# Patient Record
Sex: FEMALE | Race: WHITE | NOT HISPANIC OR LATINO | Employment: OTHER | URBAN - METROPOLITAN AREA
[De-identification: names, ages, dates, MRNs, and addresses within clinical notes are randomized per-mention and may not be internally consistent; named-entity substitution may affect disease eponyms.]

---

## 2017-01-12 ENCOUNTER — CLINICAL SUPPORT (OUTPATIENT)
Dept: AUDIOLOGY | Facility: CLINIC | Age: 62
End: 2017-01-12

## 2017-01-12 DIAGNOSIS — H90.3 SENSORINEURAL HEARING LOSS, BILATERAL: Primary | ICD-10-CM

## 2017-01-12 PROCEDURE — 99499 UNLISTED E&M SERVICE: CPT | Mod: S$GLB,,, | Performed by: OTOLARYNGOLOGY

## 2017-01-12 NOTE — PROGRESS NOTES
Mrs. Mcknight was seen for a hearing aid follow up appointment.  I decreased the gain in her high frequencies for her right aid.  She will return if it is not enough.

## 2017-02-14 ENCOUNTER — OFFICE VISIT (OUTPATIENT)
Dept: INTERNAL MEDICINE | Facility: CLINIC | Age: 62
End: 2017-02-14
Payer: COMMERCIAL

## 2017-02-14 VITALS
HEIGHT: 62 IN | WEIGHT: 132.06 LBS | BODY MASS INDEX: 24.3 KG/M2 | DIASTOLIC BLOOD PRESSURE: 70 MMHG | SYSTOLIC BLOOD PRESSURE: 110 MMHG | HEART RATE: 77 BPM | OXYGEN SATURATION: 97 %

## 2017-02-14 DIAGNOSIS — N39.0 URINARY TRACT INFECTION WITHOUT HEMATURIA, SITE UNSPECIFIED: Primary | ICD-10-CM

## 2017-02-14 PROCEDURE — 87086 URINE CULTURE/COLONY COUNT: CPT

## 2017-02-14 PROCEDURE — 99213 OFFICE O/P EST LOW 20 MIN: CPT | Mod: S$GLB,,, | Performed by: HOSPITALIST

## 2017-02-14 PROCEDURE — 99999 PR PBB SHADOW E&M-EST. PATIENT-LVL III: CPT | Mod: PBBFAC,,, | Performed by: HOSPITALIST

## 2017-02-14 RX ORDER — SERTRALINE HYDROCHLORIDE 50 MG/1
50 TABLET, FILM COATED ORAL DAILY
Qty: 30 TABLET | Refills: 3 | Status: SHIPPED | OUTPATIENT
Start: 2017-02-14 | End: 2017-12-11 | Stop reason: SDUPTHER

## 2017-02-14 RX ORDER — SULFAMETHOXAZOLE AND TRIMETHOPRIM 800; 160 MG/1; MG/1
1 TABLET ORAL 2 TIMES DAILY
Qty: 6 TABLET | Refills: 0 | Status: SHIPPED | OUTPATIENT
Start: 2017-02-14 | End: 2017-02-14 | Stop reason: SDUPTHER

## 2017-02-14 RX ORDER — SULFAMETHOXAZOLE AND TRIMETHOPRIM 800; 160 MG/1; MG/1
1 TABLET ORAL 2 TIMES DAILY
Qty: 14 TABLET | Refills: 0 | Status: SHIPPED | OUTPATIENT
Start: 2017-02-14 | End: 2017-02-21

## 2017-02-14 NOTE — MR AVS SNAPSHOT
[facility] - Internal Medicine  [address]  [place] [address]  Phone: [phone]  Fax: [phone]                  Trudi Mcknight   2017 2:00 PM   Office Visit    Description:  Female : 1955   Provider:  Gina Verma MD   Department:  [facility] Internal Medicine           Reason for Visit     Urinary Tract Infection           Diagnoses this Visit        Comments    Urinary tract infection without hematuria, site unspecified    -  Primary            To Do List           Goals (5 Years of Data)     None       These Medications        Disp Refills Start End    sulfamethoxazole-trimethoprim 800-160mg (BACTRIM DS) 800-160 mg Tab 14 tablet 0 2017    Take 1 tablet by mouth 2 (two) times daily. - Oral    Pharmacy: [facility] - [place], [place] - [address] [facility] Ph #: [phone]       sertraline (ZOLOFT) 50 MG tablet 30 tablet 3 2017 3/16/2017    Take 1 tablet (50 mg total) by mouth once daily. - Oral    Pharmacy: [facility] - [place], [place] - [address] Ph #: [phone]         [facility]     [facility] Nurse Care [facility] -  Assistance  Registered nurses in the [facility] provide clinical advisement, health education, appointment booking, and other advisory services.  Call for this free service at [phone].             Medications           Message regarding Medications     Verify the changes and/or additions to your medication regime listed below are the same as discussed with your clinician today.  If any of these changes or additions are incorrect, please notify your healthcare provider.        START taking these NEW medications        Refills    sulfamethoxazole-trimethoprim 800-160mg (BACTRIM DS) 800-160 mg Tab 0    Sig: Take 1 tablet by mouth 2 (two) times daily.    Class: Normal    Route: Oral    sertraline (ZOLOFT) 50 MG tablet 3    Sig: Take 1 tablet (50 mg  "total) by mouth once daily.    Class: Normal    Route: Oral           Verify that the below list of medications is an accurate representation of the medications you are currently taking.  If none reported, the list may be blank. If incorrect, please contact your healthcare provider. Carry this list with you in case of emergency.           Current Medications     sertraline (ZOLOFT) 50 MG tablet Take 1 tablet (50 mg total) by mouth once daily.    sulfamethoxazole-trimethoprim 800-160mg (BACTRIM DS) 800-160 mg Tab Take 1 tablet by mouth 2 (two) times daily.           Clinical Reference Information           Your Vitals Were     BP Pulse Height Weight SpO2 BMI    110/70 (BP Location: Right arm, Patient Position: Sitting, BP Method: Manual) 77 5' 1.5" (1.562 m) 59.9 kg (132 lb 0.9 oz) 97% 24.55 kg/m2      Blood Pressure          Most Recent Value    BP  110/70      Allergies as of 2/14/2017     No Known Allergies      Immunizations Administered on Date of Encounter - 2/14/2017     None      Orders Placed During Today's Visit      Normal Orders This Visit    POCT urinalysis, dipstick or tablet reag     Urine culture       Language Assistance Services     ATTENTION: Language assistance services are available, free of charge. Please call [phone].      ATENCIÓN: Si habla adamañol, tiene a fuller disposición servicios gratuitos de asistencia lingüística. Llame al [phone].     ROBERTO Ý: N?u b?n nói Ti?ng Vi?t, có các d?ch v? h? tr? ngôn ng? mi?n phí dành cho b?n. G?i s? [phone].         [facility] complies with applicable Federal civil rights laws and does not discriminate on the basis of race, color, national origin, age, disability, or sex.        "

## 2017-02-14 NOTE — PROGRESS NOTES
Subjective:       Patient ID: Trudi Mcknight is a 61 y.o. female.    Chief Complaint: Urinary Tract Infection    HPI   Ms. Mcknight is a 62 yo female w/o significant past medical history or comorbidities who presents today with complaint of (subjective) fevers, chills, HA, urinary frequency/urgency, dysuria and lower back pain (R>L). Her symptoms worsened yesterday. She describes them as consistent with previous UTIs which resolved with antibiotics. Denies h/o kidney problems/infections. She has been taking OTC pyridium. Denies N/V, chest pain, SOB, syncope/presyncope.     Review of Systems    Constitutional: +ve subjective fever w/ chills, negative for sweats and weight loss  HENT: no headaches/neck pain, congestion, nasal/ear discharge.  Eyes: no photophobia, eye/conjunctival redness/discharge, or changes in vision  Respiratory: no cough or shortness of breath, negative for hemoptysis, stridor and wheezing  Cardiovascular: no chest pain or palpitations, lower extremity edema, near-syncope and orthopnea  Gastrointestinal: no nausea or vomiting, no abdominal pain or change in bowel habits, negative for hematochezia, BRBPR, melena.  Genitourinary: no hematuria, +ve dysuria, frequency and urgency  Skin: no rashes, bumps, bruises, wounds.  Neurological: no seizures or tremors, negative for focal or unilateral weakness/paralysis  Behavioral/Psych: no auditory or visual hallucinations    Objective:      Physical Exam    Constitutional: Patient is oriented to person, place, and time. Patient appears well-developed and well-nourished. No distress.   HENT: Normocephalic and atraumatic. Oropharynx is clear and moist. No oropharyngeal exudate. Conjunctivae and EOM are normal. Pupils are equal, round, and reactive to light. No scleral icterus.  Normal range of motion. Neck supple. No tracheal deviation present. No thyromegaly present.   Cardiovascular: Normal rate, regular rhythm, normal heart sounds and intact distal pulses.   Exam reveals no gallop and no friction rub.  No murmur heard.  Pulmonary/Chest: Effort normal and breath sounds normal. No respiratory distress. Patient has no wheezes, or rales and exhibits no tenderness.   Abdominal: Soft. Bowel sounds are normal. There is no distension. There is mild suprapubic and b/l CVA tenderness.   Lymphadenopathy: Patient has no cervical adenopathy.   Neurological: Patient is alert and oriented to person, place, and time. Patient has normal reflexes. There is no cranial nerve deficit.   Skin: Skin is warm and dry. No rash noted. Patient is not diaphoretic. No erythema. No pallor.   Psychiatric: Patient has a normal mood and affect. Behavior is normal. Thought content normal.       Assessment:       1. Urinary tract infection without hematuria, site unspecified        Plan:       Urinary tract infection without hematuria, site unspecified  -     POCT urinalysis, dipstick or tablet reag  -     Urine culture  -     sulfamethoxazole-trimethoprim 800-160mg (BACTRIM DS) 800-160 mg Tab; Take 1 tablet by mouth 2 (two) times daily.  Dispense: 14 tablet; Refill: 0    Depression  Has been taking and is stable on Zoloft for many years. Recently moved from [place] and has just re-established with Dr. Moreno as her PCP. Will give 3 months worth refill and have her follow up with her PCP.   -     sertraline (ZOLOFT) 50 MG tablet; Take 1 tablet (50 mg total) by mouth once daily.  Dispense: 30 tablet; Refill: 3      Gina Verma MD  PGY-3  [phone]      I have personally seen and examined patient and agree with the A/P as noted above.    Chapincito Martinez

## 2017-02-16 LAB — BACTERIA UR CULT: NO GROWTH

## 2017-02-23 ENCOUNTER — OFFICE VISIT (OUTPATIENT)
Dept: INTERNAL MEDICINE | Facility: CLINIC | Age: 62
End: 2017-02-23
Payer: COMMERCIAL

## 2017-02-23 ENCOUNTER — LAB VISIT (OUTPATIENT)
Dept: LAB | Facility: HOSPITAL | Age: 62
End: 2017-02-23
Payer: COMMERCIAL

## 2017-02-23 VITALS
WEIGHT: 129.44 LBS | TEMPERATURE: 99 F | SYSTOLIC BLOOD PRESSURE: 106 MMHG | HEIGHT: 62 IN | HEART RATE: 82 BPM | BODY MASS INDEX: 23.82 KG/M2 | DIASTOLIC BLOOD PRESSURE: 82 MMHG | OXYGEN SATURATION: 97 %

## 2017-02-23 DIAGNOSIS — R10.9 RIGHT FLANK PAIN: Primary | ICD-10-CM

## 2017-02-23 DIAGNOSIS — R10.9 RIGHT FLANK PAIN: ICD-10-CM

## 2017-02-23 DIAGNOSIS — M54.9 CVA TENDERNESS: ICD-10-CM

## 2017-02-23 LAB
ANION GAP SERPL CALC-SCNC: 8 MMOL/L
BILIRUB SERPL-MCNC: NORMAL MG/DL
BLOOD URINE, POC: NORMAL
BUN SERPL-MCNC: 13 MG/DL
CALCIUM SERPL-MCNC: 9.9 MG/DL
CHLORIDE SERPL-SCNC: 105 MMOL/L
CO2 SERPL-SCNC: 27 MMOL/L
COLOR, POC UA: NORMAL
CREAT SERPL-MCNC: 0.9 MG/DL
EST. GFR  (AFRICAN AMERICAN): >60 ML/MIN/1.73 M^2
EST. GFR  (NON AFRICAN AMERICAN): >60 ML/MIN/1.73 M^2
GLUCOSE SERPL-MCNC: 130 MG/DL
GLUCOSE UR QL STRIP: NORMAL
KETONES UR QL STRIP: NORMAL
LEUKOCYTE ESTERASE URINE, POC: NORMAL
NITRITE, POC UA: NORMAL
PH, POC UA: 5
POTASSIUM SERPL-SCNC: 4.8 MMOL/L
PROTEIN, POC: NORMAL
SODIUM SERPL-SCNC: 140 MMOL/L
SPECIFIC GRAVITY, POC UA: 1.01
UROBILINOGEN, POC UA: NORMAL

## 2017-02-23 PROCEDURE — 99214 OFFICE O/P EST MOD 30 MIN: CPT | Mod: 25,S$GLB,, | Performed by: INTERNAL MEDICINE

## 2017-02-23 PROCEDURE — 1160F RVW MEDS BY RX/DR IN RCRD: CPT | Mod: S$GLB,,, | Performed by: INTERNAL MEDICINE

## 2017-02-23 PROCEDURE — 99999 PR PBB SHADOW E&M-EST. PATIENT-LVL IV: CPT | Mod: PBBFAC,,, | Performed by: INTERNAL MEDICINE

## 2017-02-23 PROCEDURE — 81002 URINALYSIS NONAUTO W/O SCOPE: CPT | Mod: S$GLB,,, | Performed by: INTERNAL MEDICINE

## 2017-02-23 PROCEDURE — 87086 URINE CULTURE/COLONY COUNT: CPT

## 2017-02-23 PROCEDURE — 80048 BASIC METABOLIC PNL TOTAL CA: CPT

## 2017-02-23 PROCEDURE — 36415 COLL VENOUS BLD VENIPUNCTURE: CPT

## 2017-02-23 RX ORDER — CHLORHEXIDINE GLUCONATE ORAL RINSE 1.2 MG/ML
SOLUTION DENTAL
Refills: 0 | COMMUNITY
Start: 2017-01-06 | End: 2017-12-08

## 2017-02-23 RX ORDER — DIAZEPAM 5 MG/1
5 TABLET ORAL
COMMUNITY
Start: 2014-11-16 | End: 2017-12-08

## 2017-02-23 RX ORDER — MECLIZINE HYDROCHLORIDE 25 MG/1
25-50 TABLET ORAL
COMMUNITY
Start: 2014-11-16 | End: 2017-12-08

## 2017-02-23 RX ORDER — AMOXICILLIN 500 MG/1
CAPSULE ORAL
Refills: 0 | COMMUNITY
Start: 2017-01-06 | End: 2017-12-08

## 2017-02-23 NOTE — PROGRESS NOTES
Subjective:       Patient ID: Trudi Mcknight is a 61 y.o. female.    Chief Complaint: Urinary Tract Infection    Urinary Tract Infection    This is a recurrent problem. The current episode started acute onset. The problem occurs every urination. The quality of the pain is described as aching. The pain is at a severity of 4/10. The pain is moderate. There has been no fever. There is no history of pyelonephritis. Associated symptoms include flank pain. Pertinent negatives include no chills, frequency, hematuria, nausea, urgency, vomiting or rash. Treatments tried: 7 days of bactrim ended 48 hours ago. Improvement on treatment: previous symptoms better but now returned. Her past medical history is significant for kidney stones and recurrent UTIs.     Review of Systems   Constitutional: Negative for activity change, chills, fatigue and fever.   HENT: Negative for congestion, ear pain, nosebleeds, postnasal drip, sinus pressure and sore throat.    Eyes: Negative.  Negative for visual disturbance.   Respiratory: Negative for cough, chest tightness, shortness of breath and wheezing.    Cardiovascular: Negative for chest pain.   Gastrointestinal: Negative for abdominal pain, diarrhea, nausea and vomiting.   Genitourinary: Positive for flank pain. Negative for decreased urine volume, difficulty urinating, dysuria, frequency, hematuria and urgency.   Musculoskeletal: Negative for arthralgias and neck stiffness.   Skin: Negative for rash.   Neurological: Negative for dizziness, weakness and headaches.   Psychiatric/Behavioral: Negative for sleep disturbance. The patient is not nervous/anxious.        Objective:      Physical Exam   Constitutional: She is oriented to person, place, and time. She appears well-developed and well-nourished.  Non-toxic appearance. No distress.   HENT:   Head: Normocephalic and atraumatic.   Right Ear: Tympanic membrane, external ear and ear canal normal.   Left Ear: Tympanic membrane, external ear  and ear canal normal.   Eyes: EOM are normal. Pupils are equal, round, and reactive to light. No scleral icterus.   Neck: Normal range of motion. Neck supple. No thyromegaly present.   Cardiovascular: Normal rate, regular rhythm and normal heart sounds.    Pulmonary/Chest: Effort normal and breath sounds normal.   Abdominal: Soft. Bowel sounds are normal. She exhibits no mass. There is no tenderness. There is CVA tenderness. There is no rebound.       Musculoskeletal: Normal range of motion.   Lymphadenopathy:     She has no cervical adenopathy.   Neurological: She is alert and oriented to person, place, and time. She has normal reflexes. She displays normal reflexes. No cranial nerve deficit. She exhibits normal muscle tone. Coordination normal.   Skin: Skin is warm and dry.   Psychiatric: She has a normal mood and affect. Her behavior is normal.       Assessment:       1. Right flank pain    2. CVA tenderness        Plan:   Trudi was seen today for urinary tract infection.    Diagnoses and all orders for this visit:    Right flank pain  -     POCT URINE DIPSTICK WITHOUT MICROSCOPE  -     Urine culture  -     Basic metabolic panel; Future  -     CT Urogram Abd Pelvis W WO; Future  -     Ambulatory consult to Urology    CVA tenderness  -     POCT URINE DIPSTICK WITHOUT MICROSCOPE  -     Urine culture  -     Basic metabolic panel; Future  -     CT Urogram Abd Pelvis W WO; Future  -     Ambulatory consult to Urology    Patient feels her current pain is more like the pain she had with a kidney stone many years ago.  Though there is no blood in her urine, this does not rule ou the possibility of stone.  Will look

## 2017-02-23 NOTE — MR AVS SNAPSHOT
[facility] - [facility] Medicine  [address]  [facility] [address]  Phone: [phone]  Fax: [phone]                  Trudi Mcknight   2017 1:20 PM   Office Visit    Description:  Female : 1955   Provider:  Anayeli Saldivar MD   Department:  [facility]           Reason for Visit     Urinary Tract Infection           Diagnoses this Visit        Comments    Right flank pain    -  Primary     CVA tenderness                To Do List           Future Appointments        Provider Department Dept Phone    2017 2:10 PM LAB, APPOINTMENT [facility] [phone]      Goals (5 Years of Data)     None      [facility] On Call     [facility] On Call Nurse Care Line -  Assistance  Registered nurses in the [facility] provide clinical advisement, health education, appointment booking, and other advisory services.  Call for this free service at [phone].             Medications           Message regarding Medications     Verify the changes and/or additions to your medication regime listed below are the same as discussed with your clinician today.  If any of these changes or additions are incorrect, please notify your healthcare provider.             Verify that the below list of medications is an accurate representation of the medications you are currently taking.  If none reported, the list may be blank. If incorrect, please contact your healthcare provider. Carry this list with you in case of emergency.           Current Medications     sertraline (ZOLOFT) 50 MG tablet Take 1 tablet (50 mg total) by mouth once daily.    amoxicillin (AMOXIL) 500 MG capsule TAKE ONE CAPSULE BY MOUTH 3 TIMES A DAY FOR 7 DAYS    chlorhexidine (PERIDEX) 0.12 % solution SWISH AND SPIT 1/2 OUNCE TWICE A DAY FOR 7 DAYS    diazePAM (VALIUM) 5 MG tablet Take 5 mg by mouth.    meclizine (ANTIVERT) 25 mg tablet Take 25-50 mg by mouth.           Clinical  "Reference Information           Your Vitals Were     BP Pulse Temp Height Weight SpO2    106/82 (BP Location: Left arm, Patient Position: Sitting, BP Method: Manual) 82 98.5 °F (36.9 °C) (Oral) 5' 1.5" (1.562 m) 58.7 kg (129 lb 6.6 oz) 97%    BMI                24.06 kg/m2          Blood Pressure          Most Recent Value    BP  106/82      Allergies as of 2/23/2017     No Known Allergies      Immunizations Administered on Date of Encounter - 2/23/2017     None      Orders Placed During Today's Visit      Normal Orders This Visit    Ambulatory consult to Urology     POCT URINE DIPSTICK WITHOUT MICROSCOPE     Urine culture     Future Labs/Procedures Expected by Expires    Basic metabolic panel  2/23/2017 2/23/2018    CT Urogram Abd Pelvis W WO  2/23/2017 2/23/2018      Language Assistance Services     ATTENTION: Language assistance services are available, free of charge. Please call [phone].      ATENCIÓN: Si habla español, tiene a fuller disposición servicios gratuitos de asistencia lingüística. Llame al [phone].     ROBERTO Ý: N?u b?n nói Ti?ng Vi?t, có các d?ch v? h? tr? ngôn ng? mi?n phí dành cho b?n. G?i s? [phone].         [facility] complies with applicable Federal civil rights laws and does not discriminate on the basis of race, color, national origin, age, disability, or sex.        "

## 2017-02-24 ENCOUNTER — TELEPHONE (OUTPATIENT)
Dept: INTERNAL MEDICINE | Facility: CLINIC | Age: 62
End: 2017-02-24

## 2017-02-25 LAB — BACTERIA UR CULT: NO GROWTH

## 2017-12-08 ENCOUNTER — OFFICE VISIT (OUTPATIENT)
Dept: DERMATOLOGY | Facility: CLINIC | Age: 62
End: 2017-12-08
Payer: COMMERCIAL

## 2017-12-08 ENCOUNTER — TELEPHONE (OUTPATIENT)
Dept: PLASTIC SURGERY | Facility: CLINIC | Age: 62
End: 2017-12-08

## 2017-12-08 DIAGNOSIS — D22.9 NEVUS OF MULTIPLE SITES: Primary | ICD-10-CM

## 2017-12-08 DIAGNOSIS — L81.4 LENTIGINES: ICD-10-CM

## 2017-12-08 DIAGNOSIS — R20.9 DISTURBANCE OF SKIN SENSATION: ICD-10-CM

## 2017-12-08 PROCEDURE — 99202 OFFICE O/P NEW SF 15 MIN: CPT | Mod: S$GLB,,, | Performed by: DERMATOLOGY

## 2017-12-08 PROCEDURE — 99999 PR PBB SHADOW E&M-EST. PATIENT-LVL II: CPT | Mod: PBBFAC,,, | Performed by: DERMATOLOGY

## 2017-12-08 RX ORDER — HYDROQUINONE 40 MG/G
CREAM TOPICAL
Qty: 28.35 G | Refills: 3 | Status: SHIPPED | OUTPATIENT
Start: 2017-12-08 | End: 2017-12-11 | Stop reason: SDUPTHER

## 2017-12-08 NOTE — TELEPHONE ENCOUNTER
----- Message from Gina Ken MD sent at 12/8/2017  2:21 PM CST -----  Nevus on right nose growing, please schedule for consult removal

## 2017-12-08 NOTE — PROGRESS NOTES
Subjective:       Patient ID:  Trudi Mcknight is a 62 y.o. female who presents for   Chief Complaint   Patient presents with    Mole     History of Present Illness: The patient presents with chief complaint of mole.  Location: right nose  Duration: years  Signs/Symptoms: growing, irritated    Prior treatments: none          Review of Systems   Constitutional: Negative for fever.   Skin: Negative for itching and rash.   Hematologic/Lymphatic: Does not bruise/bleed easily.        Objective:    Physical Exam   Constitutional: She appears well-developed and well-nourished. No distress.   Neurological: She is alert and oriented to person, place, and time. She is not disoriented.   Psychiatric: She has a normal mood and affect.   Skin:   Areas Examined (abnormalities noted in diagram):   Head / Face Inspection Performed  Neck Inspection Performed  Chest / Axilla Inspection Performed  Abdomen Inspection Performed  Back Inspection Performed  RUE Inspected  LUE Inspection Performed                   Diagram Legend     Erythematous scaling macule/papule c/w actinic keratosis       Vascular papule c/w angioma      Pigmented verrucoid papule/plaque c/w seborrheic keratosis      Yellow umbilicated papule c/w sebaceous hyperplasia      Irregularly shaped tan macule c/w lentigo     1-2 mm smooth white papules consistent with Milia      Movable subcutaneous cyst with punctum c/w epidermal inclusion cyst      Subcutaneous movable cyst c/w pilar cyst      Firm pink to brown papule c/w dermatofibroma      Pedunculated fleshy papule(s) c/w skin tag(s)      Evenly pigmented macule c/w junctional nevus     Mildly variegated pigmented, slightly irregular-bordered macule c/w mildly atypical nevus      Flesh colored to evenly pigmented papule c/w intradermal nevus       Pink pearly papule/plaque c/w basal cell carcinoma      Erythematous hyperkeratotic cursted plaque c/w SCC      Surgical scar with no sign of skin cancer recurrence       "Open and closed comedones      Inflammatory papules and pustules      Verrucoid papule consistent consistent with wart     Erythematous eczematous patches and plaques     Dystrophic onycholytic nail with subungual debris c/w onychomycosis     Umbilicated papule    Erythematous-base heme-crusted tan verrucoid plaque consistent with inflamed seborrheic keratosis     Erythematous Silvery Scaling Plaque c/w Psoriasis     See annotation      Assessment / Plan:        Nevus of multiple sites  The "ABCD" rules to observe pigmented lesions were reviewed.  Refer for removal of nevus on right nose    Lentigines  -     hydroquinone 4 % Crea; Use hs on dark spots  Dispense: 28.35 g; Refill: 3s    Disturbance of skin sensation nevus right nose      Angiomas  reassurance               Return in about 1 year (around 12/8/2018).  "

## 2017-12-08 NOTE — LETTER
December 8, 2017      Renuka Moreno MD  [address]  [facility] [address]           [facility]  [address] [place] [address]  Phone: [phone]  Fax: [phone]          Patient: Trudi Mcknight   MR Number: [id number]   YOB: 1955   Date of Visit: 12/8/2017       Dear Dr. Renuka Moreno:    Thank you for referring Trudi Mcknight to me for evaluation. Attached you will find relevant portions of my assessment and plan of care.    If you have questions, please do not hesitate to call me. I look forward to following Trudi Mcknight along with you.    Sincerely,    Gina Ken MD    Enclosure  CC:  No Recipients    If you would like to receive this communication electronically, please contact [email or web address] or [phone] to request more information on [facility] [employer] access.    For providers and/or their staff who would like to refer a patient to [facility], please contact us through our one-stop-shop provider referral line, [facility], at [phone].    If you feel you have received this communication in error or would no longer like to receive these types of communications, please e-mail [email or web address]

## 2017-12-11 ENCOUNTER — OFFICE VISIT (OUTPATIENT)
Dept: INTERNAL MEDICINE | Facility: CLINIC | Age: 62
End: 2017-12-11
Payer: COMMERCIAL

## 2017-12-11 ENCOUNTER — HOSPITAL ENCOUNTER (OUTPATIENT)
Dept: RADIOLOGY | Facility: HOSPITAL | Age: 62
Discharge: HOME | End: 2017-12-11
Attending: INTERNAL MEDICINE
Payer: COMMERCIAL

## 2017-12-11 ENCOUNTER — TELEPHONE (OUTPATIENT)
Dept: INTERNAL MEDICINE | Facility: CLINIC | Age: 62
End: 2017-12-11

## 2017-12-11 VITALS
WEIGHT: 131 LBS | HEART RATE: 78 BPM | HEIGHT: 62 IN | BODY MASS INDEX: 24.11 KG/M2 | OXYGEN SATURATION: 98 % | DIASTOLIC BLOOD PRESSURE: 80 MMHG | SYSTOLIC BLOOD PRESSURE: 116 MMHG

## 2017-12-11 DIAGNOSIS — Z87.19 HISTORY OF GASTRITIS: ICD-10-CM

## 2017-12-11 DIAGNOSIS — Z00.00 HEALTH CARE MAINTENANCE: Primary | ICD-10-CM

## 2017-12-11 DIAGNOSIS — L81.9 HYPERPIGMENTATION: ICD-10-CM

## 2017-12-11 DIAGNOSIS — Z12.31 ENCOUNTER FOR SCREENING MAMMOGRAM FOR BREAST CANCER: ICD-10-CM

## 2017-12-11 DIAGNOSIS — J30.9 CHRONIC ALLERGIC RHINITIS, UNSPECIFIED SEASONALITY, UNSPECIFIED TRIGGER: ICD-10-CM

## 2017-12-11 DIAGNOSIS — J30.2 CHRONIC SEASONAL ALLERGIC RHINITIS, UNSPECIFIED TRIGGER: Primary | ICD-10-CM

## 2017-12-11 DIAGNOSIS — F32.A DEPRESSION, UNSPECIFIED DEPRESSION TYPE: ICD-10-CM

## 2017-12-11 PROCEDURE — 99999 PR PBB SHADOW E&M-EST. PATIENT-LVL IV: CPT | Mod: PBBFAC,,, | Performed by: INTERNAL MEDICINE

## 2017-12-11 PROCEDURE — 99396 PREV VISIT EST AGE 40-64: CPT | Mod: S$GLB,,, | Performed by: INTERNAL MEDICINE

## 2017-12-11 PROCEDURE — 77067 SCR MAMMO BI INCL CAD: CPT | Mod: TC

## 2017-12-11 PROCEDURE — 77067 SCR MAMMO BI INCL CAD: CPT | Mod: 26,,, | Performed by: RADIOLOGY

## 2017-12-11 RX ORDER — SERTRALINE HYDROCHLORIDE 25 MG/1
12.5 TABLET, FILM COATED ORAL DAILY
Qty: 45 TABLET | Refills: 3 | Status: SHIPPED | OUTPATIENT
Start: 2017-12-11 | End: 2019-03-08 | Stop reason: SDUPTHER

## 2017-12-11 RX ORDER — AZELASTINE HYDROCHLORIDE, FLUTICASONE PROPIONATE 137; 50 UG/1; UG/1
1 SPRAY, METERED NASAL 2 TIMES DAILY
Qty: 23 G | Refills: 11 | Status: SHIPPED | OUTPATIENT
Start: 2017-12-11 | End: 2017-12-11 | Stop reason: ALTCHOICE

## 2017-12-11 RX ORDER — AZELASTINE 1 MG/ML
1 SPRAY, METERED NASAL 2 TIMES DAILY
Qty: 30 ML | Refills: 11 | Status: SHIPPED | OUTPATIENT
Start: 2017-12-11 | End: 2020-12-03

## 2017-12-11 RX ORDER — FLUTICASONE PROPIONATE 50 MCG
2 SPRAY, SUSPENSION (ML) NASAL DAILY
Qty: 16 G | Refills: 12 | Status: SHIPPED | OUTPATIENT
Start: 2017-12-11 | End: 2018-12-11

## 2017-12-11 RX ORDER — HYDROQUINONE 40 MG/G
CREAM TOPICAL
Qty: 28.35 G | Refills: 3 | Status: SHIPPED | OUTPATIENT
Start: 2017-12-11 | End: 2019-08-05 | Stop reason: SDUPTHER

## 2017-12-11 RX ORDER — HYDROQUINONE 40 MG/G
CREAM TOPICAL
Qty: 28.35 G | Refills: 3 | Status: CANCELLED | OUTPATIENT
Start: 2017-12-11

## 2017-12-11 NOTE — PROGRESS NOTES
"Subjective:       Patient ID: Trudi Mcknight is a 62 y.o. female.    Chief Complaint: Annual Exam (yearly check up.)    HPI   Trudi Mcknight is a 62 y.o. female here for a yearly preventative healthcare visit.     Hx of esophagitis and gastritis. Previously on PPI. egd and cscope in 2015.   Stomach pain/reflux comes and goes. Omeprazole gives her abdominal pain.   Flares about 30% of the time.     Review of Systems   Constitutional: Positive for activity change.   HENT: Positive for hearing loss.    Eyes: Negative for discharge and visual disturbance.   Respiratory: Negative for wheezing.    Cardiovascular: Negative for chest pain and palpitations.   Gastrointestinal: Negative for constipation, diarrhea and vomiting.   Genitourinary: Negative for difficulty urinating and hematuria.   Musculoskeletal: Positive for arthralgias.   Neurological: Negative for headaches.   Psychiatric/Behavioral: Negative for dysphoric mood.       Objective:   /80 (BP Location: Right arm, Patient Position: Sitting, BP Method: Medium (Manual))   Pulse 78   Ht 5' 2" (1.575 m)   Wt 59.4 kg (131 lb)   SpO2 98%   BMI 23.96 kg/m²      Physical Exam   Constitutional: She is oriented to person, place, and time. She appears well-developed and well-nourished.   HENT:   Head: Normocephalic and atraumatic.   Eyes: Conjunctivae and EOM are normal. Pupils are equal, round, and reactive to light.   Neck: Neck supple. No thyromegaly present.   Cardiovascular: Normal rate, regular rhythm and normal heart sounds.    No murmur heard.  Pulmonary/Chest: Effort normal and breath sounds normal. No respiratory distress. She has no wheezes.   Abdominal: Soft. Bowel sounds are normal. She exhibits no distension. There is no tenderness.   Musculoskeletal: Normal range of motion.   Neurological: She is alert and oriented to person, place, and time.   Skin: Skin is warm and dry. No rash noted.   Psychiatric: She has a normal mood and affect. Judgment and " thought content normal.   Vitals reviewed.      Assessment:       1. Health care maintenance    2. History of gastritis    3. Chronic allergic rhinitis, unspecified seasonality, unspecified trigger    4. Encounter for screening mammogram for breast cancer    5. Hyperpigmentation    6. Depression, unspecified depression type        Plan:       Trudi was seen today for annual exam.    Diagnoses and all orders for this visit:    Health care maintenance  -     CBC auto differential; Future  -     Comprehensive metabolic panel; Future  -     Hemoglobin A1c; Future  -     Lipid panel; Future  -     TSH; Future    History of gastritis   Intolerant of PPI   Try zantac OTC as needed   Monitor   egd in 2015    Chronic allergic rhinitis, unspecified seasonality, unspecified trigger  -     azelastine-fluticasone 137-50 mcg/spray Spry nassal spray; 1 spray by Each Nare route 2 (two) times daily.    Encounter for screening mammogram for breast cancer  -     Mammo Digital Screening Bilateral With CAD; Future    Hyperpigmentation  -     hydroquinone 4 % Crea; Use hs on dark spots    Depression, unspecified depression type  -     sertraline (ZOLOFT) 25 MG tablet; Take 0.5 tablets (12.5 mg total) by mouth once daily.

## 2017-12-11 NOTE — TELEPHONE ENCOUNTER
Please call pt. Her insurance does not cover dymista but will cover fluticasone and azelastine nose sprays separately. These are the components of dymista. She should take both daily as needed for symptoms.

## 2017-12-13 ENCOUNTER — OFFICE VISIT (OUTPATIENT)
Dept: PLASTIC SURGERY | Facility: CLINIC | Age: 62
End: 2017-12-13

## 2017-12-13 VITALS
SYSTOLIC BLOOD PRESSURE: 108 MMHG | HEIGHT: 62 IN | BODY MASS INDEX: 24.18 KG/M2 | HEART RATE: 81 BPM | TEMPERATURE: 99 F | WEIGHT: 131.38 LBS | DIASTOLIC BLOOD PRESSURE: 75 MMHG

## 2017-12-13 DIAGNOSIS — D22.39 NEVUS OF NOSE: Primary | ICD-10-CM

## 2017-12-13 PROCEDURE — 99203 OFFICE O/P NEW LOW 30 MIN: CPT | Mod: S$GLB,,, | Performed by: PHYSICIAN ASSISTANT

## 2017-12-13 PROCEDURE — 99999 PR PBB SHADOW E&M-EST. PATIENT-LVL III: CPT | Mod: PBBFAC,,, | Performed by: PHYSICIAN ASSISTANT

## 2017-12-13 PROCEDURE — 99213 OFFICE O/P EST LOW 20 MIN: CPT | Mod: PBBFAC,PO | Performed by: PHYSICIAN ASSISTANT

## 2017-12-13 NOTE — PROGRESS NOTES
Trudi [facility]ck[facility] for consult regarding nevus of Right nasal ala. 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 She was seen today by myself and Dr. Ruben Zarate      Review of patient's allergies indicates:  No Known Allergies  Current Outpatient Prescriptions on File Prior to Visit   Medication Sig Dispense Refill    azelastine (ASTELIN) 137 mcg (0.1 %) nasal spray 1 spray (137 mcg total) by Nasal route 2 (two) times daily. 30 mL 11    fluticasone (FLONASE) 50 mcg/actuation nasal spray 2 sprays by Each Nare route once daily. 16 g 12    hydroquinone 4 % Crea Use hs on dark spots 28.35 g 3    sertraline (ZOLOFT) 25 MG tablet Take 0.5 tablets (12.5 mg total) by mouth once daily. 45 tablet 3     No current facility-administered medications on file prior to visit.      Patient Active Problem List   Diagnosis    Esophagitis    History of gastritis     Past Surgical History:   Procedure Laterality Date    BREAST CYST ASPIRATION           SECTION      HYSTERECTOMY      TONSILLECTOMY       PHYSICAL EXAMINATION  Vitals:    17 1112   BP: 108/75   Pulse: 81   Temp: 98.5 °F (36.9 °C)     WD WN NAD  VSS  Normal resp effort  Face - [id number] nevus of R nasal ala    ASSESSMENT/PLAN  62 y.o. F with nevus of R nasal ala  - excision of nevus under local anesthesia discussed in detail with the patient  - she understands the risks, benefits and alternatives  - understands that her nose may be altered in appearance for several months (pulling toward the right) but this should resolve, but may be permanently altered   - Will submit for insurance authorization and schedule for minor procedure in  - March    All questions were answered. The patient was advised to call the clinic with any questions or concerns prior to their next visit.

## 2018-01-15 ENCOUNTER — OFFICE VISIT (OUTPATIENT)
Dept: INTERNAL MEDICINE | Facility: CLINIC | Age: 63
End: 2018-01-15
Payer: COMMERCIAL

## 2018-01-15 VITALS
HEIGHT: 62 IN | OXYGEN SATURATION: 98 % | BODY MASS INDEX: 30.36 KG/M2 | HEART RATE: 90 BPM | TEMPERATURE: 98 F | DIASTOLIC BLOOD PRESSURE: 78 MMHG | WEIGHT: 165 LBS | SYSTOLIC BLOOD PRESSURE: 116 MMHG

## 2018-01-15 DIAGNOSIS — J01.90 SUBACUTE SINUSITIS, UNSPECIFIED LOCATION: Primary | ICD-10-CM

## 2018-01-15 PROCEDURE — 99213 OFFICE O/P EST LOW 20 MIN: CPT | Mod: S$GLB,,, | Performed by: FAMILY MEDICINE

## 2018-01-15 PROCEDURE — 99999 PR PBB SHADOW E&M-EST. PATIENT-LVL III: CPT | Mod: PBBFAC,,, | Performed by: FAMILY MEDICINE

## 2018-01-15 RX ORDER — AMOXICILLIN 500 MG/1
1000 CAPSULE ORAL 3 TIMES DAILY
Qty: 42 CAPSULE | Refills: 0 | Status: SHIPPED | OUTPATIENT
Start: 2018-01-15 | End: 2018-01-22

## 2018-01-15 NOTE — PROGRESS NOTES
"S/  [facility] visit. PCP is Renuka Moreno MD   63yo retired  with 2+ weeks of a bad sinus infeciton getting worse even with all OTC meds and sinus washes. Says this used ot happen a few tiems a year and only treatment that worked is AMoxil. Has not happened for 2 years till now.    O/  /78 (BP Location: Right arm, Patient Position: Sitting, BP Method: Large (Manual))   Pulse 90   Temp 98.3 °F (36.8 °C) (Oral)   Ht 5' 2" (1.575 m)   Wt 74.8 kg (165 lb)   SpO2 98%   BMI 30.18 kg/m²   +sinus tenderness  Pharynx with PND  Neck supple  Cor s1s2  Lungs CTA  Abd benign    Trudi was seen today for sinusitis.    Diagnoses and all orders for this visit:    Subacute sinusitis, unspecified location  -     amoxicillin (AMOXIL) 500 MG capsule; Take 2 capsules (1,000 mg total) by mouth 3 (three) times daily.x7d  - discussed getting a steroid shot to reduce inflammation, but pt says that usuallyt  antibiotics work fine and she is not used to getting steroids for her sinus problems  - f/u prn          "

## 2018-02-16 ENCOUNTER — TELEPHONE (OUTPATIENT)
Dept: PLASTIC SURGERY | Facility: CLINIC | Age: 63
End: 2018-02-16

## 2018-02-16 NOTE — TELEPHONE ENCOUNTER
Ms. Mcknight surgery cannot be scheduled at this time because I don't have insurance confirmation. I assured her that I would send a message to Lenora King to make her aware of Ms. Mcknight' concerns and that Ms. Mcknight would receive a call from Lenora King or myself with an update as soon as we hear from her insurance provider.    ----- Message from Cheryle Crockett sent at 2/14/2018 10:52 AM CST -----  Contact: self   Trudi States that she needs a call returned by a nurse in reference to an surgery date for a mole removal , Please call Trudi @ [phone] . Thanks :)

## 2018-05-24 ENCOUNTER — OFFICE VISIT (OUTPATIENT)
Dept: OPTOMETRY | Facility: CLINIC | Age: 63
End: 2018-05-24
Payer: COMMERCIAL

## 2018-05-24 DIAGNOSIS — H52.203 ASTIGMATISM OF BOTH EYES WITH PRESBYOPIA: Primary | ICD-10-CM

## 2018-05-24 DIAGNOSIS — H52.4 ASTIGMATISM OF BOTH EYES WITH PRESBYOPIA: Primary | ICD-10-CM

## 2018-05-24 PROCEDURE — 92004 COMPRE OPH EXAM NEW PT 1/>: CPT | Mod: S$GLB,,, | Performed by: OPTOMETRIST

## 2018-05-24 PROCEDURE — 92015 DETERMINE REFRACTIVE STATE: CPT | Mod: S$GLB,,, | Performed by: OPTOMETRIST

## 2018-05-24 PROCEDURE — 99999 PR PBB SHADOW E&M-EST. PATIENT-LVL II: CPT | Mod: PBBFAC,,, | Performed by: OPTOMETRIST

## 2018-05-24 NOTE — PROGRESS NOTES
HPI     DRISS: 1 year ago   Pt states OD feels strained and tired when reading for long periods. Va os   is fine  Occasional floaters and flashes  spex 3 yrs old  Hx PVD OU    No gtts     Last edited by Dandre Sanchez, OD on 5/24/2018 10:29 AM. (History)        ROS     Negative for: Constitutional, Gastrointestinal, Neurological, Skin,   Genitourinary, Musculoskeletal, HENT, Endocrine, Cardiovascular, Eyes,   Respiratory, Psychiatric, Allergic/Imm, Heme/Lymph    Last edited by Dandre Sanchez, OD on 5/24/2018 10:29 AM. (History)        Assessment /Plan     For exam results, see Encounter Report.    Astigmatism of both eyes with presbyopia      1. Small Cats OD>OS. wrote new spex Rx  2. Dry eye sx--advised SYSTANE ATs BID/prn    PLAN:    rtc 1 yr

## 2018-06-27 ENCOUNTER — TELEPHONE (OUTPATIENT)
Dept: OPTOMETRY | Facility: CLINIC | Age: 63
End: 2018-06-27

## 2018-06-27 NOTE — TELEPHONE ENCOUNTER
----- Message from Jia Dunn sent at 6/27/2018  1:14 PM CDT -----  Contact: Trudi  Patient Requesting Sooner Appointment.     Reason for sooner appt.:the patient eye glass prescription is incorrect and would like it checked again  When is the first available appointment?July 10  Communication Preference:[phone]  Additional Information:

## 2018-07-02 ENCOUNTER — OFFICE VISIT (OUTPATIENT)
Dept: OPTOMETRY | Facility: CLINIC | Age: 63
End: 2018-07-02
Payer: COMMERCIAL

## 2018-07-02 DIAGNOSIS — H52.4 ASTIGMATISM OF BOTH EYES WITH PRESBYOPIA: Primary | ICD-10-CM

## 2018-07-02 DIAGNOSIS — H52.203 ASTIGMATISM OF BOTH EYES WITH PRESBYOPIA: Primary | ICD-10-CM

## 2018-07-02 PROCEDURE — 99999 PR PBB SHADOW E&M-EST. PATIENT-LVL II: CPT | Mod: PBBFAC,,, | Performed by: OPTOMETRIST

## 2018-07-02 PROCEDURE — 99499 UNLISTED E&M SERVICE: CPT | Mod: S$GLB,,, | Performed by: OPTOMETRIST

## 2018-07-02 NOTE — PROGRESS NOTES
HPI     DLS: 5/24/2018  Pt states distance va with new glasses Rx is blurry. Harder to see street   signs      Last edited by Tricia Worthington on 7/2/2018  8:10 AM. (History)            Assessment /Plan     For exam results, see Encounter Report.    Astigmatism of both eyes with presbyopia      See notes from las  month:  1. Small Cats OD>OS. wrote new spex Rx  2. Dry eye sx--advised SYSTANE ATs BID/prn    TODAY pt presents w new PALs from our optical--reports difficulty w VA at all distances, and periph distortion.  Wore PALs in past w no problems (last pair from optical in [place]).  Refraction today stable--appears OCs may be off a little    PLAN:    Will have Loc call pt to set up time to check OC placement.  If all Ok will remake spex to match old guaman  NOLOS EPRx charge today

## 2018-08-01 ENCOUNTER — PROCEDURE VISIT (OUTPATIENT)
Dept: PLASTIC SURGERY | Facility: CLINIC | Age: 63
End: 2018-08-01
Payer: COMMERCIAL

## 2018-08-01 DIAGNOSIS — D22.9 NEVUS: Primary | ICD-10-CM

## 2018-08-01 PROCEDURE — 11441 EXC FACE-MM B9+MARG 0.6-1 CM: CPT | Mod: S$GLB,,, | Performed by: SURGERY

## 2018-08-01 PROCEDURE — 88305 TISSUE EXAM BY PATHOLOGIST: CPT | Performed by: PATHOLOGY

## 2018-08-01 NOTE — PROCEDURES
DATE OF PROCEDURE:  08/01/2018    PREOPERATIVE DIAGNOSIS:  Nevus, right alar base.    POSTOPERATIVE DIAGNOSIS:  Nevus, right alar base.    PROCEDURE PERFORMED:  Excision of nevus of the right alar base with primary   closure.  Final incision length is 8 mm.    The patient was placed in the supine position and 1% lidocaine with epinephrine   was instilled.  The lesion was excised and closed using interrupted 5-0 nylon.    There were no complications.        /SADAF [id number] DATE OF SERVICE        JYOTI/ADELE  dd: 08/01/2018 18:44:07 (CDT)  td: 08/02/2018 09:50:24 (CDT)  Doc ID   #[id number]  Job ID #[id number]    CC:

## 2019-01-06 ENCOUNTER — OFFICE VISIT (OUTPATIENT)
Dept: URGENT CARE | Facility: CLINIC | Age: 64
End: 2019-01-06
Payer: COMMERCIAL

## 2019-01-06 VITALS
TEMPERATURE: 98 F | WEIGHT: 130 LBS | RESPIRATION RATE: 14 BRPM | HEIGHT: 61 IN | HEART RATE: 77 BPM | BODY MASS INDEX: 24.55 KG/M2 | SYSTOLIC BLOOD PRESSURE: 123 MMHG | DIASTOLIC BLOOD PRESSURE: 83 MMHG | OXYGEN SATURATION: 99 %

## 2019-01-06 DIAGNOSIS — J32.9 BACTERIAL SINUSITIS: Primary | ICD-10-CM

## 2019-01-06 DIAGNOSIS — B96.89 BACTERIAL SINUSITIS: Primary | ICD-10-CM

## 2019-01-06 PROCEDURE — 99214 OFFICE O/P EST MOD 30 MIN: CPT | Mod: S$GLB,,, | Performed by: PHYSICIAN ASSISTANT

## 2019-01-06 PROCEDURE — 3008F BODY MASS INDEX DOCD: CPT | Mod: CPTII,S$GLB,, | Performed by: PHYSICIAN ASSISTANT

## 2019-01-06 PROCEDURE — 99214 PR OFFICE/OUTPT VISIT, EST, LEVL IV, 30-39 MIN: ICD-10-PCS | Mod: S$GLB,,, | Performed by: PHYSICIAN ASSISTANT

## 2019-01-06 PROCEDURE — 3008F PR BODY MASS INDEX (BMI) DOCUMENTED: ICD-10-PCS | Mod: CPTII,S$GLB,, | Performed by: PHYSICIAN ASSISTANT

## 2019-01-06 RX ORDER — DIAZEPAM 5 MG/1
5 TABLET ORAL
COMMUNITY
Start: 2014-11-16 | End: 2021-02-12

## 2019-01-06 RX ORDER — MECLIZINE HYDROCHLORIDE 25 MG/1
25-50 TABLET ORAL
COMMUNITY
Start: 2014-11-16 | End: 2019-08-05

## 2019-01-06 RX ORDER — AMOXICILLIN AND CLAVULANATE POTASSIUM 875; 125 MG/1; MG/1
1 TABLET, FILM COATED ORAL 2 TIMES DAILY
Qty: 20 TABLET | Refills: 0 | Status: SHIPPED | OUTPATIENT
Start: 2019-01-06 | End: 2019-01-16

## 2019-01-06 RX ORDER — OMEPRAZOLE 40 MG/1
CAPSULE, DELAYED RELEASE ORAL
COMMUNITY
Start: 2015-05-18 | End: 2022-01-31

## 2019-01-06 RX ORDER — PROMETHAZINE HYDROCHLORIDE AND CODEINE PHOSPHATE 6.25; 1 MG/5ML; MG/5ML
5 SOLUTION ORAL EVERY 4 HOURS PRN
Qty: 118 ML | Refills: 0 | Status: SHIPPED | OUTPATIENT
Start: 2019-01-06 | End: 2019-02-25

## 2019-01-06 RX ORDER — SERTRALINE HYDROCHLORIDE 25 MG/1
TABLET, FILM COATED ORAL
Refills: 3 | COMMUNITY
Start: 2018-12-09 | End: 2020-01-07

## 2019-01-06 RX ORDER — METHYLPREDNISOLONE 4 MG/1
TABLET ORAL
Qty: 1 PACKAGE | Refills: 0 | Status: SHIPPED | OUTPATIENT
Start: 2019-01-06 | End: 2019-02-25

## 2019-01-06 NOTE — PATIENT INSTRUCTIONS
Sinusitis (Antibiotic Treatment)    The sinuses are air-filled spaces within the bones of the face. They connect to the inside of the nose. Sinusitis is an inflammation of the tissue lining the sinus cavity. Sinus inflammation can occur during a cold. It can also be due to allergies to pollens and other particles in the air. Sinusitis can cause symptoms of sinus congestion and fullness. A sinus infection causes fever, headache and facial pain. There is often green or yellow drainage from the nose or into the back of the throat (post-nasal drip). You have been given antibiotics to treat this condition.  Home care:  · Take the full course of antibiotics as instructed. Do not stop taking them, even if you feel better.  · Drink plenty of water, hot tea, and other liquids. This may help thin mucus. It also may promote sinus drainage.  · Heat may help soothe painful areas of the face. Use a towel soaked in hot water. Or,  the shower and direct the hot spray onto your face. Using a vaporizer along with a menthol rub at night may also help.   · An expectorant containing guaifenesin may help thin the mucus and promote drainage from the sinuses.  · Over-the-counter decongestants may be used unless a similar medicine was prescribed. Nasal sprays work the fastest. Use one that contains phenylephrine or oxymetazoline. First blow the nose gently. Then use the spray. Do not use these medicines more often than directed on the label or symptoms may get worse. You may also use tablets containing pseudoephedrine. Avoid products that combine ingredients, because side effects may be increased. Read labels. You can also ask the pharmacist for help. (NOTE: Persons with high blood pressure should not use decongestants. They can raise blood pressure.)  · Over-the-counter antihistamines may help if allergies contributed to your sinusitis.    · Do not use nasal rinses or irrigation during an acute sinus infection, unless told to by  your health care provider. Rinsing may spread the infection to other sinuses.  · Use acetaminophen or ibuprofen to control pain, unless another pain medicine was prescribed. (If you have chronic liver or kidney disease or ever had a stomach ulcer, talk with your doctor before using these medicines. Aspirin should never be used in anyone under 18 years of age who is ill with a fever. It may cause severe liver damage.)  · Don't smoke. This can worsen symptoms.  Follow-up care  Follow up with your healthcare provider or our staff if you are not improving within the next week.  When to seek medical advice  Call your healthcare provider if any of these occur:  · Facial pain or headache becoming more severe  · Stiff neck  · Unusual drowsiness or confusion  · Swelling of the forehead or eyelids  · Vision problems, including blurred or double vision  · Fever of 100.4ºF (38ºC) or higher, or as directed by your healthcare provider  · Seizure  · Breathing problems  · Symptoms not resolving within 10 days  Date Last Reviewed: 4/13/2015  © 4066-3603 [employer] [address] [place] [address], [place] [address]. All rights reserved. This information is not intended as a substitute for professional medical care. Always follow your healthcare professional's instructions.

## 2019-01-06 NOTE — PROGRESS NOTES
"Subjective:       Patient ID: Trudi Mcknight is a 63 y.o. female.    Vitals:  height is 5' 1" (1.549 m) and weight is 59 kg (130 lb). Her oral temperature is 97.8 °F (36.6 °C). Her blood pressure is 123/83 and her pulse is 77. Her respiration is 14 and oxygen saturation is 99%.     Chief Complaint: Sinus Problem    Pt c/o sinus pressure, sinus pain, sneezing, hoarse voice, fever,  cough and right ear pain, x 2 wks      Sinus Problem   This is a new problem. The current episode started 1 to 4 weeks ago. The problem has been gradually worsening since onset. The maximum temperature recorded prior to her arrival was 100.4 - 100.9 F. Her pain is at a severity of 8/10. The pain is moderate. Associated symptoms include chills, congestion (nasal congestion), coughing, diaphoresis, ear pain (rigth ear), headaches, a hoarse voice, sinus pressure and sneezing. Pertinent negatives include no neck pain, shortness of breath or sore throat.       Constitution: Positive for chills, sweating, fatigue and fever.   HENT: Positive for ear pain (rigth ear), congestion (nasal congestion), postnasal drip, sinus pain, sinus pressure and voice change. Negative for sore throat.    Neck: Negative for neck pain, neck stiffness and painful lymph nodes.   Cardiovascular: Negative for chest pain and palpitations.   Eyes: Negative for eye pain and eye redness.   Respiratory: Positive for cough. Negative for chest tightness, sputum production, bloody sputum, COPD, shortness of breath, stridor, wheezing and asthma.    Gastrointestinal: Positive for vomiting (once from coughing). Negative for abdominal pain, nausea and diarrhea.   Musculoskeletal: Positive for muscle ache. Negative for back pain.   Skin: Negative for color change and rash.   Allergic/Immunologic: Positive for sneezing. Negative for seasonal allergies and asthma.   Neurological: Positive for headaches. Negative for dizziness.   Hematologic/Lymphatic: Negative for swollen lymph nodes. "       Objective:      Physical Exam   Constitutional: She is oriented to person, place, and time. She appears well-developed and well-nourished. She is cooperative.  Non-toxic appearance. She does not appear ill. No distress.   HENT:   Head: Normocephalic and atraumatic.   Right Ear: Hearing, tympanic membrane, external ear and ear canal normal.   Left Ear: Hearing, tympanic membrane, external ear and ear canal normal.   Nose: Mucosal edema present. No rhinorrhea or nasal deformity. No epistaxis. Right sinus exhibits maxillary sinus tenderness and frontal sinus tenderness. Left sinus exhibits no maxillary sinus tenderness and no frontal sinus tenderness.   Mouth/Throat: Uvula is midline, oropharynx is clear and moist and mucous membranes are normal. No trismus in the jaw. Normal dentition. No uvula swelling. No posterior oropharyngeal erythema.   Eyes: Conjunctivae and lids are normal. No scleral icterus.   Sclera clear bilat   Neck: Trachea normal, full passive range of motion without pain and phonation normal. Neck supple.   Cardiovascular: Normal rate, regular rhythm, normal heart sounds, intact distal pulses and normal pulses.   Pulmonary/Chest: Effort normal and breath sounds normal. No respiratory distress.   Abdominal: Soft. Normal appearance and bowel sounds are normal. She exhibits no distension. There is no tenderness.   Musculoskeletal: Normal range of motion. She exhibits no edema or deformity.   Neurological: She is alert and oriented to person, place, and time. She exhibits normal muscle tone. Coordination normal.   Skin: Skin is warm, dry and intact. She is not diaphoretic. No pallor.   Psychiatric: She has a normal mood and affect. Her speech is normal and behavior is normal. Judgment and thought content normal. Cognition and memory are normal.   Nursing note and vitals reviewed.      Assessment:       1. Bacterial sinusitis        Plan:         Bacterial sinusitis  -     amoxicillin-clavulanate  875-125mg (AUGMENTIN) 875-125 mg per tablet; Take 1 tablet by mouth 2 (two) times daily. for 10 days  Dispense: 20 tablet; Refill: 0  -     promethazine-codeine 6.25-10 mg/5 ml (PHENERGAN WITH CODEINE) 6.25-10 mg/5 mL syrup; Take 5 mLs by mouth every 4 (four) hours as needed for Cough.  Dispense: 118 mL; Refill: 0  -     methylPREDNISolone (MEDROL DOSEPACK) 4 mg tablet; use as directed  Dispense: 1 Package; Refill: 0      Patient Instructions     Sinusitis (Antibiotic Treatment)    The sinuses are air-filled spaces within the bones of the face. They connect to the inside of the nose. Sinusitis is an inflammation of the tissue lining the sinus cavity. Sinus inflammation can occur during a cold. It can also be due to allergies to pollens and other particles in the air. Sinusitis can cause symptoms of sinus congestion and fullness. A sinus infection causes fever, headache and facial pain. There is often green or yellow drainage from the nose or into the back of the throat (post-nasal drip). You have been given antibiotics to treat this condition.  Home care:  · Take the full course of antibiotics as instructed. Do not stop taking them, even if you feel better.  · Drink plenty of water, hot tea, and other liquids. This may help thin mucus. It also may promote sinus drainage.  · Heat may help soothe painful areas of the face. Use a towel soaked in hot water. Or,  the shower and direct the hot spray onto your face. Using a vaporizer along with a menthol rub at night may also help.   · An expectorant containing guaifenesin may help thin the mucus and promote drainage from the sinuses.  · Over-the-counter decongestants may be used unless a similar medicine was prescribed. Nasal sprays work the fastest. Use one that contains phenylephrine or oxymetazoline. First blow the nose gently. Then use the spray. Do not use these medicines more often than directed on the label or symptoms may get worse. You may also use  tablets containing pseudoephedrine. Avoid products that combine ingredients, because side effects may be increased. Read labels. You can also ask the pharmacist for help. (NOTE: Persons with high blood pressure should not use decongestants. They can raise blood pressure.)  · Over-the-counter antihistamines may help if allergies contributed to your sinusitis.    · Do not use nasal rinses or irrigation during an acute sinus infection, unless told to by your health care provider. Rinsing may spread the infection to other sinuses.  · Use acetaminophen or ibuprofen to control pain, unless another pain medicine was prescribed. (If you have chronic liver or kidney disease or ever had a stomach ulcer, talk with your doctor before using these medicines. Aspirin should never be used in anyone under 18 years of age who is ill with a fever. It may cause severe liver damage.)  · Don't smoke. This can worsen symptoms.  Follow-up care  Follow up with your healthcare provider or our staff if you are not improving within the next week.  When to seek medical advice  Call your healthcare provider if any of these occur:  · Facial pain or headache becoming more severe  · Stiff neck  · Unusual drowsiness or confusion  · Swelling of the forehead or eyelids  · Vision problems, including blurred or double vision  · Fever of 100.4ºF (38ºC) or higher, or as directed by your healthcare provider  · Seizure  · Breathing problems  · Symptoms not resolving within 10 days  Date Last Reviewed: 4/13/2015  © 0955-6573 [employer]. [address] [place] [address], [place] [address]. All rights reserved. This information is not intended as a substitute for professional medical care. Always follow your healthcare professional's instructions.

## 2019-02-14 ENCOUNTER — OFFICE VISIT (OUTPATIENT)
Dept: URGENT CARE | Facility: CLINIC | Age: 64
End: 2019-02-14
Payer: COMMERCIAL

## 2019-02-14 VITALS
HEIGHT: 61 IN | HEART RATE: 104 BPM | WEIGHT: 130 LBS | SYSTOLIC BLOOD PRESSURE: 120 MMHG | OXYGEN SATURATION: 100 % | DIASTOLIC BLOOD PRESSURE: 82 MMHG | BODY MASS INDEX: 24.55 KG/M2 | RESPIRATION RATE: 20 BRPM | TEMPERATURE: 100 F

## 2019-02-14 DIAGNOSIS — B96.89 BACTERIAL SINUSITIS: Primary | ICD-10-CM

## 2019-02-14 DIAGNOSIS — J32.9 BACTERIAL SINUSITIS: Primary | ICD-10-CM

## 2019-02-14 LAB
CTP QC/QA: YES
FLUAV AG NPH QL: NEGATIVE
FLUBV AG NPH QL: NEGATIVE

## 2019-02-14 PROCEDURE — 99214 PR OFFICE/OUTPT VISIT, EST, LEVL IV, 30-39 MIN: ICD-10-PCS | Mod: S$GLB,,, | Performed by: PHYSICIAN ASSISTANT

## 2019-02-14 PROCEDURE — 87804 POCT INFLUENZA A/B: ICD-10-PCS | Mod: 59,QW,S$GLB, | Performed by: PHYSICIAN ASSISTANT

## 2019-02-14 PROCEDURE — 99214 OFFICE O/P EST MOD 30 MIN: CPT | Mod: S$GLB,,, | Performed by: PHYSICIAN ASSISTANT

## 2019-02-14 PROCEDURE — 3008F PR BODY MASS INDEX (BMI) DOCUMENTED: ICD-10-PCS | Mod: CPTII,S$GLB,, | Performed by: PHYSICIAN ASSISTANT

## 2019-02-14 PROCEDURE — 87804 INFLUENZA ASSAY W/OPTIC: CPT | Mod: QW,S$GLB,, | Performed by: PHYSICIAN ASSISTANT

## 2019-02-14 PROCEDURE — 3008F BODY MASS INDEX DOCD: CPT | Mod: CPTII,S$GLB,, | Performed by: PHYSICIAN ASSISTANT

## 2019-02-14 RX ORDER — AMOXICILLIN AND CLAVULANATE POTASSIUM 875; 125 MG/1; MG/1
1 TABLET, FILM COATED ORAL 2 TIMES DAILY
Qty: 14 TABLET | Refills: 0 | Status: SHIPPED | OUTPATIENT
Start: 2019-02-14 | End: 2019-02-21

## 2019-02-14 RX ORDER — AMOXICILLIN AND CLAVULANATE POTASSIUM 875; 125 MG/1; MG/1
1 TABLET, FILM COATED ORAL 2 TIMES DAILY
Qty: 14 TABLET | Refills: 0 | Status: SHIPPED | OUTPATIENT
Start: 2019-02-14 | End: 2019-02-14 | Stop reason: CLARIF

## 2019-02-14 NOTE — PROGRESS NOTES
"Subjective:       Patient ID: Trudi Mcknight is a 63 y.o. female.    Vitals:  height is 5' 1" (1.549 m) and weight is 59 kg (130 lb). Her temperature is 99.6 °F (37.6 °C). Her blood pressure is 120/82 and her pulse is 104. Her respiration is 20 and oxygen saturation is 100%.     Chief Complaint: Cough and Fever    Patient presents with 2 weeks of worsening sinus congestion, sinus pain, cough.  Associated symptoms include tooth pain.  She has a history of recurrent sinus infections.  She states that symptoms started 2 weeks ago with nausea, vomiting, diarrhea which soon resolved- these were the same symptoms as her granddaughter who is also being seen with her in clinic.  Temperature max at home was 100.0 F. Denies chest pain, shortness of breath.       Cough   This is a new problem. The current episode started 1 to 4 weeks ago. The problem has been unchanged. The cough is productive of sputum. Associated symptoms include a fever and postnasal drip. Pertinent negatives include no chest pain, chills, ear pain, eye redness, hemoptysis, myalgias, rash, sore throat, shortness of breath or wheezing. Nothing aggravates the symptoms. She has tried OTC cough suppressant for the symptoms. The treatment provided no relief.   Fever    Associated symptoms include congestion and coughing. Pertinent negatives include no chest pain, ear pain, nausea, rash, sore throat, vomiting or wheezing.       Constitution: Positive for fever. Negative for chills, sweating and fatigue.   HENT: Positive for congestion, postnasal drip, sinus pain and sinus pressure. Negative for ear pain, sore throat, trouble swallowing and voice change.    Neck: Negative for painful lymph nodes.   Cardiovascular: Negative for chest trauma, chest pain and leg swelling.   Eyes: Negative for eye redness.   Respiratory: Positive for cough. Negative for chest tightness, sputum production, bloody sputum, COPD, shortness of breath, stridor, wheezing and asthma.  "   Gastrointestinal: Negative for nausea and vomiting.   Musculoskeletal: Negative for muscle ache.   Skin: Negative for rash.   Allergic/Immunologic: Negative for seasonal allergies and asthma.   Hematologic/Lymphatic: Negative for swollen lymph nodes.       Objective:      Physical Exam   Constitutional: She is oriented to person, place, and time. She appears well-developed and well-nourished. She is cooperative.  Non-toxic appearance. She does not appear ill. No distress.   HENT:   Head: Normocephalic and atraumatic.   Right Ear: Hearing, tympanic membrane, external ear and ear canal normal.   Left Ear: Hearing, tympanic membrane, external ear and ear canal normal.   Nose: Mucosal edema present. No rhinorrhea or nasal deformity. No epistaxis. Right sinus exhibits maxillary sinus tenderness. Right sinus exhibits no frontal sinus tenderness. Left sinus exhibits maxillary sinus tenderness. Left sinus exhibits no frontal sinus tenderness.   Mouth/Throat: Uvula is midline, oropharynx is clear and moist and mucous membranes are normal. No trismus in the jaw. Normal dentition. No uvula swelling. No oropharyngeal exudate, posterior oropharyngeal edema, posterior oropharyngeal erythema or tonsillar abscesses. Tonsils are 0 on the right. Tonsils are 0 on the left. No tonsillar exudate.   Eyes: Conjunctivae and lids are normal. No scleral icterus.   Sclera clear bilat   Neck: Trachea normal, full passive range of motion without pain and phonation normal. Neck supple.   Cardiovascular: Normal rate, regular rhythm, normal heart sounds, intact distal pulses and normal pulses.   Pulmonary/Chest: Effort normal and breath sounds normal. No stridor. No respiratory distress. She has no decreased breath sounds. She has no wheezes. She has no rhonchi. She has no rales. She exhibits no tenderness.   Abdominal: Soft. Normal appearance and bowel sounds are normal. She exhibits no distension. There is no tenderness.   Musculoskeletal:  Normal range of motion. She exhibits no edema or deformity.   Neurological: She is alert and oriented to person, place, and time. She exhibits normal muscle tone. Coordination normal.   Skin: Skin is warm, dry and intact. She is not diaphoretic. No pallor.   Psychiatric: She has a normal mood and affect. Her speech is normal and behavior is normal. Judgment and thought content normal. Cognition and memory are normal.   Nursing note and vitals reviewed.        Results for orders placed or performed in visit on 02/14/19   POCT Influenza A/B   Result Value Ref Range    Rapid Influenza A Ag Negative Negative    Rapid Influenza B Ag Negative Negative     Acceptable Yes        Assessment:       1. Bacterial sinusitis        Plan:         Bacterial sinusitis  -     POCT Influenza A/B  -     Discontinue: amoxicillin-clavulanate 875-125mg (AUGMENTIN) 875-125 mg per tablet; Take 1 tablet by mouth 2 (two) times daily. for 7 days  Dispense: 14 tablet; Refill: 0  -     amoxicillin-clavulanate 875-125mg (AUGMENTIN) 875-125 mg per tablet; Take 1 tablet by mouth 2 (two) times daily. for 7 days  Dispense: 14 tablet; Refill: 0      Patient Instructions   - Rest.    - Drink plenty of fluids.    - Tylenol or Ibuprofen as directed as needed for fever/pain.    - You can take over-the-counter claritin, zyrtec, allegra, or xyzal as directed.  If you do not have high blood pressure, you may use a decongestant form (D) of this medication or if you do not take the D form, you can take sudafed (pseudoephedrine) over the counter, which is a decongestant. This will help to dry up post-nasal drip which is likely causing sore throat and cough.   - You can use Flonase nasal spray as directed for sinus congestion and postnasal drip. Discontinue if you develop nose bleed  - use nasal saline prior to Flonase.  - Use Ocean Spray Nasal Saline 1-3 puffs each nostril every 2-3 hours then blow out onto tissue. This is to irrigate the nasal  passage way to clear the sinus openings. Use until sinus problem resolved.    - you can take over-the-counter Mucinex 1200 mg twice a day to help loosen mucous    - You have been given an antibiotic to treat your condition today.    - Please complete the antibiotic as directed on the bottle.   - If you are female and on oral birth control pills, use additional methods to prevent pregnancy while on antibiotics and for one cycle after.   - take antibiotic with food, as this may cause upset stomach.    -warm salt water gargles can help with sore throat  - warm tea with honey can help with cough. Honey is a natural cough suppressant.     - Follow up with your PCP or specialty clinic as directed in the next 1-2 weeks if not improved or as needed.  You can call [phone] to schedule an appointment with the appropriate provider.    - Go to the ED if your symptoms worsen.    - You must understand that you have received an Urgent Care treatment only and that you may be released before all of your medical problems are known or treated.   - You, the patient, will arrange for follow up care as instructed.   - If your condition worsens or fails to improve we recommend that you receive another evaluation at the ER immediately or contact your PCP to discuss your concerns or return here.       Sinusitis (Antibiotic Treatment)    The sinuses are air-filled spaces within the bones of the face. They connect to the inside of the nose. Sinusitis is an inflammation of the tissue lining the sinus cavity. Sinus inflammation can occur during a cold. It can also be due to allergies to pollens and other particles in the air. Sinusitis can cause symptoms of sinus congestion and fullness. A sinus infection causes fever, headache and facial pain. There is often green or yellow drainage from the nose or into the back of the throat (post-nasal drip). You have been given antibiotics to treat this condition.  Home care:  · Take the full course  of antibiotics as instructed. Do not stop taking them, even if you feel better.  · Drink plenty of water, hot tea, and other liquids. This may help thin mucus. It also may promote sinus drainage.  · Heat may help soothe painful areas of the face. Use a towel soaked in hot water. Or,  the shower and direct the hot spray onto your face. Using a vaporizer along with a menthol rub at night may also help.   · An expectorant containing guaifenesin may help thin the mucus and promote drainage from the sinuses.  · Over-the-counter decongestants may be used unless a similar medicine was prescribed. Nasal sprays work the fastest. Use one that contains phenylephrine or oxymetazoline. First blow the nose gently. Then use the spray. Do not use these medicines more often than directed on the label or symptoms may get worse. You may also use tablets containing pseudoephedrine. Avoid products that combine ingredients, because side effects may be increased. Read labels. You can also ask the pharmacist for help. (NOTE: Persons with high blood pressure should not use decongestants. They can raise blood pressure.)  · Over-the-counter antihistamines may help if allergies contributed to your sinusitis.    · Do not use nasal rinses or irrigation during an acute sinus infection, unless told to by your health care provider. Rinsing may spread the infection to other sinuses.  · Use acetaminophen or ibuprofen to control pain, unless another pain medicine was prescribed. (If you have chronic liver or kidney disease or ever had a stomach ulcer, talk with your doctor before using these medicines. Aspirin should never be used in anyone under 18 years of age who is ill with a fever. It may cause severe liver damage.)  · Don't smoke. This can worsen symptoms.  Follow-up care  Follow up with your healthcare provider or our staff if you are not improving within the next week.  When to seek medical advice  Call your healthcare provider if any  of these occur:  · Facial pain or headache becoming more severe  · Stiff neck  · Unusual drowsiness or confusion  · Swelling of the forehead or eyelids  · Vision problems, including blurred or double vision  · Fever of 100.4ºF (38ºC) or higher, or as directed by your healthcare provider  · Seizure  · Breathing problems  · Symptoms not resolving within 10 days  Date Last Reviewed: 4/13/2015  © 6295-9106 [employer]. [address] [place] [address], [place] [address]. All rights reserved. This information is not intended as a substitute for professional medical care. Always follow your healthcare professional's instructions.

## 2019-02-14 NOTE — PATIENT INSTRUCTIONS
- Rest.    - Drink plenty of fluids.    - Tylenol or Ibuprofen as directed as needed for fever/pain.    - You can take over-the-counter claritin, zyrtec, allegra, or xyzal as directed.  If you do not have high blood pressure, you may use a decongestant form (D) of this medication or if you do not take the D form, you can take sudafed (pseudoephedrine) over the counter, which is a decongestant. This will help to dry up post-nasal drip which is likely causing sore throat and cough.   - You can use Flonase nasal spray as directed for sinus congestion and postnasal drip. Discontinue if you develop nose bleed  - use nasal saline prior to Flonase.  - Use Ocean Spray Nasal Saline 1-3 puffs each nostril every 2-3 hours then blow out onto tissue. This is to irrigate the nasal passage way to clear the sinus openings. Use until sinus problem resolved.    - you can take over-the-counter Mucinex 1200 mg twice a day to help loosen mucous    - You have been given an antibiotic to treat your condition today.    - Please complete the antibiotic as directed on the bottle.   - If you are female and on oral birth control pills, use additional methods to prevent pregnancy while on antibiotics and for one cycle after.   - take antibiotic with food, as this may cause upset stomach.    -warm salt water gargles can help with sore throat  - warm tea with honey can help with cough. Honey is a natural cough suppressant.     - Follow up with your PCP or specialty clinic as directed in the next 1-2 weeks if not improved or as needed.  You can call [phone] to schedule an appointment with the appropriate provider.    - Go to the ED if your symptoms worsen.    - You must understand that you have received an Urgent Care treatment only and that you may be released before all of your medical problems are known or treated.   - You, the patient, will arrange for follow up care as instructed.   - If your condition worsens or fails to improve we  recommend that you receive another evaluation at the ER immediately or contact your PCP to discuss your concerns or return here.       Sinusitis (Antibiotic Treatment)    The sinuses are air-filled spaces within the bones of the face. They connect to the inside of the nose. Sinusitis is an inflammation of the tissue lining the sinus cavity. Sinus inflammation can occur during a cold. It can also be due to allergies to pollens and other particles in the air. Sinusitis can cause symptoms of sinus congestion and fullness. A sinus infection causes fever, headache and facial pain. There is often green or yellow drainage from the nose or into the back of the throat (post-nasal drip). You have been given antibiotics to treat this condition.  Home care:  · Take the full course of antibiotics as instructed. Do not stop taking them, even if you feel better.  · Drink plenty of water, hot tea, and other liquids. This may help thin mucus. It also may promote sinus drainage.  · Heat may help soothe painful areas of the face. Use a towel soaked in hot water. Or,  the shower and direct the hot spray onto your face. Using a vaporizer along with a menthol rub at night may also help.   · An expectorant containing guaifenesin may help thin the mucus and promote drainage from the sinuses.  · Over-the-counter decongestants may be used unless a similar medicine was prescribed. Nasal sprays work the fastest. Use one that contains phenylephrine or oxymetazoline. First blow the nose gently. Then use the spray. Do not use these medicines more often than directed on the label or symptoms may get worse. You may also use tablets containing pseudoephedrine. Avoid products that combine ingredients, because side effects may be increased. Read labels. You can also ask the pharmacist for help. (NOTE: Persons with high blood pressure should not use decongestants. They can raise blood pressure.)  · Over-the-counter antihistamines may help if  allergies contributed to your sinusitis.    · Do not use nasal rinses or irrigation during an acute sinus infection, unless told to by your health care provider. Rinsing may spread the infection to other sinuses.  · Use acetaminophen or ibuprofen to control pain, unless another pain medicine was prescribed. (If you have chronic liver or kidney disease or ever had a stomach ulcer, talk with your doctor before using these medicines. Aspirin should never be used in anyone under 18 years of age who is ill with a fever. It may cause severe liver damage.)  · Don't smoke. This can worsen symptoms.  Follow-up care  Follow up with your healthcare provider or our staff if you are not improving within the next week.  When to seek medical advice  Call your healthcare provider if any of these occur:  · Facial pain or headache becoming more severe  · Stiff neck  · Unusual drowsiness or confusion  · Swelling of the forehead or eyelids  · Vision problems, including blurred or double vision  · Fever of 100.4ºF (38ºC) or higher, or as directed by your healthcare provider  · Seizure  · Breathing problems  · Symptoms not resolving within 10 days  Date Last Reviewed: 4/13/2015  © 4557-4879 [employer]. [address] [place] [address], [place] [address]. All rights reserved. This information is not intended as a substitute for professional medical care. Always follow your healthcare professional's instructions.

## 2019-02-25 ENCOUNTER — OFFICE VISIT (OUTPATIENT)
Dept: INTERNAL MEDICINE | Facility: CLINIC | Age: 64
End: 2019-02-25
Payer: COMMERCIAL

## 2019-02-25 ENCOUNTER — HOSPITAL ENCOUNTER (OUTPATIENT)
Dept: RADIOLOGY | Facility: HOSPITAL | Age: 64
Discharge: HOME | End: 2019-02-25
Attending: NURSE PRACTITIONER
Payer: COMMERCIAL

## 2019-02-25 VITALS
HEIGHT: 62 IN | SYSTOLIC BLOOD PRESSURE: 120 MMHG | BODY MASS INDEX: 24.14 KG/M2 | OXYGEN SATURATION: 97 % | TEMPERATURE: 99 F | DIASTOLIC BLOOD PRESSURE: 76 MMHG | HEART RATE: 79 BPM | WEIGHT: 131.19 LBS

## 2019-02-25 DIAGNOSIS — R05.9 COUGH: ICD-10-CM

## 2019-02-25 DIAGNOSIS — J32.9 BACTERIAL SINUSITIS: Primary | ICD-10-CM

## 2019-02-25 DIAGNOSIS — B96.89 BACTERIAL SINUSITIS: Primary | ICD-10-CM

## 2019-02-25 PROCEDURE — 3008F PR BODY MASS INDEX (BMI) DOCUMENTED: ICD-10-PCS | Mod: CPTII,S$GLB,, | Performed by: NURSE PRACTITIONER

## 2019-02-25 PROCEDURE — 71046 X-RAY EXAM CHEST 2 VIEWS: CPT | Mod: 26,,, | Performed by: RADIOLOGY

## 2019-02-25 PROCEDURE — 99213 OFFICE O/P EST LOW 20 MIN: CPT | Mod: S$GLB,,, | Performed by: NURSE PRACTITIONER

## 2019-02-25 PROCEDURE — 99999 PR PBB SHADOW E&M-EST. PATIENT-LVL IV: ICD-10-PCS | Mod: PBBFAC,,, | Performed by: NURSE PRACTITIONER

## 2019-02-25 PROCEDURE — 3008F BODY MASS INDEX DOCD: CPT | Mod: CPTII,S$GLB,, | Performed by: NURSE PRACTITIONER

## 2019-02-25 PROCEDURE — 99999 PR PBB SHADOW E&M-EST. PATIENT-LVL IV: CPT | Mod: PBBFAC,,, | Performed by: NURSE PRACTITIONER

## 2019-02-25 PROCEDURE — 99213 PR OFFICE/OUTPT VISIT, EST, LEVL III, 20-29 MIN: ICD-10-PCS | Mod: S$GLB,,, | Performed by: NURSE PRACTITIONER

## 2019-02-25 PROCEDURE — 71046 XR CHEST PA AND LATERAL: ICD-10-PCS | Mod: 26,,, | Performed by: RADIOLOGY

## 2019-02-25 PROCEDURE — 71046 X-RAY EXAM CHEST 2 VIEWS: CPT | Mod: TC

## 2019-02-25 RX ORDER — LEVOFLOXACIN 500 MG/1
500 TABLET, FILM COATED ORAL DAILY
Qty: 10 TABLET | Refills: 0 | Status: SHIPPED | OUTPATIENT
Start: 2019-02-25 | End: 2019-08-05

## 2019-02-25 NOTE — PROGRESS NOTES
"Subjective:       Patient ID: Trudi Mcknight is a 63 y.o. female.    Chief Complaint: URI    Ms. Mcknight presents today for congestion, cough, sinus pain, tooth pain, body aches.  She was sick in January and prescribed Augmentin for sinusitis on Jan 6th. She felt better after a few days. On Feb 5th she felt ill again, with fatigue, congestion, cough, and fever. She waited on 2/14/19 to seek care, again at urgent care. She had a negative flu test. Both her grandchildren, who she cares for, and her son all tested positive for the flu. She was treated again with Augmentin. The "green gunk" cleared up after 3 days and she felt a little better but then worsened again while still on the antibiotic.       Review of Systems   Constitutional: Positive for chills, fatigue and fever.   HENT: Positive for dental problem, ear pain, postnasal drip, rhinorrhea, sinus pressure and sinus pain. Negative for facial swelling and sore throat.    Eyes: Negative for visual disturbance.   Respiratory: Positive for cough. Negative for shortness of breath.    Cardiovascular: Negative for chest pain.   Gastrointestinal: Positive for diarrhea and vomiting. Negative for constipation and nausea.   Genitourinary: Negative for dysuria.   Musculoskeletal: Negative for gait problem.   Skin: Negative for rash.   Neurological: Positive for headaches.   Psychiatric/Behavioral: Negative for confusion.       Objective:      Physical Exam   Constitutional: She is oriented to person, place, and time. She appears well-developed and well-nourished. She appears ill. No distress.   HENT:   Head: Normocephalic.   Right Ear: Tympanic membrane normal.   Left Ear: Tympanic membrane normal.   Nose: Rhinorrhea and sinus tenderness present. No mucosal edema. Right sinus exhibits maxillary sinus tenderness and frontal sinus tenderness. Left sinus exhibits frontal sinus tenderness. Left sinus exhibits no maxillary sinus tenderness.   Eyes: No scleral icterus.   Neck: " Normal range of motion. Neck supple.   Cardiovascular: Normal rate, regular rhythm and normal heart sounds.   Pulmonary/Chest: Effort normal and breath sounds normal. No stridor. No respiratory distress. She has no wheezes. She has no rales.   Lymphadenopathy:     She has cervical adenopathy.   Neurological: She is alert and oriented to person, place, and time.   Skin: Skin is warm and dry. She is not diaphoretic.   Psychiatric: She has a normal mood and affect. Her behavior is normal.   Nursing note and vitals reviewed.      Assessment:       1. Bacterial sinusitis    2. Cough        Plan:   1. Bacterial sinusitis   levoFLOXacin (LEVAQUIN) 500 MG tablet; Take 1 tablet (500 mg total) by mouth once daily.  Dispense: 10 tablet; Refill: 0    2. Cough   X-Ray Chest PA And Lateral; Future      Pt has been given instructions populated from [employer] database and has verbalized understanding of the after visit summary and information contained wherein.    Follow up with a primary care provider. May go to ER for acute shortness of breath, lightheadedness, fever, or any other emergent complaints or changes in condition.

## 2019-03-08 DIAGNOSIS — F32.A DEPRESSION, UNSPECIFIED DEPRESSION TYPE: ICD-10-CM

## 2019-03-11 RX ORDER — SERTRALINE HYDROCHLORIDE 25 MG/1
12.5 TABLET, FILM COATED ORAL DAILY
Qty: 45 TABLET | Refills: 0 | Status: SHIPPED | OUTPATIENT
Start: 2019-03-11 | End: 2019-08-05

## 2019-05-30 ENCOUNTER — PATIENT MESSAGE (OUTPATIENT)
Dept: INTERNAL MEDICINE | Facility: CLINIC | Age: 64
End: 2019-05-30

## 2019-05-30 DIAGNOSIS — Z12.31 BREAST CANCER SCREENING BY MAMMOGRAM: Primary | ICD-10-CM

## 2019-06-03 ENCOUNTER — HOSPITAL ENCOUNTER (OUTPATIENT)
Dept: RADIOLOGY | Facility: HOSPITAL | Age: 64
Discharge: HOME | End: 2019-06-03
Attending: INTERNAL MEDICINE
Payer: COMMERCIAL

## 2019-06-03 DIAGNOSIS — Z12.31 BREAST CANCER SCREENING BY MAMMOGRAM: ICD-10-CM

## 2019-06-03 PROCEDURE — 77063 MAMMO DIGITAL SCREENING BILAT WITH TOMOSYNTHESIS_CAD: ICD-10-PCS | Mod: 26,,, | Performed by: RADIOLOGY

## 2019-06-03 PROCEDURE — 77067 MAMMO DIGITAL SCREENING BILAT WITH TOMOSYNTHESIS_CAD: ICD-10-PCS | Mod: 26,,, | Performed by: RADIOLOGY

## 2019-06-03 PROCEDURE — 77067 SCR MAMMO BI INCL CAD: CPT | Mod: 26,,, | Performed by: RADIOLOGY

## 2019-06-03 PROCEDURE — 77067 SCR MAMMO BI INCL CAD: CPT | Mod: TC

## 2019-06-03 PROCEDURE — 77063 BREAST TOMOSYNTHESIS BI: CPT | Mod: 26,,, | Performed by: RADIOLOGY

## 2019-08-05 ENCOUNTER — LAB VISIT (OUTPATIENT)
Dept: LAB | Facility: HOSPITAL | Age: 64
End: 2019-08-05
Attending: INTERNAL MEDICINE
Payer: COMMERCIAL

## 2019-08-05 ENCOUNTER — OFFICE VISIT (OUTPATIENT)
Dept: INTERNAL MEDICINE | Facility: CLINIC | Age: 64
End: 2019-08-05
Payer: COMMERCIAL

## 2019-08-05 VITALS
WEIGHT: 134 LBS | BODY MASS INDEX: 25.3 KG/M2 | SYSTOLIC BLOOD PRESSURE: 122 MMHG | DIASTOLIC BLOOD PRESSURE: 68 MMHG | HEIGHT: 61 IN

## 2019-08-05 DIAGNOSIS — Z00.00 HEALTH CARE MAINTENANCE: Primary | ICD-10-CM

## 2019-08-05 DIAGNOSIS — Z12.83 SKIN EXAM, SCREENING FOR CANCER: ICD-10-CM

## 2019-08-05 DIAGNOSIS — Z00.00 HEALTH CARE MAINTENANCE: ICD-10-CM

## 2019-08-05 DIAGNOSIS — Z12.11 SCREEN FOR COLON CANCER: ICD-10-CM

## 2019-08-05 DIAGNOSIS — L81.9 HYPERPIGMENTATION: ICD-10-CM

## 2019-08-05 LAB
25(OH)D3+25(OH)D2 SERPL-MCNC: 27 NG/ML (ref 30–96)
ALBUMIN SERPL BCP-MCNC: 3.9 G/DL (ref 3.5–5.2)
ALP SERPL-CCNC: 89 U/L (ref 55–135)
ALT SERPL W/O P-5'-P-CCNC: 10 U/L (ref 10–44)
ANION GAP SERPL CALC-SCNC: 9 MMOL/L (ref 8–16)
AST SERPL-CCNC: 18 U/L (ref 10–40)
BASOPHILS # BLD AUTO: 0.03 K/UL (ref 0–0.2)
BASOPHILS NFR BLD: 0.5 % (ref 0–1.9)
BILIRUB SERPL-MCNC: 0.6 MG/DL (ref 0.1–1)
BUN SERPL-MCNC: 16 MG/DL (ref 8–23)
CALCIUM SERPL-MCNC: 9.7 MG/DL (ref 8.7–10.5)
CHLORIDE SERPL-SCNC: 107 MMOL/L (ref 95–110)
CHOLEST SERPL-MCNC: 222 MG/DL (ref 120–199)
CHOLEST/HDLC SERPL: 3.8 {RATIO} (ref 2–5)
CO2 SERPL-SCNC: 25 MMOL/L (ref 23–29)
CREAT SERPL-MCNC: 1 MG/DL (ref 0.5–1.4)
DIFFERENTIAL METHOD: NORMAL
EOSINOPHIL # BLD AUTO: 0.1 K/UL (ref 0–0.5)
EOSINOPHIL NFR BLD: 1.8 % (ref 0–8)
ERYTHROCYTE [DISTWIDTH] IN BLOOD BY AUTOMATED COUNT: 14.3 % (ref 11.5–14.5)
EST. GFR  (AFRICAN AMERICAN): >60 ML/MIN/1.73 M^2
EST. GFR  (NON AFRICAN AMERICAN): 60 ML/MIN/1.73 M^2
ESTIMATED AVG GLUCOSE: 120 MG/DL (ref 68–131)
GLUCOSE SERPL-MCNC: 91 MG/DL (ref 70–110)
HBA1C MFR BLD HPLC: 5.8 % (ref 4–5.6)
HCT VFR BLD AUTO: 42.8 % (ref 37–48.5)
HDLC SERPL-MCNC: 58 MG/DL (ref 40–75)
HDLC SERPL: 26.1 % (ref 20–50)
HGB BLD-MCNC: 13.9 G/DL (ref 12–16)
LDLC SERPL CALC-MCNC: 142.8 MG/DL (ref 63–159)
LYMPHOCYTES # BLD AUTO: 1.3 K/UL (ref 1–4.8)
LYMPHOCYTES NFR BLD: 20.4 % (ref 18–48)
MCH RBC QN AUTO: 27.3 PG (ref 27–31)
MCHC RBC AUTO-ENTMCNC: 32.5 G/DL (ref 32–36)
MCV RBC AUTO: 84 FL (ref 82–98)
MONOCYTES # BLD AUTO: 0.5 K/UL (ref 0.3–1)
MONOCYTES NFR BLD: 6.9 % (ref 4–15)
NEUTROPHILS # BLD AUTO: 4.6 K/UL (ref 1.8–7.7)
NEUTROPHILS NFR BLD: 70.4 % (ref 38–73)
NONHDLC SERPL-MCNC: 164 MG/DL
PLATELET # BLD AUTO: 318 K/UL (ref 150–350)
PMV BLD AUTO: 10 FL (ref 9.2–12.9)
POTASSIUM SERPL-SCNC: 4.1 MMOL/L (ref 3.5–5.1)
PROT SERPL-MCNC: 7.2 G/DL (ref 6–8.4)
RBC # BLD AUTO: 5.09 M/UL (ref 4–5.4)
SODIUM SERPL-SCNC: 141 MMOL/L (ref 136–145)
TRIGL SERPL-MCNC: 106 MG/DL (ref 30–150)
TSH SERPL DL<=0.005 MIU/L-ACNC: 1.41 UIU/ML (ref 0.4–4)
WBC # BLD AUTO: 6.53 K/UL (ref 3.9–12.7)

## 2019-08-05 PROCEDURE — 85025 COMPLETE CBC W/AUTO DIFF WBC: CPT

## 2019-08-05 PROCEDURE — 80053 COMPREHEN METABOLIC PANEL: CPT

## 2019-08-05 PROCEDURE — 99396 PREV VISIT EST AGE 40-64: CPT | Mod: S$GLB,,, | Performed by: INTERNAL MEDICINE

## 2019-08-05 PROCEDURE — 82306 VITAMIN D 25 HYDROXY: CPT

## 2019-08-05 PROCEDURE — 99396 PR PREVENTIVE VISIT,EST,40-64: ICD-10-PCS | Mod: S$GLB,,, | Performed by: INTERNAL MEDICINE

## 2019-08-05 PROCEDURE — 36415 COLL VENOUS BLD VENIPUNCTURE: CPT

## 2019-08-05 PROCEDURE — 83036 HEMOGLOBIN GLYCOSYLATED A1C: CPT

## 2019-08-05 PROCEDURE — 84443 ASSAY THYROID STIM HORMONE: CPT

## 2019-08-05 PROCEDURE — 99999 PR PBB SHADOW E&M-EST. PATIENT-LVL III: CPT | Mod: PBBFAC,,, | Performed by: INTERNAL MEDICINE

## 2019-08-05 PROCEDURE — 80061 LIPID PANEL: CPT

## 2019-08-05 PROCEDURE — 99999 PR PBB SHADOW E&M-EST. PATIENT-LVL III: ICD-10-PCS | Mod: PBBFAC,,, | Performed by: INTERNAL MEDICINE

## 2019-08-05 RX ORDER — LORATADINE 10 MG/1
TABLET ORAL
COMMUNITY
Start: 2019-08-01 | End: 2021-11-09

## 2019-08-05 RX ORDER — HYDROQUINONE 40 MG/G
CREAM TOPICAL
Qty: 57 G | Refills: 3 | Status: SHIPPED | OUTPATIENT
Start: 2019-08-05 | End: 2020-12-03

## 2019-08-05 NOTE — PROGRESS NOTES
"Subjective:       Patient ID: Trudi Mcknight is a 64 y.o. female.    Chief Complaint: Annual Exam (annaul check up.); Blood Sugar Problem (when waking up in the am, patient assumes BG levels are lowered. patient has a family hx of diabetes but has not been diagnosed. reports of sx like slight dizziness/lightheadedness, feeling unsteady. ); and Medication Refill (patient is currently using the Hydroquinone 4% cream (not effective) and would like to try 6% for dark spots on face (taken prn).)    HPI   Trudi Mcknight is a 64 y.o. female here for a yearly preventative healthcare visit.   Last seen 2017.    Hx of esophagitis and gastritis. egd and cscope in 2015.   Stomach pain/reflux comes and goes. claritin seems to flare her symptoms. Omeprazole about 1 week/month.  Flares about 30% of the time.      Sertraline 12.5mg daily    If eating sugary food at night feels bad in am next day - shaky. She quit eating sugary foods at night and sx have resolved.      Review of Systems   Constitutional: Negative for activity change and unexpected weight change.   HENT: Positive for trouble swallowing. Negative for hearing loss and rhinorrhea.    Eyes: Negative for discharge and visual disturbance.   Respiratory: Negative for chest tightness and wheezing.    Cardiovascular: Negative for chest pain and palpitations.   Gastrointestinal: Negative for blood in stool, constipation, diarrhea and vomiting.   Endocrine: Negative for polydipsia and polyuria.   Genitourinary: Negative for difficulty urinating, dysuria, hematuria and menstrual problem.   Musculoskeletal: Positive for arthralgias and joint swelling. Negative for neck pain.   Neurological: Negative for weakness and headaches.   Psychiatric/Behavioral: Negative for confusion and dysphoric mood.       Objective:   /68 (BP Location: Left arm, Patient Position: Sitting, BP Method: Medium (Manual))   Ht 5' 1" (1.549 m)   Wt 60.8 kg (134 lb)   BMI 25.32 kg/m²      Physical " Exam   Constitutional: She is oriented to person, place, and time. She appears well-developed and well-nourished. No distress.   HENT:   Head: Normocephalic and atraumatic.   Op mildly erythematous   Cardiovascular: Normal rate and regular rhythm.   Pulmonary/Chest: Effort normal. No respiratory distress. She has no wheezes. She has no rales.   Neurological: She is alert and oriented to person, place, and time.   Skin: Skin is warm and dry. She is not diaphoretic.   Right shoulder with 2mm slightly scaly nevus   Psychiatric: She has a normal mood and affect. Her behavior is normal.       Assessment:       1. Health care maintenance    2. Screen for colon cancer    3. Hyperpigmentation    4. Skin exam, screening for cancer        Plan:       Trudi was seen today for annual exam, blood sugar problem and medication refill.    Diagnoses and all orders for this visit:    Health care maintenance  -     CBC auto differential; Future  -     Hemoglobin A1c; Future  -     Comprehensive metabolic panel; Future  -     Lipid panel; Future  -     TSH; Future  -     Vitamin D; Future    Screen for colon cancer  -     Case request GI: COLONOSCOPY    Hyperpigmentation  -     hydroquinone 4 % Crea; Use hs on dark spots    Skin exam, screening for cancer  -     Ambulatory Referral to Dermatology    cscope 2015, recommended 3 years. Ordered.

## 2019-08-15 DIAGNOSIS — Z12.11 SPECIAL SCREENING FOR MALIGNANT NEOPLASMS, COLON: Primary | ICD-10-CM

## 2019-08-15 RX ORDER — POLYETHYLENE GLYCOL 3350, SODIUM SULFATE ANHYDROUS, SODIUM BICARBONATE, SODIUM CHLORIDE, POTASSIUM CHLORIDE 236; 22.74; 6.74; 5.86; 2.97 G/4L; G/4L; G/4L; G/4L; G/4L
4 POWDER, FOR SOLUTION ORAL ONCE
Qty: 4000 ML | Refills: 0 | Status: SHIPPED | OUTPATIENT
Start: 2019-08-15 | End: 2019-08-15

## 2019-09-16 ENCOUNTER — HOSPITAL ENCOUNTER (OUTPATIENT)
Facility: HOSPITAL | Age: 64
Discharge: HOME | End: 2019-09-16
Attending: COLON & RECTAL SURGERY | Admitting: COLON & RECTAL SURGERY
Payer: COMMERCIAL

## 2019-09-16 ENCOUNTER — ANESTHESIA (OUTPATIENT)
Dept: ENDOSCOPY | Facility: HOSPITAL | Age: 64
End: 2019-09-16
Payer: COMMERCIAL

## 2019-09-16 ENCOUNTER — ANESTHESIA EVENT (OUTPATIENT)
Dept: ENDOSCOPY | Facility: HOSPITAL | Age: 64
End: 2019-09-16
Payer: COMMERCIAL

## 2019-09-16 VITALS
TEMPERATURE: 98 F | HEIGHT: 61 IN | HEART RATE: 68 BPM | SYSTOLIC BLOOD PRESSURE: 132 MMHG | OXYGEN SATURATION: 97 % | BODY MASS INDEX: 24.17 KG/M2 | DIASTOLIC BLOOD PRESSURE: 73 MMHG | WEIGHT: 128 LBS | RESPIRATION RATE: 18 BRPM

## 2019-09-16 DIAGNOSIS — Z12.11 SCREENING FOR MALIGNANT NEOPLASM OF COLON: ICD-10-CM

## 2019-09-16 PROCEDURE — G0105 COLORECTAL SCRN; HI RISK IND: ICD-10-PCS | Mod: ,,, | Performed by: COLON & RECTAL SURGERY

## 2019-09-16 PROCEDURE — G0105 COLORECTAL SCRN; HI RISK IND: HCPCS | Mod: ,,, | Performed by: COLON & RECTAL SURGERY

## 2019-09-16 PROCEDURE — G0105 COLORECTAL SCRN; HI RISK IND: HCPCS | Performed by: COLON & RECTAL SURGERY

## 2019-09-16 PROCEDURE — 63600175 PHARM REV CODE 636 W HCPCS: Performed by: COLON & RECTAL SURGERY

## 2019-09-16 PROCEDURE — E9220 PRA ENDO ANESTHESIA: HCPCS | Mod: ,,, | Performed by: NURSE ANESTHETIST, CERTIFIED REGISTERED

## 2019-09-16 PROCEDURE — 37000008 HC ANESTHESIA 1ST 15 MINUTES: Performed by: COLON & RECTAL SURGERY

## 2019-09-16 PROCEDURE — 37000009 HC ANESTHESIA EA ADD 15 MINS: Performed by: COLON & RECTAL SURGERY

## 2019-09-16 PROCEDURE — E9220 PRA ENDO ANESTHESIA: ICD-10-PCS | Mod: ,,, | Performed by: NURSE ANESTHETIST, CERTIFIED REGISTERED

## 2019-09-16 PROCEDURE — 63600175 PHARM REV CODE 636 W HCPCS: Performed by: NURSE ANESTHETIST, CERTIFIED REGISTERED

## 2019-09-16 RX ORDER — LIDOCAINE HCL/PF 100 MG/5ML
SYRINGE (ML) INTRAVENOUS
Status: DISCONTINUED | OUTPATIENT
Start: 2019-09-16 | End: 2019-09-16

## 2019-09-16 RX ORDER — PROPOFOL 10 MG/ML
VIAL (ML) INTRAVENOUS CONTINUOUS PRN
Status: DISCONTINUED | OUTPATIENT
Start: 2019-09-16 | End: 2019-09-16

## 2019-09-16 RX ORDER — SODIUM CHLORIDE 9 MG/ML
INJECTION, SOLUTION INTRAVENOUS CONTINUOUS
Status: DISCONTINUED | OUTPATIENT
Start: 2019-09-16 | End: 2019-09-16 | Stop reason: HOSPADM

## 2019-09-16 RX ORDER — PROPOFOL 10 MG/ML
VIAL (ML) INTRAVENOUS
Status: DISCONTINUED | OUTPATIENT
Start: 2019-09-16 | End: 2019-09-16

## 2019-09-16 RX ADMIN — PROPOFOL 175 MCG/KG/MIN: 10 INJECTION, EMULSION INTRAVENOUS at 01:09

## 2019-09-16 RX ADMIN — PROPOFOL 80 MG: 10 INJECTION, EMULSION INTRAVENOUS at 01:09

## 2019-09-16 RX ADMIN — LIDOCAINE HYDROCHLORIDE 40 MG: 20 INJECTION, SOLUTION INTRAVENOUS at 01:09

## 2019-09-16 RX ADMIN — SODIUM CHLORIDE: 0.9 INJECTION, SOLUTION INTRAVENOUS at 01:09

## 2019-09-16 RX ADMIN — SODIUM CHLORIDE: 0.9 INJECTION, SOLUTION INTRAVENOUS at 12:09

## 2019-09-16 NOTE — ANESTHESIA PREPROCEDURE EVALUATION
2019  Trudi Mcknight is a 64 y.o., female.  Patient Active Problem List   Diagnosis    Esophagitis    History of gastritis     Past Surgical History:   Procedure Laterality Date    BREAST CYST ASPIRATION           SECTION      HYSTERECTOMY      TONSILLECTOMY           Anesthesia Evaluation    I have reviewed the Patient Summary Reports.     I have reviewed the Medications.     Review of Systems  Anesthesia Hx:  No problems with previous Anesthesia  Denies Family Hx of Anesthesia complications.   Denies Personal Hx of Anesthesia complications.   Hematology/Oncology:  Hematology Normal   Oncology Normal     EENT/Dental:EENT/Dental Normal   Cardiovascular:   Exercise tolerance: good    Pulmonary:  Pulmonary Normal    Renal/:  Renal/ Normal     Hepatic/GI:  Hepatic/GI Normal Bowel Prep.    Musculoskeletal:  Musculoskeletal Normal    Neurological:  Neurology Normal    Endocrine:  Endocrine Normal    Dermatological:  Skin Normal    Psych:  Psychiatric Normal           Physical Exam  General:  Well nourished    Airway/Jaw/Neck:  Airway Findings: Mouth Opening: Normal Tongue: Normal  General Airway Assessment: Adult  TM Distance: Normal, at least 6 cm       Chest/Lungs:  Chest/Lungs Clear    Heart/Vascular:  Heart Findings: Normal Heart murmur: negative            Anesthesia Plan  Type of Anesthesia, risks & benefits discussed:  Anesthesia Type:  general  Patient's Preference:   Intra-op Monitoring Plan: standard ASA monitors  Intra-op Monitoring Plan Comments:   Post Op Pain Control Plan:   Post Op Pain Control Plan Comments:   Induction:   IV  Beta Blocker:  Patient is not currently on a Beta-Blocker (No further documentation required).       Informed Consent: Patient understands risks and agrees with Anesthesia plan.  Questions answered. Anesthesia consent signed with patient.  ASA  Score: 2     Day of Surgery Review of History & Physical:    H&P update referred to the surgeon.         Ready For Surgery From Anesthesia Perspective.

## 2019-09-16 NOTE — [employer] PATIENT INSTRUCTIONS
Discharge Summary/Instructions after an Endoscopic Procedure  Patient Name: Trudi Mcknight  Patient MRN: [id number]  Patient YOB: 1955  Monday, September 16, 2019  Haile Russo MD  RESTRICTIONS:  During your procedure today, you received medications for sedation.  These   medications may affect your judgment, balance and coordination.  Therefore,   for 24 hours, you have the following restrictions:   - DO NOT drive a car, operate machinery, make legal/financial decisions,   sign important papers or drink alcohol.    ACTIVITY:  Today: no heavy lifting, straining or running due to procedural   sedation/anesthesia.  The following day: return to full activity including work.  DIET:  Eat and drink normally unless instructed otherwise.     TREATMENT FOR COMMON SIDE EFFECTS:  - Mild abdominal pain, nausea, belching, bloating or excessive gas:  rest,   eat lightly and use a heating pad.  - Sore Throat: treat with throat lozenges and/or gargle with warm salt   water.  - Because air was used during the procedure, expelling large amounts of air   from your rectum or belching is normal.  - If a bowel prep was taken, you may not have a bowel movement for 1-3 days.    This is normal.  SYMPTOMS TO WATCH FOR AND REPORT TO YOUR PHYSICIAN:  1. Abdominal pain or bloating, other than gas cramps.  2. Chest pain.  3. Back pain.  4. Signs of infection such as: chills or fever occurring within 24 hours   after the procedure.  5. Rectal bleeding, which would show as bright red, maroon, or black stools.   (A tablespoon of blood from the rectum is not serious, especially if   hemorrhoids are present.)  6. Vomiting.  7. Weakness or dizziness.  GO DIRECTLY TO THE NEAREST EMERGENCY ROOM IF YOU HAVE ANY OF THE FOLLOWING:      Difficulty breathing              Chills and/or fever over 101 F   Persistent vomiting and/or vomiting blood   Severe abdominal pain   Severe chest pain   Black, tarry stools   Bleeding- more than one  tablespoon   Any other symptom or condition that you feel may need urgent attention  Your doctor recommends these additional instructions:  If any biopsies were taken, your doctors clinic will contact you in 1 to 2   weeks with any results.  - Discharge patient to home (ambulatory).   - Patient has a contact number available for emergencies.  The signs and   symptoms of potential delayed complications were discussed with the   patient.  Return to normal activities tomorrow.  Written discharge   instructions were provided to the patient.   - Resume previous diet.   - Continue present medications.   - Repeat colonoscopy in 5 years for surveillance.  For questions, problems or results please call your physician - Haile Russo MD at Work:  [phone].  [facility], EMERGENCY ROOM PHONE NUMBER: [phone]  IF A COMPLICATION OR EMERGENCY SITUATION ARISES AND YOU ARE UNABLE TO REACH   YOUR PHYSICIAN - GO DIRECTLY TO THE EMERGENCY ROOM.  Haile Russo MD  9/16/2019 1:27:59 PM  This report has been verified and signed electronically.  [employer]

## 2019-09-16 NOTE — H&P
COLONOSCOPY HISTORY AND PHYSICAL EXAM    Procedure : Colonoscopy      INDICATIONS: asymptomatic screening exam and personal history of colon polyps    Family Hx of CRC: None    Last Colonoscopy:  4 years ago  Findings: 3 polyps - adenomatous       Past Medical History:   Diagnosis Date    Esophagitis     Meniere's disease      Sedation Problems: NO  Family History   Problem Relation Age of Onset    Heart disease Mother 55        bypass at 55    Breast cancer Mother     Heart disease Father     Heart disease Brother     Diverticulitis Brother     Diverticulitis Sister     Breast cancer Paternal Grandmother     Colon cancer Neg Hx     Melanoma Neg Hx     Amblyopia Neg Hx     Blindness Neg Hx     Cataracts Neg Hx     Glaucoma Neg Hx     Macular degeneration Neg Hx     Strabismus Neg Hx     Retinal detachment Neg Hx      Fam Hx of Sedation Problems: NO  Social History     Socioeconomic History    Marital status:      Spouse name: Not on file    Number of children: Not on file    Years of education: Not on file    Highest education level: Not on file   Occupational History    Not on file   Social Needs    Financial resource strain: Not hard at all    Food insecurity:     Worry: Never true     Inability: Never true    Transportation needs:     Medical: No     Non-medical: No   Tobacco Use    Smoking status: Never Smoker    Smokeless tobacco: Never Used   Substance and Sexual Activity    Alcohol use: No     Alcohol/week: 0.0 oz     Frequency: Never     Binge frequency: Never     Comment: rarely    Drug use: No    Sexual activity: Not on file   Lifestyle    Physical activity:     Days per week: 4 days     Minutes per session: 20 min    Stress: Only a little   Relationships    Social connections:     Talks on phone: More than three times a week     Gets together: Twice a week     Attends [employer] service: Not on file     Active member of club or organization: Yes     Attends meetings  "of clubs or organizations: More than 4 times per year     Relationship status:    Other Topics Concern    Are you pregnant or think you may be? Not Asked    Breast-feeding Not Asked   Social History Narrative    Not on file       Review of Systems - Negative except   Respiratory ROS: no dyspnea  Cardiovascular ROS: no exertional chest pain  Gastrointestinal ROS: NO abdominal discomfort,  NO rectal bleeding  Musculoskeletal ROS: no muscular pain  Neurological ROS: no recent stroke    Physical Exam:  /70 (BP Location: Left arm, Patient Position: Lying)   Pulse 73   Temp 98.1 °F (36.7 °C) (Tympanic)   Resp 16   Ht 5' 1" (1.549 m)   Wt 58.1 kg (128 lb)   SpO2 96%   Breastfeeding? No   BMI 24.19 kg/m²   General: no distress  Head: normocephalic  Mallampati Score   Neck: supple, symmetrical, trachea midline  Lungs:  clear to auscultation bilaterally and normal respiratory effort  Heart: regular rate and rhythm and no murmur  Abdomen: soft, non-tender non-distented; bowel sounds normal; no masses,  no organomegaly  Extremities: no cyanosis or edema, or clubbing    ASA:  II    PLAN  COLONOSCOPY.    SedationPlan :MAC    The details of the procedure, the possible need for biopsy or polypectomy and the potential risks including bleeding, perforation, missed polyps were discussed in detail.      "

## 2019-09-16 NOTE — DISCHARGE INSTRUCTIONS
Colonoscopy     A camera attached to a flexible tube with a viewing lens is used to take video pictures.     Colonoscopy is a test to view the inside of your lower digestive tract (colon and rectum). Sometimes it can show the last part of the small intestine (ileum). During the test, small pieces of tissue may be removed for testing. This is called a biopsy. Small growths, such as polyps, may also be removed.   Why is colonoscopy done?  The test is done to help look for colon cancer. And it can help find the source of abdominal pain, bleeding, and changes in bowel habits. It may be needed once a year, depending on factors such as your:  · Age  · Health history  · Family health history  · Symptoms  · Results from any prior colonoscopy  Risks and possible complications  These include:  · Bleeding               · A puncture or tear in the colon   · Risks of anesthesia  · A cancer lesion not being seen  Getting ready   To prepare for the test:  · Talk with your healthcare provider about the risks of the test (see below). Also ask your healthcare provider about alternatives to the test.  · Tell your healthcare provider about any medicines you take. Also tell him or her about any health conditions you may have.  · Make sure your rectum and colon are empty for the test. Follow the diet and bowel prep instructions exactly. If you dont, the test may need to be rescheduled.  · Plan for a friend or family member to drive you home after the test.     Colonoscopy provides an inside view of the entire colon.     You may discuss the results with your doctor right away or at a future visit.  During the test   The test is usually done in the hospital on an outpatient basis. This means you go home the same day. The procedure takes about 30 minutes. During that time:  · You are given relaxing (sedating) medicine through an IV line. You may be drowsy, or fully asleep.  · The healthcare provider will first give you a physical exam to  check for anal and rectal problems.  · Then the anus is lubricated and the scope inserted.  · If you are awake, you may have a feeling similar to needing to have a bowel movement. You may also feel pressure as air is pumped into the colon. Its OK to pass gas during the procedure.  · Biopsy, polyp removal, or other treatments may be done during the test.  After the test   You may have gas right after the test. It can help to try to pass it to help prevent later bloating. Your healthcare provider may discuss the results with you right away. Or you may need to schedule a follow-up visit to talk about the results. After the test, you can go back to your normal eating and other activities. You may be tired from the sedation and need to rest for a few hours.  Date Last Reviewed: 11/1/2016 © 2000-2017 [employer]. [address] [place] [address], [place] [address]. All rights reserved. This information is not intended as a substitute for professional medical care. Always follow your healthcare professional's instructions.

## 2019-09-16 NOTE — TRANSFER OF CARE
"Anesthesia Transfer of Care Note    Patient: Trudi Mcknight    Procedure(s) Performed: Procedure(s) (LRB):  COLONOSCOPY (N/A)    Patient location: PACU    Anesthesia Type: general    Transport from OR: Transported from OR on room air with adequate spontaneous ventilation    Post pain: adequate analgesia    Post assessment: no apparent anesthetic complications and tolerated procedure well    Post vital signs: stable    Level of consciousness: sedated    Nausea/Vomiting: no nausea/vomiting    Complications: none    Transfer of care protocol was followed      Last vitals:   Visit Vitals  /70 (BP Location: Left arm, Patient Position: Lying)   Pulse 73   Temp 36.7 °C (98.1 °F) (Tympanic)   Resp 16   Ht 5' 1" (1.549 m)   Wt 58.1 kg (128 lb)   SpO2 96%   Breastfeeding? No   BMI 24.19 kg/m²     "

## 2019-09-16 NOTE — ANESTHESIA POSTPROCEDURE EVALUATION
Anesthesia Post Evaluation    Patient: Trudi Mcknight    Procedure(s) Performed: Procedure(s) (LRB):  COLONOSCOPY (N/A)    Final Anesthesia Type: general  Patient location during evaluation: GI PACU  Patient participation: Yes- Able to Participate  Level of consciousness: awake and alert and oriented  Post-procedure vital signs: reviewed and stable  Pain management: adequate  Airway patency: patent  PONV status at discharge: No PONV  Anesthetic complications: no      Cardiovascular status: blood pressure returned to baseline and hemodynamically stable  Respiratory status: unassisted, spontaneous ventilation and room air  Hydration status: euvolemic  Follow-up not needed.          Vitals Value Taken Time   /73 9/16/2019  2:00 PM   Temp 36.7 °C (98.1 °F) 9/16/2019  1:30 PM   Pulse 68 9/16/2019  2:00 PM   Resp 18 9/16/2019  2:00 PM   SpO2 97 % 9/16/2019  2:00 PM         Event Time     Out of Recovery 14:03:31          Pain/Leanna Score: Leanna Score: 10 (9/16/2019  2:00 PM)

## 2019-09-23 ENCOUNTER — TELEPHONE (OUTPATIENT)
Dept: ENDOSCOPY | Facility: HOSPITAL | Age: 64
End: 2019-09-23

## 2019-09-25 ENCOUNTER — DOCUMENTATION ONLY (OUTPATIENT)
Dept: AUDIOLOGY | Facility: CLINIC | Age: 64
End: 2019-09-25

## 2019-09-25 NOTE — PROGRESS NOTES
[employer] LiNX2  7  Suleman [place]  Invoice Date: 11/11/16  Rt SN [id number]  : 2MP  Warranty Exp 12/11/19

## 2019-12-13 ENCOUNTER — IMMUNIZATION (OUTPATIENT)
Dept: PHARMACY | Facility: CLINIC | Age: 64
End: 2019-12-13
Payer: COMMERCIAL

## 2020-01-07 RX ORDER — SERTRALINE HYDROCHLORIDE 25 MG/1
TABLET, FILM COATED ORAL
Qty: 45 TABLET | Refills: 0 | Status: SHIPPED | OUTPATIENT
Start: 2020-01-07 | End: 2020-04-22

## 2020-01-07 NOTE — PROGRESS NOTES
Refill Authorization Note     is requesting a refill authorization.    Brief assessment and rationale for refill: APPROVE: prr                                         Comments:     Requested Prescriptions   Signed Prescriptions Disp Refills    sertraline (ZOLOFT) 25 MG tablet 45 tablet 0     Sig: TAKE 0.5 TABLETS (12.5 MG TOTAL) BY MOUTH ONCE DAILY.       Psychiatry:  Antidepressants - SSRI Passed - 1/6/2020  1:32 PM        Passed - Patient is at least 18 years old        Passed - Last BP in normal range within 360 days     BP Readings from Last 3 Encounters:   09/16/19 132/73   08/05/19 122/68   02/25/19 120/76              Passed - Office visit in past 6 months or future 90 days     Recent Outpatient Visits            5 months ago Health care maintenance    [facility] - Internal Medicine Renuka Moreno MD    10 months ago Bacterial sinusitis    [facility] - Internal Medicine Reema Wang NP    10 months ago Bacterial sinusitis    [facility] [place] Shorty Hayes PA-C    1 year ago Bacterial sinusitis    [facility] [place] Dotson PA-C    1 year ago Astigmatism of both eyes with presbyopia    [facility] - Optometry Dandre Sanchez, OD

## 2020-04-22 RX ORDER — SERTRALINE HYDROCHLORIDE 25 MG/1
TABLET, FILM COATED ORAL
Qty: 45 TABLET | Refills: 1 | Status: SHIPPED | OUTPATIENT
Start: 2020-04-22 | End: 2021-05-03

## 2020-04-22 NOTE — TELEPHONE ENCOUNTER
Refill Authorization Note     is requesting a refill authorization.    Brief assessment and rationale for refill: APPROVE: prr               Medication reconciliation completed: No                         Comments:   Requested Prescriptions   Signed Prescriptions Disp Refills    sertraline (ZOLOFT) 25 MG tablet 45 tablet 1     Sig: TAKE 1/2 TABLET BY MOUTH ONCE DAILY       Psychiatry:  Antidepressants - SSRI Failed - 4/17/2020  5:06 PM        Failed - Office visit in past 6 months or future 90 days.     Recent Outpatient Visits            8 months ago Health care maintenance    [facility] - Internal Medicine Renuka Moreno MD    1 year ago Bacterial sinusitis    [facility] - Internal Medicine Reema Wang NP    1 year ago Bacterial sinusitis    [facility] [place] Shorty Hayes PA-C    1 year ago Bacterial sinusitis    [facility] [place] Lopez Dotson PA-C    1 year ago Astigmatism of both eyes with presbyopia    [facility] - Optometry Dandre Sanchez, OD                    Passed - Patient is at least 18 years old         Appointments  past 12m or future 3m with PCP    Date Provider   Last Visit   8/5/2019 Renuka Moreno MD   Next Visit   Visit date not found Renuka Moreno MD   .  ED visits in past 90 days: [unfilled]       Note composed:2:36 PM 04/22/2020

## 2020-09-28 ENCOUNTER — PATIENT MESSAGE (OUTPATIENT)
Dept: INTERNAL MEDICINE | Facility: CLINIC | Age: 65
End: 2020-09-28

## 2020-09-28 DIAGNOSIS — Z12.31 ENCOUNTER FOR SCREENING MAMMOGRAM FOR BREAST CANCER: Primary | ICD-10-CM

## 2020-10-01 ENCOUNTER — HOSPITAL ENCOUNTER (OUTPATIENT)
Dept: RADIOLOGY | Facility: HOSPITAL | Age: 65
Discharge: HOME | End: 2020-10-01
Attending: INTERNAL MEDICINE
Payer: MEDICARE

## 2020-10-01 DIAGNOSIS — Z12.31 ENCOUNTER FOR SCREENING MAMMOGRAM FOR BREAST CANCER: ICD-10-CM

## 2020-10-01 PROCEDURE — 77063 MAMMO DIGITAL SCREENING BILAT WITH TOMO: ICD-10-PCS | Mod: 26,HCNC,, | Performed by: RADIOLOGY

## 2020-10-01 PROCEDURE — 77067 SCR MAMMO BI INCL CAD: CPT | Mod: 26,HCNC,, | Performed by: RADIOLOGY

## 2020-10-01 PROCEDURE — 77067 SCR MAMMO BI INCL CAD: CPT | Mod: TC,HCNC

## 2020-10-01 PROCEDURE — 77063 BREAST TOMOSYNTHESIS BI: CPT | Mod: 26,HCNC,, | Performed by: RADIOLOGY

## 2020-10-01 PROCEDURE — 77067 MAMMO DIGITAL SCREENING BILAT WITH TOMO: ICD-10-PCS | Mod: 26,HCNC,, | Performed by: RADIOLOGY

## 2020-10-05 ENCOUNTER — PATIENT MESSAGE (OUTPATIENT)
Dept: ADMINISTRATIVE | Facility: HOSPITAL | Age: 65
End: 2020-10-05

## 2020-10-10 ENCOUNTER — OFFICE VISIT (OUTPATIENT)
Dept: URGENT CARE | Facility: CLINIC | Age: 65
End: 2020-10-10
Payer: MEDICARE

## 2020-10-10 VITALS
BODY MASS INDEX: 24.55 KG/M2 | SYSTOLIC BLOOD PRESSURE: 135 MMHG | TEMPERATURE: 98 F | WEIGHT: 130 LBS | HEIGHT: 61 IN | HEART RATE: 96 BPM | RESPIRATION RATE: 16 BRPM | OXYGEN SATURATION: 98 % | DIASTOLIC BLOOD PRESSURE: 84 MMHG

## 2020-10-10 DIAGNOSIS — S92.355A NONDISPLACED FRACTURE OF FIFTH METATARSAL BONE, LEFT FOOT, INITIAL ENCOUNTER FOR CLOSED FRACTURE: ICD-10-CM

## 2020-10-10 DIAGNOSIS — S99.922A INJURY OF LEFT FOOT, INITIAL ENCOUNTER: Primary | ICD-10-CM

## 2020-10-10 PROCEDURE — 73630 X-RAY EXAM OF FOOT: CPT | Mod: FY,LT,S$GLB, | Performed by: RADIOLOGY

## 2020-10-10 PROCEDURE — 73630 XR FOOT COMPLETE 3 VIEW LEFT: ICD-10-PCS | Mod: FY,LT,S$GLB, | Performed by: RADIOLOGY

## 2020-10-10 PROCEDURE — 99214 OFFICE O/P EST MOD 30 MIN: CPT | Mod: S$GLB,,, | Performed by: INTERNAL MEDICINE

## 2020-10-10 PROCEDURE — 99214 PR OFFICE/OUTPT VISIT, EST, LEVL IV, 30-39 MIN: ICD-10-PCS | Mod: S$GLB,,, | Performed by: INTERNAL MEDICINE

## 2020-10-10 NOTE — PROGRESS NOTES
"Subjective:       Patient ID: Trudi Mcknight is a 65 y.o. female.    Vitals:  height is 5' 1" (1.549 m) and weight is 59 kg (130 lb). Her temperature is 98.3 °F (36.8 °C). Her blood pressure is 135/84 and her pulse is 96. Her respiration is 16 and oxygen saturation is 98%.     Chief Complaint: Foot Injury    Pt 's shoe caught and she fell, hkurting her left foot    Foot Injury   The incident occurred 1 to 3 hours ago. The injury mechanism was a fall. The pain is present in the left foot. The pain is at a severity of 9/10. The pain is severe. The pain has been constant since onset. Associated symptoms include an inability to bear weight. The symptoms are aggravated by movement and weight bearing. She has tried nothing for the symptoms.       Constitution: Negative for chills, fatigue and fever.   HENT: Negative for congestion and sore throat.    Neck: Negative for painful lymph nodes.   Cardiovascular: Negative for chest pain and leg swelling.   Eyes: Negative for double vision and blurred vision.   Respiratory: Negative for cough and shortness of breath.    Gastrointestinal: Negative for nausea, vomiting and diarrhea.   Genitourinary: Negative for dysuria, frequency, urgency and history of kidney stones.   Musculoskeletal: Negative for joint pain, joint swelling, muscle cramps and muscle ache.   Skin: Negative for color change, pale, rash and bruising.   Allergic/Immunologic: Negative for seasonal allergies.   Neurological: Negative for dizziness, history of vertigo, light-headedness, passing out and headaches.   Hematologic/Lymphatic: Negative for swollen lymph nodes.   Psychiatric/Behavioral: Negative for nervous/anxious, sleep disturbance and depression. The patient is not nervous/anxious.        Objective:      Physical Exam   Cardiovascular: Normal pulses.   Abdominal: Normal appearance.   Musculoskeletal:      Comments: Tenderness and swelling 5th and 4th metatarsal area L foot   Neurological: She is alert. "   Nursing note and vitals reviewed.        Assessment:       1. Injury of left foot, initial encounter    2. Nondisplaced fracture of fifth metatarsal bone, left foot, initial encounter for closed fracture        Plan:         Injury of left foot, initial encounter  -     X-Ray Foot Complete 3 view Left; Future; Expected date: 10/10/2020    Nondisplaced fracture of fifth metatarsal bone, left foot, initial encounter for closed fracture  -     NON-PNEUMATIC WALKING BOOT FOR HOME USE  -     Ambulatory referral/consult to Orthopedics

## 2020-10-12 ENCOUNTER — TELEPHONE (OUTPATIENT)
Dept: ORTHOPEDICS | Facility: CLINIC | Age: 65
End: 2020-10-12

## 2020-10-12 NOTE — TELEPHONE ENCOUNTER
Ortho Referral: [id number]   Spoke with pt regarding Ortho appt for nondisplaced fracture of fifth metatarsal bone, left foot, per Dr. Gaona/Tyler  referral. Pt confirms wearing boot and states that she would like to wait to schedule Ortho appt if needed. Provided Ortho Clinic contact number.

## 2020-10-17 ENCOUNTER — IMMUNIZATION (OUTPATIENT)
Dept: INTERNAL MEDICINE | Facility: CLINIC | Age: 65
End: 2020-10-17
Payer: MEDICARE

## 2020-10-17 PROCEDURE — 90694 VACC AIIV4 NO PRSRV 0.5ML IM: CPT | Mod: HCNC,S$GLB,, | Performed by: FAMILY MEDICINE

## 2020-10-17 PROCEDURE — 99999 PR PBB SHADOW E&M-EST. PATIENT-LVL I: ICD-10-PCS | Mod: PBBFAC,HCNC,,

## 2020-10-17 PROCEDURE — G0008 FLU VACCINE - QUADRIVALENT - ADJUVANTED: ICD-10-PCS | Mod: HCNC,S$GLB,, | Performed by: FAMILY MEDICINE

## 2020-10-17 PROCEDURE — 90694 FLU VACCINE - QUADRIVALENT - ADJUVANTED: ICD-10-PCS | Mod: HCNC,S$GLB,, | Performed by: FAMILY MEDICINE

## 2020-10-17 PROCEDURE — 99999 PR PBB SHADOW E&M-EST. PATIENT-LVL I: CPT | Mod: PBBFAC,HCNC,,

## 2020-10-17 PROCEDURE — G0008 ADMIN INFLUENZA VIRUS VAC: HCPCS | Mod: HCNC,S$GLB,, | Performed by: FAMILY MEDICINE

## 2020-10-26 ENCOUNTER — PATIENT OUTREACH (OUTPATIENT)
Dept: ADMINISTRATIVE | Facility: OTHER | Age: 65
End: 2020-10-26

## 2020-10-27 ENCOUNTER — OFFICE VISIT (OUTPATIENT)
Dept: OPTOMETRY | Facility: CLINIC | Age: 65
End: 2020-10-27
Payer: MEDICARE

## 2020-10-27 DIAGNOSIS — Z13.5 GLAUCOMA SCREENING: ICD-10-CM

## 2020-10-27 DIAGNOSIS — H25.13 NUCLEAR SCLEROSIS, BILATERAL: Primary | ICD-10-CM

## 2020-10-27 DIAGNOSIS — H52.4 ASTIGMATISM OF BOTH EYES WITH PRESBYOPIA: ICD-10-CM

## 2020-10-27 DIAGNOSIS — H53.2 DIPLOPIA: ICD-10-CM

## 2020-10-27 DIAGNOSIS — H52.203 ASTIGMATISM OF BOTH EYES WITH PRESBYOPIA: ICD-10-CM

## 2020-10-27 PROCEDURE — 92014 COMPRE OPH EXAM EST PT 1/>: CPT | Mod: HCNC,S$GLB,, | Performed by: OPTOMETRIST

## 2020-10-27 PROCEDURE — 92015 DETERMINE REFRACTIVE STATE: CPT | Mod: HCNC,S$GLB,, | Performed by: OPTOMETRIST

## 2020-10-27 PROCEDURE — 99999 PR PBB SHADOW E&M-EST. PATIENT-LVL III: CPT | Mod: PBBFAC,HCNC,, | Performed by: OPTOMETRIST

## 2020-10-27 PROCEDURE — 92014 PR EYE EXAM, EST PATIENT,COMPREHESV: ICD-10-PCS | Mod: HCNC,S$GLB,, | Performed by: OPTOMETRIST

## 2020-10-27 PROCEDURE — 99999 PR PBB SHADOW E&M-EST. PATIENT-LVL III: ICD-10-PCS | Mod: PBBFAC,HCNC,, | Performed by: OPTOMETRIST

## 2020-10-27 PROCEDURE — 92015 PR REFRACTION: ICD-10-PCS | Mod: HCNC,S$GLB,, | Performed by: OPTOMETRIST

## 2020-10-27 NOTE — PROGRESS NOTES
HPI     DLS: 7/2/18  Pt states she is having double VA with reading and dist all the time. Now   when she reads she is seeing rays of lights  Coming upward. also go blue   light blockers +2.50 on top of her glasses, pt stats reading with her rx   glasses stain her eyes.  Also  getting rays of light with dist also this   has been going on for over a year now. Pt states she was never happy with   her Rx, her glasses were made [facility].   Occ. Floaters   No gtts     Last edited by Cheryle Childers MA on 10/27/2020 12:45 PM. (History)          ROS     Negative for: Constitutional, Gastrointestinal, Neurological, Skin,   Genitourinary, Musculoskeletal, HENT, Endocrine, Cardiovascular, Eyes,   Respiratory, Psychiatric, Allergic/Imm, Heme/Lymph    Last edited by Dandre Sanchez, ELAINE on 10/27/2020  1:48 PM. (History)        Assessment /Plan     For exam results, see Encounter Report.    Nuclear sclerosis, bilateral    Diplopia    Glaucoma screening    Astigmatism of both eyes with presbyopia      1. Small cats OU--no spex Rx change  2. Pt notes transient vertical diplopia for past year.  She noted it in office at distance, [facility]oria testing was normal.  Pt says some days diplopia in diff gaze direction than the day before.  Could not get accurate Lane 3 step responses.      PLAN:    Consult--Dr Choudhary--diplopia eval.  ro myasthemia

## 2020-12-02 ENCOUNTER — PATIENT OUTREACH (OUTPATIENT)
Dept: ADMINISTRATIVE | Facility: OTHER | Age: 65
End: 2020-12-02

## 2020-12-02 NOTE — PROGRESS NOTES
Requested updates within Care Everywhere.  Patient's chart was reviewed for overdue ALIA topics.  Immunizations reconciled.

## 2020-12-03 ENCOUNTER — OFFICE VISIT (OUTPATIENT)
Dept: ORTHOPEDICS | Facility: CLINIC | Age: 65
End: 2020-12-03
Payer: MEDICARE

## 2020-12-03 DIAGNOSIS — S92.302A CLOSED FRACTURE OF METATARSAL NECK, LEFT, INITIAL ENCOUNTER: Primary | ICD-10-CM

## 2020-12-03 PROCEDURE — 1125F AMNT PAIN NOTED PAIN PRSNT: CPT | Mod: HCNC,S$GLB,, | Performed by: ORTHOPAEDIC SURGERY

## 2020-12-03 PROCEDURE — 99204 OFFICE O/P NEW MOD 45 MIN: CPT | Mod: HCNC,57,S$GLB, | Performed by: ORTHOPAEDIC SURGERY

## 2020-12-03 PROCEDURE — 1125F PR PAIN SEVERITY QUANTIFIED, PAIN PRESENT: ICD-10-PCS | Mod: HCNC,S$GLB,, | Performed by: ORTHOPAEDIC SURGERY

## 2020-12-03 PROCEDURE — 99204 PR OFFICE/OUTPT VISIT, NEW, LEVL IV, 45-59 MIN: ICD-10-PCS | Mod: HCNC,57,S$GLB, | Performed by: ORTHOPAEDIC SURGERY

## 2020-12-03 PROCEDURE — 99999 PR PBB SHADOW E&M-EST. PATIENT-LVL III: ICD-10-PCS | Mod: PBBFAC,HCNC,, | Performed by: ORTHOPAEDIC SURGERY

## 2020-12-03 PROCEDURE — 99999 PR PBB SHADOW E&M-EST. PATIENT-LVL III: CPT | Mod: PBBFAC,HCNC,, | Performed by: ORTHOPAEDIC SURGERY

## 2020-12-03 PROCEDURE — 28470 CLTX METATARSAL FX WO MNP EA: CPT | Mod: HCNC,S$GLB,, | Performed by: ORTHOPAEDIC SURGERY

## 2020-12-03 PROCEDURE — 28470 PR CLOSED RX METATARSAL FX: ICD-10-PCS | Mod: HCNC,S$GLB,, | Performed by: ORTHOPAEDIC SURGERY

## 2020-12-03 NOTE — PROGRESS NOTES
Subjective:   Chief complaint:   Chief Complaint   Patient presents with    Left Foot - Pain       Date of injury: Early October  Fracture: left 5th metatarsal neck fracture    HPI:   Trudi Mcknight is a 65 y.o. female referred to me by Dr. Jeferson Gaona for evaluation of left metatarsal fracture.  Rates pain as 3/10.  Pain has been ongoing for since date of injury.  Inciting event: injury.  Treatments thus far: boot immobilization.    S over the 1st month following her injury she had improvement in pain.  She feels now she is stuck.  She has continued pain over the lateral border of the foot.  It is worse with activity.  She has been unable to transition into normal shoes due to this.    ROS:  Musculoskeletal: per HPI  Constitutional: Negative for fever  Cardiovascular: Negative for chest pain  Respiratory: Negative for shortness of breath  Skin: Negative for ulcers or lesions  Neurological: Negative for burning, tingling and numbness  Vascular: Negative for peripheral edema  Heme: Negative for chronic anticoagulation; Negative for history of blood clot  Endocrine: Negative for diabetes  Immune: Negative for inflammatory arthritis  /Nephrology: Negative for ESRD-on hemodialysis       Objective:   Exam:  There were no vitals filed for this visit.  General: No acute distress, well-appearing  Neurologic: Alert and oriented x3  Psychiatric: Appropriate mood and affect, cooperative  Cardiovascular: Regular pulse  Respiratory: Breathing on room air  Skin: No rashes or ulcers  Vascular:  Left foot palpable dorsalis pedis and posterior tibial pulses  Musculoskeletal:  Standing examination demonstrates neutral ankle and hindfoot alignment.  Lesser toe cascade maintained.  Minimal swelling seen.  Ankle range of motion maintained and not irritable.  Hindfoot flexible.  Fires tibialis anterior, gastrocsoleus, posterior tibialis, peroneals.  Tender palpation over the plantar and dorsal aspects of the 5th metatarsal  head.  Slight prominence of the 5th metatarsal head plantarly.  No callusing seen.  Manipulation of the 5th MP joint not irritable.  Nontender proximally over the 5th metatarsal shaft and base.  Sensation intact and able to localize throughout superficial peroneal, deep peroneal, tibial nerve distributions.    Imaging:  Prior radiographs were personally reviewed by me.    Nonweightbearing three views left foot demonstrate minimally displaced comminuted 5th metatarsal neck fracture.  Fifth MP joint is congruent.    Additional records/labs reviewed:  None      Assessment:     1. Closed fracture of metatarsal neck, left, initial encounter         I reviewed imaging, injury history, and diagnosis as above with the patient. I attempted to use layman's terms to educate the patient as well as utilize foot models and/or pictures.   I personally went through imaging with the patient and reviewed that radiographs show a stable injury pattern, and I discussed the overwhelming number of toes fractures heal uneventfully.  As such, I discussed the role for non-operative treatment.  Non-operative treatment for this injury involves: Anti-inflammatory medications or creams, RICE modalities, Braces and/or shoe ware modifications and HEP.  I anticipate symptoms should improve and fracture should heal with treatment and there is minimal risk of long term symptoms from this injury.  I discussed that with treatment of any fracture, there is risk that fracture may not heal but rarely for toe fractures is that a significant problem.  I discussed the most common problem from these fractures is residual stiffness in the toe.  I also discussed that swelling and sensitivity of the toe can remain for 2-3 months following the injury.       Plan:       1.  Weight bearing: Weight bearing as tolerated  2.  Immobilization: Start weaning short boot and use metatarsal pad and/or bunionette sleeve  3.  Activity: Advance as tolerated, use pain as  guide  4.  Therapy: Encourage motion and use  5.  Symptomatic treatment: Over the counter anti-inflammatories as needed; ice and elevation as needed   6.  Follow-up: 1 months for recheck left foot with standing x-rays      No orders of the defined types were placed in this encounter.      Past Medical History:   Diagnosis Date    Esophagitis     Meniere's disease        Past Surgical History:   Procedure Laterality Date    BREAST CYST ASPIRATION           SECTION      COLONOSCOPY N/A 2019    Procedure: COLONOSCOPY;  Surgeon: INGRID Russo MD;  Location: [facility] ([address] [place] [address]);  Service: Endoscopy;  Laterality: N/A;    HYSTERECTOMY      TONSILLECTOMY         Family History   Problem Relation Age of Onset    Heart disease Mother 55        bypass at 55    Breast cancer Mother     Heart disease Father     Heart disease Brother     Diverticulitis Brother     Diverticulitis Sister     Breast cancer Paternal Grandmother     Colon cancer Neg Hx     Melanoma Neg Hx     Amblyopia Neg Hx     Blindness Neg Hx     Cataracts Neg Hx     Glaucoma Neg Hx     Macular degeneration Neg Hx     Strabismus Neg Hx     Retinal detachment Neg Hx        Social History     Socioeconomic History    Marital status:      Spouse name: Not on file    Number of children: Not on file    Years of education: Not on file    Highest education level: Not on file   Occupational History    Not on file   Social Needs    Financial resource strain: Not hard at all    Food insecurity     Worry: Never true     Inability: Never true    Transportation needs     Medical: No     Non-medical: No   Tobacco Use    Smoking status: Never Smoker    Smokeless tobacco: Never Used   Substance and Sexual Activity    Alcohol use: No     Alcohol/week: 0.0 standard drinks     Frequency: Never     Binge frequency: Never     Comment: rarely    Drug use: No    Sexual activity: Not on file   Lifestyle    Physical activity      Days per week: 4 days     Minutes per session: 20 min    Stress: Only a little   Relationships    Social connections     Talks on phone: More than three times a week     Gets together: Twice a week     Attends [employer] service: Not on file     Active member of club or organization: Yes     Attends meetings of clubs or organizations: More than 4 times per year     Relationship status:    Other Topics Concern    Are you pregnant or think you may be? Not Asked    Breast-feeding Not Asked   Social History Narrative    Not on file

## 2020-12-03 NOTE — LETTER
December 3, 2020      Jeferson Gaona MD  [address] [facility] [address]           [facility]  [address]  [facility] [address]  Phone: [phone]          Patient: Trudi Mcknight   MR Number: [id number]   YOB: 1955   Date of Visit: 12/3/2020       Dear Dr. Jeferson Gaona,    Thank you for your referral of Trudi Mcknight for evaluation of their left foot fracture.  Please see attached consultation note for details regarding our visit.    I appreciate the opportunity to participate in the care of our mutual patient.  Please do not hesitate to reach out with questions/concerns.    Sincerely,    Jaelyn Rowe MD  Foot & Ankle Orthopedic Surgery  12/03/2020  [phone]

## 2020-12-03 NOTE — PATIENT INSTRUCTIONS
"Today, you saw Dr. Rowe and were seen for left 5th metatarsal neck fracture follow-up.  Broken toes especially close to joints hurt (and are swollen) for a while - up to 3 months.  I want to encourage you to move it, use it, and wean out of boot.    We decided the next step(s) in in treatment is/are   1.  Weight bearing: weight bear as tolerated   2.  Immobilization: discontinue boot  o Try the pad provided and consider a tailor bunion sleeve to add cushion to this area in your shoe   3.  Therapy: First resume regular walking and then begin adding calf raises and scar/toe massage to work out the stiffness   4.  Symptomatic treatment: Over the counter anti-inflammatories as needed; ice and elevation as needed     Thank you for allowing me to participate in your care.  We will see you back in 1 month if not improved.    How to treat inflammation?  1.) What does your doctor mean when they tell you to follow R.I.C.E. Guidelines?     Rest your ankle/foot by limiting activities that cause pain.  A good indicator of activity level is your pain the night following the activity and the day after.  If you have pain that lasts until the next day, you did too much.   Ice it to keep the swelling down. Don't put ice directly on the skin (use a thin piece of cloth between the ice bag and skin) and don't ice more than 20 minutes at a time to avoid frostbite.   Compressive bandages immobilize and support your injury.  Make sure it isn't too tight - your toes should not be turning purple and the wrap should not hurt.   Elevate your ankle above your heart level - "toes above your nose". This applies to acute injuries and should be followed for first 48 hours as well as afterward when you have increased pain and swelling after activity or therapy.    2.) Another common way of treating pain and inflammation from an injury is anti-inflammatory medications.  Dr. Rowe may prescribe you a medication for inflammation or you can " take an over-the-counter anti-inflammatory (also known as NSAIDs - nonsteroidal anti-inflammatory drugs).  These include such medications as aleve, motrin, ibuprofen, naproxen, etc.  They should be taken as prescribed or according to the over-the-counter packaging instructions.  These medications can upset the stomach or rare cases causes ulcer disease or kidney injury.  If you are concerned about using these medications long-term, you should discuss it with you primary doctor.

## 2020-12-15 ENCOUNTER — PATIENT MESSAGE (OUTPATIENT)
Dept: INTERNAL MEDICINE | Facility: CLINIC | Age: 65
End: 2020-12-15

## 2021-01-04 ENCOUNTER — PATIENT MESSAGE (OUTPATIENT)
Dept: ADMINISTRATIVE | Facility: HOSPITAL | Age: 66
End: 2021-01-04

## 2021-02-12 ENCOUNTER — OFFICE VISIT (OUTPATIENT)
Dept: OPHTHALMOLOGY | Facility: CLINIC | Age: 66
End: 2021-02-12
Payer: MEDICARE

## 2021-02-12 DIAGNOSIS — H02.883 MEIBOMIAN GLAND DYSFUNCTION (MGD) OF BOTH EYES: Primary | ICD-10-CM

## 2021-02-12 DIAGNOSIS — H02.886 MEIBOMIAN GLAND DYSFUNCTION (MGD) OF BOTH EYES: Primary | ICD-10-CM

## 2021-02-12 DIAGNOSIS — H04.129 DRY EYE: ICD-10-CM

## 2021-02-12 PROCEDURE — 99999 PR PBB SHADOW E&M-EST. PATIENT-LVL II: CPT | Mod: PBBFAC,,, | Performed by: STUDENT IN AN ORGANIZED HEALTH CARE EDUCATION/TRAINING PROGRAM

## 2021-02-12 PROCEDURE — 1100F PTFALLS ASSESS-DOCD GE2>/YR: CPT | Mod: CPTII,S$GLB,, | Performed by: STUDENT IN AN ORGANIZED HEALTH CARE EDUCATION/TRAINING PROGRAM

## 2021-02-12 PROCEDURE — 1126F PR PAIN SEVERITY QUANTIFIED, NO PAIN PRESENT: ICD-10-PCS | Mod: S$GLB,,, | Performed by: STUDENT IN AN ORGANIZED HEALTH CARE EDUCATION/TRAINING PROGRAM

## 2021-02-12 PROCEDURE — 92002 PR EYE EXAM, NEW PATIENT,INTERMED: ICD-10-PCS | Mod: S$GLB,,, | Performed by: STUDENT IN AN ORGANIZED HEALTH CARE EDUCATION/TRAINING PROGRAM

## 2021-02-12 PROCEDURE — 99999 PR PBB SHADOW E&M-EST. PATIENT-LVL II: ICD-10-PCS | Mod: PBBFAC,,, | Performed by: STUDENT IN AN ORGANIZED HEALTH CARE EDUCATION/TRAINING PROGRAM

## 2021-02-12 PROCEDURE — 1100F PR PT FALLS ASSESS DOC 2+ FALLS/FALL W/INJURY/YR: ICD-10-PCS | Mod: CPTII,S$GLB,, | Performed by: STUDENT IN AN ORGANIZED HEALTH CARE EDUCATION/TRAINING PROGRAM

## 2021-02-12 PROCEDURE — 1126F AMNT PAIN NOTED NONE PRSNT: CPT | Mod: S$GLB,,, | Performed by: STUDENT IN AN ORGANIZED HEALTH CARE EDUCATION/TRAINING PROGRAM

## 2021-02-12 PROCEDURE — 3288F PR FALLS RISK ASSESSMENT DOCUMENTED: ICD-10-PCS | Mod: CPTII,S$GLB,, | Performed by: STUDENT IN AN ORGANIZED HEALTH CARE EDUCATION/TRAINING PROGRAM

## 2021-02-12 PROCEDURE — 3288F FALL RISK ASSESSMENT DOCD: CPT | Mod: CPTII,S$GLB,, | Performed by: STUDENT IN AN ORGANIZED HEALTH CARE EDUCATION/TRAINING PROGRAM

## 2021-02-12 PROCEDURE — 92002 INTRM OPH EXAM NEW PATIENT: CPT | Mod: S$GLB,,, | Performed by: STUDENT IN AN ORGANIZED HEALTH CARE EDUCATION/TRAINING PROGRAM

## 2021-03-07 DIAGNOSIS — B02.29 POST HERPETIC NEURALGIA: Primary | ICD-10-CM

## 2021-03-07 RX ORDER — GABAPENTIN 300 MG/1
CAPSULE ORAL
Qty: 30 CAPSULE | Refills: 0 | Status: SHIPPED | OUTPATIENT
Start: 2021-03-07 | End: 2021-11-09

## 2021-03-08 ENCOUNTER — HOSPITAL ENCOUNTER (EMERGENCY)
Facility: HOSPITAL | Age: 66
Discharge: HOME | End: 2021-03-08
Attending: EMERGENCY MEDICINE
Payer: MEDICARE

## 2021-03-08 ENCOUNTER — TELEPHONE (OUTPATIENT)
Dept: INTERNAL MEDICINE | Facility: CLINIC | Age: 66
End: 2021-03-08

## 2021-03-08 VITALS
HEIGHT: 62 IN | BODY MASS INDEX: 23.92 KG/M2 | SYSTOLIC BLOOD PRESSURE: 131 MMHG | OXYGEN SATURATION: 97 % | DIASTOLIC BLOOD PRESSURE: 79 MMHG | WEIGHT: 130 LBS | RESPIRATION RATE: 18 BRPM | TEMPERATURE: 99 F | HEART RATE: 95 BPM

## 2021-03-08 DIAGNOSIS — B02.9 HERPES ZOSTER WITHOUT COMPLICATION: Primary | ICD-10-CM

## 2021-03-08 DIAGNOSIS — R21 RASH OF FACE: ICD-10-CM

## 2021-03-08 PROCEDURE — 99284 PR EMERGENCY DEPT VISIT,LEVEL IV: ICD-10-PCS | Mod: ,,, | Performed by: EMERGENCY MEDICINE

## 2021-03-08 PROCEDURE — 99284 EMERGENCY DEPT VISIT MOD MDM: CPT | Mod: ,,, | Performed by: EMERGENCY MEDICINE

## 2021-03-08 PROCEDURE — 99281 EMR DPT VST MAYX REQ PHY/QHP: CPT

## 2021-03-08 PROCEDURE — 25000003 PHARM REV CODE 250: Performed by: EMERGENCY MEDICINE

## 2021-03-08 RX ORDER — VALACYCLOVIR HYDROCHLORIDE 500 MG/1
500 TABLET, FILM COATED ORAL 2 TIMES DAILY
COMMUNITY
End: 2021-11-09

## 2021-03-08 RX ORDER — PROPARACAINE HYDROCHLORIDE 5 MG/ML
1 SOLUTION/ DROPS OPHTHALMIC
Status: DISCONTINUED | OUTPATIENT
Start: 2021-03-08 | End: 2021-03-08 | Stop reason: HOSPADM

## 2021-03-11 ENCOUNTER — PATIENT MESSAGE (OUTPATIENT)
Dept: INTERNAL MEDICINE | Facility: CLINIC | Age: 66
End: 2021-03-11

## 2021-03-12 ENCOUNTER — PATIENT MESSAGE (OUTPATIENT)
Dept: INTERNAL MEDICINE | Facility: CLINIC | Age: 66
End: 2021-03-12

## 2021-04-05 ENCOUNTER — PATIENT MESSAGE (OUTPATIENT)
Dept: ADMINISTRATIVE | Facility: HOSPITAL | Age: 66
End: 2021-04-05

## 2021-04-16 ENCOUNTER — PATIENT MESSAGE (OUTPATIENT)
Dept: RESEARCH | Facility: HOSPITAL | Age: 66
End: 2021-04-16

## 2021-05-03 RX ORDER — SERTRALINE HYDROCHLORIDE 25 MG/1
TABLET, FILM COATED ORAL
Qty: 45 TABLET | Refills: 1 | Status: SHIPPED | OUTPATIENT
Start: 2021-05-03 | End: 2021-10-25

## 2021-10-04 ENCOUNTER — PATIENT MESSAGE (OUTPATIENT)
Dept: INTERNAL MEDICINE | Facility: CLINIC | Age: 66
End: 2021-10-04

## 2021-10-04 DIAGNOSIS — Z12.31 ENCOUNTER FOR SCREENING MAMMOGRAM FOR MALIGNANT NEOPLASM OF BREAST: Primary | ICD-10-CM

## 2021-10-15 ENCOUNTER — HOSPITAL ENCOUNTER (OUTPATIENT)
Dept: RADIOLOGY | Facility: HOSPITAL | Age: 66
Discharge: HOME | End: 2021-10-15
Attending: INTERNAL MEDICINE
Payer: MEDICARE

## 2021-10-15 VITALS — BODY MASS INDEX: 24.29 KG/M2 | HEIGHT: 62 IN | WEIGHT: 132 LBS

## 2021-10-15 DIAGNOSIS — Z12.31 ENCOUNTER FOR SCREENING MAMMOGRAM FOR MALIGNANT NEOPLASM OF BREAST: ICD-10-CM

## 2021-10-15 PROCEDURE — 77067 SCR MAMMO BI INCL CAD: CPT | Mod: 26,HCNC,, | Performed by: RADIOLOGY

## 2021-10-15 PROCEDURE — 77067 MAMMO DIGITAL SCREENING BILAT WITH TOMO: ICD-10-PCS | Mod: 26,HCNC,, | Performed by: RADIOLOGY

## 2021-10-15 PROCEDURE — 77063 MAMMO DIGITAL SCREENING BILAT WITH TOMO: ICD-10-PCS | Mod: 26,HCNC,, | Performed by: RADIOLOGY

## 2021-10-15 PROCEDURE — 77067 SCR MAMMO BI INCL CAD: CPT | Mod: TC,HCNC

## 2021-10-15 PROCEDURE — 77063 BREAST TOMOSYNTHESIS BI: CPT | Mod: 26,HCNC,, | Performed by: RADIOLOGY

## 2021-10-25 RX ORDER — SERTRALINE HYDROCHLORIDE 25 MG/1
TABLET, FILM COATED ORAL
Qty: 45 TABLET | Refills: 3 | Status: SHIPPED | OUTPATIENT
Start: 2021-10-25 | End: 2022-03-14

## 2021-11-09 ENCOUNTER — LAB VISIT (OUTPATIENT)
Dept: LAB | Facility: HOSPITAL | Age: 66
End: 2021-11-09
Payer: MEDICARE

## 2021-11-09 ENCOUNTER — OFFICE VISIT (OUTPATIENT)
Dept: INTERNAL MEDICINE | Facility: CLINIC | Age: 66
End: 2021-11-09
Payer: MEDICARE

## 2021-11-09 ENCOUNTER — TELEPHONE (OUTPATIENT)
Dept: INTERNAL MEDICINE | Facility: CLINIC | Age: 66
End: 2021-11-09
Payer: MEDICARE

## 2021-11-09 VITALS
SYSTOLIC BLOOD PRESSURE: 112 MMHG | BODY MASS INDEX: 25.19 KG/M2 | HEART RATE: 76 BPM | HEIGHT: 62 IN | DIASTOLIC BLOOD PRESSURE: 76 MMHG | WEIGHT: 136.88 LBS | OXYGEN SATURATION: 98 %

## 2021-11-09 DIAGNOSIS — R42 DIZZINESS: ICD-10-CM

## 2021-11-09 DIAGNOSIS — E66.3 OVERWEIGHT (BMI 25.0-29.9): ICD-10-CM

## 2021-11-09 DIAGNOSIS — R55 SYNCOPE, UNSPECIFIED SYNCOPE TYPE: Primary | ICD-10-CM

## 2021-11-09 DIAGNOSIS — R55 SYNCOPE, UNSPECIFIED SYNCOPE TYPE: ICD-10-CM

## 2021-11-09 PROBLEM — I48.91 UNSPECIFIED ATRIAL FIBRILLATION: Status: ACTIVE | Noted: 2021-03-07

## 2021-11-09 PROBLEM — E07.9 DISORDER OF THYROID, UNSPECIFIED: Status: ACTIVE | Noted: 2021-03-07

## 2021-11-09 PROBLEM — K21.9 GASTRO-ESOPHAGEAL REFLUX DISEASE WITHOUT ESOPHAGITIS: Status: ACTIVE | Noted: 2021-03-07

## 2021-11-09 PROBLEM — M79.2 NEURALGIA AND NEURITIS, UNSPECIFIED: Status: ACTIVE | Noted: 2021-03-07

## 2021-11-09 LAB
ALBUMIN SERPL BCP-MCNC: 3.9 G/DL (ref 3.5–5.2)
ALP SERPL-CCNC: 81 U/L (ref 55–135)
ALT SERPL W/O P-5'-P-CCNC: 17 U/L (ref 10–44)
ANION GAP SERPL CALC-SCNC: 8 MMOL/L (ref 8–16)
AST SERPL-CCNC: 21 U/L (ref 10–40)
BASOPHILS # BLD AUTO: 0.04 K/UL (ref 0–0.2)
BASOPHILS NFR BLD: 0.5 % (ref 0–1.9)
BILIRUB SERPL-MCNC: 0.3 MG/DL (ref 0.1–1)
BUN SERPL-MCNC: 13 MG/DL (ref 8–23)
CALCIUM SERPL-MCNC: 10.1 MG/DL (ref 8.7–10.5)
CHLORIDE SERPL-SCNC: 106 MMOL/L (ref 95–110)
CO2 SERPL-SCNC: 28 MMOL/L (ref 23–29)
CREAT SERPL-MCNC: 1 MG/DL (ref 0.5–1.4)
DIFFERENTIAL METHOD: NORMAL
EOSINOPHIL # BLD AUTO: 0.1 K/UL (ref 0–0.5)
EOSINOPHIL NFR BLD: 1.9 % (ref 0–8)
ERYTHROCYTE [DISTWIDTH] IN BLOOD BY AUTOMATED COUNT: 13.9 % (ref 11.5–14.5)
EST. GFR  (AFRICAN AMERICAN): >60 ML/MIN/1.73 M^2
EST. GFR  (NON AFRICAN AMERICAN): 58.8 ML/MIN/1.73 M^2
GLUCOSE SERPL-MCNC: 102 MG/DL (ref 70–110)
HCT VFR BLD AUTO: 42.5 % (ref 37–48.5)
HGB BLD-MCNC: 13.6 G/DL (ref 12–16)
IMM GRANULOCYTES # BLD AUTO: 0.01 K/UL (ref 0–0.04)
IMM GRANULOCYTES NFR BLD AUTO: 0.1 % (ref 0–0.5)
LYMPHOCYTES # BLD AUTO: 1.6 K/UL (ref 1–4.8)
LYMPHOCYTES NFR BLD: 21.8 % (ref 18–48)
MAGNESIUM SERPL-MCNC: 2.1 MG/DL (ref 1.6–2.6)
MCH RBC QN AUTO: 27.7 PG (ref 27–31)
MCHC RBC AUTO-ENTMCNC: 32 G/DL (ref 32–36)
MCV RBC AUTO: 87 FL (ref 82–98)
MONOCYTES # BLD AUTO: 0.5 K/UL (ref 0.3–1)
MONOCYTES NFR BLD: 6.8 % (ref 4–15)
NEUTROPHILS # BLD AUTO: 5.1 K/UL (ref 1.8–7.7)
NEUTROPHILS NFR BLD: 68.9 % (ref 38–73)
NRBC BLD-RTO: 0 /100 WBC
PLATELET # BLD AUTO: 358 K/UL (ref 150–450)
PMV BLD AUTO: 11.4 FL (ref 9.2–12.9)
POTASSIUM SERPL-SCNC: 4 MMOL/L (ref 3.5–5.1)
PROT SERPL-MCNC: 7.3 G/DL (ref 6–8.4)
RBC # BLD AUTO: 4.91 M/UL (ref 4–5.4)
SODIUM SERPL-SCNC: 142 MMOL/L (ref 136–145)
WBC # BLD AUTO: 7.35 K/UL (ref 3.9–12.7)

## 2021-11-09 PROCEDURE — 80053 COMPREHEN METABOLIC PANEL: CPT | Mod: HCNC | Performed by: NURSE PRACTITIONER

## 2021-11-09 PROCEDURE — 3008F PR BODY MASS INDEX (BMI) DOCUMENTED: ICD-10-PCS | Mod: HCNC,CPTII,S$GLB, | Performed by: NURSE PRACTITIONER

## 2021-11-09 PROCEDURE — 3074F SYST BP LT 130 MM HG: CPT | Mod: HCNC,CPTII,S$GLB, | Performed by: NURSE PRACTITIONER

## 2021-11-09 PROCEDURE — 3074F PR MOST RECENT SYSTOLIC BLOOD PRESSURE < 130 MM HG: ICD-10-PCS | Mod: HCNC,CPTII,S$GLB, | Performed by: NURSE PRACTITIONER

## 2021-11-09 PROCEDURE — 99999 PR PBB SHADOW E&M-EST. PATIENT-LVL III: ICD-10-PCS | Mod: PBBFAC,HCNC,, | Performed by: NURSE PRACTITIONER

## 2021-11-09 PROCEDURE — 1101F PT FALLS ASSESS-DOCD LE1/YR: CPT | Mod: HCNC,CPTII,S$GLB, | Performed by: NURSE PRACTITIONER

## 2021-11-09 PROCEDURE — 83735 ASSAY OF MAGNESIUM: CPT | Mod: HCNC | Performed by: NURSE PRACTITIONER

## 2021-11-09 PROCEDURE — 3078F DIAST BP <80 MM HG: CPT | Mod: HCNC,CPTII,S$GLB, | Performed by: NURSE PRACTITIONER

## 2021-11-09 PROCEDURE — 85025 COMPLETE CBC W/AUTO DIFF WBC: CPT | Mod: HCNC | Performed by: NURSE PRACTITIONER

## 2021-11-09 PROCEDURE — 3008F BODY MASS INDEX DOCD: CPT | Mod: HCNC,CPTII,S$GLB, | Performed by: NURSE PRACTITIONER

## 2021-11-09 PROCEDURE — 3288F PR FALLS RISK ASSESSMENT DOCUMENTED: ICD-10-PCS | Mod: HCNC,CPTII,S$GLB, | Performed by: NURSE PRACTITIONER

## 2021-11-09 PROCEDURE — 93010 EKG 12-LEAD: ICD-10-PCS | Mod: HCNC,S$GLB,, | Performed by: INTERNAL MEDICINE

## 2021-11-09 PROCEDURE — 1101F PR PT FALLS ASSESS DOC 0-1 FALLS W/OUT INJ PAST YR: ICD-10-PCS | Mod: HCNC,CPTII,S$GLB, | Performed by: NURSE PRACTITIONER

## 2021-11-09 PROCEDURE — 99214 PR OFFICE/OUTPT VISIT, EST, LEVL IV, 30-39 MIN: ICD-10-PCS | Mod: HCNC,S$GLB,, | Performed by: NURSE PRACTITIONER

## 2021-11-09 PROCEDURE — 3288F FALL RISK ASSESSMENT DOCD: CPT | Mod: HCNC,CPTII,S$GLB, | Performed by: NURSE PRACTITIONER

## 2021-11-09 PROCEDURE — 99999 PR PBB SHADOW E&M-EST. PATIENT-LVL III: CPT | Mod: PBBFAC,HCNC,, | Performed by: NURSE PRACTITIONER

## 2021-11-09 PROCEDURE — 1126F PR PAIN SEVERITY QUANTIFIED, NO PAIN PRESENT: ICD-10-PCS | Mod: HCNC,CPTII,S$GLB, | Performed by: NURSE PRACTITIONER

## 2021-11-09 PROCEDURE — 93010 ELECTROCARDIOGRAM REPORT: CPT | Mod: HCNC,S$GLB,, | Performed by: INTERNAL MEDICINE

## 2021-11-09 PROCEDURE — 3078F PR MOST RECENT DIASTOLIC BLOOD PRESSURE < 80 MM HG: ICD-10-PCS | Mod: HCNC,CPTII,S$GLB, | Performed by: NURSE PRACTITIONER

## 2021-11-09 PROCEDURE — 93005 EKG 12-LEAD: ICD-10-PCS | Mod: HCNC,S$GLB,, | Performed by: NURSE PRACTITIONER

## 2021-11-09 PROCEDURE — 93005 ELECTROCARDIOGRAM TRACING: CPT | Mod: HCNC,S$GLB,, | Performed by: NURSE PRACTITIONER

## 2021-11-09 PROCEDURE — 36415 COLL VENOUS BLD VENIPUNCTURE: CPT | Mod: HCNC | Performed by: NURSE PRACTITIONER

## 2021-11-09 PROCEDURE — 1126F AMNT PAIN NOTED NONE PRSNT: CPT | Mod: HCNC,CPTII,S$GLB, | Performed by: NURSE PRACTITIONER

## 2021-11-09 PROCEDURE — 99214 OFFICE O/P EST MOD 30 MIN: CPT | Mod: HCNC,S$GLB,, | Performed by: NURSE PRACTITIONER

## 2021-11-09 RX ORDER — MECLIZINE HCL 12.5 MG 12.5 MG/1
12.5 TABLET ORAL 3 TIMES DAILY PRN
Qty: 30 TABLET | Refills: 0 | Status: SHIPPED | OUTPATIENT
Start: 2021-11-09 | End: 2022-01-10

## 2022-01-02 ENCOUNTER — HOSPITAL ENCOUNTER (EMERGENCY)
Facility: HOSPITAL | Age: 67
Discharge: HOME | End: 2022-01-02
Attending: EMERGENCY MEDICINE
Payer: MEDICARE

## 2022-01-02 VITALS
WEIGHT: 130 LBS | HEART RATE: 66 BPM | OXYGEN SATURATION: 98 % | BODY MASS INDEX: 23.92 KG/M2 | HEIGHT: 62 IN | DIASTOLIC BLOOD PRESSURE: 69 MMHG | SYSTOLIC BLOOD PRESSURE: 160 MMHG | TEMPERATURE: 98 F | RESPIRATION RATE: 20 BRPM

## 2022-01-02 DIAGNOSIS — K44.9 HIATAL HERNIA: ICD-10-CM

## 2022-01-02 DIAGNOSIS — I48.92 ATRIAL FLUTTER WITH RAPID VENTRICULAR RESPONSE: Primary | ICD-10-CM

## 2022-01-02 DIAGNOSIS — R07.89 CHEST DISCOMFORT: ICD-10-CM

## 2022-01-02 DIAGNOSIS — R10.11 RIGHT UPPER QUADRANT ABDOMINAL PAIN: ICD-10-CM

## 2022-01-02 LAB
ALBUMIN SERPL BCP-MCNC: 3.7 G/DL (ref 3.5–5.2)
ALP SERPL-CCNC: 80 U/L (ref 55–135)
ALT SERPL W/O P-5'-P-CCNC: 13 U/L (ref 10–44)
ANION GAP SERPL CALC-SCNC: 12 MMOL/L (ref 8–16)
AST SERPL-CCNC: 19 U/L (ref 10–40)
BASOPHILS # BLD AUTO: 0.05 K/UL (ref 0–0.2)
BASOPHILS NFR BLD: 0.7 % (ref 0–1.9)
BILIRUB SERPL-MCNC: 0.5 MG/DL (ref 0.1–1)
BILIRUB UR QL STRIP: NEGATIVE
BUN SERPL-MCNC: 17 MG/DL (ref 8–23)
BUN SERPL-MCNC: 21 MG/DL (ref 6–30)
CALCIUM SERPL-MCNC: 9.5 MG/DL (ref 8.7–10.5)
CHLORIDE SERPL-SCNC: 108 MMOL/L (ref 95–110)
CHLORIDE SERPL-SCNC: 110 MMOL/L (ref 95–110)
CLARITY UR REFRACT.AUTO: CLEAR
CO2 SERPL-SCNC: 22 MMOL/L (ref 23–29)
COLOR UR AUTO: NORMAL
CREAT SERPL-MCNC: 0.9 MG/DL (ref 0.5–1.4)
CREAT SERPL-MCNC: 0.9 MG/DL (ref 0.5–1.4)
CTP QC/QA: YES
DIFFERENTIAL METHOD: NORMAL
EOSINOPHIL # BLD AUTO: 0.2 K/UL (ref 0–0.5)
EOSINOPHIL NFR BLD: 2.1 % (ref 0–8)
ERYTHROCYTE [DISTWIDTH] IN BLOOD BY AUTOMATED COUNT: 13.7 % (ref 11.5–14.5)
EST. GFR  (AFRICAN AMERICAN): >60 ML/MIN/1.73 M^2
EST. GFR  (NON AFRICAN AMERICAN): >60 ML/MIN/1.73 M^2
GLUCOSE SERPL-MCNC: 118 MG/DL (ref 70–110)
GLUCOSE SERPL-MCNC: 122 MG/DL (ref 70–110)
GLUCOSE UR QL STRIP: NEGATIVE
HCT VFR BLD AUTO: 44.3 % (ref 37–48.5)
HCT VFR BLD CALC: 43 %PCV (ref 36–54)
HGB BLD-MCNC: 14.6 G/DL (ref 12–16)
HGB UR QL STRIP: NEGATIVE
IMM GRANULOCYTES # BLD AUTO: 0.02 K/UL (ref 0–0.04)
IMM GRANULOCYTES NFR BLD AUTO: 0.3 % (ref 0–0.5)
KETONES UR QL STRIP: NEGATIVE
LACTATE SERPL-SCNC: 1.5 MMOL/L (ref 0.5–2.2)
LEUKOCYTE ESTERASE UR QL STRIP: NEGATIVE
LIPASE SERPL-CCNC: 41 U/L (ref 4–60)
LYMPHOCYTES # BLD AUTO: 2 K/UL (ref 1–4.8)
LYMPHOCYTES NFR BLD: 28.1 % (ref 18–48)
MCH RBC QN AUTO: 28.3 PG (ref 27–31)
MCHC RBC AUTO-ENTMCNC: 33 G/DL (ref 32–36)
MCV RBC AUTO: 86 FL (ref 82–98)
MONOCYTES # BLD AUTO: 0.6 K/UL (ref 0.3–1)
MONOCYTES NFR BLD: 8.3 % (ref 4–15)
NEUTROPHILS # BLD AUTO: 4.4 K/UL (ref 1.8–7.7)
NEUTROPHILS NFR BLD: 60.5 % (ref 38–73)
NITRITE UR QL STRIP: NEGATIVE
NRBC BLD-RTO: 0 /100 WBC
PH UR STRIP: 6 [PH] (ref 5–8)
PLATELET # BLD AUTO: 316 K/UL (ref 150–450)
PMV BLD AUTO: 10.5 FL (ref 9.2–12.9)
POC IONIZED CALCIUM: 1.24 MMOL/L (ref 1.06–1.42)
POC TCO2 (MEASURED): 25 MMOL/L (ref 23–29)
POTASSIUM BLD-SCNC: 3.4 MMOL/L (ref 3.5–5.1)
POTASSIUM SERPL-SCNC: 3.5 MMOL/L (ref 3.5–5.1)
PROT SERPL-MCNC: 7.3 G/DL (ref 6–8.4)
PROT UR QL STRIP: NEGATIVE
RBC # BLD AUTO: 5.16 M/UL (ref 4–5.4)
SAMPLE: ABNORMAL
SARS-COV-2 RDRP RESP QL NAA+PROBE: NEGATIVE
SODIUM BLD-SCNC: 145 MMOL/L (ref 136–145)
SODIUM SERPL-SCNC: 144 MMOL/L (ref 136–145)
SP GR UR STRIP: 1 (ref 1–1.03)
TROPONIN I SERPL DL<=0.01 NG/ML-MCNC: <0.006 NG/ML (ref 0–0.03)
TSH SERPL DL<=0.005 MIU/L-ACNC: 2.5 UIU/ML (ref 0.4–4)
URN SPEC COLLECT METH UR: NORMAL
WBC # BLD AUTO: 7.2 K/UL (ref 3.9–12.7)

## 2022-01-02 PROCEDURE — 99285 EMERGENCY DEPT VISIT HI MDM: CPT | Mod: 25,HCNC

## 2022-01-02 PROCEDURE — 86803 HEPATITIS C AB TEST: CPT | Mod: HCNC | Performed by: EMERGENCY MEDICINE

## 2022-01-02 PROCEDURE — 93010 EKG 12-LEAD: ICD-10-PCS | Mod: HCNC,,, | Performed by: INTERNAL MEDICINE

## 2022-01-02 PROCEDURE — 80053 COMPREHEN METABOLIC PANEL: CPT | Mod: HCNC | Performed by: EMERGENCY MEDICINE

## 2022-01-02 PROCEDURE — 25000003 PHARM REV CODE 250: Mod: HCNC | Performed by: EMERGENCY MEDICINE

## 2022-01-02 PROCEDURE — 84443 ASSAY THYROID STIM HORMONE: CPT | Mod: HCNC | Performed by: EMERGENCY MEDICINE

## 2022-01-02 PROCEDURE — 83605 ASSAY OF LACTIC ACID: CPT | Mod: HCNC | Performed by: EMERGENCY MEDICINE

## 2022-01-02 PROCEDURE — U0002 COVID-19 LAB TEST NON-CDC: HCPCS | Mod: HCNC | Performed by: EMERGENCY MEDICINE

## 2022-01-02 PROCEDURE — 80047 BASIC METABLC PNL IONIZED CA: CPT | Mod: HCNC

## 2022-01-02 PROCEDURE — 93010 ELECTROCARDIOGRAM REPORT: CPT | Mod: HCNC,,, | Performed by: INTERNAL MEDICINE

## 2022-01-02 PROCEDURE — 84484 ASSAY OF TROPONIN QUANT: CPT | Mod: HCNC | Performed by: EMERGENCY MEDICINE

## 2022-01-02 PROCEDURE — 81003 URINALYSIS AUTO W/O SCOPE: CPT | Mod: HCNC | Performed by: EMERGENCY MEDICINE

## 2022-01-02 PROCEDURE — 99284 PR EMERGENCY DEPT VISIT,LEVEL IV: ICD-10-PCS | Mod: HCNC,CS,, | Performed by: EMERGENCY MEDICINE

## 2022-01-02 PROCEDURE — 85025 COMPLETE CBC W/AUTO DIFF WBC: CPT | Mod: HCNC | Performed by: EMERGENCY MEDICINE

## 2022-01-02 PROCEDURE — 83690 ASSAY OF LIPASE: CPT | Mod: HCNC | Performed by: EMERGENCY MEDICINE

## 2022-01-02 PROCEDURE — 99284 EMERGENCY DEPT VISIT MOD MDM: CPT | Mod: HCNC,CS,, | Performed by: EMERGENCY MEDICINE

## 2022-01-02 PROCEDURE — 93005 ELECTROCARDIOGRAM TRACING: CPT | Mod: HCNC

## 2022-01-02 PROCEDURE — 82330 ASSAY OF CALCIUM: CPT | Mod: HCNC

## 2022-01-02 RX ORDER — METOPROLOL SUCCINATE 25 MG/1
12.5 TABLET, EXTENDED RELEASE ORAL DAILY
Qty: 15 TABLET | Refills: 0 | OUTPATIENT
Start: 2022-01-02 | End: 2022-01-05

## 2022-01-02 RX ORDER — METOPROLOL TARTRATE 1 MG/ML
5 INJECTION, SOLUTION INTRAVENOUS
Status: DISCONTINUED | OUTPATIENT
Start: 2022-01-02 | End: 2022-01-02

## 2022-01-02 RX ORDER — METOPROLOL SUCCINATE 25 MG/1
12.5 TABLET, EXTENDED RELEASE ORAL DAILY
Qty: 15 TABLET | Refills: 11 | Status: SHIPPED | OUTPATIENT
Start: 2022-01-02 | End: 2022-01-02 | Stop reason: SDUPTHER

## 2022-01-02 RX ORDER — METOPROLOL SUCCINATE 25 MG/1
25 TABLET, EXTENDED RELEASE ORAL
Status: DISCONTINUED | OUTPATIENT
Start: 2022-01-02 | End: 2022-01-02

## 2022-01-02 RX ADMIN — SODIUM CHLORIDE 1000 ML: 0.9 INJECTION, SOLUTION INTRAVENOUS at 04:01

## 2022-01-02 RX ADMIN — APIXABAN 5 MG: 5 TABLET, FILM COATED ORAL at 09:01

## 2022-01-02 RX ADMIN — METOPROLOL SUCCINATE 12.5 MG: 25 TABLET, EXTENDED RELEASE ORAL at 09:01

## 2022-01-02 NOTE — ED PROVIDER NOTES
"Source of History   Patient    Chief Complaint   Abdominal Pain (R upper abd, )      History Of Present Illness   Trudi Mcknight is a 66 y.o. female presenting with intermittent gas pains on her chest off and on for a few weeks.  She has also been having right upper quadrant and right rib pain for a couple of months.  They are not content for PALOMA.  She does not want to eat because it makes the abdominal pain worse.  No fever or chills.  She had an EKG a couple of weeks ago and was told it looked normal.  At the moment, she does not feel short of breath or have any "gas" pressure.    Review Of Systems   As per HPI and below:  General: No fever.  No chills.  Eyes: No visual changes.  Head: No headache.    Integument: No rashes or lesions.  Chest: Notes shortness of breath.  Cardiovascular: Notes chest pain.  Abdomen: Notes abdominal pain.  No nausea or vomiting.  Urinary: No abnormal urination.  Neurologic: No focal weakness.  No numbness.  Hematologic: No easy bruising.  Endocrine: No excessive thirst or urination.      Review of patient's allergies indicates:   Allergen Reactions    Penicillins Hives     causes congestion and hives    Tricyclic compounds        No current facility-administered medications on file prior to encounter.     Current Outpatient Medications on File Prior to Encounter   Medication Sig Dispense Refill    sertraline (ZOLOFT) 25 MG tablet TAKE 1/2 TABLET BY MOUTH EVERY DAY 45 tablet 3    meclizine (ANTIVERT) 12.5 mg tablet Take 1 tablet (12.5 mg total) by mouth 3 (three) times daily as needed for Dizziness. 30 tablet 0    omeprazole (PRILOSEC) 40 MG capsule          Past History   As per HPI and below:  Past Medical History:   Diagnosis Date    Esophagitis     Meniere's disease      Past Surgical History:   Procedure Laterality Date    BREAST CYST ASPIRATION           SECTION      COLONOSCOPY N/A 2019    Procedure: COLONOSCOPY;  Surgeon: INGRID Russo MD;  " Location: [facility] (4TH FLR);  Service: Endoscopy;  Laterality: N/A;    HYSTERECTOMY      TONSILLECTOMY         Social History     Socioeconomic History    Marital status:    Tobacco Use    Smoking status: Never Smoker    Smokeless tobacco: Never Used   Substance and Sexual Activity    Alcohol use: No     Alcohol/week: 0.0 standard drinks     Comment: rarely    Drug use: No       Family History   Problem Relation Age of Onset    Heart disease Mother 55        bypass at 55    Breast cancer Mother     Heart disease Father     Heart disease Brother     Diverticulitis Brother     Diverticulitis Sister     Breast cancer Paternal Grandmother     Colon cancer Neg Hx     Melanoma Neg Hx     Amblyopia Neg Hx     Blindness Neg Hx     Cataracts Neg Hx     Glaucoma Neg Hx     Macular degeneration Neg Hx     Strabismus Neg Hx     Retinal detachment Neg Hx        Physical Exam     Vitals:    01/02/22 1732 01/02/22 1734 01/02/22 2101 01/02/22 2102   BP: (!) 140/80  (!) 160/69    Pulse: 65  66    Resp:  18  20   Temp:       TempSrc:       SpO2: 98%  98%    Weight:       Height:         Appearance: No acute distress.  Skin: No rashes seen.  Good turgor.  No abrasions.  No ecchymoses.  Eyes: No conjunctival injection.  ENT: Oropharynx clear.    Chest: Clear to auscultation bilaterally.  Good air movement.  No wheezes.  No rhonchi.  Cardiovascular: Irregularly irregular.  No murmurs. No gallops. No rubs.  Abdomen: Soft.  Not distended.  RUQ TTP.  No guarding.  No rebound.  Musculoskeletal: Good range of motion all joints.  No deformities.  Neck supple.  No meningismus.  Neurologic: Motor intact.  Sensation intact.  Cerebellar intact.  Cranial nerves intact.  Mental Status:  Alert and oriented x 3.  Appropriate, conversant.      Initial MDM   Atrial flutter with rapid ventricular response, also abdominal tenderness.  She has had a couple of months of intermittent palpitations/gas pain as well as a  couple months of abdominal pain.  Will do gallbladder workup as well as atrial flutter workup.    I decided to obtain the patient's medical records.    Medications   sodium chloride 0.9% bolus 1,000 mL (1,000 mLs Intravenous New Bag 1/2/22 4931)   apixaban tablet 5 mg (5 mg Oral Given 1/2/22 2147)   metoprolol succinate (TOPROL-XL) 24 hr split tablet 12.5 mg (12.5 mg Oral Given 1/2/22 2147)       Results and Course     Labs Reviewed   COMPREHENSIVE METABOLIC PANEL - Abnormal; Notable for the following components:       Result Value    CO2 22 (*)     Glucose 118 (*)     All other components within normal limits   ISTAT PROCEDURE - Abnormal; Notable for the following components:    POC Glucose 122 (*)     POC Potassium 3.4 (*)     All other components within normal limits   HEPATITIS C ANTIBODY    Narrative:     Release to patient->Immediate   CBC W/ AUTO DIFFERENTIAL   LIPASE   URINALYSIS, REFLEX TO URINE CULTURE    Narrative:     Specimen Source->Urine   TROPONIN I   TSH   LACTIC ACID, PLASMA   SARS-COV-2 RDRP GENE    Narrative:     This test utilizes isothermal nucleic acid amplification   technology to detect the SARS-CoV-2 RdRp nucleic acid segment.   The analytical sensitivity (limit of detection) is 125 genome   equivalents/mL.   A POSITIVE result implies infection with the SARS-CoV-2 virus;   the patient is presumed to be contagious.     A NEGATIVE result means that SARS-CoV-2 nucleic acids are not   present above the limit of detection. A NEGATIVE result should be   treated as presumptive. It does not rule out the possibility of   COVID-19 and should not be the sole basis for treatment decisions.   If COVID-19 is strongly suspected based on clinical and exposure   history, re-testing using an alternate molecular assay should be   considered.   This test is only for use under the Food and Drug   Administration s Emergency Use Authorization (EUA).   Commercial kits are provided by [employer].    Performance characteristics of the EUA have been independently   verified by [facility] Department of   Pathology and Laboratory Medicine.   _________________________________________________________________   The authorized Fact Sheet for Healthcare Providers and the authorized Fact   Sheet for Patients of the ID NOW COVID-19 are available on the FDA   website:     https://www.fda.gov/media/151552/download  https://www.fda.gov/media/567434/download           Imaging Results          US Abdomen Limited (Final result)  Result time 01/02/22 19:09:42    Final result by Dandre Hedrick MD (01/02/22 19:09:42)                 Impression:      No significant sonographic abnormality.      Electronically signed by: Dandre Hedrick MD  Date:    01/02/2022  Time:    19:09             Narrative:    EXAMINATION:  US ABDOMEN LIMITED    CLINICAL HISTORY:  RUQ Pain, r/o biliary disease;    TECHNIQUE:  Limited ultrasound of the right upper quadrant of the abdomen.    COMPARISON:  None.    FINDINGS:  Gallbladder: No calculi, wall thickening, or pericholecystic fluid.  No sonographic Mccoy's sign.    Biliary system: The common duct is not dilated, measuring 2 mm.  No intrahepatic ductal dilatation.    Miscellaneous: No upper abdominal ascites.  Right kidney measures 10.6 cm in length without hydronephrosis.  Imaged portions of the pancreas are within normal limits.                               X-Ray Chest AP Portable (Final result)  Result time 01/02/22 18:26:21    Final result by Dandre Hedrick MD (01/02/22 18:26:21)                 Impression:      No radiographic acute intrathoracic process seen.    Suspected large hiatal hernia.  Clinical correlation advised.      Electronically signed by: Dandre Hedrick MD  Date:    01/02/2022  Time:    18:26             Narrative:    EXAMINATION:  XR CHEST AP PORTABLE    CLINICAL HISTORY:  Other chest pain    TECHNIQUE:  Single frontal view of the chest was  performed.    COMPARISON:  Chest radiograph 02/25/2019    FINDINGS:  Monitoring leads overlie the chest.  No detrimental change.  Cardiomediastinal silhouette is midline noting calcification and tortuosity of the aorta, mildly enlarged cardiac silhouette and also suspected large hiatal hernia projected over the midline lower mediastinum with air-fluid level.  Pulmonary vasculature and hilar contours are within normal limits.  The lungs are well expanded without large consolidation, pleural effusion or pneumothorax.  No acute osseous process seen.  PA and lateral views can be obtained.                                ED Course as of 01/03/22 1032   Sun Jan 02, 2022   1638 EKG 12-lead [DC]   1638 EKG 12-lead  Atrial flutter with 's, no acute ischemia per my independent interpretation.     [DC]   1650 WBC: 7.20 [DC]   1650 Hemoglobin: 14.6 [DC]   1650 Platelets: 316 [DC]   1650 POC Creatinine: 0.9 [DC]   1739 EKG 12-lead  NSR @ 80, no ischemia, cardioverted spontaneously per my independent interpretation.     [DC]   1739 SARS-CoV-2 RNA, Amplification, Qual: Negative [DC]   1740 Lipase: 41 [DC]   1740 POC Creatinine: 0.9 [DC]   1740 Troponin I: <0.006 [DC]   1740 AST: 19 [DC]   1740 ALT: 13 [DC]   1740 Alkaline Phosphatase: 80 [DC]   1744 Chest discomfort resolved after spontaneous cardioversion, still with some RUQ pain [DC]   1751 TSH: 2.504 [DC]   1802 Discussed with cards, ok to d/c with metoprolol and xarelto.  Will check 2nd trop.   [DC]   1820 X-Ray Chest AP Portable  CXR shows no acute disease per my independent interpretation.     [DC]      ED Course User Index  [DC] Haile Ireland MD           MDM, Impression and Plan   66 y.o. female with atrial flutter/rvr, vague chest discomfort intermittently for over a month, resolved in ED when spontaneously cardioverted.  Also with over a month of RUQ/rib pain, CXR negative, RUQ U/S negative, labs benign.  OK to d/c with afib meds, f/u pcp.  Hiatal hernia noted,  doubt incarceration/strangulation given resolution of symptoms.           Final diagnoses:  [R07.89] Chest discomfort  [I48.92] Atrial flutter with rapid ventricular response (Primary)  [R10.11] Right upper quadrant abdominal pain  [K44.9] Hiatal hernia          ED Disposition Condition    Discharge Stable        ED Prescriptions     Medication Sig Dispense Start Date End Date Auth. Provider    metoprolol succinate (TOPROL-XL) 25 MG 24 hr tablet  (Status: Discontinued) Take 0.5 tablets (12.5 mg total) by mouth once daily. 15 tablet 1/2/2022 1/2/2022 Dafne Howe MD    apixaban (ELIQUIS) 5 mg Tab  (Status: Discontinued) Take 1 tablet (5 mg total) by mouth 2 (two) times daily. 60 tablet 1/2/2022 1/2/2022 Dafne Howe MD    apixaban (ELIQUIS) 5 mg Tab Take 1 tablet (5 mg total) by mouth 2 (two) times daily. 60 tablet 1/2/2022 2/1/2022 Dafne Howe MD    metoprolol succinate (TOPROL-XL) 25 MG 24 hr tablet Take 0.5 tablets (12.5 mg total) by mouth once daily. 15 tablet 1/2/2022  Dafne Howe MD        Follow-up Information     Follow up With Specialties Details Why Contact Info    Renuka Moreno MD Internal Medicine Schedule an appointment as soon as possible for a visit in 2 days  [address]  [place] [address]  [phone]             Haile Ireland MD  01/03/22 1086

## 2022-01-02 NOTE — ED NOTES
I-STAT Chem-8+ Results:   Value Reference Range   Sodium 145 136-145 mmol/L   Potassium  3.4 3.5-5.1 mmol/L   Chloride 108  mmol/L   Ionized Calcium 1.24 1.06-1.42 mmol/L   CO2 (measured) 25 23-29 mmol/L   Glucose 122  mg/dL   BUN 21 6-30 mg/dL   Creatinine 0.9 0.5-1.4 mg/dL   Hematocrit 43 36-54%

## 2022-01-02 NOTE — ED NOTES
"Patient states pain to RUQ, abd pain , :gas on my chest" States she has had 5 "spells"  In the last hour when she feels like she is going to pass out, states left arm feels "tired"  H/O A fib in 90s. Currently with chest pressure,   "

## 2022-01-03 ENCOUNTER — PATIENT MESSAGE (OUTPATIENT)
Dept: INTERNAL MEDICINE | Facility: CLINIC | Age: 67
End: 2022-01-03
Payer: MEDICARE

## 2022-01-03 LAB — HCV AB SERPL QL IA: NEGATIVE

## 2022-01-03 NOTE — ED PROVIDER NOTES
Patient was signed out to me by Dr. Ireland. Briefly, this is a 65yo F presenting with 1mo of CP and RUQ abdominal pain, found to be in atrail flutter with RVR though spontaneously converted to NSR. Pending RUQ US.     PE:   Const: Awake and alert, NAD  Resp: Even and unlabored  Abd: soft, ND, NTTP  Neuro: Moves all extremities equally  Psych: Normal mood and affect    Data:  Results for orders placed or performed during the hospital encounter of 01/02/22   CBC W/ AUTO DIFFERENTIAL   Result Value Ref Range    WBC 7.20 3.90 - 12.70 K/uL    RBC 5.16 4.00 - 5.40 M/uL    Hemoglobin 14.6 12.0 - 16.0 g/dL    Hematocrit 44.3 37.0 - 48.5 %    MCV 86 82 - 98 fL    MCH 28.3 27.0 - 31.0 pg    MCHC 33.0 32.0 - 36.0 g/dL    RDW 13.7 11.5 - 14.5 %    Platelets 316 150 - 450 K/uL    MPV 10.5 9.2 - 12.9 fL    Immature Granulocytes 0.3 0.0 - 0.5 %    Gran # (ANC) 4.4 1.8 - 7.7 K/uL    Immature Grans (Abs) 0.02 0.00 - 0.04 K/uL    Lymph # 2.0 1.0 - 4.8 K/uL    Mono # 0.6 0.3 - 1.0 K/uL    Eos # 0.2 0.0 - 0.5 K/uL    Baso # 0.05 0.00 - 0.20 K/uL    nRBC 0 0 /100 WBC    Gran % 60.5 38.0 - 73.0 %    Lymph % 28.1 18.0 - 48.0 %    Mono % 8.3 4.0 - 15.0 %    Eosinophil % 2.1 0.0 - 8.0 %    Basophil % 0.7 0.0 - 1.9 %    Differential Method Automated    Comp. Metabolic Panel   Result Value Ref Range    Sodium 144 136 - 145 mmol/L    Potassium 3.5 3.5 - 5.1 mmol/L    Chloride 110 95 - 110 mmol/L    CO2 22 (L) 23 - 29 mmol/L    Glucose 118 (H) 70 - 110 mg/dL    BUN 17 8 - 23 mg/dL    Creatinine 0.9 0.5 - 1.4 mg/dL    Calcium 9.5 8.7 - 10.5 mg/dL    Total Protein 7.3 6.0 - 8.4 g/dL    Albumin 3.7 3.5 - 5.2 g/dL    Total Bilirubin 0.5 0.1 - 1.0 mg/dL    Alkaline Phosphatase 80 55 - 135 U/L    AST 19 10 - 40 U/L    ALT 13 10 - 44 U/L    Anion Gap 12 8 - 16 mmol/L    eGFR if African American >60.0 >60 mL/min/1.73 m^2    eGFR if non African American >60.0 >60 mL/min/1.73 m^2   Lipase   Result Value Ref Range    Lipase 41 4 - 60 U/L   Urinalysis,  Reflex to Urine Culture Urine, Clean Catch    Specimen: Urine   Result Value Ref Range    Specimen UA Urine, Clean Catch     Color, UA Straw Yellow, Straw, Jazzy    Appearance, UA Clear Clear    pH, UA 6.0 5.0 - 8.0    Specific Gravity, UA 1.005 1.005 - 1.030    Protein, UA Negative Negative    Glucose, UA Negative Negative    Ketones, UA Negative Negative    Bilirubin (UA) Negative Negative    Occult Blood UA Negative Negative    Nitrite, UA Negative Negative    Leukocytes, UA Negative Negative   Troponin I   Result Value Ref Range    Troponin I <0.006 0.000 - 0.026 ng/mL   TSH   Result Value Ref Range    TSH 2.504 0.400 - 4.000 uIU/mL   Lactic acid, plasma   Result Value Ref Range    Lactate (Lactic Acid) 1.5 0.5 - 2.2 mmol/L   POCT COVID-19 Rapid Screening   Result Value Ref Range    POC Rapid COVID Negative Negative     Acceptable Yes    ISTAT PROCEDURE   Result Value Ref Range    POC Glucose 122 (H) 70 - 110 mg/dL    POC BUN 21 6 - 30 mg/dL    POC Creatinine 0.9 0.5 - 1.4 mg/dL    POC Sodium 145 136 - 145 mmol/L    POC Potassium 3.4 (L) 3.5 - 5.1 mmol/L    POC Chloride 108 95 - 110 mmol/L    POC TCO2 (MEASURED) 25 23 - 29 mmol/L    POC Ionized Calcium 1.24 1.06 - 1.42 mmol/L    POC Hematocrit 43 36 - 54 %PCV    Sample LONG       Imaging Results              US Abdomen Limited (Final result)  Result time 01/02/22 19:09:42      Final result by Dandre Hedrick MD (01/02/22 19:09:42)                   Impression:      No significant sonographic abnormality.      Electronically signed by: Dandre Hedrick MD  Date:    01/02/2022  Time:    19:09               Narrative:    EXAMINATION:  US ABDOMEN LIMITED    CLINICAL HISTORY:  RUQ Pain, r/o biliary disease;    TECHNIQUE:  Limited ultrasound of the right upper quadrant of the abdomen.    COMPARISON:  None.    FINDINGS:  Gallbladder: No calculi, wall thickening, or pericholecystic fluid.  No sonographic Mccoy's sign.    Biliary system: The common duct is not  dilated, measuring 2 mm.  No intrahepatic ductal dilatation.    Miscellaneous: No upper abdominal ascites.  Right kidney measures 10.6 cm in length without hydronephrosis.  Imaged portions of the pancreas are within normal limits.                                       X-Ray Chest AP Portable (Final result)  Result time 01/02/22 18:26:21      Final result by Dandre Hedrick MD (01/02/22 18:26:21)                   Impression:      No radiographic acute intrathoracic process seen.    Suspected large hiatal hernia.  Clinical correlation advised.      Electronically signed by: Dandre Hedrick MD  Date:    01/02/2022  Time:    18:26               Narrative:    EXAMINATION:  XR CHEST AP PORTABLE    CLINICAL HISTORY:  Other chest pain    TECHNIQUE:  Single frontal view of the chest was performed.    COMPARISON:  Chest radiograph 02/25/2019    FINDINGS:  Monitoring leads overlie the chest.  No detrimental change.  Cardiomediastinal silhouette is midline noting calcification and tortuosity of the aorta, mildly enlarged cardiac silhouette and also suspected large hiatal hernia projected over the midline lower mediastinum with air-fluid level.  Pulmonary vasculature and hilar contours are within normal limits.  The lungs are well expanded without large consolidation, pleural effusion or pneumothorax.  No acute osseous process seen.  PA and lateral views can be obtained.                                       MDM:   RUQ US negative for acute cholecystitis or cholelithiasis. Doubt cholecystitis, appendicitis, pancreatitis, SBO, diverticulitis, or any other acute abdominal surgical emergency. I susect her symptoms are more related to her hiatal hernia and likely paroxysmal atrial flutter. Patient discussed with cariology on call, who recommended eliquis and metoprolol. Patient started on eliquis for anticoagulation and low dose metoprolol in the ER per cardiology recs, given prescription for home. Outpatient referral placed, stable  for DC home with cardiology and GI outpatient follow up.      Dafne Howe MD  01/03/22 0956

## 2022-01-05 ENCOUNTER — CLINICAL SUPPORT (OUTPATIENT)
Dept: CARDIOLOGY | Facility: HOSPITAL | Age: 67
End: 2022-01-05
Attending: STUDENT IN AN ORGANIZED HEALTH CARE EDUCATION/TRAINING PROGRAM
Payer: MEDICARE

## 2022-01-05 ENCOUNTER — HOSPITAL ENCOUNTER (EMERGENCY)
Facility: HOSPITAL | Age: 67
Discharge: HOME | End: 2022-01-05
Attending: EMERGENCY MEDICINE
Payer: MEDICARE

## 2022-01-05 VITALS
BODY MASS INDEX: 23.22 KG/M2 | HEART RATE: 68 BPM | SYSTOLIC BLOOD PRESSURE: 117 MMHG | WEIGHT: 123 LBS | DIASTOLIC BLOOD PRESSURE: 72 MMHG | TEMPERATURE: 99 F | RESPIRATION RATE: 18 BRPM | HEIGHT: 61 IN | OXYGEN SATURATION: 98 %

## 2022-01-05 DIAGNOSIS — I49.3 PVC (PREMATURE VENTRICULAR CONTRACTION): ICD-10-CM

## 2022-01-05 DIAGNOSIS — I48.0 PAROXYSMAL ATRIAL FIBRILLATION: Primary | ICD-10-CM

## 2022-01-05 DIAGNOSIS — I48.0 PAROXYSMAL ATRIAL FIBRILLATION: ICD-10-CM

## 2022-01-05 DIAGNOSIS — R07.9 CHEST PAIN: ICD-10-CM

## 2022-01-05 DIAGNOSIS — R00.1 SINUS BRADYCARDIA: ICD-10-CM

## 2022-01-05 DIAGNOSIS — R00.2 PALPITATIONS: ICD-10-CM

## 2022-01-05 LAB
ALBUMIN SERPL BCP-MCNC: 3.8 G/DL (ref 3.5–5.2)
ALP SERPL-CCNC: 77 U/L (ref 55–135)
ALT SERPL W/O P-5'-P-CCNC: 8 U/L (ref 10–44)
ANION GAP SERPL CALC-SCNC: 9 MMOL/L (ref 8–16)
AST SERPL-CCNC: 18 U/L (ref 10–40)
BASOPHILS # BLD AUTO: 0.03 K/UL (ref 0–0.2)
BASOPHILS NFR BLD: 0.5 % (ref 0–1.9)
BILIRUB SERPL-MCNC: 0.7 MG/DL (ref 0.1–1)
BNP SERPL-MCNC: 32 PG/ML (ref 0–99)
BUN SERPL-MCNC: 16 MG/DL (ref 8–23)
CALCIUM SERPL-MCNC: 9.5 MG/DL (ref 8.7–10.5)
CHLORIDE SERPL-SCNC: 108 MMOL/L (ref 95–110)
CO2 SERPL-SCNC: 20 MMOL/L (ref 23–29)
CREAT SERPL-MCNC: 0.9 MG/DL (ref 0.5–1.4)
CTP QC/QA: YES
DIFFERENTIAL METHOD: ABNORMAL
EOSINOPHIL # BLD AUTO: 0.1 K/UL (ref 0–0.5)
EOSINOPHIL NFR BLD: 2.2 % (ref 0–8)
ERYTHROCYTE [DISTWIDTH] IN BLOOD BY AUTOMATED COUNT: 13.6 % (ref 11.5–14.5)
EST. GFR  (AFRICAN AMERICAN): >60 ML/MIN/1.73 M^2
EST. GFR  (NON AFRICAN AMERICAN): >60 ML/MIN/1.73 M^2
GLUCOSE SERPL-MCNC: 94 MG/DL (ref 70–110)
HCT VFR BLD AUTO: 42.9 % (ref 37–48.5)
HGB BLD-MCNC: 13.7 G/DL (ref 12–16)
IMM GRANULOCYTES # BLD AUTO: 0.01 K/UL (ref 0–0.04)
IMM GRANULOCYTES NFR BLD AUTO: 0.2 % (ref 0–0.5)
LYMPHOCYTES # BLD AUTO: 1.7 K/UL (ref 1–4.8)
LYMPHOCYTES NFR BLD: 26.3 % (ref 18–48)
MAGNESIUM SERPL-MCNC: 2.1 MG/DL (ref 1.6–2.6)
MCH RBC QN AUTO: 27.6 PG (ref 27–31)
MCHC RBC AUTO-ENTMCNC: 31.9 G/DL (ref 32–36)
MCV RBC AUTO: 87 FL (ref 82–98)
MONOCYTES # BLD AUTO: 0.5 K/UL (ref 0.3–1)
MONOCYTES NFR BLD: 8.2 % (ref 4–15)
NEUTROPHILS # BLD AUTO: 4 K/UL (ref 1.8–7.7)
NEUTROPHILS NFR BLD: 62.6 % (ref 38–73)
NRBC BLD-RTO: 0 /100 WBC
PLATELET # BLD AUTO: 280 K/UL (ref 150–450)
PMV BLD AUTO: 10.2 FL (ref 9.2–12.9)
POTASSIUM SERPL-SCNC: 3.7 MMOL/L (ref 3.5–5.1)
PROT SERPL-MCNC: 7 G/DL (ref 6–8.4)
RBC # BLD AUTO: 4.96 M/UL (ref 4–5.4)
SARS-COV-2 RDRP RESP QL NAA+PROBE: NEGATIVE
SODIUM SERPL-SCNC: 137 MMOL/L (ref 136–145)
TROPONIN I SERPL DL<=0.01 NG/ML-MCNC: 0.01 NG/ML (ref 0–0.03)
TROPONIN I SERPL DL<=0.01 NG/ML-MCNC: <0.006 NG/ML (ref 0–0.03)
WBC # BLD AUTO: 6.31 K/UL (ref 3.9–12.7)

## 2022-01-05 PROCEDURE — 85025 COMPLETE CBC W/AUTO DIFF WBC: CPT | Mod: HCNC | Performed by: STUDENT IN AN ORGANIZED HEALTH CARE EDUCATION/TRAINING PROGRAM

## 2022-01-05 PROCEDURE — 93226 XTRNL ECG REC<48 HR SCAN A/R: CPT | Mod: HCNC

## 2022-01-05 PROCEDURE — 84484 ASSAY OF TROPONIN QUANT: CPT | Mod: 91,HCNC | Performed by: STUDENT IN AN ORGANIZED HEALTH CARE EDUCATION/TRAINING PROGRAM

## 2022-01-05 PROCEDURE — 83735 ASSAY OF MAGNESIUM: CPT | Mod: HCNC | Performed by: STUDENT IN AN ORGANIZED HEALTH CARE EDUCATION/TRAINING PROGRAM

## 2022-01-05 PROCEDURE — 99285 EMERGENCY DEPT VISIT HI MDM: CPT | Mod: HCNC,CS,, | Performed by: EMERGENCY MEDICINE

## 2022-01-05 PROCEDURE — U0002 COVID-19 LAB TEST NON-CDC: HCPCS | Mod: HCNC | Performed by: STUDENT IN AN ORGANIZED HEALTH CARE EDUCATION/TRAINING PROGRAM

## 2022-01-05 PROCEDURE — 80053 COMPREHEN METABOLIC PANEL: CPT | Mod: HCNC | Performed by: STUDENT IN AN ORGANIZED HEALTH CARE EDUCATION/TRAINING PROGRAM

## 2022-01-05 PROCEDURE — 93010 EKG 12-LEAD: ICD-10-PCS | Mod: HCNC,,, | Performed by: INTERNAL MEDICINE

## 2022-01-05 PROCEDURE — 99285 PR EMERGENCY DEPT VISIT,LEVEL V: ICD-10-PCS | Mod: HCNC,CS,, | Performed by: EMERGENCY MEDICINE

## 2022-01-05 PROCEDURE — 83880 ASSAY OF NATRIURETIC PEPTIDE: CPT | Mod: HCNC | Performed by: STUDENT IN AN ORGANIZED HEALTH CARE EDUCATION/TRAINING PROGRAM

## 2022-01-05 PROCEDURE — 93010 ELECTROCARDIOGRAM REPORT: CPT | Mod: HCNC,,, | Performed by: INTERNAL MEDICINE

## 2022-01-05 PROCEDURE — 99285 EMERGENCY DEPT VISIT HI MDM: CPT | Mod: 25,HCNC

## 2022-01-05 PROCEDURE — 84484 ASSAY OF TROPONIN QUANT: CPT | Mod: HCNC | Performed by: EMERGENCY MEDICINE

## 2022-01-05 PROCEDURE — 25000003 PHARM REV CODE 250: Mod: HCNC | Performed by: STUDENT IN AN ORGANIZED HEALTH CARE EDUCATION/TRAINING PROGRAM

## 2022-01-05 PROCEDURE — 93227 XTRNL ECG REC<48 HR R&I: CPT | Mod: HCNC,,, | Performed by: INTERNAL MEDICINE

## 2022-01-05 PROCEDURE — 93227 HOLTER MONITOR - 48 HOUR (CUPID ONLY): ICD-10-PCS | Mod: HCNC,,, | Performed by: INTERNAL MEDICINE

## 2022-01-05 PROCEDURE — 93005 ELECTROCARDIOGRAM TRACING: CPT | Mod: HCNC

## 2022-01-05 PROCEDURE — 93005 ELECTROCARDIOGRAM TRACING: CPT | Mod: HCNC,59

## 2022-01-05 RX ORDER — ASPIRIN 325 MG
325 TABLET ORAL
Status: COMPLETED | OUTPATIENT
Start: 2022-01-05 | End: 2022-01-05

## 2022-01-05 RX ADMIN — ASPIRIN 325 MG ORAL TABLET 325 MG: 325 PILL ORAL at 05:01

## 2022-01-05 NOTE — CARE UPDATE
"Brief Cardiology Clinic Note    Called regarding Ms. Mcknight, 66F with recently diagnosed AF presenting with palpitations and noncardiac mid sternal chest pain (associated solely w eating, lying flat after meal), nonexertional. Recently in ED for newly diagnosed afib. Started on eliquis and very low dose metoprolol succinate 12.5mg daily. She reports that her symptoms again occurred today after she took the medication though this was also associated with a large meal and associated w lying flat. Not associated with exertion (if anything improved). In ED telemetry with normal sinus rhythm and mild bradycardia (50s) and frequent PVCs. No sustained pauses (they were concerned because saw "30s" briefly on bedside monitor). Not seen on my tele review. Not associated w lightheadedness, dizziness. Denies weight gain, swelling, PND, orthopnea.      RECS  - Chest pain sounds noncardiac. Initial trop negative, ECG without dynamic changes. Reasonable to follow up additional troponin given proximity of onset of symptoms  - 48 hour Holter monitor ordered to assess for episodes of bradycardia, further Afib and PVC briden  - Continue eliquis  - Low suspicion metoprolol contributing to symptoms (miniscule dose 12.5mg) but in absence of significant burden atrial fibrillation and intermittent bradycardia, reasonable to discontinue metoprolol    - Reasonable to hold off on stress testing for now given noncardiac nature of discomfort (likely GI +/- palpitations from PVCs) but if symptoms persist reasonable to perform outpatient stress testing given new AF of unclear cause (defer to upcoming outpatient cardiology appointment next week).      Ruben Dykes MD  PGY-6, Cardiology  Pager [phone]    "

## 2022-01-05 NOTE — ED NOTES
Ambulated to bathroom in room -- at bedside. Describes a burning /pressure type pain in mid chest. --Maintained on cardiac monitor in Sinus madelaine with occasional PVC's

## 2022-01-05 NOTE — ED TRIAGE NOTES
"Trudi Mcknight, a 66 y.o. female presents to the ED w/ complaint of mid sternal chest pain and SOB. Pt describes chest pain as tight, burning and cold that radiates to her back. Pt reports that per her pulse ox " 02 was in the 80s and HR was in the 30s".     Triage note:  Chief Complaint   Patient presents with    Palpitations     Started Sunday, cp started this morning, pt recently dx of afib     Review of patient's allergies indicates:   Allergen Reactions    Penicillins Hives     causes congestion and hives    Tricyclic compounds      Past Medical History:   Diagnosis Date    Esophagitis     Meniere's disease      "

## 2022-01-05 NOTE — PROVIDER PROGRESS NOTES - EMERGENCY DEPT.
Encounter Date: 1/5/2022    ED Physician Progress Notes        Physician Note:   Ms Trudi Mcknight is a pleasant 66 y.o. female with a PMH hiatal hernia, AFib on Eliquis and metoprolol started 3 days ago presenting with chest pain, shortness of breath, diaphoresis, and generalized weakness that started around 430/5:00 a.m. She had a milder episode at midnight on 01/05/2022. On repeat exam, patient was in stable condition with HR in the low 60s. Examination of cardiac monitoring showed HR dipping on occasion to 48. Patient does not complain of chest pain, but occasional chest tightness. ECG was evaluated without signs of acute infarctive process. Initial troponin was negative. Patient symptoms likely related to her recent initiation of metoprolol. ED reached out to cardiology who suggested discontinuing patient's metoprolol/    Additional interventions:  - Repeat troponin was ordered for collection at 9am which came back negative .   - Holter cardiac monitor for 48 hours    Disposition plans:  - Follow-up on holter monitor results  - Seek medical assistance if condition worsens or patient develops new worsening symptoms  - Patient already has a follow-up appointment with cardiologist Dr. Darby on 01/12/2022

## 2022-01-05 NOTE — DISCHARGE INSTRUCTIONS
Ms Mcknight, you were seen in the ED with chest tightness and decreased heart rate. Your examination confirmed intermittently mildly reduced heart rate. Multiple lab results and ECG were negative for any signs of a heart attack. Your symptoms are most likely related to your recent initiation of metoprolol. We reached out to our inpatient cardiologist who agreed with this impression. The plan moving forward is to discontinue your metoprolol to ensure your heart rate is not reduced. Additionally, a holter monitor will be set up to allow for 48 hour monitoring of your heart rhythm to assess need for medication for atrial fibrillation and further management. Please follow up with your PCP and cardiologist for additional assessment.    Please follow up with your PCP and your cardiologist within the next week. If symptoms worsen, call your PCP or in case of emergency seek assistance.

## 2022-01-05 NOTE — ED PROVIDER NOTES
Encounter Date: 2022       History     Chief Complaint   Patient presents with    Palpitations     Started , cp started this morning, pt recently dx of afib     HPI   Patient is a 66-year-old female with past medical history of hiatal hernia, AFib on Eliquis and metoprolol started 3 days ago presenting with chest pain, shortness of breath, diaphoresis, and generalized weakness that started around 430/5:00 a.m..  States she woke up to use restroom and when she walked back to the bedroom started having severe 10/10 pain in her chest that felt like pressure radiating to her back and her neck.  Pain resolved after approximately 5 minutes though currently 3/10.  Did not take any medications for symptoms.  Patient is concerned that she has been having fluctuations in her heart rate since starting the metoprolol.  No recent infectious symptoms, nausea or vomiting, trauma, urinary symptoms, diarrhea, or history of bleeding (hemoptosis, hematemesis, hematuria, melena, or hematochezia).    Review of patient's allergies indicates:   Allergen Reactions    Penicillins Hives     causes congestion and hives    Tricyclic compounds      Past Medical History:   Diagnosis Date    Esophagitis     Meniere's disease      Past Surgical History:   Procedure Laterality Date    BREAST CYST ASPIRATION           SECTION      COLONOSCOPY N/A 2019    Procedure: COLONOSCOPY;  Surgeon: INGRID Russo MD;  Location: [facility] ([address] [place] [address]);  Service: Endoscopy;  Laterality: N/A;    HYSTERECTOMY      TONSILLECTOMY       Family History   Problem Relation Age of Onset    Heart disease Mother 55        bypass at 55    Breast cancer Mother     Heart disease Father     Heart disease Brother     Diverticulitis Brother     Diverticulitis Sister     Breast cancer Paternal Grandmother     Colon cancer Neg Hx     Melanoma Neg Hx     Amblyopia Neg Hx     Blindness Neg Hx     Cataracts Neg Hx     Glaucoma Neg Hx      Macular degeneration Neg Hx     Strabismus Neg Hx     Retinal detachment Neg Hx      Social History     Tobacco Use    Smoking status: Never Smoker    Smokeless tobacco: Never Used   Substance Use Topics    Alcohol use: No     Alcohol/week: 0.0 standard drinks     Comment: rarely    Drug use: No     Review of Systems   Constitutional: Positive for diaphoresis. Negative for appetite change, chills and fever.   HENT: Negative for congestion and sore throat.    Respiratory: Positive for chest tightness and shortness of breath. Negative for cough.    Cardiovascular: Positive for chest pain. Negative for leg swelling.   Gastrointestinal: Negative for abdominal pain, constipation, diarrhea, nausea and vomiting.   Genitourinary: Negative for dysuria and hematuria.   Musculoskeletal: Positive for back pain and neck pain.   Skin: Negative for rash and wound.   Neurological: Positive for weakness. Negative for headaches.   Hematological: Does not bruise/bleed easily.       Physical Exam     Initial Vitals [01/05/22 0523]   BP Pulse Resp Temp SpO2   137/68 81 18 97.7 °F (36.5 °C) 99 %      MAP       --         Physical Exam    Nursing note and vitals reviewed.  Constitutional: She appears well-developed. She is not diaphoretic.   Elderly female who looks her stated age, alert and oriented, speaking in full sentences.    HENT:   Head: Normocephalic and atraumatic.   Nose: Nose normal.   Eyes: Conjunctivae and EOM are normal. Pupils are equal, round, and reactive to light. No scleral icterus.   Neck: Neck supple.   Normal range of motion.  Cardiovascular: Normal rate, regular rhythm and intact distal pulses.   No murmur heard.  Pulmonary/Chest: Breath sounds normal. No stridor. No respiratory distress. She has no wheezes. She has no rales.   Abdominal: Abdomen is soft. Bowel sounds are normal. She exhibits no distension. There is no abdominal tenderness. There is no rebound.   Musculoskeletal:         General: No  edema. Normal range of motion.      Cervical back: Normal range of motion and neck supple.     Neurological: She is alert and oriented to person, place, and time. GCS score is 15. GCS eye subscore is 4. GCS verbal subscore is 5. GCS motor subscore is 6.   Skin: Skin is warm. Capillary refill takes less than 2 seconds.   Psychiatric: She has a normal mood and affect.         ED Course   Procedures  Labs Reviewed   CBC W/ AUTO DIFFERENTIAL - Abnormal; Notable for the following components:       Result Value    MCHC 31.9 (*)     All other components within normal limits   MAGNESIUM   B-TYPE NATRIURETIC PEPTIDE   COMPREHENSIVE METABOLIC PANEL   TROPONIN I   SARS-COV-2 RDRP GENE     EKG Readings: (Independently Interpreted)   EKG: Sinus rhythm, normal intervals, normal axis no ischemic changes.       Imaging Results          X-Ray Chest AP Portable (In process)                  Medications   aspirin tablet 325 mg (325 mg Oral Given 1/5/22 0556)     Medical Decision Making:   History:   Old Medical Records: I decided to obtain old medical records.  Old Records Summarized: records from previous admission(s).       <> Summary of Records: Dx with paroxysmal a flutter on 1/2/2022- labs stable. Started on eliquis and metoprolol  Independently Interpreted Test(s):   I have ordered and independently interpreted X-rays - see prior notes.  I have ordered and independently interpreted EKG Reading(s) - see prior notes  Clinical Tests:   Lab Tests: Reviewed and Ordered  Radiological Study: Ordered and Reviewed  Medical Tests: Ordered and Reviewed       APC / Resident Notes:   Patient is a 66-year-old female past medical history of AFib recently diagnosed, on Eliquis and metoprolol presenting with sensations of palpitations, shortness breath, chest pressure, and diaphoresis.  Had an episode like this around 5:00 a.m. today.  No associated nausea or vomiting.  Denies any recent infectious symptoms. Vital signs all within normal limits.   Concern for ACS, arrhythmia, medication induced bradycardia, anemia, pneumonia, pericarditis, or myocarditis.  Given aspirin.  Ordered ACS workup, chest x-ray, and COVID-19 swab.  Disposition pending symptom improvement and either to [facility] or if patient continues to be symptomatic with chest pain, consideration for admission.  Pending workup and reassessment, care signed out to oncoming team.     Nate Zelaya MD  [facility], PGY4  01/05/2022 6:41 AM                Attending Attestation:   Physician Attestation Statement for Resident:  As the supervising MD   Physician Attestation Statement: I have personally seen and examined this patient.   I agree with the above history. -: 66 M hx of hiatal hernia, AFib on Eliquis here for chest pain, shortness of breath, weakness insert shortly after getting up to use the bathroom around 4:30 a.m..   As the supervising MD I agree with the above PE.   -: General:  Well appearing, no acute distress  Lungs:  Clear to auscultation  Cardiac:  Regular rate and rhythm  Abdomen: soft, nontender  Extremity:no extremities   As the supervising MD I agree with the above treatment, course, plan, and disposition.   -: Labs, ekg, cxr    CBC unremarkable.  Sign-out to Dr. Lou pending CMP, troponin, BNP, chest x-ray, re-eval and final disposition                               Clinical Impression:   Final diagnoses:  [R07.9] Chest pain  [R00.2] Palpitations                 Jaci Hurd MD  01/05/22 0620       Nate Zelaya MD  Resident  01/05/22 0641       Jaci Hurd MD  01/05/22 0652

## 2022-01-10 ENCOUNTER — OFFICE VISIT (OUTPATIENT)
Dept: INTERNAL MEDICINE | Facility: CLINIC | Age: 67
End: 2022-01-10
Payer: MEDICARE

## 2022-01-10 ENCOUNTER — HOSPITAL ENCOUNTER (OUTPATIENT)
Dept: CARDIOLOGY | Facility: HOSPITAL | Age: 67
Discharge: HOME | End: 2022-01-10
Attending: INTERNAL MEDICINE
Payer: MEDICARE

## 2022-01-10 VITALS
HEIGHT: 61 IN | HEART RATE: 67 BPM | OXYGEN SATURATION: 99 % | DIASTOLIC BLOOD PRESSURE: 78 MMHG | WEIGHT: 131 LBS | BODY MASS INDEX: 24.73 KG/M2 | SYSTOLIC BLOOD PRESSURE: 110 MMHG

## 2022-01-10 VITALS
WEIGHT: 131 LBS | DIASTOLIC BLOOD PRESSURE: 78 MMHG | SYSTOLIC BLOOD PRESSURE: 110 MMHG | BODY MASS INDEX: 24.73 KG/M2 | HEART RATE: 70 BPM | HEIGHT: 61 IN

## 2022-01-10 DIAGNOSIS — I48.91 ATRIAL FIBRILLATION, UNSPECIFIED TYPE: ICD-10-CM

## 2022-01-10 DIAGNOSIS — I48.91 ATRIAL FIBRILLATION, UNSPECIFIED TYPE: Primary | ICD-10-CM

## 2022-01-10 LAB
ASCENDING AORTA: 3.26 CM
AV INDEX (PROSTH): 0.79
AV MEAN GRADIENT: 3 MMHG
AV PEAK GRADIENT: 5 MMHG
AV VALVE AREA: 3.8 CM2
AV VELOCITY RATIO: 0.88
BSA FOR ECHO PROCEDURE: 1.6 M2
CV ECHO LV RWT: 0.31 CM
DOP CALC AO PEAK VEL: 1.08 M/S
DOP CALC AO VTI: 24.19 CM
DOP CALC LVOT AREA: 4.8 CM2
DOP CALC LVOT DIAMETER: 2.48 CM
DOP CALC LVOT PEAK VEL: 0.95 M/S
DOP CALC LVOT STROKE VOLUME: 91.88 CM3
DOP CALCLVOT PEAK VEL VTI: 19.03 CM
E WAVE DECELERATION TIME: 158.13 MSEC
E/A RATIO: 0.94
E/E' RATIO: 10.73 M/S
ECHO LV POSTERIOR WALL: 0.56 CM (ref 0.6–1.1)
EJECTION FRACTION: 55 %
FRACTIONAL SHORTENING: 32 % (ref 28–44)
INTERVENTRICULAR SEPTUM: 0.73 CM (ref 0.6–1.1)
LA MAJOR: 4.05 CM
LA MINOR: 3.74 CM
LA WIDTH: 2.97 CM
LEFT ATRIUM SIZE: 2.98 CM
LEFT ATRIUM VOLUME INDEX MOD: 14.6 ML/M2
LEFT ATRIUM VOLUME INDEX: 18.5 ML/M2
LEFT ATRIUM VOLUME MOD: 23.07 CM3
LEFT ATRIUM VOLUME: 29.26 CM3
LEFT INTERNAL DIMENSION IN SYSTOLE: 2.47 CM (ref 2.1–4)
LEFT VENTRICLE DIASTOLIC VOLUME INDEX: 34.6 ML/M2
LEFT VENTRICLE DIASTOLIC VOLUME: 54.67 ML
LEFT VENTRICLE MASS INDEX: 38 G/M2
LEFT VENTRICLE SYSTOLIC VOLUME INDEX: 13.7 ML/M2
LEFT VENTRICLE SYSTOLIC VOLUME: 21.61 ML
LEFT VENTRICULAR INTERNAL DIMENSION IN DIASTOLE: 3.61 CM (ref 3.5–6)
LEFT VENTRICULAR MASS: 59.34 G
LV LATERAL E/E' RATIO: 8.43 M/S
LV SEPTAL E/E' RATIO: 14.75 M/S
MV PEAK A VEL: 0.63 M/S
MV PEAK E VEL: 0.59 M/S
MV STENOSIS PRESSURE HALF TIME: 45.86 MS
MV VALVE AREA P 1/2 METHOD: 4.8 CM2
PISA TR MAX VEL: 2.32 M/S
RA MAJOR: 3.7 CM
RA PRESSURE: 3 MMHG
RA WIDTH: 3.02 CM
RIGHT VENTRICULAR END-DIASTOLIC DIMENSION: 2.93 CM
RV TISSUE DOPPLER FREE WALL SYSTOLIC VELOCITY 1 (APICAL 4 CHAMBER VIEW): 42.12 CM/S
SINUS: 3.23 CM
STJ: 3.52 CM
TDI LATERAL: 0.07 M/S
TDI SEPTAL: 0.04 M/S
TDI: 0.06 M/S
TR MAX PG: 22 MMHG
TRICUSPID ANNULAR PLANE SYSTOLIC EXCURSION: 1.76 CM
TV REST PULMONARY ARTERY PRESSURE: 25 MMHG

## 2022-01-10 PROCEDURE — 1125F AMNT PAIN NOTED PAIN PRSNT: CPT | Mod: HCNC,CPTII,S$GLB, | Performed by: INTERNAL MEDICINE

## 2022-01-10 PROCEDURE — 1125F PR PAIN SEVERITY QUANTIFIED, PAIN PRESENT: ICD-10-PCS | Mod: HCNC,CPTII,S$GLB, | Performed by: INTERNAL MEDICINE

## 2022-01-10 PROCEDURE — 99999 PR PBB SHADOW E&M-EST. PATIENT-LVL III: ICD-10-PCS | Mod: PBBFAC,HCNC,, | Performed by: INTERNAL MEDICINE

## 2022-01-10 PROCEDURE — 93306 TTE W/DOPPLER COMPLETE: CPT | Mod: HCNC

## 2022-01-10 PROCEDURE — 3078F DIAST BP <80 MM HG: CPT | Mod: HCNC,CPTII,S$GLB, | Performed by: INTERNAL MEDICINE

## 2022-01-10 PROCEDURE — 3074F PR MOST RECENT SYSTOLIC BLOOD PRESSURE < 130 MM HG: ICD-10-PCS | Mod: HCNC,CPTII,S$GLB, | Performed by: INTERNAL MEDICINE

## 2022-01-10 PROCEDURE — 1160F RVW MEDS BY RX/DR IN RCRD: CPT | Mod: HCNC,CPTII,S$GLB, | Performed by: INTERNAL MEDICINE

## 2022-01-10 PROCEDURE — 3078F PR MOST RECENT DIASTOLIC BLOOD PRESSURE < 80 MM HG: ICD-10-PCS | Mod: HCNC,CPTII,S$GLB, | Performed by: INTERNAL MEDICINE

## 2022-01-10 PROCEDURE — 1159F MED LIST DOCD IN RCRD: CPT | Mod: HCNC,CPTII,S$GLB, | Performed by: INTERNAL MEDICINE

## 2022-01-10 PROCEDURE — 93306 ECHO (CUPID ONLY): ICD-10-PCS | Mod: 26,HCNC,, | Performed by: INTERNAL MEDICINE

## 2022-01-10 PROCEDURE — 3288F PR FALLS RISK ASSESSMENT DOCUMENTED: ICD-10-PCS | Mod: HCNC,CPTII,S$GLB, | Performed by: INTERNAL MEDICINE

## 2022-01-10 PROCEDURE — 1159F PR MEDICATION LIST DOCUMENTED IN MEDICAL RECORD: ICD-10-PCS | Mod: HCNC,CPTII,S$GLB, | Performed by: INTERNAL MEDICINE

## 2022-01-10 PROCEDURE — 3008F BODY MASS INDEX DOCD: CPT | Mod: HCNC,CPTII,S$GLB, | Performed by: INTERNAL MEDICINE

## 2022-01-10 PROCEDURE — 1101F PR PT FALLS ASSESS DOC 0-1 FALLS W/OUT INJ PAST YR: ICD-10-PCS | Mod: HCNC,CPTII,S$GLB, | Performed by: INTERNAL MEDICINE

## 2022-01-10 PROCEDURE — 99213 OFFICE O/P EST LOW 20 MIN: CPT | Mod: HCNC,S$GLB,, | Performed by: INTERNAL MEDICINE

## 2022-01-10 PROCEDURE — 3288F FALL RISK ASSESSMENT DOCD: CPT | Mod: HCNC,CPTII,S$GLB, | Performed by: INTERNAL MEDICINE

## 2022-01-10 PROCEDURE — 1160F PR REVIEW ALL MEDS BY PRESCRIBER/CLIN PHARMACIST DOCUMENTED: ICD-10-PCS | Mod: HCNC,CPTII,S$GLB, | Performed by: INTERNAL MEDICINE

## 2022-01-10 PROCEDURE — 3074F SYST BP LT 130 MM HG: CPT | Mod: HCNC,CPTII,S$GLB, | Performed by: INTERNAL MEDICINE

## 2022-01-10 PROCEDURE — 93306 TTE W/DOPPLER COMPLETE: CPT | Mod: 26,HCNC,, | Performed by: INTERNAL MEDICINE

## 2022-01-10 PROCEDURE — 3008F PR BODY MASS INDEX (BMI) DOCUMENTED: ICD-10-PCS | Mod: HCNC,CPTII,S$GLB, | Performed by: INTERNAL MEDICINE

## 2022-01-10 PROCEDURE — 1101F PT FALLS ASSESS-DOCD LE1/YR: CPT | Mod: HCNC,CPTII,S$GLB, | Performed by: INTERNAL MEDICINE

## 2022-01-10 PROCEDURE — 99213 PR OFFICE/OUTPT VISIT, EST, LEVL III, 20-29 MIN: ICD-10-PCS | Mod: HCNC,S$GLB,, | Performed by: INTERNAL MEDICINE

## 2022-01-10 PROCEDURE — 99999 PR PBB SHADOW E&M-EST. PATIENT-LVL III: CPT | Mod: PBBFAC,HCNC,, | Performed by: INTERNAL MEDICINE

## 2022-01-10 NOTE — PROGRESS NOTES
"Subjective:       Patient ID: Trudi Mcknight is a 66 y.o. female.    Chief Complaint: Hospital Follow Up    HPI     Seen in ED on 1/5:  A fib on eliquis recently metoprolol was started but HR down to 48 and seen in ED with shortness of breath and weakness and cardiology discontinued metoprolol CE negative.   appt w Dr. Darby on 1/12.   She was seen earlier on 1/2 with a fib with rvr.     She reports she is sleeping upright because she feels chest pressure and PVCs occur when she lays flat. HR drops to upper 50s, one time 48 at rest (after stopping metoprolol.)    She reports a hx of a fib in her 30s that appeared to be triggered by treatment with synthroid. Has been off of this with normal TSH since then.     In March 2015 she had stress test and maybe an echo in 2015 prior to moving to [facility] which checked out normal.     She has done a 48 hour holter and awaiting results.     She was started on eliquis on her own but she stopped it due to severe teeth pain and gums turning dark. She was worried about losing dental implant. She also was having GI discomfort.     Previously bisphosphonates were recommended and she turned down. She may consider infusion version in future once heart and gi issues addressed.   Review of Systems   Constitutional: Negative for fever.   Respiratory: Negative for shortness of breath.    Cardiovascular: Negative for chest pain.   Musculoskeletal: Negative.    Skin: Negative.        Objective:   /78 (BP Location: Right arm, Patient Position: Sitting, BP Method: Medium (Manual))   Pulse 67   Ht 5' 1" (1.549 m)   Wt 59.4 kg (131 lb)   SpO2 99%   BMI 24.75 kg/m²      Physical Exam  Constitutional:       General: She is not in acute distress.     Appearance: She is well-developed and well-nourished. She is not diaphoretic.   HENT:      Head: Normocephalic and atraumatic.   Cardiovascular:      Rate and Rhythm: Normal rate and regular rhythm.   Pulmonary:      Effort: Pulmonary effort " is normal. No respiratory distress.      Breath sounds: No wheezing or rales.   Skin:     General: Skin is warm and dry.   Neurological:      Mental Status: She is alert and oriented to person, place, and time.   Psychiatric:         Mood and Affect: Mood and affect normal.         Behavior: Behavior normal.         Assessment:       1. Atrial fibrillation, unspecified type        Plan:       Trudi was seen today for hospital follow up.    Diagnoses and all orders for this visit:    Atrial fibrillation, unspecified type  -     Echo Saline Bubble? No; Future   Keep cardiology follow up        Declines dexa and p13 at this time

## 2022-01-12 ENCOUNTER — OFFICE VISIT (OUTPATIENT)
Dept: CARDIOLOGY | Facility: CLINIC | Age: 67
End: 2022-01-12
Payer: MEDICARE

## 2022-01-12 ENCOUNTER — TELEPHONE (OUTPATIENT)
Dept: CARDIOLOGY | Facility: CLINIC | Age: 67
End: 2022-01-12
Payer: MEDICARE

## 2022-01-12 VITALS
HEIGHT: 61 IN | HEART RATE: 85 BPM | DIASTOLIC BLOOD PRESSURE: 67 MMHG | WEIGHT: 131.81 LBS | SYSTOLIC BLOOD PRESSURE: 146 MMHG | BODY MASS INDEX: 24.89 KG/M2

## 2022-01-12 DIAGNOSIS — I48.0 PAROXYSMAL ATRIAL FIBRILLATION: Primary | ICD-10-CM

## 2022-01-12 DIAGNOSIS — I48.92 ATRIAL FLUTTER WITH RAPID VENTRICULAR RESPONSE: ICD-10-CM

## 2022-01-12 LAB
OHS CV EVENT MONITOR DAY: 0
OHS CV HOLTER LENGTH DECIMAL HOURS: 48
OHS CV HOLTER LENGTH HOURS: 48
OHS CV HOLTER LENGTH MINUTES: 0
OHS CV HOLTER SINUS AVERAGE HR: 72
OHS CV HOLTER SINUS MAX HR: 218
OHS CV HOLTER SINUS MIN HR: 37

## 2022-01-12 PROCEDURE — 3288F FALL RISK ASSESSMENT DOCD: CPT | Mod: HCNC,CPTII,S$GLB, | Performed by: INTERNAL MEDICINE

## 2022-01-12 PROCEDURE — 3077F PR MOST RECENT SYSTOLIC BLOOD PRESSURE >= 140 MM HG: ICD-10-PCS | Mod: HCNC,CPTII,S$GLB, | Performed by: INTERNAL MEDICINE

## 2022-01-12 PROCEDURE — 3078F PR MOST RECENT DIASTOLIC BLOOD PRESSURE < 80 MM HG: ICD-10-PCS | Mod: HCNC,CPTII,S$GLB, | Performed by: INTERNAL MEDICINE

## 2022-01-12 PROCEDURE — 1101F PT FALLS ASSESS-DOCD LE1/YR: CPT | Mod: HCNC,CPTII,S$GLB, | Performed by: INTERNAL MEDICINE

## 2022-01-12 PROCEDURE — 99204 PR OFFICE/OUTPT VISIT, NEW, LEVL IV, 45-59 MIN: ICD-10-PCS | Mod: HCNC,S$GLB,, | Performed by: INTERNAL MEDICINE

## 2022-01-12 PROCEDURE — 99999 PR PBB SHADOW E&M-EST. PATIENT-LVL III: ICD-10-PCS | Mod: PBBFAC,HCNC,, | Performed by: INTERNAL MEDICINE

## 2022-01-12 PROCEDURE — 99204 OFFICE O/P NEW MOD 45 MIN: CPT | Mod: HCNC,S$GLB,, | Performed by: INTERNAL MEDICINE

## 2022-01-12 PROCEDURE — 1101F PR PT FALLS ASSESS DOC 0-1 FALLS W/OUT INJ PAST YR: ICD-10-PCS | Mod: HCNC,CPTII,S$GLB, | Performed by: INTERNAL MEDICINE

## 2022-01-12 PROCEDURE — 3008F BODY MASS INDEX DOCD: CPT | Mod: HCNC,CPTII,S$GLB, | Performed by: INTERNAL MEDICINE

## 2022-01-12 PROCEDURE — 3288F PR FALLS RISK ASSESSMENT DOCUMENTED: ICD-10-PCS | Mod: HCNC,CPTII,S$GLB, | Performed by: INTERNAL MEDICINE

## 2022-01-12 PROCEDURE — 3008F PR BODY MASS INDEX (BMI) DOCUMENTED: ICD-10-PCS | Mod: HCNC,CPTII,S$GLB, | Performed by: INTERNAL MEDICINE

## 2022-01-12 PROCEDURE — 1126F PR PAIN SEVERITY QUANTIFIED, NO PAIN PRESENT: ICD-10-PCS | Mod: HCNC,CPTII,S$GLB, | Performed by: INTERNAL MEDICINE

## 2022-01-12 PROCEDURE — 3078F DIAST BP <80 MM HG: CPT | Mod: HCNC,CPTII,S$GLB, | Performed by: INTERNAL MEDICINE

## 2022-01-12 PROCEDURE — 3077F SYST BP >= 140 MM HG: CPT | Mod: HCNC,CPTII,S$GLB, | Performed by: INTERNAL MEDICINE

## 2022-01-12 PROCEDURE — 1126F AMNT PAIN NOTED NONE PRSNT: CPT | Mod: HCNC,CPTII,S$GLB, | Performed by: INTERNAL MEDICINE

## 2022-01-12 PROCEDURE — 99999 PR PBB SHADOW E&M-EST. PATIENT-LVL III: CPT | Mod: PBBFAC,HCNC,, | Performed by: INTERNAL MEDICINE

## 2022-01-12 RX ORDER — ASPIRIN 81 MG/1
81 TABLET ORAL DAILY
Qty: 1 TABLET | Refills: 0
Start: 2022-01-12 | End: 2022-01-27

## 2022-01-12 RX ORDER — CHOLECALCIFEROL (VITAMIN D3) 25 MCG
1000 TABLET ORAL DAILY
COMMUNITY
End: 2022-02-04

## 2022-01-12 RX ORDER — DILTIAZEM HYDROCHLORIDE 120 MG/1
240 CAPSULE, EXTENDED RELEASE ORAL DAILY
Qty: 30 CAPSULE | Refills: 3 | Status: ON HOLD | OUTPATIENT
Start: 2022-01-12 | End: 2022-01-24 | Stop reason: HOSPADM

## 2022-01-12 NOTE — PROGRESS NOTES
Subjective:    Patient ID:  Trudi Mcknight is a 66 y.o. female who presents for evaluation paroxsymal atrial fibrillation  HPI     Trudi Mcknight is a 66 year old female presented to the ED 1/2/22 with intermittent gas pains on her chest off and on for a few weeks.  She has also been having right upper quadrant and right rib pain for a couple of months.She was noted to have rapid irregular rhythm and EKG revealed AF with RVR. She spontaneously reverted back to RSR. She does not have IHSAN and does not use alcohol. She is a non smoker and exercises regularly. She has been noticing few minute episodes or rapid irregular rhythm for a few weeks. She was evaluated for an irregular rhythm in 2015 that was unremarkable[records pending]. CHADS 1. She was prescribed 12.5 mg metoprolol but has bradycardia and upset stomach. Holter report is pending.    Summary 1/10/22    · The left ventricle is normal in size with normal systolic function. The estimated ejection fraction is 55%.  · Normal right ventricular size with normal right ventricular systolic function.  · Normal left ventricular diastolic function.  · The estimated PA systolic pressure is 25 mmHg.  · Normal central venous pressure (3 mmHg).       Lab Results   Component Value Date     01/05/2022    K 3.7 01/05/2022     01/05/2022    CO2 20 (L) 01/05/2022    BUN 16 01/05/2022    CREATININE 0.9 01/05/2022    GLU 94 01/05/2022    HGBA1C 5.8 (H) 08/05/2019    MG 2.1 01/05/2022    AST 18 01/05/2022    ALT 8 (L) 01/05/2022    ALBUMIN 3.8 01/05/2022    PROT 7.0 01/05/2022    BILITOT 0.7 01/05/2022    WBC 6.31 01/05/2022    HGB 13.7 01/05/2022    HCT 42.9 01/05/2022    HCT 43 01/02/2022    MCV 87 01/05/2022     01/05/2022    TSH 2.504 01/02/2022         Lab Results   Component Value Date    CHOL 222 (H) 08/05/2019    HDL 58 08/05/2019    TRIG 106 08/05/2019       Lab Results   Component Value Date    LDLCALC 142.8 08/05/2019       Past Medical History:    Diagnosis Date    Esophagitis     Meniere's disease        Current Outpatient Medications:     omeprazole (PRILOSEC) 40 MG capsule, , Disp: , Rfl:     sertraline (ZOLOFT) 25 MG tablet, TAKE 1/2 TABLET BY MOUTH EVERY DAY, Disp: 45 tablet, Rfl: 3    vitamin D (VITAMIN D3) 1000 units Tab, Take 1,000 Units by mouth once daily., Disp: , Rfl:     aspirin (ECOTRIN) 81 MG EC tablet, Take 1 tablet (81 mg total) by mouth once daily., Disp: 1 tablet, Rfl: 0    diltiaZEM (TIAZAC) 120 mg 24 hr capsule, Take 2 capsules (240 mg total) by mouth once daily., Disp: 30 capsule, Rfl: 3          Review of Systems   Constitutional: Negative for decreased appetite, diaphoresis, fever, malaise/fatigue, weight gain and weight loss.   HENT: Negative for congestion, ear discharge, ear pain and nosebleeds.    Eyes: Negative for blurred vision, double vision and visual disturbance.   Cardiovascular: Positive for palpitations. Negative for chest pain, claudication, cyanosis, dyspnea on exertion, irregular heartbeat, leg swelling, near-syncope, orthopnea, paroxysmal nocturnal dyspnea and syncope.   Respiratory: Negative for cough, hemoptysis, shortness of breath, sleep disturbances due to breathing, snoring, sputum production and wheezing.    Endocrine: Negative for polydipsia, polyphagia and polyuria.   Hematologic/Lymphatic: Negative for adenopathy and bleeding problem. Does not bruise/bleed easily.   Skin: Negative for color change, nail changes, poor wound healing and rash.   Musculoskeletal: Negative for muscle cramps and muscle weakness.   Gastrointestinal: Positive for bloating. Negative for abdominal pain, anorexia, change in bowel habit, hematochezia, nausea and vomiting.   Genitourinary: Negative for dysuria, frequency and hematuria.   Neurological: Negative for brief paralysis, difficulty with concentration, excessive daytime sleepiness, dizziness, focal weakness, headaches, light-headedness, seizures, vertigo and weakness.  "  Psychiatric/Behavioral: Negative for altered mental status and depression.   Allergic/Immunologic: Negative for persistent infections.        Objective:BP (!) 146/67 (BP Location: Left arm, Patient Position: Sitting, BP Method: Medium (Automatic))   Pulse 85   Ht 5' 1" (1.549 m)   Wt 59.8 kg (131 lb 13.4 oz)   BMI 24.91 kg/m²             Physical Exam  Constitutional:       Appearance: She is well-developed, normal weight and well-nourished.   HENT:      Head: Normocephalic.      Right Ear: External ear normal.      Left Ear: External ear normal.      Nose: Nose normal.        Comments: Inspection of lips, teeth and gums normalEyes:      General: No scleral icterus.     Extraocular Movements: EOM normal.      Conjunctiva/sclera: Conjunctivae normal.      Pupils: Pupils are equal, round, and reactive to light.   Neck:      Thyroid: No thyromegaly.      Vascular: No JVD.      Trachea: No tracheal deviation.   Cardiovascular:      Rate and Rhythm: Normal rate and regular rhythm.      Pulses: Intact distal pulses.           Carotid pulses are 2+ on the right side and 2+ on the left side.       Dorsalis pedis pulses are 2+ on the right side and 2+ on the left side.      Heart sounds: S1 normal and S2 normal. No murmur heard.  No friction rub. No gallop.    Pulmonary:      Effort: Pulmonary effort is normal. No respiratory distress.      Breath sounds: Normal breath sounds. No wheezing or rales.   Chest:      Chest wall: No tenderness.   Abdominal:      General: Bowel sounds are normal. There is no distension.      Palpations: Abdomen is soft. There is no hepatosplenomegaly.      Tenderness: There is no abdominal tenderness. There is no guarding.   Musculoskeletal:         General: No tenderness or edema. Normal range of motion.      Cervical back: Normal range of motion.   Lymphadenopathy:      Comments: Palpation of lymph nodes of neck and groin normal   Skin:     General: Skin is warm and dry.      Coloration: " Skin is not pale.      Findings: No erythema or rash.      Comments: No ankle nor pretibial edema   Neurological:      Mental Status: She is alert and oriented to person, place, and time.      Cranial Nerves: No cranial nerve deficit.      Motor: No abnormal muscle tone.      Coordination: Coordination normal.   Psychiatric:         Mood and Affect: Mood and affect normal.         Behavior: Behavior normal.         Thought Content: Thought content normal.         Judgment: Judgment normal.           Assessment:       1. Paroxysmal atrial fibrillation    2. Atrial flutter with rapid ventricular response [not present]        Plan:       Trudi was seen today for chest pain, dizziness, fatigue and atrial flutter with rapid ventricular response.    Diagnoses and all orders for this visit:    Paroxysmal atrial fibrillation  -     aspirin (ECOTRIN) 81 MG EC tablet; Take 1 tablet (81 mg total) by mouth once daily.  -     diltiaZEM (TIAZAC) 120 mg 24 hr capsule; Take 2 capsules (240 mg total) by mouth once daily.    Atrial flutter with rapid ventricular response  -     Ambulatory referral/consult to Cardiology    Other orders  -     vitamin D (VITAMIN D3) 1000 units Tab; Take 1,000 Units by mouth once daily.

## 2022-01-13 ENCOUNTER — TELEPHONE (OUTPATIENT)
Dept: CARDIOLOGY | Facility: CLINIC | Age: 67
End: 2022-01-13
Payer: MEDICARE

## 2022-01-13 DIAGNOSIS — I48.0 PAROXYSMAL ATRIAL FIBRILLATION: Primary | ICD-10-CM

## 2022-01-13 DIAGNOSIS — I49.5 SICK SINUS SYNDROME: ICD-10-CM

## 2022-01-15 ENCOUNTER — PATIENT MESSAGE (OUTPATIENT)
Dept: ELECTROPHYSIOLOGY | Facility: CLINIC | Age: 67
End: 2022-01-15
Payer: MEDICARE

## 2022-01-15 ENCOUNTER — PATIENT MESSAGE (OUTPATIENT)
Dept: INTERNAL MEDICINE | Facility: CLINIC | Age: 67
End: 2022-01-15
Payer: MEDICARE

## 2022-01-15 DIAGNOSIS — R93.89 ABNORMAL CHEST X-RAY: Primary | ICD-10-CM

## 2022-01-17 ENCOUNTER — HOSPITAL ENCOUNTER (INPATIENT)
Facility: HOSPITAL | Age: 67
LOS: 5 days | Discharge: HOME | DRG: 244 | End: 2022-01-24
Attending: EMERGENCY MEDICINE | Admitting: INTERNAL MEDICINE
Payer: MEDICARE

## 2022-01-17 DIAGNOSIS — I48.0 PAROXYSMAL ATRIAL FIBRILLATION: ICD-10-CM

## 2022-01-17 DIAGNOSIS — I49.9 ARRHYTHMIA: ICD-10-CM

## 2022-01-17 DIAGNOSIS — R07.9 CHEST PAIN: Primary | ICD-10-CM

## 2022-01-17 DIAGNOSIS — I49.5 SICK SINUS SYNDROME: ICD-10-CM

## 2022-01-17 DIAGNOSIS — Z13.6 ENCOUNTER FOR SCREENING FOR CARDIOVASCULAR DISORDERS: ICD-10-CM

## 2022-01-17 DIAGNOSIS — I48.0 PAROXYSMAL ATRIAL FIBRILLATION WITH RVR: ICD-10-CM

## 2022-01-17 DIAGNOSIS — I48.91 A-FIB: ICD-10-CM

## 2022-01-17 DIAGNOSIS — R00.0 TACHYCARDIA: ICD-10-CM

## 2022-01-17 DIAGNOSIS — I20.9 ANGINA PECTORIS, UNSPECIFIED: ICD-10-CM

## 2022-01-17 DIAGNOSIS — R00.1 SYMPTOMATIC BRADYCARDIA: ICD-10-CM

## 2022-01-17 DIAGNOSIS — R07.89 CHEST DISCOMFORT: ICD-10-CM

## 2022-01-17 PROBLEM — K44.9 HIATAL HERNIA WITH GERD AND ESOPHAGITIS: Status: ACTIVE | Noted: 2021-03-07

## 2022-01-17 PROBLEM — K21.00 HIATAL HERNIA WITH GERD AND ESOPHAGITIS: Status: ACTIVE | Noted: 2021-03-07

## 2022-01-17 LAB
ALBUMIN SERPL BCP-MCNC: 3.9 G/DL (ref 3.5–5.2)
ALP SERPL-CCNC: 79 U/L (ref 55–135)
ALT SERPL W/O P-5'-P-CCNC: 8 U/L (ref 10–44)
ANION GAP SERPL CALC-SCNC: 10 MMOL/L (ref 8–16)
AST SERPL-CCNC: 15 U/L (ref 10–40)
BASOPHILS # BLD AUTO: 0.05 K/UL (ref 0–0.2)
BASOPHILS NFR BLD: 0.5 % (ref 0–1.9)
BILIRUB SERPL-MCNC: 0.6 MG/DL (ref 0.1–1)
BNP SERPL-MCNC: 33 PG/ML (ref 0–99)
BUN SERPL-MCNC: 16 MG/DL (ref 8–23)
CALCIUM SERPL-MCNC: 9.9 MG/DL (ref 8.7–10.5)
CHLORIDE SERPL-SCNC: 109 MMOL/L (ref 95–110)
CO2 SERPL-SCNC: 20 MMOL/L (ref 23–29)
CREAT SERPL-MCNC: 0.9 MG/DL (ref 0.5–1.4)
CTP QC/QA: YES
DIFFERENTIAL METHOD: ABNORMAL
EOSINOPHIL # BLD AUTO: 0.1 K/UL (ref 0–0.5)
EOSINOPHIL NFR BLD: 1.2 % (ref 0–8)
ERYTHROCYTE [DISTWIDTH] IN BLOOD BY AUTOMATED COUNT: 13.9 % (ref 11.5–14.5)
EST. GFR  (AFRICAN AMERICAN): >60 ML/MIN/1.73 M^2
EST. GFR  (NON AFRICAN AMERICAN): >60 ML/MIN/1.73 M^2
GLUCOSE SERPL-MCNC: 97 MG/DL (ref 70–110)
HCT VFR BLD AUTO: 44.8 % (ref 37–48.5)
HGB BLD-MCNC: 14.6 G/DL (ref 12–16)
IMM GRANULOCYTES # BLD AUTO: 0.03 K/UL (ref 0–0.04)
IMM GRANULOCYTES NFR BLD AUTO: 0.3 % (ref 0–0.5)
LYMPHOCYTES # BLD AUTO: 1.3 K/UL (ref 1–4.8)
LYMPHOCYTES NFR BLD: 14.3 % (ref 18–48)
MCH RBC QN AUTO: 28.1 PG (ref 27–31)
MCHC RBC AUTO-ENTMCNC: 32.6 G/DL (ref 32–36)
MCV RBC AUTO: 86 FL (ref 82–98)
MONOCYTES # BLD AUTO: 0.7 K/UL (ref 0.3–1)
MONOCYTES NFR BLD: 6.9 % (ref 4–15)
NEUTROPHILS # BLD AUTO: 7.2 K/UL (ref 1.8–7.7)
NEUTROPHILS NFR BLD: 76.8 % (ref 38–73)
NRBC BLD-RTO: 0 /100 WBC
PLATELET # BLD AUTO: 281 K/UL (ref 150–450)
PMV BLD AUTO: 10.7 FL (ref 9.2–12.9)
POTASSIUM SERPL-SCNC: 3.7 MMOL/L (ref 3.5–5.1)
PROT SERPL-MCNC: 7.3 G/DL (ref 6–8.4)
RBC # BLD AUTO: 5.19 M/UL (ref 4–5.4)
SARS-COV-2 RDRP RESP QL NAA+PROBE: NEGATIVE
SODIUM SERPL-SCNC: 139 MMOL/L (ref 136–145)
TROPONIN I SERPL DL<=0.01 NG/ML-MCNC: 0.01 NG/ML (ref 0–0.03)
TROPONIN I SERPL DL<=0.01 NG/ML-MCNC: <0.006 NG/ML (ref 0–0.03)
WBC # BLD AUTO: 9.38 K/UL (ref 3.9–12.7)

## 2022-01-17 PROCEDURE — U0002 COVID-19 LAB TEST NON-CDC: HCPCS | Mod: HCNC | Performed by: PHYSICIAN ASSISTANT

## 2022-01-17 PROCEDURE — 99285 EMERGENCY DEPT VISIT HI MDM: CPT | Mod: 25,HCNC

## 2022-01-17 PROCEDURE — 93010 EKG 12-LEAD: ICD-10-PCS | Mod: HCNC,,, | Performed by: INTERNAL MEDICINE

## 2022-01-17 PROCEDURE — 93005 ELECTROCARDIOGRAM TRACING: CPT | Mod: HCNC

## 2022-01-17 PROCEDURE — G0378 HOSPITAL OBSERVATION PER HR: HCPCS | Mod: HCNC

## 2022-01-17 PROCEDURE — 25000003 PHARM REV CODE 250: Mod: HCNC

## 2022-01-17 PROCEDURE — 84484 ASSAY OF TROPONIN QUANT: CPT | Mod: HCNC | Performed by: PHYSICIAN ASSISTANT

## 2022-01-17 PROCEDURE — 99220 PR INITIAL OBSERVATION CARE,LEVL III: CPT | Mod: HCNC,,,

## 2022-01-17 PROCEDURE — 25000003 PHARM REV CODE 250: Mod: HCNC | Performed by: PHYSICIAN ASSISTANT

## 2022-01-17 PROCEDURE — 99220 PR INITIAL OBSERVATION CARE,LEVL III: ICD-10-PCS | Mod: HCNC,,,

## 2022-01-17 PROCEDURE — 93010 ELECTROCARDIOGRAM REPORT: CPT | Mod: HCNC,,, | Performed by: INTERNAL MEDICINE

## 2022-01-17 PROCEDURE — 99284 PR EMERGENCY DEPT VISIT,LEVEL IV: ICD-10-PCS | Mod: HCNC,CS,, | Performed by: EMERGENCY MEDICINE

## 2022-01-17 PROCEDURE — 83880 ASSAY OF NATRIURETIC PEPTIDE: CPT | Mod: HCNC | Performed by: PHYSICIAN ASSISTANT

## 2022-01-17 PROCEDURE — 85025 COMPLETE CBC W/AUTO DIFF WBC: CPT | Mod: HCNC | Performed by: PHYSICIAN ASSISTANT

## 2022-01-17 PROCEDURE — 99284 EMERGENCY DEPT VISIT MOD MDM: CPT | Mod: HCNC,CS,, | Performed by: EMERGENCY MEDICINE

## 2022-01-17 PROCEDURE — 80053 COMPREHEN METABOLIC PANEL: CPT | Mod: HCNC | Performed by: PHYSICIAN ASSISTANT

## 2022-01-17 RX ORDER — TALC
9 POWDER (GRAM) TOPICAL NIGHTLY PRN
Status: DISCONTINUED | OUTPATIENT
Start: 2022-01-17 | End: 2022-01-17

## 2022-01-17 RX ORDER — PROCHLORPERAZINE EDISYLATE 5 MG/ML
5 INJECTION INTRAMUSCULAR; INTRAVENOUS EVERY 6 HOURS PRN
Status: DISCONTINUED | OUTPATIENT
Start: 2022-01-17 | End: 2022-01-24 | Stop reason: HOSPADM

## 2022-01-17 RX ORDER — ASPIRIN 81 MG/1
81 TABLET ORAL DAILY
Status: DISCONTINUED | OUTPATIENT
Start: 2022-01-18 | End: 2022-01-24 | Stop reason: HOSPADM

## 2022-01-17 RX ORDER — SUCRALFATE 1 G/1
1 TABLET ORAL
Status: DISCONTINUED | OUTPATIENT
Start: 2022-01-17 | End: 2022-01-24 | Stop reason: HOSPADM

## 2022-01-17 RX ORDER — IBUPROFEN 200 MG
16 TABLET ORAL
Status: DISCONTINUED | OUTPATIENT
Start: 2022-01-17 | End: 2022-01-24 | Stop reason: HOSPADM

## 2022-01-17 RX ORDER — ACETAMINOPHEN 325 MG/1
650 TABLET ORAL EVERY 6 HOURS PRN
Status: DISCONTINUED | OUTPATIENT
Start: 2022-01-17 | End: 2022-01-21

## 2022-01-17 RX ORDER — PANTOPRAZOLE SODIUM 40 MG/1
40 TABLET, DELAYED RELEASE ORAL 2 TIMES DAILY
Status: DISCONTINUED | OUTPATIENT
Start: 2022-01-17 | End: 2022-01-24 | Stop reason: HOSPADM

## 2022-01-17 RX ORDER — GLUCAGON 1 MG
1 KIT INJECTION
Status: DISCONTINUED | OUTPATIENT
Start: 2022-01-17 | End: 2022-01-24 | Stop reason: HOSPADM

## 2022-01-17 RX ORDER — ASPIRIN 325 MG
325 TABLET ORAL
Status: COMPLETED | OUTPATIENT
Start: 2022-01-17 | End: 2022-01-17

## 2022-01-17 RX ORDER — SODIUM CHLORIDE 0.9 % (FLUSH) 0.9 %
10 SYRINGE (ML) INJECTION EVERY 8 HOURS PRN
Status: DISCONTINUED | OUTPATIENT
Start: 2022-01-17 | End: 2022-01-24 | Stop reason: HOSPADM

## 2022-01-17 RX ORDER — MAG HYDROX/ALUMINUM HYD/SIMETH 200-200-20
30 SUSPENSION, ORAL (FINAL DOSE FORM) ORAL 4 TIMES DAILY PRN
Status: DISCONTINUED | OUTPATIENT
Start: 2022-01-17 | End: 2022-01-24 | Stop reason: HOSPADM

## 2022-01-17 RX ORDER — SODIUM CHLORIDE 0.9 % (FLUSH) 0.9 %
10 SYRINGE (ML) INJECTION
Status: DISCONTINUED | OUTPATIENT
Start: 2022-01-17 | End: 2022-01-24 | Stop reason: HOSPADM

## 2022-01-17 RX ORDER — PANTOPRAZOLE SODIUM 40 MG/1
40 TABLET, DELAYED RELEASE ORAL DAILY
Status: DISCONTINUED | OUTPATIENT
Start: 2022-01-18 | End: 2022-01-17

## 2022-01-17 RX ORDER — IPRATROPIUM BROMIDE AND ALBUTEROL SULFATE 2.5; .5 MG/3ML; MG/3ML
3 SOLUTION RESPIRATORY (INHALATION) EVERY 4 HOURS PRN
Status: DISCONTINUED | OUTPATIENT
Start: 2022-01-17 | End: 2022-01-22

## 2022-01-17 RX ORDER — NALOXONE HCL 0.4 MG/ML
0.02 VIAL (ML) INJECTION
Status: DISCONTINUED | OUTPATIENT
Start: 2022-01-17 | End: 2022-01-24 | Stop reason: HOSPADM

## 2022-01-17 RX ORDER — ONDANSETRON 8 MG/1
8 TABLET, ORALLY DISINTEGRATING ORAL EVERY 8 HOURS PRN
Status: DISCONTINUED | OUTPATIENT
Start: 2022-01-17 | End: 2022-01-24 | Stop reason: HOSPADM

## 2022-01-17 RX ORDER — LIDOCAINE HYDROCHLORIDE 20 MG/ML
5 SOLUTION OROPHARYNGEAL
Status: COMPLETED | OUTPATIENT
Start: 2022-01-17 | End: 2022-01-17

## 2022-01-17 RX ORDER — POLYETHYLENE GLYCOL 3350 17 G/17G
17 POWDER, FOR SOLUTION ORAL DAILY
Status: DISCONTINUED | OUTPATIENT
Start: 2022-01-17 | End: 2022-01-24 | Stop reason: HOSPADM

## 2022-01-17 RX ORDER — MAG HYDROX/ALUMINUM HYD/SIMETH 200-200-20
30 SUSPENSION, ORAL (FINAL DOSE FORM) ORAL
Status: COMPLETED | OUTPATIENT
Start: 2022-01-17 | End: 2022-01-17

## 2022-01-17 RX ORDER — IBUPROFEN 200 MG
24 TABLET ORAL
Status: DISCONTINUED | OUTPATIENT
Start: 2022-01-17 | End: 2022-01-24 | Stop reason: HOSPADM

## 2022-01-17 RX ORDER — TALC
6 POWDER (GRAM) TOPICAL NIGHTLY PRN
Status: DISCONTINUED | OUTPATIENT
Start: 2022-01-17 | End: 2022-01-24 | Stop reason: HOSPADM

## 2022-01-17 RX ADMIN — POLYETHYLENE GLYCOL 3350 17 G: 17 POWDER, FOR SOLUTION ORAL at 04:01

## 2022-01-17 RX ADMIN — ASPIRIN 325 MG ORAL TABLET 325 MG: 325 PILL ORAL at 11:01

## 2022-01-17 RX ADMIN — SUCRALFATE 1 G: 1 TABLET ORAL at 04:01

## 2022-01-17 RX ADMIN — LIDOCAINE HYDROCHLORIDE 5 ML: 20 SOLUTION ORAL; TOPICAL at 11:01

## 2022-01-17 RX ADMIN — SUCRALFATE 1 G: 1 TABLET ORAL at 08:01

## 2022-01-17 RX ADMIN — ALUMINUM HYDROXIDE, MAGNESIUM HYDROXIDE, AND SIMETHICONE 30 ML: 200; 200; 20 SUSPENSION ORAL at 11:01

## 2022-01-17 RX ADMIN — PANTOPRAZOLE SODIUM 40 MG: 40 TABLET, DELAYED RELEASE ORAL at 04:01

## 2022-01-17 NOTE — Clinical Note
The lead was inserted under fluorscopic guidance. The lead was inserted in the high right atrium.

## 2022-01-17 NOTE — ASSESSMENT & PLAN NOTE
Holter monitor earlier this month confirmed paroxysmal afib with RVR. Evaluated by outpatient cardiology. Not on AC, [id number] score 2 (2.2% stroke risk). Pressures unable to tolerate metoprolol, not a candidate for diltiazem based on pause seen on holter. Referred, but not yet evaluated by EP.   - On admit, HR is controlled   - EKG with sinus arrhythmia

## 2022-01-17 NOTE — ED TRIAGE NOTES
Pt has hx of Afib and c/o left sided chest pain that radiates down her left arm and to her neck. Pt also reports SOB and weakness when the chest pain presents. Pt is alert and oriented x4, VSS, NAD. Pt ambulates without assistance and has steady gait.

## 2022-01-17 NOTE — HPI
Trudi Mcknight is a 66-year-old female with paroxysmal Afib, hiatal hernia with esophagitis, and Meniere's disease, who presents after having intermittent chest pain for 2 weeks, acutely worsened today. At its worst the pain was substernal, crushing and radiating to her neck, back, and left arm. The pain is associated palpitations and significant fatigue, shortness of breath, lightheadedness and sensation of presyncope. She also reports onset of pain with eating, though she cannot link the symptoms to certain foods and reports it does not happen every time she eats. She denies nausea/vomiting, abdominal pain, bright blood in stool or dark stools, diaphoresis, fevers/chills, or numbness/tingling. She was recently seen here at [facility] for a similar episode, ACS workup was negative. She was discharged, referred and evaluated by outpatient cardiology. Holter monitor confirmed paroxysmal a fib with RVR. She was not started on AC. She was started on metoprolol for rate control but was unable to tolerate due to hypotension. Planned to start diltiazem, but holter monitor showed 4 second pause and patient was instructed not to start the med. Cards referred her to EP, but patient has not yet been evaluated. She has no personal ACS history but endorses strong family history of heart disease, her mother had an MI in her 50s, her brother had an MI very young and passed away from a heart attack in his 60s.  She had a stress test about 7 years ago.  She is a nonsmoker.    In the ED, patient is afebrile without leukocytosis. Vitals stable and HR controlled. EKG with sinus arrhythmia, T wave inversion in anterior leads. Troponin trend flat x 2. CXR without acute process. BNP 33. CBC/CMP unremarkable. Patient was given  and GI cocktail, with improvement in CP.

## 2022-01-17 NOTE — Clinical Note
A generator pocket was made at the left  chest with blunt dissection, electrocautery and sharp dissection.

## 2022-01-17 NOTE — ASSESSMENT & PLAN NOTE
Hiatal hernia and GERD with hx of esophagitis on EGD in 2015. Patient was prescribed daily PPI which helped her symptoms in the past, but she reports she stopped taking it out of concern that it would contribute to her cardiac arrhythmias. Patient being evaluated for CC of CP, which she describes as chest tightness, and at times burning pain. The pain is sometimes brought on by eating, though she has not noticed certain foods to be triggers. She denies abdominal pain, N/V, melena or hematochezia.   - pain improved with GI cocktail x1 in ED  - resume home PO protonix 40mg, increase to BID while inpatient   - mylanta and antiemetics prn   - scheduled carafate

## 2022-01-17 NOTE — ED PROVIDER NOTES
Encounter Date: 2022       History     Chief Complaint   Patient presents with    Chest Pain     Hx atrial fib     66-year-old female with esophagitis, Meniere's disease, paroxysmal AFib presents for intermittent chest pain for 2 weeks.  The pain is substernal, feels crushing and radiates to her neck and left arm.  She feels that the pain is worse when she has palpitations associated with Afib.  She feels better sometimes when she gets up and walks around.  She reports associated shortness of breath, lightheadedness and sensation of presyncope.  She denies nausea/vomiting abdominal pain, fevers/chills, numbness/tingling or weakness.  She is followed by cardiology for intermittent AFib but is not currently taking any medication for this.  She was started on metoprolol but this dropped her blood pressure significantly, she was started on diltiazem but made her heart rate too slow.  Not currently on anticoagulation.  She has no personal ACS history but endorses strong family history of heart disease, her mother had an MI in her 50s, her brother had an MI very young and passed away from a heart attack in his 60s.  She had a stress test about 7 years ago.  She is a nonsmoker.        Review of patient's allergies indicates:   Allergen Reactions    Penicillins Hives     causes congestion and hives    Tricyclic compounds      Past Medical History:   Diagnosis Date    Esophagitis     Meniere's disease      Past Surgical History:   Procedure Laterality Date    BREAST CYST ASPIRATION           SECTION      COLONOSCOPY N/A 2019    Procedure: COLONOSCOPY;  Surgeon: INGRID Russo MD;  Location: [address]);  Service: Endoscopy;  Laterality: N/A;    HYSTERECTOMY      TONSILLECTOMY       Family History   Problem Relation Age of Onset    Heart disease Mother 55        bypass at 55    Breast cancer Mother     Hypertension Mother     Hyperlipidemia Mother     Heart disease Father      Hypertension Father     Heart disease Brother     Diverticulitis Brother     Diverticulitis Sister     Breast cancer Paternal Grandmother     Colon cancer Neg Hx     Melanoma Neg Hx     Amblyopia Neg Hx     Blindness Neg Hx     Cataracts Neg Hx     Glaucoma Neg Hx     Macular degeneration Neg Hx     Strabismus Neg Hx     Retinal detachment Neg Hx      Social History     Tobacco Use    Smoking status: Never Smoker    Smokeless tobacco: Never Used   Substance Use Topics    Alcohol use: No     Alcohol/week: 0.0 standard drinks     Comment: rarely    Drug use: No     Review of Systems   Constitutional: Negative for fever.   HENT: Negative for sore throat.    Respiratory: Positive for shortness of breath.    Cardiovascular: Positive for chest pain and palpitations. Negative for leg swelling.   Gastrointestinal: Negative for abdominal pain, nausea and vomiting.   Genitourinary: Negative for dysuria.   Musculoskeletal: Negative for back pain.   Skin: Negative for rash.   Neurological: Positive for light-headedness. Negative for weakness.   Hematological: Does not bruise/bleed easily.       Physical Exam     Initial Vitals [01/17/22 1037]   BP Pulse Resp Temp SpO2   134/74 99 18 97.7 °F (36.5 °C) 100 %      MAP       --         Physical Exam    Nursing note and vitals reviewed.  Constitutional: She appears well-developed and well-nourished. She is not diaphoretic. No distress.   HENT:   Head: Normocephalic and atraumatic.   Eyes: EOM are normal. Pupils are equal, round, and reactive to light.   Neck:   Normal range of motion.  Cardiovascular: Normal rate, regular rhythm, normal heart sounds and intact distal pulses. Exam reveals no gallop and no friction rub.    No murmur heard.  Pulmonary/Chest: Breath sounds normal. No respiratory distress. She has no wheezes. She has no rhonchi. She has no rales. She exhibits no tenderness.   Abdominal: Abdomen is soft. Bowel sounds are normal. She exhibits no distension  and no mass. There is no abdominal tenderness. There is no rebound and no guarding.   Musculoskeletal:         General: Normal range of motion.      Cervical back: Normal range of motion.     Neurological: She is alert and oriented to person, place, and time.   Skin: Skin is warm and dry.   Psychiatric: She has a normal mood and affect.         ED Course   Procedures  Labs Reviewed   CBC W/ AUTO DIFFERENTIAL - Abnormal; Notable for the following components:       Result Value    Gran % 76.8 (*)     Lymph % 14.3 (*)     All other components within normal limits   COMPREHENSIVE METABOLIC PANEL - Abnormal; Notable for the following components:    CO2 20 (*)     ALT 8 (*)     All other components within normal limits   TROPONIN I   B-TYPE NATRIURETIC PEPTIDE   TROPONIN I   SARS-COV-2 RDRP GENE     EKG Readings: (Independently Interpreted)   Initial Reading: No STEMI. Previous EKG: Compared with most recent EKG Previous EKG Date: 1/5/2022. Rhythm: Sinus Arrhythmia. Heart Rate: 79. Ectopy: No Ectopy. ST Segments: Non-Specific ST Segment Depression. Clinical Impression: Sinus Arrhythmia       Imaging Results          X-Ray Chest AP Portable (Final result)  Result time 01/17/22 12:00:16    Final result by Carmita Gaytan MD (01/17/22 12:00:16)                 Impression:      1. Minimal blunting the left costophrenic angle favored to reflect scarring or less likely trace pleural fluid.  2. Atherosclerosis.  3. Moderate hiatal hernia.      Electronically signed by: Carmita Gaytan MD  Date:    01/17/2022  Time:    12:00             Narrative:    EXAMINATION:  XR CHEST AP PORTABLE    CLINICAL HISTORY:  Chest Pain;    TECHNIQUE:  Single frontal view of the chest was performed.    COMPARISON:  Prior dated 01/05/2022    FINDINGS:  The mediastinal structures are midline.  The cardiac silhouette is not enlarged.  There is atherosclerotic calcification of the aortic arch.  There is blunting the left costophrenic angle which  could be due to scarring or trace pleural fluid.  There is a moderate-sized hiatal hernia.                                 Medications   aspirin tablet 325 mg (325 mg Oral Given 1/17/22 1126)   aluminum-magnesium hydroxide-simethicone 200-200-20 mg/5 mL suspension 30 mL (30 mLs Oral Given 1/17/22 1126)   LIDOcaine HCl 2% oral solution 5 mL (5 mLs Oral Given 1/17/22 1126)     Medical Decision Making:   History:   Old Medical Records: I decided to obtain old medical records.  Old Records Summarized: records from clinic visits and other records.       <> Summary of Records: Patient had Holter monitor 01/05/2022 which showed paroxysmal AFib and PVCs  Initial Assessment:   66-year-old female presenting for chest pain.  Her vitals are normal, she appears uncomfortable but in no acute distress.  Differential Diagnosis:   ACS  Dysrhythmia  GERD  I think COVID-19, pneumonia or other infectious cause or less likely  Independently Interpreted Test(s):   I have ordered and independently interpreted X-rays - see summary below.       <> Summary of X-Ray Reading(s): No consolidation or pulmonary edema  I have ordered and independently interpreted EKG Reading(s) - see prior notes  Clinical Tests:   Lab Tests: Ordered and Reviewed  Radiological Study: Ordered and Reviewed  Medical Tests: Ordered and Reviewed  ED Management:  Will check labs, EKG, present cardiac monitor, give aspirin, GI cocktail and reassess.    Patient reports relief of chest discomfort after GI cocktail.  Unclear if symptoms are related to GI source versus cardiac stress from arrhythmia. HEART score=5, will to observation for ACS rule out.  Patient comfortable with admission.  I discussed this patient with my supervising physician.    Additional MDM:   Heart Score:    History:          Moderately suspicious.  ECG:             Nonspecific repolarisation disturbance  Age:               >65 years  Risk factors: 1-2 risk factors  Troponin:       Less than or equal to  normal limit  Final Score: 5             Attending Attestation:     Physician Attestation Statement for NP/PA:   I discussed this assessment and plan of this patient with the NP/PA, but I did not personally examine the patient. The face to face encounter was performed by the NP/PA.    Other NP/PA Attestation Additions:      Medical Decision Making: Moderately suspicious history for ACS given description of chest pain.  HEART score 4 (EKG not too concerning so I would probably not give a point for EKG).  Given lack of recent cardiac workup will  Recommend admit for cardiac workup.             ED Course as of 01/17/22 1303   Mon Jan 17, 2022   1208 Hemoglobin: 14.6 [CC]   1210 Patient reports relief of chest pain after GI cocktail [CC]   1210 Troponin I: <0.006 [CC]   1221 BNP: 33 [CC]   1228 SARS-CoV-2 RNA, Amplification, Qual: Negative [CC]      ED Course User Index  [CC] More Christensen PA-C             Clinical Impression:   Final diagnoses:  [R07.9] Chest pain (Primary)          ED Disposition Condition    Observation               More Christensen PA-C  01/17/22 1303       Cj Lerner MD  01/17/22 [id number]

## 2022-01-17 NOTE — Clinical Note
The chest was prepped. The site was prepped with ChloraPrep and Ioban. The site was clipped. The patient was draped. The patient was positioned supine. The patient was secured using safety straps, with ulnar pads and to an armboard.

## 2022-01-17 NOTE — H&P
[facility] ([address] [facility]  History & Physical    Patient Name: Trudi Mcknight  MRN: [id number]  Patient Class: OP- Observation  Admission Date: 1/17/2022  Attending Physician: Shana Cochran MD   Primary Care Provider: Renuka Moreno MD         Patient information was obtained from patient and ER records.     Subjective:     Principal Problem:Chest pain    Chief Complaint:   Chief Complaint   Patient presents with    Chest Pain     Hx atrial fib        HPI: Trudi Mcknight is a 66-year-old female with paroxysmal Afib, hiatal hernia with esophagitis, and Meniere's disease, who presents after having intermittent chest pain for 2 weeks, acutely worsened today. At its worst the pain was substernal, crushing and radiating to her neck, back, and left arm. The pain is associated palpitations and significant fatigue, shortness of breath, lightheadedness and sensation of presyncope. She also reports onset of pain with eating, though she cannot link the symptoms to certain foods and reports it does not happen every time she eats. She denies nausea/vomiting, abdominal pain, bright blood in stool or dark stools, diaphoresis, fevers/chills, or numbness/tingling. [place] [facility] [place], ACS workup was negative. She was discharged, referred and evaluated by outpatient cardiology. Holter monitor confirmed paroxysmal a fib with RVR. She was not started on AC. She was started on metoprolol for rate control but was unable to tolerate due to hypotension. Planned to start diltiazem, but holter monitor showed 4 second pause and patient was instructed not to start the med. Cards referred her to EP, but patient has not yet been evaluated. She has no personal ACS history but endorses strong family history of heart disease, her mother had an MI in her 50s, her brother had an MI very young and passed away from a heart attack in his 60s.  She had a stress test about  7 years ago.  She is a nonsmoker.    In the ED, patient is afebrile without leukocytosis. Vitals stable and HR controlled. EKG with sinus arrhythmia, T wave inversion in anterior leads. Troponin trend flat x 2. CXR without acute process. BNP 33. CBC/CMP unremarkable. Patient was given  and GI cocktail, with improvement in CP.       Past Medical History:   Diagnosis Date    Esophagitis     Meniere's disease        Past Surgical History:   Procedure Laterality Date    BREAST CYST ASPIRATION           SECTION      COLONOSCOPY N/A 2019    Procedure: COLONOSCOPY;  Surgeon: INGRID Russo MD;  Location: [facility] ([address] [place] [address]);  Service: Endoscopy;  Laterality: N/A;    HYSTERECTOMY      TONSILLECTOMY         Review of patient's allergies indicates:   Allergen Reactions    Penicillins Hives     causes congestion and hives    Tricyclic compounds        No current facility-administered medications on file prior to encounter.     Current Outpatient Medications on File Prior to Encounter   Medication Sig    aspirin (ECOTRIN) 81 MG EC tablet Take 1 tablet (81 mg total) by mouth once daily.    diltiaZEM (TIAZAC) 120 mg 24 hr capsule Take 2 capsules (240 mg total) by mouth once daily.    omeprazole (PRILOSEC) 40 MG capsule     sertraline (ZOLOFT) 25 MG tablet TAKE 1/2 TABLET BY MOUTH EVERY DAY    vitamin D (VITAMIN D3) 1000 units Tab Take 1,000 Units by mouth once daily.     Family History     Problem Relation (Age of Onset)    Breast cancer Mother, Paternal Grandmother    Diverticulitis Brother, Sister    Heart disease Mother (55), Father, Brother    Hyperlipidemia Mother    Hypertension Mother, Father        Tobacco Use    Smoking status: Never Smoker    Smokeless tobacco: Never Used   Substance and Sexual Activity    Alcohol use: No     Alcohol/week: 0.0 standard drinks     Comment: rarely    Drug use: No    Sexual activity: Not on file     Review of Systems   Constitutional: Positive  for fatigue. Negative for activity change, chills, diaphoresis and fever.   HENT: Negative for congestion, facial swelling, rhinorrhea and sore throat.    Eyes: Negative for photophobia, itching and visual disturbance.   Respiratory: Positive for chest tightness and shortness of breath. Negative for cough and wheezing.    Cardiovascular: Positive for chest pain and palpitations. Negative for leg swelling.   Gastrointestinal: Positive for constipation. Negative for abdominal distention, abdominal pain, blood in stool, diarrhea, nausea and vomiting.   Genitourinary: Negative for difficulty urinating, dysuria, frequency, hematuria and urgency.   Musculoskeletal: Negative for arthralgias, back pain and neck stiffness.   Skin: Negative for pallor.   Neurological: Positive for dizziness, weakness and light-headedness. Negative for tremors, seizures, syncope, numbness and headaches.   Psychiatric/Behavioral: Negative for agitation, confusion and hallucinations.     Objective:     Vital Signs (Most Recent):  Temp: 98.1 °F (36.7 °C) (01/17/22 1300)  Pulse: 66 (01/17/22 1300)  Resp: 18 (01/17/22 1300)  BP: (!) 127/92 (01/17/22 1300)  SpO2: 97 % (01/17/22 1244) Vital Signs (24h Range):  Temp:  [97.7 °F (36.5 °C)-98.1 °F (36.7 °C)] 98.1 °F (36.7 °C)  Pulse:  [66-99] 66  Resp:  [18] 18  SpO2:  [97 %-100 %] 97 %  BP: (127-157)/(74-92) 127/92     Weight: 57.2 kg (126 lb)  Body mass index is 23.81 kg/m².    Physical Exam  Vitals and nursing note reviewed.   Constitutional:       General: She is not in acute distress.     Appearance: She is well-developed. She is not diaphoretic.   HENT:      Head: Normocephalic and atraumatic.      Right Ear: External ear normal.      Left Ear: External ear normal.      Nose: Nose normal. No congestion.      Mouth/Throat:      Pharynx: Oropharynx is clear.   Eyes:      General: No scleral icterus.     Extraocular Movements: Extraocular movements intact.   Cardiovascular:      Rate and Rhythm:  Normal rate. Rhythm irregular.      Pulses: Normal pulses.      Heart sounds: Normal heart sounds. No murmur heard.      Pulmonary:      Effort: Pulmonary effort is normal. No respiratory distress.      Breath sounds: Normal breath sounds. No wheezing or rales.   Abdominal:      General: Bowel sounds are normal. There is no distension.      Palpations: Abdomen is soft.      Tenderness: There is no abdominal tenderness. There is no guarding or rebound.   Musculoskeletal:      Cervical back: Normal range of motion.      Right lower leg: No edema.      Left lower leg: No edema.   Skin:     General: Skin is warm and dry.      Capillary Refill: Capillary refill takes less than 2 seconds.   Neurological:      General: No focal deficit present.      Mental Status: She is alert and oriented to person, place, and time. Mental status is at baseline.   Psychiatric:         Mood and Affect: Mood normal.         Behavior: Behavior normal.         Thought Content: Thought content normal.             Significant Labs:   All pertinent labs within the past 24 hours have been reviewed.  BMP:   Recent Labs   Lab 01/17/22  1133   GLU 97      K 3.7      CO2 20*   BUN 16   CREATININE 0.9   CALCIUM 9.9     CBC:   Recent Labs   Lab 01/17/22  1133   WBC 9.38   HGB 14.6   HCT 44.8        Troponin:   Recent Labs   Lab 01/17/22  1133 01/17/22  1407   TROPONINI <0.006 0.009       Significant Imaging: I have reviewed all pertinent imaging results/findings within the past 24 hours.    Assessment/Plan:     Chest pain  Trudi Mcknight is a 66-year-old female with paroxysmal Afib, hiatal hernia with esophagitis, and Meniere's disease, who presents after having intermittent chest pain for 2 weeks, acutely worsened today. At its worst the pain was substernal, crushing and radiating to her neck, back, and left arm. The pain is associated palpitations and significant fatigue, shortness of breath, lightheadedness and sensation of  presyncope. She was recently seen here at [facility] [place] for a [place] episode, ACS workup was negative. She was discharged, referred and evaluated by outpatient cardiology.   - Vitals stable and HR controlled on admit   - EKG with sinus arrhythmia, T wave inversion in anterior leads.  - Troponin trend flat x 2, 3rd trop pending   - CXR without acute process, BNP 33.   - Patient was given  and GI cocktail in ED, with improvement in CP.   - PO protonix BID (see hiatal hernia)  - Last echo done 1/10 with normal cardiac function, EF 55%  - Will hold off on cards consult for now      Results for orders placed during the hospital encounter of 01/10/22    Echo Saline Bubble? No    Interpretation Summary  · The left ventricle is normal in size with normal systolic function. The estimated ejection fraction is 55%.  · Normal right ventricular size with normal right ventricular systolic function.  · Normal left ventricular diastolic function.  · The estimated PA systolic pressure is 25 mmHg.  · Normal central venous pressure (3 mmHg).      Paroxysmal atrial fibrillation with RVR  Holter monitor earlier this month confirmed paroxysmal afib with RVR. Evaluated by outpatient cardiology. Not on AC, [id number] score 2 (2.2% stroke risk). Pressures unable to tolerate metoprolol, not a candidate for diltiazem based on pause seen on holter. Referred, but not yet evaluated by EP.   - On admit, HR is controlled   - EKG with sinus arrhythmia         Hiatal hernia with GERD and esophagitis  Hiatal hernia and GERD with hx of esophagitis on EGD in 2015. Patient was prescribed daily PPI which helped her symptoms in the past, but she reports she stopped taking it out of concern that it would contribute to her cardiac arrhythmias. Patient being evaluated for CC of CP, which she describes as chest tightness, and at times burning pain. The pain is sometimes brought on by eating, though she has not noticed certain foods to be triggers. She denies  abdominal pain, N/V, melena or hematochezia.   - pain improved with GI cocktail x1 in ED  - resume home PO protonix 40mg, increase to BID while inpatient   - mylanta and antiemetics prn   - scheduled carafate        VTE Risk Mitigation (From admission, onward)         Ordered     IP VTE LOW RISK PATIENT  Once         01/17/22 1341     Place sequential compression device  Until discontinued         01/17/22 1341                   Tatiana Fletcher PA-C  Department of Hospital Medicine   [facility] - Telemetry Stepdown ([place]-)

## 2022-01-17 NOTE — ASSESSMENT & PLAN NOTE
Trudi Mcknight is a 66-year-old female with paroxysmal Afib, hiatal hernia with esophagitis, and Meniere's disease, who presents after having intermittent chest pain for 2 weeks, acutely worsened today. At its worst the pain was substernal, crushing and radiating to her neck, back, and left arm. The pain is associated palpitations and significant fatigue, shortness of breath, lightheadedness and sensation of presyncope. She was recently [place] at [facility] [place], ACS workup was negative. She was discharged, referred and evaluated by outpatient cardiology.   - Vitals stable and HR controlled on admit   - EKG with sinus arrhythmia, T wave inversion in anterior leads.  - Troponin trend flat x 2, 3rd trop pending   - CXR without acute process, BNP 33.   - Patient was given  and GI cocktail in ED, with improvement in CP.   - PO protonix BID (see hiatal hernia)  - Last echo done 1/10 with normal cardiac function, EF 55%  - Will hold off on cards consult for now      Results for orders placed during the hospital encounter of 01/10/22    Echo Saline Bubble? No    Interpretation Summary  · The left ventricle is normal in size with normal systolic function. The estimated ejection fraction is 55%.  · Normal right ventricular size with normal right ventricular systolic function.  · Normal left ventricular diastolic function.  · The estimated PA systolic pressure is 25 mmHg.  · Normal central venous pressure (3 mmHg).

## 2022-01-17 NOTE — SUBJECTIVE & OBJECTIVE
Past Medical History:   Diagnosis Date    Esophagitis     Meniere's disease        Past Surgical History:   Procedure Laterality Date    BREAST CYST ASPIRATION           SECTION      COLONOSCOPY N/A 2019    Procedure: COLONOSCOPY;  Surgeon: INGRID Russo MD;  Location: [address];  Service: Endoscopy;  Laterality: N/A;    HYSTERECTOMY      TONSILLECTOMY         Review of patient's allergies indicates:   Allergen Reactions    Penicillins Hives     causes congestion and hives    Tricyclic compounds        No current facility-administered medications on file prior to encounter.     Current Outpatient Medications on File Prior to Encounter   Medication Sig    aspirin (ECOTRIN) 81 MG EC tablet Take 1 tablet (81 mg total) by mouth once daily.    diltiaZEM (TIAZAC) 120 mg 24 hr capsule Take 2 capsules (240 mg total) by mouth once daily.    omeprazole (PRILOSEC) 40 MG capsule     sertraline (ZOLOFT) 25 MG tablet TAKE 1/2 TABLET BY MOUTH EVERY DAY    vitamin D (VITAMIN D3) 1000 units Tab Take 1,000 Units by mouth once daily.     Family History     Problem Relation (Age of Onset)    Breast cancer Mother, Paternal Grandmother    Diverticulitis Brother, Sister    Heart disease Mother (55), Father, Brother    Hyperlipidemia Mother    Hypertension Mother, Father        Tobacco Use    Smoking status: Never Smoker    Smokeless tobacco: Never Used   Substance and Sexual Activity    Alcohol use: No     Alcohol/week: 0.0 standard drinks     Comment: rarely    Drug use: No    Sexual activity: Not on file     Review of Systems   Constitutional: Positive for fatigue. Negative for activity change, chills, diaphoresis and fever.   HENT: Negative for congestion, facial swelling, rhinorrhea and sore throat.    Eyes: Negative for photophobia, itching and visual disturbance.   Respiratory: Positive for chest tightness and shortness of breath. Negative for cough and wheezing.    Cardiovascular:  Positive for chest pain and palpitations. Negative for leg swelling.   Gastrointestinal: Positive for constipation. Negative for abdominal distention, abdominal pain, blood in stool, diarrhea, nausea and vomiting.   Genitourinary: Negative for difficulty urinating, dysuria, frequency, hematuria and urgency.   Musculoskeletal: Negative for arthralgias, back pain and neck stiffness.   Skin: Negative for pallor.   Neurological: Positive for dizziness, weakness and light-headedness. Negative for tremors, seizures, syncope, numbness and headaches.   Psychiatric/Behavioral: Negative for agitation, confusion and hallucinations.     Objective:     Vital Signs (Most Recent):  Temp: 98.1 °F (36.7 °C) (01/17/22 1300)  Pulse: 66 (01/17/22 1300)  Resp: 18 (01/17/22 1300)  BP: (!) 127/92 (01/17/22 1300)  SpO2: 97 % (01/17/22 1244) Vital Signs (24h Range):  Temp:  [97.7 °F (36.5 °C)-98.1 °F (36.7 °C)] 98.1 °F (36.7 °C)  Pulse:  [66-99] 66  Resp:  [18] 18  SpO2:  [97 %-100 %] 97 %  BP: (127-157)/(74-92) 127/92     Weight: 57.2 kg (126 lb)  Body mass index is 23.81 kg/m².    Physical Exam  Vitals and nursing note reviewed.   Constitutional:       General: She is not in acute distress.     Appearance: She is well-developed. She is not diaphoretic.   HENT:      Head: Normocephalic and atraumatic.      Right Ear: External ear normal.      Left Ear: External ear normal.      Nose: Nose normal. No congestion.      Mouth/Throat:      Pharynx: Oropharynx is clear.   Eyes:      General: No scleral icterus.     Extraocular Movements: Extraocular movements intact.   Cardiovascular:      Rate and Rhythm: Normal rate. Rhythm irregular.      Pulses: Normal pulses.      Heart sounds: Normal heart sounds. No murmur heard.      Pulmonary:      Effort: Pulmonary effort is normal. No respiratory distress.      Breath sounds: Normal breath sounds. No wheezing or rales.   Abdominal:      General: Bowel sounds are normal. There is no distension.       Palpations: Abdomen is soft.      Tenderness: There is no abdominal tenderness. There is no guarding or rebound.   Musculoskeletal:      Cervical back: Normal range of motion.      Right lower leg: No edema.      Left lower leg: No edema.   Skin:     General: Skin is warm and dry.      Capillary Refill: Capillary refill takes less than 2 seconds.   Neurological:      General: No focal deficit present.      Mental Status: She is alert and oriented to person, place, and time. Mental status is at baseline.   Psychiatric:         Mood and Affect: Mood normal.         Behavior: Behavior normal.         Thought Content: Thought content normal.             Significant Labs:   All pertinent labs within the past 24 hours have been reviewed.  BMP:   Recent Labs   Lab 01/17/22  1133   GLU 97      K 3.7      CO2 20*   BUN 16   CREATININE 0.9   CALCIUM 9.9     CBC:   Recent Labs   Lab 01/17/22  1133   WBC 9.38   HGB 14.6   HCT 44.8        Troponin:   Recent Labs   Lab 01/17/22  1133 01/17/22  1407   TROPONINI <0.006 0.009       Significant Imaging: I have reviewed all pertinent imaging results/findings within the past 24 hours.

## 2022-01-17 NOTE — Clinical Note
Lm for pt ([id number] Aicha Booker per HIPAA) to inform mammo normal. Repeat in 1 year. To call back at the office if any further questions. The wire was removed from the  left axillary vein.

## 2022-01-17 NOTE — NURSING
"Patient was admitted with shortness of breath and chest pain.  She is oriented X 4 and she does have pain she rates @3 in the chest.  She stated that it feels like "light pressure".  No SOB at this time.  "

## 2022-01-18 LAB — TROPONIN I SERPL DL<=0.01 NG/ML-MCNC: <0.006 NG/ML (ref 0–0.03)

## 2022-01-18 PROCEDURE — 99226 PR SUBSEQUENT OBSERVATION CARE,LEVEL III: ICD-10-PCS | Mod: HCNC,,, | Performed by: PHYSICIAN ASSISTANT

## 2022-01-18 PROCEDURE — 36415 COLL VENOUS BLD VENIPUNCTURE: CPT | Mod: HCNC | Performed by: PHYSICIAN ASSISTANT

## 2022-01-18 PROCEDURE — 99226 PR SUBSEQUENT OBSERVATION CARE,LEVEL III: CPT | Mod: HCNC,,, | Performed by: PHYSICIAN ASSISTANT

## 2022-01-18 PROCEDURE — 84484 ASSAY OF TROPONIN QUANT: CPT | Mod: HCNC | Performed by: PHYSICIAN ASSISTANT

## 2022-01-18 PROCEDURE — 25000003 PHARM REV CODE 250: Mod: HCNC

## 2022-01-18 PROCEDURE — 99900035 HC TECH TIME PER 15 MIN (STAT): Mod: HCNC

## 2022-01-18 PROCEDURE — 25000003 PHARM REV CODE 250: Mod: HCNC | Performed by: PHYSICIAN ASSISTANT

## 2022-01-18 PROCEDURE — 94761 N-INVAS EAR/PLS OXIMETRY MLT: CPT | Mod: HCNC

## 2022-01-18 PROCEDURE — G0378 HOSPITAL OBSERVATION PER HR: HCPCS | Mod: HCNC

## 2022-01-18 RX ORDER — POTASSIUM CHLORIDE 20 MEQ/1
40 TABLET, EXTENDED RELEASE ORAL ONCE
Status: COMPLETED | OUTPATIENT
Start: 2022-01-18 | End: 2022-01-18

## 2022-01-18 RX ADMIN — POTASSIUM CHLORIDE 40 MEQ: 1500 TABLET, EXTENDED RELEASE ORAL at 04:01

## 2022-01-18 RX ADMIN — PANTOPRAZOLE SODIUM 40 MG: 40 TABLET, DELAYED RELEASE ORAL at 08:01

## 2022-01-18 RX ADMIN — ASPIRIN 81 MG: 81 TABLET, COATED ORAL at 09:01

## 2022-01-18 RX ADMIN — SUCRALFATE 1 G: 1 TABLET ORAL at 05:01

## 2022-01-18 RX ADMIN — SUCRALFATE 1 G: 1 TABLET ORAL at 04:01

## 2022-01-18 RX ADMIN — SUCRALFATE 1 G: 1 TABLET ORAL at 08:01

## 2022-01-18 RX ADMIN — PANTOPRAZOLE SODIUM 40 MG: 40 TABLET, DELAYED RELEASE ORAL at 09:01

## 2022-01-18 RX ADMIN — POLYETHYLENE GLYCOL 3350 17 G: 17 POWDER, FOR SOLUTION ORAL at 09:01

## 2022-01-18 RX ADMIN — SUCRALFATE 1 G: 1 TABLET ORAL at 11:01

## 2022-01-18 NOTE — PLAN OF CARE
CM will schedule EP appointment follow up, per provider patient will have stress test tomorrow.    Keiko Merchant RN, CM  Case Management  [id number]

## 2022-01-18 NOTE — HOSPITAL COURSE
66 y.o. who was admitted to hospital medicine for chest pain. EKG without acute ischemic changes. Troponin WNL x3. CXR unremarkable. DSE planned for 1/19. Prior to DSE, patient  developed acute symptomatic tachycardia with heart rates in the 190-200s interrupted with bouts of nonsustained bradycardia. Rapid response was called, EKG with atrial fibrillation w/ RVR. Cardiology and EP were consulted and pacemaker was placed on 1/20/2022 for sick sinus syndrome. Post-procedure TTE was stable and MTP was initiated, Xarelto to start 5 days post procedure. Case was discussed with general cardiology, as patient in need of is ischemic eval --- recommended outpatient follow-up. On 1/21/2022, patient became symptomatically hypotensive with noted atrial fibrillation w/ RVR. Decreased metoprolol from 25mg to 12.5mg with improvement in hypotension. Patient is stable for discharge home with cardiology follow up on 2/27/22. Outpatient nuclear stress test ordered. Return precautions given.

## 2022-01-18 NOTE — PLAN OF CARE
Problem: Adult Inpatient Plan of Care  Goal: Plan of Care Review  Outcome: Ongoing, Progressing  Flowsheets (Taken 1/18/2022 0503)  Plan of Care Reviewed With: patient  Goal: Absence of Hospital-Acquired Illness or Injury  Outcome: Ongoing, Progressing  Goal: Optimal Comfort and Wellbeing  Outcome: Ongoing, Progressing     Problem: Pain Acute  Goal: Acceptable Pain Control and Functional Ability  Outcome: Ongoing, Progressing    Plan of care reviewed with pt. Pt voiced understanding. Pt AAO X 4. Pt did c/o slight chest pain that did not last long during the shift. Pt kept NPO. No apparent distress noted. Fall precautions maintained. Pt remains free of fall or injury. Bed in lowest position, locked, and call light within reach. Side rails up x's 2 with slip resistant socks on.

## 2022-01-18 NOTE — PLAN OF CARE
Problem: Adult Inpatient Plan of Care  Goal: Plan of Care Review  Outcome: Ongoing, Progressing     Problem: Adult Inpatient Plan of Care  Goal: Absence of Hospital-Acquired Illness or Injury  Outcome: Ongoing, Progressing     Problem: Adult Inpatient Plan of Care  Goal: Optimal Comfort and Wellbeing  Outcome: Ongoing, Progressing     Pt. AAOx4, VSS, no falls or injuries during shift, safety maintained. Call light in reach, bed locked and in lowest position, side rails up x2. No acute events, will continue to monitor.

## 2022-01-18 NOTE — PLAN OF CARE
[facility] - Telemetry Stepdown ([facility])  Discharge Assessment    Primary Care Provider: Renuka Moreno MD     Discharge Assessment (most recent)     BRIEF DISCHARGE ASSESSMENT - 01/18/22 8703        Discharge Planning    Assessment Type Discharge Planning Brief Assessment     Support Systems Spouse/significant other     Equipment Currently Used at Home none     Patient/Family Anticipates Transition to home     Patient/Family Anticipated Services at Transition none     DME Needed Upon Discharge  none     Discharge Plan A Home with family     Discharge Plan B Home with family             Spoke to spouse.  Patient lives with spouse. Post hospital stay spouse will be her support person and patient transportation at discharge .  There have been no hospitalizations within the last 30 days per spouse. Verified patient's PCP and preferred Pharmacy.  Spouse states not on Coumadin and is not receiving dialysis.  All questions answered regarding Case Management Discharge Planning, spouse verbalized understanding.      Keiko Merchant RN, CM  Case Management  [id number]

## 2022-01-18 NOTE — PROGRESS NOTES
[facility] ([address] [facility]  Progress Note    Patient Name: Trudi Mcknight  MRN: [id number]  Patient Class: OP- Observation   Admission Date: 1/17/2022  Length of Stay: 0 days  Attending Physician: Shana Cochran MD  Primary Care Provider: Renuka Moreno MD        Subjective:     Principal Problem:Chest pain        HPI:  Trudi Mcknight is a 66-year-old female with paroxysmal Afib, hiatal hernia with esophagitis, and Meniere's disease, who presents after having intermittent chest pain for 2 weeks, acutely worsened today. At its worst the pain was substernal, crushing and radiating to her neck, back, and left arm. The pain is associated palpitations and significant fatigue, shortness of breath, lightheadedness and sensation of presyncope. She also reports onset of pain with eating, though she cannot link the symptoms to certain foods and reports it does not happen every time she eats. She denies nausea/vomiting, abdominal pain, bright blood in stool or dark stools, diaphoresis, fevers/chills, or numbness/tingling. She was recently seen here at [facility] [place] for a similar episode, ACS workup was negative. She was discharged, referred and evaluated by outpatient cardiology. Holter monitor confirmed paroxysmal a fib with RVR. She was not started on AC. She was started on metoprolol for rate control but was unable to tolerate due to hypotension. Planned to start diltiazem, but holter monitor showed 4 second pause and patient was instructed not to start the med. Cards referred her to EP, but patient has not yet been evaluated. She has no personal ACS history but endorses strong family history of heart disease, her mother had an MI in her 50s, her brother had an MI very young and passed away from a heart attack in his 60s.  She had a stress test about 7 years ago.  She is a nonsmoker.    In the ED, patient is afebrile without leukocytosis. Vitals stable and HR controlled. EKG with sinus  arrhythmia, T wave inversion in anterior leads. Troponin trend flat x 2. CXR without acute process. BNP 33. CBC/CMP unremarkable. Patient was given  and GI cocktail, with improvement in CP.       Overview/Hospital Course:  Patient admitted for chest pain. EKG without acute ischemic changes. Troponin WNL x3. CXR unremarkable. DSE planned for 1/19. Continue to monitor on tele.       Interval History: Trop WNL this AM, pt with subsided CP currently. Her last stress was <5 yrs ago and unremarkable. Will continue to monitor on tele, plan for DSE tomorrow @ 1300    Review of Systems   Constitutional: Positive for fatigue. Negative for diaphoresis and fever.   HENT: Negative for congestion and rhinorrhea.    Eyes: Negative for itching and visual disturbance.   Respiratory: Positive for chest tightness and shortness of breath. Negative for cough.    Cardiovascular: Positive for chest pain and palpitations.   Gastrointestinal: Negative for abdominal pain and nausea.   Genitourinary: Negative for difficulty urinating and dysuria.   Musculoskeletal: Negative for arthralgias and back pain.   Skin: Negative for color change and wound.   Neurological: Positive for dizziness, weakness and light-headedness. Negative for tremors, syncope and headaches.   Psychiatric/Behavioral: Negative for agitation and confusion.     Objective:     Vital Signs (Most Recent):  Temp: 98.3 °F (36.8 °C) (01/18/22 0802)  Pulse: (!) 52 (01/18/22 1111)  Resp: 18 (01/18/22 0419)  BP: 122/69 (01/18/22 0802)  SpO2: 98 % (01/18/22 0811) Vital Signs (24h Range):  Temp:  [96 °F (35.6 °C)-98.3 °F (36.8 °C)] 98.3 °F (36.8 °C)  Pulse:  [52-71] 52  Resp:  [17-18] 18  SpO2:  [96 %-99 %] 98 %  BP: ()/(59-69) 122/69     Weight: 57.2 kg (126 lb)  Body mass index is 23.81 kg/m².    Intake/Output Summary (Last 24 hours) at 1/18/2022 1322  Last data filed at 1/18/2022 0500  Gross per 24 hour   Intake 120 ml   Output --   Net 120 ml      Physical Exam  Vitals  and nursing note reviewed.   Constitutional:       General: She is not in acute distress.     Appearance: Normal appearance. She is well-developed. She is not ill-appearing.   HENT:      Head: Normocephalic and atraumatic.      Nose: Nose normal.   Eyes:      Extraocular Movements: Extraocular movements intact.   Cardiovascular:      Rate and Rhythm: Normal rate and regular rhythm.      Pulses: Normal pulses.      Heart sounds: No murmur heard.      Pulmonary:      Effort: Pulmonary effort is normal. No respiratory distress.      Breath sounds: Normal breath sounds.   Abdominal:      General: Abdomen is flat. There is no distension.      Palpations: Abdomen is soft.   Musculoskeletal:      Right lower leg: No edema.      Left lower leg: No edema.   Skin:     General: Skin is warm and dry.      Capillary Refill: Capillary refill takes less than 2 seconds.   Neurological:      General: No focal deficit present.      Mental Status: She is alert and oriented to person, place, and time.   Psychiatric:         Mood and Affect: Mood normal.         Significant Labs: All pertinent labs within the past 24 hours have been reviewed.    Significant Imaging: I have reviewed all pertinent imaging results/findings within the past 24 hours.      Assessment/Plan:      * Chest pain  Trudi Mcknight is a 66-year-old female with paroxysmal Afib, hiatal hernia with esophagitis, and Meniere's disease, who presents after having intermittent chest pain for 2 weeks, acutely worsened today. At its worst the pain was substernal, crushing and radiating to her neck, back, and left arm. The pain is associated palpitations and significant fatigue, shortness of breath, lightheadedness and sensation of presyncope. She was recently seen here at [facility] [place] for a similar episode, ACS workup was negative. She was discharged, referred and evaluated by outpatient cardiology.   - Vitals stable and HR controlled on admit   - EKG with sinus arrhythmia, T wave  inversion in anterior leads.  - Troponin trend flat x 3   - CXR without acute process, BNP 33.   - DSE 1/19  - Patient was given  and GI cocktail in ED, with improvement in CP.   - PO protonix BID (see hiatal hernia)  - Last echo done 1/10 with normal cardiac function, EF 55%  - Will hold off on cards consult for now      Results for orders placed during the hospital encounter of 01/10/22    Echo Saline Bubble? No    Interpretation Summary  · The left ventricle is normal in size with normal systolic function. The estimated ejection fraction is 55%.  · Normal right ventricular size with normal right ventricular systolic function.  · Normal left ventricular diastolic function.  · The estimated PA systolic pressure is 25 mmHg.  · Normal central venous pressure (3 mmHg).      Paroxysmal atrial fibrillation with RVR  Holter monitor earlier this month confirmed paroxysmal afib with RVR. Evaluated by outpatient cardiology. Not on AC, [id number] score 2 (2.2% stroke risk). Pressures unable to tolerate metoprolol, not a candidate for diltiazem based on pause seen on holter. Referred, but not yet evaluated by EP.   - On admit, HR is controlled   - EKG with sinus arrhythmia         Hiatal hernia with GERD and esophagitis  Hiatal hernia and GERD with hx of esophagitis on EGD in 2015. Patient was prescribed daily PPI which helped her symptoms in the past, but she reports she stopped taking it out of concern that it would contribute to her cardiac arrhythmias. Patient being evaluated for CC of CP, which she describes as chest tightness, and at times burning pain. The pain is sometimes brought on by eating, though she has not noticed certain foods to be triggers. She denies abdominal pain, N/V, melena or hematochezia.   - pain improved with GI cocktail x1 in ED  - resume home PO protonix 40mg, increase to BID while inpatient   - mylanta and antiemetics prn   - scheduled carafate        VTE Risk Mitigation (From admission,  onward)         Ordered     IP VTE LOW RISK PATIENT  Once         01/17/22 1859     Place sequential compression device  Until discontinued         01/17/22 1341                Discharge Planning   STEVE: 1/18/2022     Code Status: Full Code   Is the patient medically ready for discharge?: No    Reason for patient still in hospital (select all that apply): Imaging and Pending disposition                     Cheryle Sutton PA-C  Department of Hospital Medicine   [facility] - Telemetry Stepdown ([place]-[facility])

## 2022-01-18 NOTE — SUBJECTIVE & OBJECTIVE
Interval History: Trop WNL this AM, pt with subsided CP currently. Her last stress was <5 yrs ago and unremarkable. Will continue to monitor on tele, plan for DSE tomorrow @ 1300    Review of Systems   Constitutional: Positive for fatigue. Negative for diaphoresis and fever.   HENT: Negative for congestion and rhinorrhea.    Eyes: Negative for itching and visual disturbance.   Respiratory: Positive for chest tightness and shortness of breath. Negative for cough.    Cardiovascular: Positive for chest pain and palpitations.   Gastrointestinal: Negative for abdominal pain and nausea.   Genitourinary: Negative for difficulty urinating and dysuria.   Musculoskeletal: Negative for arthralgias and back pain.   Skin: Negative for color change and wound.   Neurological: Positive for dizziness, weakness and light-headedness. Negative for tremors, syncope and headaches.   Psychiatric/Behavioral: Negative for agitation and confusion.     Objective:     Vital Signs (Most Recent):  Temp: 98.3 °F (36.8 °C) (01/18/22 0802)  Pulse: (!) 52 (01/18/22 1111)  Resp: 18 (01/18/22 0419)  BP: 122/69 (01/18/22 0802)  SpO2: 98 % (01/18/22 0811) Vital Signs (24h Range):  Temp:  [96 °F (35.6 °C)-98.3 °F (36.8 °C)] 98.3 °F (36.8 °C)  Pulse:  [52-71] 52  Resp:  [17-18] 18  SpO2:  [96 %-99 %] 98 %  BP: ()/(59-69) 122/69     Weight: 57.2 kg (126 lb)  Body mass index is 23.81 kg/m².    Intake/Output Summary (Last 24 hours) at 1/18/2022 1322  Last data filed at 1/18/2022 0500  Gross per 24 hour   Intake 120 ml   Output --   Net 120 ml      Physical Exam  Vitals and nursing note reviewed.   Constitutional:       General: She is not in acute distress.     Appearance: Normal appearance. She is well-developed. She is not ill-appearing.   HENT:      Head: Normocephalic and atraumatic.      Nose: Nose normal.   Eyes:      Extraocular Movements: Extraocular movements intact.   Cardiovascular:      Rate and Rhythm: Normal rate and regular rhythm.       Pulses: Normal pulses.      Heart sounds: No murmur heard.      Pulmonary:      Effort: Pulmonary effort is normal. No respiratory distress.      Breath sounds: Normal breath sounds.   Abdominal:      General: Abdomen is flat. There is no distension.      Palpations: Abdomen is soft.   Musculoskeletal:      Right lower leg: No edema.      Left lower leg: No edema.   Skin:     General: Skin is warm and dry.      Capillary Refill: Capillary refill takes less than 2 seconds.   Neurological:      General: No focal deficit present.      Mental Status: She is alert and oriented to person, place, and time.   Psychiatric:         Mood and Affect: Mood normal.         Significant Labs: All pertinent labs within the past 24 hours have been reviewed.    Significant Imaging: I have reviewed all pertinent imaging results/findings within the past 24 hours.

## 2022-01-18 NOTE — ASSESSMENT & PLAN NOTE
Trudi Mcknight is a 66-year-old female with paroxysmal Afib, hiatal hernia with esophagitis, and Meniere's disease, who presents after having intermittent chest pain for 2 weeks, acutely worsened today. At its worst the pain was substernal, crushing and radiating to her neck, back, and left arm. The pain is associated palpitations and significant fatigue, shortness of breath, lightheadedness and sensation of presyncope. She was recently seen here at [facility] [place] for a similar episode, ACS workup was negative. She was discharged, referred and evaluated by outpatient cardiology.   - Vitals stable and HR controlled on admit   - EKG with sinus arrhythmia, T wave inversion in anterior leads.  - Troponin trend flat x 3   - CXR without acute process, BNP 33.   - DSE 1/19  - Patient was given  and GI cocktail in ED, with improvement in CP.   - PO protonix BID (see hiatal hernia)  - Last echo done 1/10 with normal cardiac function, EF 55%  - Will hold off on cards consult for now      Results for orders placed during the hospital encounter of 01/10/22    Echo Saline Bubble? No    Interpretation Summary  · The left ventricle is normal in size with normal systolic function. The estimated ejection fraction is 55%.  · Normal right ventricular size with normal right ventricular systolic function.  · Normal left ventricular diastolic function.  · The estimated PA systolic pressure is 25 mmHg.  · Normal central venous pressure (3 mmHg).

## 2022-01-19 ENCOUNTER — ANESTHESIA EVENT (OUTPATIENT)
Dept: MEDSURG UNIT | Facility: HOSPITAL | Age: 67
DRG: 244 | End: 2022-01-19
Payer: MEDICARE

## 2022-01-19 PROBLEM — I48.0 PAROXYSMAL ATRIAL FIBRILLATION: Status: ACTIVE | Noted: 2021-03-07

## 2022-01-19 LAB
ANION GAP SERPL CALC-SCNC: 10 MMOL/L (ref 8–16)
BUN SERPL-MCNC: 17 MG/DL (ref 8–23)
CALCIUM SERPL-MCNC: 9.8 MG/DL (ref 8.7–10.5)
CHLORIDE SERPL-SCNC: 108 MMOL/L (ref 95–110)
CO2 SERPL-SCNC: 20 MMOL/L (ref 23–29)
CREAT SERPL-MCNC: 0.8 MG/DL (ref 0.5–1.4)
EST. GFR  (AFRICAN AMERICAN): >60 ML/MIN/1.73 M^2
EST. GFR  (NON AFRICAN AMERICAN): >60 ML/MIN/1.73 M^2
GLUCOSE SERPL-MCNC: 93 MG/DL (ref 70–110)
MAGNESIUM SERPL-MCNC: 2 MG/DL (ref 1.6–2.6)
POTASSIUM SERPL-SCNC: 3.9 MMOL/L (ref 3.5–5.1)
SODIUM SERPL-SCNC: 138 MMOL/L (ref 136–145)
TROPONIN I SERPL DL<=0.01 NG/ML-MCNC: <0.006 NG/ML (ref 0–0.03)

## 2022-01-19 PROCEDURE — 80048 BASIC METABOLIC PNL TOTAL CA: CPT | Mod: HCNC | Performed by: PHYSICIAN ASSISTANT

## 2022-01-19 PROCEDURE — 93010 ELECTROCARDIOGRAM REPORT: CPT | Mod: HCNC,,, | Performed by: INTERNAL MEDICINE

## 2022-01-19 PROCEDURE — 93005 ELECTROCARDIOGRAM TRACING: CPT | Mod: HCNC

## 2022-01-19 PROCEDURE — 99223 1ST HOSP IP/OBS HIGH 75: CPT | Mod: HCNC,,, | Performed by: INTERNAL MEDICINE

## 2022-01-19 PROCEDURE — 99223 PR INITIAL HOSPITAL CARE,LEVL III: ICD-10-PCS | Mod: HCNC,,, | Performed by: INTERNAL MEDICINE

## 2022-01-19 PROCEDURE — 93010 EKG 12-LEAD: ICD-10-PCS | Mod: HCNC,,, | Performed by: INTERNAL MEDICINE

## 2022-01-19 PROCEDURE — 36415 COLL VENOUS BLD VENIPUNCTURE: CPT | Mod: HCNC | Performed by: PHYSICIAN ASSISTANT

## 2022-01-19 PROCEDURE — 99222 PR INITIAL HOSPITAL CARE,LEVL II: ICD-10-PCS | Mod: HCNC,,, | Performed by: INTERNAL MEDICINE

## 2022-01-19 PROCEDURE — 94761 N-INVAS EAR/PLS OXIMETRY MLT: CPT | Mod: HCNC

## 2022-01-19 PROCEDURE — 25000003 PHARM REV CODE 250: Mod: HCNC

## 2022-01-19 PROCEDURE — 99222 1ST HOSP IP/OBS MODERATE 55: CPT | Mod: HCNC,,, | Performed by: INTERNAL MEDICINE

## 2022-01-19 PROCEDURE — 99226 PR SUBSEQUENT OBSERVATION CARE,LEVEL III: CPT | Mod: HCNC,,, | Performed by: PHYSICIAN ASSISTANT

## 2022-01-19 PROCEDURE — 83735 ASSAY OF MAGNESIUM: CPT | Mod: HCNC | Performed by: PHYSICIAN ASSISTANT

## 2022-01-19 PROCEDURE — 99226 PR SUBSEQUENT OBSERVATION CARE,LEVEL III: ICD-10-PCS | Mod: HCNC,,, | Performed by: PHYSICIAN ASSISTANT

## 2022-01-19 PROCEDURE — 11000001 HC ACUTE MED/SURG PRIVATE ROOM: Mod: HCNC

## 2022-01-19 PROCEDURE — 84484 ASSAY OF TROPONIN QUANT: CPT | Mod: HCNC | Performed by: PHYSICIAN ASSISTANT

## 2022-01-19 PROCEDURE — 25000003 PHARM REV CODE 250: Mod: HCNC | Performed by: PHYSICIAN ASSISTANT

## 2022-01-19 RX ORDER — DILTIAZEM HYDROCHLORIDE 30 MG/1
30 TABLET, FILM COATED ORAL ONCE
Status: DISCONTINUED | OUTPATIENT
Start: 2022-01-19 | End: 2022-01-19

## 2022-01-19 RX ORDER — VANCOMYCIN HCL IN 5 % DEXTROSE 1G/250ML
1000 PLASTIC BAG, INJECTION (ML) INTRAVENOUS
Status: DISCONTINUED | OUTPATIENT
Start: 2022-01-19 | End: 2022-01-24 | Stop reason: HOSPADM

## 2022-01-19 RX ORDER — POTASSIUM CHLORIDE 20 MEQ/1
40 TABLET, EXTENDED RELEASE ORAL ONCE
Status: COMPLETED | OUTPATIENT
Start: 2022-01-19 | End: 2022-01-19

## 2022-01-19 RX ADMIN — POTASSIUM CHLORIDE 40 MEQ: 1500 TABLET, EXTENDED RELEASE ORAL at 11:01

## 2022-01-19 RX ADMIN — ALUMINUM HYDROXIDE, MAGNESIUM HYDROXIDE, AND SIMETHICONE 30 ML: 200; 200; 20 SUSPENSION ORAL at 10:01

## 2022-01-19 RX ADMIN — ASPIRIN 81 MG: 81 TABLET, COATED ORAL at 08:01

## 2022-01-19 RX ADMIN — PANTOPRAZOLE SODIUM 40 MG: 40 TABLET, DELAYED RELEASE ORAL at 08:01

## 2022-01-19 RX ADMIN — SUCRALFATE 1 G: 1 TABLET ORAL at 11:01

## 2022-01-19 RX ADMIN — SUCRALFATE 1 G: 1 TABLET ORAL at 05:01

## 2022-01-19 NOTE — PROGRESS NOTES
[facility] ([address] [facility]  Progress Note    Patient Name: Trudi Mcknight  MRN: [id number]  Patient Class: OP- Observation   Admission Date: 1/17/2022  Length of Stay: 0 days  Attending Physician: Erika Chaney MD  Primary Care Provider: Renuka Moreno MD        Subjective:     Principal Problem:Chest pain        HPI:  Trudi Mcknight is a 66-year-old female with paroxysmal Afib, hiatal hernia with esophagitis, and Meniere's disease, who presents after having intermittent chest pain for 2 weeks, acutely worsened today. At its worst the pain was substernal, crushing and radiating to her neck, back, and left arm. The pain is associated palpitations and significant fatigue, shortness of breath, lightheadedness and sensation of presyncope. She also reports onset of pain with eating, though she cannot link the symptoms to certain foods and reports it does not happen every time she eats. She denies nausea/vomiting, abdominal pain, bright blood in stool or dark stools, diaphoresis, fevers/chills, or numbness/tingling. [place] [facility] [place], ACS workup was negative. She was discharged, referred and evaluated by outpatient cardiology. Holter monitor confirmed paroxysmal a fib with RVR. She was not started on AC. She was started on metoprolol for rate control but was unable to tolerate due to hypotension. Planned to start diltiazem, but holter monitor showed 4 second pause and patient was instructed not to start the med. Cards referred her to EP, but patient has not yet been evaluated. She has no personal ACS history but endorses strong family history of heart disease, her mother had an MI in her 50s, her brother had an MI very young and passed away from a heart attack in his 60s.  She had a stress test about 7 years ago.  She is a nonsmoker.    In the ED, patient is afebrile without leukocytosis. Vitals stable and HR controlled. EKG with sinus  arrhythmia, T wave inversion in anterior leads. Troponin trend flat x 2. CXR without acute process. BNP 33. CBC/CMP unremarkable. Patient was given  and GI cocktail, with improvement in CP.       Overview/Hospital Course:  Patient admitted for chest pain. EKG without acute ischemic changes. Troponin WNL x3. CXR unremarkable. DSE planned for 1/19. However, 1/19 patient became tachycardic in the 190-200s on tele and symptomatic. Rapid response called, EKG showed afib RVR HR 110s. Patient began to have labile HR, bradying into the 30s but not sustaining. Spontaneously resolved at bedside. Discontinued DSE for now given labile HR. Cardiology called and consulted.       Interval History: Patient seen this AM resting in NAD, sitting by window. Planned for DSE at 1pm.  However, shortly after rounding, was notified by nursing that patient's HR was captured to be 190-200s on tele and patient felt 8/10 CP. Rapid response called. When at bedside, her monitor showed -135s. BP stable. STAT 12 lead, troponin, EKG ordered.EKG shows Afib RVR . As rapid team was leaving, patient began to madelaine down into the 30s temporarily. Called and discussed with cardiology, specifically regarding rate control given her labile HR and documentation of 4sec pause on outpt holter. Formal consult placed. Appreciate assistance. Dobutamine stress echo canceled given change in condition    Critical Care Time: 45 minutes        Critical care was time spent personally by me on the following activities: evaluating this patient's organ dysfunction, development of treatment plan, discussing treatment plan with patient or surrogate and bedside caregivers, discussions with consultants, evaluation of patient's response to treatment, examination of patient, ordering and performing treatments and interventions, ordering and review of laboratory studies, ordering and review of radiographic studies, re-evaluation of patient's condition. This  critical care time did not overlap with that of any other provider or involve time for any separately billed procedures    Diagnosis requiring critical care: Patient has a condition that poses threat to life and bodily function: tachycardia/arrhytmia          Review of Systems   Constitutional: Positive for fatigue. Negative for diaphoresis and fever.   HENT: Negative for congestion and rhinorrhea.    Eyes: Negative for itching and visual disturbance.   Respiratory: Positive for chest tightness and shortness of breath. Negative for cough.    Cardiovascular: Positive for chest pain and palpitations.   Gastrointestinal: Negative for abdominal pain, nausea and vomiting.   Genitourinary: Negative for difficulty urinating and dysuria.   Musculoskeletal: Negative for arthralgias and back pain.   Skin: Negative for color change and wound.   Neurological: Positive for dizziness, weakness and light-headedness. Negative for tremors, syncope and headaches.   Psychiatric/Behavioral: Negative for agitation and confusion.     Objective:     Vital Signs (Most Recent):  Temp: 98.2 °F (36.8 °C) (01/19/22 0749)  Pulse: 81 (01/19/22 1104)  Resp: 18 (01/19/22 0830)  BP: (!) 127/94 (01/19/22 1038)  SpO2: 97 % (01/19/22 1038) Vital Signs (24h Range):  Temp:  [96.2 °F (35.7 °C)-98.4 °F (36.9 °C)] 98.2 °F (36.8 °C)  Pulse:  [] 81  Resp:  [14-18] 18  SpO2:  [96 %-99 %] 97 %  BP: (104-133)/(57-94) 127/94     Weight: 57.2 kg (126 lb)  Body mass index is 23.81 kg/m².    Intake/Output Summary (Last 24 hours) at 1/19/2022 1144  Last data filed at 1/18/2022 2149  Gross per 24 hour   Intake 120 ml   Output --   Net 120 ml      Physical Exam  Vitals and nursing note reviewed.   Constitutional:       General: She is not in acute distress.     Appearance: Normal appearance. She is well-developed. She is not ill-appearing.   HENT:      Head: Normocephalic and atraumatic.      Nose: Nose normal.   Eyes:      Extraocular Movements: Extraocular  movements intact.   Cardiovascular:      Rate and Rhythm: Tachycardia present. Rhythm irregular.   Pulmonary:      Effort: Pulmonary effort is normal. No respiratory distress.      Breath sounds: Normal breath sounds. No wheezing or rales.   Abdominal:      General: Abdomen is flat.      Palpations: Abdomen is soft.   Musculoskeletal:      Right lower leg: No edema.      Left lower leg: No edema.   Skin:     General: Skin is warm and dry.      Capillary Refill: Capillary refill takes less than 2 seconds.   Neurological:      General: No focal deficit present.      Mental Status: She is alert and oriented to person, place, and time.   Psychiatric:         Mood and Affect: Mood normal.         Significant Labs: All pertinent labs within the past 24 hours have been reviewed.    Significant Imaging: I have reviewed all pertinent imaging results/findings within the past 24 hours.      Assessment/Plan:      * Chest pain  P Afib  Trudi Mcknight is a 66-year-old female with paroxysmal Afib, hiatal hernia with esophagitis, and Meniere's disease, who presents after having intermittent chest pain for 2 weeks, acutely worsened today. At its worst the pain was substernal, crushing and radiating to her neck, back, and left arm. The pain is associated palpitations and significant fatigue, shortness of breath, lightheadedness and sensation of presyncope. She was recently seen here at [facility] [place] for a similar episode, ACS workup was negative. She was discharged, referred and evaluated by outpatient cardiology. Holter monitor earlier this month confirmed paroxysmal afib with RVR. Evaluated by outpatient cardiology. Not on AC, [id number] score 2 (2.2% stroke risk). Pressures unable to tolerate metoprolol, not a candidate for diltiazem based on pause seen on holter. Referred, but not yet evaluated by EP.     - Vitals stable and HR controlled on admit   - EKG with sinus arrhythmia, T wave inversion in anterior leads.  - Troponin trend flat x  3   - CXR without acute process, BNP 33.   - Patient was given  and GI cocktail in ED, with improvement in CP.   - PO protonix BID (see hiatal hernia)  - Last echo done 1/10 with normal cardiac function, EF 55%  - recent TSH WNL, lipid and a1c pending  1/19: Rapid response called for -200s with associated CP   .STAT 12 lead    troponin    EKG ordered, EKG shows Afib RVR .   As rapid team was leaving, patient began to madelaine down into the 30s temporarily. Called and discussed with cardiology, specifically regarding rate control given her labile HR and documentation of 4sec pause on outpt holter. Formal consult placed. Appreciate assistance. Dobutamine stress echo canceled given change in condition    Results for orders placed during the hospital encounter of 01/10/22    Echo Saline Bubble? No    Interpretation Summary  · The left ventricle is normal in size with normal systolic function. The estimated ejection fraction is 55%.  · Normal right ventricular size with normal right ventricular systolic function.  · Normal left ventricular diastolic function.  · The estimated PA systolic pressure is 25 mmHg.  · Normal central venous pressure (3 mmHg).      Paroxysmal atrial fibrillation with RVR        Hiatal hernia with GERD and esophagitis  Hiatal hernia and GERD with hx of esophagitis on EGD in 2015. Patient was prescribed daily PPI which helped her symptoms in the past, but she reports she stopped taking it out of concern that it would contribute to her cardiac arrhythmias. Patient being evaluated for CC of CP, which she describes as chest tightness, and at times burning pain. The pain is sometimes brought on by eating, though she has not noticed certain foods to be triggers. She denies abdominal pain, N/V, melena or hematochezia.   - pain improved with GI cocktail x1 in ED  - resume home PO protonix 40mg, increase to BID while inpatient   - mylanta and antiemetics prn   - scheduled  carafate        VTE Risk Mitigation (From admission, onward)         Ordered     IP VTE LOW RISK PATIENT  Once         01/17/22 1859     Place sequential compression device  Until discontinued         01/17/22 1341                Discharge Planning   STEVE: 1/21/2022     Code Status: Full Code   Is the patient medically ready for discharge?: No    Reason for patient still in hospital (select all that apply): Patient new problem, Patient trending condition, Laboratory test, Treatment, Imaging and Consult recommendations  Discharge Plan A: Home with family              Discussed with cadence Sutton PA-C  Department of Hospital Medicine   [facility] - Telemetry Stepdown ([place]

## 2022-01-19 NOTE — SUBJECTIVE & OBJECTIVE
Interval History: Patient seen this AM resting in NAD, sitting by window. Planned for DSE at 1pm.  However, shortly after rounding, was notified by nursing that patient's HR was captured to be 190-200s on tele and patient felt 8/10 CP. Rapid response called. When at bedside, her monitor showed -135s. BP stable. STAT 12 lead, troponin, EKG ordered.EKG shows Afib RVR . As rapid team was leaving, patient began to madelaine down into the 30s temporarily. Called and discussed with cardiology, specifically regarding rate control given her labile HR and documentation of 4sec pause on outpt holter. Formal consult placed. Appreciate assistance. Dobutamine stress echo canceled given change in condition    Critical Care Time: 45 minutes        Critical care was time spent personally by me on the following activities: evaluating this patient's organ dysfunction, development of treatment plan, discussing treatment plan with patient or surrogate and bedside caregivers, discussions with consultants, evaluation of patient's response to treatment, examination of patient, ordering and performing treatments and interventions, ordering and review of laboratory studies, ordering and review of radiographic studies, re-evaluation of patient's condition. This critical care time did not overlap with that of any other provider or involve time for any separately billed procedures    Diagnosis requiring critical care: Patient has a condition that poses threat to life and bodily function: tachycardia/arrhytmia          Review of Systems   Constitutional: Positive for fatigue. Negative for diaphoresis and fever.   HENT: Negative for congestion and rhinorrhea.    Eyes: Negative for itching and visual disturbance.   Respiratory: Positive for chest tightness and shortness of breath. Negative for cough.    Cardiovascular: Positive for chest pain and palpitations.   Gastrointestinal: Negative for abdominal pain, nausea and vomiting.    Genitourinary: Negative for difficulty urinating and dysuria.   Musculoskeletal: Negative for arthralgias and back pain.   Skin: Negative for color change and wound.   Neurological: Positive for dizziness, weakness and light-headedness. Negative for tremors, syncope and headaches.   Psychiatric/Behavioral: Negative for agitation and confusion.     Objective:     Vital Signs (Most Recent):  Temp: 98.2 °F (36.8 °C) (01/19/22 0749)  Pulse: 81 (01/19/22 1104)  Resp: 18 (01/19/22 0830)  BP: (!) 127/94 (01/19/22 1038)  SpO2: 97 % (01/19/22 1038) Vital Signs (24h Range):  Temp:  [96.2 °F (35.7 °C)-98.4 °F (36.9 °C)] 98.2 °F (36.8 °C)  Pulse:  [] 81  Resp:  [14-18] 18  SpO2:  [96 %-99 %] 97 %  BP: (104-133)/(57-94) 127/94     Weight: 57.2 kg (126 lb)  Body mass index is 23.81 kg/m².    Intake/Output Summary (Last 24 hours) at 1/19/2022 1144  Last data filed at 1/18/2022 2149  Gross per 24 hour   Intake 120 ml   Output --   Net 120 ml      Physical Exam  Vitals and nursing note reviewed.   Constitutional:       General: She is not in acute distress.     Appearance: Normal appearance. She is well-developed. She is not ill-appearing.   HENT:      Head: Normocephalic and atraumatic.      Nose: Nose normal.   Eyes:      Extraocular Movements: Extraocular movements intact.   Cardiovascular:      Rate and Rhythm: Tachycardia present. Rhythm irregular.   Pulmonary:      Effort: Pulmonary effort is normal. No respiratory distress.      Breath sounds: Normal breath sounds. No wheezing or rales.   Abdominal:      General: Abdomen is flat.      Palpations: Abdomen is soft.   Musculoskeletal:      Right lower leg: No edema.      Left lower leg: No edema.   Skin:     General: Skin is warm and dry.      Capillary Refill: Capillary refill takes less than 2 seconds.   Neurological:      General: No focal deficit present.      Mental Status: She is alert and oriented to person, place, and time.   Psychiatric:         Mood and Affect:  Mood normal.         Significant Labs: All pertinent labs within the past 24 hours have been reviewed.    Significant Imaging: I have reviewed all pertinent imaging results/findings within the past 24 hours.

## 2022-01-19 NOTE — CHAPLAIN
Responded to RR. Pt being tended by medical staff. No family present.     Chaplain May, Spiritual Care  Spectra [phone]

## 2022-01-19 NOTE — CONSULTS
[facility] - Telemetry Stepdown ([place] [address])  Cardiac Electrophysiology  Consult Note    Admission Date: 2022  Code Status: Full Code   Attending Provider: Erika Chaney MD  Consulting Provider: Catherine Alvarez MD  Principal Problem:Chest pain    Consults  Subjective:     Chief Complaint:  AF, symptomatic bradycardia    HPI:   Ms. Trudi Mcknight is a 66 year old female presented to the ED 22 with intermittent gas pains on her chest off and on for a few weeks.  She has also been having right upper quadrant and right rib pain for a couple of months.She was noted to have rapid irregular rhythm and EKG revealed AF with RVR. Her Echo from 1/10/2022 showed EF 55%, and normal RV function and no significant VHD. Reviewing her ECG on 2021 showed SR, and first reported atrial fibrillation w RVR and subsequent ECG on same day showed sinus rhythm w rate of 71 BPM. She underwent Holter monitor (22)which sowed atrial fibrillation and conversion pause ~ 4.1 seconds at 2300. HR did range from 37bpm- 175bpm. She reports having episodes of dizziness and near syncope when Hrs drop as low at 30s. This admission 2022, her presentation is chest pain in the setting of having atrial fibrillation with rapid ventricular response ~ 130 BPM. She has three sets of Trop which were negative. Cardiology consulted for her atrial fibrillation. She felt his conversion pauses (one of which occurred during the Holter monitor). Difficult for her tot tolerate AV vika blocking agents for rate control given drop in Hrs.          Past Medical History:   Diagnosis Date    Esophagitis     Meniere's disease        Past Surgical History:   Procedure Laterality Date    BREAST CYST ASPIRATION           SECTION      COLONOSCOPY N/A 2019    Procedure: COLONOSCOPY;  Surgeon: INGRID Russo MD;  Location: [facility] ([address] [place] [address]);  Service: Endoscopy;  Laterality: N/A;    HYSTERECTOMY      TONSILLECTOMY          Review of patient's allergies indicates:   Allergen Reactions    Penicillins Hives     causes congestion and hives    Tricyclic compounds        No current facility-administered medications on file prior to encounter.     Current Outpatient Medications on File Prior to Encounter   Medication Sig    aspirin (ECOTRIN) 81 MG EC tablet Take 1 tablet (81 mg total) by mouth once daily.    diltiaZEM (TIAZAC) 120 mg 24 hr capsule Take 2 capsules (240 mg total) by mouth once daily.    omeprazole (PRILOSEC) 40 MG capsule     sertraline (ZOLOFT) 25 MG tablet TAKE 1/2 TABLET BY MOUTH EVERY DAY    vitamin D (VITAMIN D3) 1000 units Tab Take 1,000 Units by mouth once daily.     Family History     Problem Relation (Age of Onset)    Breast cancer Mother, Paternal Grandmother    Diverticulitis Brother, Sister    Heart disease Mother (55), Father, Brother    Hyperlipidemia Mother    Hypertension Mother, Father        Tobacco Use    Smoking status: Never Smoker    Smokeless tobacco: Never Used   Substance and Sexual Activity    Alcohol use: No     Alcohol/week: 0.0 standard drinks     Comment: rarely    Drug use: No    Sexual activity: Not on file     Review of Systems   Constitutional: Negative.   HENT: Negative.    Eyes: Negative.    Cardiovascular: Positive for chest pain.   Respiratory: Negative.    Skin: Negative.    Musculoskeletal: Negative.    Neurological: Positive for dizziness.     Objective:     Vital Signs (Most Recent):  Temp: 98.4 °F (36.9 °C) (01/19/22 1303)  Pulse: 79 (01/19/22 1303)  Resp: (!) 22 (01/19/22 1311)  BP: 98/63 (01/19/22 1303)  SpO2: 98 % (01/19/22 1303) Vital Signs (24h Range):  Temp:  [96.2 °F (35.7 °C)-98.4 °F (36.9 °C)] 98.4 °F (36.9 °C)  Pulse:  [] 79  Resp:  [14-28] 22  SpO2:  [96 %-98 %] 98 %  BP: ()/(57-94) 98/63       Weight: 57.2 kg (126 lb)  Body mass index is 23.81 kg/m².    SpO2: 98 %  O2 Device (Oxygen Therapy): room air    Physical Exam  Vitals and nursing note  reviewed.   Constitutional:       Appearance: Normal appearance.   HENT:      Head: Normocephalic and atraumatic.   Cardiovascular:      Rate and Rhythm: Normal rate and regular rhythm.      Pulses: Normal pulses.      Heart sounds: Normal heart sounds.   Pulmonary:      Effort: Pulmonary effort is normal.      Breath sounds: Normal breath sounds.   Abdominal:      General: Abdomen is flat. There is no distension.      Palpations: Abdomen is soft.   Musculoskeletal:      Cervical back: Normal range of motion and neck supple.      Right lower leg: No edema.      Left lower leg: No edema.   Neurological:      General: No focal deficit present.      Mental Status: She is alert and oriented to person, place, and time.         Significant Labs: All pertinent lab results from the last 24 hours have been reviewed.                Assessment and Plan:     Paroxysmal atrial fibrillation  - Paroxysmal atrial fibrillation one of which associated with conversion pause   - [id number] Score is 2 points, which carry risk 2.2% of stroke per year  - Currently not on anticoagulation as discussed previously,           - We recommended discussion with her again about starting anticoagulation after PPM   - Plan for PPM placement tomorrow  - keep NPO after midnight    The risks, benefits and alternatives of the procedure were explained to the patient, patient's family and/or surrogate decision maker. Risks include (but not limited to) bleeding, hematoma, infection, pain, vascular damage, damage to structures surrounding the vasculature, injury to heart valves, injury to lung causing pneumothorax requiring tube placementmyocardial damage [perforation, valvular damage], puncture of the heart leading to pericardial effusion or tamponade requiring tube drainage, CVA, MI, and death. All questions were answered. Patient is understanding of these risks, and would like to proceed. Consents signed.        Thank you for your consult. I will  follow-up with patient. Please contact us if you have any additional questions.    Catherine Alvarez MD  Cardiac Electrophysiology  [facility] - Telemetry Stepdown ([place])

## 2022-01-19 NOTE — SUBJECTIVE & OBJECTIVE
Past Medical History:   Diagnosis Date    Esophagitis     Meniere's disease        Past Surgical History:   Procedure Laterality Date    BREAST CYST ASPIRATION           SECTION      COLONOSCOPY N/A 2019    Procedure: COLONOSCOPY;  Surgeon: INGRID Russo MD;  Location: [address];  Service: Endoscopy;  Laterality: N/A;    HYSTERECTOMY      TONSILLECTOMY         Review of patient's allergies indicates:   Allergen Reactions    Penicillins Hives     causes congestion and hives    Tricyclic compounds        No current facility-administered medications on file prior to encounter.     Current Outpatient Medications on File Prior to Encounter   Medication Sig    aspirin (ECOTRIN) 81 MG EC tablet Take 1 tablet (81 mg total) by mouth once daily.    diltiaZEM (TIAZAC) 120 mg 24 hr capsule Take 2 capsules (240 mg total) by mouth once daily.    omeprazole (PRILOSEC) 40 MG capsule     sertraline (ZOLOFT) 25 MG tablet TAKE 1/2 TABLET BY MOUTH EVERY DAY    vitamin D (VITAMIN D3) 1000 units Tab Take 1,000 Units by mouth once daily.     Family History     Problem Relation (Age of Onset)    Breast cancer Mother, Paternal Grandmother    Diverticulitis Brother, Sister    Heart disease Mother (55), Father, Brother    Hyperlipidemia Mother    Hypertension Mother, Father        Tobacco Use    Smoking status: Never Smoker    Smokeless tobacco: Never Used   Substance and Sexual Activity    Alcohol use: No     Alcohol/week: 0.0 standard drinks     Comment: rarely    Drug use: No    Sexual activity: Not on file     Review of Systems   Constitutional: Negative for chills and decreased appetite.   HENT: Negative for congestion.    Cardiovascular: Positive for chest pain. Negative for irregular heartbeat and leg swelling.   Respiratory: Negative for cough and shortness of breath.    Endocrine: Negative for cold intolerance and heat intolerance.   Skin: Negative for rash.   Musculoskeletal: Negative  for arthritis and back pain.   Gastrointestinal: Negative for abdominal pain, constipation and diarrhea.   Genitourinary: Negative for dysuria and hematuria.   Neurological: Negative for dizziness and headaches.   Psychiatric/Behavioral: Negative for altered mental status.     Objective:     Vital Signs (Most Recent):  Temp: 98.4 °F (36.9 °C) (01/19/22 1303)  Pulse: 79 (01/19/22 1303)  Resp: (!) 22 (01/19/22 1311)  BP: 98/63 (01/19/22 1303)  SpO2: 98 % (01/19/22 1303) Vital Signs (24h Range):  Temp:  [96.2 °F (35.7 °C)-98.4 °F (36.9 °C)] 98.4 °F (36.9 °C)  Pulse:  [] 79  Resp:  [14-28] 22  SpO2:  [96 %-99 %] 98 %  BP: ()/(57-94) 98/63     Weight: 57.2 kg (126 lb)  Body mass index is 23.81 kg/m².    SpO2: 98 %  O2 Device (Oxygen Therapy): room air      Intake/Output Summary (Last 24 hours) at 1/19/2022 1317  Last data filed at 1/18/2022 2149  Gross per 24 hour   Intake 120 ml   Output --   Net 120 ml       Lines/Drains/Airways     Peripheral Intravenous Line                 Peripheral IV - Single Lumen 01/17/22 1134 20 G Left Antecubital 2 days                Physical Exam  Vitals reviewed.   Constitutional:       General: She is not in acute distress.     Appearance: She is well-nourished.   HENT:      Head: Normocephalic.   Eyes:      Pupils: Pupils are equal, round, and reactive to light.   Neck:      Thyroid: No thyromegaly.      Vascular: No JVD.   Cardiovascular:      Rate and Rhythm: Normal rate and regular rhythm.      Heart sounds: No murmur heard.  No friction rub.   Pulmonary:      Effort: Pulmonary effort is normal. No respiratory distress.   Musculoskeletal:         General: No edema.   Neurological:      Mental Status: She is alert and oriented to person, place, and time.         Significant Labs:   CMP   Recent Labs   Lab 01/19/22  0913      K 3.9      CO2 20*   GLU 93   BUN 17   CREATININE 0.8   CALCIUM 9.8   ANIONGAP 10   ESTGFRAFRICA >60.0   EGFRNONAA >60.0   , CBC No results  for input(s): WBC, HGB, HCT, PLT in the last 48 hours., Troponin   Recent Labs   Lab 01/17/22  1407 01/18/22  0913 01/19/22  1055   TROPONINI 0.009 <0.006 <0.006    and All pertinent lab results from the last 24 hours have been reviewed.    Significant Imaging: Echocardiogram:   2D echo with color flow doppler: No results found for this or any previous visit. and Transthoracic echo (TTE) complete (Cupid Only):   Results for orders placed or performed during the hospital encounter of 01/10/22   Echo Saline Bubble? No   Result Value Ref Range    Ascending aorta 3.26 cm    STJ 3.52 cm    AV mean gradient 3 mmHg    Ao peak mark 1.08 m/s    Ao VTI 24.19 cm    IVS 0.73 0.6 - 1.1 cm    LA size 2.98 cm    Left Atrium Major Axis 4.05 cm    Left Atrium Minor Axis 3.74 cm    LVIDd 3.61 3.5 - 6.0 cm    LVIDs 2.47 2.1 - 4.0 cm    LVOT diameter 2.48 cm    LVOT peak VTI 19.03 cm    Posterior Wall 0.56 (A) 0.6 - 1.1 cm    MV Peak A Mark 0.63 m/s    E wave deceleration time 158.13 msec    MV Peak E Mark 0.59 m/s    RA Major Axis 3.70 cm    RA Width 3.02 cm    RVDD 2.93 cm    Sinus 3.23 cm    TAPSE 1.76 cm    TR Max Mark 2.32 m/s    TDI LATERAL 0.07 m/s    TDI SEPTAL 0.04 m/s    LA WIDTH 2.97 cm    MV stenosis pressure 1/2 time 45.86 ms    LV Diastolic Volume 54.67 mL    LV Systolic Volume 21.61 mL    RV S' 42.12 cm/s    LVOT peak mark 0.95 m/s    LA volume (mod) 23.07 cm3    LV LATERAL E/E' RATIO 8.43 m/s    LV SEPTAL E/E' RATIO 14.75 m/s    FS 32 %    LA volume 29.26 cm3    LV mass 59.34 g    Left Ventricle Relative Wall Thickness 0.31 cm    AV valve area 3.80 cm2    AV Velocity Ratio 0.88     AV index (prosthetic) 0.79     MV valve area p 1/2 method 4.80 cm2    E/A ratio 0.94     Mean e' 0.06 m/s    LVOT area 4.8 cm2    LVOT stroke volume 91.88 cm3    AV peak gradient 5 mmHg    E/E' ratio 10.73 m/s    LV Systolic Volume Index 13.7 mL/m2    LV Diastolic Volume Index 34.60 mL/m2    LA Volume Index 18.5 mL/m2    LV Mass Index 38 g/m2     Triscuspid Valve Regurgitation Peak Gradient 22 mmHg    LA Volume Index (Mod) 14.6 mL/m2    BSA 1.6 m2    Right Atrial Pressure (from IVC) 3 mmHg    EF 55 %    TV rest pulmonary artery pressure 25 mmHg    Narrative    · The left ventricle is normal in size with normal systolic function. The   estimated ejection fraction is 55%.  · Normal right ventricular size with normal right ventricular systolic   function.  · Normal left ventricular diastolic function.  · The estimated PA systolic pressure is 25 mmHg.  · Normal central venous pressure (3 mmHg).

## 2022-01-19 NOTE — RESPIRATORY THERAPY
RAPID RESPONSE RESPIRATORY THERAPY NOTE             Code Status: Full Code   : 1955  Bed: 7071/7071 A:   MRN: [id number]  Time page Received: 1042   Time Rapid Response RT at Bedside: 1044  Time Rapid Response RT left Bedside: 1052    SITUATION    Evaluated patient for: Chest Pain    BACKGROUND    Why is the patient in the hospital?: Chest pain    Patient has a past medical history of Esophagitis and Meniere's disease.    24 Hours Vitals Range:  Temp:  [96.2 °F (35.7 °C)-98.4 °F (36.9 °C)]   Pulse:  []   Resp:  [14-18]   BP: (104-133)/(57-94)   SpO2:  [96 %-99 %]     Labs:    Recent Labs     22  1133 22  0913 22  1055    138  --    K 3.7 3.9  --     108  --    CO2 20* 20*  --    CREATININE 0.9 0.8  --    GLU 97 93  --    MG  --   --  2.0        No results for input(s): PH, PCO2, PO2, HCO3, POCSATURATED, BE in the last 72 hours.    ASSESSMENT/INTERVENTIONS    Upon arrival in room Pt awake and oriented with no respiratory needs.    Last Vitals: Temp: 98.2 °F (36.8 °C) ( 0749)  Pulse: 81 ( 1104)  Resp: 18 ( 0830)  BP: 127/94 ( 1038)  SpO2: 97 % ( 1038)  Level of Consciousness: Level of Consciousness (AVPU): alert  Respiratory Effort: Respiratory Effort: Normal,Unlabored  Expansion/Accessory Muscle Usage: Expansion/Accessory Muscles/Retractions: no use of accessory muscles,no retractions,expansion symmetric  All Lung Field Breath Sounds: All Lung Fields Breath Sounds: Posterior:,equal bilaterally,clear  PEDRO Breath Sounds: diminished  LLL Breath Sounds: diminished  RUL Breath Sounds: diminished  RML Breath Sounds: diminished  RLL Breath Sounds: diminished  O2 Device/Concentration:  ,  , O2 Device (Oxygen Therapy): room air  NIPPV: No Surgical airway: No Vent: No  ETCO2 monitored:    Ambu at bedside:      Active Orders   Respiratory Care    Inhalation Treatment Q4H PRN     Frequency: Q4H PRN     Number of Occurrences: Until Specified    Oxygen PRN      Frequency: PRN     Number of Occurrences: Until Specified     Order Questions:      Device type: Low flow      Device: Nasal Cannula (1- 5 Liters)      LPM: 2      Titrate O2 per Oxygen Titration Protocol: Yes      To maintain SpO2 goal of: >= 90%      Notify MD of: Inability to achieve desired SpO2; Sudden change in patient status and requires 20% increase in FiO2; Patient requires >60% FiO2    Pulse Oximetry Q4H     Frequency: Q4H     Number of Occurrences: Until Specified       RECOMMENDATIONS  ?  We recommend: RRT Recs: EKG tech completed EKG, nursing assesing.      ESCALATION        FOLLOW-UP    Disposition: Remain in room 7071.    Please call back the Rapid Response RTOlayinka RRT at x [phone] for any questions or concerns.

## 2022-01-19 NOTE — ASSESSMENT & PLAN NOTE
P Afib  Trudi Mcknight is a 66-year-old female with paroxysmal Afib, hiatal hernia with esophagitis, and Meniere's disease, who presents after having intermittent chest pain for 2 weeks, acutely worsened today. At its worst the pain was substernal, crushing and radiating to her neck, back, and left arm. The pain is associated palpitations and significant fatigue, shortness of breath, lightheadedness and sensation of presyncope. She was recently seen here at [facility] for a similar episode, ACS workup was negative. She was discharged, referred and evaluated by outpatient cardiology. Holter monitor earlier this month confirmed paroxysmal afib with RVR. Evaluated by outpatient cardiology. Not on AC, [id number] score 2 (2.2% stroke risk). Pressures unable to tolerate metoprolol, not a candidate for diltiazem based on pause seen on holter. Referred, but not yet evaluated by EP.     - Vitals stable and HR controlled on admit   - EKG with sinus arrhythmia, T wave inversion in anterior leads.  - Troponin trend flat x 3   - CXR without acute process, BNP 33.   - Patient was given  and GI cocktail in ED, with improvement in CP.   - PO protonix BID (see hiatal hernia)  - Last echo done 1/10 with normal cardiac function, EF 55%  - recent TSH WNL, lipid and a1c pending  1/19: Rapid response called for -200s with associated CP   .STAT 12 lead    troponin    EKG ordered, EKG shows Afib RVR .   As rapid team was leaving, patient began to madelaine down into the 30s temporarily. Called and discussed with cardiology, specifically regarding rate control given her labile HR and documentation of 4sec pause on outpt holter. Formal consult placed. Appreciate assistance. Dobutamine stress echo canceled given change in condition    Results for orders placed during the hospital encounter of 01/10/22    Echo Saline Bubble? No    Interpretation Summary  · The left ventricle is normal in size with normal systolic function. The  estimated ejection fraction is 55%.  · Normal right ventricular size with normal right ventricular systolic function.  · Normal left ventricular diastolic function.  · The estimated PA systolic pressure is 25 mmHg.  · Normal central venous pressure (3 mmHg).

## 2022-01-19 NOTE — NURSING
Call received from telemetry stating pt's HR is 209. This nurse found pt lying on bed c/o chest pain 8/10. Pt's  on monitor at this time. HUI Lobato made aware of above. Rapid response called at this time

## 2022-01-19 NOTE — SUBJECTIVE & OBJECTIVE
Past Medical History:   Diagnosis Date    Esophagitis     Meniere's disease        Past Surgical History:   Procedure Laterality Date    BREAST CYST ASPIRATION           SECTION      COLONOSCOPY N/A 2019    Procedure: COLONOSCOPY;  Surgeon: INGRID Russo MD;  Location: [address]);  Service: Endoscopy;  Laterality: N/A;    HYSTERECTOMY      TONSILLECTOMY         Review of patient's allergies indicates:   Allergen Reactions    Penicillins Hives     causes congestion and hives    Tricyclic compounds        No current facility-administered medications on file prior to encounter.     Current Outpatient Medications on File Prior to Encounter   Medication Sig    aspirin (ECOTRIN) 81 MG EC tablet Take 1 tablet (81 mg total) by mouth once daily.    diltiaZEM (TIAZAC) 120 mg 24 hr capsule Take 2 capsules (240 mg total) by mouth once daily.    omeprazole (PRILOSEC) 40 MG capsule     sertraline (ZOLOFT) 25 MG tablet TAKE 1/2 TABLET BY MOUTH EVERY DAY    vitamin D (VITAMIN D3) 1000 units Tab Take 1,000 Units by mouth once daily.     Family History     Problem Relation (Age of Onset)    Breast cancer Mother, Paternal Grandmother    Diverticulitis Brother, Sister    Heart disease Mother (55), Father, Brother    Hyperlipidemia Mother    Hypertension Mother, Father        Tobacco Use    Smoking status: Never Smoker    Smokeless tobacco: Never Used   Substance and Sexual Activity    Alcohol use: No     Alcohol/week: 0.0 standard drinks     Comment: rarely    Drug use: No    Sexual activity: Not on file     Review of Systems   Constitutional: Negative.   HENT: Negative.    Eyes: Negative.    Cardiovascular: Positive for chest pain.   Respiratory: Negative.    Skin: Negative.    Musculoskeletal: Negative.    Neurological: Positive for dizziness.     Objective:     Vital Signs (Most Recent):  Temp: 98.4 °F (36.9 °C) (22 1303)  Pulse: 79 (22 1303)  Resp: (!) 22 (22  1311)  BP: 98/63 (01/19/22 1303)  SpO2: 98 % (01/19/22 1303) Vital Signs (24h Range):  Temp:  [96.2 °F (35.7 °C)-98.4 °F (36.9 °C)] 98.4 °F (36.9 °C)  Pulse:  [] 79  Resp:  [14-28] 22  SpO2:  [96 %-98 %] 98 %  BP: ()/(57-94) 98/63       Weight: 57.2 kg (126 lb)  Body mass index is 23.81 kg/m².    SpO2: 98 %  O2 Device (Oxygen Therapy): room air    Physical Exam  Vitals and nursing note reviewed.   Constitutional:       Appearance: Normal appearance.   HENT:      Head: Normocephalic and atraumatic.   Cardiovascular:      Rate and Rhythm: Normal rate and regular rhythm.      Pulses: Normal pulses.      Heart sounds: Normal heart sounds.   Pulmonary:      Effort: Pulmonary effort is normal.      Breath sounds: Normal breath sounds.   Abdominal:      General: Abdomen is flat. There is no distension.      Palpations: Abdomen is soft.   Musculoskeletal:      Cervical back: Normal range of motion and neck supple.      Right lower leg: No edema.      Left lower leg: No edema.   Neurological:      General: No focal deficit present.      Mental Status: She is alert and oriented to person, place, and time.         Significant Labs: All pertinent lab results from the last 24 hours have been reviewed.

## 2022-01-19 NOTE — ANESTHESIA PREPROCEDURE EVALUATION
[facility]  Anesthesia Pre-Operative Evaluation         Patient Name: Trudi Mcknight  YOB: 1955  MRN: [id number]    SUBJECTIVE:     Pre-operative evaluation for Procedure(s) (LRB):  INSERTION, CARDIAC PACEMAKER, DUAL CHAMBER (N/A)     01/19/2022    Trudi Mcknight is a 66 y.o. female w/ a significant PMHx of pAF and hiatal hernia with esophagitis. She presented for evaluation and intermittent chest pain. EKG identified AF with RVR with a rate of 130. Troponin levels were at baseline. Cardiology was consulted for evaluation. She has not tolerated AV vika blocking agents due to symptomatic heart rate drops.    Patient now presents for the above procedure(s).      LDA:       Peripheral IV - Single Lumen 01/17/22 1134 20 G Left Antecubital (Active)   Site Assessment Clean;Dry;Intact 01/19/22 1233   Extremity Assessment Distal to IV No warmth;No swelling;No redness;No abnormal discoloration 01/19/22 1233   Line Status Saline locked 01/19/22 1233   Dressing Status Clean;Intact;Dry 01/19/22 1233   Dressing Intervention Integrity maintained 01/19/22 1233   Dressing Change Due 01/21/22 01/19/22 1233   Site Change Due 01/21/22 01/19/22 1233   Reason Not Rotated Not due 01/19/22 1233   Number of days: 2     Prev airway: None documented.    Drips: None documented.      Patient Active Problem List   Diagnosis    Esophagitis    History of gastritis    Screening for malignant neoplasm of colon    Paroxysmal atrial fibrillation    Neuralgia and neuritis, unspecified    Hiatal hernia with GERD and esophagitis    Disorder of thyroid, unspecified    Paroxysmal atrial fibrillation with RVR    Chest pain       Review of patient's allergies indicates:   Allergen Reactions    Penicillins Hives     causes congestion and hives    Tricyclic compounds        Current Inpatient Medications:   aspirin  81 mg Oral Daily    pantoprazole  40 mg Oral BID    polyethylene glycol  17 g Oral Daily     sucralfate  1 g Oral QID (AC & HS)       No current facility-administered medications on file prior to encounter.     Current Outpatient Medications on File Prior to Encounter   Medication Sig Dispense Refill    aspirin (ECOTRIN) 81 MG EC tablet Take 1 tablet (81 mg total) by mouth once daily. 1 tablet 0    diltiaZEM (TIAZAC) 120 mg 24 hr capsule Take 2 capsules (240 mg total) by mouth once daily. 30 capsule 3    omeprazole (PRILOSEC) 40 MG capsule       sertraline (ZOLOFT) 25 MG tablet TAKE 1/2 TABLET BY MOUTH EVERY DAY 45 tablet 3    vitamin D (VITAMIN D3) 1000 units Tab Take 1,000 Units by mouth once daily.         Past Surgical History:   Procedure Laterality Date    BREAST CYST ASPIRATION           SECTION      COLONOSCOPY N/A 2019    Procedure: COLONOSCOPY;  Surgeon: INGRID Russo MD;  Location: [address];  Service: Endoscopy;  Laterality: N/A;    HYSTERECTOMY      TONSILLECTOMY         Social History:  Tobacco Use: Low Risk     Smoking Tobacco Use: Never Smoker    Smokeless Tobacco Use: Never Used      Alcohol Use: Not At Risk    Frequency of Alcohol Consumption: Never    Average Number of Drinks: Patient refused    Frequency of Binge Drinking: Never        OBJECTIVE:     Vital Signs Range (Last 24H):  Temp:  [35.7 °C (96.2 °F)-36.9 °C (98.4 °F)]   Pulse:  []   Resp:  [14-28]   BP: ()/(57-94)   SpO2:  [96 %-98 %]       Significant Labs:  Lab Results   Component Value Date    WBC 9.38 2022    HGB 14.6 2022    HCT 44.8 2022     2022    CHOL 222 (H) 2019    TRIG 106 2019    HDL 58 2019    ALT 8 (L) 2022    AST 15 2022     2022    K 3.9 2022     2022    CREATININE 0.8 2022    BUN 17 2022    CO2 20 (L) 2022    TSH 2.504 2022    HGBA1C 5.8 (H) 2019       Diagnostic Studies: No relevant studies.    EKG:   Results for orders placed or  performed during the hospital encounter of 01/17/22   EKG 12-lead    Collection Time: 01/19/22 10:47 AM    Narrative    Test Reason : R00.0,    Vent. Rate : 113 BPM     Atrial Rate : 000 BPM     P-R Int : 000 ms          QRS Dur : 070 ms      QT Int : 318 ms       P-R-T Axes : 000 068 020 degrees     QTc Int : 436 ms    Atrial fibrillation with rapid ventricular response  Nonspecific ST and T wave abnormality  Abnormal ECG  When compared with ECG of 17-JAN-2022 10:42,  Atrial fibrillation has replaced Sinus rhythm  ST now depressed in Inferior leads  Confirmed by CONCEPCION MCDONALD MD ([id number]) on 1/19/2022 11:47:00 AM    Referred By: [email or web address]   SELF           Confirmed By:CONCEPCION MCDONALD MD       2D ECHO:  TTE:  Results for orders placed or performed during the hospital encounter of 01/10/22   Echo Saline Bubble? No   Result Value Ref Range    Ascending aorta 3.26 cm    STJ 3.52 cm    AV mean gradient 3 mmHg    Ao peak mandy 1.08 m/s    Ao VTI 24.19 cm    IVS 0.73 0.6 - 1.1 cm    LA size 2.98 cm    Left Atrium Major Axis 4.05 cm    Left Atrium Minor Axis 3.74 cm    LVIDd 3.61 3.5 - 6.0 cm    LVIDs 2.47 2.1 - 4.0 cm    LVOT diameter 2.48 cm    LVOT peak VTI 19.03 cm    Posterior Wall 0.56 (A) 0.6 - 1.1 cm    MV Peak A Mandy 0.63 m/s    E wave deceleration time 158.13 msec    MV Peak E Mandy 0.59 m/s    RA Major Axis 3.70 cm    RA Width 3.02 cm    RVDD 2.93 cm    Sinus 3.23 cm    TAPSE 1.76 cm    TR Max Mandy 2.32 m/s    TDI LATERAL 0.07 m/s    TDI SEPTAL 0.04 m/s    LA WIDTH 2.97 cm    MV stenosis pressure 1/2 time 45.86 ms    LV Diastolic Volume 54.67 mL    LV Systolic Volume 21.61 mL    RV S' 42.12 cm/s    LVOT peak mandy 0.95 m/s    LA volume (mod) 23.07 cm3    LV LATERAL E/E' RATIO 8.43 m/s    LV SEPTAL E/E' RATIO 14.75 m/s    FS 32 %    LA volume 29.26 cm3    LV mass 59.34 g    Left Ventricle Relative Wall Thickness 0.31 cm    AV valve area 3.80 cm2    AV Velocity Ratio 0.88     AV index (prosthetic) 0.79     MV valve area  p 1/2 method 4.80 cm2    E/A ratio 0.94     Mean e' 0.06 m/s    LVOT area 4.8 cm2    LVOT stroke volume 91.88 cm3    AV peak gradient 5 mmHg    E/E' ratio 10.73 m/s    LV Systolic Volume Index 13.7 mL/m2    LV Diastolic Volume Index 34.60 mL/m2    LA Volume Index 18.5 mL/m2    LV Mass Index 38 g/m2    Triscuspid Valve Regurgitation Peak Gradient 22 mmHg    LA Volume Index (Mod) 14.6 mL/m2    BSA 1.6 m2    Right Atrial Pressure (from IVC) 3 mmHg    EF 55 %    TV rest pulmonary artery pressure 25 mmHg    Narrative    · The left ventricle is normal in size with normal systolic function. The   estimated ejection fraction is 55%.  · Normal right ventricular size with normal right ventricular systolic   function.  · Normal left ventricular diastolic function.  · The estimated PA systolic pressure is 25 mmHg.  · Normal central venous pressure (3 mmHg).          RADHA:  No results found for this or any previous visit.    ASSESSMENT/PLAN:         Anesthesia Evaluation    I have reviewed the Patient Summary Reports.    I have reviewed the Nursing Notes. I have reviewed the NPO Status.   I have reviewed the Medications.     Review of Systems  Anesthesia Hx:  Denies Hx of Anesthetic complications  History of prior surgery of interest to airway management or planning: Denies Family Hx of Anesthesia complications.   Denies Personal Hx of Anesthesia complications.   Social:  Non-Smoker    Cardiovascular:   Dysrhythmias atrial fibrillation    Pulmonary:  Pulmonary Normal    Renal/:  Renal/ Normal     Hepatic/GI:   Hiatal Hernia, GERD    Neurological:  Neurology Normal    Endocrine:  Endocrine Normal        Physical Exam  General:  Well nourished    Airway/Jaw/Neck:  Airway Findings: Mouth Opening: Normal Tongue: Normal  General Airway Assessment: Adult, Average  Mallampati: I  TM Distance: Normal, at least 6 cm  Jaw/Neck Findings:  Neck ROM: Normal ROM      Dental:  Dental Findings: In tact    Chest/Lungs:  Chest/Lungs Findings:  Clear to auscultation, Normal Respiratory Rate     Heart/Vascular:  Heart Findings: Rate: Normal  Rhythm: Regular Rhythm  Sounds: Normal        Mental Status:  Mental Status Findings:  Cooperative, Alert and Oriented         Anesthesia Plan  Type of Anesthesia, risks & benefits discussed:  Anesthesia Type:  general, MAC    Patient's Preference:   Plan Factors:          Intra-op Monitoring Plan: standard ASA monitors  Intra-op Monitoring Plan Comments:   Post Op Pain Control Plan: per primary service following discharge from PACU, IV/PO Opioids PRN and multimodal analgesia  Post Op Pain Control Plan Comments:     Induction:   IV  Beta Blocker:  Patient is not currently on a Beta-Blocker (No further documentation required).       Informed Consent: Patient understands risks and agrees with Anesthesia plan.  Questions answered. Anesthesia consent signed with patient.  ASA Score: 3     Day of Surgery Review of History & Physical:    H&P update referred to the surgeon.         Ready For Surgery From Anesthesia Perspective.

## 2022-01-19 NOTE — NURSING
Pt c/o of chest pain 8/10 that radiates to bilateral neck and left arm. Pt states  makes pain better. /79, HR 93. Pt given maalox and immediately states pain was a 4/10. Cheryle AMES made aware of above. No new orders at this time

## 2022-01-19 NOTE — HPI
Ms. Trudi Mcknight is a 66 year old female presented to the ED 1/2/22 with intermittent gas pains on her chest off and on for a few weeks.  She has also been having right upper quadrant and right rib pain for a couple of months.She was noted to have rapid irregular rhythm and EKG revealed AF with RVR. Her Echo from 1/10/2022 showed EF 55%, and normal RV function and no significant VHD. Reviewing her ECG on 11/2021 showed SR, and first reported atrial fibrillation w RVR and subsequent ECG on same day showed sinus rhythm w rate of 71 BPM. She underwent Holter monitor which sowed atrial fibrillation and conversion pause ~ 4.1 seconds at 2300. This admission 1/17/2022, her presentation is chest pain in the setting of having atrial fibrillation with rapid ventricular response ~ 130 BPM. She has three sets of Trop which were negative. Cardiology consulted for her atrial fibrillation. She felt his conversion pauses (one of which occurred during the Holter monitor).

## 2022-01-19 NOTE — CONSULTS
[facility] - Tele[facility] Stepdown ([place] [address])  Cardiology  Consult Note    Patient Name: Trudi Mcknight  MRN: [id number]  Admission Date: 2022  Hospital Length of Stay: 0 days  Code Status: Full Code   Attending Provider: Erika Chaney MD   Consulting Provider: Soy Moreno MD  Primary Care Physician: Renuka Moreno MD  Principal Problem:Chest pain    Patient information was obtained from patient and ER records.     Inpatient consult to Cardiology  Consult performed by: Soy Moreno MD  Consult ordered by: Cheryle Sutton PA-C        Subjective:     Chief Complaint:  atrial fibrillation      HPI:   Ms. Trudi Mcknight is a 66 year old female presented to the ED 22 with intermittent gas pains on her chest off and on for a few weeks.  She has also been having right upper quadrant and right rib pain for a couple of months.She was noted to have rapid irregular rhythm and EKG revealed AF with RVR. Her Echo from 1/10/2022 showed EF 55%, and normal RV function and no significant VHD. Reviewing her ECG on 2021 showed SR, and first reported atrial fibrillation w RVR and subsequent ECG on same day showed sinus rhythm w rate of 71 BPM. She underwent Holter monitor which sowed atrial fibrillation and conversion pause ~ 4.1 seconds at 2300. This admission 2022, her presentation is chest pain in the setting of having atrial fibrillation with rapid ventricular response ~ 130 BPM. She has three sets of Trop which were negative. Cardiology consulted for her atrial fibrillation. She felt his conversion pauses (one of which occurred during the Holter monitor).           Past Medical History:   Diagnosis Date    Esophagitis     Meniere's disease        Past Surgical History:   Procedure Laterality Date    BREAST CYST ASPIRATION           SECTION      COLONOSCOPY N/A 2019    Procedure: COLONOSCOPY;  Surgeon: INGRID Russo MD;  Location: [facility] ([address] [place] [address]);  Service: Endoscopy;   Laterality: N/A;    HYSTERECTOMY      TONSILLECTOMY         Review of patient's allergies indicates:   Allergen Reactions    Penicillins Hives     causes congestion and hives    Tricyclic compounds        No current facility-administered medications on file prior to encounter.     Current Outpatient Medications on File Prior to Encounter   Medication Sig    aspirin (ECOTRIN) 81 MG EC tablet Take 1 tablet (81 mg total) by mouth once daily.    diltiaZEM (TIAZAC) 120 mg 24 hr capsule Take 2 capsules (240 mg total) by mouth once daily.    omeprazole (PRILOSEC) 40 MG capsule     sertraline (ZOLOFT) 25 MG tablet TAKE 1/2 TABLET BY MOUTH EVERY DAY    vitamin D (VITAMIN D3) 1000 units Tab Take 1,000 Units by mouth once daily.     Family History     Problem Relation (Age of Onset)    Breast cancer Mother, Paternal Grandmother    Diverticulitis Brother, Sister    Heart disease Mother (55), Father, Brother    Hyperlipidemia Mother    Hypertension Mother, Father        Tobacco Use    Smoking status: Never Smoker    Smokeless tobacco: Never Used   Substance and Sexual Activity    Alcohol use: No     Alcohol/week: 0.0 standard drinks     Comment: rarely    Drug use: No    Sexual activity: Not on file     Review of Systems   Constitutional: Negative for chills and decreased appetite.   HENT: Negative for congestion.    Cardiovascular: Positive for chest pain. Negative for irregular heartbeat and leg swelling.   Respiratory: Negative for cough and shortness of breath.    Endocrine: Negative for cold intolerance and heat intolerance.   Skin: Negative for rash.   Musculoskeletal: Negative for arthritis and back pain.   Gastrointestinal: Negative for abdominal pain, constipation and diarrhea.   Genitourinary: Negative for dysuria and hematuria.   Neurological: Negative for dizziness and headaches.   Psychiatric/Behavioral: Negative for altered mental status.     Objective:     Vital Signs (Most Recent):  Temp: 98.4 °F  (36.9 °C) (01/19/22 1303)  Pulse: 79 (01/19/22 1303)  Resp: (!) 22 (01/19/22 1311)  BP: 98/63 (01/19/22 1303)  SpO2: 98 % (01/19/22 1303) Vital Signs (24h Range):  Temp:  [96.2 °F (35.7 °C)-98.4 °F (36.9 °C)] 98.4 °F (36.9 °C)  Pulse:  [] 79  Resp:  [14-28] 22  SpO2:  [96 %-99 %] 98 %  BP: ()/(57-94) 98/63     Weight: 57.2 kg (126 lb)  Body mass index is 23.81 kg/m².    SpO2: 98 %  O2 Device (Oxygen Therapy): room air      Intake/Output Summary (Last 24 hours) at 1/19/2022 1317  Last data filed at 1/18/2022 2149  Gross per 24 hour   Intake 120 ml   Output --   Net 120 ml       Lines/Drains/Airways     Peripheral Intravenous Line                 Peripheral IV - Single Lumen 01/17/22 1134 20 G Left Antecubital 2 days                Physical Exam  Vitals reviewed.   Constitutional:       General: She is not in acute distress.     Appearance: She is well-nourished.   HENT:      Head: Normocephalic.   Eyes:      Pupils: Pupils are equal, round, and reactive to light.   Neck:      Thyroid: No thyromegaly.      Vascular: No JVD.   Cardiovascular:      Rate and Rhythm: Normal rate and regular rhythm.      Heart sounds: No murmur heard.  No friction rub.   Pulmonary:      Effort: Pulmonary effort is normal. No respiratory distress.   Musculoskeletal:         General: No edema.   Neurological:      Mental Status: She is alert and oriented to person, place, and time.         Significant Labs:   CMP   Recent Labs   Lab 01/19/22  0913      K 3.9      CO2 20*   GLU 93   BUN 17   CREATININE 0.8   CALCIUM 9.8   ANIONGAP 10   ESTGFRAFRICA >60.0   EGFRNONAA >60.0   , CBC No results for input(s): WBC, HGB, HCT, PLT in the last 48 hours., Troponin   Recent Labs   Lab 01/17/22  1407 01/18/22  0913 01/19/22  1055   TROPONINI 0.009 <0.006 <0.006    and All pertinent lab results from the last 24 hours have been reviewed.    Significant Imaging: Echocardiogram:   2D echo with color flow doppler: No results found for  this or any previous visit. and Transthoracic echo (TTE) complete (Cupid Only):   Results for orders placed or performed during the hospital encounter of 01/10/22   Echo Saline Bubble? No   Result Value Ref Range    Ascending aorta 3.26 cm    STJ 3.52 cm    AV mean gradient 3 mmHg    Ao peak mark 1.08 m/s    Ao VTI 24.19 cm    IVS 0.73 0.6 - 1.1 cm    LA size 2.98 cm    Left Atrium Major Axis 4.05 cm    Left Atrium Minor Axis 3.74 cm    LVIDd 3.61 3.5 - 6.0 cm    LVIDs 2.47 2.1 - 4.0 cm    LVOT diameter 2.48 cm    LVOT peak VTI 19.03 cm    Posterior Wall 0.56 (A) 0.6 - 1.1 cm    MV Peak A Mark 0.63 m/s    E wave deceleration time 158.13 msec    MV Peak E Mark 0.59 m/s    RA Major Axis 3.70 cm    RA Width 3.02 cm    RVDD 2.93 cm    Sinus 3.23 cm    TAPSE 1.76 cm    TR Max Mark 2.32 m/s    TDI LATERAL 0.07 m/s    TDI SEPTAL 0.04 m/s    LA WIDTH 2.97 cm    MV stenosis pressure 1/2 time 45.86 ms    LV Diastolic Volume 54.67 mL    LV Systolic Volume 21.61 mL    RV S' 42.12 cm/s    LVOT peak mark 0.95 m/s    LA volume (mod) 23.07 cm3    LV LATERAL E/E' RATIO 8.43 m/s    LV SEPTAL E/E' RATIO 14.75 m/s    FS 32 %    LA volume 29.26 cm3    LV mass 59.34 g    Left Ventricle Relative Wall Thickness 0.31 cm    AV valve area 3.80 cm2    AV Velocity Ratio 0.88     AV index (prosthetic) 0.79     MV valve area p 1/2 method 4.80 cm2    E/A ratio 0.94     Mean e' 0.06 m/s    LVOT area 4.8 cm2    LVOT stroke volume 91.88 cm3    AV peak gradient 5 mmHg    E/E' ratio 10.73 m/s    LV Systolic Volume Index 13.7 mL/m2    LV Diastolic Volume Index 34.60 mL/m2    LA Volume Index 18.5 mL/m2    LV Mass Index 38 g/m2    Triscuspid Valve Regurgitation Peak Gradient 22 mmHg    LA Volume Index (Mod) 14.6 mL/m2    BSA 1.6 m2    Right Atrial Pressure (from IVC) 3 mmHg    EF 55 %    TV rest pulmonary artery pressure 25 mmHg    Narrative    · The left ventricle is normal in size with normal systolic function. The   estimated ejection fraction is 55%.  ·  Normal right ventricular size with normal right ventricular systolic   function.  · Normal left ventricular diastolic function.  · The estimated PA systolic pressure is 25 mmHg.  · Normal central venous pressure (3 mmHg).        Assessment and Plan:     Paroxysmal atrial fibrillation  - Paroxysmal atrial fibrillation one of which associated with conversion pause   - Agree with continue on low dose Dilt 30 mg daily to avoid further   - [id number] Score is 2 points, which carry risk 2.2% of stroke per year  - Currently not on anticoagulation as discussed previously,    - We recommended discussion with her again about starting anticoagulation   - Defer decision for possible device therapy for Tachy Michele syndrome to EP  - Once she is stable from atrial fibrillation, needs outpatient cardiac stress test for possible cardiac chest pain     VTE Risk Mitigation (From admission, onward)         Ordered     IP VTE LOW RISK PATIENT  Once         01/17/22 1859     Place sequential compression device  Until discontinued         01/17/22 1341              Thank you for your consult. I will follow-up with patient. Please contact us if you have any additional questions.    Soy Moreno MD  Cardiology   [facility] - Tele[facility] Stepdown ([place]

## 2022-01-19 NOTE — ASSESSMENT & PLAN NOTE
- Paroxysmal atrial fibrillation one of which associated with conversion pause   - Agree with continue on low dose Dilt 30 mg daily to avoid further

## 2022-01-19 NOTE — PLAN OF CARE
Pt AOX4. Scheduled medications given per MAR. NPO for part of shift pending cardiology consult. C/o chest pain intermittently. Remained on heart monitor. Safety precautions in place.

## 2022-01-19 NOTE — CODE/ RAPID DOCUMENTATION
RAPID RESPONSE NURSE NOTE        Admit Date: 2022  LOS: 0  Code Status: Full Code   Date of Consult: 2022  : 1955  Age: 66 y.o.  Weight:   Wt Readings from Last 1 Encounters:   22 57.2 kg (126 lb)     Sex: female  Race: White   Bed: [facility]/70 A:   MRN: [id number]  Time Rapid Response Team page Received: 1041  Time Rapid Response Team at Bedside: 1043  Time Rapid Response Team left Bedside: 1109  Was the patient discharged from an ICU this admission? No   Was the patient discharged from a PACU within last 24 hours? No   Did the patient receive conscious sedation/general anesthesia in last 24 hours? No  Was the patient in the ED within the past 24 hours? No  Was the patient on NIPPV within the past 24 hours? No   Did this progress into an ARC or CPA: no  Attending Physician: Erika Chaney MD  Primary Service: [facility]       SITUATION    Notified by code pager.  Reason for alert: HR >200  Called to evaluate the patient for Dysrythmia    BACKGROUND     Why is the patient in the hospital?: Chest pain    Patient has a past medical history of Esophagitis and Meniere's disease.    Last Vitals:  Temp: 98.2 °F (36.8 °C) ( 0749)  Pulse: 81 ( 1104)  Resp: 18 ( 0830)  BP: 127/94 ( 1038)  SpO2: 97 % ( 1038)    24 Hours Vitals Range:  Temp:  [96.2 °F (35.7 °C)-98.4 °F (36.9 °C)]   Pulse:  []   Resp:  [14-18]   BP: (104-133)/(57-94)   SpO2:  [96 %-99 %]     Labs:  Recent Labs     22  1133   WBC 9.38   HGB 14.6   HCT 44.8          Recent Labs     22  1133 22  0913    138   K 3.7 3.9    108   CO2 20* 20*   CREATININE 0.9 0.8   GLU 97 93        No results for input(s): PH, PCO2, PO2, HCO3, POCSATURATED, BE in the last 72 hours.     ASSESSMENT    Physical Exam  Cardiovascular:      Rate and Rhythm: Normal rate and regular rhythm.      Pulses: Normal pulses.   Pulmonary:      Effort: Pulmonary effort is normal.   Abdominal:       "Palpations: Abdomen is soft.   Skin:     General: Skin is warm and dry.   Neurological:      General: No focal deficit present.      Mental Status: She is alert and oriented to person, place, and time.       INTERVENTIONS    Called to bedside for "HR > 200" upon arrival patient in afib 113, EKG, labs and chest xray completed, patient spontaneously converted to SR 80. Patient reports that she is starting to feel better now, but during event became SOB and had chest pressure and tightness. HUI Sutton at bedside, stress test cancelled, cardiology consult placed.    RECOMMENDATIONS    We recommend: Continuous telemetry and pulse ox, cardiology consult, bedrest    PROVIDER ESCALATION    Orders received and case discussed with  HUI Aviles.    Disposition: Remain in room 7071.    FOLLOW UP    Bedside RNClaudia and charge Chris CHAN  updated on plan of care. Instructed to call the Rapid Response Nurse, Courtney Li RN at [phone] for additional questions or concerns.        "

## 2022-01-19 NOTE — ASSESSMENT & PLAN NOTE
- Paroxysmal atrial fibrillation one of which associated with conversion pause   - [id number] Score is 2 points, which carry risk 2.2% of stroke per year  - Currently not on anticoagulation as discussed previously,           - We recommended discussion with her again about starting anticoagulation after PPM   - Plan for PPM placement tomorrow  - keep NPO after midnight    The risks, benefits and alternatives of the procedure were explained to the patient, patient's family and/or surrogate decision maker. Risks include (but not limited to) bleeding, hematoma, infection, pain, vascular damage, damage to structures surrounding the vasculature, injury to heart valves, injury to lung causing pneumothorax requiring tube placementmyocardial damage [perforation, valvular damage], puncture of the heart leading to pericardial effusion or tamponade requiring tube drainage, CVA, MI, and death. All questions were answered. Patient is understanding of these risks, and would like to proceed. Consents signed.

## 2022-01-20 ENCOUNTER — ANESTHESIA (OUTPATIENT)
Dept: MEDSURG UNIT | Facility: HOSPITAL | Age: 67
DRG: 244 | End: 2022-01-20
Payer: MEDICARE

## 2022-01-20 DIAGNOSIS — I49.8 OTHER SPECIFIED CARDIAC ARRHYTHMIAS: Primary | ICD-10-CM

## 2022-01-20 LAB
ANION GAP SERPL CALC-SCNC: 13 MMOL/L (ref 8–16)
ASCENDING AORTA: 3.05 CM
BSA FOR ECHO PROCEDURE: 1.57 M2
BUN SERPL-MCNC: 16 MG/DL (ref 8–23)
CALCIUM SERPL-MCNC: 10.1 MG/DL (ref 8.7–10.5)
CHLORIDE SERPL-SCNC: 108 MMOL/L (ref 95–110)
CHOLEST SERPL-MCNC: 166 MG/DL (ref 120–199)
CHOLEST/HDLC SERPL: 3.6 {RATIO} (ref 2–5)
CO2 SERPL-SCNC: 18 MMOL/L (ref 23–29)
CREAT SERPL-MCNC: 0.8 MG/DL (ref 0.5–1.4)
CV ECHO LV RWT: 0.36 CM
DOP CALC LVOT AREA: 3.6 CM2
DOP CALC LVOT DIAMETER: 2.14 CM
ECHO LV POSTERIOR WALL: 0.65 CM (ref 0.6–1.1)
EJECTION FRACTION: 65 %
EST. GFR  (AFRICAN AMERICAN): >60 ML/MIN/1.73 M^2
EST. GFR  (NON AFRICAN AMERICAN): >60 ML/MIN/1.73 M^2
ESTIMATED AVG GLUCOSE: 111 MG/DL (ref 68–131)
FRACTIONAL SHORTENING: 33 % (ref 28–44)
GLUCOSE SERPL-MCNC: 84 MG/DL (ref 70–110)
HBA1C MFR BLD: 5.5 % (ref 4–5.6)
HDLC SERPL-MCNC: 46 MG/DL (ref 40–75)
HDLC SERPL: 27.7 % (ref 20–50)
INTERVENTRICULAR SEPTUM: 0.7 CM (ref 0.6–1.1)
LDLC SERPL CALC-MCNC: 107 MG/DL (ref 63–159)
LEFT ATRIUM SIZE: 2.52 CM
LEFT INTERNAL DIMENSION IN SYSTOLE: 2.43 CM (ref 2.1–4)
LEFT VENTRICLE DIASTOLIC VOLUME INDEX: 35.2 ML/M2
LEFT VENTRICLE DIASTOLIC VOLUME: 54.56 ML
LEFT VENTRICLE MASS INDEX: 40 G/M2
LEFT VENTRICLE SYSTOLIC VOLUME INDEX: 13.5 ML/M2
LEFT VENTRICLE SYSTOLIC VOLUME: 20.85 ML
LEFT VENTRICULAR INTERNAL DIMENSION IN DIASTOLE: 3.6 CM (ref 3.5–6)
LEFT VENTRICULAR MASS: 62.69 G
NONHDLC SERPL-MCNC: 120 MG/DL
POCT GLUCOSE: 124 MG/DL (ref 70–110)
POTASSIUM SERPL-SCNC: 3.9 MMOL/L (ref 3.5–5.1)
SINUS: 3.51 CM
SODIUM SERPL-SCNC: 139 MMOL/L (ref 136–145)
STJ: 2.75 CM
TRIGL SERPL-MCNC: 65 MG/DL (ref 30–150)

## 2022-01-20 PROCEDURE — 63600175 PHARM REV CODE 636 W HCPCS: Mod: HCNC | Performed by: NURSE ANESTHETIST, CERTIFIED REGISTERED

## 2022-01-20 PROCEDURE — 25000003 PHARM REV CODE 250: Mod: HCNC | Performed by: PHYSICIAN ASSISTANT

## 2022-01-20 PROCEDURE — 11000001 HC ACUTE MED/SURG PRIVATE ROOM: Mod: HCNC

## 2022-01-20 PROCEDURE — 63600175 PHARM REV CODE 636 W HCPCS: Mod: HCNC | Performed by: INTERNAL MEDICINE

## 2022-01-20 PROCEDURE — 93010 ELECTROCARDIOGRAM REPORT: CPT | Mod: HCNC,,, | Performed by: INTERNAL MEDICINE

## 2022-01-20 PROCEDURE — D9220A PRA ANESTHESIA: ICD-10-PCS | Mod: HCNC,CRNA,, | Performed by: NURSE ANESTHETIST, CERTIFIED REGISTERED

## 2022-01-20 PROCEDURE — 63600175 PHARM REV CODE 636 W HCPCS: Mod: HCNC | Performed by: ANESTHESIOLOGY

## 2022-01-20 PROCEDURE — 25000003 PHARM REV CODE 250: Mod: HCNC | Performed by: NURSE ANESTHETIST, CERTIFIED REGISTERED

## 2022-01-20 PROCEDURE — 94761 N-INVAS EAR/PLS OXIMETRY MLT: CPT | Mod: HCNC

## 2022-01-20 PROCEDURE — 99233 SBSQ HOSP IP/OBS HIGH 50: CPT | Mod: HCNC,,, | Performed by: PHYSICIAN ASSISTANT

## 2022-01-20 PROCEDURE — 37000009 HC ANESTHESIA EA ADD 15 MINS: Mod: HCNC | Performed by: INTERNAL MEDICINE

## 2022-01-20 PROCEDURE — 93005 ELECTROCARDIOGRAM TRACING: CPT | Mod: HCNC

## 2022-01-20 PROCEDURE — 25000003 PHARM REV CODE 250: Mod: HCNC

## 2022-01-20 PROCEDURE — D9220A PRA ANESTHESIA: ICD-10-PCS | Mod: HCNC,ANES,, | Performed by: ANESTHESIOLOGY

## 2022-01-20 PROCEDURE — 80048 BASIC METABOLIC PNL TOTAL CA: CPT | Mod: HCNC | Performed by: PHYSICIAN ASSISTANT

## 2022-01-20 PROCEDURE — 36415 COLL VENOUS BLD VENIPUNCTURE: CPT | Mod: HCNC | Performed by: PHYSICIAN ASSISTANT

## 2022-01-20 PROCEDURE — 93010 EKG 12-LEAD: ICD-10-PCS | Mod: HCNC,,, | Performed by: INTERNAL MEDICINE

## 2022-01-20 PROCEDURE — 25000003 PHARM REV CODE 250: Mod: HCNC | Performed by: NURSE PRACTITIONER

## 2022-01-20 PROCEDURE — 25000003 PHARM REV CODE 250: Mod: HCNC | Performed by: INTERNAL MEDICINE

## 2022-01-20 PROCEDURE — D9220A PRA ANESTHESIA: Mod: HCNC,ANES,, | Performed by: ANESTHESIOLOGY

## 2022-01-20 PROCEDURE — 37000008 HC ANESTHESIA 1ST 15 MINUTES: Mod: HCNC | Performed by: INTERNAL MEDICINE

## 2022-01-20 PROCEDURE — 83036 HEMOGLOBIN GLYCOSYLATED A1C: CPT | Mod: HCNC | Performed by: PHYSICIAN ASSISTANT

## 2022-01-20 PROCEDURE — C1898 LEAD, PMKR, OTHER THAN TRANS: HCPCS | Mod: HCNC | Performed by: INTERNAL MEDICINE

## 2022-01-20 PROCEDURE — 33208 INSRT HEART PM ATRIAL & VENT: CPT | Mod: KX,HCNC,, | Performed by: INTERNAL MEDICINE

## 2022-01-20 PROCEDURE — 33208 PR INSER HART PACER XVENOUS ATR/VENTR: ICD-10-PCS | Mod: KX,HCNC,, | Performed by: INTERNAL MEDICINE

## 2022-01-20 PROCEDURE — 80061 LIPID PANEL: CPT | Mod: HCNC | Performed by: PHYSICIAN ASSISTANT

## 2022-01-20 PROCEDURE — 33208 INSRT HEART PM ATRIAL & VENT: CPT | Mod: KX,HCNC | Performed by: INTERNAL MEDICINE

## 2022-01-20 PROCEDURE — 99233 PR SUBSEQUENT HOSPITAL CARE,LEVL III: ICD-10-PCS | Mod: HCNC,,, | Performed by: PHYSICIAN ASSISTANT

## 2022-01-20 PROCEDURE — 25500020 PHARM REV CODE 255: Mod: HCNC | Performed by: INTERNAL MEDICINE

## 2022-01-20 PROCEDURE — D9220A PRA ANESTHESIA: Mod: HCNC,CRNA,, | Performed by: NURSE ANESTHETIST, CERTIFIED REGISTERED

## 2022-01-20 PROCEDURE — C1894 INTRO/SHEATH, NON-LASER: HCPCS | Mod: HCNC | Performed by: INTERNAL MEDICINE

## 2022-01-20 PROCEDURE — 63600175 PHARM REV CODE 636 W HCPCS: Mod: HCNC | Performed by: STUDENT IN AN ORGANIZED HEALTH CARE EDUCATION/TRAINING PROGRAM

## 2022-01-20 PROCEDURE — C1785 PMKR, DUAL, RATE-RESP: HCPCS | Mod: HCNC | Performed by: INTERNAL MEDICINE

## 2022-01-20 DEVICE — PULSE GENERATOR
Type: IMPLANTABLE DEVICE | Site: CHEST  WALL | Status: FUNCTIONAL
Brand: ASSURITY MRI™

## 2022-01-20 DEVICE — PACING LEAD
Type: IMPLANTABLE DEVICE | Site: HEART | Status: FUNCTIONAL
Brand: TENDRIL™

## 2022-01-20 RX ORDER — LIDOCAINE HYDROCHLORIDE 20 MG/ML
INJECTION, SOLUTION INFILTRATION; PERINEURAL
Status: DISCONTINUED | OUTPATIENT
Start: 2022-01-20 | End: 2022-01-24 | Stop reason: HOSPADM

## 2022-01-20 RX ORDER — KETOROLAC TROMETHAMINE 15 MG/ML
15 INJECTION, SOLUTION INTRAMUSCULAR; INTRAVENOUS ONCE
Status: DISCONTINUED | OUTPATIENT
Start: 2022-01-20 | End: 2022-01-21

## 2022-01-20 RX ORDER — DOXYCYCLINE 100 MG/1
100 CAPSULE ORAL EVERY 12 HOURS
Qty: 10 CAPSULE | Refills: 0 | Status: SHIPPED | OUTPATIENT
Start: 2022-01-21 | End: 2022-01-24 | Stop reason: SDUPTHER

## 2022-01-20 RX ORDER — MIDAZOLAM HYDROCHLORIDE 1 MG/ML
INJECTION, SOLUTION INTRAMUSCULAR; INTRAVENOUS
Status: DISCONTINUED | OUTPATIENT
Start: 2022-01-20 | End: 2022-01-20

## 2022-01-20 RX ORDER — VANCOMYCIN HYDROCHLORIDE 1 G/20ML
INJECTION, POWDER, LYOPHILIZED, FOR SOLUTION INTRAVENOUS
Status: DISCONTINUED | OUTPATIENT
Start: 2022-01-20 | End: 2022-01-24 | Stop reason: HOSPADM

## 2022-01-20 RX ORDER — SODIUM CHLORIDE 0.9 % (FLUSH) 0.9 %
10 SYRINGE (ML) INJECTION
Status: DISCONTINUED | OUTPATIENT
Start: 2022-01-20 | End: 2022-01-24 | Stop reason: HOSPADM

## 2022-01-20 RX ORDER — ONDANSETRON 2 MG/ML
4 INJECTION INTRAMUSCULAR; INTRAVENOUS DAILY PRN
Status: DISCONTINUED | OUTPATIENT
Start: 2022-01-20 | End: 2022-01-24 | Stop reason: HOSPADM

## 2022-01-20 RX ORDER — VANCOMYCIN HCL IN 5 % DEXTROSE 1G/250ML
1000 PLASTIC BAG, INJECTION (ML) INTRAVENOUS ONCE
Status: COMPLETED | OUTPATIENT
Start: 2022-01-20 | End: 2022-01-20

## 2022-01-20 RX ORDER — LIDOCAINE HYDROCHLORIDE 20 MG/ML
INJECTION, SOLUTION EPIDURAL; INFILTRATION; INTRACAUDAL; PERINEURAL
Status: DISCONTINUED | OUTPATIENT
Start: 2022-01-20 | End: 2022-01-20

## 2022-01-20 RX ORDER — PROPOFOL 10 MG/ML
VIAL (ML) INTRAVENOUS CONTINUOUS PRN
Status: DISCONTINUED | OUTPATIENT
Start: 2022-01-20 | End: 2022-01-20

## 2022-01-20 RX ORDER — SODIUM CHLORIDE 0.9 G/100ML
IRRIGANT IRRIGATION
Status: DISCONTINUED | OUTPATIENT
Start: 2022-01-20 | End: 2022-01-24 | Stop reason: HOSPADM

## 2022-01-20 RX ORDER — BUPIVACAINE HYDROCHLORIDE 2.5 MG/ML
INJECTION, SOLUTION EPIDURAL; INFILTRATION; INTRACAUDAL
Status: DISCONTINUED | OUTPATIENT
Start: 2022-01-20 | End: 2022-01-24 | Stop reason: HOSPADM

## 2022-01-20 RX ORDER — PHENYLEPHRINE HYDROCHLORIDE 10 MG/ML
INJECTION INTRAVENOUS
Status: DISCONTINUED | OUTPATIENT
Start: 2022-01-20 | End: 2022-01-20

## 2022-01-20 RX ORDER — DOXYCYCLINE HYCLATE 100 MG
100 TABLET ORAL EVERY 12 HOURS
Status: DISCONTINUED | OUTPATIENT
Start: 2022-01-21 | End: 2022-01-24 | Stop reason: HOSPADM

## 2022-01-20 RX ORDER — OXYCODONE HYDROCHLORIDE 5 MG/1
5 TABLET ORAL EVERY 6 HOURS PRN
Status: DISCONTINUED | OUTPATIENT
Start: 2022-01-20 | End: 2022-01-24 | Stop reason: HOSPADM

## 2022-01-20 RX ORDER — FENTANYL CITRATE 50 UG/ML
25 INJECTION, SOLUTION INTRAMUSCULAR; INTRAVENOUS EVERY 5 MIN PRN
Status: DISCONTINUED | OUTPATIENT
Start: 2022-01-20 | End: 2022-01-20

## 2022-01-20 RX ORDER — MAG HYDROX/ALUMINUM HYD/SIMETH 200-200-20
30 SUSPENSION, ORAL (FINAL DOSE FORM) ORAL ONCE
Status: COMPLETED | OUTPATIENT
Start: 2022-01-20 | End: 2022-01-20

## 2022-01-20 RX ORDER — METOPROLOL TARTRATE 25 MG/1
25 TABLET, FILM COATED ORAL 2 TIMES DAILY
Status: DISCONTINUED | OUTPATIENT
Start: 2022-01-20 | End: 2022-01-23

## 2022-01-20 RX ORDER — FENTANYL CITRATE 50 UG/ML
INJECTION, SOLUTION INTRAMUSCULAR; INTRAVENOUS
Status: DISCONTINUED | OUTPATIENT
Start: 2022-01-20 | End: 2022-01-20

## 2022-01-20 RX ADMIN — SODIUM CHLORIDE: 9 INJECTION, SOLUTION INTRAVENOUS at 09:01

## 2022-01-20 RX ADMIN — ACETAMINOPHEN 650 MG: 325 TABLET ORAL at 11:01

## 2022-01-20 RX ADMIN — VANCOMYCIN HYDROCHLORIDE 1000 MG: 1 INJECTION, POWDER, LYOPHILIZED, FOR SOLUTION INTRAVENOUS at 09:01

## 2022-01-20 RX ADMIN — SUCRALFATE 1 G: 1 TABLET ORAL at 09:01

## 2022-01-20 RX ADMIN — PHENYLEPHRINE HYDROCHLORIDE 200 MCG: 10 INJECTION, SOLUTION INTRAMUSCULAR; INTRAVENOUS; SUBCUTANEOUS at 09:01

## 2022-01-20 RX ADMIN — OXYCODONE 5 MG: 5 TABLET ORAL at 11:01

## 2022-01-20 RX ADMIN — LIDOCAINE HYDROCHLORIDE 60 MG: 20 INJECTION, SOLUTION EPIDURAL; INFILTRATION; INTRACAUDAL; PERINEURAL at 09:01

## 2022-01-20 RX ADMIN — ASPIRIN 81 MG: 81 TABLET, COATED ORAL at 08:01

## 2022-01-20 RX ADMIN — PANTOPRAZOLE SODIUM 40 MG: 40 TABLET, DELAYED RELEASE ORAL at 09:01

## 2022-01-20 RX ADMIN — PROPOFOL 75 MCG/KG/MIN: 10 INJECTION, EMULSION INTRAVENOUS at 09:01

## 2022-01-20 RX ADMIN — SUCRALFATE 1 G: 1 TABLET ORAL at 05:01

## 2022-01-20 RX ADMIN — ACETAMINOPHEN 650 MG: 325 TABLET ORAL at 09:01

## 2022-01-20 RX ADMIN — METOPROLOL TARTRATE 25 MG: 25 TABLET, FILM COATED ORAL at 09:01

## 2022-01-20 RX ADMIN — FENTANYL CITRATE 25 MCG: 50 INJECTION INTRAMUSCULAR; INTRAVENOUS at 12:01

## 2022-01-20 RX ADMIN — ONDANSETRON 4 MG: 2 INJECTION INTRAMUSCULAR; INTRAVENOUS at 01:01

## 2022-01-20 RX ADMIN — FENTANYL CITRATE 25 MCG: 0.05 INJECTION, SOLUTION INTRAMUSCULAR; INTRAVENOUS at 09:01

## 2022-01-20 RX ADMIN — METOPROLOL TARTRATE 25 MG: 25 TABLET, FILM COATED ORAL at 02:01

## 2022-01-20 RX ADMIN — ALUMINUM HYDROXIDE, MAGNESIUM HYDROXIDE, AND SIMETHICONE 30 ML: 200; 200; 20 SUSPENSION ORAL at 05:01

## 2022-01-20 RX ADMIN — PANTOPRAZOLE SODIUM 40 MG: 40 TABLET, DELAYED RELEASE ORAL at 08:01

## 2022-01-20 RX ADMIN — MIDAZOLAM HYDROCHLORIDE 2 MG: 1 INJECTION, SOLUTION INTRAMUSCULAR; INTRAVENOUS at 09:01

## 2022-01-20 RX ADMIN — SUCRALFATE 1 G: 1 TABLET ORAL at 06:01

## 2022-01-20 RX ADMIN — RIVAROXABAN 20 MG: 10 TABLET, FILM COATED ORAL at 05:01

## 2022-01-20 RX ADMIN — HUMAN ALBUMIN MICROSPHERES AND PERFLUTREN 0.66 MG: 10; .22 INJECTION, SOLUTION INTRAVENOUS at 03:01

## 2022-01-20 NOTE — PROGRESS NOTES
Pt awaiting transport and then complains of nausea, medicated.  Then complained of chest pain below incision and right jaw pain not related to previous complaint of right neck pain. Dr. Castaneda notified and orders received.   on phone and updated on condition.

## 2022-01-20 NOTE — TRANSFER OF CARE
"Anesthesia Transfer of Care Note    Patient: Trudi Mcknight    Procedure(s) Performed: Procedure(s) (LRB):  INSERTION, CARDIAC PACEMAKER, DUAL CHAMBER (N/A)    Patient location: PACU    Anesthesia Type: MAC    Transport from OR: Transported from OR on 6-10 L/min O2 by face mask with adequate spontaneous ventilation    Post pain: adequate analgesia    Post assessment: no apparent anesthetic complications    Post vital signs: stable    Level of consciousness: lethargic and responds to stimulation    Nausea/Vomiting: no nausea/vomiting    Complications: none    Transfer of care protocol was followed      Last vitals:   Visit Vitals  /70 (BP Location: Right arm, Patient Position: Lying)   Pulse 71   Temp 36.9 °C (98.5 °F)   Resp 18   Ht 5' 1" (1.549 m)   Wt 57.2 kg (126 lb)   SpO2 97%   BMI 23.81 kg/m²     "

## 2022-01-20 NOTE — NURSING TRANSFER
Nursing Transfer Note      1/20/2022     Reason patient is being transferred: pt released by aldretti    Transfer To: [place] and then 7071    Transfer via stretcher    Transfer with cardiac monitoring    Transported by transport    Medicines sent: home meds    Any special needs or follow-up needed: meds ordered    Chart send with patient: Yes    Notified: spouse    Patient reassessed at: 1430 1/20/2022  Upon arrival to floor: cardiac monitor applied, patient oriented to room, call bell in reach and bed in lowest position

## 2022-01-20 NOTE — CARE UPDATE
Patient having chest pain post PPM placement. ECG unrevealing NSR with nonspecific T wave changes unchanged from prior ECGs.     Recommendations  - Ok to initiate antianginals/ BB therapy at this time  - Hold off on Heparin at this time  - initiate AC therapy (Xarelto 20mg every evening with meals) 1 week post PPM placement  - If chest pain persists despite anti-anginal therapy, consider repeating echo.    Discussed above with primary team    Catherine Alvarez MD PGY V  Cardiology- Electrophysiolgy  Pager: [phone]  [facility]

## 2022-01-20 NOTE — PROGRESS NOTES
Pt states pain is stable at 3/10. Pt going straight to echo. Telemetry connected and verified. Nurse and  updated on plan. Dressing CDI. Ice pack intact. Arm in immobilizer. VS stable. ecg shows NSR.

## 2022-01-20 NOTE — PROGRESS NOTES
Dr. EUNICE Syed paged and notified of continuing chest/jaw pain.  Had spoken with Dr. Castaneda.  Okayed to transfer back to floor.

## 2022-01-20 NOTE — BRIEF OP NOTE
: Ernesto Duncan MD  Date of procedure:1/20/2022  Post-operative Diagnosis: Tachybrady/ conversion pause    Procedure Performed: dual chamber permanent pacemaker implant    Description of Procedure: The patient was brought to the EP lab in the fasting state. Prepped and draped in sterile fashion. Safety timeout was performed. Sedation administered by anesthesia staff. Lidocaine used for local anesthetic. Fluoroscopic guided axillary access utilized. Guide/J Wire advanced and confirmed in IVC. Incision made. Blunt and electrocautery dissection performed. Pocket made. Second fluoroscopic guided axillary access obtained. Guide/J wire advanced and confirmed in IVC. Peel away sheath advanced over J wire. RV lead advanced into RV. R waves and injury pattern adequate. Lead fixed into place and sutured in place. Second peel away sheath advanced over J wire. RA lead advanced into RA via peel away sheath. After adequate p waves and current of injury detected, the RA lead was fixed into place in the RA appendage. Peel away sheath removed and RA lead sutured into place. Pocket washed using antibiotic solution. Leads connected to generator. Generator placed into pocket, sutured in place, and washed with antibiotic solution. Deep layer closed with interrupted 3-0 suture. Intermediate layer closed with running 3-0 suture. Superficial layer closed with running 4-0 suture. Skin closed with Dermabond. Meplex dressing to be placed after dermabond has dried.     EBL: <10 mL    Specimens Removed: None  Complications: no immediate    1. Will start on metoprolol tartrate 50 mg BID. Discontinue diltiazem .   2. Sling to left arm - wear for 48 hours, then only at night for 6 weeks.  3. NO HEPARIN PRODUCTS and DO NOT ANTICOAGULATE. Will consider starting oral anticoagulation in 1 week.   4. Doxycycline 100 mg twice a  for 5 days at discharge   5. No lifting left elbow above shoulder height  6. No lifting over 5 pounds  7. No  driving for 1 week and for 4 weeks if patient uses left arm to make turns  8. Dressing in place   9. Chest Xray (ordered)  10. Follow up in device clinic in 1 week for device and wound checks.     Dr Duncan, the attending electrophysiologist, was present for the entirety of this procedure.    Navneet MARTINEZ Fellow

## 2022-01-20 NOTE — SUBJECTIVE & OBJECTIVE
Interval History: PPM placed today with EP. Start Xarelto and BB. TTE pending. Pt had CP after her Ppm placement, but improved with sitting up. Suspect a degree of reflux contributing    Review of Systems   Constitutional: Positive for fatigue. Negative for diaphoresis and fever.   HENT: Negative for congestion and rhinorrhea.    Eyes: Negative for itching and visual disturbance.   Respiratory: Positive for chest tightness and shortness of breath. Negative for cough.    Cardiovascular: Positive for chest pain and palpitations.   Gastrointestinal: Negative for abdominal pain, nausea and vomiting.   Genitourinary: Negative for difficulty urinating and dysuria.   Musculoskeletal: Negative for arthralgias and back pain.   Skin: Negative for color change and wound.   Neurological: Positive for dizziness, weakness and light-headedness. Negative for tremors, syncope and headaches.   Psychiatric/Behavioral: Negative for agitation and confusion.     Objective:     Vital Signs (Most Recent):  Temp: 98.3 °F (36.8 °C) (01/20/22 1600)  Pulse: 66 (01/20/22 1702)  Resp: 18 (01/20/22 1600)  BP: (!) 143/71 (01/20/22 1600)  SpO2: 98 % (01/20/22 1600) Vital Signs (24h Range):  Temp:  [96.4 °F (35.8 °C)-98.5 °F (36.9 °C)] 98.3 °F (36.8 °C)  Pulse:  [] 66  Resp:  [17-64] 18  SpO2:  [97 %-99 %] 98 %  BP: (112-144)/(58-71) 143/71     Weight: 57.2 kg (126 lb)  Body mass index is 23.81 kg/m².    Intake/Output Summary (Last 24 hours) at 1/20/2022 1720  Last data filed at 1/20/2022 1114  Gross per 24 hour   Intake 950 ml   Output --   Net 950 ml      Physical Exam  Vitals and nursing note reviewed.   Constitutional:       General: She is not in acute distress.     Appearance: Normal appearance. She is well-developed. She is not ill-appearing.   HENT:      Head: Normocephalic and atraumatic.      Nose: Nose normal.   Eyes:      Extraocular Movements: Extraocular movements intact.   Cardiovascular:      Rate and Rhythm: Normal rate and  regular rhythm.   Pulmonary:      Effort: Pulmonary effort is normal. No respiratory distress.      Breath sounds: Normal breath sounds.   Abdominal:      General: Abdomen is flat.      Palpations: Abdomen is soft.   Musculoskeletal:      Right lower leg: No edema.      Left lower leg: No edema.   Skin:     General: Skin is warm and dry.      Capillary Refill: Capillary refill takes less than 2 seconds.   Neurological:      General: No focal deficit present.      Mental Status: She is alert and oriented to person, place, and time.   Psychiatric:         Mood and Affect: Mood normal.         Significant Labs: All pertinent labs within the past 24 hours have been reviewed.    Significant Imaging: I have reviewed all pertinent imaging results/findings within the past 24 hours.

## 2022-01-20 NOTE — NURSING
Pt received from PACU/ ECHO in stable condition. VSS and as charted in flowsheet. No complains of pain, nausea, vomiting at this time. Safety precautions in place.

## 2022-01-20 NOTE — PROGRESS NOTES
[facility] [facility]  [facility] Note    Patient Name: Trudi Mcknight  MRN: [id number]  Patient Class: IP- Inpatient   Admission Date: 1/17/2022  Length of Stay: 1 days  Attending Physician: Shashi Syed MD  Primary Care Provider: Renuka Moreno MD        Subjective:     Principal Problem:Chest pain        HPI:  Trudi Mcknight is a 66-year-old female with paroxysmal Afib, hiatal hernia with esophagitis, and Meniere's disease, who presents after having intermittent chest pain for 2 weeks, acutely worsened today. At its worst the pain was substernal, crushing and radiating to her neck, back, and left arm. The pain is associated palpitations and significant fatigue, shortness of breath, lightheadedness and sensation of presyncope. She also reports onset of pain with eating, though she cannot link the symptoms to certain foods and reports it does not happen every time she eats. She denies nausea/vomiting, abdominal pain, bright blood in stool or dark stools, diaphoresis, fevers/chills, or numbness/tingling. She [place] [facility] [place], ACS workup was negative. She was discharged, referred and evaluated by outpatient cardiology. Holter monitor confirmed paroxysmal a fib with RVR. She was not started on AC. She was started on metoprolol for rate control but was unable to tolerate due to hypotension. Planned to start diltiazem, but holter monitor showed 4 second pause and patient was instructed not to start the med. Cards referred her to EP, but patient has not yet been evaluated. She has no personal ACS history but endorses strong family history of heart disease, her mother had an MI in her 50s, her brother had an MI very young and passed away from a heart attack in his 60s.  She had a stress test about 7 years ago.  She is a nonsmoker.    In the ED, patient is afebrile without leukocytosis. Vitals stable and HR controlled. EKG with sinus  arrhythmia, T wave inversion in anterior leads. Troponin trend flat x 2. CXR without acute process. BNP 33. CBC/CMP unremarkable. Patient was given  and GI cocktail, with improvement in CP.       Overview/Hospital Course:  Patient admitted for chest pain. EKG without acute ischemic changes. Troponin WNL x3. CXR unremarkable. DSE planned for 1/19. However, 1/19 patient became tachycardic in the 190-200s on tele and symptomatic. Rapid response called, EKG showed afib RVR HR 110s. Patient began to have labile HR, bradying into the 30s but not sustaining. Spontaneously resolved at bedside. Discontinued DSE for now given labile HR. Cardiology called and consulted, PPM placed 1/20. Start xarelto and MTP, repeat TTE and monitor      Interval History: PPM placed today with EP. Start Xarelto and BB. TTE pending. Pt had CP after her Ppm placement, but improved with sitting up. Suspect a degree of reflux contributing    Review of Systems   Constitutional: Positive for fatigue. Negative for diaphoresis and fever.   HENT: Negative for congestion and rhinorrhea.    Eyes: Negative for itching and visual disturbance.   Respiratory: Positive for chest tightness and shortness of breath. Negative for cough.    Cardiovascular: Positive for chest pain and palpitations.   Gastrointestinal: Negative for abdominal pain, nausea and vomiting.   Genitourinary: Negative for difficulty urinating and dysuria.   Musculoskeletal: Negative for arthralgias and back pain.   Skin: Negative for color change and wound.   Neurological: Positive for dizziness, weakness and light-headedness. Negative for tremors, syncope and headaches.   Psychiatric/Behavioral: Negative for agitation and confusion.     Objective:     Vital Signs (Most Recent):  Temp: 98.3 °F (36.8 °C) (01/20/22 1600)  Pulse: 66 (01/20/22 1702)  Resp: 18 (01/20/22 1600)  BP: (!) 143/71 (01/20/22 1600)  SpO2: 98 % (01/20/22 1600) Vital Signs (24h Range):  Temp:  [96.4 °F (35.8  °C)-98.5 °F (36.9 °C)] 98.3 °F (36.8 °C)  Pulse:  [] 66  Resp:  [17-64] 18  SpO2:  [97 %-99 %] 98 %  BP: (112-144)/(58-71) 143/71     Weight: 57.2 kg (126 lb)  Body mass index is 23.81 kg/m².    Intake/Output Summary (Last 24 hours) at 1/20/2022 1720  Last data filed at 1/20/2022 1114  Gross per 24 hour   Intake 950 ml   Output --   Net 950 ml      Physical Exam  Vitals and nursing note reviewed.   Constitutional:       General: She is not in acute distress.     Appearance: Normal appearance. She is well-developed. She is not ill-appearing.   HENT:      Head: Normocephalic and atraumatic.      Nose: Nose normal.   Eyes:      Extraocular Movements: Extraocular movements intact.   Cardiovascular:      Rate and Rhythm: Normal rate and regular rhythm.   Pulmonary:      Effort: Pulmonary effort is normal. No respiratory distress.      Breath sounds: Normal breath sounds.   Abdominal:      General: Abdomen is flat.      Palpations: Abdomen is soft.   Musculoskeletal:      Right lower leg: No edema.      Left lower leg: No edema.   Skin:     General: Skin is warm and dry.      Capillary Refill: Capillary refill takes less than 2 seconds.   Neurological:      General: No focal deficit present.      Mental Status: She is alert and oriented to person, place, and time.   Psychiatric:         Mood and Affect: Mood normal.         Significant Labs: All pertinent labs within the past 24 hours have been reviewed.    Significant Imaging: I have reviewed all pertinent imaging results/findings within the past 24 hours.      Assessment/Plan:      * Chest pain  P Afib  Trudi Mcknight is a 66-year-old female with paroxysmal Afib, hiatal hernia with esophagitis, and Meniere's disease, who presents after having intermittent chest pain for 2 weeks, acutely worsened today. At its worst the pain was substernal, crushing and radiating to her neck, back, and left arm. The pain is associated palpitations and significant fatigue, shortness  of breath, lightheadedness and sensation of presyncope. She was recently seen here at [facility] for a similar episode, ACS workup was negative. She was discharged, referred and evaluated by outpatient cardiology. Holter monitor earlier this month confirmed paroxysmal afib with RVR. Evaluated by outpatient cardiology. Not on AC, [id number] score 2 (2.2% stroke risk). Pressures unable to tolerate metoprolol, not a candidate for diltiazem based on pause seen on holter. Referred, but not yet evaluated by EP.     - Vitals stable and HR controlled on admit   - EKG with sinus arrhythmia, T wave inversion in anterior leads.  - Troponin trend flat x 3   - CXR without acute process, BNP 33.   - Patient was given  and GI cocktail in ED, with improvement in CP.   - PO protonix BID (see hiatal hernia)  - Last echo done 1/10 with normal cardiac function, EF 55%  - recent TSH WNL, lipid and a1c WNL  1/19: Rapid response called for -200s with associated CP     EKG ordered, EKG shows Afib RVR .   As rapid team was leaving, patient began to madelaine down into the 30s temporarily. Called and discussed with cardiology, specifically regarding rate control given her labile HR and documentation of 4sec pause on outpt holter. Formal consult placed. Appreciate assistance. Dobutamine stress echo canceled given change in condition  PPM placed 1/20, start MTP and Xarelto  Repeat TTE    Results for orders placed during the hospital encounter of 01/10/22    Echo Saline Bubble? No    Interpretation Summary  · The left ventricle is normal in size with normal systolic function. The estimated ejection fraction is 55%.  · Normal right ventricular size with normal right ventricular systolic function.  · Normal left ventricular diastolic function.  · The estimated PA systolic pressure is 25 mmHg.  · Normal central venous pressure (3 mmHg).      Paroxysmal atrial fibrillation with RVR        Hiatal hernia with GERD and esophagitis  Hiatal  hernia and GERD with hx of esophagitis on EGD in 2015. Patient was prescribed daily PPI which helped her symptoms in the past, but she reports she stopped taking it out of concern that it would contribute to her cardiac arrhythmias. Patient being evaluated for CC of CP, which she describes as chest tightness, and at times burning pain. The pain is sometimes brought on by eating, though she has not noticed certain foods to be triggers. She denies abdominal pain, N/V, melena or hematochezia.   - pain improved with GI cocktail x1 in ED  - resume home PO protonix 40mg, increase to BID while inpatient   - mylanta and antiemetics prn   - scheduled carafate        VTE Risk Mitigation (From admission, onward)         Ordered     rivaroxaban tablet 20 mg  With dinner         01/20/22 1426     IP VTE LOW RISK PATIENT  Once         01/17/22 1859     Place sequential compression device  Until discontinued         01/17/22 1341                Discharge Planning   STEVE: 1/21/2022     Code Status: Full Code   Is the patient medically ready for discharge?: No    Reason for patient still in hospital (select all that apply): Patient trending condition  Discharge Plan A: Home with family                  Cheryle Sutton PA-C  Department of Hospital Medicine   [facility] - Telemetry Stepdown ([place]

## 2022-01-20 NOTE — ASSESSMENT & PLAN NOTE
P Afib  Trudi Mcknight is a 66-year-old female with paroxysmal Afib, hiatal hernia with esophagitis, and Meniere's disease, who presents after having intermittent chest pain for 2 weeks, acutely worsened today. At its worst the pain was substernal, crushing and radiating to her neck, back, and left arm. The pain is associated palpitations and significant fatigue, shortness of breath, lightheadedness and sensation of presyncope. She was recently seen here at [facility] for a similar episode, ACS workup was negative. She was discharged, referred and evaluated by outpatient cardiology. Holter monitor earlier this month confirmed paroxysmal afib with RVR. Evaluated by outpatient cardiology. Not on AC, [id number] score 2 (2.2% stroke risk). Pressures unable to tolerate metoprolol, not a candidate for diltiazem based on pause seen on holter. Referred, but not yet evaluated by EP.     - Vitals stable and HR controlled on admit   - EKG with sinus arrhythmia, T wave inversion in anterior leads.  - Troponin trend flat x 3   - CXR without acute process, BNP 33.   - Patient was given  and GI cocktail in ED, with improvement in CP.   - PO protonix BID (see hiatal hernia)  - Last echo done 1/10 with normal cardiac function, EF 55%  - recent TSH WNL, lipid and a1c WNL  1/19: Rapid response called for -200s with associated CP     EKG ordered, EKG shows Afib RVR .   As rapid team was leaving, patient began to madelaine down into the 30s temporarily. Called and discussed with cardiology, specifically regarding rate control given her labile HR and documentation of 4sec pause on outpt holter. Formal consult placed. Appreciate assistance. Dobutamine stress echo canceled given change in condition  PPM placed 1/20, start MTP and Xarelto  Repeat TTE    Results for orders placed during the hospital encounter of 01/10/22    Echo Saline Bubble? No    Interpretation Summary  · The left ventricle is normal in size with normal systolic  function. The estimated ejection fraction is 55%.  · Normal right ventricular size with normal right ventricular systolic function.  · Normal left ventricular diastolic function.  · The estimated PA systolic pressure is 25 mmHg.  · Normal central venous pressure (3 mmHg).

## 2022-01-20 NOTE — CARE UPDATE
RAPID RESPONSE NURSE FOLLOW-UP NOTE       Followed up with patient for a rapid response on dayshift.  No acute issues at this time. Reviewed plan of care with Charge RNMarialuisa.   Please call Rapid Response RN, Aleta Gee RN with any questions or concerns at [phone].

## 2022-01-20 NOTE — ANESTHESIA POSTPROCEDURE EVALUATION
Anesthesia Post Evaluation    Patient: Trudi Mcknight    Procedure(s) Performed: Procedure(s) (LRB):  INSERTION, CARDIAC PACEMAKER, DUAL CHAMBER (N/A)    Final Anesthesia Type: general (Natural airway)      Patient location during evaluation: PACU  Patient participation: Yes- Able to Participate  Level of consciousness: awake and alert  Post-procedure vital signs: reviewed and stable  Pain management: adequate  Airway patency: patent    PONV status at discharge: No PONV  Anesthetic complications: no      Cardiovascular status: hemodynamically stable  Respiratory status: unassisted  Hydration status: euvolemic  Follow-up not needed.          Vitals Value Taken Time   /58 01/20/22 1132   Temp 36.5 °C (97.7 °F) 01/20/22 1130   Pulse 60 01/20/22 1144   Resp 22 01/20/22 1144   SpO2 98 % 01/20/22 1144   Vitals shown include unvalidated device data.      No case tracking events are documented in the log.      Pain/Leanna Score: No data recorded

## 2022-01-20 NOTE — PLAN OF CARE
Pt. AAO x4, VS stable with no complaints of pain at this moment. NPO status since midnight maintained. No distress noted.

## 2022-01-20 NOTE — PROGRESS NOTES
Pt admitted from procedure.  Vs stable. States slight neck pain.  Awake and alert.  ecg shows nsr rate 71.   notified of arrival. Waiting in room.

## 2022-01-21 DIAGNOSIS — I49.8 OTHER SPECIFIED CARDIAC ARRHYTHMIAS: Primary | ICD-10-CM

## 2022-01-21 PROBLEM — I49.5 SICK SINUS SYNDROME: Status: ACTIVE | Noted: 2022-01-21

## 2022-01-21 LAB
ALBUMIN SERPL BCP-MCNC: 3.3 G/DL (ref 3.5–5.2)
ALP SERPL-CCNC: 73 U/L (ref 55–135)
ALT SERPL W/O P-5'-P-CCNC: 9 U/L (ref 10–44)
ANION GAP SERPL CALC-SCNC: 11 MMOL/L (ref 8–16)
AST SERPL-CCNC: 15 U/L (ref 10–40)
BASOPHILS # BLD AUTO: 0.04 K/UL (ref 0–0.2)
BASOPHILS NFR BLD: 0.3 % (ref 0–1.9)
BILIRUB SERPL-MCNC: 0.7 MG/DL (ref 0.1–1)
BUN SERPL-MCNC: 17 MG/DL (ref 8–23)
CALCIUM SERPL-MCNC: 9.7 MG/DL (ref 8.7–10.5)
CHLORIDE SERPL-SCNC: 105 MMOL/L (ref 95–110)
CO2 SERPL-SCNC: 16 MMOL/L (ref 23–29)
CREAT SERPL-MCNC: 0.9 MG/DL (ref 0.5–1.4)
DIFFERENTIAL METHOD: ABNORMAL
EOSINOPHIL # BLD AUTO: 0.1 K/UL (ref 0–0.5)
EOSINOPHIL NFR BLD: 1 % (ref 0–8)
ERYTHROCYTE [DISTWIDTH] IN BLOOD BY AUTOMATED COUNT: 14.1 % (ref 11.5–14.5)
EST. GFR  (AFRICAN AMERICAN): >60 ML/MIN/1.73 M^2
EST. GFR  (NON AFRICAN AMERICAN): >60 ML/MIN/1.73 M^2
GLUCOSE SERPL-MCNC: 107 MG/DL (ref 70–110)
HCT VFR BLD AUTO: 40.4 % (ref 37–48.5)
HGB BLD-MCNC: 13 G/DL (ref 12–16)
IMM GRANULOCYTES # BLD AUTO: 0.14 K/UL (ref 0–0.04)
IMM GRANULOCYTES NFR BLD AUTO: 1.2 % (ref 0–0.5)
LACTATE SERPL-SCNC: 1.6 MMOL/L (ref 0.5–2.2)
LYMPHOCYTES # BLD AUTO: 1.5 K/UL (ref 1–4.8)
LYMPHOCYTES NFR BLD: 12.4 % (ref 18–48)
MAGNESIUM SERPL-MCNC: 2 MG/DL (ref 1.6–2.6)
MCH RBC QN AUTO: 27.6 PG (ref 27–31)
MCHC RBC AUTO-ENTMCNC: 32.2 G/DL (ref 32–36)
MCV RBC AUTO: 86 FL (ref 82–98)
MONOCYTES # BLD AUTO: 0.9 K/UL (ref 0.3–1)
MONOCYTES NFR BLD: 7.3 % (ref 4–15)
NEUTROPHILS # BLD AUTO: 9.5 K/UL (ref 1.8–7.7)
NEUTROPHILS NFR BLD: 77.8 % (ref 38–73)
NRBC BLD-RTO: 0 /100 WBC
PLATELET # BLD AUTO: 259 K/UL (ref 150–450)
PMV BLD AUTO: 11.3 FL (ref 9.2–12.9)
POCT GLUCOSE: 101 MG/DL (ref 70–110)
POCT GLUCOSE: 125 MG/DL (ref 70–110)
POCT GLUCOSE: 146 MG/DL (ref 70–110)
POTASSIUM SERPL-SCNC: 4.1 MMOL/L (ref 3.5–5.1)
PROT SERPL-MCNC: 6.4 G/DL (ref 6–8.4)
RBC # BLD AUTO: 4.71 M/UL (ref 4–5.4)
SODIUM SERPL-SCNC: 132 MMOL/L (ref 136–145)
VANCOMYCIN TROUGH SERPL-MCNC: 13.1 UG/ML (ref 10–22)
WBC # BLD AUTO: 12.16 K/UL (ref 3.9–12.7)

## 2022-01-21 PROCEDURE — 93010 EKG 12-LEAD: ICD-10-PCS | Mod: HCNC,,, | Performed by: INTERNAL MEDICINE

## 2022-01-21 PROCEDURE — 25000003 PHARM REV CODE 250: Mod: HCNC

## 2022-01-21 PROCEDURE — 80202 ASSAY OF VANCOMYCIN: CPT | Mod: HCNC | Performed by: INTERNAL MEDICINE

## 2022-01-21 PROCEDURE — 36415 COLL VENOUS BLD VENIPUNCTURE: CPT | Mod: HCNC | Performed by: INTERNAL MEDICINE

## 2022-01-21 PROCEDURE — 93010 ELECTROCARDIOGRAM REPORT: CPT | Mod: HCNC,,, | Performed by: INTERNAL MEDICINE

## 2022-01-21 PROCEDURE — 83735 ASSAY OF MAGNESIUM: CPT | Mod: HCNC | Performed by: PHYSICIAN ASSISTANT

## 2022-01-21 PROCEDURE — 25000003 PHARM REV CODE 250: Mod: HCNC | Performed by: PHYSICIAN ASSISTANT

## 2022-01-21 PROCEDURE — 99233 PR SUBSEQUENT HOSPITAL CARE,LEVL III: ICD-10-PCS | Mod: HCNC,,, | Performed by: PHYSICIAN ASSISTANT

## 2022-01-21 PROCEDURE — 25000003 PHARM REV CODE 250: Mod: HCNC | Performed by: NURSE PRACTITIONER

## 2022-01-21 PROCEDURE — 11000001 HC ACUTE MED/SURG PRIVATE ROOM: Mod: HCNC

## 2022-01-21 PROCEDURE — 25000003 PHARM REV CODE 250: Mod: HCNC | Performed by: INTERNAL MEDICINE

## 2022-01-21 PROCEDURE — 83605 ASSAY OF LACTIC ACID: CPT | Mod: HCNC | Performed by: INTERNAL MEDICINE

## 2022-01-21 PROCEDURE — 93005 ELECTROCARDIOGRAM TRACING: CPT | Mod: HCNC

## 2022-01-21 PROCEDURE — 99233 SBSQ HOSP IP/OBS HIGH 50: CPT | Mod: HCNC,,, | Performed by: PHYSICIAN ASSISTANT

## 2022-01-21 PROCEDURE — 63600175 PHARM REV CODE 636 W HCPCS: Mod: HCNC | Performed by: PHYSICIAN ASSISTANT

## 2022-01-21 PROCEDURE — 80053 COMPREHEN METABOLIC PANEL: CPT | Mod: HCNC | Performed by: PHYSICIAN ASSISTANT

## 2022-01-21 PROCEDURE — 36415 COLL VENOUS BLD VENIPUNCTURE: CPT | Mod: HCNC | Performed by: PHYSICIAN ASSISTANT

## 2022-01-21 PROCEDURE — 85025 COMPLETE CBC W/AUTO DIFF WBC: CPT | Mod: HCNC | Performed by: PHYSICIAN ASSISTANT

## 2022-01-21 RX ORDER — ACETAMINOPHEN 500 MG
1000 TABLET ORAL 3 TIMES DAILY
Qty: 18 TABLET | Refills: 0 | Status: SHIPPED | OUTPATIENT
Start: 2022-01-21 | End: 2022-01-24

## 2022-01-21 RX ORDER — METOPROLOL TARTRATE 1 MG/ML
INJECTION, SOLUTION INTRAVENOUS
Status: COMPLETED
Start: 2022-01-21 | End: 2022-01-21

## 2022-01-21 RX ORDER — KETOROLAC TROMETHAMINE 15 MG/ML
15 INJECTION, SOLUTION INTRAMUSCULAR; INTRAVENOUS ONCE
Status: DISPENSED | OUTPATIENT
Start: 2022-01-21 | End: 2022-01-24

## 2022-01-21 RX ORDER — METHOCARBAMOL 750 MG/1
750 TABLET, FILM COATED ORAL 3 TIMES DAILY
Qty: 30 TABLET | Refills: 0 | Status: SHIPPED | OUTPATIENT
Start: 2022-01-21 | End: 2022-01-31

## 2022-01-21 RX ORDER — METOPROLOL TARTRATE 1 MG/ML
5 INJECTION, SOLUTION INTRAVENOUS EVERY 5 MIN PRN
Status: DISCONTINUED | OUTPATIENT
Start: 2022-01-21 | End: 2022-01-24 | Stop reason: HOSPADM

## 2022-01-21 RX ORDER — ACETAMINOPHEN 500 MG
1000 TABLET ORAL 3 TIMES DAILY
Status: DISCONTINUED | OUTPATIENT
Start: 2022-01-21 | End: 2022-01-24 | Stop reason: HOSPADM

## 2022-01-21 RX ORDER — METHOCARBAMOL 750 MG/1
750 TABLET, FILM COATED ORAL 3 TIMES DAILY
Status: DISCONTINUED | OUTPATIENT
Start: 2022-01-21 | End: 2022-01-24 | Stop reason: HOSPADM

## 2022-01-21 RX ORDER — METOPROLOL TARTRATE 25 MG/1
25 TABLET, FILM COATED ORAL 2 TIMES DAILY
Qty: 60 TABLET | Refills: 11 | Status: SHIPPED | OUTPATIENT
Start: 2022-01-21 | End: 2022-01-24 | Stop reason: HOSPADM

## 2022-01-21 RX ADMIN — ACETAMINOPHEN 1000 MG: 500 TABLET ORAL at 08:01

## 2022-01-21 RX ADMIN — SODIUM CHLORIDE, SODIUM LACTATE, POTASSIUM CHLORIDE, AND CALCIUM CHLORIDE 500 ML: .6; .31; .03; .02 INJECTION, SOLUTION INTRAVENOUS at 04:01

## 2022-01-21 RX ADMIN — SUCRALFATE 1 G: 1 TABLET ORAL at 05:01

## 2022-01-21 RX ADMIN — ASPIRIN 81 MG: 81 TABLET, COATED ORAL at 09:01

## 2022-01-21 RX ADMIN — DOXYCYCLINE HYCLATE 100 MG: 100 TABLET, COATED ORAL at 09:01

## 2022-01-21 RX ADMIN — PANTOPRAZOLE SODIUM 40 MG: 40 TABLET, DELAYED RELEASE ORAL at 09:01

## 2022-01-21 RX ADMIN — SUCRALFATE 1 G: 1 TABLET ORAL at 11:01

## 2022-01-21 RX ADMIN — SUCRALFATE 1 G: 1 TABLET ORAL at 08:01

## 2022-01-21 RX ADMIN — PANTOPRAZOLE SODIUM 40 MG: 40 TABLET, DELAYED RELEASE ORAL at 08:01

## 2022-01-21 RX ADMIN — METOPROLOL TARTRATE: 5 INJECTION, SOLUTION INTRAVENOUS at 03:01

## 2022-01-21 RX ADMIN — METHOCARBAMOL 750 MG: 750 TABLET ORAL at 11:01

## 2022-01-21 RX ADMIN — METHOCARBAMOL 750 MG: 750 TABLET ORAL at 08:01

## 2022-01-21 RX ADMIN — OXYCODONE 5 MG: 5 TABLET ORAL at 09:01

## 2022-01-21 RX ADMIN — DOXYCYCLINE HYCLATE 100 MG: 100 TABLET, COATED ORAL at 08:01

## 2022-01-21 RX ADMIN — ACETAMINOPHEN 1000 MG: 500 TABLET ORAL at 11:01

## 2022-01-21 RX ADMIN — METOPROLOL TARTRATE 25 MG: 25 TABLET, FILM COATED ORAL at 09:01

## 2022-01-21 RX ADMIN — METOPROLOL TARTRATE 25 MG: 25 TABLET, FILM COATED ORAL at 10:01

## 2022-01-21 NOTE — PROGRESS NOTES
[facility] [facility]  Progress Note    Patient Name: Trudi Mcknight  MRN: [id number]  Patient Class: IP- Inpatient   Admission Date: 1/17/2022  Length of Stay: 2 days  Attending Physician: Shashi Syed MD  Primary Care Provider: Renuka Moreno MD        Subjective:     Principal Problem:Chest pain        HPI:  Trudi Mcknight is a 66-year-old female with paroxysmal Afib, hiatal hernia with esophagitis, and Meniere's disease, who presents after having intermittent chest pain for 2 weeks, acutely worsened today. At its worst the pain was substernal, crushing and radiating to her neck, back, and left arm. The pain is associated palpitations and significant fatigue, shortness of breath, lightheadedness and sensation of presyncope. She also reports onset of pain with eating, though she cannot link the symptoms to certain foods and reports it does not happen every time she eats. She denies nausea/vomiting, abdominal pain, bright blood in stool or dark stools, diaphoresis, fevers/chills, or numbness/tingling. She was recently seen here at [facility] [place] for a similar episode, ACS workup was negative. She was discharged, referred and evaluated by outpatient cardiology. Holter monitor confirmed paroxysmal a fib with RVR. She was not started on AC. She was started on metoprolol for rate control but was unable to tolerate due to hypotension. Planned to start diltiazem, but holter monitor showed 4 second pause and patient was instructed not to start the med. Cards referred her to EP, but patient has not yet been evaluated. She has no personal ACS history but endorses strong family history of heart disease, her mother had an MI in her 50s, her brother had an MI very young and passed away from a heart attack in his 60s.  She had a stress test about 7 years ago.  She is a nonsmoker.    In the ED, patient is afebrile without leukocytosis. Vitals stable and HR controlled. EKG with sinus  arrhythmia, T wave inversion in anterior leads. Troponin trend flat x 2. CXR without acute process. BNP 33. CBC/CMP unremarkable. Patient was given  and GI cocktail, with improvement in CP.       Overview/Hospital Course:  66 y.o. who was admitted to hospital medicine for chest pain. EKG without acute ischemic changes. Troponin WNL x3. CXR unremarkable. DSE planned for 1/19. Prior to DSE, patient  developed acute symptomatic tachycardia with heart rates in the 190-200s interrupted with bouts of nonsustained bradycardia. Rapid response was called, EKG with atrial fibrillation w/ RVR. Cardiology and EP were consulted and pacemaker was placed on 1/20/2022 for sick sinus syndrome. Post-procedure TTE was stable and MTP was initiated, Xarelto to start 5 days post procedure. Case was discussed with general cardiology, as patient in need of is ischemic eval --- recommended outpatient follow-up. On 1/21/2022, patient became symptomatically hypotensive with noted atrial fibrillation w/ RVR.       Interval History: NAEON. Patient is s/p pacemaker placement on yesterday. Patient seen with  at the bedside, case discussed extensively. The patient reports pain in her upper chest/collar bone area which is aggravated with movement and deep breathing. Pain was also reproducible on exam, suspect MSK pain post-procedure. We discussed antiinflammatories for this pain, patient and  were agreeable as this pain is different from the chest pain that brought her to the hospital. Case was discussed with cardiology who recommended outpatient ischemic work-up. Patient was then discharged.    Prior to discharge, patient became symptomatically hypotensive (reported feeling lightheaded as if she was going to pass out) with a blood pressure 82/60 and heart rate of 150. EKG was obtained which confirmed atrial fibrillation with RVR. LR bolus was started, with improvement of BP to ~100/64. Heart rate was sustained in the  120's-130's, patient continued to complain of headache and feeling faint, BP ~99/54. IV metoprolol 2.5MG was administered and SBP dropped slightly to the mid-80's. Cardiology was called for atrial fibrillation management in setting of symptomatic hypotension. An additional 2.5 MG was administered upon their arrival. Awaiting plan for atrial fibrillation management from cardiology. Patient remained alert and responsive throughout the event.      Review of Systems   Constitutional: Positive for fatigue. Negative for diaphoresis and fever.   HENT: Negative for congestion and rhinorrhea.    Eyes: Negative for itching and visual disturbance.   Respiratory: Positive for chest tightness and shortness of breath. Negative for cough.    Cardiovascular: Positive for chest pain and palpitations.   Gastrointestinal: Negative for abdominal pain, nausea and vomiting.   Genitourinary: Negative for difficulty urinating and dysuria.   Musculoskeletal: Negative for arthralgias and back pain.   Skin: Negative for color change and wound.   Neurological: Positive for dizziness, weakness and light-headedness. Negative for tremors, syncope and headaches.   Psychiatric/Behavioral: Negative for agitation and confusion.     Objective:     Vital Signs (Most Recent):  Temp: 97.3 °F (36.3 °C) (01/21/22 1147)  Pulse: (!) 111 (01/21/22 1634)  Resp: 18 (01/21/22 1634)  BP: (!) 90/58 (01/21/22 1634)  SpO2: (!) 94 % (01/21/22 1430) Vital Signs (24h Range):  Temp:  [96.2 °F (35.7 °C)-97.3 °F (36.3 °C)] 97.3 °F (36.3 °C)  Pulse:  [] 111  Resp:  [16-20] 18  SpO2:  [94 %-98 %] 94 %  BP: ()/(53-76) 90/58     Weight: 57.2 kg (126 lb)  Body mass index is 23.81 kg/m².  No intake or output data in the 24 hours ending 01/21/22 1659   Physical Exam  Vitals and nursing note reviewed.   Constitutional:       General: She is not in acute distress.     Appearance: Normal appearance. She is well-developed. She is not ill-appearing.   HENT:      Head:  Normocephalic and atraumatic.      Nose: Nose normal.   Eyes:      Extraocular Movements: Extraocular movements intact.   Cardiovascular:      Rate and Rhythm: Normal rate and regular rhythm.   Pulmonary:      Effort: Pulmonary effort is normal. No respiratory distress.      Breath sounds: Normal breath sounds.   Abdominal:      General: Abdomen is flat.      Palpations: Abdomen is soft.   Musculoskeletal:      Right lower leg: No edema.      Left lower leg: No edema.   Skin:     General: Skin is warm and dry.      Capillary Refill: Capillary refill takes less than 2 seconds.   Neurological:      General: No focal deficit present.      Mental Status: She is alert and oriented to person, place, and time.   Psychiatric:         Mood and Affect: Mood normal.         Significant Labs: All pertinent labs within the past 24 hours have been reviewed.    Significant Imaging: I have reviewed all pertinent imaging results/findings within the past 24 hours.      Assessment/Plan:      * Chest pain  Paroxysmal atrial fibrillation with RVR  Trudi Mcknight is a 66-year-old female with paroxysmal Afib, hiatal hernia with esophagitis, and Meniere's disease, who presents after having intermittent chest pain for 2 weeks, acutely worsened today. At its worst the pain was substernal, crushing and radiating to her neck, back, and left arm. The pain is associated palpitations and significant fatigue, shortness of breath, lightheadedness and sensation of presyncope. She was recently seen here at [facility] [place] for a similar episode, ACS workup was negative. She was discharged, referred and evaluated by outpatient cardiology. Holter monitor earlier this month confirmed paroxysmal afib with RVR. Evaluated by outpatient cardiology. Not on AC, [id number] score 2 (2.2% stroke risk). Pressures unable to tolerate metoprolol, not a candidate for diltiazem based on pause seen on holter. Referred, but not yet evaluated by EP.     RVR noted with symptomatic  hypotension, cardiology consulted for atrial fib management   - EKG with sinus arrhythmia, T wave inversion in anterior leads.  - Troponin trend flat x 3   - CXR without acute process, BNP 33.   - Patient was given  and GI cocktail in ED, with improvement in CP.   - PO protonix BID (see hiatal hernia)  - Last echo done 1/10 with normal cardiac function, EF 55%  - recent TSH WNL, lipid and a1c WNL  1/19: Rapid response called for -200s with associated CP     EKG ordered, EKG shows Afib RVR .   As rapid team was leaving, patient began to madelaine down into the 30s temporarily. Called and discussed with cardiology, specifically regarding rate control given her labile HR and documentation of 4sec pause on outpt holter. Formal consult placed. Appreciate assistance. Dobutamine stress echo canceled given change in condition  - start MTP and Xarelto (5 days post-procedure)  - Repeat TTE stable: EF 65%  - Devices: PPM placed 1/20  - previous DCCV/TTE?  - IV metoprolol 5mg PRN for 3 doses for HR >120   - cardiac telemetry  - keep Mg >2, K >4    Hiatal hernia with GERD and esophagitis  Hiatal hernia and GERD with hx of esophagitis on EGD in 2015. Patient was prescribed daily PPI which helped her symptoms in the past, but she reports she stopped taking it out of concern that it would contribute to her cardiac arrhythmias. Patient being evaluated for CC of CP, which she describes as chest tightness, and at times burning pain. The pain is sometimes brought on by eating, though she has not noticed certain foods to be triggers. She denies abdominal pain, N/V, melena or hematochezia.   - pain improved with GI cocktail x1 in ED  - resume home PO protonix 40mg, increase to BID while inpatient   - mylanta and antiemetics prn   - scheduled carafate      VTE Risk Mitigation (From admission, onward)         Ordered     IP VTE LOW RISK PATIENT  Once         01/17/22 1329     Place sequential compression device  Until  discontinued         01/17/22 1341                Discharge Planning   STEVE: 1/21/2022     Code Status: Full Code   Is the patient medically ready for discharge?: No    Reason for patient still in hospital (select all that apply): Patient trending condition and Consult recommendations  Discharge Plan A: Home with family                  Lea Choi PA-C  Department of Hospital Medicine   [facility] - Telemetry Stepdown ([place])

## 2022-01-21 NOTE — NURSING
Summoned to patient room by call light. Upon arrival, patient was sitting on edge of bed, c/o feeling faint and CP.  VS obtained with a BP of 82/60, P 150-157, R 20 Pulse Ox 94% on RA. Notified charge nurse Autumn for assist. Upon arrival, Charge nurse ordered EKG and notified MD. Awaiting EKG to be done.

## 2022-01-21 NOTE — CARE UPDATE
Device interrogation 1/21/2022  A capture at 1 v and .4 ms, sensing > 5 mv, impedance  480  Ohms    V threshold <.25 v @ .4 ms , sensing > 12 mv , impedance 590 ohms

## 2022-01-21 NOTE — ASSESSMENT & PLAN NOTE
Paroxysmal atrial fibrillation with RVR  Trudi Mcknight is a 66-year-old female with paroxysmal Afib, hiatal hernia with esophagitis, and Meniere's disease, who presents after having intermittent chest pain for 2 weeks, acutely worsened today. At its worst the pain was substernal, crushing and radiating to her neck, back, and left arm. The pain is associated palpitations and significant fatigue, shortness of breath, lightheadedness and sensation of presyncope. [place] [facility], ACS workup was negative. She was discharged, referred and evaluated by outpatient cardiology. Holter monitor earlier this month confirmed paroxysmal afib with RVR. Evaluated by outpatient cardiology. Not on AC, [id number] score 2 (2.2% stroke risk). Pressures unable to tolerate metoprolol, not a candidate for diltiazem based on pause seen on holter. Referred, but not yet evaluated by EP.     RVR noted with symptomatic hypotension, cardiology consulted for atrial fib management   - EKG with sinus arrhythmia, T wave inversion in anterior leads.  - Troponin trend flat x 3   - CXR without acute process, BNP 33.   - Patient was given  and GI cocktail in ED, with improvement in CP.   - PO protonix BID (see hiatal hernia)  - Last echo done 1/10 with normal cardiac function, EF 55%  - recent TSH WNL, lipid and a1c WNL  1/19: Rapid response called for -200s with associated CP     EKG ordered, EKG shows Afib RVR .   As rapid team was leaving, patient began to madelaine down into the 30s temporarily. Called and discussed with cardiology, specifically regarding rate control given her labile HR and documentation of 4sec pause on outpt holter. Formal consult placed. Appreciate assistance. Dobutamine stress echo canceled given change in condition  - start MTP and Xarelto (5 days post-procedure)  - Repeat TTE stable: EF 65%  - Devices: PPM placed 1/20  - previous DCCV/TTE?  - IV metoprolol 5mg PRN for 3  doses for HR >120   - cardiac telemetry  - keep Mg >2, K >4

## 2022-01-21 NOTE — PLAN OF CARE
Problem: Adult Inpatient Plan of Care  Goal: Plan of Care Review  Outcome: Ongoing, Progressing  Goal: Patient-Specific Goal (Individualized)  Outcome: Ongoing, Progressing  Goal: Absence of Hospital-Acquired Illness or Injury  Outcome: Ongoing, Progressing     Problem: Pain Acute  Goal: Acceptable Pain Control and Functional Ability  Outcome: Ongoing, Progressing     Problem: Fall Injury Risk  Goal: Absence of Fall and Fall-Related Injury  Outcome: Ongoing, Progressing

## 2022-01-21 NOTE — PLAN OF CARE
Problem: Adult Inpatient Plan of Care  Goal: Optimal Comfort and Wellbeing  Outcome: Ongoing, Progressing     Problem: Pain Acute  Goal: Acceptable Pain Control and Functional Ability  Outcome: Ongoing, Progressing     Problem: Fall Injury Risk  Goal: Absence of Fall and Fall-Related Injury  Outcome: Ongoing, Progressing      Pt. AAOx4, VS stable. Complains of chest and neck pain when taking deep breaths unrelieved by Oxycodone and Toradol. MD, notified, orders placed for STAT x-ray and EKG.     PAST MEDICAL HISTORY:  Back pain     Childhood asthma     H/O babesiosis 2006    H/O Clostridium difficile infection 1998    H/O measles 2006    H/O [place] spotted fever 2015    Herniated nucleus pulposus, lumbar sacral    IBS (irritable bowel syndrome)     Kidney stone     Lyme disease 1998    Lymphadenopathy retroperitoneal    Microscopic hematuria     Spinal stenosis PAST MEDICAL HISTORY:  Back pain     Childhood asthma     H/O babesiosis 2006    H/O Clostridium difficile infection 1998    H/O measles 2006    H/O [place] spotted fever 2015    Herniated nucleus pulposus, lumbar sacral    IBS (irritable bowel syndrome)     Kidney stone     Lyme disease 1998    Lymphadenopathy retroperitoneal    Microscopic hematuria     Prediabetes     Spinal stenosis

## 2022-01-21 NOTE — PLAN OF CARE
Pt AOX4. went for pacemarker placement today. Returned to floor with no complains of chest pain. Scheduled medications given per MAR. Safety precautions in place.

## 2022-01-21 NOTE — PLAN OF CARE
Mr. Mcknight was seen and examined this morning. She underwent successful dcppm implantation yesterday for her afib rvr and significant conversation pauses. During exam today, she reported ongoing chest discomfort that is on/off. Pain appears atypica -  reports chest discomfort that radiated to the neck/jaw, sometimes worse with food. She has had weeks of chest pain which was the reason why she presented to the ED on this admission. She now reports that there is a pruritic component but describes it as being similar to her  prior chest discomfort.     I interrogated her device which showed stable lead impedence and thresholds on both RA and RV leads. Her echo yesterday was also negative for any obvious effusion. Her dressing was removed and wound appears well and intact.       Plan:  Workup for underlying CAD and ischemia as cause of her chest pain  Can increase metoprolol to 50 mg bid if her bp allows   Continue to keep on sling overnight for 6 weeks  Continue on oral antibiotics x 5 days   Would care follow up in 1 week and then routine device follow up   Hold off anticoagulation and can resume after wound care visit (about 1 week)

## 2022-01-21 NOTE — SUBJECTIVE & OBJECTIVE
Interval History: NAEON. Patient is s/p pacemaker placement on yesterday. Patient seen with  at the bedside, case discussed extensively. The patient reports pain in her upper chest/collar bone area which is aggravated with movement and deep breathing. Pain was also reproducible on exam, suspect MSK pain post-procedure. We discussed antiinflammatories for this pain, patient and  were agreeable as this pain is different from the chest pain that brought her to the hospital. Case was discussed with cardiology who recommended outpatient ischemic work-up. Patient was then discharged.    Prior to discharge, patient became symptomatically hypotensive (reported feeling lightheaded as if she was going to pass out) with a blood pressure 82/60 and heart rate of 150. EKG was obtained which confirmed atrial fibrillation with RVR. LR bolus was started, with improvement of BP to ~100/64. Heart rate was sustained in the 120's-130's, patient continued to complain of headache and feeling faint, BP ~99/54. IV metoprolol 2.5MG was administered and SBP dropped slightly to the mid-80's. Cardiology was called for atrial fibrillation management in setting of symptomatic hypotension. An additional 2.5 MG was administered upon their arrival. Awaiting plan for atrial fibrillation management from cardiology. Patient remained alert and responsive throughout the event.      Review of Systems   Constitutional: Positive for fatigue. Negative for diaphoresis and fever.   HENT: Negative for congestion and rhinorrhea.    Eyes: Negative for itching and visual disturbance.   Respiratory: Positive for chest tightness and shortness of breath. Negative for cough.    Cardiovascular: Positive for chest pain and palpitations.   Gastrointestinal: Negative for abdominal pain, nausea and vomiting.   Genitourinary: Negative for difficulty urinating and dysuria.   Musculoskeletal: Negative for arthralgias and back pain.   Skin: Negative for color  change and wound.   Neurological: Positive for dizziness, weakness and light-headedness. Negative for tremors, syncope and headaches.   Psychiatric/Behavioral: Negative for agitation and confusion.     Objective:     Vital Signs (Most Recent):  Temp: 97.3 °F (36.3 °C) (01/21/22 1147)  Pulse: (!) 111 (01/21/22 1634)  Resp: 18 (01/21/22 1634)  BP: (!) 90/58 (01/21/22 1634)  SpO2: (!) 94 % (01/21/22 1430) Vital Signs (24h Range):  Temp:  [96.2 °F (35.7 °C)-97.3 °F (36.3 °C)] 97.3 °F (36.3 °C)  Pulse:  [] 111  Resp:  [16-20] 18  SpO2:  [94 %-98 %] 94 %  BP: ()/(53-76) 90/58     Weight: 57.2 kg (126 lb)  Body mass index is 23.81 kg/m².  No intake or output data in the 24 hours ending 01/21/22 1659   Physical Exam  Vitals and nursing note reviewed.   Constitutional:       General: She is not in acute distress.     Appearance: Normal appearance. She is well-developed. She is not ill-appearing.   HENT:      Head: Normocephalic and atraumatic.      Nose: Nose normal.   Eyes:      Extraocular Movements: Extraocular movements intact.   Cardiovascular:      Rate and Rhythm: Normal rate and regular rhythm.   Pulmonary:      Effort: Pulmonary effort is normal. No respiratory distress.      Breath sounds: Normal breath sounds.   Abdominal:      General: Abdomen is flat.      Palpations: Abdomen is soft.   Musculoskeletal:      Right lower leg: No edema.      Left lower leg: No edema.   Skin:     General: Skin is warm and dry.      Capillary Refill: Capillary refill takes less than 2 seconds.   Neurological:      General: No focal deficit present.      Mental Status: She is alert and oriented to person, place, and time.   Psychiatric:         Mood and Affect: Mood normal.         Significant Labs: All pertinent labs within the past 24 hours have been reviewed.    Significant Imaging: I have reviewed all pertinent imaging results/findings within the past 24 hours.

## 2022-01-21 NOTE — ASSESSMENT & PLAN NOTE
Paroxysmal atrial fibrillation with RVR  Sick sinus syndrome  Trudi Mcknight is a 66-year-old female with paroxysmal Afib, hiatal hernia with esophagitis, and Meniere's disease, who presents after having intermittent chest pain for 2 weeks, acutely worsened today. At its worst the pain was substernal, crushing and radiating to her neck, back, and left arm. The pain is associated palpitations and significant fatigue, shortness of breath, lightheadedness and sensation of presyncope. She was recently seen here at [facility] [place] for a similar episode, ACS workup was negative. She was discharged, referred and evaluated by outpatient cardiology. Holter monitor earlier this month confirmed paroxysmal afib with RVR. Evaluated by outpatient cardiology. Not on AC, [id number] score 2 (2.2% stroke risk). Pressures unable to tolerate metoprolol, not a candidate for diltiazem based on pause seen on holter. Referred, but not yet evaluated by EP.     RVR noted with symptomatic hypotension, cardiology consulted for atrial fib management   - EKG with sinus arrhythmia, T wave inversion in anterior leads.  - Troponin trend flat x 3   - CXR without acute process, BNP 33.   - Patient was given  and GI cocktail in ED, with improvement in CP.   - PO protonix BID (see hiatal hernia)  - Last echo done 1/10 with normal cardiac function, EF 55%  - recent TSH WNL, lipid and a1c WNL  1/19: Rapid response called for -200s with associated CP     EKG ordered, EKG shows Afib RVR .   As rapid team was leaving, patient began to madelaine down into the 30s temporarily. Called and discussed with cardiology, specifically regarding rate control given her labile HR and documentation of 4sec pause on outpt holter. Formal consult placed. Appreciate assistance. Dobutamine stress echo canceled given change in condition  - start MTP 12.5mg BID and Xarelto (5 days post-procedure)  - Repeat TTE stable: EF 65%  - Devices: PPM placed 1/20  - Cardiology follow  up and device check for 2/27  - outpatient nuclear stress test ordered

## 2022-01-22 LAB
POCT GLUCOSE: 110 MG/DL (ref 70–110)
POCT GLUCOSE: 131 MG/DL (ref 70–110)

## 2022-01-22 PROCEDURE — 25000003 PHARM REV CODE 250: Mod: HCNC | Performed by: INTERNAL MEDICINE

## 2022-01-22 PROCEDURE — 25000003 PHARM REV CODE 250: Mod: HCNC | Performed by: PHYSICIAN ASSISTANT

## 2022-01-22 PROCEDURE — 63600175 PHARM REV CODE 636 W HCPCS: Mod: HCNC | Performed by: NURSE PRACTITIONER

## 2022-01-22 PROCEDURE — 11000001 HC ACUTE MED/SURG PRIVATE ROOM: Mod: HCNC

## 2022-01-22 PROCEDURE — 25000003 PHARM REV CODE 250: Mod: HCNC

## 2022-01-22 PROCEDURE — 94761 N-INVAS EAR/PLS OXIMETRY MLT: CPT | Mod: HCNC

## 2022-01-22 RX ADMIN — SODIUM CHLORIDE 250 ML: 0.9 INJECTION, SOLUTION INTRAVENOUS at 04:01

## 2022-01-22 RX ADMIN — ACETAMINOPHEN 1000 MG: 500 TABLET ORAL at 08:01

## 2022-01-22 RX ADMIN — ASPIRIN 81 MG: 81 TABLET, COATED ORAL at 08:01

## 2022-01-22 RX ADMIN — METHOCARBAMOL 750 MG: 750 TABLET ORAL at 08:01

## 2022-01-22 RX ADMIN — METHOCARBAMOL 750 MG: 750 TABLET ORAL at 09:01

## 2022-01-22 RX ADMIN — SODIUM CHLORIDE, SODIUM LACTATE, POTASSIUM CHLORIDE, AND CALCIUM CHLORIDE 250 ML: .6; .31; .03; .02 INJECTION, SOLUTION INTRAVENOUS at 12:01

## 2022-01-22 RX ADMIN — METOPROLOL TARTRATE 25 MG: 25 TABLET, FILM COATED ORAL at 08:01

## 2022-01-22 RX ADMIN — SUCRALFATE 1 G: 1 TABLET ORAL at 05:01

## 2022-01-22 RX ADMIN — POLYETHYLENE GLYCOL 3350 17 G: 17 POWDER, FOR SOLUTION ORAL at 08:01

## 2022-01-22 RX ADMIN — DOXYCYCLINE HYCLATE 100 MG: 100 TABLET, COATED ORAL at 08:01

## 2022-01-22 RX ADMIN — SUCRALFATE 1 G: 1 TABLET ORAL at 11:01

## 2022-01-22 RX ADMIN — PANTOPRAZOLE SODIUM 40 MG: 40 TABLET, DELAYED RELEASE ORAL at 08:01

## 2022-01-22 RX ADMIN — PANTOPRAZOLE SODIUM 40 MG: 40 TABLET, DELAYED RELEASE ORAL at 09:01

## 2022-01-22 RX ADMIN — ACETAMINOPHEN 1000 MG: 500 TABLET ORAL at 02:01

## 2022-01-22 RX ADMIN — METHOCARBAMOL 750 MG: 750 TABLET ORAL at 02:01

## 2022-01-22 RX ADMIN — SUCRALFATE 1 G: 1 TABLET ORAL at 08:01

## 2022-01-22 NOTE — PROGRESS NOTES
Hospital Medicine  Progress Note  [facility]      Patient Name: Trudi Mcknight  MRN:  [id number]  Hospital Medicine Team: [facility] HOSP MED F Tim Bernard MD  Date of Admission:  1/17/2022     Length of Stay:  LOS: 3 days       Principal Problem:  Chest pain         Subjective:  Patient denies acute distress, denies chest pain, SOB, palpitations, light headedness. BP borderline normal. Last reading 99/63 but patient asymptomatic at this time.     HPI:  Trudi Mcknight is a 66-year-old female with paroxysmal Afib, hiatal hernia with esophagitis, and Meniere's disease, who presents after having intermittent chest pain for 2 weeks, acutely worsened today. At its worst the pain was substernal, crushing and radiating to her neck, back, and left arm. The pain is associated palpitations and significant fatigue, shortness of breath, lightheadedness and sensation of presyncope. She also reports onset of pain with eating, though she cannot link the symptoms to certain foods and reports it does not happen every time she eats. She denies nausea/vomiting, abdominal pain, bright blood in stool or dark stools, diaphoresis, fevers/chills, or numbness/tingling. She was recently seen here at [facility] for a similar episode, ACS workup was negative. She was discharged, referred and evaluated by outpatient cardiology. Holter monitor confirmed paroxysmal a fib with RVR. She was not started on AC. She was started on metoprolol for rate control but was unable to tolerate due to hypotension. Planned to start diltiazem, but holter monitor showed 4 second pause and patient was instructed not to start the med. Cards referred her to EP, but patient has not yet been evaluated. She has no personal ACS history but endorses strong family history of heart disease, her mother had an MI in her 50s, her brother had an MI very young and passed away from a heart attack in his 60s.  She had a stress test about 7 years ago.  She is a  nonsmoker.     In the ED, patient is afebrile without leukocytosis. Vitals stable and HR controlled. EKG with sinus arrhythmia, T wave inversion in anterior leads. Troponin trend flat x 2. CXR without acute process. BNP 33. CBC/CMP unremarkable. Patient was given  and GI cocktail, with improvement in CP.         Overview/Hospital Course:  66 y.o. who was admitted to hospital medicine for chest pain. EKG without acute ischemic changes. Troponin WNL x3. CXR unremarkable. DSE planned for 1/19. Prior to DSE, patient  developed acute symptomatic tachycardia with heart rates in the 190-200s interrupted with bouts of nonsustained bradycardia. Rapid response was called, EKG with atrial fibrillation w/ RVR. Cardiology and EP were consulted and pacemaker was placed on 1/20/2022 for sick sinus syndrome. Post-procedure TTE was stable and MTP was initiated, Xarelto to start 5 days post procedure. Case was discussed with general cardiology, as patient in need of is ischemic eval --- recommended outpatient follow-up. On 1/21/2022, patient became symptomatically hypotensive with noted atrial fibrillation w/ RVR.         Interval History: NAEON. Patient is s/p pacemaker placement on yesterday. Patient seen with  at the bedside, case discussed extensively. The patient reports pain in her upper chest/collar bone area which is aggravated with movement and deep breathing. Pain was also reproducible on exam, suspect MSK pain post-procedure. We discussed antiinflammatories for this pain, patient and  were agreeable as this pain is different from the chest pain that brought her to the hospital. Case was discussed with cardiology who recommended outpatient ischemic work-up. Patient was then discharged.     Prior to discharge, patient became symptomatically hypotensive (reported feeling lightheaded as if she was going to pass out) with a blood pressure 82/60 and heart rate of 150. EKG was obtained which confirmed atrial  "fibrillation with RVR. LR bolus was started, with improvement of BP to ~100/64. Heart rate was sustained in the 120's-130's, patient continued to complain of headache and feeling faint, BP ~99/54. IV metoprolol 2.5MG was administered and SBP dropped slightly to the mid-80's. Cardiology was called for atrial fibrillation management in setting of symptomatic hypotension. An additional 2.5 MG was administered upon their arrival. Cardiology and EP recommended workup for underlying CAD and ischemia as cause of her chest pain ( Need outpatient stress test ideally nuclear stress (SPECT))  Can increase metoprolol to 50 mg bid if her bp allows   Continue to keep on sling overnight for 6 weeks  Continue on oral antibiotics x 5 days   Would care follow up in 1 week and then routine device follow up   Hold off anticoagulation and can resume after wound care visit (about 1 week)"          Inpatient Medications:    Current Facility-Administered Medications:     acetaminophen tablet 1,000 mg, 1,000 mg, Oral, TID, Lea Choi PA-C, 1,000 mg at 01/22/22 0821    albuterol-ipratropium 2.5 mg-0.5 mg/3 mL nebulizer solution 3 mL, 3 mL, Nebulization, Q4H PRN, Tatiana Fletcher PA-C    aluminum-magnesium hydroxide-simethicone 200-200-20 mg/5 mL suspension 30 mL, 30 mL, Oral, QID PRN, Tatiana Fletcher PA-C, 30 mL at 01/19/22 1025    aspirin EC tablet 81 mg, 81 mg, Oral, Daily, Tatiana Fletcher PA-C, 81 mg at 01/22/22 0820    BUPivacaine (PF) 0.25% (2.5 mg/ml) injection, , , PRN, Ernesto Duncan MD, 10 mL at 01/20/22 0949    dextrose 50% injection 12.5 g, 12.5 g, Intravenous, PRN, Tatiana Fletcher PA-C    dextrose 50% injection 25 g, 25 g, Intravenous, PRN, Tatiana Fletcher PA-C    doxycycline tablet 100 mg, 100 mg, Oral, Q12H, Navneet Castaneda MD, 100 mg at 01/22/22 0821    glucagon (human recombinant) injection 1 mg, 1 mg, Intramuscular, PRN, Tatiana Fletcher PA-C    glucose chewable tablet 16 g, 16 g, Oral, PRN, " Tatiana Fletcher PA-C    glucose chewable tablet 24 g, 24 g, Oral, PRN, Tatiana Fletcher PA-C    ketorolac injection 15 mg, 15 mg, Intravenous, Once, Lea Choi PA-C    LIDOcaine HCL 20 mg/ml (2%) injection, , , PRN, Ernesto Duncan MD, 8 mL at 01/20/22 0949    melatonin tablet 6 mg, 6 mg, Oral, Nightly PRN, More Christensen PA-C    methocarbamoL tablet 750 mg, 750 mg, Oral, TID, Lea Choi PA-C, 750 mg at 01/22/22 0820    metoprolol injection 5 mg, 5 mg, Intravenous, Q5 Min PRN, Lea Choi PA-C    metoprolol tartrate (LOPRESSOR) tablet 25 mg, 25 mg, Oral, BID, Cheryle Sutton PA-C, 25 mg at 01/21/22 2256    naloxone 0.4 mg/mL injection 0.02 mg, 0.02 mg, Intravenous, PRN, Tatiana Fletcher PA-C    ondansetron disintegrating tablet 8 mg, 8 mg, Oral, Q8H PRN, Tatiana Fletcher PA-C    ondansetron injection 4 mg, 4 mg, Intravenous, Daily PRN, Cj Tom MD, 4 mg at 01/20/22 1330    oxyCODONE immediate release tablet 5 mg, 5 mg, Oral, Q6H PRN, Maya Fields NP, 5 mg at 01/21/22 0921    pantoprazole EC tablet 40 mg, 40 mg, Oral, BID, Tatiana Fletcher PA-C, 40 mg at 01/22/22 0820    polyethylene glycol packet 17 g, 17 g, Oral, Daily, Tatiana Fletcher PA-C, 17 g at 01/22/22 0822    prochlorperazine injection Soln 5 mg, 5 mg, Intravenous, Q6H PRN, Tatiana Fletcher PA-C    sodium chloride 0.9% flush 10 mL, 10 mL, Intravenous, PRN, More Christensen PA-C    sodium chloride 0.9% flush 10 mL, 10 mL, Intravenous, Q8H PRN, Tatiana Fletcher PA-C    sodium chloride 0.9% flush 10 mL, 10 mL, Intravenous, PRN, Cj Tom MD    sodium chloride 0.9% irrigation, , , PRN, Ernesto Duncan MD, 1,000 mL at 01/20/22 0933    sucralfate tablet 1 g, 1 g, Oral, QID (AC & HS), Tatiana Fletcher PA-C, 1 g at 01/22/22 0555    vancomycin in dextrose 5 % 1 gram/250 mL IVPB 1,000 mg, 1,000 mg, Intravenous, On Call Procedure, Catherine Alvarez MD, 1,000 mg at 01/20/22  "0930    vancomycin injection, , , PRN, Ernesto Duncan MD, 1,000 mg at 01/20/22 0932      Physical Exam:    No intake or output data in the 24 hours ending 01/22/22 1110  Wt Readings from Last 3 Encounters:   01/20/22 57.2 kg (126 lb)   01/12/22 59.8 kg (131 lb 13.4 oz)   01/10/22 59.4 kg (131 lb)       BP 99/63   Pulse 92   Temp 99.6 °F (37.6 °C)   Resp 16   Ht 5' 1" (1.549 m)   Wt 57.2 kg (126 lb)   SpO2 (!) 93%   BMI 23.81 kg/m²     Physical Exam  Vitals and nursing note reviewed.   Constitutional:       General: She is not in acute distress.     Appearance: Normal appearance. She is well-developed. She is not ill-appearing.   HENT:      Head: Normocephalic and atraumatic.      Nose: Nose normal.   Eyes:      Extraocular Movements: Extraocular movements intact.   Cardiovascular:      irregularly irregular rate and rhythm  Pulmonary:      Effort: Pulmonary effort is normal. No respiratory distress.      Breath sounds: Normal breath sounds.   Abdominal:      General: Abdomen is flat.      Palpations: Abdomen is soft.   Musculoskeletal:      Right lower leg: No edema.      Left lower leg: No edema.   Skin:     General: Skin is warm and dry.      Capillary Refill: Capillary refill takes less than 2 seconds.   Neurological:      General: No focal deficit present.      Mental Status: She is alert and oriented to person, place, and time.   Psychiatric:         Mood and Affect: Mood normal.     Laboratory:  Lab Results   Component Value Date    [id number] Negative 01/17/2022       Recent Labs   Lab 01/17/22  1133 01/21/22  1516   WBC 9.38 12.16   LYMPH 14.3*  1.3 12.4*  1.5   HGB 14.6 13.0   HCT 44.8 40.4    259     Recent Labs   Lab 01/19/22  0913 01/19/22  1055 01/20/22  0910 01/21/22  1516     --  139 132*   K 3.9  --  3.9 4.1     --  108 105   CO2 20*  --  18* 16*   BUN 17  --  16 17   CREATININE 0.8  --  0.8 0.9   GLU 93  --  84 107   CALCIUM 9.8  --  10.1 9.7   MG  --  2.0  --  2.0 "     Recent Labs   Lab 01/17/22  1133 01/21/22  1516   ALKPHOS 79 73   ALT 8* 9*   AST 15 15   ALBUMIN 3.9 3.3*   PROT 7.3 6.4   BILITOT 0.6 0.7        No results for input(s): DDIMER, FERRITIN, CRP, LDH, BNP, TROPONINI, CPK in the last 72 hours.    Invalid input(s): PROCALCITONIN    All labs within the last 24 hours were reviewed.     Microbiology:  Microbiology Results (last 7 days)     ** No results found for the last 168 hours. **            Imaging  ECG Results          EKG 12-lead (Final result)  Result time 01/18/22 11:07:24    Final result by Interface, Lab In [facility] (01/18/22 11:07:24)                 Narrative:    Test Reason : R07.9,    Vent. Rate : 079 BPM     Atrial Rate : 079 BPM     P-R Int : 140 ms          QRS Dur : 074 ms      QT Int : 372 ms       P-R-T Axes : 041 074 060 degrees     QTc Int : 426 ms    Normal sinus rhythm with sinus arrhythmia  Nonspecific ST and T wave abnormality  Abnormal ECG  When compared with ECG of 05-JAN-2022 05:45,  T wave inversion more evident in Anterior leads  Confirmed by Yair NORRIS MD ([id number]) on 1/18/2022 11:07:16 AM    Referred By: [email or web address]   SELF           Confirmed By:Yair NORRIS MD                              Results for orders placed during the hospital encounter of 01/17/22    Echo    Interpretation Summary  · Technically challenging study.  · The left ventricle is normal in size with normal systolic function.  · The estimated ejection fraction is 65%.  · Normal left ventricular diastolic function.  · Normal right ventricular size with normal right ventricular systolic function.      Electrophysiology Procedure  · Successful implantation of PPM Dual.     X-Ray Chest AP Portable  Narrative: EXAMINATION:  XR CHEST AP PORTABLE    CLINICAL HISTORY:  chest discomfort;    TECHNIQUE:  Single frontal view of the chest was performed.    COMPARISON:  01/20/2022    FINDINGS:  The cardiomediastinal silhouette is top limits of normal size to borderline enlarged.  There is  mild unfolding and atherosclerotic stigmata of the aorta.  The lung fields and pleural spaces appear to be clear.  Dual chamber pacer device again noted.  The bones appear unremarkable for the age of the patient, with mild hypertrophic and/or degenerative changes.  Impression: No acute pulmonary pathology identified.  No significant interval change since prior exam noted.    Electronically signed by: Monserrat Kaplan  Date:    01/21/2022  Time:    08:29      All imaging within the last 24 hours was reviewed.     Assessment and Plan:    Active Hospital Problems    Diagnosis  POA    *Chest pain [R07.9]  Yes    Sick sinus syndrome [I49.5]  Clinically Undetermined    Paroxysmal atrial fibrillation with RVR [I48.0]  Yes    Hiatal hernia with GERD and esophagitis [K44.9, K21.00]  Yes    Paroxysmal atrial fibrillation [I48.0]  Yes      Resolved Hospital Problems   No resolved problems to display.       * Chest pain  Paroxysmal atrial fibrillation with RVR  Trudi Mcknight is a 66-year-old female with paroxysmal Afib, hiatal hernia with esophagitis, and Meniere's disease, who presents after having intermittent chest pain for 2 weeks, acutely worsened today. At its worst the pain was substernal, crushing and radiating to her neck, back, and left arm. The pain is associated palpitations and significant fatigue, shortness of breath, lightheadedness and sensation of presyncope. She was [place] [facility] [place], ACS workup was negative. She was discharged, referred and evaluated by outpatient cardiology. Holter monitor earlier this month confirmed paroxysmal afib with RVR. Evaluated by outpatient cardiology. Not on AC, [id number] score 2 (2.2% stroke risk). Pressures unable to tolerate metoprolol, not a candidate for diltiazem based on pause seen on holter. Referred, but not yet evaluated by EP.      RVR noted with symptomatic hypotension, cardiology consulted for atrial fib management   - EKG with sinus  arrhythmia, T wave inversion in anterior leads.  - Troponin trend flat x 3   - CXR without acute process, BNP 33.   - Patient was given  and GI cocktail in ED, with improvement in CP.   - PO protonix BID (see hiatal hernia)  - Last echo done 1/10 with normal cardiac function, EF 55%  - recent TSH WNL, lipid and a1c WNL  1/19: Rapid response called for -200s with associated CP                       EKG ordered, EKG shows Afib RVR .   As rapid team was leaving, patient began to madelaine down into the 30s temporarily. Called and discussed with cardiology, specifically regarding rate control given her labile HR and documentation of 4sec pause on outpt holter. Formal consult placed. Appreciate assistance. Dobutamine stress echo canceled given change in condition  - start MTP and Xarelto (5 days post-procedure)  - Repeat TTE stable: EF 65%  - Devices: PPM placed 1/20  - previous DCCV/TTE?  - IV metoprolol 5mg PRN for 3 doses for HR >120   - cardiac telemetry  - keep Mg >2, K >4  -As plan from EP:   Workup for underlying CAD and ischemia as cause of her chest pain    Need outpatient stress test ideally nuclear stress (SPECT)   Can increase metoprolol to 50 mg bid if her bp allows   Continue to keep on sling overnight for 6 weeks  Continue on oral antibiotics x 5 days   Would care follow up in 1 week and then routine device follow up   Hold off anticoagulation and can resume after wound care visit (about 1 week)        Hiatal hernia with GERD and esophagitis  Hiatal hernia and GERD with hx of esophagitis on EGD in 2015. Patient was prescribed daily PPI which helped her symptoms in the past, but she reports she stopped taking it out of concern that it would contribute to her cardiac arrhythmias. Patient being evaluated for CC of CP, which she describes as chest tightness, and at times burning pain. The pain is sometimes brought on by eating, though she has not noticed certain foods to be triggers. She denies  abdominal pain, N/V, melena or hematochezia.   - pain improved with GI cocktail x1 in ED  - resume home PO protonix 40mg, increase to BID while inpatient   - mylanta and antiemetics prn   - scheduled carafate        VTE High Risk Prophylaxis:   VTE Risk Mitigation (From admission, onward)         Ordered     IP VTE LOW RISK PATIENT  Once         01/17/22 1859     Place sequential compression device  Until discontinued         01/17/22 1341                  Discharge Planning   STEVE: 1/23/2022     Code Status: Full Code   Is the patient medically ready for discharge?: No     Discharge Plan A: Home with family          Tim Bernard MD  [facility]

## 2022-01-22 NOTE — PLAN OF CARE
Problem: Adult Inpatient Plan of Care  Goal: Optimal Comfort and Wellbeing  Outcome: Ongoing, Progressing     Problem: Pain Acute  Goal: Acceptable Pain Control and Functional Ability  Outcome: Ongoing, Progressing     Pt. AAOx3, no complaints of chest pain or SOB at this moment. Intermittent reading of A-fib on the monitor with SBP<100, MAP >65, MD aware. Bed in the lowest position, call bell within reach. Informed patient to call when experiencing chest pain.

## 2022-01-22 NOTE — RESPIRATORY THERAPY
RAPID RESPONSE RESPIRATORY CHART CHECK       Chart check completed, instructed to call [phone] for further concerns or assistance.

## 2022-01-22 NOTE — NURSING
Patient reading A-fib on the monitor, Heart rate between 115-130. SBP> 100, oral Metoprolol given. Patient complains of nonradiating left sided chest pain. EKG STAT called. Charge nurse notified. Will continue to monitor patient closely. Awaiting response from on call provider, Maya Fields.

## 2022-01-23 LAB
ALBUMIN SERPL BCP-MCNC: 3 G/DL (ref 3.5–5.2)
ALP SERPL-CCNC: 83 U/L (ref 55–135)
ALT SERPL W/O P-5'-P-CCNC: 130 U/L (ref 10–44)
ANION GAP SERPL CALC-SCNC: 10 MMOL/L (ref 8–16)
AST SERPL-CCNC: 117 U/L (ref 10–40)
BASOPHILS # BLD AUTO: 0.04 K/UL (ref 0–0.2)
BASOPHILS NFR BLD: 0.4 % (ref 0–1.9)
BILIRUB SERPL-MCNC: 0.4 MG/DL (ref 0.1–1)
BILIRUB UR QL STRIP: ABNORMAL
BUN SERPL-MCNC: 13 MG/DL (ref 8–23)
CALCIUM SERPL-MCNC: 9.5 MG/DL (ref 8.7–10.5)
CHLORIDE SERPL-SCNC: 107 MMOL/L (ref 95–110)
CLARITY UR REFRACT.AUTO: CLEAR
CO2 SERPL-SCNC: 23 MMOL/L (ref 23–29)
COLOR UR AUTO: YELLOW
CREAT SERPL-MCNC: 0.8 MG/DL (ref 0.5–1.4)
DIFFERENTIAL METHOD: ABNORMAL
EOSINOPHIL # BLD AUTO: 0.2 K/UL (ref 0–0.5)
EOSINOPHIL NFR BLD: 2.4 % (ref 0–8)
ERYTHROCYTE [DISTWIDTH] IN BLOOD BY AUTOMATED COUNT: 14.1 % (ref 11.5–14.5)
EST. GFR  (AFRICAN AMERICAN): >60 ML/MIN/1.73 M^2
EST. GFR  (NON AFRICAN AMERICAN): >60 ML/MIN/1.73 M^2
GLUCOSE SERPL-MCNC: 101 MG/DL (ref 70–110)
GLUCOSE UR QL STRIP: NEGATIVE
HCT VFR BLD AUTO: 34.2 % (ref 37–48.5)
HGB BLD-MCNC: 11.2 G/DL (ref 12–16)
HGB UR QL STRIP: NEGATIVE
IMM GRANULOCYTES # BLD AUTO: 0.02 K/UL (ref 0–0.04)
IMM GRANULOCYTES NFR BLD AUTO: 0.2 % (ref 0–0.5)
KETONES UR QL STRIP: ABNORMAL
LACTATE SERPL-SCNC: 2.4 MMOL/L (ref 0.5–2.2)
LEUKOCYTE ESTERASE UR QL STRIP: NEGATIVE
LYMPHOCYTES # BLD AUTO: 1.7 K/UL (ref 1–4.8)
LYMPHOCYTES NFR BLD: 17.2 % (ref 18–48)
MCH RBC QN AUTO: 27.7 PG (ref 27–31)
MCHC RBC AUTO-ENTMCNC: 32.7 G/DL (ref 32–36)
MCV RBC AUTO: 84 FL (ref 82–98)
MONOCYTES # BLD AUTO: 0.9 K/UL (ref 0.3–1)
MONOCYTES NFR BLD: 9.2 % (ref 4–15)
NEUTROPHILS # BLD AUTO: 7 K/UL (ref 1.8–7.7)
NEUTROPHILS NFR BLD: 70.6 % (ref 38–73)
NITRITE UR QL STRIP: NEGATIVE
NRBC BLD-RTO: 0 /100 WBC
PH UR STRIP: 5 [PH] (ref 5–8)
PLATELET # BLD AUTO: 261 K/UL (ref 150–450)
PMV BLD AUTO: 11.4 FL (ref 9.2–12.9)
POCT GLUCOSE: 112 MG/DL (ref 70–110)
POCT GLUCOSE: 133 MG/DL (ref 70–110)
POTASSIUM SERPL-SCNC: 3.2 MMOL/L (ref 3.5–5.1)
PROT SERPL-MCNC: 6.6 G/DL (ref 6–8.4)
PROT UR QL STRIP: NEGATIVE
RBC # BLD AUTO: 4.05 M/UL (ref 4–5.4)
SODIUM SERPL-SCNC: 140 MMOL/L (ref 136–145)
SP GR UR STRIP: 1.02 (ref 1–1.03)
URN SPEC COLLECT METH UR: ABNORMAL
WBC # BLD AUTO: 9.87 K/UL (ref 3.9–12.7)

## 2022-01-23 PROCEDURE — 81003 URINALYSIS AUTO W/O SCOPE: CPT | Mod: HCNC | Performed by: INTERNAL MEDICINE

## 2022-01-23 PROCEDURE — 25000003 PHARM REV CODE 250: Mod: HCNC | Performed by: PHYSICIAN ASSISTANT

## 2022-01-23 PROCEDURE — 25000003 PHARM REV CODE 250: Mod: HCNC | Performed by: INTERNAL MEDICINE

## 2022-01-23 PROCEDURE — 80053 COMPREHEN METABOLIC PANEL: CPT | Mod: HCNC | Performed by: INTERNAL MEDICINE

## 2022-01-23 PROCEDURE — 83605 ASSAY OF LACTIC ACID: CPT | Mod: HCNC | Performed by: INTERNAL MEDICINE

## 2022-01-23 PROCEDURE — 85025 COMPLETE CBC W/AUTO DIFF WBC: CPT | Mod: HCNC | Performed by: INTERNAL MEDICINE

## 2022-01-23 PROCEDURE — 11000001 HC ACUTE MED/SURG PRIVATE ROOM: Mod: HCNC

## 2022-01-23 PROCEDURE — 25000003 PHARM REV CODE 250: Mod: HCNC

## 2022-01-23 PROCEDURE — 87040 BLOOD CULTURE FOR BACTERIA: CPT | Mod: HCNC | Performed by: INTERNAL MEDICINE

## 2022-01-23 PROCEDURE — 25000003 PHARM REV CODE 250: Mod: HCNC | Performed by: NURSE PRACTITIONER

## 2022-01-23 RX ORDER — METOPROLOL TARTRATE 25 MG/1
12.5 TABLET ORAL 2 TIMES DAILY
Status: DISCONTINUED | OUTPATIENT
Start: 2022-01-23 | End: 2022-01-24 | Stop reason: HOSPADM

## 2022-01-23 RX ADMIN — DOXYCYCLINE HYCLATE 100 MG: 100 TABLET, COATED ORAL at 08:01

## 2022-01-23 RX ADMIN — ACETAMINOPHEN 1000 MG: 500 TABLET ORAL at 08:01

## 2022-01-23 RX ADMIN — SUCRALFATE 1 G: 1 TABLET ORAL at 06:01

## 2022-01-23 RX ADMIN — PANTOPRAZOLE SODIUM 40 MG: 40 TABLET, DELAYED RELEASE ORAL at 08:01

## 2022-01-23 RX ADMIN — SUCRALFATE 1 G: 1 TABLET ORAL at 08:01

## 2022-01-23 RX ADMIN — METHOCARBAMOL 750 MG: 750 TABLET ORAL at 08:01

## 2022-01-23 RX ADMIN — METOPROLOL TARTRATE 12.5 MG: 25 TABLET, FILM COATED ORAL at 08:01

## 2022-01-23 RX ADMIN — METHOCARBAMOL 750 MG: 750 TABLET ORAL at 02:01

## 2022-01-23 RX ADMIN — SUCRALFATE 1 G: 1 TABLET ORAL at 12:01

## 2022-01-23 RX ADMIN — OXYCODONE 5 MG: 5 TABLET ORAL at 03:01

## 2022-01-23 RX ADMIN — ACETAMINOPHEN 1000 MG: 500 TABLET ORAL at 02:01

## 2022-01-23 RX ADMIN — SUCRALFATE 1 G: 1 TABLET ORAL at 04:01

## 2022-01-23 RX ADMIN — ASPIRIN 81 MG: 81 TABLET, COATED ORAL at 08:01

## 2022-01-23 NOTE — PROGRESS NOTES
01/23/22 1448 01/23/22 1450 01/23/22 1452   Vital Signs   /66 113/66 111/63   MAP (mmHg) 81 84 80   BP Location Right arm Right arm Right arm   BP Method Automatic Automatic Automatic   Patient Position Lying Sitting Standing   Orthostatic BP

## 2022-01-23 NOTE — PROGRESS NOTES
Hospital Medicine  Progress Note  [facility]      Patient Name: Trudi Mcknight  MRN:  [id number]  Hospital Medicine Team: [facility] [place] [facility] HOSP MED F Tim Bernard MD  Date of Admission:  1/17/2022     Length of Stay:  LOS: 4 days       Principal Problem:  Chest pain         Subjective:  Patient denies acute distress, denies chest pain, SOB, palpitations, light headedness. BP borderline normal.     HPI:  Trudi Mcknight is a 66-year-old female with paroxysmal Afib, hiatal hernia with esophagitis, and Meniere's disease, who presents after having intermittent chest pain for 2 weeks, acutely worsened today. At its worst the pain was substernal, crushing and radiating to her neck, back, and left arm. The pain is associated palpitations and significant fatigue, shortness of breath, lightheadedness and sensation of presyncope. She also reports onset of pain with eating, though she cannot link the symptoms to certain foods and reports it does not happen every time she eats. She denies nausea/vomiting, abdominal pain, bright blood in stool or dark stools, diaphoresis, fevers/chills, or numbness/tingling. She was recently seen here at [facility] for a similar episode, ACS workup was negative. She was discharged, referred and evaluated by outpatient cardiology. Holter monitor confirmed paroxysmal a fib with RVR. She was not started on AC. She was started on metoprolol for rate control but was unable to tolerate due to hypotension. Planned to start diltiazem, but holter monitor showed 4 second pause and patient was instructed not to start the med. Cards referred her to EP, but patient has not yet been evaluated. She has no personal ACS history but endorses strong family history of heart disease, her mother had an MI in her 50s, her brother had an MI very young and passed away from a heart attack in his 60s.  She had a stress test about 7 years ago.  She is a nonsmoker.     In the ED, patient is afebrile without  leukocytosis. Vitals stable and HR controlled. EKG with sinus arrhythmia, T wave inversion in anterior leads. Troponin trend flat x 2. CXR without acute process. BNP 33. CBC/CMP unremarkable. Patient was given  and GI cocktail, with improvement in CP.         Overview/Hospital Course:  66 y.o. who was admitted to hospital medicine for chest pain. EKG without acute ischemic changes. Troponin WNL x3. CXR unremarkable. DSE planned for 1/19. Prior to DSE, patient  developed acute symptomatic tachycardia with heart rates in the 190-200s interrupted with bouts of nonsustained bradycardia. Rapid response was called, EKG with atrial fibrillation w/ RVR. Cardiology and EP were consulted and pacemaker was placed on 1/20/2022 for sick sinus syndrome. Post-procedure TTE was stable and MTP was initiated, Xarelto to start 5 days post procedure. Case was discussed with general cardiology, as patient in need of is ischemic eval --- recommended outpatient follow-up. On 1/21/2022, patient became symptomatically hypotensive with noted atrial fibrillation w/ RVR.         Interval History: NAEON. Patient is s/p pacemaker placement on yesterday. Patient seen with  at the bedside, case discussed extensively. The patient reports pain in her upper chest/collar bone area which is aggravated with movement and deep breathing. Pain was also reproducible on exam, suspect MSK pain post-procedure. We discussed antiinflammatories for this pain, patient and  were agreeable as this pain is different from the chest pain that brought her to the hospital. Case was discussed with cardiology who recommended outpatient ischemic work-up. Prior to discharge, patient became symptomatically hypotensive (reported feeling lightheaded as if she was going to pass out) with a blood pressure 82/60 and heart rate of 150. EKG was obtained which confirmed atrial fibrillation with RVR. LR bolus was started, with improvement of BP to ~100/64. Heart  "rate was sustained in the 120's-130's, patient continued to complain of headache and feeling faint, BP ~99/54. IV metoprolol 2.5MG was administered and SBP dropped slightly to the mid-80's. Cardiology was called for atrial fibrillation management in setting of symptomatic hypotension. An additional 2.5 MG was administered upon their arrival. Cardiology and EP recommended workup for underlying CAD and ischemia as cause of her chest pain ( Need outpatient stress test ideally nuclear stress (SPECT))  Can increase metoprolol to 50 mg bid if her bp allows   Continue to keep on sling overnight for 6 weeks  Continue on oral antibiotics x 5 days   Would care follow up in 1 week and then routine device follow up   Hold off anticoagulation and can resume after wound care visit (about 1 week)"    1/23/22: patient still hypotensive, she doesn't like the lopressor, borderline MAPS of around 65 overnight, I have reduced the dose to 12.5 BID, ordered labs and ortho stats along with blood culture and UA.         Inpatient Medications:    Current Facility-Administered Medications:     acetaminophen tablet 1,000 mg, 1,000 mg, Oral, TID, Lea Choi PA-C, 1,000 mg at 01/23/22 0810    aluminum-magnesium hydroxide-simethicone 200-200-20 mg/5 mL suspension 30 mL, 30 mL, Oral, QID PRN, Tatiana Fletcher PA-C, 30 mL at 01/19/22 1025    aspirin EC tablet 81 mg, 81 mg, Oral, Daily, Tatiana Fletcher PA-C, 81 mg at 01/23/22 0809    BUPivacaine (PF) 0.25% (2.5 mg/ml) injection, , , PRN, Ernesto Duncan MD, 10 mL at 01/20/22 0949    dextrose 50% injection 12.5 g, 12.5 g, Intravenous, PRN, Tatiana Fletcher PA-C    dextrose 50% injection 25 g, 25 g, Intravenous, PRN, Tatiana Fletcher PA-C    doxycycline tablet 100 mg, 100 mg, Oral, Q12H, Navneet Castaneda MD, 100 mg at 01/23/22 0809    glucagon (human recombinant) injection 1 mg, 1 mg, Intramuscular, PRN, Tatiana Fletcher PA-C    glucose chewable tablet 16 g, 16 g, Oral, PRN, " Tatiana Fletcher PA-C    glucose chewable tablet 24 g, 24 g, Oral, PRN, Tatiana Fletcher PA-C    ketorolac injection 15 mg, 15 mg, Intravenous, Once, Lea Choi PA-C    LIDOcaine HCL 20 mg/ml (2%) injection, , , PRN, Ernesto Duncan MD, 8 mL at 01/20/22 0949    melatonin tablet 6 mg, 6 mg, Oral, Nightly PRN, More Christensen PA-C    methocarbamoL tablet 750 mg, 750 mg, Oral, TID, Lea Choi PA-C, 750 mg at 01/23/22 0809    metoprolol injection 5 mg, 5 mg, Intravenous, Q5 Min PRN, Lea Choi PA-C    metoprolol tartrate (LOPRESSOR) split tablet 12.5 mg, 12.5 mg, Oral, BID, Tim Bernard MD    naloxone 0.4 mg/mL injection 0.02 mg, 0.02 mg, Intravenous, PRN, Tatiana Fletcher PA-C    ondansetron disintegrating tablet 8 mg, 8 mg, Oral, Q8H PRN, Tatiana Fletcher PA-C    ondansetron injection 4 mg, 4 mg, Intravenous, Daily PRN, Cj Tom MD, 4 mg at 01/20/22 1330    oxyCODONE immediate release tablet 5 mg, 5 mg, Oral, Q6H PRN, Maya Fields NP, 5 mg at 01/23/22 0355    pantoprazole EC tablet 40 mg, 40 mg, Oral, BID, Tatiana Fletcher PA-C, 40 mg at 01/23/22 0810    polyethylene glycol packet 17 g, 17 g, Oral, Daily, Tatiana Fletcher PA-C, 17 g at 01/22/22 0822    prochlorperazine injection Soln 5 mg, 5 mg, Intravenous, Q6H PRN, Tatiana Fletcher PA-C    sodium chloride 0.9% flush 10 mL, 10 mL, Intravenous, PRN, More Carosone-Link, PA-C    sodium chloride 0.9% flush 10 mL, 10 mL, Intravenous, Q8H PRN, Tatiana Fletcher PA-C    sodium chloride 0.9% flush 10 mL, 10 mL, Intravenous, PRN, Cj Tom MD    sodium chloride 0.9% irrigation, , , PRN, Ernesto Duncan MD, 1,000 mL at 01/20/22 0933    sucralfate tablet 1 g, 1 g, Oral, QID (AC & HS), Tatiana Fletcher PA-C, 1 g at 01/23/22 1230    vancomycin in dextrose 5 % 1 gram/250 mL IVPB 1,000 mg, 1,000 mg, Intravenous, On Call Procedure, Catherine Alvarez MD, 1,000 mg at 01/20/22 0930     "vancomycin injection, , , PRN, Ernesto Duncan MD, 1,000 mg at 01/20/22 0932      Physical Exam:    No intake or output data in the 24 hours ending 01/23/22 1254  Wt Readings from Last 3 Encounters:   01/20/22 57.2 kg (126 lb)   01/12/22 59.8 kg (131 lb 13.4 oz)   01/10/22 59.4 kg (131 lb)       BP (!) 112/55 (Patient Position: Lying)   Pulse 97   Temp 97.3 °F (36.3 °C) (Oral)   Resp 18   Ht 5' 1" (1.549 m)   Wt 57.2 kg (126 lb)   SpO2 97%   BMI 23.81 kg/m²     Physical Exam  Vitals and nursing note reviewed.   Constitutional:       General: She is not in acute distress.     Appearance: Normal appearance. She is well-developed. She is not ill-appearing.   HENT:      Head: Normocephalic and atraumatic.      Nose: Nose normal.   Eyes:      Extraocular Movements: Extraocular movements intact.   Cardiovascular:      irregularly irregular rate and rhythm  Pulmonary:      Effort: Pulmonary effort is normal. No respiratory distress.      Breath sounds: Normal breath sounds.   Abdominal:      General: Abdomen is flat.      Palpations: Abdomen is soft.   Musculoskeletal:      Right lower leg: No edema.      Left lower leg: No edema.   Skin:     General: Skin is warm and dry.      Capillary Refill: Capillary refill takes less than 2 seconds.   Neurological:      General: No focal deficit present.      Mental Status: She is alert and oriented to person, place, and time.   Psychiatric:         Mood and Affect: Mood normal.     Laboratory:  Lab Results   Component Value Date    [id number] Negative 01/17/2022       Recent Labs   Lab 01/17/22  1133 01/21/22  1516   WBC 9.38 12.16   LYMPH 14.3*  1.3 12.4*  1.5   HGB 14.6 13.0   HCT 44.8 40.4    259     Recent Labs   Lab 01/19/22  0913 01/19/22  1055 01/20/22  0910 01/21/22  1516     --  139 132*   K 3.9  --  3.9 4.1     --  108 105   CO2 20*  --  18* 16*   BUN 17  --  16 17   CREATININE 0.8  --  0.8 0.9   GLU 93  --  84 107   CALCIUM 9.8  --  10.1 9.7 "   MG  --  2.0  --  2.0     Recent Labs   Lab 01/17/22  1133 01/21/22  1516   ALKPHOS 79 73   ALT 8* 9*   AST 15 15   ALBUMIN 3.9 3.3*   PROT 7.3 6.4   BILITOT 0.6 0.7        No results for input(s): DDIMER, FERRITIN, CRP, LDH, BNP, TROPONINI, CPK in the last 72 hours.    Invalid input(s): PROCALCITONIN    All labs within the last 24 hours were reviewed.     Microbiology:  Microbiology Results (last 7 days)     ** No results found for the last 168 hours. **            Imaging  ECG Results          EKG 12-lead (Final result)  Result time 01/18/22 11:07:24    Final result by Interface, Lab In [facility] (01/18/22 11:07:24)                 Narrative:    Test Reason : R07.9,    Vent. Rate : 079 BPM     Atrial Rate : 079 BPM     P-R Int : 140 ms          QRS Dur : 074 ms      QT Int : 372 ms       P-R-T Axes : 041 074 060 degrees     QTc Int : 426 ms    Normal sinus rhythm with sinus arrhythmia  Nonspecific ST and T wave abnormality  Abnormal ECG  When compared with ECG of 05-JAN-2022 05:45,  T wave inversion more evident in Anterior leads  Confirmed by Yair NORRIS MD ([id number]) on 1/18/2022 11:07:16 AM    Referred By: [email or web address]   SELF           Confirmed By:Yair NORRIS MD                              Results for orders placed during the hospital encounter of 01/17/22    Echo    Interpretation Summary  · Technically challenging study.  · The left ventricle is normal in size with normal systolic function.  · The estimated ejection fraction is 65%.  · Normal left ventricular diastolic function.  · Normal right ventricular size with normal right ventricular systolic function.      Electrophysiology Procedure  · Successful implantation of PPM Dual.     X-Ray Chest AP Portable  Narrative: EXAMINATION:  XR CHEST AP PORTABLE    CLINICAL HISTORY:  chest discomfort;    TECHNIQUE:  Single frontal view of the chest was performed.    COMPARISON:  01/20/2022    FINDINGS:  The cardiomediastinal silhouette is top limits of normal size to  borderline enlarged.  There is mild unfolding and atherosclerotic stigmata of the aorta.  The lung fields and pleural spaces appear to be clear.  Dual chamber pacer device again noted.  The bones appear unremarkable for the age of the patient, with mild hypertrophic and/or degenerative changes.  Impression: No acute pulmonary pathology identified.  No significant interval change since prior exam noted.    Electronically signed by: Monserrat Kaplan  Date:    01/21/2022  Time:    08:29      All imaging within the last 24 hours was reviewed.     Assessment and Plan:    Active Hospital Problems    Diagnosis  POA    *Chest pain [R07.9]  Yes    Sick sinus syndrome [I49.5]  Clinically Undetermined    Paroxysmal atrial fibrillation with RVR [I48.0]  Yes    Hiatal hernia with GERD and esophagitis [K44.9, K21.00]  Yes    Paroxysmal atrial fibrillation [I48.0]  Yes      Resolved Hospital Problems   No resolved problems to display.       * Chest pain  Paroxysmal atrial fibrillation with RVR  Trudi Mcknight is a 66-year-old female with paroxysmal Afib, hiatal hernia with esophagitis, and Meniere's disease, who presents after having intermittent chest pain for 2 weeks, acutely worsened today. At its worst the pain was substernal, crushing and radiating to her neck, back, and left arm. The pain is associated palpitations and significant fatigue, shortness of breath, lightheadedness and sensation of presyncope. She was recently [place] [facility] [place], ACS workup was negative. She was discharged, referred and evaluated by outpatient cardiology. Holter monitor earlier this month confirmed paroxysmal afib with RVR. Evaluated by outpatient cardiology. Not on AC, [id number] score 2 (2.2% stroke risk). Pressures unable to tolerate metoprolol, not a candidate for diltiazem based on pause seen on holter. Referred, but not yet evaluated by EP.      RVR noted with symptomatic hypotension, cardiology consulted for atrial fib  management   - EKG with sinus arrhythmia, T wave inversion in anterior leads.  - Troponin trend flat x 3   - CXR without acute process, BNP 33.   - Patient was given  and GI cocktail in ED, with improvement in CP.   - PO protonix BID (see hiatal hernia)  - Last echo done 1/10 with normal cardiac function, EF 55%  - recent TSH WNL, lipid and a1c WNL  1/19: Rapid response called for -200s with associated CP                       EKG ordered, EKG shows Afib RVR .   As rapid team was leaving, patient began to madelaine down into the 30s temporarily. Called and discussed with cardiology, specifically regarding rate control given her labile HR and documentation of 4sec pause on outpt holter. Formal consult placed. Appreciate assistance. Dobutamine stress echo canceled given change in condition  - start MTP and Xarelto (5 days post-procedure)  - Repeat TTE stable: EF 65%  - Devices: PPM placed 1/20  - previous DCCV/TTE?  - IV metoprolol 5mg PRN for 3 doses for HR >120   - cardiac telemetry  - keep Mg >2, K >4  -As plan from EP:   Workup for underlying CAD and ischemia as cause of her chest pain    Need outpatient stress test ideally nuclear stress (SPECT)   Can increase metoprolol to 50 mg bid if her bp allows (her HP isnt allowing so I lowered to 12.5 BID)  Continue to keep on sling overnight for 6 weeks  Continue on oral antibiotics x 5 days   Would care follow up in 1 week and then routine device follow up   Hold off anticoagulation and can resume after wound care visit (about 1 week)        Hiatal hernia with GERD and esophagitis  Hiatal hernia and GERD with hx of esophagitis on EGD in 2015. Patient was prescribed daily PPI which helped her symptoms in the past, but she reports she stopped taking it out of concern that it would contribute to her cardiac arrhythmias. Patient being evaluated for CC of CP, which she describes as chest tightness, and at times burning pain. The pain is sometimes brought on by  eating, though she has not noticed certain foods to be triggers. She denies abdominal pain, N/V, melena or hematochezia.   - pain improved with GI cocktail x1 in ED  - resume home PO protonix 40mg, increase to BID while inpatient   - mylanta and antiemetics prn   - scheduled carafate        VTE High Risk Prophylaxis:   VTE Risk Mitigation (From admission, onward)         Ordered     IP VTE LOW RISK PATIENT  Once         01/17/22 1859     Place sequential compression device  Until discontinued         01/17/22 1341                  Discharge Planning   STEVE: 1/23/2022     Code Status: Full Code   Is the patient medically ready for discharge?: No     Discharge Plan A: Home with family          Tim Bernard MD  [facility]

## 2022-01-23 NOTE — PLAN OF CARE
Pt AOX4. Scheduled medications given per MAR. Pt refused morning dose of metoprolol, states she experienced chest pain after receiving last night's dose. MD made aware during morning rounds. Orthostatic BP done per order. Pt c/o mild chest pain, relieved with scheduled tylenol. New orders noted and carried out. Safety precautions in place.

## 2022-01-23 NOTE — PLAN OF CARE
Pt AOX4. Scheduled medications given per MAR. No complains of pain nausea, or vomiting throughout shift. BP dropped, pt asymptomatic. MD notified, new orders placed by MD and carried out. Safety precautions in place.

## 2022-01-24 ENCOUNTER — TELEPHONE (OUTPATIENT)
Dept: WOUND CARE | Facility: CLINIC | Age: 67
End: 2022-01-24
Payer: MEDICARE

## 2022-01-24 VITALS
WEIGHT: 126 LBS | OXYGEN SATURATION: 99 % | RESPIRATION RATE: 16 BRPM | TEMPERATURE: 97 F | BODY MASS INDEX: 23.79 KG/M2 | HEIGHT: 61 IN | SYSTOLIC BLOOD PRESSURE: 104 MMHG | HEART RATE: 80 BPM | DIASTOLIC BLOOD PRESSURE: 58 MMHG

## 2022-01-24 LAB
ALBUMIN SERPL BCP-MCNC: 2.5 G/DL (ref 3.5–5.2)
ALP SERPL-CCNC: 72 U/L (ref 55–135)
ALT SERPL W/O P-5'-P-CCNC: 82 U/L (ref 10–44)
ANION GAP SERPL CALC-SCNC: 8 MMOL/L (ref 8–16)
AST SERPL-CCNC: 49 U/L (ref 10–40)
BILIRUB SERPL-MCNC: 0.4 MG/DL (ref 0.1–1)
BUN SERPL-MCNC: 13 MG/DL (ref 8–23)
CALCIUM SERPL-MCNC: 9.2 MG/DL (ref 8.7–10.5)
CHLORIDE SERPL-SCNC: 109 MMOL/L (ref 95–110)
CO2 SERPL-SCNC: 25 MMOL/L (ref 23–29)
CREAT SERPL-MCNC: 0.8 MG/DL (ref 0.5–1.4)
EST. GFR  (AFRICAN AMERICAN): >60 ML/MIN/1.73 M^2
EST. GFR  (NON AFRICAN AMERICAN): >60 ML/MIN/1.73 M^2
GLUCOSE SERPL-MCNC: 96 MG/DL (ref 70–110)
LACTATE SERPL-SCNC: 1 MMOL/L (ref 0.5–2.2)
POCT GLUCOSE: 96 MG/DL (ref 70–110)
POTASSIUM SERPL-SCNC: 3.7 MMOL/L (ref 3.5–5.1)
PROT SERPL-MCNC: 5.8 G/DL (ref 6–8.4)
SODIUM SERPL-SCNC: 142 MMOL/L (ref 136–145)

## 2022-01-24 PROCEDURE — 97161 PT EVAL LOW COMPLEX 20 MIN: CPT | Mod: HCNC

## 2022-01-24 PROCEDURE — 1111F DSCHRG MED/CURRENT MED MERGE: CPT | Mod: HCNC,CPTII,, | Performed by: PHYSICIAN ASSISTANT

## 2022-01-24 PROCEDURE — 97535 SELF CARE MNGMENT TRAINING: CPT | Mod: HCNC

## 2022-01-24 PROCEDURE — 83605 ASSAY OF LACTIC ACID: CPT | Mod: HCNC | Performed by: INTERNAL MEDICINE

## 2022-01-24 PROCEDURE — 1111F PR DISCHARGE MEDS RECONCILED W/ CURRENT OUTPATIENT MED LIST: ICD-10-PCS | Mod: HCNC,CPTII,, | Performed by: PHYSICIAN ASSISTANT

## 2022-01-24 PROCEDURE — 36415 COLL VENOUS BLD VENIPUNCTURE: CPT | Mod: HCNC | Performed by: PHYSICIAN ASSISTANT

## 2022-01-24 PROCEDURE — 99239 HOSP IP/OBS DSCHRG MGMT >30: CPT | Mod: HCNC,,, | Performed by: PHYSICIAN ASSISTANT

## 2022-01-24 PROCEDURE — 36415 COLL VENOUS BLD VENIPUNCTURE: CPT | Mod: HCNC | Performed by: INTERNAL MEDICINE

## 2022-01-24 PROCEDURE — 97165 OT EVAL LOW COMPLEX 30 MIN: CPT | Mod: HCNC

## 2022-01-24 PROCEDURE — 25000003 PHARM REV CODE 250: Mod: HCNC | Performed by: PHYSICIAN ASSISTANT

## 2022-01-24 PROCEDURE — 25000003 PHARM REV CODE 250: Mod: HCNC | Performed by: INTERNAL MEDICINE

## 2022-01-24 PROCEDURE — 99239 PR HOSPITAL DISCHARGE DAY,>30 MIN: ICD-10-PCS | Mod: HCNC,,, | Performed by: PHYSICIAN ASSISTANT

## 2022-01-24 PROCEDURE — 25000003 PHARM REV CODE 250: Mod: HCNC

## 2022-01-24 PROCEDURE — 80053 COMPREHEN METABOLIC PANEL: CPT | Mod: HCNC | Performed by: PHYSICIAN ASSISTANT

## 2022-01-24 PROCEDURE — 97530 THERAPEUTIC ACTIVITIES: CPT | Mod: HCNC

## 2022-01-24 RX ORDER — DOXYCYCLINE 100 MG/1
100 CAPSULE ORAL EVERY 12 HOURS
Qty: 3 CAPSULE | Refills: 0 | Status: SHIPPED | OUTPATIENT
Start: 2022-01-24 | End: 2022-01-27

## 2022-01-24 RX ORDER — METOPROLOL TARTRATE 25 MG/1
12.5 TABLET, FILM COATED ORAL 2 TIMES DAILY
Qty: 30 TABLET | Refills: 11 | Status: SHIPPED | OUTPATIENT
Start: 2022-01-24 | End: 2022-01-27 | Stop reason: SINTOL

## 2022-01-24 RX ADMIN — SUCRALFATE 1 G: 1 TABLET ORAL at 06:01

## 2022-01-24 RX ADMIN — METHOCARBAMOL 750 MG: 750 TABLET ORAL at 09:01

## 2022-01-24 RX ADMIN — POTASSIUM BICARBONATE 25 MEQ: 978 TABLET, EFFERVESCENT ORAL at 11:01

## 2022-01-24 RX ADMIN — DOXYCYCLINE HYCLATE 100 MG: 100 TABLET, COATED ORAL at 09:01

## 2022-01-24 RX ADMIN — ASPIRIN 81 MG: 81 TABLET, COATED ORAL at 09:01

## 2022-01-24 RX ADMIN — METOPROLOL TARTRATE 12.5 MG: 25 TABLET, FILM COATED ORAL at 09:01

## 2022-01-24 RX ADMIN — SUCRALFATE 1 G: 1 TABLET ORAL at 11:01

## 2022-01-24 RX ADMIN — PANTOPRAZOLE SODIUM 40 MG: 40 TABLET, DELAYED RELEASE ORAL at 09:01

## 2022-01-24 NOTE — DISCHARGE SUMMARY
[facility] [address]  [facility] Medicine  Discharge Summary      Patient Name: Trudi Mcknight  MRN: [id number]  Patient Class: IP- Inpatient  Admission Date: 1/17/2022  Hospital Length of Stay: 5 days  Discharge Date and Time: 1/24/2022  2:53 PM  Attending Physician: Shashi Syed MD  Discharging Provider: SEFERINO NorwoodC  Primary Care Provider: Renuka Moreno MD      HPI:   Trudi Mcknight is a 66-year-old female with paroxysmal Afib, hiatal hernia with esophagitis, and Meniere's disease, who presents after having intermittent chest pain for 2 weeks, acutely worsened today. At its worst the pain was substernal, crushing and radiating to her neck, back, and left arm. The pain is associated palpitations and significant fatigue, shortness of breath, lightheadedness and sensation of presyncope. She also reports onset of pain with eating, though she cannot link the symptoms to certain foods and reports it does not happen every time she eats. She denies nausea/vomiting, abdominal pain, bright blood in stool or dark stools, diaphoresis, fevers/chills, or numbness/tingling. She was recently seen here at [facility] for a similar episode, ACS workup was negative. She was discharged, referred and evaluated by outpatient cardiology. Holter monitor confirmed paroxysmal a fib with RVR. She was not started on AC. She was started on metoprolol for rate control but was unable to tolerate due to hypotension. Planned to start diltiazem, but holter monitor showed 4 second pause and patient was instructed not to start the med. Cards referred her to EP, but patient has not yet been evaluated. She has no personal ACS history but endorses strong family history of heart disease, her mother had an MI in her 50s, her brother had an MI very young and passed away from a heart attack in his 60s.  She had a stress test about 7 years ago.  She is a nonsmoker.    In the ED, patient is afebrile without leukocytosis. Vitals  stable and HR controlled. EKG with sinus arrhythmia, T wave inversion in anterior leads. Troponin trend flat x 2. CXR without acute process. BNP 33. CBC/CMP unremarkable. Patient was given  and GI cocktail, with improvement in CP.       Procedure(s) (LRB):  INSERTION, CARDIAC PACEMAKER, DUAL CHAMBER (N/A)      Hospital Course:   66 y.o. who was admitted to hospital medicine for chest pain. EKG without acute ischemic changes. Troponin WNL x3. CXR unremarkable. DSE planned for 1/19. Prior to DSE, patient  developed acute symptomatic tachycardia with heart rates in the 190-200s interrupted with bouts of nonsustained bradycardia. Rapid response was called, EKG with atrial fibrillation w/ RVR. Cardiology and EP were consulted and pacemaker was placed on 1/20/2022 for sick sinus syndrome. Post-procedure TTE was stable and MTP was initiated, Xarelto to start 5 days post procedure. Case was discussed with general cardiology, as patient in need of is ischemic eval --- recommended outpatient follow-up. On 1/21/2022, patient became symptomatically hypotensive with noted atrial fibrillation w/ RVR. Decreased metoprolol from 25mg to 12.5mg with improvement in hypotension. Patient is stable for discharge home with cardiology follow up on 2/27/22. Outpatient nuclear stress test ordered. Return precautions given.        Goals of Care Treatment Preferences:  Code Status: Full Code      Consults:   Consults (From admission, onward)        Status Ordering Provider     Inpatient consult to Cardiology  Once        Provider:  (Not yet assigned)    Completed SCOTTY RICARDO          * Chest pain  Paroxysmal atrial fibrillation with RVR  Sick sinus syndrome  Trudi Mcknight is a 66-year-old female with paroxysmal Afib, hiatal hernia with esophagitis, and Meniere's disease, who presents after having intermittent chest pain for 2 weeks, acutely worsened today. At its worst the pain was substernal, crushing and radiating to her neck,  back, and left arm. The pain is associated palpitations and significant fatigue, shortness of breath, lightheadedness and sensation of presyncope. She was recently seen here at [facility] for a similar episode, ACS workup was negative. She was discharged, referred and evaluated by outpatient cardiology. Holter monitor earlier this month confirmed paroxysmal afib with RVR. Evaluated by outpatient cardiology. Not on AC, [id number] score 2 (2.2% stroke risk). Pressures unable to tolerate metoprolol, not a candidate for diltiazem based on pause seen on holter. Referred, but not yet evaluated by EP.     RVR noted with symptomatic hypotension, cardiology consulted for atrial fib management   - EKG with sinus arrhythmia, T wave inversion in anterior leads.  - Troponin trend flat x 3   - CXR without acute process, BNP 33.   - Patient was given  and GI cocktail in ED, with improvement in CP.   - PO protonix BID (see hiatal hernia)  - Last echo done 1/10 with normal cardiac function, EF 55%  - recent TSH WNL, lipid and a1c WNL  1/19: Rapid response called for -200s with associated CP     EKG ordered, EKG shows Afib RVR .   As rapid team was leaving, patient began to madelaine down into the 30s temporarily. Called and discussed with cardiology, specifically regarding rate control given her labile HR and documentation of 4sec pause on outpt holter. Formal consult placed. Appreciate assistance. Dobutamine stress echo canceled given change in condition  - start MTP 12.5mg BID and Xarelto (5 days post-procedure)  - Repeat TTE stable: EF 65%  - Devices: PPM placed 1/20  - Cardiology follow up and device check for 2/27  - outpatient nuclear stress test ordered    Hiatal hernia with GERD and esophagitis  Hiatal hernia and GERD with hx of esophagitis on EGD in 2015. Patient was prescribed daily PPI which helped her symptoms in the past, but she reports she stopped taking it out of concern that it would contribute to her  cardiac arrhythmias. Patient being evaluated for CC of CP, which she describes as chest tightness, and at times burning pain. The pain is sometimes brought on by eating, though she has not noticed certain foods to be triggers. She denies abdominal pain, N/V, melena or hematochezia.   - pain improved with GI cocktail x1 in ED  - resume home PO protonix 40mg        Final Active Diagnoses:    Diagnosis Date Noted POA    PRINCIPAL PROBLEM:  Chest pain [R07.9] 01/17/2022 Yes    Sick sinus syndrome [I49.5] 01/21/2022 Clinically Undetermined    Paroxysmal atrial fibrillation with RVR [I48.0] 01/17/2022 Yes    Hiatal hernia with GERD and esophagitis [K44.9, K21.00] 03/07/2021 Yes    Paroxysmal atrial fibrillation [I48.0] 03/07/2021 Yes      Problems Resolved During this Admission:       Discharged Condition: stable    Disposition: Home or Self Care    Follow Up:   Follow-up Information     Kellie Geller MD On 1/27/2022.    Specialty: Cardiology  Why: Follow-up 1/27/22 at 4:00pm  Contact information:  [address] [facility]  8TH FLOOR  [facility] [address]  [phone]             Renuka Moreno MD In 1 week.    Specialty: Internal Medicine  Contact information:  [address]  [place] [address] [phone]                       Patient Instructions:      Ambulatory referral/consult to Outpatient Case Management   Referral Priority: Routine Referral Type: Consultation   Referral Reason: Specialty Services Required   Number of Visits Requested: 1     Ambulatory referral/consult to Wound Clinic   Standing Status: Future   Referral Priority: Routine Referral Type: Consultation   Referral Reason: Specialty Services Required   Requested Specialty: Wound Care   Number of Visits Requested: 1     Diet Cardiac     Diet Cardiac     Notify your health care provider if you experience any of the following:  redness, tenderness, or signs of infection (pain, swelling, redness, odor or green/yellow discharge  around incision site)     Notify your health care provider if you experience any of the following:  severe uncontrolled pain     Notify your health care provider if you experience any of the following:  difficulty breathing or increased cough     Notify your health care provider if you experience any of the following:  persistent nausea and vomiting or diarrhea     Notify your health care provider if you experience any of the following:  severe uncontrolled pain     Notify your health care provider if you experience any of the following:  persistent dizziness, light-headedness, or visual disturbances     Nuclear Stress Test   Standing Status: Future Standing Exp. Date: 01/21/23     Order Specific Question Answer Comments   Which stress agent will be used Pharm    Which medicaton for the stress procedure? Dobutamine    Release to patient Immediate      Activity as tolerated     Activity as tolerated       Significant Diagnostic Studies: Labs: All labs within the past 24 hours have been reviewed    Pending Diagnostic Studies:     None         Medications:  Reconciled Home Medications:      Medication List      START taking these medications    acetaminophen 500 MG tablet  Commonly known as: TYLENOL  Take 2 tablets (1,000 mg total) by mouth 3 (three) times daily. for 3 days     doxycycline 100 MG Cap  Commonly known as: VIBRAMYCIN  Take 1 capsule (100 mg total) by mouth every 12 (twelve) hours. for 3 doses     methocarbamoL 750 MG Tab  Commonly known as: ROBAXIN  Take 1 tablet (750 mg total) by mouth 3 (three) times daily. for 10 days     metoprolol tartrate 25 MG tablet  Commonly known as: LOPRESSOR  Take 0.5 tablets (12.5 mg total) by mouth 2 (two) times daily.     rivaroxaban 20 mg Tab  Commonly known as: XARELTO  Take 1 tablet (20 mg total) by mouth daily with dinner or evening meal.  Start taking on: January 26, 2022        CONTINUE taking these medications    aspirin 81 MG EC tablet  Commonly known as:  ECOTRIN  Take 1 tablet (81 mg total) by mouth once daily.     omeprazole 40 MG capsule  Commonly known as: PRILOSEC     sertraline 25 MG tablet  Commonly known as: ZOLOFT  TAKE 1/2 TABLET BY MOUTH EVERY DAY     vitamin D 1000 units Tab  Commonly known as: VITAMIN D3  Take 1,000 Units by mouth once daily.        STOP taking these medications    diltiaZEM HCl 120 mg 24 hr capsule  Commonly known as: TIAZAC            Indwelling Lines/Drains at time of discharge:   Lines/Drains/Airways     None                 Time spent on the discharge of patient: 37 minutes         Mercedez Huerta PA-C  Department of Hospital Medicine  [facility] - Telemetry Stepdown ([place]-[facility])

## 2022-01-24 NOTE — ASSESSMENT & PLAN NOTE
Hiatal hernia and GERD with hx of esophagitis on EGD in 2015. Patient was prescribed daily PPI which helped her symptoms in the past, but she reports she stopped taking it out of concern that it would contribute to her cardiac arrhythmias. Patient being evaluated for CC of CP, which she describes as chest tightness, and at times burning pain. The pain is sometimes brought on by eating, though she has not noticed certain foods to be triggers. She denies abdominal pain, N/V, melena or hematochezia.   - pain improved with GI cocktail x1 in ED  - resume home PO protonix 40mg

## 2022-01-24 NOTE — PT/OT/SLP EVAL
"Occupational Therapy   Evaluation and Discharge Note    Name: Trudi Mcknight  MRN: [id number]  Admitting Diagnosis:  Chest pain   Recent Surgery: Procedure(s) (LRB):  INSERTION, CARDIAC PACEMAKER, DUAL CHAMBER (N/A) 4 Days Post-Op    Recommendations:     Discharge Recommendations: home  Discharge Equipment Recommendations:  none  Barriers to discharge:  None    Assessment:     Trudi Mcknight is a 66 y.o. female with a medical diagnosis of Chest pain. Pt presents with independence in functional mobility, lower body dressing and grooming activities. Pt demonstrated independence with bed mobility. Pt's ROM/Strength of RUE are WNL. Pt's LUE ROM/ Strength were limited due to recent pacemaker placement on 1/20/2022. During evalaution, Pt's LUE ROM/strength were WFL. At this time, patient is functioning at their prior level of function and does not require further acute OT services.     Plan:     During this hospitalization, patient does not require further acute OT services.  Please re-consult if situation changes.    · Plan of Care Reviewed with: patient    Subjective     Chief Complaint: "It's hard to do anything with this IV"  Patient/Family Comments/goals: Return home to be with her 2 grandchildren.    Occupational Profile:  Living Environment:Pt lives with her  in a [facility] with 6 ESTUARDO and bilateral hand rails.  Pt has a tub/shower style bathroom and does not report difficulty with accessibility.   Previous level of function:Pt reports independence with all ADLs.  Roles and Routines: Pt is retired and stays home to look after her home and grandchildren.  Equipment Used at home:  none  Assistance upon Discharge: Pt has good family support.    Pain/Comfort:  · Pain Rating 1: 0/10      Objective:     Communicated with: RN prior to session.  Patient found supine with peripheral IV,telemetry upon OT entry to room.  General Precautions: Standard, fall,pacemaker   Orthopedic Precautions:N/A (pacemaker precautions, recent " surgery)   Braces: N/A  Respiratory Status: Room air     Occupational Performance:    Bed Mobility:    · Patient completed Rolling/Turning to Left with  independence  · Patient completed Scooting/Bridging with independence  · Patient completed Supine to Sit with independence  · Patient completed Sit to Supine with independence    Functional Mobility/Transfers:  · Patient completed Sit <> Stand Transfer with independence  with  no assistive device   · Functional Mobility: Pt demonstrated ability to independently walk ~20' with no AD.    Activities of Daily Living:  · Grooming: independence oral care and hair care while standing at the sink  · Lower Body Dressing: independence while seated at the EOB    Cognitive/Visual Perceptual:  Cognitive/Psychosocial Skills:     -       Oriented to: Person, Place, Time and Situation   -       Follows Commands/attention:Follows multistep  commands  -       Safety awareness/insight to disability: intact     Physical Exam:  Upper Extremity Range of Motion:     -       Right Upper Extremity: WNL  -       Left Upper Extremity: WFL, did not fully assess 2/2  recent pacemaker surgery on 1/20/2022  Upper Extremity Strength:    -       Right Upper Extremity: WNL  -       Left Upper Extremity: WFL, unable to fully assess 2/2 recent pacemaker surgery on 1/20/2022   Strength:    -       Right Upper Extremity: WNL  -       Left Upper Extremity: WNL    AMPAC 6 Click ADL:  AMPAC Total Score: 23    Treatment & Education:  Educated Role of OT  Educated on discharge planning    Education:    Patient left supine with all lines intact and call button in reach    GOALS:   Multidisciplinary Problems     Occupational Therapy Goals     Not on file                History:     Past Medical History:   Diagnosis Date    Esophagitis     Meniere's disease        Past Surgical History:   Procedure Laterality Date    A-V CARDIAC PACEMAKER INSERTION N/A 1/20/2022    Procedure: INSERTION, CARDIAC  PACEMAKER, DUAL CHAMBER;  Surgeon: Ernesto Duncan MD;  Location: [facility] EP LAB;  Service: Cardiology;  Laterality: N/A;  SB, DUAL PPM, SJM, ANES, SK, 7071    BREAST CYST ASPIRATION           SECTION      COLONOSCOPY N/A 2019    Procedure: COLONOSCOPY;  Surgeon: INGRID Russo MD;  Location: [facility] ENDO ([address] [place] [address]);  Service: Endoscopy;  Laterality: N/A;    HYSTERECTOMY      TONSILLECTOMY         Time Tracking:     OT Date of Treatment: 22  OT Start Time: 1138  OT Stop Time: 1155  OT Total Time (min): 17 min    Billable Minutes:Evaluation 9  Self Care/Home Management 8    2022

## 2022-01-24 NOTE — PT/OT/SLP EVAL
Physical Therapy Evaluation and Discharge Note    Patient Name:  Trudi Mcknight   MRN:  [id number]    Recommendations:     Discharge Recommendations:  home (no needs)   Discharge Equipment Recommendations: none   Barriers to discharge: None    Assessment:     Trudi Mcknight is a 66 y.o. female admitted with a medical diagnosis of Chest pain. .  At this time, patient is functioning at their prior level of function and does not require further acute PT services.     Recent Surgery: Procedure(s) (LRB):  INSERTION, CARDIAC PACEMAKER, DUAL CHAMBER (N/A) 4 Days Post-Op    Plan:     During this hospitalization, patient does not require further acute PT services.  Please re-consult if situation changes.      Subjective     Chief Complaint:  None stated  Patient/Family Comments/goals: return home to PLOF  Pain/Comfort:  · Pain Rating 1: 4/10  · Location - Orientation 1: generalized  · Location 1: neck  · Pain Addressed 1: Pre-medicate for activity,Nurse notified  · Pain Rating Post-Intervention 1: 4/10    Patients cultural, spiritual, Hindu conflicts given the current situation: no    Living Environment:  Pt lives w/ her  in a 1SH w/ [facility] and R that are far apart. She has a tub/shower combo.  Prior to admission, patients level of function was IND w/ mobility/ADLs w/o an AD. She takes care of her 4 y/o grandson daily.  Equipment used at home: none.  DME owned (not currently used): none.  Upon discharge, patient will have assistance from her  as needed.    Objective:     Communicated with nursing prior to session.  Patient found sitting at EOB with telemetry upon PT entry to room.    General Precautions: Standard, fall,pacemaker   Orthopedic Precautions: (LUE pacemaker precautions)   Braces: Sling and swathe (to be worn at night)   Respiratory Status: Room air    Exams:  · Cognitive Exam:  Patient is oriented to Person, Place, Time and Situation  · Fine Motor Coordination:    · -       Intact  Left hand  thumb/finger opposition skills and Right hand thumb/finger opposition skills  · Gross Motor Coordination:  WFL  · Postural Exam:  Patient presented with the following abnormalities:    · -       Rounded shoulders  · Sensation:    · -       Intact  light/touch hands/feet  · Skin Integrity/Edema:      · -       Skin integrity: Visible skin intact  · RUE ROM: WFL  · RUE Strength: WFL  · LUE ROM: WFL except shoulder limited to 90 degrees flexion/abd due to pacemaker precautions  · LUE Strength: not tested due to pacemaker precautions  · RLE ROM: WFL  · RLE Strength: WFL  · LLE ROM: WFL  · LLE Strength: WFL    Functional Mobility:  · Transfers:    · Sit<>stand to/from EOB w/o AD IND  · Gait:   · ~100ft in room w/o AD IND  · Swing through gait pattern w/ good step length/height  · No instability or LOB noted  · Balance:   · Static/dynamic standing balance w/o AD IND    AM-PAC 6 CLICK MOBILITY  Total Score:24       Therapeutic Activities and Exercises:  Pt was educated on pacemaker precautions including: LUE sling/swath to be worn at night, no lifting more than 5lbs w/ LUE, and no rasing LUE elbow above shoulder. Pt was also educated on her current LOF and the importance of frequent mobility at home. Pt verb understanding.     AM-PAC 6 CLICK MOBILITY  Total Score:24     Patient left sitting at EOB with all lines intact and call button in reach.    GOALS:   Multidisciplinary Problems     Physical Therapy Goals     Not on file                History:     Past Medical History:   Diagnosis Date    Esophagitis     Meniere's disease        Past Surgical History:   Procedure Laterality Date    A-V CARDIAC PACEMAKER INSERTION N/A 2022    Procedure: INSERTION, CARDIAC PACEMAKER, DUAL CHAMBER;  Surgeon: Ernesto Duncan MD;  Location: [facility] EP LAB;  Service: Cardiology;  Laterality: N/A;  SB, DUAL PPM, SJM, ANES, SK, 7071    BREAST CYST ASPIRATION           SECTION      COLONOSCOPY N/A 2019    Procedure:  COLONOSCOPY;  Surgeon: INGRID Russo MD;  Location: [facility] ([address] [place] [address]);  Service: Endoscopy;  Laterality: N/A;    HYSTERECTOMY      TONSILLECTOMY         Time Tracking:     PT Received On: 01/24/22  PT Start Time: 1400     PT Stop Time: 1418  PT Total Time (min): 18 min     Billable Minutes: Evaluation 10, Therapeutic Activity 8 and Total Time 18      01/24/2022

## 2022-01-24 NOTE — PLAN OF CARE
[facility]ude [facility] - Telemetry Stepdown ([facility])  Discharge Final Note    Primary Care Provider: Renuka Moreno MD    Expected Discharge Date: 1/24/2022    Final Discharge Note (most recent)     Final Note - 01/24/22 1405        Final Note    Assessment Type Final Discharge Note     Anticipated Discharge Disposition Home or Self Care     Hospital Resources/Appts/Education Provided Provided patient/caregiver with written discharge plan information;Appointments scheduled and added to AVS        Post-Acute Status    Post-Acute Authorization Other     Other Status No Post-Acute Service Needs     Discharge Delays None known at this time             Patient discharged home with no needs.  Family available for transportation.  Information given to her per medical staff for f/u and symptoms to notify provider.                 PCP:  Renuka Moreno MD  [phone]        Pharmacy:    [facility]/pharmacy #[id number] - [place] - [address]  [address]  [place] [address]  Phone: [phone] Fax: [phone]        Emergency Contacts:  Extended Emergency Contact Information  Primary Emergency Contact: Jose Mcknight  Address: [address] [place] [address]  Mobile Phone: [phone]  Relation: Spouse      Insurance:    Payor: HU[employer] MANAGED MEDICARE / Plan: HU[employer]NA TOTAL CARE ADVANTAGE / Product Type: Medicare Advantage /       01/24/2022  2:05 PM      Future Appointments   Date Time Provider Department Center   1/27/2022  1:20 PM PACEMAKER, ICD [facility] DAVID [facility]   1/27/2022  4:00 PM Kellie Geller MD [facility] CARDIO [facility]   1/31/2022  9:20 AM Keiko Parnell NP [facility] GASTRO [facility]   4/27/2022  8:00 AM EKG, APPT [facility] EKG [facility]   4/27/2022  8:20 AM COORDINATED DEVICE CHECK NOM DAVID [facility]   4/27/2022  9:20 AM Ernesto Duncan MD [facility] ARRHYTH [facility]     Keiko Merchant, RN, CM  Case Management  [id number]          Important Message from Medicare  Important Message from  Medicare regarding Discharge Appeal Rights: Given to patient/caregiver,Explained to patient/caregiver,Signed/date by patient/caregiver     Date IMM was signed: 01/24/22  Time IMM was signed: 1143    Contact Info     Kellie Geller MD   Specialty: Cardiology    [address] [facility]  8TH FLOOR  [facility] [address]   Phone: [phone]       Next Steps: Follow up on 1/27/2022    Instructions: Follow-up 1/27/22 at 4:00pm    Renuka Moreno MD   Specialty: Internal Medicine   Relationship: PCP - General    [address]  [place] [address]   Phone: [phone]       Next Steps: Follow up in 1 week(s)

## 2022-01-24 NOTE — PROGRESS NOTES
Reviewed discharge instructions with pt. Questions encouraged and answered. Verbalized understanding. IV removed. VSS. No fall or injuries noted during shift. Pt dressed appropriately for weather. Pt waiting transportation services. Will continue to monitor.

## 2022-01-24 NOTE — TELEPHONE ENCOUNTER
Spoke with patient and she stated that she has a follow up appointment with her doctor on 01/27/2022 to have her pacemaker check. She  didn't need and appointment with wound care.

## 2022-01-24 NOTE — CARE UPDATE
RAPID RESPONSE NURSE ROUND       Rounding completed with charge RNEkaterina. No concerns verbalized at this time. Instructed to call [phone] for further concerns or assistance.

## 2022-01-24 NOTE — TELEPHONE ENCOUNTER
----- Message from Marycarmen Caldera sent at 1/24/2022  2:01 PM CST -----  Regarding: Woundcare appointment  Contact: I49.5 (ICD-10-CM) - Sick sinus syndrome  Calling in regards to scheduling patient for follow up appointment within a week from today. Please call patient to schedule and send teams message of appointment to Keiko Merchant

## 2022-01-25 ENCOUNTER — PES CALL (OUTPATIENT)
Dept: ADMINISTRATIVE | Facility: CLINIC | Age: 67
End: 2022-01-25
Payer: MEDICARE

## 2022-01-25 ENCOUNTER — TELEPHONE (OUTPATIENT)
Dept: ELECTROPHYSIOLOGY | Facility: CLINIC | Age: 67
End: 2022-01-25
Payer: MEDICARE

## 2022-01-25 NOTE — TELEPHONE ENCOUNTER
Spoke to: Trudi Mcknight      Does the patient have any concerns, questions about post op instructions? Yes c/o chest pain that radiates up her neck.  Pain that is intermittent that lasts about 20 minutes and then settles down.   She has periods of irreg heart rhythm with HR in 150's.  Currently denies pain HR 70's and /65.  She does not know how to use her remote monitor to send readings to device team.  Instructed to bring the equipment with her on Thursday.     Does the patient have any concerns, questions about wound site? No,  She has no dressing just wound glue and liquid bandage.  The site was red at first but no longer red, no swelling or drainage.  Protecting with plastic wrap to shower.       Has the patient resumed medications and/or picked up new prescriptions ordered at discharge? Taking doxycycline as instructed and will resume the xeralto once antibiotics are complete    Does the patient have any other questions regarding their procedure? NO    Instructed if chest pain occurs and sustains greater than 15 minutes or more with pain radiating, nausea, SOB, Dizziness she should seek emergency care.     Patient verbalized understanding.

## 2022-01-26 ENCOUNTER — TELEPHONE (OUTPATIENT)
Dept: ELECTROPHYSIOLOGY | Facility: CLINIC | Age: 67
End: 2022-01-26
Payer: MEDICARE

## 2022-01-26 ENCOUNTER — HOSPITAL ENCOUNTER (EMERGENCY)
Facility: HOSPITAL | Age: 67
Discharge: HOME | End: 2022-01-26
Attending: EMERGENCY MEDICINE
Payer: MEDICARE

## 2022-01-26 VITALS
HEART RATE: 91 BPM | RESPIRATION RATE: 19 BRPM | DIASTOLIC BLOOD PRESSURE: 81 MMHG | WEIGHT: 126 LBS | TEMPERATURE: 98 F | BODY MASS INDEX: 23.81 KG/M2 | SYSTOLIC BLOOD PRESSURE: 128 MMHG | OXYGEN SATURATION: 100 %

## 2022-01-26 DIAGNOSIS — K44.9 HIATAL HERNIA: ICD-10-CM

## 2022-01-26 DIAGNOSIS — R07.9 CHEST PAIN: Primary | ICD-10-CM

## 2022-01-26 DIAGNOSIS — I48.0 PAROXYSMAL ATRIAL FIBRILLATION: ICD-10-CM

## 2022-01-26 LAB
ALBUMIN SERPL BCP-MCNC: 3 G/DL (ref 3.5–5.2)
ALP SERPL-CCNC: 74 U/L (ref 55–135)
ALT SERPL W/O P-5'-P-CCNC: 39 U/L (ref 10–44)
ANION GAP SERPL CALC-SCNC: 8 MMOL/L (ref 8–16)
AST SERPL-CCNC: 23 U/L (ref 10–40)
BASOPHILS # BLD AUTO: 0.05 K/UL (ref 0–0.2)
BASOPHILS NFR BLD: 0.8 % (ref 0–1.9)
BILIRUB SERPL-MCNC: 0.3 MG/DL (ref 0.1–1)
BNP SERPL-MCNC: 320 PG/ML (ref 0–99)
BUN SERPL-MCNC: 13 MG/DL (ref 8–23)
CALCIUM SERPL-MCNC: 9.6 MG/DL (ref 8.7–10.5)
CHLORIDE SERPL-SCNC: 111 MMOL/L (ref 95–110)
CO2 SERPL-SCNC: 25 MMOL/L (ref 23–29)
CREAT SERPL-MCNC: 0.7 MG/DL (ref 0.5–1.4)
CTP QC/QA: YES
DIFFERENTIAL METHOD: ABNORMAL
EOSINOPHIL # BLD AUTO: 0.2 K/UL (ref 0–0.5)
EOSINOPHIL NFR BLD: 3.3 % (ref 0–8)
ERYTHROCYTE [DISTWIDTH] IN BLOOD BY AUTOMATED COUNT: 14.3 % (ref 11.5–14.5)
EST. GFR  (AFRICAN AMERICAN): >60 ML/MIN/1.73 M^2
EST. GFR  (NON AFRICAN AMERICAN): >60 ML/MIN/1.73 M^2
GLUCOSE SERPL-MCNC: 106 MG/DL (ref 70–110)
HCT VFR BLD AUTO: 34.6 % (ref 37–48.5)
HGB BLD-MCNC: 11.2 G/DL (ref 12–16)
IMM GRANULOCYTES # BLD AUTO: 0.01 K/UL (ref 0–0.04)
IMM GRANULOCYTES NFR BLD AUTO: 0.2 % (ref 0–0.5)
LYMPHOCYTES # BLD AUTO: 1.7 K/UL (ref 1–4.8)
LYMPHOCYTES NFR BLD: 27.3 % (ref 18–48)
MCH RBC QN AUTO: 27.7 PG (ref 27–31)
MCHC RBC AUTO-ENTMCNC: 32.4 G/DL (ref 32–36)
MCV RBC AUTO: 86 FL (ref 82–98)
MONOCYTES # BLD AUTO: 0.5 K/UL (ref 0.3–1)
MONOCYTES NFR BLD: 7.7 % (ref 4–15)
NEUTROPHILS # BLD AUTO: 3.7 K/UL (ref 1.8–7.7)
NEUTROPHILS NFR BLD: 60.7 % (ref 38–73)
NRBC BLD-RTO: 0 /100 WBC
PLATELET # BLD AUTO: 334 K/UL (ref 150–450)
PMV BLD AUTO: 10 FL (ref 9.2–12.9)
POTASSIUM SERPL-SCNC: 3.4 MMOL/L (ref 3.5–5.1)
PROT SERPL-MCNC: 6.6 G/DL (ref 6–8.4)
RBC # BLD AUTO: 4.04 M/UL (ref 4–5.4)
SARS-COV-2 RDRP RESP QL NAA+PROBE: NEGATIVE
SODIUM SERPL-SCNC: 144 MMOL/L (ref 136–145)
TROPONIN I SERPL DL<=0.01 NG/ML-MCNC: <0.006 NG/ML (ref 0–0.03)
WBC # BLD AUTO: 6.09 K/UL (ref 3.9–12.7)

## 2022-01-26 PROCEDURE — 96374 THER/PROPH/DIAG INJ IV PUSH: CPT | Mod: HCNC

## 2022-01-26 PROCEDURE — 93010 EKG 12-LEAD: ICD-10-PCS | Mod: HCNC,,, | Performed by: INTERNAL MEDICINE

## 2022-01-26 PROCEDURE — 83880 ASSAY OF NATRIURETIC PEPTIDE: CPT | Mod: HCNC | Performed by: NURSE PRACTITIONER

## 2022-01-26 PROCEDURE — 25000003 PHARM REV CODE 250: Mod: HCNC

## 2022-01-26 PROCEDURE — 93005 ELECTROCARDIOGRAM TRACING: CPT | Mod: HCNC

## 2022-01-26 PROCEDURE — 84484 ASSAY OF TROPONIN QUANT: CPT | Mod: HCNC | Performed by: NURSE PRACTITIONER

## 2022-01-26 PROCEDURE — 99285 EMERGENCY DEPT VISIT HI MDM: CPT | Mod: 25,HCNC

## 2022-01-26 PROCEDURE — 99285 EMERGENCY DEPT VISIT HI MDM: CPT | Mod: HCNC,,, | Performed by: EMERGENCY MEDICINE

## 2022-01-26 PROCEDURE — 93010 ELECTROCARDIOGRAM REPORT: CPT | Mod: HCNC,,, | Performed by: INTERNAL MEDICINE

## 2022-01-26 PROCEDURE — 85025 COMPLETE CBC W/AUTO DIFF WBC: CPT | Mod: HCNC | Performed by: NURSE PRACTITIONER

## 2022-01-26 PROCEDURE — 80053 COMPREHEN METABOLIC PANEL: CPT | Mod: HCNC | Performed by: NURSE PRACTITIONER

## 2022-01-26 PROCEDURE — 96375 TX/PRO/DX INJ NEW DRUG ADDON: CPT | Mod: HCNC

## 2022-01-26 PROCEDURE — 63600175 PHARM REV CODE 636 W HCPCS: Mod: HCNC

## 2022-01-26 PROCEDURE — 99285 PR EMERGENCY DEPT VISIT,LEVEL V: ICD-10-PCS | Mod: HCNC,,, | Performed by: EMERGENCY MEDICINE

## 2022-01-26 RX ORDER — KETOROLAC TROMETHAMINE 30 MG/ML
10 INJECTION, SOLUTION INTRAMUSCULAR; INTRAVENOUS
Status: COMPLETED | OUTPATIENT
Start: 2022-01-26 | End: 2022-01-26

## 2022-01-26 RX ORDER — MAG HYDROX/ALUMINUM HYD/SIMETH 200-200-20
5 SUSPENSION, ORAL (FINAL DOSE FORM) ORAL
Status: DISCONTINUED | OUTPATIENT
Start: 2022-01-26 | End: 2022-01-27 | Stop reason: HOSPADM

## 2022-01-26 RX ORDER — METOPROLOL TARTRATE 1 MG/ML
5 INJECTION, SOLUTION INTRAVENOUS
Status: COMPLETED | OUTPATIENT
Start: 2022-01-26 | End: 2022-01-26

## 2022-01-26 RX ADMIN — METOROPROLOL TARTRATE 5 MG: 5 INJECTION, SOLUTION INTRAVENOUS at 09:01

## 2022-01-26 RX ADMIN — KETOROLAC TROMETHAMINE 10 MG: 30 INJECTION, SOLUTION INTRAMUSCULAR at 09:01

## 2022-01-26 NOTE — TELEPHONE ENCOUNTER
----- Message from Joana Harding RN sent at 1/25/2022  4:25 PM CST -----  Regarding: chest pain  FYI,  patient with intermittent chest pain.  Status post PPM insertion.   See note from call.     Joana

## 2022-01-27 ENCOUNTER — OFFICE VISIT (OUTPATIENT)
Dept: CARDIOLOGY | Facility: CLINIC | Age: 67
End: 2022-01-27
Payer: MEDICARE

## 2022-01-27 ENCOUNTER — TELEPHONE (OUTPATIENT)
Dept: ELECTROPHYSIOLOGY | Facility: CLINIC | Age: 67
End: 2022-01-27
Payer: MEDICARE

## 2022-01-27 ENCOUNTER — CLINICAL SUPPORT (OUTPATIENT)
Dept: CARDIOLOGY | Facility: HOSPITAL | Age: 67
End: 2022-01-27
Attending: INTERNAL MEDICINE
Payer: MEDICARE

## 2022-01-27 VITALS
SYSTOLIC BLOOD PRESSURE: 133 MMHG | BODY MASS INDEX: 24.55 KG/M2 | DIASTOLIC BLOOD PRESSURE: 58 MMHG | HEART RATE: 93 BPM | HEIGHT: 61 IN | WEIGHT: 130.06 LBS

## 2022-01-27 DIAGNOSIS — Z82.49 FAMILY HISTORY OF PREMATURE CAD: ICD-10-CM

## 2022-01-27 DIAGNOSIS — E78.00 HYPERCHOLESTEREMIA: ICD-10-CM

## 2022-01-27 DIAGNOSIS — I49.8 OTHER SPECIFIED CARDIAC ARRHYTHMIAS: ICD-10-CM

## 2022-01-27 DIAGNOSIS — I48.0 PAROXYSMAL ATRIAL FIBRILLATION: Primary | ICD-10-CM

## 2022-01-27 PROCEDURE — 3288F FALL RISK ASSESSMENT DOCD: CPT | Mod: HCNC,CPTII,S$GLB, | Performed by: INTERNAL MEDICINE

## 2022-01-27 PROCEDURE — 1159F PR MEDICATION LIST DOCUMENTED IN MEDICAL RECORD: ICD-10-PCS | Mod: HCNC,CPTII,S$GLB, | Performed by: INTERNAL MEDICINE

## 2022-01-27 PROCEDURE — 3288F PR FALLS RISK ASSESSMENT DOCUMENTED: ICD-10-PCS | Mod: HCNC,CPTII,S$GLB, | Performed by: INTERNAL MEDICINE

## 2022-01-27 PROCEDURE — 1126F AMNT PAIN NOTED NONE PRSNT: CPT | Mod: HCNC,CPTII,S$GLB, | Performed by: INTERNAL MEDICINE

## 2022-01-27 PROCEDURE — 3008F PR BODY MASS INDEX (BMI) DOCUMENTED: ICD-10-PCS | Mod: HCNC,CPTII,S$GLB, | Performed by: INTERNAL MEDICINE

## 2022-01-27 PROCEDURE — 3044F PR MOST RECENT HEMOGLOBIN A1C LEVEL <7.0%: ICD-10-PCS | Mod: HCNC,CPTII,S$GLB, | Performed by: INTERNAL MEDICINE

## 2022-01-27 PROCEDURE — 3008F BODY MASS INDEX DOCD: CPT | Mod: HCNC,CPTII,S$GLB, | Performed by: INTERNAL MEDICINE

## 2022-01-27 PROCEDURE — 3075F SYST BP GE 130 - 139MM HG: CPT | Mod: HCNC,CPTII,S$GLB, | Performed by: INTERNAL MEDICINE

## 2022-01-27 PROCEDURE — 3078F PR MOST RECENT DIASTOLIC BLOOD PRESSURE < 80 MM HG: ICD-10-PCS | Mod: HCNC,CPTII,S$GLB, | Performed by: INTERNAL MEDICINE

## 2022-01-27 PROCEDURE — 1159F MED LIST DOCD IN RCRD: CPT | Mod: HCNC,CPTII,S$GLB, | Performed by: INTERNAL MEDICINE

## 2022-01-27 PROCEDURE — 1160F PR REVIEW ALL MEDS BY PRESCRIBER/CLIN PHARMACIST DOCUMENTED: ICD-10-PCS | Mod: HCNC,CPTII,S$GLB, | Performed by: INTERNAL MEDICINE

## 2022-01-27 PROCEDURE — 3075F PR MOST RECENT SYSTOLIC BLOOD PRESS GE 130-139MM HG: ICD-10-PCS | Mod: HCNC,CPTII,S$GLB, | Performed by: INTERNAL MEDICINE

## 2022-01-27 PROCEDURE — 1160F RVW MEDS BY RX/DR IN RCRD: CPT | Mod: HCNC,CPTII,S$GLB, | Performed by: INTERNAL MEDICINE

## 2022-01-27 PROCEDURE — 3044F HG A1C LEVEL LT 7.0%: CPT | Mod: HCNC,CPTII,S$GLB, | Performed by: INTERNAL MEDICINE

## 2022-01-27 PROCEDURE — 93280 PM DEVICE PROGR EVAL DUAL: CPT | Mod: 26,HCNC,, | Performed by: INTERNAL MEDICINE

## 2022-01-27 PROCEDURE — 1111F DSCHRG MED/CURRENT MED MERGE: CPT | Mod: HCNC,CPTII,S$GLB, | Performed by: INTERNAL MEDICINE

## 2022-01-27 PROCEDURE — 1101F PT FALLS ASSESS-DOCD LE1/YR: CPT | Mod: HCNC,CPTII,S$GLB, | Performed by: INTERNAL MEDICINE

## 2022-01-27 PROCEDURE — 93280 PM DEVICE PROGR EVAL DUAL: CPT | Mod: HCNC

## 2022-01-27 PROCEDURE — 99214 OFFICE O/P EST MOD 30 MIN: CPT | Mod: HCNC,S$GLB,, | Performed by: INTERNAL MEDICINE

## 2022-01-27 PROCEDURE — 1101F PR PT FALLS ASSESS DOC 0-1 FALLS W/OUT INJ PAST YR: ICD-10-PCS | Mod: HCNC,CPTII,S$GLB, | Performed by: INTERNAL MEDICINE

## 2022-01-27 PROCEDURE — 93280 CARDIAC DEVICE CHECK - IN CLINIC & HOSPITAL: ICD-10-PCS | Mod: 26,HCNC,, | Performed by: INTERNAL MEDICINE

## 2022-01-27 PROCEDURE — 3078F DIAST BP <80 MM HG: CPT | Mod: HCNC,CPTII,S$GLB, | Performed by: INTERNAL MEDICINE

## 2022-01-27 PROCEDURE — 1126F PR PAIN SEVERITY QUANTIFIED, NO PAIN PRESENT: ICD-10-PCS | Mod: HCNC,CPTII,S$GLB, | Performed by: INTERNAL MEDICINE

## 2022-01-27 PROCEDURE — 1111F PR DISCHARGE MEDS RECONCILED W/ CURRENT OUTPATIENT MED LIST: ICD-10-PCS | Mod: HCNC,CPTII,S$GLB, | Performed by: INTERNAL MEDICINE

## 2022-01-27 PROCEDURE — 99999 PR PBB SHADOW E&M-EST. PATIENT-LVL IV: ICD-10-PCS | Mod: PBBFAC,HCNC,, | Performed by: INTERNAL MEDICINE

## 2022-01-27 PROCEDURE — 99214 PR OFFICE/OUTPT VISIT, EST, LEVL IV, 30-39 MIN: ICD-10-PCS | Mod: HCNC,S$GLB,, | Performed by: INTERNAL MEDICINE

## 2022-01-27 PROCEDURE — 99999 PR PBB SHADOW E&M-EST. PATIENT-LVL IV: CPT | Mod: PBBFAC,HCNC,, | Performed by: INTERNAL MEDICINE

## 2022-01-27 RX ORDER — DILTIAZEM HYDROCHLORIDE 90 MG/1
90 TABLET, FILM COATED ORAL 3 TIMES DAILY
Qty: 90 TABLET | Refills: 3 | Status: SHIPPED | OUTPATIENT
Start: 2022-01-27 | End: 2022-02-04 | Stop reason: CLARIF

## 2022-01-27 RX ORDER — DILTIAZEM HYDROCHLORIDE 90 MG/1
90 TABLET, FILM COATED ORAL 3 TIMES DAILY
COMMUNITY
End: 2022-01-27

## 2022-01-27 RX ORDER — DILTIAZEM HYDROCHLORIDE 90 MG/1
90 TABLET, FILM COATED ORAL 3 TIMES DAILY
Qty: 90 TABLET | Refills: 11 | Status: ON HOLD | OUTPATIENT
Start: 2022-01-27 | End: 2022-02-07 | Stop reason: HOSPADM

## 2022-01-27 NOTE — ED PROVIDER NOTES
"Encounter Date: 2022       History     Chief Complaint   Patient presents with    Chest Pain     Pt had a pacemaker placed last week and since has felt "weird". Light headed and feels like her legs will give out from under her.      Ms. Mcknight is a 65 yo woman PMHx of Afib with RVR on lopressor and xarelto, sick sinus syndrome s/p pacemaker in , hiatial hernia, GERD who presents with constant palpitations and chest pain that started at 11pm last night. Chest pain radiates to neck and L arm. She says that she has been feeling SOB at rest and weak as though her legs are going to give out. Also reports diffuse abdominal pain that she says sometimes happens when she has her A Fib.         Review of patient's allergies indicates:   Allergen Reactions    Penicillins Hives     causes congestion and hives    Tricyclic compounds      Past Medical History:   Diagnosis Date    Esophagitis     Meniere's disease      Past Surgical History:   Procedure Laterality Date    A-V CARDIAC PACEMAKER INSERTION N/A 2022    Procedure: INSERTION, CARDIAC PACEMAKER, DUAL CHAMBER;  Surgeon: Ernesto Duncan MD;  Location: [facility] EP LAB;  Service: Cardiology;  Laterality: N/A;  SB, DUAL PPM, SJM, ANES, SK, [id number]    BREAST CYST ASPIRATION           SECTION      COLONOSCOPY N/A 2019    Procedure: COLONOSCOPY;  Surgeon: INGRID Russo MD;  Location: [facility] ENDO ([address] [place] [address]);  Service: Endoscopy;  Laterality: N/A;    HYSTERECTOMY      TONSILLECTOMY       Family History   Problem Relation Age of Onset    Heart disease Mother 55        bypass at 55    Breast cancer Mother     Hypertension Mother     Hyperlipidemia Mother     Heart disease Father     Hypertension Father     Heart disease Brother     Diverticulitis Brother     Diverticulitis Sister     Breast cancer Paternal Grandmother     Colon cancer Neg Hx     Melanoma Neg Hx     Amblyopia Neg Hx     Blindness Neg Hx     Cataracts Neg Hx     " Glaucoma Neg Hx     Macular degeneration Neg Hx     Strabismus Neg Hx     Retinal detachment Neg Hx      Social History     Tobacco Use    Smoking status: Never Smoker    Smokeless tobacco: Never Used   Substance Use Topics    Alcohol use: No     Alcohol/week: 0.0 standard drinks     Comment: rarely    Drug use: No     Review of Systems   Constitutional: Positive for fatigue. Negative for chills and fever.   HENT: Negative.    Respiratory: Positive for shortness of breath. Negative for cough.    Cardiovascular: Positive for chest pain (radiating to neck and L arm) and palpitations. Negative for leg swelling.   Gastrointestinal: Positive for abdominal pain. Negative for abdominal distention, constipation, diarrhea, nausea and vomiting.   Genitourinary: Negative for difficulty urinating and dysuria.   Musculoskeletal: Negative.    Skin: Negative.    Allergic/Immunologic: Negative.    Neurological: Positive for weakness and light-headedness. Negative for syncope and numbness.   Psychiatric/Behavioral: The patient is nervous/anxious.        Physical Exam     Initial Vitals [01/26/22 1927]   BP Pulse Resp Temp SpO2   (!) 128/96 74 18 98.3 °F (36.8 °C) 99 %      MAP       --         Physical Exam    Nursing note and vitals reviewed.  Constitutional: She appears well-developed and well-nourished.   HENT:   Head: Normocephalic and atraumatic.   Eyes: EOM are normal.   Neck: Neck supple. No JVD present.   Normal range of motion.  Cardiovascular: Normal heart sounds.   No murmur heard.  Irregularly irregular heart beat   Pulmonary/Chest: Breath sounds normal.   Abdominal: Abdomen is soft. There is no abdominal tenderness.   Musculoskeletal:         General: No edema.      Cervical back: Normal range of motion and neck supple.     Neurological: She is alert and oriented to person, place, and time.   Skin: Skin is warm and dry.         ED Course   Procedures  Labs Reviewed   CBC W/ AUTO DIFFERENTIAL - Abnormal; Notable  for the following components:       Result Value    Hemoglobin 11.2 (*)     Hematocrit 34.6 (*)     All other components within normal limits   COMPREHENSIVE METABOLIC PANEL - Abnormal; Notable for the following components:    Potassium 3.4 (*)     Chloride 111 (*)     Albumin 3.0 (*)     All other components within normal limits   B-TYPE NATRIURETIC PEPTIDE - Abnormal; Notable for the following components:     (*)     All other components within normal limits   TROPONIN I     EKG Readings: (Independently Interpreted)   Initial Reading: No STEMI. Rhythm: Atrial Fibrillation.     ECG Results          EKG 12-lead (In process)  Result time 01/27/22 08:29:22    In process by Interface, Lab In [facility] (01/27/22 08:29:22)                 Narrative:    Test Reason :     Vent. Rate : 113 BPM     Atrial Rate : 111 BPM     P-R Int : 000 ms          QRS Dur : 070 ms      QT Int : 366 ms       P-R-T Axes : 000 086 068 degrees     QTc Int : 502 ms    Atrial fibrillation with rapid ventricular response  Nonspecific T wave abnormality  Abnormal ECG  When compared with ECG of 26-JAN-2022 19:34,  Atrial fibrillation has replaced Sinus rhythm  Nonspecific T wave abnormality now evident in Inferior leads    Referred By: [email or web address]   SELF           Confirmed By:                              EKG 12-lead (In process)  Result time 01/27/22 07:47:24    In process by Interface, Lab In [facility] (01/27/22 07:47:24)                 Narrative:    Test Reason : R07.9,    Vent. Rate : 109 BPM     Atrial Rate : 156 BPM     P-R Int : 200 ms          QRS Dur : 072 ms      QT Int : 314 ms       P-R-T Axes : 068 087 076 degrees     QTc Int : 422 ms    Sinus tachycardia with Fusion complexes  Otherwise normal ECG  When compared with ECG of 21-JAN-2022 23:06,  Sinus rhythm has replaced Atrial fibrillation    Referred By: [email or web address]   SELF           Confirmed By:                             Imaging Results          X-Ray Chest AP Portable (Final  result)  Result time 01/26/22 21:34:01    Final result by Javier España MD (01/26/22 21:34:01)                 Impression:      Bibasilar pleural effusions left greater than right with probable mild associated atelectasis at the left lung base.      Electronically signed by: Javier España  Date:    01/26/2022  Time:    21:34             Narrative:    EXAMINATION:  XR CHEST AP PORTABLE    CLINICAL HISTORY:  Chest Pain;    TECHNIQUE:  Single frontal view of the chest was performed.    COMPARISON:  01/21/2022    FINDINGS:  Cardiac silhouette is mildly enlarged, similar to the prior study.  Aortic atherosclerosis.    Cardiac pacemaker device on the left.    Left basilar pleural effusion with blunting of the costophrenic angle.  Probable mild associated atelectasis.  Trace effusion on the right also.  This is new from the prior study.    No mass, consolidation or focal infiltrate.  No acute osseous abnormality.                              X-Rays:   Independently Interpreted Readings:   Chest X-Ray: Normal heart size.  No acute abnormalities.  No infiltrates.     Medications   metoprolol injection 5 mg (5 mg Intravenous Given 1/26/22 2117)   ketorolac injection 9.999 mg (9.999 mg Intravenous Given 1/26/22 2123)     Medical Decision Making:   Initial Assessment:   65 yo woman PMHx of Afib with RVR on lopressor and xarelto reporting chest pain that radiates to neck and L arm since last night. HD stable, O2 sats >95% on room air, in A Fib on tele.   Differential Diagnosis:   Atrial fibrillation, STEMI, NSTEMI, atypical angina, GERD, hiatal hernia  Clinical Tests:   Lab Tests: Ordered and Reviewed  Radiological Study: Ordered and Reviewed  Medical Tests: Ordered and Reviewed  ED Management:  A Fib on tele and on EKG. No STEMI/NSTEMI on EKG. Troponin x1 WNL. BNP elevated to 320, likely 2/2 atrial fibrillation. Pt given dose of IV metoprolol 5mg with close monitoring of BP and atrial fibrillation. Pt maintained BP with  MAPS>65 with resolution of Atrial fibrillation. Pain controlled with Toradol 10mg IV. Given maalox, but deferred given that she had just taken a dose prior to arrival at ED. Pt felt better with resolution of A Fib. Instructed to discuss rate control regimen with Cardiology (appointment scheduled for tomorrow) and advised to follow up with Gastroenterology to discuss hiatal hernia and GERD symptoms.             Attending Attestation:   Physician Attestation Statement for Resident:  As the supervising MD   Physician Attestation Statement: I have personally seen and examined this patient.   I agree with the above history. -:   As the supervising MD I agree with the above PE.    As the supervising MD I agree with the above treatment, course, plan, and disposition.   -: Chest discomfort since last night, constant, didn't sleep well due to it.  It has never gone away in 24h.  Afib on EKG.  Pain worse with inspiration, certain movements and certain positions.  Troponin negative, which excludes ACS/MI after 24h of pain.  Symptoms improved after Toradol, metoprolol, conversion to NSR.  Patient has cardiology appt tomorrow, ok to d/c and f/u there.                           Clinical Impression:   Final diagnoses:  [R07.9] Chest pain (Primary)  [K44.9] Hiatal hernia  [I48.0] Paroxysmal atrial fibrillation          ED Disposition Condition    Discharge Stable        ED Prescriptions     None        Follow-up Information     Follow up With Specialties Details Why Contact Info    [facility] - Emergency Dept Emergency Medicine  If symptoms worsen [address] [facility] [address] [phone]    Kellie Geller MD Cardiology In 1 day To discuss chest pain and rate control [address] [facility]  [address]LO[address]  [place] [address]  [phone]      Keiko Parnell NP Gastroenterology In 1 week For evaluation of GERD and hiatal hernia [address]  [address]  [place] [address] [phone]             [place]  MD Kathy  Resident  01/26/22 224       Haile Ireland MD  01/27/22 7503

## 2022-01-27 NOTE — PROGRESS NOTES
"lipSubjective:   Patient ID:  Trudi Mcknight is a 66 y.o. female who presents for follow-up of Chest Pain (Hospital f/u 12/17/21 and ED f/u 1/26/22), Tachycardia, Atrial Fibrillation, and Atrial flutter with rapid ventricular response    Symptomatic Bradycardia s/p Dual chamber pacemaker  AF  Hiatal Hernia  GERD    Echo 01/2022  · Technically challenging study.  · The left ventricle is normal in size with normal systolic function.  · The estimated ejection fraction is 65%.  · Normal left ventricular diastolic function.  · Normal right ventricular size with normal right ventricular systolic function.     HPI:   Patient is c/o chest pain that radiates to the neck and the arm.  Pain comes on a couple of hours after eating, Much worse on lying.   Non smoker  Mother had CABG at 65. Dad has AF. Younger brother has a pacemaker has congential heart disease. Older brother passed away at 60 from heart disease.   She has gastritis and esophagitis and still taking omeprazole.  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 Her HRs have been above 100-135 in prior EKGs. She has been prescribed Diltiazem q8h by EP.       The 10-year ASCVD risk score (Canby DC Jr., et al., 2013) is: 6.5%    Values used to calculate the score:      Age: 66 years      Sex: Female      Is Non- : No      Diabetic: No      Tobacco smoker: No      Systolic Blood Pressure: 133 mmHg      Is BP treated: No      HDL Cholesterol: 46 mg/dL      Total Cholesterol: 166 mg/dL    Patient Active Problem List   Diagnosis    Esophagitis    History of gastritis    Screening for malignant neoplasm of colon    Paroxysmal atrial fibrillation    Neuralgia and neuritis, unspecified    Hiatal hernia with GERD and esophagitis    Disorder of thyroid, unspecified    Paroxysmal atrial fibrillation with RVR    Chest pain " "   Sick sinus syndrome     BP (!) 133/58 (BP Location: Left arm, Patient Position: Sitting, BP Method: Medium (Automatic))   Pulse 93   Ht 5' 1" (1.549 m)   Wt 59 kg (130 lb 1.1 oz)   BMI 24.58 kg/m²   Body mass index is 24.58 kg/m².  Estimated Creatinine Clearance: 65.3 mL/min (based on SCr of 0.7 mg/dL).    Lab Results   Component Value Date     01/26/2022    K 3.4 (L) 01/26/2022     (H) 01/26/2022    CO2 25 01/26/2022    BUN 13 01/26/2022    CREATININE 0.7 01/26/2022     01/26/2022    HGBA1C 5.5 01/20/2022    MG 2.0 01/21/2022    AST 23 01/26/2022    ALT 39 01/26/2022    ALBUMIN 3.0 (L) 01/26/2022    PROT 6.6 01/26/2022    BILITOT 0.3 01/26/2022    WBC 6.09 01/26/2022    HGB 11.2 (L) 01/26/2022    HCT 34.6 (L) 01/26/2022    HCT 43 01/02/2022    MCV 86 01/26/2022     01/26/2022    TSH 2.504 01/02/2022    CHOL 166 01/20/2022    HDL 46 01/20/2022    LDLCALC 107.0 01/20/2022    TRIG 65 01/20/2022       Current Outpatient Medications   Medication Sig    methocarbamoL (ROBAXIN) 750 MG Tab Take 1 tablet (750 mg total) by mouth 3 (three) times daily. for 10 days    omeprazole (PRILOSEC) 40 MG capsule     rivaroxaban (XARELTO) 20 mg Tab Take 1 tablet (20 mg total) by mouth daily with dinner or evening meal.    sertraline (ZOLOFT) 25 MG tablet TAKE 1/2 TABLET BY MOUTH EVERY DAY    vitamin D (VITAMIN D3) 1000 units Tab Take 1,000 Units by mouth once daily.    diltiaZEM (CARDIZEM) 90 MG tablet Take 1 tablet (90 mg total) by mouth 3 (three) times daily.     No current facility-administered medications for this visit.       Review of Systems   Constitutional: Positive for malaise/fatigue. Negative for chills, decreased appetite, night sweats, weight gain and weight loss.   Eyes: Negative for blurred vision, double vision, visual disturbance and visual halos.   Cardiovascular: Negative for chest pain, claudication, cyanosis, dyspnea on exertion, irregular heartbeat, leg swelling, " near-syncope, orthopnea, palpitations, paroxysmal nocturnal dyspnea and syncope.   Respiratory: Negative for cough, hemoptysis, snoring, sputum production and wheezing.    Endocrine: Negative for cold intolerance, heat intolerance, polydipsia and polyphagia.   Hematologic/Lymphatic: Negative for adenopathy and bleeding problem. Does not bruise/bleed easily.   Skin: Negative for flushing, itching, poor wound healing and rash.   Musculoskeletal: Negative for arthritis, back pain, falls, gout, joint pain, joint swelling, muscle cramps, muscle weakness, myalgias, neck pain and stiffness.   Gastrointestinal: Negative for bloating, abdominal pain, anorexia, diarrhea, dysphagia, excessive appetite, flatus, hematemesis, jaundice, melena and nausea.   Genitourinary: Negative for hesitancy and incomplete emptying.   Neurological: Positive for dizziness (with meds). Negative for aphonia, brief paralysis, difficulty with concentration, disturbances in coordination, excessive daytime sleepiness, focal weakness, light-headedness, loss of balance and weakness.   Psychiatric/Behavioral: Negative for altered mental status, depression, hallucinations, hypervigilance, memory loss, substance abuse and suicidal ideas. The patient does not have insomnia and is not nervous/anxious.        Objective:   Physical Exam  Constitutional:       General: She is not in acute distress.     Appearance: She is well-developed and well-nourished. She is not diaphoretic.   HENT:      Head: Normocephalic and atraumatic.      Nose: Nose normal.      Mouth/Throat:      Mouth: Oropharynx is clear and moist.      Pharynx: No oropharyngeal exudate.   Eyes:      General: No scleral icterus.        Right eye: No discharge.         Left eye: No discharge.      Extraocular Movements: EOM normal.      Conjunctiva/sclera: Conjunctivae normal.      Pupils: Pupils are equal, round, and reactive to light.   Neck:      Thyroid: No thyromegaly.      Vascular: No JVD.       Trachea: No tracheal deviation.   Cardiovascular:      Rate and Rhythm: Normal rate and regular rhythm.      Pulses: Intact distal pulses.      Heart sounds: Normal heart sounds. No murmur heard.  No friction rub. No gallop.    Pulmonary:      Effort: Pulmonary effort is normal. No respiratory distress.      Breath sounds: Normal breath sounds. No stridor. No wheezing or rales.   Chest:      Chest wall: No tenderness.   Abdominal:      General: Bowel sounds are normal. There is no distension.      Palpations: Abdomen is soft. There is no mass.      Tenderness: There is no abdominal tenderness. There is no guarding or rebound.   Musculoskeletal:         General: No tenderness or edema. Normal range of motion.      Cervical back: Normal range of motion and neck supple.   Lymphadenopathy:      Cervical: No cervical adenopathy.   Skin:     General: Skin is warm.      Coloration: Skin is not pale.      Findings: No erythema or rash.   Neurological:      Mental Status: She is alert and oriented to person, place, and time.      Cranial Nerves: No cranial nerve deficit.      Motor: No abnormal muscle tone.      Coordination: Coordination normal.      Deep Tendon Reflexes: Reflexes are normal and symmetric.   Psychiatric:         Mood and Affect: Mood and affect normal.         Behavior: Behavior normal.         Thought Content: Thought content normal.         Judgment: Judgment normal.         Assessment:     1. Paroxysmal atrial fibrillation    2. Family history of premature CAD    3. Hypercholesteremia        Plan:   Patient will reach out to EP if she cannot tolerate Dilitiazem.  Her CP appears to be related to GERD and possibly esophagitis (she is seeing GI). Will risk stratify her given given f/h of heart disease. Patient does not want to try statin but willing to take Red yeast rice.  Counseled on importance of heart healthy diet low in saturated and trans fat and salt as well gradually starting a regular aerobic  exercise regimen with goal of 30min 5x/week. Recommend BP diary. Call if systolic BP > 130 mmHg on checking repeatedly      Trudi was seen today for chest pain, tachycardia, atrial fibrillation and atrial flutter with rapid ventricular response.    Diagnoses and all orders for this visit:    Paroxysmal atrial fibrillation  -     CT Calcium Scoring Cardiac; Future    Family history of premature CAD  -     CT Calcium Scoring Cardiac; Future    Hypercholesteremia  -     Lipid Panel; Future    Other orders  -     diltiaZEM (CARDIZEM) 90 MG tablet; Take 1 tablet (90 mg total) by mouth 3 (three) times daily.      RTC 11 wks.

## 2022-01-27 NOTE — FIRST PROVIDER EVALUATION
" Emergency Department TeleTriage Encounter Note      CHIEF COMPLAINT    Chief Complaint   Patient presents with    Chest Pain     Pt had a pacemaker placed last week and since has felt "weird". Light headed and feels like her legs will give out from under her.        VITAL SIGNS   Initial Vitals [01/26/22 1927]   BP Pulse Resp Temp SpO2   (!) 128/96 74 18 98.3 °F (36.8 °C) 99 %      MAP       --            ALLERGIES    Review of patient's allergies indicates:   Allergen Reactions    Penicillins Hives     causes congestion and hives    Tricyclic compounds        PROVIDER TRIAGE NOTE  This is a teletriage evaluation of a 66 y.o. female presenting to the ED with c/o elevated heart rate, A-fib, chest pain and weakness since this morning.  Pt had a pacemaker placed 1 week ago.  Pt was told to come to ER by cardiologist.         All ED beds are full at present; patient notified of this status.  Patient seen and medically screened by Nurse Practitioner via teletriage. Orders initiated at triage to expedite care.  Patient is stable to return to the waiting room and will be placed in an ED bed when available.  Care will be transferred to an alternate provider when patient has been placed in an Exam Room from the [facility] for physical exam, additional orders, and disposition.          ORDERS  Labs Reviewed   CBC W/ AUTO DIFFERENTIAL   COMPREHENSIVE METABOLIC PANEL   TROPONIN I   B-TYPE NATRIURETIC PEPTIDE       ED Orders (720h ago, onward)    Start Ordered     Status Ordering Provider    01/26/22 2019 01/26/22 2018  Vital signs  Every 15 min         Ordered SHANAE CONTRERAS    01/26/22 2019 01/26/22 2018  Cardiac Monitoring - Adult  Continuous        Comments: Notify Physician If:    Ordered SHANAE CONTRERAS    01/26/22 2019 01/26/22 2018  Pulse Oximetry Continuous  Continuous         Ordered SHANAE CONTRERAS    01/26/22 2019 01/26/22 2018  Diet NPO  Diet effective now         Ordered SHANAE CONTRERAS    01/26/22 2019 " 01/26/22 2018  Saline lock IV  Once         Ordered SHANAE CONTRERAS    01/26/22 2019 01/26/22 2018  CBC auto differential  STAT         Ordered SHANAE CONTRERAS    01/26/22 2019 01/26/22 2018  Comprehensive metabolic panel  STAT         Ordered SHANAE CONTRERAS    01/26/22 2019 01/26/22 2018  Troponin I #1  STAT         Ordered SHANAE CONTRERAS    01/26/22 2019 01/26/22 2018  B-Type natriuretic peptide (BNP)  STAT         Ordered SHANAE CONTRERAS    01/26/22 2019 01/26/22 2018  X-Ray Chest AP Portable  1 time imaging         Ordered SHANAE CONTRERAS    01/26/22 1929 01/26/22 1928  EKG 12-lead  Once         Ordered YOCASTA BRIGHT            Virtual Visit Note: The provider triage portion of this emergency department evaluation and documentation was performed via [employer], a HIPAA-compliant telemedicine application, in concert with a tele-presenter in the room. A face to face patient evaluation with one of my colleagues will occur once the patient is placed in an emergency department room.      DISCLAIMER: This note was prepared with [email or web address] voice recognition transcription software. Garbled syntax, mangled pronouns, and other bizarre constructions may be attributed to that software system.

## 2022-01-27 NOTE — TELEPHONE ENCOUNTER
----- Message from Isatu Nolasco sent at 1/27/2022  2:06 PM CST -----  Patient seen in device clinic today for post op check; DC PPM for TBS, AF, conversion pause pre-implant    She is having symptomatic AF w/ RVR, is not anticoagulated and having trouble tolerating beta blockers.    I discussed with Ernesto.  He wants Xarelto started today, with dinner.  DC beta blocker now.  Start diltiazem 90mg TID.  This has all been taken care of and patient is aware.    He would like you to call her in 2 weeks to see how she's doing.  Would ideally like to hold off 4 weeks for AAD, but if in 2 weeks she's still not feeling well, may consider RADHA then AAD.     Thanks,  Isatu

## 2022-01-27 NOTE — ED TRIAGE NOTES
"Pt states "I had a pacemaker put in on Monday. I have been in atrial fib since then. My heart feels like it is beating so fast"  "

## 2022-01-27 NOTE — ED NOTES
"Patient identifiers verified and correct for Trudi Roxanna  C/C: Pt states "I had a pacemaker put in on Monday. I have been in atrial fib since then. My heart feels like it is beating so fast"  APPEARANCE: awake and alert in no acute distress.  SKIN: warm, dry and intact. No breakdown or bruising.  MUSCULOSKELETAL: Patient moving all extremities spontaneously, no obvious swelling or deformities noted. Ambulates independently.  RESPIRATORY: Denies shortness of breath. Respirations unlabored.   CARDIAC: Confirms CP, 2+ distal pulses; no peripheral edema  ABDOMEN: soft, non-tender, and non-distended, Denies N/V  : voids spontaneously, denies difficulty  Neurologic: AAO x 4; follows commands equal strength in all extremities; denies numbness/tingling. Denies dizziness   "

## 2022-01-28 ENCOUNTER — TELEPHONE (OUTPATIENT)
Dept: ELECTROPHYSIOLOGY | Facility: CLINIC | Age: 67
End: 2022-01-28
Payer: MEDICARE

## 2022-01-28 ENCOUNTER — TELEPHONE (OUTPATIENT)
Dept: CARDIOLOGY | Facility: HOSPITAL | Age: 67
End: 2022-01-28
Payer: MEDICARE

## 2022-01-28 LAB
BACTERIA BLD CULT: NORMAL
BACTERIA BLD CULT: NORMAL

## 2022-01-28 NOTE — TELEPHONE ENCOUNTER
Mrs. Mcknight contacted the Device Clinic on this morning and stated shortly after taking the Xarelto on last night she felt like she was going to pass out.  She stated she just feels tired this am.  Pt also stated that her pharmacy did not have the Diltiazem therefor she took a Metoprolol on last night. Being that pt's symptoms are medication related, she was informed the pt that a message would have to be sent to Dr. Duncan's RN.  Understanding was verbalized.     High priority message was sent to Dr. Duncan's RN via in basket.   
bed rails

## 2022-01-28 NOTE — TELEPHONE ENCOUNTER
Spoke with patient. Assured her there were no arrhythmias noted that correlated with symptoms. Patient states BP was stable. She has a documented history of being intolerant of beta blockers. [facility] told her they had to order the Diltiazem, so she stayed on the Metoprolol until she could switch. Advised to call [facility] and have them transfer the rx if they cannot get the Cardizem today. She verbalized understanding.   Also states that she has diarrhea today and thinks it is from the first dose of Xarelto. Denies any dark or tarry stools. Advised that we will continue to monitor and she may need a few days to adjust to the new meds. She will keep us updated.

## 2022-01-28 NOTE — TELEPHONE ENCOUNTER
----- Message from Rohan Guzman sent at 1/28/2022 10:07 AM CST -----  No arrhythmias that would explain why she would feel as though she would pass out.     ThanksRohan  ----- Message -----  From: Mela Mora RN  Sent: 1/28/2022   9:07 AM CST  To: Rohan Guzman    Anything noteworthy on transmission?  ----- Message -----  From: Rohan Guzman  Sent: 1/28/2022   8:52 AM CST  To: Mela Mora RN    Good morning Smiley,    Mrs. Mcknight contacted the Device Clinic on this morning and stated shortly after taking the Xarelto on last night she felt like she was going to pass out.  She just feels tired this am.  Pt also stated that her pharmacy did not have the Diltiazem therefor she took a Metoprolol.   Please contact the pt to discuss further.     ThanksRohan

## 2022-01-30 ENCOUNTER — PATIENT OUTREACH (OUTPATIENT)
Dept: ADMINISTRATIVE | Facility: OTHER | Age: 67
End: 2022-01-30
Payer: MEDICARE

## 2022-01-31 ENCOUNTER — OFFICE VISIT (OUTPATIENT)
Dept: GASTROENTEROLOGY | Facility: CLINIC | Age: 67
End: 2022-01-31
Payer: MEDICARE

## 2022-01-31 ENCOUNTER — HOSPITAL ENCOUNTER (OUTPATIENT)
Dept: RADIOLOGY | Facility: HOSPITAL | Age: 67
Discharge: HOME | End: 2022-01-31
Attending: INTERNAL MEDICINE
Payer: MEDICARE

## 2022-01-31 ENCOUNTER — OUTPATIENT CASE MANAGEMENT (OUTPATIENT)
Dept: ADMINISTRATIVE | Facility: OTHER | Age: 67
End: 2022-01-31
Payer: MEDICARE

## 2022-01-31 ENCOUNTER — PATIENT MESSAGE (OUTPATIENT)
Dept: INTERNAL MEDICINE | Facility: CLINIC | Age: 67
End: 2022-01-31
Payer: MEDICARE

## 2022-01-31 VITALS — WEIGHT: 130.94 LBS | BODY MASS INDEX: 24.72 KG/M2 | HEIGHT: 61 IN

## 2022-01-31 DIAGNOSIS — R93.89 ABNORMAL CHEST X-RAY: ICD-10-CM

## 2022-01-31 DIAGNOSIS — K59.04 CHRONIC IDIOPATHIC CONSTIPATION: ICD-10-CM

## 2022-01-31 DIAGNOSIS — R10.10 PAIN OF UPPER ABDOMEN: ICD-10-CM

## 2022-01-31 DIAGNOSIS — K21.9 GASTROESOPHAGEAL REFLUX DISEASE, UNSPECIFIED WHETHER ESOPHAGITIS PRESENT: Primary | ICD-10-CM

## 2022-01-31 DIAGNOSIS — K44.9 HIATAL HERNIA: ICD-10-CM

## 2022-01-31 DIAGNOSIS — R14.0 ABDOMINAL BLOATING: ICD-10-CM

## 2022-01-31 PROCEDURE — 3288F FALL RISK ASSESSMENT DOCD: CPT | Mod: HCNC,CPTII,S$GLB, | Performed by: NURSE PRACTITIONER

## 2022-01-31 PROCEDURE — 71046 X-RAY EXAM CHEST 2 VIEWS: CPT | Mod: 26,HCNC,, | Performed by: RADIOLOGY

## 2022-01-31 PROCEDURE — 1111F DSCHRG MED/CURRENT MED MERGE: CPT | Mod: HCNC,CPTII,S$GLB, | Performed by: NURSE PRACTITIONER

## 2022-01-31 PROCEDURE — 3008F BODY MASS INDEX DOCD: CPT | Mod: HCNC,CPTII,S$GLB, | Performed by: NURSE PRACTITIONER

## 2022-01-31 PROCEDURE — 1125F AMNT PAIN NOTED PAIN PRSNT: CPT | Mod: HCNC,CPTII,S$GLB, | Performed by: NURSE PRACTITIONER

## 2022-01-31 PROCEDURE — 1111F PR DISCHARGE MEDS RECONCILED W/ CURRENT OUTPATIENT MED LIST: ICD-10-PCS | Mod: HCNC,CPTII,S$GLB, | Performed by: NURSE PRACTITIONER

## 2022-01-31 PROCEDURE — 71046 X-RAY EXAM CHEST 2 VIEWS: CPT | Mod: TC,HCNC

## 2022-01-31 PROCEDURE — 3008F PR BODY MASS INDEX (BMI) DOCUMENTED: ICD-10-PCS | Mod: HCNC,CPTII,S$GLB, | Performed by: NURSE PRACTITIONER

## 2022-01-31 PROCEDURE — 1159F PR MEDICATION LIST DOCUMENTED IN MEDICAL RECORD: ICD-10-PCS | Mod: HCNC,CPTII,S$GLB, | Performed by: NURSE PRACTITIONER

## 2022-01-31 PROCEDURE — 71046 XR CHEST PA AND LATERAL: ICD-10-PCS | Mod: 26,HCNC,, | Performed by: RADIOLOGY

## 2022-01-31 PROCEDURE — 1101F PT FALLS ASSESS-DOCD LE1/YR: CPT | Mod: HCNC,CPTII,S$GLB, | Performed by: NURSE PRACTITIONER

## 2022-01-31 PROCEDURE — 1101F PR PT FALLS ASSESS DOC 0-1 FALLS W/OUT INJ PAST YR: ICD-10-PCS | Mod: HCNC,CPTII,S$GLB, | Performed by: NURSE PRACTITIONER

## 2022-01-31 PROCEDURE — 99214 PR OFFICE/OUTPT VISIT, EST, LEVL IV, 30-39 MIN: ICD-10-PCS | Mod: HCNC,S$GLB,, | Performed by: NURSE PRACTITIONER

## 2022-01-31 PROCEDURE — 99999 PR PBB SHADOW E&M-EST. PATIENT-LVL III: CPT | Mod: PBBFAC,HCNC,, | Performed by: NURSE PRACTITIONER

## 2022-01-31 PROCEDURE — 3044F HG A1C LEVEL LT 7.0%: CPT | Mod: HCNC,CPTII,S$GLB, | Performed by: NURSE PRACTITIONER

## 2022-01-31 PROCEDURE — 99214 OFFICE O/P EST MOD 30 MIN: CPT | Mod: HCNC,S$GLB,, | Performed by: NURSE PRACTITIONER

## 2022-01-31 PROCEDURE — 3044F PR MOST RECENT HEMOGLOBIN A1C LEVEL <7.0%: ICD-10-PCS | Mod: HCNC,CPTII,S$GLB, | Performed by: NURSE PRACTITIONER

## 2022-01-31 PROCEDURE — 1159F MED LIST DOCD IN RCRD: CPT | Mod: HCNC,CPTII,S$GLB, | Performed by: NURSE PRACTITIONER

## 2022-01-31 PROCEDURE — 1125F PR PAIN SEVERITY QUANTIFIED, PAIN PRESENT: ICD-10-PCS | Mod: HCNC,CPTII,S$GLB, | Performed by: NURSE PRACTITIONER

## 2022-01-31 PROCEDURE — 99999 PR PBB SHADOW E&M-EST. PATIENT-LVL III: ICD-10-PCS | Mod: PBBFAC,HCNC,, | Performed by: NURSE PRACTITIONER

## 2022-01-31 PROCEDURE — 3288F PR FALLS RISK ASSESSMENT DOCUMENTED: ICD-10-PCS | Mod: HCNC,CPTII,S$GLB, | Performed by: NURSE PRACTITIONER

## 2022-01-31 RX ORDER — PANTOPRAZOLE SODIUM 40 MG/1
40 TABLET, DELAYED RELEASE ORAL DAILY
Qty: 30 TABLET | Refills: 6 | Status: SHIPPED | OUTPATIENT
Start: 2022-01-31 | End: 2022-02-21

## 2022-01-31 NOTE — PROGRESS NOTES
Outpatient Care Management   - Low Risk Patient Assessment    Patient: Trudi Mcknight  MRN:  [id number]  Date of Service:  1/31/2022  Completed by:  Rocío Do LMSW  Referral Date: 01/21/2022  Program: OPCM Low Risk    Reason for Visit   Patient presents with    Social Work Assessment - Low/Mod Risk    Plan Of Care    Case Closure       Brief Summary: SW completed assessment with patient. Patient resides with spouse. Patient reports she can complete ADL's without assistance. Patient denied using any assisted devices to ambulate. Patient denied needing assistance with food, shelter, medication or medical. Patient aware of [employer] Over the Counter benefit and transportation. Patient reports spouse provides transportation to and from appointments. Patient denied any current symptoms. Patient denied information on Advance Care planning. SW completed PHQ screening with patient. Patient scored a 2. Patient denied SI or HI. Patient denied counseling resources. SW provided patient with [facility] helpline to assist with health care concerns. Patient denied any further needs SW will close case    Patient Summary     OPCM Social Work Assessment (Low/Moderate Risk)    General  Level of Caregiver support: Member independent and does not need caregiver assistance  Have you had to make a decision between paying for any of the following in the last 2 months?: None  Transportation means: Family  Current symptoms: None  Assessments  Was the PHQ Depression Screening completed this visit?: Yes  Was the DANIELLE-7 Screening completed this visit?: No         Complex Care Plan     Care plan was discussed and completed today with input from patient and/or caregiver.    Patient Instructions     No follow-ups on file.    Todays OPCM Self-Management Care Plan was developed with the patients/caregivers input and was based on identified barriers from todays assessment.  Goals were written today with the  patient/caregiver and the patient has agreed to work towards these goals to improve his/her overall well-being. Patient verbalized understanding of the care plan, goals, and all of today's instructions. Encouraged patient/caregiver to communicate with his/her physician and health care team about health conditions and the treatment plan.  Provided my contact information today and encouraged patient/caregiver to call me with any questions as needed.

## 2022-01-31 NOTE — PROGRESS NOTES
"     GASTROENTEROLOGY CLINIC NOTE    Chief Complaint: The primary encounter diagnosis was Gastroesophageal reflux disease, unspecified whether esophagitis present. Diagnoses of Pain of upper abdomen, Abdominal bloating, and Chronic idiopathic constipation were also pertinent to this visit.  Referring provider/PCP: Renuka Moreno MD    HPI:  Trudi Mcknight is a 66 y.o. female who is a new patient to me with a PMH that is significant for GERD, AFib with RVR, and chest pain.  She is here today to establish care for reflux and constipation.  These are not new problems as patient reports a h/o reflux but has been well controlled.  She recently (1/26/2022) underwent pacemaker placement and reports symptoms have worsened since.  She experiences daily symptoms where she describes upper abdominal pain/pressure that radiates upward into her chest and neck.  The pressure is described as "constricting"  And is accompanied by water brash, regurgitation with bending over, belching, bloating, and dysphagia.  Bowel movements occur every other day and are small and hard in consistency.  Denies pyrosis, nocturnal symptoms, melena, hematochezia, unexplained weight loss, known triggers, or significant NSAID use.  She is avoiding known triggers such as red sauce and foods with high acidity.  She has tried Prilosec, Mylanta, and Nexium for her symptoms.      Prior Upper Endoscopy: Approximately 7 years ago in [place].   Prior Colonoscopy: 9/2019  Findings: The perianal and digital rectal examinations were normal. Scattered small-mouthed diverticula were found in the sigmoid colon, descending colon, splenic flexure and transverse colon. No other significant abnormalities were identified in a careful examination of the remainder of the colon. The retroflexed view of the distal rectum and anal verge was normal and showed no anal or rectal abnormalities. No biopsies or other specimens were collected for this exam. The terminal ileum " appeared normal.     Impression:  - Diverticulosis in the sigmoid colon, in the descending colon, at the splenic flexure and in the transverse colon.                         - The distal rectum and anal verge are normal on retroflexion view.                         - The examined portion of the ileum was normal.     Recommendation:       - Discharge patient to home (ambulatory).                         - Resume previous diet.                         - Continue present medications.                         - Repeat colonoscopy in 5 years for surveillance.     Family h/o Colon Cancer: No  Family h/o Crohn's Disease or Ulcerative Colitis: No  Abdominal Surgeries: No    GI ROS:  Reflux: Yes  Dysphagia: Yes   Constipation: Yes  Diarrhea: No  Rectal bleeding/Melena/hematemesis: No  NSAIDs: No  Anticoagulation or Antiplatelet: Xarelto    Review of Systems   Constitutional: Negative for weight loss.   HENT: Negative for sore throat.    Eyes: Negative for blurred vision.   Respiratory: Negative for cough.    Cardiovascular: Negative for chest pain.   Gastrointestinal: Positive for constipation and heartburn. Negative for blood in stool, diarrhea, melena, nausea and vomiting. Abdominal pain: upper abdominal pressure.   Genitourinary: Negative for dysuria.   Musculoskeletal: Negative for myalgias.   Skin: Negative for rash.   Neurological: Negative for headaches.   Endo/Heme/Allergies: Negative for environmental allergies.   Psychiatric/Behavioral: Negative for suicidal ideas. The patient is not nervous/anxious.        Past Medical History: has a past medical history of Esophagitis and Meniere's disease.    Past Surgical History: has a past surgical history that includes Tonsillectomy; Hysterectomy;  section; Breast cyst aspiration; Colonoscopy (N/A, 2019); and A-V cardiac pacemaker insertion (N/A, 2022).    Family History:family history includes Breast cancer in her mother and paternal grandmother;  "Diverticulitis in her brother and sister; Heart disease in her brother and father; Heart disease (age of onset: 55) in her mother; Hyperlipidemia in her mother; Hypertension in her father and mother.    Allergies:   Review of patient's allergies indicates:   Allergen Reactions    Penicillins Hives     causes congestion and hives    Tricyclic compounds        Social History: reports that she has never smoked. She has never used smokeless tobacco. She reports that she does not drink alcohol and does not use drugs.    Home medications:   Current Outpatient Medications on File Prior to Visit   Medication Sig Dispense Refill    diltiaZEM (CARDIZEM) 90 MG tablet Take 1 tablet (90 mg total) by mouth 3 (three) times daily. 90 tablet 11    diltiaZEM (CARDIZEM) 90 MG tablet Take 1 tablet (90 mg total) by mouth 3 (three) times daily. 90 tablet 3    methocarbamoL (ROBAXIN) 750 MG Tab Take 1 tablet (750 mg total) by mouth 3 (three) times daily. for 10 days 30 tablet 0    omeprazole (PRILOSEC) 40 MG capsule       rivaroxaban (XARELTO) 20 mg Tab Take 1 tablet (20 mg total) by mouth daily with dinner or evening meal. 30 tablet 11    sertraline (ZOLOFT) 25 MG tablet TAKE 1/2 TABLET BY MOUTH EVERY DAY 45 tablet 3    vitamin D (VITAMIN D3) 1000 units Tab Take 1,000 Units by mouth once daily.       No current facility-administered medications on file prior to visit.       Vital signs:  Ht 5' 1" (1.549 m)   Wt 59.4 kg (130 lb 15.3 oz)   BMI 24.74 kg/m²     Physical Exam  Vitals reviewed.   Constitutional:       General: She is not in acute distress.     Appearance: Normal appearance. She is not ill-appearing.   HENT:      Head: Normocephalic.   Cardiovascular:      Rate and Rhythm: Normal rate and regular rhythm.      Heart sounds: Normal heart sounds. No murmur heard.      Pulmonary:      Effort: Pulmonary effort is normal. No respiratory distress.      Breath sounds: Normal breath sounds.   Chest:      Chest wall: No " tenderness.   Abdominal:      General: Bowel sounds are normal. There is no distension.      Palpations: Abdomen is soft.      Tenderness: There is no abdominal tenderness. Negative signs include Mccoy's sign.      Hernia: No hernia is present.   Skin:     General: Skin is warm.   Neurological:      Mental Status: She is alert and oriented to person, place, and time.   Psychiatric:         Mood and Affect: Mood normal.         Behavior: Behavior normal.         Routine labs:  Lab Results   Component Value Date    WBC 6.09 01/26/2022    HGB 11.2 (L) 01/26/2022    HCT 34.6 (L) 01/26/2022    MCV 86 01/26/2022     01/26/2022     No results found for: INR  No results found for: IRON, FERRITIN, TIBC, FESATURATED  Lab Results   Component Value Date     01/26/2022    K 3.4 (L) 01/26/2022     (H) 01/26/2022    CO2 25 01/26/2022    BUN 13 01/26/2022    CREATININE 0.7 01/26/2022     Lab Results   Component Value Date    ALBUMIN 3.0 (L) 01/26/2022    ALT 39 01/26/2022    AST 23 01/26/2022    ALKPHOS 74 01/26/2022    BILITOT 0.3 01/26/2022     No results found for: GLUCOSE  Lab Results   Component Value Date    TSH 2.504 01/02/2022     Lab Results   Component Value Date    CALCIUM 9.6 01/26/2022       Imaging:      I have reviewed prior labs, imaging, and notes.      Assessment:  1. Gastroesophageal reflux disease, unspecified whether esophagitis present    2. Pain of upper abdomen    3. Abdominal bloating    4. Chronic idiopathic constipation    5. Hiatal hernia        Plan:  Orders Placed This Encounter    pantoprazole (PROTONIX) 40 MG tablet     Stop Prilosec 40mg daily.   Start Protonix 40mg daily.  Start Miralax daily as patient reports good results with Miralax in the past.  If no improvement with Miralax, consider low dose Linzess.     Recommended EGD to patient to further evaluate worsening reflux and hiatal hernia which was recently discovered on xray.  Clearance will need to be obtained from  cardiology and likely will have to have her AFib controlled before having procedure.  She is not interested in having EGD performed as her sister had complications from procedure.  Discussed risks vs benefits of EGD.  Recommend medical management for symptom control with possible EGD in future.       Plan of care discussed with patient who is in agreement and verbalized understanding.     I have explained the planned procedures to the patient.The risks, benefits and alternatives of the procedure were also explained in detail. Patient verbalized understanding, all questions were answered. The patient agrees to proceed as planned    Follow Up: 8 weeks          YOUSIF Stevens,FNP-BC  [facility]

## 2022-01-31 NOTE — PROGRESS NOTES
Health Maintenance Due   Topic Date Due    DEXA SCAN  Never done    Shingles Vaccine (2 of 3) 02/09/2016    Pneumococcal Vaccines (Age 65+) (1 of 1 - PPSV23) Never done     Updates were requested from care everywhere.  Chart was reviewed for overdue Proactive [facility] Encounters ([facility]) topics (CRS, Breast Cancer Screening, Eye exam)  Health Maintenance has been updated.  LINKS immunization registry triggered.  Immunizations were reconciled.

## 2022-02-01 ENCOUNTER — TELEPHONE (OUTPATIENT)
Dept: ELECTROPHYSIOLOGY | Facility: CLINIC | Age: 67
End: 2022-02-01
Payer: MEDICARE

## 2022-02-01 ENCOUNTER — TELEPHONE (OUTPATIENT)
Dept: INTERNAL MEDICINE | Facility: CLINIC | Age: 67
End: 2022-02-01
Payer: MEDICARE

## 2022-02-01 DIAGNOSIS — J90 BILATERAL PLEURAL EFFUSION: Primary | ICD-10-CM

## 2022-02-01 NOTE — TELEPHONE ENCOUNTER
Please call pt. Her chest xray shows that she still has fluid accumulation in the bases of her lungs (aka pleural effusions.) I would like her to see pulmonary for further evaluation. Referral placed. Thanks.

## 2022-02-01 NOTE — TELEPHONE ENCOUNTER
----- Message from Efarin Posey MA sent at 2/1/2022 10:25 AM CST -----  Regarding: FW: PVC's    ----- Message -----  From: Grecia Jacob  Sent: 2/1/2022  10:19 AM CST  To: [facility] Staff  Subject: PVC's                                            The pt is having PVC's and she is having uncontrolled heart rate. Please call her back @ [phone]. Thanks, Grecia

## 2022-02-02 ENCOUNTER — OFFICE VISIT (OUTPATIENT)
Dept: PULMONOLOGY | Facility: CLINIC | Age: 67
End: 2022-02-02
Payer: MEDICARE

## 2022-02-02 ENCOUNTER — PATIENT MESSAGE (OUTPATIENT)
Dept: PULMONOLOGY | Facility: CLINIC | Age: 67
End: 2022-02-02

## 2022-02-02 ENCOUNTER — HOSPITAL ENCOUNTER (OUTPATIENT)
Dept: PULMONOLOGY | Facility: CLINIC | Age: 67
Discharge: HOME | End: 2022-02-02
Payer: MEDICARE

## 2022-02-02 VITALS
HEIGHT: 61 IN | BODY MASS INDEX: 24.72 KG/M2 | DIASTOLIC BLOOD PRESSURE: 70 MMHG | WEIGHT: 130.94 LBS | SYSTOLIC BLOOD PRESSURE: 127 MMHG | HEART RATE: 111 BPM | OXYGEN SATURATION: 99 %

## 2022-02-02 VITALS — WEIGHT: 130.06 LBS | BODY MASS INDEX: 24.55 KG/M2 | HEIGHT: 61 IN

## 2022-02-02 DIAGNOSIS — J90 BILATERAL PLEURAL EFFUSION: ICD-10-CM

## 2022-02-02 DIAGNOSIS — J90 BILATERAL PLEURAL EFFUSION: Primary | ICD-10-CM

## 2022-02-02 PROCEDURE — 3078F PR MOST RECENT DIASTOLIC BLOOD PRESSURE < 80 MM HG: ICD-10-PCS | Mod: HCNC,CPTII,S$GLB, | Performed by: INTERNAL MEDICINE

## 2022-02-02 PROCEDURE — 3078F DIAST BP <80 MM HG: CPT | Mod: HCNC,CPTII,S$GLB, | Performed by: INTERNAL MEDICINE

## 2022-02-02 PROCEDURE — 1111F PR DISCHARGE MEDS RECONCILED W/ CURRENT OUTPATIENT MED LIST: ICD-10-PCS | Mod: HCNC,CPTII,S$GLB, | Performed by: INTERNAL MEDICINE

## 2022-02-02 PROCEDURE — 94618 PULMONARY STRESS TESTING: CPT | Mod: HCNC,S$GLB,, | Performed by: INTERNAL MEDICINE

## 2022-02-02 PROCEDURE — 3008F PR BODY MASS INDEX (BMI) DOCUMENTED: ICD-10-PCS | Mod: HCNC,CPTII,S$GLB, | Performed by: INTERNAL MEDICINE

## 2022-02-02 PROCEDURE — 99204 OFFICE O/P NEW MOD 45 MIN: CPT | Mod: 25,HCNC,S$GLB, | Performed by: INTERNAL MEDICINE

## 2022-02-02 PROCEDURE — 3008F BODY MASS INDEX DOCD: CPT | Mod: HCNC,CPTII,S$GLB, | Performed by: INTERNAL MEDICINE

## 2022-02-02 PROCEDURE — 1159F PR MEDICATION LIST DOCUMENTED IN MEDICAL RECORD: ICD-10-PCS | Mod: HCNC,CPTII,S$GLB, | Performed by: INTERNAL MEDICINE

## 2022-02-02 PROCEDURE — 3044F HG A1C LEVEL LT 7.0%: CPT | Mod: HCNC,CPTII,S$GLB, | Performed by: INTERNAL MEDICINE

## 2022-02-02 PROCEDURE — 99204 PR OFFICE/OUTPT VISIT, NEW, LEVL IV, 45-59 MIN: ICD-10-PCS | Mod: 25,HCNC,S$GLB, | Performed by: INTERNAL MEDICINE

## 2022-02-02 PROCEDURE — 3074F PR MOST RECENT SYSTOLIC BLOOD PRESSURE < 130 MM HG: ICD-10-PCS | Mod: HCNC,CPTII,S$GLB, | Performed by: INTERNAL MEDICINE

## 2022-02-02 PROCEDURE — 1160F PR REVIEW ALL MEDS BY PRESCRIBER/CLIN PHARMACIST DOCUMENTED: ICD-10-PCS | Mod: HCNC,CPTII,S$GLB, | Performed by: INTERNAL MEDICINE

## 2022-02-02 PROCEDURE — 3074F SYST BP LT 130 MM HG: CPT | Mod: HCNC,CPTII,S$GLB, | Performed by: INTERNAL MEDICINE

## 2022-02-02 PROCEDURE — 1160F RVW MEDS BY RX/DR IN RCRD: CPT | Mod: HCNC,CPTII,S$GLB, | Performed by: INTERNAL MEDICINE

## 2022-02-02 PROCEDURE — 1159F MED LIST DOCD IN RCRD: CPT | Mod: HCNC,CPTII,S$GLB, | Performed by: INTERNAL MEDICINE

## 2022-02-02 PROCEDURE — 3288F FALL RISK ASSESSMENT DOCD: CPT | Mod: HCNC,CPTII,S$GLB, | Performed by: INTERNAL MEDICINE

## 2022-02-02 PROCEDURE — 94618 PULMONARY STRESS TESTING: ICD-10-PCS | Mod: HCNC,S$GLB,, | Performed by: INTERNAL MEDICINE

## 2022-02-02 PROCEDURE — 3044F PR MOST RECENT HEMOGLOBIN A1C LEVEL <7.0%: ICD-10-PCS | Mod: HCNC,CPTII,S$GLB, | Performed by: INTERNAL MEDICINE

## 2022-02-02 PROCEDURE — 1126F PR PAIN SEVERITY QUANTIFIED, NO PAIN PRESENT: ICD-10-PCS | Mod: HCNC,CPTII,S$GLB, | Performed by: INTERNAL MEDICINE

## 2022-02-02 PROCEDURE — 1111F DSCHRG MED/CURRENT MED MERGE: CPT | Mod: HCNC,CPTII,S$GLB, | Performed by: INTERNAL MEDICINE

## 2022-02-02 PROCEDURE — 99999 PR PBB SHADOW E&M-EST. PATIENT-LVL IV: CPT | Mod: PBBFAC,HCNC,, | Performed by: INTERNAL MEDICINE

## 2022-02-02 PROCEDURE — 1101F PR PT FALLS ASSESS DOC 0-1 FALLS W/OUT INJ PAST YR: ICD-10-PCS | Mod: HCNC,CPTII,S$GLB, | Performed by: INTERNAL MEDICINE

## 2022-02-02 PROCEDURE — 99999 PR PBB SHADOW E&M-EST. PATIENT-LVL IV: ICD-10-PCS | Mod: PBBFAC,HCNC,, | Performed by: INTERNAL MEDICINE

## 2022-02-02 PROCEDURE — 1101F PT FALLS ASSESS-DOCD LE1/YR: CPT | Mod: HCNC,CPTII,S$GLB, | Performed by: INTERNAL MEDICINE

## 2022-02-02 PROCEDURE — 3288F PR FALLS RISK ASSESSMENT DOCUMENTED: ICD-10-PCS | Mod: HCNC,CPTII,S$GLB, | Performed by: INTERNAL MEDICINE

## 2022-02-02 PROCEDURE — 1126F AMNT PAIN NOTED NONE PRSNT: CPT | Mod: HCNC,CPTII,S$GLB, | Performed by: INTERNAL MEDICINE

## 2022-02-02 RX ORDER — INFLUENZA A VIRUS A/VICTORIA/2570/2019 IVR-215 (H1N1) ANTIGEN (FORMALDEHYDE INACTIVATED), INFLUENZA A VIRUS A/TASMANIA/503/2020 IVR-221 (H3N2) ANTIGEN (FORMALDEHYDE INACTIVATED), INFLUENZA B VIRUS B/PHUKET/3073/2013 ANTIGEN (FORMALDEHYDE INACTIVATED), AND INFLUENZA B VIRUS B/WASHINGTON/02/2019 ANTIGEN (FORMALDEHYDE INACTIVATED) 60; 60; 60; 60 UG/.7ML; UG/.7ML; UG/.7ML; UG/.7ML
INJECTION, SUSPENSION INTRAMUSCULAR
Status: ON HOLD | COMMUNITY
Start: 2021-10-02 | End: 2022-04-07 | Stop reason: HOSPADM

## 2022-02-02 NOTE — PROCEDURES
Trudi Mcknight is a 66 y.o.  female patient, who presents for a 6 minute walk test ordered by MD Silas.  The diagnosis is Shortness of Breath.  The patient's BMI is 24.6 kg/m2.  Predicted distance (lower limit of normal) is 339.72 meters.      Test Results:    The test was completed with stops.  The patient stopped 2 times for a total of 39 seconds.  The total time walked was 321 seconds.  During walking, the patient reported:  Chest Pain; Dyspnea; Lightheadedness; Dizziness.  The patient used no assistive devices   during testing.     02/02/2022---------Distance: 129.54 meters (425 feet)     O2 Sat % Supplemental Oxygen Heart Rate Blood Pressure Caridad Scale   Pre-exercise  (Resting) 99 % Room Air 107 bpm 112/80 mmHg 2   During Exercise 99 % Room Air 156 bpm 128/60 mmHg 7-8   Post-exercise  (Recovery) 100 % Room Air  62 bpm       Recovery Time: 89 seconds    Performing nurse/tech: Sahil PEREZ      PREVIOUS STUDY:   The patient has not had a previous study.      CLINICAL INTERPRETATION:  Six minute walk distance is 129.54 meters (425 feet) with very heavy dyspnea.  During exercise, there was no desaturation while breathing room air.  Blood pressure remained stable and Heart rate increased significantly with walking.  Tachycardia was present prior to exercise.  This may represent a tachycardic response to exercise.  The patient reported non-pulmonary symptoms during exercise.  Severe exercise impairment is likely due to cardiovascular causes and subjective symptoms.  The patient did complete the study, walking 321 seconds of the 360 second test.  No previous study performed.  Based upon age and body mass index, exercise capacity is less than predicted.

## 2022-02-02 NOTE — PROGRESS NOTES
Subjective:       Patient ID: Trudi Mcknight is a 66 y.o. female.  Consult from Dr. Moreno for pleural effusion  Chief Complaint: Pleural Effusion    66 year old recently admitted for sick sinus syndrome and had an urgent pacemaker placement.  Prior to this, was extremely active without symptoms.  Shortly after Gabriel began having chest pain with associated shortness of breath.  She is a lifelong nonsmoker without a pulmonary history.  She does have a history of hiatal hernia and chest pain is reported to be two hours after meals.  However, since pacemaker has been placed she can no longer perform ADLs.  Showering this morning has wiped her out.  She is tearful.  She has been evaluated by cardiology and patient did state that she was complaining of worsening MARLEY, orthopnea (now sleeps in a recliner). BNP which was previously normal, now is elevated at over 300.  She has bilateral small pleural effusions.      Review of Systems   Constitutional: Negative for weight loss and weight gain.   HENT: Negative for trouble swallowing.    Eyes: Negative for itching.   Respiratory: Positive for chest tightness (walking and after eating). Negative for cough and wheezing.    Cardiovascular: Positive for chest pain (below left breast and on the side.). Negative for leg swelling.        Pedal edema only   Genitourinary: Negative for difficulty urinating.   Endocrine: Negative for cold intolerance and heat intolerance.    Musculoskeletal: Negative for arthralgias.   Gastrointestinal: Positive for acid reflux.   Neurological: Positive for headaches (since starting xarelto).   Hematological: Negative for adenopathy.   Psychiatric/Behavioral: Negative for confusion.       She is unable to tolerate diltiazem and states symptoms have become worse since starting xarelto.  Objective:       Vitals:    02/02/22 0933   BP: 127/70   BP Location: Left arm   Patient Position: Sitting   Pulse: (!) 111   SpO2: 99%   Weight: 59.4 kg (130 lb  "15.3 oz)   Height: 5' 1" (1.549 m)     Physical Exam   Constitutional: She is oriented to person, place, and time. She appears well-developed and well-nourished.   HENT:   Head: Normocephalic.   Nose: Nose normal.   Cardiovascular: Exam reveals no friction rub.   No murmur heard.  tachycardic   Pulmonary/Chest: Normal expansion, symmetric chest wall expansion, effort normal and breath sounds normal. She has no wheezes. She has no rales.   Abdominal: Soft. Bowel sounds are normal. There is no hepatosplenomegaly.   Musculoskeletal:         General: No edema.      Cervical back: Normal range of motion and neck supple.   Lymphadenopathy: No supraclavicular adenopathy is present.     She has no cervical adenopathy.   Neurological: She is alert and oriented to person, place, and time.   Skin: Skin is warm and dry.   Psychiatric: She has a normal mood and affect.   Tearful        Personal Diagnostic Review  Chest x-ray: bilateral effusions, small.  Right is new from previous    BNP elevated over 300    Previous echo are normal.    No flowsheet data found.      Assessment:       1. Bilateral pleural effusion        Outpatient Encounter Medications as of 2/2/2022   Medication Sig Dispense Refill    pantoprazole (PROTONIX) 40 MG tablet Take 1 tablet (40 mg total) by mouth once daily. 30 tablet 6    rivaroxaban (XARELTO) 20 mg Tab Take 1 tablet (20 mg total) by mouth daily with dinner or evening meal. 30 tablet 11    diltiaZEM (CARDIZEM) 90 MG tablet Take 1 tablet (90 mg total) by mouth 3 (three) times daily. (Patient not taking: Reported on 2/2/2022) 90 tablet 11    diltiaZEM (CARDIZEM) 90 MG tablet Take 1 tablet (90 mg total) by mouth 3 (three) times daily. (Patient not taking: Reported on 2/2/2022) 90 tablet 3    FLUZONE HIGHDOSE QUAD 21-22  mcg/0.7 mL Syrg       sertraline (ZOLOFT) 25 MG tablet TAKE 1/2 TABLET BY MOUTH EVERY DAY (Patient not taking: Reported on 2/2/2022) 45 tablet 3    vitamin D (VITAMIN D3) " 1000 units Tab Take 1,000 Units by mouth once daily.       No facility-administered encounter medications on file as of 2/2/2022.     Orders Placed This Encounter   Procedures    Stress test, pulmonary     Standing Status:   Future     Standing Expiration Date:   2/2/2023     Order Specific Question:   Reason for study     Answer:   Functional status     Order Specific Question:   Release to patient     Answer:   Immediate     Plan:     Problem List Items Addressed This Visit     Bilateral pleural effusion - Primary    Overview     In the setting of tachycardia, new elevation in BNP and orthopnea.  Likely not pulmonary related.  Have sent message to Dr. Duncan for further evaluation.    PFTs will likely not be reliable today.  Will obtain a 6MWT>           Relevant Orders    Stress test, pulmonary        There is not desaturation but significant tachycardia with activity. Post clinic.  Patient should follow up with cardiology.    02/02/2022---------Distance: 129.54 meters (425 feet)       O2 Sat % Supplemental Oxygen Heart Rate Blood Pressure Caridad Scale   Pre-exercise  (Resting) 99 % Room Air 107 bpm 112/80 mmHg 2   During Exercise 99 % Room Air 156 bpm 128/60 mmHg 7-8   Post-exercise  (Recovery) 100 % Room Air  62 bpm

## 2022-02-04 ENCOUNTER — OFFICE VISIT (OUTPATIENT)
Dept: CARDIOLOGY | Facility: CLINIC | Age: 67
End: 2022-02-04
Payer: MEDICARE

## 2022-02-04 ENCOUNTER — HOSPITAL ENCOUNTER (INPATIENT)
Facility: HOSPITAL | Age: 67
LOS: 3 days | Discharge: HOME HEALTH | DRG: 310 | End: 2022-02-07
Attending: EMERGENCY MEDICINE | Admitting: EMERGENCY MEDICINE
Payer: MEDICARE

## 2022-02-04 VITALS
DIASTOLIC BLOOD PRESSURE: 74 MMHG | SYSTOLIC BLOOD PRESSURE: 112 MMHG | WEIGHT: 129 LBS | HEART RATE: 125 BPM | OXYGEN SATURATION: 97 % | HEIGHT: 61 IN | BODY MASS INDEX: 24.35 KG/M2

## 2022-02-04 DIAGNOSIS — I48.3 TYPICAL ATRIAL FLUTTER: ICD-10-CM

## 2022-02-04 DIAGNOSIS — I50.31 ACUTE DIASTOLIC CONGESTIVE HEART FAILURE: ICD-10-CM

## 2022-02-04 DIAGNOSIS — R07.9 CHEST PAIN: ICD-10-CM

## 2022-02-04 DIAGNOSIS — I48.91 ATRIAL FIBRILLATION: ICD-10-CM

## 2022-02-04 DIAGNOSIS — I48.92 ATRIAL FLUTTER WITH RAPID VENTRICULAR RESPONSE: Primary | ICD-10-CM

## 2022-02-04 DIAGNOSIS — I50.9 CONGESTIVE HEART FAILURE, UNSPECIFIED HF CHRONICITY, UNSPECIFIED HEART FAILURE TYPE: Primary | ICD-10-CM

## 2022-02-04 DIAGNOSIS — I48.0 PAROXYSMAL ATRIAL FIBRILLATION: ICD-10-CM

## 2022-02-04 DIAGNOSIS — I48.92 ATRIAL FLUTTER: ICD-10-CM

## 2022-02-04 PROBLEM — I50.33 ACUTE ON CHRONIC DIASTOLIC CHF (CONGESTIVE HEART FAILURE): Status: ACTIVE | Noted: 2022-02-04

## 2022-02-04 LAB
ABO + RH BLD: NORMAL
ALBUMIN SERPL BCP-MCNC: 3.2 G/DL (ref 3.5–5.2)
ALP SERPL-CCNC: 80 U/L (ref 55–135)
ALT SERPL W/O P-5'-P-CCNC: 41 U/L (ref 10–44)
ANION GAP SERPL CALC-SCNC: 10 MMOL/L (ref 8–16)
AST SERPL-CCNC: 23 U/L (ref 10–40)
BASOPHILS # BLD AUTO: 0.04 K/UL (ref 0–0.2)
BASOPHILS NFR BLD: 0.5 % (ref 0–1.9)
BILIRUB SERPL-MCNC: 0.7 MG/DL (ref 0.1–1)
BLD GP AB SCN CELLS X3 SERPL QL: NORMAL
BNP SERPL-MCNC: 196 PG/ML (ref 0–99)
BUN SERPL-MCNC: 10 MG/DL (ref 8–23)
CALCIUM SERPL-MCNC: 9.5 MG/DL (ref 8.7–10.5)
CHLORIDE SERPL-SCNC: 108 MMOL/L (ref 95–110)
CO2 SERPL-SCNC: 23 MMOL/L (ref 23–29)
CREAT SERPL-MCNC: 0.8 MG/DL (ref 0.5–1.4)
CTP QC/QA: YES
DIFFERENTIAL METHOD: ABNORMAL
EOSINOPHIL # BLD AUTO: 0.2 K/UL (ref 0–0.5)
EOSINOPHIL NFR BLD: 2.1 % (ref 0–8)
ERYTHROCYTE [DISTWIDTH] IN BLOOD BY AUTOMATED COUNT: 15 % (ref 11.5–14.5)
EST. GFR  (AFRICAN AMERICAN): >60 ML/MIN/1.73 M^2
EST. GFR  (NON AFRICAN AMERICAN): >60 ML/MIN/1.73 M^2
GLUCOSE SERPL-MCNC: 94 MG/DL (ref 70–110)
HCT VFR BLD AUTO: 34 % (ref 37–48.5)
HGB BLD-MCNC: 10.9 G/DL (ref 12–16)
IMM GRANULOCYTES # BLD AUTO: 0.03 K/UL (ref 0–0.04)
IMM GRANULOCYTES NFR BLD AUTO: 0.4 % (ref 0–0.5)
LYMPHOCYTES # BLD AUTO: 1.4 K/UL (ref 1–4.8)
LYMPHOCYTES NFR BLD: 17 % (ref 18–48)
MAGNESIUM SERPL-MCNC: 2 MG/DL (ref 1.6–2.6)
MCH RBC QN AUTO: 28.2 PG (ref 27–31)
MCHC RBC AUTO-ENTMCNC: 32.1 G/DL (ref 32–36)
MCV RBC AUTO: 88 FL (ref 82–98)
MONOCYTES # BLD AUTO: 0.7 K/UL (ref 0.3–1)
MONOCYTES NFR BLD: 8.5 % (ref 4–15)
NEUTROPHILS # BLD AUTO: 6 K/UL (ref 1.8–7.7)
NEUTROPHILS NFR BLD: 71.5 % (ref 38–73)
NRBC BLD-RTO: 0 /100 WBC
PLATELET # BLD AUTO: 379 K/UL (ref 150–450)
PMV BLD AUTO: 9.9 FL (ref 9.2–12.9)
POTASSIUM SERPL-SCNC: 3.5 MMOL/L (ref 3.5–5.1)
PROT SERPL-MCNC: 6.9 G/DL (ref 6–8.4)
RBC # BLD AUTO: 3.87 M/UL (ref 4–5.4)
SARS-COV-2 RDRP RESP QL NAA+PROBE: NEGATIVE
SODIUM SERPL-SCNC: 141 MMOL/L (ref 136–145)
TROPONIN I SERPL DL<=0.01 NG/ML-MCNC: <0.006 NG/ML (ref 0–0.03)
TROPONIN I SERPL DL<=0.01 NG/ML-MCNC: <0.006 NG/ML (ref 0–0.03)
TSH SERPL DL<=0.005 MIU/L-ACNC: 1.29 UIU/ML (ref 0.4–4)
WBC # BLD AUTO: 8.43 K/UL (ref 3.9–12.7)

## 2022-02-04 PROCEDURE — 99999 PR PBB SHADOW E&M-EST. PATIENT-LVL IV: ICD-10-PCS | Mod: PBBFAC,HCNC,, | Performed by: INTERNAL MEDICINE

## 2022-02-04 PROCEDURE — 1159F PR MEDICATION LIST DOCUMENTED IN MEDICAL RECORD: ICD-10-PCS | Mod: HCNC,CPTII,S$GLB, | Performed by: INTERNAL MEDICINE

## 2022-02-04 PROCEDURE — 99285 EMERGENCY DEPT VISIT HI MDM: CPT | Mod: 25,HCNC

## 2022-02-04 PROCEDURE — 80053 COMPREHEN METABOLIC PANEL: CPT | Mod: HCNC | Performed by: EMERGENCY MEDICINE

## 2022-02-04 PROCEDURE — 83735 ASSAY OF MAGNESIUM: CPT | Mod: HCNC | Performed by: EMERGENCY MEDICINE

## 2022-02-04 PROCEDURE — 99214 PR OFFICE/OUTPT VISIT, EST, LEVL IV, 30-39 MIN: ICD-10-PCS | Mod: HCNC,S$GLB,, | Performed by: INTERNAL MEDICINE

## 2022-02-04 PROCEDURE — 84443 ASSAY THYROID STIM HORMONE: CPT | Mod: HCNC | Performed by: EMERGENCY MEDICINE

## 2022-02-04 PROCEDURE — 3074F PR MOST RECENT SYSTOLIC BLOOD PRESSURE < 130 MM HG: ICD-10-PCS | Mod: HCNC,CPTII,S$GLB, | Performed by: INTERNAL MEDICINE

## 2022-02-04 PROCEDURE — 3044F HG A1C LEVEL LT 7.0%: CPT | Mod: HCNC,CPTII,S$GLB, | Performed by: INTERNAL MEDICINE

## 2022-02-04 PROCEDURE — 83880 ASSAY OF NATRIURETIC PEPTIDE: CPT | Mod: HCNC | Performed by: EMERGENCY MEDICINE

## 2022-02-04 PROCEDURE — 99999 PR PBB SHADOW E&M-EST. PATIENT-LVL IV: CPT | Mod: PBBFAC,HCNC,, | Performed by: INTERNAL MEDICINE

## 2022-02-04 PROCEDURE — 3008F PR BODY MASS INDEX (BMI) DOCUMENTED: ICD-10-PCS | Mod: HCNC,CPTII,S$GLB, | Performed by: INTERNAL MEDICINE

## 2022-02-04 PROCEDURE — 63600175 PHARM REV CODE 636 W HCPCS: Mod: HCNC | Performed by: EMERGENCY MEDICINE

## 2022-02-04 PROCEDURE — 1125F AMNT PAIN NOTED PAIN PRSNT: CPT | Mod: HCNC,CPTII,S$GLB, | Performed by: INTERNAL MEDICINE

## 2022-02-04 PROCEDURE — 1125F PR PAIN SEVERITY QUANTIFIED, PAIN PRESENT: ICD-10-PCS | Mod: HCNC,CPTII,S$GLB, | Performed by: INTERNAL MEDICINE

## 2022-02-04 PROCEDURE — 1159F MED LIST DOCD IN RCRD: CPT | Mod: HCNC,CPTII,S$GLB, | Performed by: INTERNAL MEDICINE

## 2022-02-04 PROCEDURE — 1111F DSCHRG MED/CURRENT MED MERGE: CPT | Mod: HCNC,CPTII,S$GLB, | Performed by: INTERNAL MEDICINE

## 2022-02-04 PROCEDURE — 1111F PR DISCHARGE MEDS RECONCILED W/ CURRENT OUTPATIENT MED LIST: ICD-10-PCS | Mod: HCNC,CPTII,S$GLB, | Performed by: INTERNAL MEDICINE

## 2022-02-04 PROCEDURE — 1160F PR REVIEW ALL MEDS BY PRESCRIBER/CLIN PHARMACIST DOCUMENTED: ICD-10-PCS | Mod: HCNC,CPTII,S$GLB, | Performed by: INTERNAL MEDICINE

## 2022-02-04 PROCEDURE — 1101F PT FALLS ASSESS-DOCD LE1/YR: CPT | Mod: HCNC,CPTII,S$GLB, | Performed by: INTERNAL MEDICINE

## 2022-02-04 PROCEDURE — 3078F DIAST BP <80 MM HG: CPT | Mod: HCNC,CPTII,S$GLB, | Performed by: INTERNAL MEDICINE

## 2022-02-04 PROCEDURE — 3288F FALL RISK ASSESSMENT DOCD: CPT | Mod: HCNC,CPTII,S$GLB, | Performed by: INTERNAL MEDICINE

## 2022-02-04 PROCEDURE — 99499 RISK ADDL DX/OHS AUDIT: ICD-10-PCS | Mod: HCNC,S$GLB,, | Performed by: INTERNAL MEDICINE

## 2022-02-04 PROCEDURE — 1160F RVW MEDS BY RX/DR IN RCRD: CPT | Mod: HCNC,CPTII,S$GLB, | Performed by: INTERNAL MEDICINE

## 2022-02-04 PROCEDURE — 85025 COMPLETE CBC W/AUTO DIFF WBC: CPT | Mod: HCNC | Performed by: EMERGENCY MEDICINE

## 2022-02-04 PROCEDURE — 3074F SYST BP LT 130 MM HG: CPT | Mod: HCNC,CPTII,S$GLB, | Performed by: INTERNAL MEDICINE

## 2022-02-04 PROCEDURE — 3008F BODY MASS INDEX DOCD: CPT | Mod: HCNC,CPTII,S$GLB, | Performed by: INTERNAL MEDICINE

## 2022-02-04 PROCEDURE — 84484 ASSAY OF TROPONIN QUANT: CPT | Mod: HCNC | Performed by: EMERGENCY MEDICINE

## 2022-02-04 PROCEDURE — 1101F PR PT FALLS ASSESS DOC 0-1 FALLS W/OUT INJ PAST YR: ICD-10-PCS | Mod: HCNC,CPTII,S$GLB, | Performed by: INTERNAL MEDICINE

## 2022-02-04 PROCEDURE — 99284 EMERGENCY DEPT VISIT MOD MDM: CPT | Mod: HCNC,CS,, | Performed by: EMERGENCY MEDICINE

## 2022-02-04 PROCEDURE — 86850 RBC ANTIBODY SCREEN: CPT | Mod: HCNC | Performed by: STUDENT IN AN ORGANIZED HEALTH CARE EDUCATION/TRAINING PROGRAM

## 2022-02-04 PROCEDURE — 96374 THER/PROPH/DIAG INJ IV PUSH: CPT | Mod: HCNC

## 2022-02-04 PROCEDURE — 25000003 PHARM REV CODE 250: Mod: HCNC

## 2022-02-04 PROCEDURE — 99499 UNLISTED E&M SERVICE: CPT | Mod: HCNC,S$GLB,, | Performed by: INTERNAL MEDICINE

## 2022-02-04 PROCEDURE — 99214 OFFICE O/P EST MOD 30 MIN: CPT | Mod: HCNC,S$GLB,, | Performed by: INTERNAL MEDICINE

## 2022-02-04 PROCEDURE — 3288F PR FALLS RISK ASSESSMENT DOCUMENTED: ICD-10-PCS | Mod: HCNC,CPTII,S$GLB, | Performed by: INTERNAL MEDICINE

## 2022-02-04 PROCEDURE — 99284 PR EMERGENCY DEPT VISIT,LEVEL IV: ICD-10-PCS | Mod: HCNC,CS,, | Performed by: EMERGENCY MEDICINE

## 2022-02-04 PROCEDURE — 3044F PR MOST RECENT HEMOGLOBIN A1C LEVEL <7.0%: ICD-10-PCS | Mod: HCNC,CPTII,S$GLB, | Performed by: INTERNAL MEDICINE

## 2022-02-04 PROCEDURE — 93005 ELECTROCARDIOGRAM TRACING: CPT | Mod: HCNC

## 2022-02-04 PROCEDURE — 12000002 HC ACUTE/MED SURGE SEMI-PRIVATE ROOM: Mod: HCNC

## 2022-02-04 PROCEDURE — U0002 COVID-19 LAB TEST NON-CDC: HCPCS | Mod: HCNC | Performed by: EMERGENCY MEDICINE

## 2022-02-04 PROCEDURE — 3078F PR MOST RECENT DIASTOLIC BLOOD PRESSURE < 80 MM HG: ICD-10-PCS | Mod: HCNC,CPTII,S$GLB, | Performed by: INTERNAL MEDICINE

## 2022-02-04 RX ORDER — IBUPROFEN 200 MG
16 TABLET ORAL
Status: DISCONTINUED | OUTPATIENT
Start: 2022-02-04 | End: 2022-02-07 | Stop reason: HOSPADM

## 2022-02-04 RX ORDER — TALC
6 POWDER (GRAM) TOPICAL NIGHTLY PRN
Status: DISCONTINUED | OUTPATIENT
Start: 2022-02-04 | End: 2022-02-07 | Stop reason: HOSPADM

## 2022-02-04 RX ORDER — PANTOPRAZOLE SODIUM 40 MG/1
40 TABLET, DELAYED RELEASE ORAL DAILY
Status: DISCONTINUED | OUTPATIENT
Start: 2022-02-05 | End: 2022-02-07 | Stop reason: HOSPADM

## 2022-02-04 RX ORDER — POTASSIUM CHLORIDE 20 MEQ/1
40 TABLET, EXTENDED RELEASE ORAL ONCE
Status: COMPLETED | OUTPATIENT
Start: 2022-02-04 | End: 2022-02-04

## 2022-02-04 RX ORDER — GLUCAGON 1 MG
1 KIT INJECTION
Status: DISCONTINUED | OUTPATIENT
Start: 2022-02-04 | End: 2022-02-07 | Stop reason: HOSPADM

## 2022-02-04 RX ORDER — SODIUM CHLORIDE 0.9 % (FLUSH) 0.9 %
10 SYRINGE (ML) INJECTION
Status: DISCONTINUED | OUTPATIENT
Start: 2022-02-04 | End: 2022-02-07 | Stop reason: HOSPADM

## 2022-02-04 RX ORDER — TALC
6 POWDER (GRAM) TOPICAL NIGHTLY PRN
Status: DISCONTINUED | OUTPATIENT
Start: 2022-02-04 | End: 2022-02-04 | Stop reason: SDUPTHER

## 2022-02-04 RX ORDER — FUROSEMIDE 10 MG/ML
20 INJECTION INTRAMUSCULAR; INTRAVENOUS
Status: COMPLETED | OUTPATIENT
Start: 2022-02-04 | End: 2022-02-04

## 2022-02-04 RX ORDER — ACETAMINOPHEN 325 MG/1
650 TABLET ORAL EVERY 4 HOURS PRN
Status: DISCONTINUED | OUTPATIENT
Start: 2022-02-04 | End: 2022-02-07 | Stop reason: HOSPADM

## 2022-02-04 RX ORDER — POLYETHYLENE GLYCOL 3350 17 G/17G
17 POWDER, FOR SOLUTION ORAL ONCE AS NEEDED
COMMUNITY
End: 2022-05-19

## 2022-02-04 RX ORDER — POLYETHYLENE GLYCOL 3350 17 G/17G
17 POWDER, FOR SOLUTION ORAL DAILY
Status: DISCONTINUED | OUTPATIENT
Start: 2022-02-04 | End: 2022-02-06

## 2022-02-04 RX ORDER — IBUPROFEN 200 MG
24 TABLET ORAL
Status: DISCONTINUED | OUTPATIENT
Start: 2022-02-04 | End: 2022-02-07 | Stop reason: HOSPADM

## 2022-02-04 RX ORDER — MAG HYDROX/ALUMINUM HYD/SIMETH 200-200-20
30 SUSPENSION, ORAL (FINAL DOSE FORM) ORAL ONCE
Status: DISCONTINUED | OUTPATIENT
Start: 2022-02-04 | End: 2022-02-06

## 2022-02-04 RX ORDER — FUROSEMIDE 10 MG/ML
20 INJECTION INTRAMUSCULAR; INTRAVENOUS DAILY
Status: DISCONTINUED | OUTPATIENT
Start: 2022-02-05 | End: 2022-02-05

## 2022-02-04 RX ADMIN — POTASSIUM CHLORIDE 40 MEQ: 1500 TABLET, EXTENDED RELEASE ORAL at 02:02

## 2022-02-04 RX ADMIN — FUROSEMIDE 20 MG: 10 INJECTION, SOLUTION INTRAVENOUS at 12:02

## 2022-02-04 RX ADMIN — RIVAROXABAN 20 MG: 20 TABLET, FILM COATED ORAL at 05:02

## 2022-02-04 RX ADMIN — POLYETHYLENE GLYCOL 3350 17 G: 17 POWDER, FOR SOLUTION ORAL at 03:02

## 2022-02-04 NOTE — ASSESSMENT & PLAN NOTE
Etiology of shortness of breath possibly 2/2 heart failure, but likely exacerbated in the setting of AF and uncontrolled HF.  - Given one dosage of IV Lasix -> can reassess for additional diuresis  - Monitor I's and O's, daily weights, ensure K > 4, Mg > 2  - Na restricted (2g) and 1.5L fluid restricted diet

## 2022-02-04 NOTE — ED NOTES
Patient moved to ED room 9 via triage staff, patient assisted onto stretcher and changed into a gown. Patient placed on cardiac monitor, continuous pulse oximetry and automatic blood pressure cuff. Bed placed in low locked position, side rails up x 2, call light is within reach of patient or family, orientation to room and explanation of wait provided to family and patient, alarms set and turned on for monitor and pulse ox, awaiting MD evaluation and orders, will continue to monitor.      Pt presents to Er for cardiology consult and possible heart failure evaluation. Pt denies active CP, SOB< dizziness, N/V/D, abd pains or fever.

## 2022-02-04 NOTE — SUBJECTIVE & OBJECTIVE
Past Medical History:   Diagnosis Date    Esophagitis     Hiatal hernia     Meniere's disease     Paroxysmal atrial fibrillation 3/7/2021    Sick sinus syndrome 2022    s/p Dual chamber pacemaker       Past Surgical History:   Procedure Laterality Date    A-V CARDIAC PACEMAKER INSERTION N/A 2022    Procedure: INSERTION, CARDIAC PACEMAKER, DUAL CHAMBER;  Surgeon: Ernesto Duncan MD;  Location: [facility] EP LAB;  Service: Cardiology;  Laterality: N/A;  SB, DUAL PPM, SJM, ANES, SK, [id number]    BREAST CYST ASPIRATION           SECTION      COLONOSCOPY N/A 2019    Procedure: COLONOSCOPY;  Surgeon: INGRID Russo MD;  Location: [facility] ENDO ([address] [place] [address]);  Service: Endoscopy;  Laterality: N/A;    HYSTERECTOMY      TONSILLECTOMY         Review of patient's allergies indicates:   Allergen Reactions    Penicillins Hives     causes congestion and hives    Tricyclic compounds        No current facility-administered medications on file prior to encounter.     Current Outpatient Medications on File Prior to Encounter   Medication Sig    diltiaZEM (CARDIZEM) 90 MG tablet Take 1 tablet (90 mg total) by mouth 3 (three) times daily.    FLUZONE HIGHDOSE QUAD 21-22  mcg/0.7 mL Syrg     furosemide (LASIX) 20 MG tablet Take 1 tablet (20 mg total) by mouth once daily.    pantoprazole (PROTONIX) 40 MG tablet Take 1 tablet (40 mg total) by mouth once daily.    rivaroxaban (XARELTO) 20 mg Tab Take 1 tablet (20 mg total) by mouth daily with dinner or evening meal.    [DISCONTINUED] diltiaZEM (CARDIZEM) 90 MG tablet Take 1 tablet (90 mg total) by mouth 3 (three) times daily.    sertraline (ZOLOFT) 25 MG tablet TAKE 1/2 TABLET BY MOUTH EVERY DAY (Patient not taking: Reported on 2022)    [DISCONTINUED] vitamin D (VITAMIN D3) 1000 units Tab Take 1,000 Units by mouth once daily.     Family History     Problem Relation (Age of Onset)    Breast cancer Mother, Paternal Grandmother    Diverticulitis Brother,  Sister    Heart disease Mother (55), Father, Brother    Hyperlipidemia Mother    Hypertension Mother, Father        Tobacco Use    Smoking status: Never Smoker    Smokeless tobacco: Never Used   Substance and Sexual Activity    Alcohol use: No     Alcohol/week: 0.0 standard drinks     Comment: rarely    Drug use: No    Sexual activity: Not on file     Review of Systems   All other systems reviewed and are negative.    Objective:     Vital Signs (Most Recent):  Temp: 98.5 °F (36.9 °C) (02/04/22 1233)  Pulse: 100 (02/04/22 1503)  Resp: 18 (02/04/22 1040)  BP: 126/65 (02/04/22 1503)  SpO2: 95 % (02/04/22 1503) Vital Signs (24h Range):  Temp:  [98.5 °F (36.9 °C)] 98.5 °F (36.9 °C)  Pulse:  [100-125] 100  Resp:  [18] 18  SpO2:  [95 %-99 %] 95 %  BP: (112-128)/(63-74) 126/65       Weight: 59 kg (130 lb)  Body mass index is 24.56 kg/m².    SpO2: 95 %  O2 Device (Oxygen Therapy): room air    Physical Exam  HENT:      Head: Normocephalic.      Mouth/Throat:      Mouth: Mucous membranes are moist.   Eyes:      Pupils: Pupils are equal, round, and reactive to light.   Cardiovascular:      Rate and Rhythm: Tachycardia present. Rhythm irregular.      Pulses: Normal pulses.   Pulmonary:      Effort: Pulmonary effort is normal.      Comments: Decreased breath sounds at the bases  Abdominal:      Palpations: Abdomen is soft.   Musculoskeletal:      Right lower leg: No edema.      Left lower leg: No edema.   Skin:     General: Skin is warm.      Capillary Refill: Capillary refill takes less than 2 seconds.   Neurological:      General: No focal deficit present.      Mental Status: She is alert and oriented to person, place, and time.   Psychiatric:         Mood and Affect: Mood normal.         Behavior: Behavior normal.         Significant Labs:   Troponin   Recent Labs   Lab 02/04/22  1242 02/04/22  1537   TROPONINI <0.006 <0.006       Significant Imaging: Echocardiogram:   2D echo with color flow doppler: No results found for  this or any previous visit. and Transthoracic echo (TTE) complete (Cupid Only):   Results for orders placed or performed during the hospital encounter of 01/17/22   Echo   Result Value Ref Range    LVIDd 3.60 3.5 - 6.0 cm    IVS 0.70 0.6 - 1.1 cm    Posterior Wall 0.65 0.6 - 1.1 cm    LVIDs 2.43 2.1 - 4.0 cm    FS 33 28 - 44 %    Sinus 3.51 cm    STJ 2.75 cm    Ascending aorta 3.05 cm    LV mass 62.69 g    LA size 2.52 cm    Left Ventricle Relative Wall Thickness 0.36 cm    LVOT diameter 2.14 cm    LVOT area 3.6 cm2    LV Systolic Volume 20.85 mL    LV Systolic Volume Index 13.5 mL/m2    LV Diastolic Volume 54.56 mL    LV Diastolic Volume Index 35.20 mL/m2    LV Mass Index 40 g/m2    BSA 1.57 m2    EF 65 %    Narrative    · Technically challenging study.  · The left ventricle is normal in size with normal systolic function.  · The estimated ejection fraction is 65%.  · Normal left ventricular diastolic function.  · Normal right ventricular size with normal right ventricular systolic   function.

## 2022-02-04 NOTE — ASSESSMENT & PLAN NOTE
Patient reports SOB, no orthopnea. History of HFpEF.   CXR (2/4): No particular change in the size of the small left pleural effusion and trace right pleural effusion.  Afebrile, no white count, no consolidation present, low concern for infectious process.     - Will diurese with Lasix 20 daily

## 2022-02-04 NOTE — ASSESSMENT & PLAN NOTE
Patient reports history of esophagitis and has trouble swallowing uncoated pills.     - continue home med pantoprozole

## 2022-02-04 NOTE — CONSULTS
[facility] - Emergency Dept  Cardiac Electrophysiology  Consult Note    Admission Date: 2/4/2022  Code Status: Full Code   Attending Provider: Phil Leon MD  Consulting Provider: Tram Tejada MD  Principal Problem:Paroxysmal atrial fibrillation with RVR    Inpatient consult to Electrophysiology  Consult performed by: Tram Tejada MD  Consult ordered by: Sea Cohen MD        Subjective:     Chief Complaint:  Chest pain     HPI:   Ms. Mcknight is a 65 y/o F with a history of sick sinus syndrome s/p Dual chamber PPM implantation (1/20/22), pAF/AFL, hiatal hernia, GERD who was referred from Cardiology clinic today with complaints of chest discomfort with ECG notable for AF with RVR. Per the patient, she has a history of chest pain, but she developed left-sided chest pain this AM which radiates to the neck and the arm.  Pain comes on a couple of hours after eating, and appears to be worse when she lies down. She also reports shortness of breath with minimal exertion over the last several days and palpitations. Was prescribed metoprolol tartrate after implantation of dcPPM and reports that she did not tolerate it (she felt fatigue/generalized weakness). She also reports similar symptoms after taking diltiazem 90 mg TID and is no longer taking it either (stopped taking last week). Denies LE edema, PND, orthopnea, abdominal swelling.    Came to the ER today after her clinic visit and HRs were elevated in the 100s-120s. ECG notable for atrial fibrillation (some flutter waves apparent, but rate and rhythm are irregular), HR in the 100s, nonspecific T wave abnormalities. CXR with bilateral pleural effusions, no pulmonary edema. Labs notable for negative troponin,  (300 prior).  She was given IV Lasix 20 mg once. EP consulted for further management.            Past Medical History:   Diagnosis Date    Esophagitis     Hiatal hernia     Meniere's disease     Paroxysmal atrial fibrillation 3/7/2021     Sick sinus syndrome 2022    s/p Dual chamber pacemaker       Past Surgical History:   Procedure Laterality Date    A-V CARDIAC PACEMAKER INSERTION N/A 2022    Procedure: INSERTION, CARDIAC PACEMAKER, DUAL CHAMBER;  Surgeon: Ernesto Duncan MD;  Location: [facility] EP LAB;  Service: Cardiology;  Laterality: N/A;  SB, DUAL PPM, SJM, ANES, SK, [id number]    BREAST CYST ASPIRATION           SECTION      COLONOSCOPY N/A 2019    Procedure: COLONOSCOPY;  Surgeon: INGRID Russo MD;  Location: [facility] ENDO ([address] [place] [address]);  Service: Endoscopy;  Laterality: N/A;    HYSTERECTOMY      TONSILLECTOMY         Review of patient's allergies indicates:   Allergen Reactions    Penicillins Hives     causes congestion and hives    Tricyclic compounds        No current facility-administered medications on file prior to encounter.     Current Outpatient Medications on File Prior to Encounter   Medication Sig    diltiaZEM (CARDIZEM) 90 MG tablet Take 1 tablet (90 mg total) by mouth 3 (three) times daily.    FLUZONE HIGHDOSE QUAD 21-22  mcg/0.7 mL Syrg     furosemide (LASIX) 20 MG tablet Take 1 tablet (20 mg total) by mouth once daily.    pantoprazole (PROTONIX) 40 MG tablet Take 1 tablet (40 mg total) by mouth once daily.    rivaroxaban (XARELTO) 20 mg Tab Take 1 tablet (20 mg total) by mouth daily with dinner or evening meal.    [DISCONTINUED] diltiaZEM (CARDIZEM) 90 MG tablet Take 1 tablet (90 mg total) by mouth 3 (three) times daily.    sertraline (ZOLOFT) 25 MG tablet TAKE 1/2 TABLET BY MOUTH EVERY DAY (Patient not taking: Reported on 2022)    [DISCONTINUED] vitamin D (VITAMIN D3) 1000 units Tab Take 1,000 Units by mouth once daily.     Family History     Problem Relation (Age of Onset)    Breast cancer Mother, Paternal Grandmother    Diverticulitis Brother, Sister    Heart disease Mother (55), Father, Brother    Hyperlipidemia Mother    Hypertension Mother, Father        Tobacco Use    Smoking  status: Never Smoker    Smokeless tobacco: Never Used   Substance and Sexual Activity    Alcohol use: No     Alcohol/week: 0.0 standard drinks     Comment: rarely    Drug use: No    Sexual activity: Not on file     Review of Systems   All other systems reviewed and are negative.    Objective:     Vital Signs (Most Recent):  Temp: 98.5 °F (36.9 °C) (02/04/22 1233)  Pulse: 100 (02/04/22 1503)  Resp: 18 (02/04/22 1040)  BP: 126/65 (02/04/22 1503)  SpO2: 95 % (02/04/22 1503) Vital Signs (24h Range):  Temp:  [98.5 °F (36.9 °C)] 98.5 °F (36.9 °C)  Pulse:  [100-125] 100  Resp:  [18] 18  SpO2:  [95 %-99 %] 95 %  BP: (112-128)/(63-74) 126/65       Weight: 59 kg (130 lb)  Body mass index is 24.56 kg/m².    SpO2: 95 %  O2 Device (Oxygen Therapy): room air    Physical Exam  HENT:      Head: Normocephalic.      Mouth/Throat:      Mouth: Mucous membranes are moist.   Eyes:      Pupils: Pupils are equal, round, and reactive to light.   Cardiovascular:      Rate and Rhythm: Tachycardia present. Rhythm irregular.      Pulses: Normal pulses.   Pulmonary:      Effort: Pulmonary effort is normal.      Comments: Decreased breath sounds at the bases  Abdominal:      Palpations: Abdomen is soft.   Musculoskeletal:      Right lower leg: No edema.      Left lower leg: No edema.   Skin:     General: Skin is warm.      Capillary Refill: Capillary refill takes less than 2 seconds.   Neurological:      General: No focal deficit present.      Mental Status: She is alert and oriented to person, place, and time.   Psychiatric:         Mood and Affect: Mood normal.         Behavior: Behavior normal.         Significant Labs:   Troponin   Recent Labs   Lab 02/04/22  1242 02/04/22  1537   TROPONINI <0.006 <0.006       Significant Imaging: Echocardiogram:   2D echo with color flow doppler: No results found for this or any previous visit. and Transthoracic echo (TTE) complete (Cupid Only):   Results for orders placed or performed during the  hospital encounter of 01/17/22   Echo   Result Value Ref Range    LVIDd 3.60 3.5 - 6.0 cm    IVS 0.70 0.6 - 1.1 cm    Posterior Wall 0.65 0.6 - 1.1 cm    LVIDs 2.43 2.1 - 4.0 cm    FS 33 28 - 44 %    Sinus 3.51 cm    STJ 2.75 cm    Ascending aorta 3.05 cm    LV mass 62.69 g    LA size 2.52 cm    Left Ventricle Relative Wall Thickness 0.36 cm    LVOT diameter 2.14 cm    LVOT area 3.6 cm2    LV Systolic Volume 20.85 mL    LV Systolic Volume Index 13.5 mL/m2    LV Diastolic Volume 54.56 mL    LV Diastolic Volume Index 35.20 mL/m2    LV Mass Index 40 g/m2    BSA 1.57 m2    EF 65 %    Narrative    · Technically challenging study.  · The left ventricle is normal in size with normal systolic function.  · The estimated ejection fraction is 65%.  · Normal left ventricular diastolic function.  · Normal right ventricular size with normal right ventricular systolic   function.                Assessment and Plan:     * Paroxysmal atrial fibrillation with RVR  Patient with pAF ([id number] of 3), currently in RVR off rate-controlling medications. Plan for RADHA/DCCV Monday followed by starting an AAD (likely amiodarone vs. Tikosyn). There was an outpatient plan for possible PVI -> will consider after DCCV  - Continue Xarelto  - Can hold Diltazem as she is not taking at home       Typical atrial flutter  ECG currently with rate-uncontrolled AF, but given history of typical AFL, monitor closely on telemetry    Acute on chronic diastolic CHF (congestive heart failure)  Etiology of shortness of breath possibly 2/2 heart failure, but likely exacerbated in the setting of AF and uncontrolled HF.  - Given one dosage of IV Lasix -> can reassess for additional diuresis  - Monitor I's and O's, daily weights, ensure K > 4, Mg > 2  - Na restricted (2g) and 1.5L fluid restricted diet    Chest pain  Recommend ischemic work-up outpatient         Thank you for your consult. I will follow-up with patient. Please contact us if you have any additional  questions.    Tram Tejada MD  Cardiac Electrophysiology  [facility] - Emergency Dept

## 2022-02-04 NOTE — MEDICAL/APP STUDENT
History & Physical  [facility]    SUBJECTIVE:   Chief Complaint/Reason for Admission: Chest Pain    History of Present Illness:  Patient is a 66 y.o. female who presented with worsening SOB w/ intermittent chx pain and palpitations. Admitted for f/u from [facility] for a pMHX of afib/flutter, SSS, dyspnea on exertion, chx pain, and a new diagnosis of mild CHF (65% on 2022).    SOB began on  w/ dizziness. Diagnosed with Afib and resolved with cardioversion. Given apixaban and metoprolol. Chx pain since Nov of last year w/ dizziness. Pain radiates to Jaw and arm. Can approach 10/10 and described as burning pain greater w/ meals. Pain is not relieved by rest.    SSS diagnosed on  and pacemaker placed. New diagnosis of CHF. Given 20mg furosemide as recommended by [facility] in ED.     Old chart was reviewed.    Past Medical History:   Diagnosis Date    Esophagitis     Meniere's disease        Past Surgical History:   Procedure Laterality Date    A-V CARDIAC PACEMAKER INSERTION N/A 2022    Procedure: INSERTION, CARDIAC PACEMAKER, DUAL CHAMBER;  Surgeon: Ernesto Duncan MD;  Location: [facility] EP LAB;  Service: Cardiology;  Laterality: N/A;  SB, DUAL PPM, SJM, ANES, SK, 7071    BREAST CYST ASPIRATION           SECTION      COLONOSCOPY N/A 2019    Procedure: COLONOSCOPY;  Surgeon: INGRID Russo MD;  Location: [facility] ENDO ([address] [place] [address]);  Service: Endoscopy;  Laterality: N/A;    HYSTERECTOMY      TONSILLECTOMY        Family History   Problem Relation Age of Onset    Heart disease Mother 55        bypass at 55    Breast cancer Mother     Hypertension Mother     Hyperlipidemia Mother     Heart disease Father     Hypertension Father     Heart disease Brother     Diverticulitis Brother     Diverticulitis Sister     Breast cancer Paternal Grandmother     Colon cancer Neg Hx     Melanoma Neg Hx     Amblyopia Neg Hx     Blindness Neg Hx     Cataracts Neg Hx     Glaucoma Neg Hx     Macular  "degeneration Neg Hx     Strabismus Neg Hx     Retinal detachment Neg Hx        Social History     Tobacco Use    Smoking status: Never Smoker    Smokeless tobacco: Never Used   Substance Use Topics    Alcohol use: No     Alcohol/week: 0.0 standard drinks     Comment: rarely    Drug use: No      Review of patient's allergies indicates:   Allergen Reactions    Penicillins Hives     causes congestion and hives    Tricyclic compounds        No current facility-administered medications on file prior to encounter.     Current Outpatient Medications on File Prior to Encounter   Medication Sig Dispense Refill    diltiaZEM (CARDIZEM) 90 MG tablet Take 1 tablet (90 mg total) by mouth 3 (three) times daily. 90 tablet 11    FLUZONE HIGHDOSE QUAD 21-22  mcg/0.7 mL Syrg       furosemide (LASIX) 20 MG tablet Take 1 tablet (20 mg total) by mouth once daily. 20 tablet 0    pantoprazole (PROTONIX) 40 MG tablet Take 1 tablet (40 mg total) by mouth once daily. 30 tablet 6    rivaroxaban (XARELTO) 20 mg Tab Take 1 tablet (20 mg total) by mouth daily with dinner or evening meal. 30 tablet 11    sertraline (ZOLOFT) 25 MG tablet TAKE 1/2 TABLET BY MOUTH EVERY DAY 45 tablet 3    vitamin D (VITAMIN D3) 1000 units Tab Take 1,000 Units by mouth once daily.      diltiaZEM (CARDIZEM) 90 MG tablet Take 1 tablet (90 mg total) by mouth 3 (three) times daily. (Patient not taking: No sig reported) 90 tablet 3        Review of Systems:  Review of Systems   Constitutional: Negative for chills, fever and weight loss.   HENT: Negative for congestion and sore throat.    Eyes: Negative for photophobia and redness.        "Sees pulse in vision"   Respiratory: Positive for shortness of breath. Negative for cough and wheezing.         "Was able to walk 2 miles, now unable to go to bathroom"   Cardiovascular: Positive for chest pain and palpitations. Negative for claudication and leg swelling.        Left chx   Gastrointestinal: Positive " for constipation and heartburn. Negative for abdominal pain, nausea and vomiting.   Genitourinary: Negative for dysuria and hematuria.        +ve for reflux, dyspghagia   Musculoskeletal: Negative for falls and myalgias.   Neurological: Positive for dizziness. Negative for loss of consciousness, weakness and headaches.         OBJECTIVE:     Vital Signs (Most Recent)   Temp: 98.5 °F (36.9 °C) (02/04/22 1233)  Pulse: 104 (02/04/22 1302)  Resp: 18 (02/04/22 1040)  BP: 128/63 (02/04/22 1302)  SpO2: 99 % (02/04/22 1302)  Body mass index is 24.56 kg/m².      Physical Exam:  Physical Exam  Constitutional:       Appearance: Normal appearance.   Neck:      Vascular: No JVD.   Cardiovascular:      Rate and Rhythm: Tachycardia present. Rhythm irregularly irregular.      Heart sounds: No murmur heard.      Pulmonary:      Breath sounds: Normal breath sounds.   Chest:      Chest wall: Tenderness present.          Comments: There is a scar on left  Abdominal:      General: Bowel sounds are normal.      Palpations: Abdomen is soft.      Tenderness: There is no abdominal tenderness.   Musculoskeletal:      Right lower leg: No edema.      Left lower leg: No edema.   Neurological:      Mental Status: She is lethargic.         Laboratory:  CBC/Anemia Labs: Coags:    Recent Labs   Lab 02/04/22  1242   WBC 8.43   HGB 10.9*   HCT 34.0*      MCV 88   RDW 15.0*    No results for input(s): PT, INR, APTT in the last 168 hours.     Chemistries: ABG:   Recent Labs   Lab 02/04/22  1242      K 3.5      CO2 23   BUN 10   CREATININE 0.8   CALCIUM 9.5   PROT 6.9   BILITOT 0.7   ALKPHOS 80   ALT 41   AST 23    No results for input(s): PH, PCO2, PO2, HCO3, POCSATURATED, BE in the last 168 hours.         Diagnostic Results:   - Repeat Troponin nl (<0/006)  - BNP elevated at 196  - 12 lead EKG shows Afib  - XR AP Chx sig for cardiomegaly, aortic calcifications, trace pleural effusion on R, small effusion on L        ASSESSMENT/PLAN:      1. pAfib  - [id number] 3  - Not on rate-control    Plan:  - Cardiovert on Monday  - Continue xarelto 20mg PO   - Hold diltiazem  - Continue to consult cards  - monitor on telemetry    2. Acute on chronic diastolic CHF  - exasperated by AF    Plan:  - Continue tx on lasix 20mg PO  - Assess electrolytes daily  - Monitor I/O, daily weights, K > 4, Mg > 2  - Na restricted (2g) and 1.5L fluid restricted diet    VTE Risk Mitigation (From admission, onward)         Ordered     rivaroxaban tablet 20 mg  With dinner         02/04/22 1603     IP VTE HIGH RISK PATIENT  Once         02/04/22 1410     Place sequential compression device  Until discontinued         02/04/22 1410     Place sequential compression device  Until discontinued         02/04/22 1349              CODE status- FULL    Dispo- No, for cardiovert Monday    Gamal Villalta, MS3  [facility]  [phone]

## 2022-02-04 NOTE — PHARMACY MED REC
"Admission Medication History     The home medication history was taken by César Goodwin.    You may go to "Admission" then "Reconcile Home Medications" tabs to review and/or act upon these items.      The home medication list has been updated by the Pharmacy department.    Please read ALL comments highlighted in yellow.    Please address this information as you see fit.     Feel free to contact us if you have any questions or require assistance.      The medications listed below were removed from the home medication list. Please reorder if appropriate:  Patient reports no longer taking the following medication(s):   VITAMIN D 1000 UNITS TAB    Medications listed below were obtained from: Patient/family     PTA Medications   Medication Sig    diltiaZEM (CARDIZEM) 90 MG tablet Take 1 tablet (90 mg total) by mouth 3 (three) times daily. (only took for 2 days)    FLUZONE HIGHDOSE QUAD 21-22  mcg/0.7 mL Syrg     furosemide (LASIX) 20 MG tablet Take 1 tablet (20 mg total) by mouth once daily.    pantoprazole (PROTONIX) 40 MG tablet Take 1 tablet (40 mg total) by mouth once daily.    polyethylene glycol (MIRALAX) 17 gram PwPk Take 17 g by mouth once as needed (Constipation).    rivaroxaban (XARELTO) 20 mg Tab Take 1 tablet (20 mg total) by mouth daily with dinner or evening meal.     Potential issues to be addressed PRIOR TO DISCHARGE  Patient reported not taking the following medications: (SERTRALINE). This medication remain on the home medication list. Please address accordingly.     César Goodwin [facility]  EXT [id number]                    .          "

## 2022-02-04 NOTE — ED NOTES
Patient identifiers verified and correct for Trudi Mcknight.    LOC: The patient is awake, alert and aware of environment with an appropriate affect, the patient is oriented x 3 and speaking appropriately.  APPEARANCE: Patient resting comfortably and in no acute distress, patient is clean and well groomed, patient's clothing is properly fastened.  SKIN: The skin is warm and dry, color consistent with ethnicity, patient has normal skin turgor and moist mucus membranes, skin intact, no breakdown or bruising noted.  MUSCULOSKELETAL: Patient moving all extremities spontaneously, no obvious swelling or deformities noted.  RESPIRATORY: Airway is open and patent, respirations are spontaneous, patient has a normal effort and rate, no accessory muscle use noted, bilateral breath sounds clear.  CARDIAC: Patient noted a-fib on cardiac monitor w/ a rate of 110, no periphreal edema noted, capillary refill < 3 seconds. + pacemaker noted to left chest wall.   ABDOMEN: Soft and non tender to palpation, no distention noted.  NEUROLOGIC: PERRL, 3 mm bilaterally, eyes open spontaneously, behavior appropriate to situation, follows commands, facial expression symmetrical, bilateral hand grasp equal and even, purposeful motor response noted, normal sensation in all extremities when touched with a finger.

## 2022-02-04 NOTE — PROGRESS NOTES
"Subjective:   Patient ID:  Trudi Mcknight is a 66 y.o. female who presents for follow-up of Follow-up, Dizziness, Shortness of Breath, and Chest Pain   Trudi Mcknight is a 66 y.o. female who presents for follow-up of Chest Pain (Hospital f/u 12/17/21 and ED f/u 1/26/22), Tachycardia, Atrial Fibrillation, and Atrial flutter with rapid ventricular response     Symptomatic Bradycardia s/p Dual chamber pacemaker  AF  Hiatal Hernia  GERD     Echo 01/2022  · Technically challenging study.  · The left ventricle is normal in size with normal systolic function.  · The estimated ejection fraction is 65%.  · Normal left ventricular diastolic function.  · Normal right ventricular size with normal right ventricular systolic function.     HPI:   Patient is c/o chest pain that radiates to the neck and the arm.  Pain comes on a couple of hours after eating, Much worse on lying.   Non smoker  Mother had CABG at 65. Dad has AF. Younger brother has a pacemaker has congential heart disease. Older brother passed away at 60 from heart disease.   She has gastritis and esophagitis and still taking omeprazole.  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 Her HRs have been above 100-135 in prior EKGs. She has been prescribed Diltiazem q8h by EP.         The 10-year ASCVD risk score (Giovanni TOMASA Jr., et al., 2013) is: 6.5%    Values used to calculate the score:      Age: 66 years      Sex: Female      Is Non- : No      Diabetic: No      Tobacco smoker: No      Systolic Blood Pressure: 133 mmHg      Is BP treated: No      HDL Cholesterol: 46 mg/dL      Total Cholesterol: 166 mg/dL     HPI:   Patient has been experiencing chest pain and dizzy in November and was   In jan 2nd she was very dizzy presyncopal and was very dizzy, she was prescribed MTP and diagnosed with AF. She feels bad with " "Metoprolol.  She could not tolerate beta blocker and had a sinus pause  For which she underwent pacemaker implantation.   She has pacemaker 1/20 and xeralto 1/26.   Patient appears to be very winded  She took diltiazem last Sunday she took the whole tab of 90 mg Cardizem  Before /73 HR 99 and her BP dropped to 83 mmHg and HR to 63 bpm.      Patient Active Problem List   Diagnosis    Esophagitis    History of gastritis    Screening for malignant neoplasm of colon    Paroxysmal atrial fibrillation    Neuralgia and neuritis, unspecified    Hiatal hernia with GERD and esophagitis    Disorder of thyroid, unspecified    Paroxysmal atrial fibrillation with RVR    Chest pain    Sick sinus syndrome    Bilateral pleural effusion     /74 (BP Location: Left arm, Patient Position: Sitting, BP Method: Medium (Automatic))   Pulse (!) 125   Ht 5' 1" (1.549 m)   Wt 58.5 kg (128 lb 15.5 oz)   SpO2 97%   BMI 24.37 kg/m²   Body mass index is 24.37 kg/m².  CrCl cannot be calculated (Patient's most recent lab result is older than the maximum 7 days allowed.).    Lab Results   Component Value Date     01/26/2022    K 3.4 (L) 01/26/2022     (H) 01/26/2022    CO2 25 01/26/2022    BUN 13 01/26/2022    CREATININE 0.7 01/26/2022     01/26/2022    HGBA1C 5.5 01/20/2022    MG 2.0 01/21/2022    AST 23 01/26/2022    ALT 39 01/26/2022    ALBUMIN 3.0 (L) 01/26/2022    PROT 6.6 01/26/2022    BILITOT 0.3 01/26/2022    WBC 6.09 01/26/2022    HGB 11.2 (L) 01/26/2022    HCT 34.6 (L) 01/26/2022    HCT 43 01/02/2022    MCV 86 01/26/2022     01/26/2022    TSH 2.504 01/02/2022    CHOL 166 01/20/2022    HDL 46 01/20/2022    LDLCALC 107.0 01/20/2022    TRIG 65 01/20/2022       Current Outpatient Medications   Medication Sig    furosemide (LASIX) 20 MG tablet Take 1 tablet (20 mg total) by mouth once daily.    pantoprazole (PROTONIX) 40 MG tablet Take 1 tablet (40 mg total) by mouth once daily.    " rivaroxaban (XARELTO) 20 mg Tab Take 1 tablet (20 mg total) by mouth daily with dinner or evening meal.    diltiaZEM (CARDIZEM) 90 MG tablet Take 1 tablet (90 mg total) by mouth 3 (three) times daily. (Patient not taking: No sig reported)    diltiaZEM (CARDIZEM) 90 MG tablet Take 1 tablet (90 mg total) by mouth 3 (three) times daily. (Patient not taking: No sig reported)    FLUZONE HIGHDOSE QUAD 21-22  mcg/0.7 mL Syrg     sertraline (ZOLOFT) 25 MG tablet TAKE 1/2 TABLET BY MOUTH EVERY DAY (Patient not taking: No sig reported)    vitamin D (VITAMIN D3) 1000 units Tab Take 1,000 Units by mouth once daily.     No current facility-administered medications for this visit.       Review of Systems   Constitutional: Positive for malaise/fatigue. Negative for chills, decreased appetite, night sweats, weight gain and weight loss.   Eyes: Negative for blurred vision, double vision, visual disturbance and visual halos.   Cardiovascular: Negative for chest pain, claudication, cyanosis, dyspnea on exertion, irregular heartbeat, leg swelling, near-syncope, orthopnea, palpitations, paroxysmal nocturnal dyspnea and syncope.   Respiratory: Positive for shortness of breath. Negative for cough, hemoptysis, snoring, sputum production and wheezing.    Endocrine: Negative for cold intolerance, heat intolerance, polydipsia and polyphagia.   Hematologic/Lymphatic: Negative for adenopathy and bleeding problem. Does not bruise/bleed easily.   Skin: Negative for flushing, itching, poor wound healing and rash.   Musculoskeletal: Negative for arthritis, back pain, falls, gout, joint pain, joint swelling, muscle cramps, muscle weakness, myalgias, neck pain and stiffness.   Gastrointestinal: Negative for bloating, abdominal pain, anorexia, diarrhea, dysphagia, excessive appetite, flatus, hematemesis, jaundice, melena and nausea.   Genitourinary: Negative for hesitancy and incomplete emptying.   Neurological: Negative for aphonia, brief  paralysis, difficulty with concentration, disturbances in coordination, excessive daytime sleepiness, dizziness, focal weakness, light-headedness, loss of balance and weakness.   Psychiatric/Behavioral: Negative for altered mental status, depression, hallucinations, hypervigilance, memory loss, substance abuse and suicidal ideas. The patient does not have insomnia and is not nervous/anxious.        Objective:   Physical Exam  Vitals reviewed: patient SOB talking to me.   Constitutional:       General: She is not in acute distress.     Appearance: She is well-developed and well-nourished. She is not diaphoretic.   HENT:      Head: Normocephalic and atraumatic.      Nose: Nose normal.      Mouth/Throat:      Mouth: Oropharynx is clear and moist.      Pharynx: No oropharyngeal exudate.   Eyes:      General: No scleral icterus.        Right eye: No discharge.         Left eye: No discharge.      Extraocular Movements: EOM normal.      Conjunctiva/sclera: Conjunctivae normal.      Pupils: Pupils are equal, round, and reactive to light.   Neck:      Thyroid: No thyromegaly.      Vascular: No JVD.      Trachea: No tracheal deviation.   Cardiovascular:      Rate and Rhythm: Normal rate and regular rhythm.      Pulses: Intact distal pulses.      Heart sounds: Normal heart sounds. No murmur heard.  No friction rub. No gallop.    Pulmonary:      Effort: Pulmonary effort is normal. No respiratory distress.      Breath sounds: No stridor. Rales present. No wheezing.   Chest:      Chest wall: No tenderness.   Abdominal:      General: Bowel sounds are normal. There is no distension.      Palpations: Abdomen is soft. There is no mass.      Tenderness: There is no abdominal tenderness. There is no guarding or rebound.   Musculoskeletal:         General: No tenderness or edema. Normal range of motion.      Cervical back: Normal range of motion and neck supple.   Lymphadenopathy:      Cervical: No cervical adenopathy.   Skin:      General: Skin is warm.      Coloration: Skin is not pale.      Findings: No erythema or rash.   Neurological:      Mental Status: She is alert and oriented to person, place, and time.      Cranial Nerves: No cranial nerve deficit.      Motor: No abnormal muscle tone.      Coordination: Coordination normal.      Deep Tendon Reflexes: Reflexes are normal and symmetric.   Psychiatric:         Mood and Affect: Mood and affect normal.         Behavior: Behavior normal.         Thought Content: Thought content normal.         Judgment: Judgment normal.         Assessment:     1. Atrial flutter with rapid ventricular response    2. Paroxysmal atrial fibrillation    3. Acute diastolic congestive heart failure        Plan:   Patient in mild CHF consider rate controlling and gentle diuresis (20 mg IV lasix) and reassess. Patient to undergo RADHA cardioversion on Monday case discussed with cardiology fellow and EP staff.     Trudi was seen today for follow-up, dizziness, shortness of breath and chest pain.    Diagnoses and all orders for this visit:    Atrial flutter with rapid ventricular response    Paroxysmal atrial fibrillation    Acute diastolic congestive heart failure      RTC post discharge at 6 wks.

## 2022-02-04 NOTE — HPI
66 year old female with past medical history of sick sinus syndrome (s/p [employer] dual chamber pacemaker placement on 1/20), paroxysmal Afib (on xarelto for AC), diastolic HF (EF 65%), hiatal hernia, esophagitis, Meniere's disease who present from cardiology clinic with Afib with RVR. She reports crushing substernal/left sided chest pain radiating to her neck and arms, dyspnea on exertion, and palpitations. Her chest pain worsens with eating, improves with burping. She denies pleuritic/positional chest pain. She reports not being able to tolerate metoprolol and diltiazem in the past, experiencing headache, low BP, and palpitations when taking them. She reports that her dyspnea has worsened through the month of of January; she used to be able to walk 2 miles per day and now struggles to walk to the restroom 2/2 to dyspnea. She also reports occasional morning lightheadedness lasting 15 min. She denies orthopnea/PND/leg swelling. She was prescribed lasix 20 mg by her outpatient cardiologist two days ago, and has been taking it since. She has not been taking her diltiazem due her intolerance.     In the ED, patient received Lasix 20 mg. Troponins x2 negative, EKG shows Aflutter with variable AV block. Cardiology was consulted, recommend RADHA/DC cardioversion on Monday 2/7.

## 2022-02-04 NOTE — ED PROVIDER NOTES
Encounter Date: 2022       History     Chief Complaint   Patient presents with    Cardiology Referral     65 yo female referred from cardiology clinic for admission.    PMH:  Gastritis, esophagitis, sick sinus syndrome status post pacemaker, paroxysmal Afib on Xarelto     Context:  The patient was evaluated in cardiology clinic and referred for admission.  The patient states she has been having chest pain.  She had an episode of left-sided chest pain this morning.  The pain is currently resolved.  She reports dyspnea with any exertion, as well as palpitations.  The pain occasionally radiates down her left arm.  Onset:  Gradual  Location:  Left chest  Duration:  Intermittent, now resolved  Associated Symptoms:  Positive shortness of breath, no leg swelling    The history is provided by the patient and medical records. No  was used.     Review of patient's allergies indicates:   Allergen Reactions    Penicillins Hives     causes congestion and hives    Tricyclic compounds      Past Medical History:   Diagnosis Date    Esophagitis     Hiatal hernia     Meniere's disease     Paroxysmal atrial fibrillation 3/7/2021    Sick sinus syndrome 2022    s/p Dual chamber pacemaker     Past Surgical History:   Procedure Laterality Date    A-V CARDIAC PACEMAKER INSERTION N/A 2022    Procedure: INSERTION, CARDIAC PACEMAKER, DUAL CHAMBER;  Surgeon: Ernesto Duncan MD;  Location: [facility] EP LAB;  Service: Cardiology;  Laterality: N/A;  SB, DUAL PPM, SJM, ANES, SK, [id number]    BREAST CYST ASPIRATION           SECTION      COLONOSCOPY N/A 2019    Procedure: COLONOSCOPY;  Surgeon: INGRID Rusos MD;  Location: [facility] ENDO ([address] [place] [address]);  Service: Endoscopy;  Laterality: N/A;    HYSTERECTOMY      TONSILLECTOMY       Family History   Problem Relation Age of Onset    Heart disease Mother 55        bypass at 55    Breast cancer Mother     Hypertension Mother     Hyperlipidemia Mother      Heart disease Father     Hypertension Father     Heart disease Brother     Diverticulitis Brother     Diverticulitis Sister     Breast cancer Paternal Grandmother     Colon cancer Neg Hx     Melanoma Neg Hx     Amblyopia Neg Hx     Blindness Neg Hx     Cataracts Neg Hx     Glaucoma Neg Hx     Macular degeneration Neg Hx     Strabismus Neg Hx     Retinal detachment Neg Hx      Social History     Tobacco Use    Smoking status: Never Smoker    Smokeless tobacco: Never Used   Substance Use Topics    Alcohol use: No     Alcohol/week: 0.0 standard drinks     Comment: rarely    Drug use: No     Review of Systems   Constitutional: Negative for fever.   HENT: Negative for facial swelling.    Eyes: Negative for redness.   Respiratory: Positive for shortness of breath.    Cardiovascular: Positive for chest pain and palpitations. Negative for leg swelling.   Gastrointestinal: Negative for vomiting.   Skin: Negative for rash.   Allergic/Immunologic: Negative for food allergies.   Neurological: Negative for numbness.   Hematological: Does not bruise/bleed easily.       Physical Exam     Initial Vitals   BP Pulse Resp Temp SpO2   02/04/22 1040 02/04/22 1040 02/04/22 1040 02/04/22 1233 02/04/22 1040   126/73 101 18 98.5 °F (36.9 °C) 98 %      MAP       --                Physical Exam    Nursing note and vitals reviewed.  Constitutional: She is not diaphoretic. No distress.   HENT:   Head: Normocephalic and atraumatic.   Eyes: Right eye exhibits no discharge. Left eye exhibits no discharge.   Neck: Neck supple. No tracheal deviation present. No JVD present.   Cardiovascular: Normal rate.   Irregular rhythm    Pulmonary/Chest: No respiratory distress. She has rales (bibasilar ).   Left chest wall pacemaker site c/d/i, no erythema or purulence    Abdominal: Abdomen is soft. There is no abdominal tenderness.   Musculoskeletal:         General: No edema.      Cervical back: Neck supple.     Neurological: She is  alert and oriented to person, place, and time.   Skin: Skin is warm. No rash noted.   Psychiatric: She has a normal mood and affect. Her behavior is normal.         ED Course   Procedures  Labs Reviewed   CBC W/ AUTO DIFFERENTIAL - Abnormal; Notable for the following components:       Result Value    RBC 3.87 (*)     Hemoglobin 10.9 (*)     Hematocrit 34.0 (*)     RDW 15.0 (*)     Lymph % 17.0 (*)     All other components within normal limits   COMPREHENSIVE METABOLIC PANEL - Abnormal; Notable for the following components:    Albumin 3.2 (*)     All other components within normal limits   B-TYPE NATRIURETIC PEPTIDE - Abnormal; Notable for the following components:     (*)     All other components within normal limits   TROPONIN I   TROPONIN I   MAGNESIUM   TSH   SARS-COV-2 RDRP GENE     EKG Readings: (Independently Interpreted)   Initial Reading: No STEMI. Previous EKG: Compared with most recent EKG Rhythm: Atrial Flutter. Heart Rate: 100. Clinical Impression: Atrial Flutter     ECG Results          EKG 12-lead (Final result)  Result time 02/04/22 15:10:04    Final result by Interface, Lab In [facility] (02/04/22 15:10:04)                 Narrative:    Test Reason : R07.9,    Vent. Rate : 108 BPM     Atrial Rate : 309 BPM     P-R Int : 000 ms          QRS Dur : 072 ms      QT Int : 308 ms       P-R-T Axes : 000 072 008 degrees     QTc Int : 412 ms    Atrial flutter with variable A-V block  Nonspecific T wave abnormality  Abnormal ECG  When compared with ECG of 26-JAN-2022 20:46,  Atrial flutter has replaced Atrial fibrillation    Confirmed by Racquel Colin MD ([id number]) on 2/4/2022 3:09:58 PM    Referred By: JESSICA TITUS           Confirmed By:Racquel Colin MD                            Imaging Results          X-Ray Chest AP Portable (Final result)  Result time 02/04/22 15:00:43    Final result by Pop Singh MD (02/04/22 15:00:43)                 Impression:      No significant changes to chest findings  seen and described on earlier exam.      Electronically signed by: Pop Singh  Date:    02/04/2022  Time:    15:00             Narrative:    EXAMINATION:  XR CHEST AP PORTABLE    CLINICAL HISTORY:  Chest Pain;    TECHNIQUE:  Single frontal view of the chest was performed.    COMPARISON:  January 31, 2022    FINDINGS:  Reconfirmed cardiomegaly, arch calcification, normal pulmonary vasculature, and left dual lead pacing device.  No particular change in the size of the small left pleural effusion and trace right pleural effusion.  Minimal stable left basilar likely compressive atelectatic infiltrate.  Otherwise no left or right infiltrates.  No right or left pneumothorax.  Hiatal hernia.                                 Medications   sodium chloride 0.9% flush 10 mL (has no administration in time range)   sodium chloride 0.9% flush 10 mL (has no administration in time range)   acetaminophen tablet 650 mg (has no administration in time range)   melatonin tablet 6 mg (has no administration in time range)   polyethylene glycol packet 17 g (17 g Oral Given 2/4/22 1529)   glucose chewable tablet 16 g (has no administration in time range)   glucose chewable tablet 24 g (has no administration in time range)   dextrose 10% bolus 125 mL (has no administration in time range)   dextrose 10% bolus 250 mL (has no administration in time range)   glucagon (human recombinant) injection 1 mg (has no administration in time range)   aluminum-magnesium hydroxide-simethicone 200-200-20 mg/5 mL suspension 30 mL (30 mLs Oral Not Given 2/4/22 1500)   pantoprazole EC tablet 40 mg (has no administration in time range)   rivaroxaban tablet 20 mg (has no administration in time range)   furosemide injection 20 mg (has no administration in time range)   furosemide injection 20 mg (20 mg Intravenous Given 2/4/22 1243)   potassium chloride SA CR tablet 40 mEq (40 mEq Oral Given 2/4/22 1414)     Medical Decision Making:   History:   Old Medical  Records: I decided to obtain old medical records.  Old Records Summarized: records from clinic visits.       <> Summary of Records: Cardiology visit note reviewed:  RADHA cardioversion on Monday, plan for gentle diuresis     Most recent ECHO:  · The estimated ejection fraction is 65%.  · Normal left ventricular diastolic function.      Initial Assessment:   67 yo female referred from cardiology clinic for admission.  On arrival, no active chest pain, no distress.    Differential Diagnosis:   Including, but not limited to:  CHF exacerbation  ACS  A fib/flutter  Arrhythmia   Considered aortic pathology, but less likely given pain is resolved  Considered PE, but no new oxygen requirement   Independently Interpreted Test(s):   I have ordered and independently interpreted EKG Reading(s) - see prior notes  Clinical Tests:   Lab Tests: Ordered and Reviewed  Radiological Study: Reviewed and Ordered  Medical Tests: Ordered and Reviewed  ED Management:  I discussed the case with cardiology.  Cardiology recommends IV lasix, admission to hospital medicine, plan for cardioverson on Monday.  Patient understands and agrees with the plan.  Remains chest pain free, neg CTNI, lower suspicion for ACS.  Received modest lasix dosing.  I discussed the case with hospital medicine, admitted for further management.  Other:   I have discussed this case with another health care provider.                      Clinical Impression:   Final diagnoses:  [R07.9] Chest pain  [I50.9] Congestive heart failure, unspecified HF chronicity, unspecified heart failure type (Primary)  [I48.3] Typical atrial flutter          ED Disposition Condition    Admit               Sea Cohen MD  02/04/22 1641       Sea Cohen MD  02/04/22 [id number]

## 2022-02-04 NOTE — ASSESSMENT & PLAN NOTE
ECG currently with rate-uncontrolled AF, but given history of typical AFL, monitor closely on telemetry

## 2022-02-04 NOTE — SUBJECTIVE & OBJECTIVE
Past Medical History:   Diagnosis Date    Esophagitis     Hiatal hernia     Meniere's disease     Paroxysmal atrial fibrillation 3/7/2021    Sick sinus syndrome 2022    s/p Dual chamber pacemaker       Past Surgical History:   Procedure Laterality Date    A-V CARDIAC PACEMAKER INSERTION N/A 2022    Procedure: INSERTION, CARDIAC PACEMAKER, DUAL CHAMBER;  Surgeon: Ernesto Duncan MD;  Location: [facility] EP LAB;  Service: Cardiology;  Laterality: N/A;  SB, DUAL PPM, SJM, ANES, SK, [id number]    BREAST CYST ASPIRATION           SECTION      COLONOSCOPY N/A 2019    Procedure: COLONOSCOPY;  Surgeon: INGRID Russo MD;  Location: [facility] ENDO ([address] [place] [address]);  Service: Endoscopy;  Laterality: N/A;    HYSTERECTOMY      TONSILLECTOMY         Review of patient's allergies indicates:   Allergen Reactions    Penicillins Hives     causes congestion and hives    Tricyclic compounds        No current facility-administered medications on file prior to encounter.     Current Outpatient Medications on File Prior to Encounter   Medication Sig    diltiaZEM (CARDIZEM) 90 MG tablet Take 1 tablet (90 mg total) by mouth 3 (three) times daily.    FLUZONE HIGHDOSE QUAD 21-22  mcg/0.7 mL Syrg     furosemide (LASIX) 20 MG tablet Take 1 tablet (20 mg total) by mouth once daily.    pantoprazole (PROTONIX) 40 MG tablet Take 1 tablet (40 mg total) by mouth once daily.    polyethylene glycol (MIRALAX) 17 gram PwPk Take 17 g by mouth once as needed (Constipation).    rivaroxaban (XARELTO) 20 mg Tab Take 1 tablet (20 mg total) by mouth daily with dinner or evening meal.    [DISCONTINUED] diltiaZEM (CARDIZEM) 90 MG tablet Take 1 tablet (90 mg total) by mouth 3 (three) times daily.    sertraline (ZOLOFT) 25 MG tablet TAKE 1/2 TABLET BY MOUTH EVERY DAY (Patient not taking: Reported on 2022)    [DISCONTINUED] vitamin D (VITAMIN D3) 1000 units Tab Take 1,000 Units by mouth once daily.     Family History     Problem  Relation (Age of Onset)    Breast cancer Mother, Paternal Grandmother    Diverticulitis Brother, Sister    Heart disease Mother (55), Father, Brother    Hyperlipidemia Mother    Hypertension Mother, Father        Tobacco Use    Smoking status: Never Smoker    Smokeless tobacco: Never Used   Substance and Sexual Activity    Alcohol use: No     Alcohol/week: 0.0 standard drinks     Comment: rarely    Drug use: No    Sexual activity: Not on file     Review of Systems   Constitutional: Positive for activity change, appetite change, chills and unexpected weight change (3 lbs ). Negative for fever.   HENT: Positive for congestion (chronic) and trouble swallowing.    Eyes: Positive for visual disturbance (edges of vision get dark and pulsate with HR). Negative for photophobia.   Respiratory: Positive for shortness of breath. Negative for cough and wheezing.    Cardiovascular: Positive for chest pain and palpitations. Negative for leg swelling.   Gastrointestinal: Positive for constipation (improves with miralax). Negative for abdominal pain, diarrhea, nausea and vomiting.   Genitourinary: Negative for difficulty urinating and dysuria.   Musculoskeletal: Negative for arthralgias and back pain.   Skin: Negative for rash and wound.   Neurological: Positive for headaches (wtih metoprolol/diltiazem).   Psychiatric/Behavioral: Negative for agitation and behavioral problems.     Objective:     Vital Signs (Most Recent):  Temp: 98.5 °F (36.9 °C) (02/04/22 1233)  Pulse: 100 (02/04/22 1503)  Resp: 18 (02/04/22 1040)  BP: 126/65 (02/04/22 1503)  SpO2: 95 % (02/04/22 1503) Vital Signs (24h Range):  Temp:  [98.5 °F (36.9 °C)] 98.5 °F (36.9 °C)  Pulse:  [100-125] 100  Resp:  [18] 18  SpO2:  [95 %-99 %] 95 %  BP: (112-128)/(63-74) 126/65     Weight: 59 kg (130 lb)  Body mass index is 24.56 kg/m².    Physical Exam  Constitutional:       General: She is not in acute distress.     Appearance: She is normal weight. She is not  ill-appearing.   HENT:      Head: Normocephalic and atraumatic.      Nose: Nose normal.      Mouth/Throat:      Mouth: Mucous membranes are moist.   Eyes:      General: No scleral icterus.     Conjunctiva/sclera: Conjunctivae normal.   Cardiovascular:      Rate and Rhythm: Normal rate. Rhythm irregular.      Pulses: Normal pulses.      Heart sounds: No murmur heard.  No gallop.       Comments: No JVD  Pulmonary:      Effort: Pulmonary effort is normal. No respiratory distress.      Breath sounds: Normal breath sounds. No wheezing or rales.   Abdominal:      General: Abdomen is flat. Bowel sounds are normal. There is no distension.      Palpations: Abdomen is soft.      Tenderness: There is no abdominal tenderness. There is no guarding.   Musculoskeletal:         General: No swelling or tenderness. Normal range of motion.      Cervical back: Normal range of motion. No rigidity.      Right lower leg: No edema.      Left lower leg: No edema.   Skin:     General: Skin is warm and dry.      Capillary Refill: Capillary refill takes less than 2 seconds.      Coloration: Skin is not jaundiced.   Neurological:      General: No focal deficit present.      Mental Status: She is alert and oriented to person, place, and time.   Psychiatric:         Mood and Affect: Mood normal.         Behavior: Behavior normal.             Significant Labs:   All pertinent labs within the past 24 hours have been reviewed.  CBC:   Recent Labs   Lab 02/04/22  1242   WBC 8.43   HGB 10.9*   HCT 34.0*        CMP:   Recent Labs   Lab 02/04/22  1242      K 3.5      CO2 23   GLU 94   BUN 10   CREATININE 0.8   CALCIUM 9.5   PROT 6.9   ALBUMIN 3.2*   BILITOT 0.7   ALKPHOS 80   AST 23   ALT 41   ANIONGAP 10   EGFRNONAA >60.0     Troponin:   Recent Labs   Lab 02/04/22  1242 02/04/22  1537   TROPONINI <0.006 <0.006       Significant Imaging: I have reviewed all pertinent imaging results/findings within the past 24 hours.

## 2022-02-04 NOTE — ASSESSMENT & PLAN NOTE
Heart failure with preserved EF (65%) on 1/20/22. No signs of fluid overload on physical exam. History not consistent with HF as patient denies orthopnea/PND/recurrent LE edema. Started on Lasix 20 mg by her cardiologist Dr. Dominguez on 2/2/22. BNP was 196 on admission, down from 320 in January. Troponins x2 negative. Exam and labs not indicative of acute exacerbation, will continue maintenance diuretic dose.     - start home dose lasix 20 mg daily  - daily weights  - Strict I/Os  - cardiac diet (low Na 2 g, fluid restriction 1.5 L)

## 2022-02-04 NOTE — ASSESSMENT & PLAN NOTE
Patient with Paroxysmal (<7 days) atrial fibrillation which is uncontrolled currently with Calcium Channel Blocker. Patient is currently in atrial fibrillation.[id number] Score: 3. Anticoagulation indicated. Anticoagulation done with rivaroxaban.    - Restarted home AC med xarelto, CHADS VASC 3  - Rate/rhythm control per cards recommendations. Patient has not tolerated metoprolol or diltiazem in the past.   - May consider amiodarone/dofetilide for rhythm control post DCCV  - Cardioversion planned for Monday 2/7, NPO at midnight on 2/7  - EP consulted, appreciate recs.

## 2022-02-04 NOTE — HPI
Ms. Mcknight is a 67 y/o F with a history of sick sinus syndrome s/p Dual chamber PPM implantation (1/20/22), pAF/AFL, hiatal hernia, GERD who was referred from Cardiology clinic today with complaints of chest discomfort with ECG notable for AF with RVR. Per the patient, she has a history of chest pain, but she developed left-sided chest pain this AM which radiates to the neck and the arm.  Pain comes on a couple of hours after eating, and appears to be worse when she lies down. She also reports shortness of breath with minimal exertion over the last several days and palpitations. Was prescribed metoprolol tartrate after implantation of dcPPM and reports that she did not tolerate it (she felt fatigue/generalized weakness). She also reports similar symptoms after taking diltiazem 90 mg TID and is no longer taking it either (stopped taking last week). Denies LE edema, PND, orthopnea, abdominal swelling.    Came to the ER today after her clinic visit and HRs were elevated in the 100s-120s. ECG notable for atrial fibrillation (some flutter waves apparent, but rate and rhythm are irregular), HR in the 100s, nonspecific T wave abnormalities. CXR with bilateral pleural effusions, no pulmonary edema. Labs notable for negative troponin,  (300 prior).  She was given IV Lasix 20 mg once. EP consulted for further management.

## 2022-02-04 NOTE — ASSESSMENT & PLAN NOTE
Patient with pAF ([id number] of 3), currently in RVR off rate-controlling medications. Plan for RADHA/DCCV Monday followed by starting an AAD (likely amiodarone vs. Tikosyn). There was an outpatient plan for possible PVI -> will consider after DCCV  - Continue Xarelto  - Can hold Diltazem as she is not taking at home

## 2022-02-05 LAB
ANION GAP SERPL CALC-SCNC: 11 MMOL/L (ref 8–16)
BASOPHILS # BLD AUTO: 0.08 K/UL (ref 0–0.2)
BASOPHILS NFR BLD: 1.2 % (ref 0–1.9)
BUN SERPL-MCNC: 13 MG/DL (ref 8–23)
CALCIUM SERPL-MCNC: 9.9 MG/DL (ref 8.7–10.5)
CHLORIDE SERPL-SCNC: 105 MMOL/L (ref 95–110)
CO2 SERPL-SCNC: 24 MMOL/L (ref 23–29)
CREAT SERPL-MCNC: 0.8 MG/DL (ref 0.5–1.4)
DIFFERENTIAL METHOD: ABNORMAL
EOSINOPHIL # BLD AUTO: 0.3 K/UL (ref 0–0.5)
EOSINOPHIL NFR BLD: 4.5 % (ref 0–8)
ERYTHROCYTE [DISTWIDTH] IN BLOOD BY AUTOMATED COUNT: 14.7 % (ref 11.5–14.5)
EST. GFR  (AFRICAN AMERICAN): >60 ML/MIN/1.73 M^2
EST. GFR  (NON AFRICAN AMERICAN): >60 ML/MIN/1.73 M^2
GLUCOSE SERPL-MCNC: 92 MG/DL (ref 70–110)
HCT VFR BLD AUTO: 35.6 % (ref 37–48.5)
HGB BLD-MCNC: 11.2 G/DL (ref 12–16)
IMM GRANULOCYTES # BLD AUTO: 0.03 K/UL (ref 0–0.04)
IMM GRANULOCYTES NFR BLD AUTO: 0.4 % (ref 0–0.5)
LYMPHOCYTES # BLD AUTO: 1.4 K/UL (ref 1–4.8)
LYMPHOCYTES NFR BLD: 20.2 % (ref 18–48)
MAGNESIUM SERPL-MCNC: 2.3 MG/DL (ref 1.6–2.6)
MCH RBC QN AUTO: 27.5 PG (ref 27–31)
MCHC RBC AUTO-ENTMCNC: 31.5 G/DL (ref 32–36)
MCV RBC AUTO: 87 FL (ref 82–98)
MONOCYTES # BLD AUTO: 0.7 K/UL (ref 0.3–1)
MONOCYTES NFR BLD: 9.6 % (ref 4–15)
NEUTROPHILS # BLD AUTO: 4.4 K/UL (ref 1.8–7.7)
NEUTROPHILS NFR BLD: 64.1 % (ref 38–73)
NRBC BLD-RTO: 0 /100 WBC
PHOSPHATE SERPL-MCNC: 4.4 MG/DL (ref 2.7–4.5)
PLATELET # BLD AUTO: 428 K/UL (ref 150–450)
PMV BLD AUTO: 10.4 FL (ref 9.2–12.9)
POTASSIUM SERPL-SCNC: 4.2 MMOL/L (ref 3.5–5.1)
RBC # BLD AUTO: 4.08 M/UL (ref 4–5.4)
SODIUM SERPL-SCNC: 140 MMOL/L (ref 136–145)
TROPONIN I SERPL DL<=0.01 NG/ML-MCNC: <0.006 NG/ML (ref 0–0.03)
WBC # BLD AUTO: 6.89 K/UL (ref 3.9–12.7)

## 2022-02-05 PROCEDURE — 97161 PT EVAL LOW COMPLEX 20 MIN: CPT | Mod: HCNC

## 2022-02-05 PROCEDURE — 80048 BASIC METABOLIC PNL TOTAL CA: CPT | Mod: HCNC

## 2022-02-05 PROCEDURE — 97165 OT EVAL LOW COMPLEX 30 MIN: CPT | Mod: HCNC

## 2022-02-05 PROCEDURE — 25000003 PHARM REV CODE 250: Mod: HCNC

## 2022-02-05 PROCEDURE — 93005 ELECTROCARDIOGRAM TRACING: CPT | Mod: HCNC

## 2022-02-05 PROCEDURE — 97530 THERAPEUTIC ACTIVITIES: CPT | Mod: HCNC

## 2022-02-05 PROCEDURE — 85025 COMPLETE CBC W/AUTO DIFF WBC: CPT | Mod: HCNC

## 2022-02-05 PROCEDURE — 36415 COLL VENOUS BLD VENIPUNCTURE: CPT | Mod: HCNC

## 2022-02-05 PROCEDURE — 84100 ASSAY OF PHOSPHORUS: CPT | Mod: HCNC

## 2022-02-05 PROCEDURE — 93010 ELECTROCARDIOGRAM REPORT: CPT | Mod: HCNC,,, | Performed by: STUDENT IN AN ORGANIZED HEALTH CARE EDUCATION/TRAINING PROGRAM

## 2022-02-05 PROCEDURE — 63600175 PHARM REV CODE 636 W HCPCS: Mod: HCNC

## 2022-02-05 PROCEDURE — 93010 EKG 12-LEAD: ICD-10-PCS | Mod: HCNC,,, | Performed by: STUDENT IN AN ORGANIZED HEALTH CARE EDUCATION/TRAINING PROGRAM

## 2022-02-05 PROCEDURE — 83735 ASSAY OF MAGNESIUM: CPT | Mod: HCNC

## 2022-02-05 PROCEDURE — 84484 ASSAY OF TROPONIN QUANT: CPT | Mod: HCNC

## 2022-02-05 PROCEDURE — 20600001 HC STEP DOWN PRIVATE ROOM: Mod: HCNC

## 2022-02-05 RX ADMIN — PANTOPRAZOLE SODIUM 40 MG: 40 TABLET, DELAYED RELEASE ORAL at 08:02

## 2022-02-05 RX ADMIN — RIVAROXABAN 20 MG: 20 TABLET, FILM COATED ORAL at 05:02

## 2022-02-05 RX ADMIN — FUROSEMIDE 20 MG: 10 INJECTION, SOLUTION INTRAVENOUS at 09:02

## 2022-02-05 NOTE — MEDICAL/APP STUDENT
Progress Note  Hospital Medicine    Primary Team: [facility] HOSP MED 1  Admit Date: 2/4/2022   Length of Stay:  LOS: 1 day   SUBJECTIVE:   Reason for Admission:  Paroxysmal atrial fibrillation with RVR    History of Present Illness: Patient is a 66 y.o. female who presented with worsening SOB w/ palpitations and intermittent chx pain. Pt was admitted for pAfib with a PMHx significant for SSS, dyspnea on exert, radiating chx pain, and mild diastolic HF (65% on 2/5).      Interval history: Pt moved to [place] at 4pm on 2/5. Slept comfortably since. NAEON. Resting comfortably on entry. Cannot tolerate lasix 20mg.      Old chart was reviewed.    Review of Systems:  Review of Systems   Constitutional: Negative for chills and fever.   HENT: Negative for congestion and sore throat.    Eyes: Negative for photophobia and redness.   Respiratory: Positive for shortness of breath. Negative for cough.    Cardiovascular: Positive for chest pain. Negative for palpitations, claudication and leg swelling.   Gastrointestinal: Negative for abdominal pain, nausea and vomiting.   Genitourinary: Negative for dysuria and hematuria.   Musculoskeletal: Negative for falls and myalgias.   Neurological: Negative for dizziness, weakness and headaches.         OBJECTIVE:     Vital Signs (Most Recent)   Temp: 98 °F (36.7 °C) (02/05/22 0745)  Pulse: 75 (02/05/22 0747)  Resp: 15 (02/05/22 0418)  BP: (!) 102/55 (02/05/22 0745)  SpO2: 100 % (02/05/22 0747) Body mass index is 23.62 kg/m².  Intake/Outake:  This Shift:  No intake/output data recorded.    Net I/O past 24h:     Intake/Output Summary (Last 24 hours) at 2/5/2022 0809  Last data filed at 2/5/2022 0418  Gross per 24 hour   Intake 500 ml   Output 1725 ml   Net -1225 ml             Physical Exam:  Physical Exam  Constitutional:       Appearance: Normal appearance.   Cardiovascular:      Rate and Rhythm: Rhythm irregularly irregular.      Pulses: Normal pulses.   Pulmonary:      Breath sounds: Normal  breath sounds.   Abdominal:      Palpations: Abdomen is soft.      Tenderness: There is no abdominal tenderness.   Neurological:      Mental Status: She is alert and oriented to person, place, and time.          Laboratory:  CBC/Anemia Labs: Coags:    Recent Labs   Lab 02/04/22  1242 02/05/22  0428   WBC 8.43 6.89   HGB 10.9* 11.2*   HCT 34.0* 35.6*    428   MCV 88 87   RDW 15.0* 14.7*    No results for input(s): PT, INR, APTT in the last 168 hours.     Chemistries: ABG:   Recent Labs   Lab 02/04/22  1242 02/04/22  1537 02/05/22  0428     --  140   K 3.5  --  4.2     --  105   CO2 23  --  24   BUN 10  --  13   CREATININE 0.8  --  0.8   CALCIUM 9.5  --  9.9   PROT 6.9  --   --    BILITOT 0.7  --   --    ALKPHOS 80  --   --    ALT 41  --   --    AST 23  --   --    MG  --  2.0 2.3   PHOS  --   --  4.4    No results for input(s): PH, PCO2, PO2, HCO3, POCSATURATED, BE in the last 168 hours.     Diagnostic Results: No new results.    Medications:  Scheduled Meds:   aluminum-magnesium hydroxide-simethicone  30 mL Oral Once    furosemide (LASIX) injection  20 mg Intravenous Daily    pantoprazole  40 mg Oral Daily    polyethylene glycol  17 g Oral Daily    rivaroxaban  20 mg Oral Daily with dinner                             Continuous Infusions:  PRN Meds:acetaminophen, dextrose 10%, dextrose 10%, glucagon (human recombinant), glucose, glucose, melatonin, sodium chloride 0.9%, sodium chloride 0.9%     ASSESSMENT/PLAN:     Active Hospital Problems    Diagnosis  POA    *Paroxysmal atrial fibrillation with RVR [I48.0]  Yes    Acute on chronic diastolic CHF (congestive heart failure) [I50.33]  Yes    Typical atrial flutter [I48.3]  Yes    Bilateral pleural effusion [J90]  Yes    Chest pain [R07.9]  Yes    Esophagitis [K20.90]  Yes      Resolved Hospital Problems   No resolved problems to display.     1. pAfib  - [id number] 3  - Not on rate-control     Plan:  - Cardiovert on Monday  - Continue  xarelto 20mg PO   - Hold diltiazem  - Continue to consult cards  - monitor on telemetry     2. Acute on chronic diastolic CHF  - exasperated by AF  - D/C'd lasix 20mg PO     Plan:  - Assess electrolytes daily  - Monitor I/O, daily weights, K > 4, Mg > 2  - Na restricted (2g) and 1.5L fluid restricted diet    VTE Risk Mitigation (From admission, onward)         Ordered     rivaroxaban tablet 20 mg  With dinner         02/04/22 1603     IP VTE HIGH RISK PATIENT  Once         02/04/22 1410     Place sequential compression device  Until discontinued         02/04/22 1410     Place sequential compression device  Until discontinued         02/04/22 1349              CODE Status- FULL    Dispo- No, for cardiovert Monday    Angelita Villalta, MS3  [facility] Medicine  [phone]

## 2022-02-05 NOTE — H&P
[facility] - Emergency Dept  [facility] Medicine  History & Physical    Patient Name: Trudi Mcknight  MRN: [id number]  Patient Class: IP- Inpatient  Admission Date: 2/4/2022  Attending Physician: Phil Leon MD   Primary Care Provider: Renuka Moreno MD         Patient information was obtained from patient, past medical records and ER records.     Subjective:     Principal Problem:Paroxysmal atrial fibrillation with RVR    Chief Complaint:   Chief Complaint   Patient presents with    Cardiology Referral        HPI: 66 year old female with past medical history of sick sinus syndrome (s/p [employer] dual chamber pacemaker placement on 1/20), paroxysmal Afib (on xarelto for AC), diastolic HF (EF 65%), hiatal hernia, esophagitis, Meniere's disease who present from cardiology clinic with Afib with RVR. She reports crushing substernal/left sided chest pain radiating to her neck and arms, dyspnea on exertion, and palpitations. Her chest pain worsens with eating, improves with burping. She denies pleuritic/positional chest pain. She reports not being able to tolerate metoprolol and diltiazem in the past, experiencing headache, low BP, and palpitations when taking them. She reports that her dyspnea has worsened through the month of of January; she used to be able to walk 2 miles per day and now struggles to walk to the restroom 2/2 to dyspnea. She also reports occasional morning lightheadedness lasting 15 min. She denies orthopnea/PND/leg swelling. She was prescribed lasix 20 mg by her outpatient cardiologist two days ago, and has been taking it since. She has not been taking her diltiazem due her intolerance.     In the ED, patient received Lasix 20 mg. Troponins x2 negative, EKG shows Aflutter with variable AV block. Cardiology was consulted, recommend RADHA/DC cardioversion on Monday 2/7.       Past Medical History:   Diagnosis Date    Esophagitis     Hiatal hernia     Meniere's disease     Paroxysmal atrial  fibrillation 3/7/2021    Sick sinus syndrome 2022    s/p Dual chamber pacemaker       Past Surgical History:   Procedure Laterality Date    A-V CARDIAC PACEMAKER INSERTION N/A 2022    Procedure: INSERTION, CARDIAC PACEMAKER, DUAL CHAMBER;  Surgeon: Ernesto Duncan MD;  Location: [facility] EP LAB;  Service: Cardiology;  Laterality: N/A;  SB, DUAL PPM, SJM, ANES, SK, [id number]    BREAST CYST ASPIRATION           SECTION      COLONOSCOPY N/A 2019    Procedure: COLONOSCOPY;  Surgeon: INGRID Russo MD;  [facility]  Service: Endoscopy;  Laterality: N/A;    HYSTERECTOMY      TONSILLECTOMY         Review of patient's allergies indicates:   Allergen Reactions    Penicillins Hives     causes congestion and hives    Tricyclic compounds        No current facility-administered medications on file prior to encounter.     Current Outpatient Medications on File Prior to Encounter   Medication Sig    diltiaZEM (CARDIZEM) 90 MG tablet Take 1 tablet (90 mg total) by mouth 3 (three) times daily.    FLUZONE HIGHDOSE QUAD 21-22  mcg/0.7 mL Syrg     furosemide (LASIX) 20 MG tablet Take 1 tablet (20 mg total) by mouth once daily.    pantoprazole (PROTONIX) 40 MG tablet Take 1 tablet (40 mg total) by mouth once daily.    polyethylene glycol (MIRALAX) 17 gram PwPk Take 17 g by mouth once as needed (Constipation).    rivaroxaban (XARELTO) 20 mg Tab Take 1 tablet (20 mg total) by mouth daily with dinner or evening meal.    [DISCONTINUED] diltiaZEM (CARDIZEM) 90 MG tablet Take 1 tablet (90 mg total) by mouth 3 (three) times daily.    sertraline (ZOLOFT) 25 MG tablet TAKE 1/2 TABLET BY MOUTH EVERY DAY (Patient not taking: Reported on 2022)    [DISCONTINUED] vitamin D (VITAMIN D3) 1000 units Tab Take 1,000 Units by mouth once daily.     Family History     Problem Relation (Age of Onset)    Breast cancer Mother, Paternal Grandmother    Diverticulitis Brother, Sister    Heart  disease Mother (55), Father, Brother    Hyperlipidemia Mother    Hypertension Mother, Father        Tobacco Use    Smoking status: Never Smoker    Smokeless tobacco: Never Used   Substance and Sexual Activity    Alcohol use: No     Alcohol/week: 0.0 standard drinks     Comment: rarely    Drug use: No    Sexual activity: Not on file     Review of Systems   Constitutional: Positive for activity change, appetite change, chills and unexpected weight change (3 lbs ). Negative for fever.   HENT: Positive for congestion (chronic) and trouble swallowing.    Eyes: Positive for visual disturbance (edges of vision get dark and pulsate with HR). Negative for photophobia.   Respiratory: Positive for shortness of breath. Negative for cough and wheezing.    Cardiovascular: Positive for chest pain and palpitations. Negative for leg swelling.   Gastrointestinal: Positive for constipation (improves with miralax). Negative for abdominal pain, diarrhea, nausea and vomiting.   Genitourinary: Negative for difficulty urinating and dysuria.   Musculoskeletal: Negative for arthralgias and back pain.   Skin: Negative for rash and wound.   Neurological: Positive for headaches (wtih metoprolol/diltiazem).   Psychiatric/Behavioral: Negative for agitation and behavioral problems.     Objective:     Vital Signs (Most Recent):  Temp: 98.5 °F (36.9 °C) (02/04/22 1233)  Pulse: 100 (02/04/22 1503)  Resp: 18 (02/04/22 1040)  BP: 126/65 (02/04/22 1503)  SpO2: 95 % (02/04/22 1503) Vital Signs (24h Range):  Temp:  [98.5 °F (36.9 °C)] 98.5 °F (36.9 °C)  Pulse:  [100-125] 100  Resp:  [18] 18  SpO2:  [95 %-99 %] 95 %  BP: (112-128)/(63-74) 126/65     Weight: 59 kg (130 lb)  Body mass index is 24.56 kg/m².    Physical Exam  Constitutional:       General: She is not in acute distress.     Appearance: She is normal weight. She is not ill-appearing.   HENT:      Head: Normocephalic and atraumatic.      Nose: Nose normal.      Mouth/Throat:      Mouth:  Mucous membranes are moist.   Eyes:      General: No scleral icterus.     Conjunctiva/sclera: Conjunctivae normal.   Cardiovascular:      Rate and Rhythm: Normal rate. Rhythm irregular.      Pulses: Normal pulses.      Heart sounds: No murmur heard.  No gallop.       Comments: No JVD  Pulmonary:      Effort: Pulmonary effort is normal. No respiratory distress.      Breath sounds: Normal breath sounds. No wheezing or rales.   Abdominal:      General: Abdomen is flat. Bowel sounds are normal. There is no distension.      Palpations: Abdomen is soft.      Tenderness: There is no abdominal tenderness. There is no guarding.   Musculoskeletal:         General: No swelling or tenderness. Normal range of motion.      Cervical back: Normal range of motion. No rigidity.      Right lower leg: No edema.      Left lower leg: No edema.   Skin:     General: Skin is warm and dry.      Capillary Refill: Capillary refill takes less than 2 seconds.      Coloration: Skin is not jaundiced.   Neurological:      General: No focal deficit present.      Mental Status: She is alert and oriented to person, place, and time.   Psychiatric:         Mood and Affect: Mood normal.         Behavior: Behavior normal.             Significant Labs:   All pertinent labs within the past 24 hours have been reviewed.  CBC:   Recent Labs   Lab 02/04/22  1242   WBC 8.43   HGB 10.9*   HCT 34.0*        CMP:   Recent Labs   Lab 02/04/22  1242      K 3.5      CO2 23   GLU 94   BUN 10   CREATININE 0.8   CALCIUM 9.5   PROT 6.9   ALBUMIN 3.2*   BILITOT 0.7   ALKPHOS 80   AST 23   ALT 41   ANIONGAP 10   EGFRNONAA >60.0     Troponin:   Recent Labs   Lab 02/04/22  1242 02/04/22  1537   TROPONINI <0.006 <0.006       Significant Imaging: I have reviewed all pertinent imaging results/findings within the past 24 hours.    Assessment/Plan:     * Paroxysmal atrial fibrillation with RVR  Patient with Paroxysmal (<7 days) atrial fibrillation which is  uncontrolled currently with Calcium Channel Blocker. Patient is currently in atrial fibrillation.[id number] Score: 3. Anticoagulation indicated. Anticoagulation done with rivaroxaban.    - Restarted home AC med xarelto, CHADS VASC 3  - Rate/rhythm control per cards recommendations. Patient has not tolerated metoprolol or diltiazem in the past.   - May consider amiodarone/dofetilide for rhythm control post DCCV  - Cardioversion planned for Monday 2/7, NPO at midnight on 2/7  - EP consulted, appreciate recs.         Acute on chronic diastolic CHF (congestive heart failure)  Heart failure with preserved EF (65%) on 1/20/22. No signs of fluid overload on physical exam. History not consistent with HF as patient denies orthopnea/PND/recurrent LE edema. Started on Lasix 20 mg by her cardiologist Dr. Dominguez on 2/2/22. BNP was 196 on admission, down from 320 in January. Troponins x2 negative. Exam and labs not indicative of acute exacerbation, will continue maintenance diuretic dose.     - start home dose lasix 20 mg daily  - daily weights  - Strict I/Os  - cardiac diet (low Na 2 g, fluid restriction 1.5 L)          Bilateral pleural effusion  Patient reports SOB, no orthopnea. History of HFpEF.   CXR (2/4): No particular change in the size of the small left pleural effusion and trace right pleural effusion.  Afebrile, no white count, no consolidation present, low concern for infectious process.     - Will diurese with Lasix 20 daily      Esophagitis  Patient reports history of esophagitis and has trouble swallowing uncoated pills.     - continue home med pantoprozole      Typical atrial flutter    See paroxysmal afib with RVR    Chest pain  Patient complains of burning/crushing chest pain that radiating to neck/arms accompanied by SOB. Reports relief with burping and possible relief with omeprazole. Troponins x2 negative. No ST changes on EKG. EKG consistent with aflutter.     - Pantoprazole daily for esophagitis  related chest pain  - prn EKG and trop stat if recurrence of chest pain        VTE Risk Mitigation (From admission, onward)         Ordered     rivaroxaban tablet 20 mg  With dinner         02/04/22 1603     IP VTE HIGH RISK PATIENT  Once         02/04/22 1410     Place sequential compression device  Until discontinued         02/04/22 1410     Place sequential compression device  Until discontinued         02/04/22 1349                   Js Sales MD  Department of Hospital Medicine   [facility] - Emergency Dept

## 2022-02-05 NOTE — PLAN OF CARE
Continue currently plan for tentative arnulfo/dccv on Monday.   BP has remained stable overnight.  ECG today shows coarse AF with .  Continue Xarelto  NPO Sunday night.    Please page Cardiology with any urgent concerns or questions.

## 2022-02-05 NOTE — ASSESSMENT & PLAN NOTE
Patient with Paroxysmal (<7 days) atrial fibrillation which is uncontrolled currently with Calcium Channel Blocker. Patient is currently in atrial fibrillation.[id number] Score: 3. Anticoagulation indicated. Anticoagulation done with rivaroxaban.    - Restarted home AC med xarelto, CHADS VASC 3  - Rate/rhythm control per cards recommendations. Patient has not tolerated metoprolol or diltiazem in the past.   - May consider amiodarone/dofetilide for rhythm control post DCCV  - Cardioversion planned for Monday 2/7, NPO at midnight on 2/7  - EP consulted, appreciate recs.

## 2022-02-05 NOTE — HOSPITAL COURSE
Patient has been rate controlled without any medications in the 60-90s mostly. Dc'd lasix due to hypotension and lightheadedness. She was admitted with the plan for cardioversion, but EP recs starting flecainide and digoxin and continue AC. Rhythm converted to sinus. Plan discussed with the patient. She will follow up with EP 4-6 weeks and PCP as well. All questions and concerned addressed.     Vitals:    02/07/22 0603 02/07/22 0810 02/07/22 1201 02/07/22 1217   BP:       BP Location:       Patient Position:       Pulse: 67 66 76 64   Resp:       Temp:  98.1 °F (36.7 °C)     TempSrc:  Oral     SpO2: 96%      Weight:       Height:           Physical Exam  Constitutional:       General: She is not in acute distress.     Appearance: She is normal weight. She is not ill-appearing.   HENT:      Head: Normocephalic and atraumatic.      Nose: Nose normal.      Mouth/Throat:      Mouth: Mucous membranes are moist.   Eyes:      General: No scleral icterus.     Conjunctiva/sclera: Conjunctivae normal.   Cardiovascular:      Rate and Rhythm: Normal rate. Rhythm regular.      Pulses: Normal pulses.      Heart sounds: No murmur heard.  No gallop.       Comments: No JVD  Pulmonary:      Effort: Pulmonary effort is normal. No respiratory distress.      Breath sounds: Normal breath sounds. No wheezing or rales.   Abdominal:      General: Abdomen is flat. Bowel sounds are normal. There is no distension.      Palpations: Abdomen is soft.      Tenderness: There is no abdominal tenderness. There is no guarding.   Musculoskeletal:         General: No swelling or tenderness. Normal range of motion.      Cervical back: Normal range of motion. No rigidity.      Right lower leg: No edema.      Left lower leg: No edema.   Skin:     General: Skin is warm and dry.      Capillary Refill: Capillary refill takes less than 2 seconds.      Coloration: Skin is not jaundiced.   Neurological:      General: No focal deficit present.      Mental Status:  She is alert and oriented to person, place, and time.   Psychiatric:         Mood and Affect: Mood normal.

## 2022-02-05 NOTE — ASSESSMENT & PLAN NOTE
Patient complains of burning/crushing chest pain that radiating to neck/arms accompanied by SOB. Reports relief with burping and possible relief with omeprazole. Troponins x2 negative. No ST changes on EKG. EKG consistent with aflutter.     - Pantoprazole daily for esophagitis related chest pain  - prn EKG and trop stat if recurrence of chest pain

## 2022-02-05 NOTE — ASSESSMENT & PLAN NOTE
Heart failure with preserved EF (65%) on 1/20/22. No signs of fluid overload on physical exam. History not consistent with HF exacerbation as patient denies orthopnea/PND/recurrent LE edema. Started on Lasix 20 mg by her cardiologist Dr. Dominguez on 2/2/22. BNP was 196 on admission, down from 320 in January. Troponins x2 negative. Exam and labs not indicative of acute exacerbation. Likely that uncontrolled Afib contributing to HF.     - Hold Lasix  - daily weights  - Strict I/Os  - cardiac diet (low Na 2 g, fluid restriction 1.5 L)

## 2022-02-05 NOTE — PLAN OF CARE
Evaluation and POC established this date. Pt presented with pleasant demeanor and amenable to therapy, able to complete bed mobility activities with mod I and transfers with SBA.     Problem: Occupational Therapy Goal  Goal: Occupational Therapy Goal  Description: Goals to be met by: 3/7/22    Patient will increase functional independence with ADLs by performing:    Grooming while standing at sink with Supervision.  Step transfer with Supervision     Outcome: Ongoing, Progressing

## 2022-02-05 NOTE — SUBJECTIVE & OBJECTIVE
Interval History: NAOE, rate controlled off BB or CCB. DC'd Lasix due to hypotension and lightheadedness.     Review of Systems   Constitutional: Negative for chills and fever.   Eyes: Negative for photophobia and visual disturbance.   Respiratory: Negative for cough, shortness of breath and wheezing.    Cardiovascular: Negative for chest pain, palpitations and leg swelling.   Gastrointestinal: Negative for abdominal pain, constipation, diarrhea, nausea and vomiting.   Genitourinary: Negative for difficulty urinating and dysuria.   Musculoskeletal: Negative for arthralgias and back pain.   Neurological: Positive for light-headedness and headaches ([facility] metoprolol/diltiazem).   Psychiatric/Behavioral: Negative for agitation and behavioral problems.     Objective:     Vital Signs (Most Recent):  Temp: 97.5 °F (36.4 °C) (02/05/22 1145)  Pulse: 90 (02/05/22 1315)  Resp: 15 (02/05/22 0418)  BP: 95/62 (02/05/22 1315)  SpO2: 98 % (02/05/22 1315) Vital Signs (24h Range):  Temp:  [97.5 °F (36.4 °C)-98.8 °F (37.1 °C)] 97.5 °F (36.4 °C)  Pulse:  [] 90  Resp:  [15-29] 15  SpO2:  [98 %-100 %] 98 %  BP: ()/(55-80) 95/62     Weight: 56.7 kg (125 lb)  Body mass index is 23.62 kg/m².    Intake/Output Summary (Last 24 hours) at 2/5/2022 1529  Last data filed at 2/5/2022 1030  Gross per 24 hour   Intake 500 ml   Output 1025 ml   Net -525 ml      Physical Exam  Constitutional:       General: She is not in acute distress.     Appearance: She is normal weight. She is not ill-appearing.   HENT:      Head: Normocephalic and atraumatic.      Nose: Nose normal.      Mouth/Throat:      Mouth: Mucous membranes are moist.   Eyes:      General: No scleral icterus.     Conjunctiva/sclera: Conjunctivae normal.   Cardiovascular:      Rate and Rhythm: Normal rate. Rhythm irregular.      Pulses: Normal pulses.      Heart sounds: No murmur heard.  No gallop.       Comments: No JVD  Pulmonary:      Effort: Pulmonary effort is normal. No  respiratory distress.      Breath sounds: Normal breath sounds. No wheezing or rales.   Abdominal:      General: Abdomen is flat. Bowel sounds are normal. There is no distension.      Palpations: Abdomen is soft.      Tenderness: There is no abdominal tenderness. There is no guarding.   Musculoskeletal:         General: No swelling or tenderness. Normal range of motion.      Cervical back: Normal range of motion. No rigidity.      Right lower leg: No edema.      Left lower leg: No edema.   Skin:     General: Skin is warm and dry.      Capillary Refill: Capillary refill takes less than 2 seconds.      Coloration: Skin is not jaundiced.   Neurological:      General: No focal deficit present.      Mental Status: She is alert and oriented to person, place, and time.   Psychiatric:         Mood and Affect: Mood normal.         Behavior: Behavior normal.         Significant Labs: All pertinent labs within the past 24 hours have been reviewed.    Significant Imaging: I have reviewed all pertinent imaging results/findings within the past 24 hours.

## 2022-02-05 NOTE — PLAN OF CARE
Recommendations    1. Rec Cardiac diet with texture per SLP     2. Add Boost Plus TID     3. If TF warranted, rec Isosource 1.5 advancing as tolerated until goal of 40 mL/hr providing pt with 1440 kcal, 65 g protein, and 733 mL free water    Goals: Pt to meet >/= 75% EEN/EPN by f/u date  Nutrition Goal Status: new  Communication of RD Recs:  (POC)

## 2022-02-05 NOTE — PT/OT/SLP EVAL
Physical Therapy Co-Evaluation/Treatment    Patient Name:  Trudi Mcknight   MRN:  [id number]    Recommendations:     Discharge Recommendations:  home health PT,home health OT   Discharge Equipment Recommendations: none   Barriers to discharge: Inaccessible home (5-6 ESTUARDO)    Assessment:     Trudi Mcknight is a 66 y.o. female admitted with a medical diagnosis of Paroxysmal atrial fibrillation with RVR.  She presents with the following impairments/functional limitations:  weakness,impaired balance,impaired endurance,impaired cardiopulmonary response to activity,impaired self care skills,impaired functional mobilty,gait instability,orthopedic precautions. Pt completing functional mobility without physical assist or use of DME. Ambulated short distance bed>chair without LOB or major deviations noted. Further progression of mobility limited 2* impaired endurance and symptomatic reaction to receiving lasix earlier this date. Pt would continue to benefit from skilled acute PT in order to address current deficits and progress functional mobility.    Rehab Prognosis: Good; patient would benefit from acute skilled PT services to address these deficits and reach maximum level of function.    Recent Surgery: Procedure(s) (LRB):  Transesophageal echo (RADHA) intra-procedure log documentation (N/A)      Plan:     During this hospitalization, patient to be seen 3 x/week to address the identified rehab impairments via gait training,therapeutic activities,therapeutic exercises,neuromuscular re-education and progress toward the following goals:    · Plan of Care Expires:  03/06/22    Subjective     Chief Complaint: cramping in back and abdomen  Patient/Family Comments/goals: return to PLOF  Pain/Comfort:  · Pain Rating 1:  (reported discomfort at back and abdomen, described as cramping)  · Pain Addressed 1: Reposition,Distraction (hot pack applied)    Patients cultural, spiritual, Hindu conflicts given the current situation:  no    Living Environment:  Pt lives with her  in a 1SH with 5-6 ESTUARDO, B handrails.   Prior to admission, patients level of function was independent with ambulation and ADLs. Pt reports that she is typically active, enjoys walking daily, and caring for her grandchildren. Pt reports that she has been more limited recently due to medical issues. Equipment used at home: grab bar.  DME owned (not currently used): none.  Upon discharge, patient will have assistance from .    Objective:     Communicated with RN prior to session.  Patient found supine with telemetry,pulse ox (continuous)  upon PT entry to room.    General Precautions: Standard, fall,pacemaker   Orthopedic Precautions:N/A   Braces: Sling and swathe (at night 2* recent ppm insertion)  Respiratory Status: Room air    Exams:  · Cognitive Exam:  Patient is oriented to Person, Place, Time and Situation  · Sensation:    · -       Intact  except pt report N&T at lips 2* reaction from lasix  · RLE ROM: WFL  · RLE Strength: WFL  · LLE ROM: WFL  · LLE Strength: WFL    Functional Mobility:  · Bed Mobility:     · Supine to Sit: supervision with HOB elevated  · Transfers:     · Sit to Stand:  stand by assistance with no AD from EOB   · Bed to Chair: stand by assistance with  no AD  using  Stand Pivot  · Gait: ~4 ft. bed>chair with SBA without AD  · No major LOB or deviations noted    Therapeutic Activities and Exercises:  Pt educated on role of PT and PT POC. Pt verbalized understanding.   Reviewed pacemaker precautions 2* recent insertion (22). However, based on insertion date, noted that precautions may be . Will need to follow-up with JUAN PILLAI to clarify.   Pt educated on the effects of bed rest and the importance of OOB activity. Pt encouraged to sit UIC majority of day as tolerated and continue daily ambulation with nursing assist. Pt verbalized understanding.  Pt oriented to call bell and instructed to call for staff assist with standing  tasks/transfers. Pt verbalized understanding.   Set-up assist provided for pt to eat lunch     AM-PAC 6 CLICK MOBILITY  Total Score:20     Patient left up in chair with all lines intact, call button in reach and RN notified.    GOALS:   Multidisciplinary Problems     Physical Therapy Goals        Problem: Physical Therapy Goal    Goal Priority Disciplines Outcome Goal Variances Interventions   Physical Therapy Goal     PT, PT/OT Ongoing, Progressing     Description: Goals to be met by: 2/15/2022     Patient will increase functional independence with mobility by performin. Supine to sit with [place]  2. Sit to stand transfer with Supervision  3. Gait  x 150 feet with Supervision without AD.   4. Ascend/descend 5 stair with bilateral Handrails with SBA.  5. Lower extremity exercise program x15 reps, with supervision, in order to increase LE strength and (I) with functional mobility.                      History:     Past Medical History:   Diagnosis Date    Esophagitis     Hiatal hernia     Meniere's disease     Paroxysmal atrial fibrillation 3/7/2021    Sick sinus syndrome 2022    s/p Dual chamber pacemaker       Past Surgical History:   Procedure Laterality Date    A-V CARDIAC PACEMAKER INSERTION N/A 2022    Procedure: INSERTION, CARDIAC PACEMAKER, DUAL CHAMBER;  Surgeon: Ernesto Duncan MD;  Location: [facility] EP LAB;  Service: Cardiology;  Laterality: N/A;  SB, DUAL PPM, SJM, ANES, SK, [id number]    BREAST CYST ASPIRATION           SECTION      COLONOSCOPY N/A 2019    Procedure: COLONOSCOPY;  Surgeon: INGRID Russo MD;  Location: [facility] ENDO ([address] [place] [address]);  Service: Endoscopy;  Laterality: N/A;    HYSTERECTOMY      TONSILLECTOMY         Time Tracking:     PT Received On: 22  PT Start Time: 1255     PT Stop Time: 1318  PT Total Time (min): 23 min     Billable Minutes: Evaluation 15 and Therapeutic Activity 8  (co-eval performed this date due to need for 2 skilled therapists  in order to ensure pt safety, accomodate for pt activity tolerance, and maximize functional potential)     02/05/2022

## 2022-02-05 NOTE — ASSESSMENT & PLAN NOTE
Patient reports SOB, no orthopnea. History of HFpEF.   CXR (2/4): No particular change in the size of the small left pleural effusion and trace right pleural effusion.  Afebrile, no white count, no consolidation present, low concern for infectious process.     - HOLD lasix due to lightheadedness with hypotension

## 2022-02-05 NOTE — PT/OT/SLP EVAL
Occupational Therapy  Co- Evaluation    *co-eval to maximize safety and functional mobility    Name: Trudi Mcknight  MRN: [id number]  Admitting Diagnosis:  Paroxysmal atrial fibrillation with RVR  Recent Surgery: Procedure(s) (LRB):  Transesophageal echo (RADHA) intra-procedure log documentation (N/A)      Recommendations:     Discharge Recommendations: home health OT  Discharge Equipment Recommendations:  none  Barriers to discharge:   None    Assessment:     Trudi Mcknight is a 66 y.o. female with a medical diagnosis of Paroxysmal atrial fibrillation with RVR.  She presents with the following deficits affecting function: weakness,impaired endurance,impaired balance,impaired self care skills,impaired functional mobilty,gait instability,orthopedic precautions. Pt completed functional mobility without physical assistance or DME use. Patient completed bed mobility with modified independence and transfer sit >stand with SBA. Patient ambulated short distance bed>chair without LOB or wavering. Pt would benefit from skilled acute OT to address current deficits and progress functional mobility.      Rehab Prognosis: Good; patient would benefit from acute skilled OT services to address these deficits and reach maximum level of function.       Plan:     Patient to be seen 2 x/week to address the above listed problems via self-care/home management,therapeutic activities,therapeutic exercises  · Plan of Care Expires: 03/07/22  · Plan of Care Reviewed with: patient    Subjective     Chief Complaint: cramping in back and abdomen  Patient/Family Comments/goals: return to PLOF    Occupational Profile:  Living Environment: patient lives with  in 1SSH with 5-6 steps and BHR. Tub has grab bars but does not have DME  Previous level of function: walking and playing with grandchildren, independent with ambulation and ADLs, though has been more limited recently dt to medical issues.  has been staying in the bathroom for safety  while she has showered.   Roles and Routines: Grandmother, mother, wife  Equipment Used at Home:  grab bar  Assistance upon Discharge:     Pain/Comfort:  · Pain Rating 1: 0/10 (reported cramping in back and abdomen)  · Pain Addressed 1: Reposition,Distraction (hot pack applied)    Patients cultural, spiritual, Oriental orthodox conflicts given the current situation: no    Objective:     Communicated with: DUSTIN Lobato prior to session.  Patient found HOB elevated with telemetry,pulse ox (continuous) upon OT entry to room.    General Precautions: Standard, fall,pacemaker   Orthopedic Precautions:N/A   Braces: Sling and swathe (at night 2* recent ppm insertion)  Respiratory Status: Room air    Occupational Performance:    Bed Mobility:    · Patient completed Rolling/Turning to Left with  modified independence  · Patient completed Rolling/Turning to Right with modified independence  · Patient completed Scooting/Bridging with modified independence  · Patient completed Supine to Sit with stand by assistance     Functional Mobility/Transfers:  · Patient completed Sit <> Stand Transfer with stand by assistance  with  no assistive device from EOB  · Patient completed Stand <> Sit Transfer to upright chair with no AD  · Functional Mobility: Pt engaging in functional mobility to simulate household/community distances utilizing SBA and no AD in order to maximize functional activity tolerance and standing balance required for engagement in occupations of choice.     Activities of Daily Living:  · Lower Body Dressing: modified independence donning non-slip socks   · Feeding: feeding trays/table placed in front of patient to initiate eating while up in chair, mod I with set up assistance     Cognitive/Visual Perceptual:  Cognitive/Psychosocial Skills:     -       Oriented to: AOx4   -       Follows Commands/attention:Follows multistep  commands  -       Communication: clear/fluent  -       Memory: No Deficits noted  -       Safety  awareness/insight to disability: intact   -       Mood/Affect/Coping skills/emotional control: Appropriate to situation  Visual/Perceptual:      -Intact      Physical Exam:  Balance:    -       Intact  Postural examination/scapula alignment:    -       No postural abnormalities identified  Dominant hand:    -       Right  Upper Extremity Range of Motion:   WFL  Upper Extremity Strength:  WFL   Strength:  WFL  Fine Motor Coordination:    -       Intact  Gross motor coordination:   WFL    AMPAC 6 Click ADL:  AMPAC Total Score: 24    Treatment & Education:   Pt educated on role of OT, POC, and goals for therapy.     POC was dicussed with patient/caregiver, who was included in its development and is in agreement with the identified goals and treatment plan.    Patient and family aware of patient's deficits and therapy progression.    Time provided for therapeutic counseling and discussion of health disposition.    Educated on importance of EOB/OOB mobility, maintaining routine, sitting up in chair, and maximizing independence with ADLs during admission    Reviewed pacemaker precautions due to recent insertion (22), however based on insertion date, noted precautions may be .    Encouraged to sit in chair majority of day as tolerated, patient verbalized understanding    Education:    Patient left up in chair with all lines intact, call button in reach and RN notified    GOALS:   Multidisciplinary Problems     Occupational Therapy Goals        Problem: Occupational Therapy Goal    Goal Priority Disciplines Outcome Interventions   Occupational Therapy Goal     OT, PT/OT Ongoing, Progressing    Description: Goals to be met by: 3/7/22    Patient will increase functional independence with ADLs by performing:    Grooming while standing at sink with Supervision.  Step transfer with Supervision                      History:     Past Medical History:   Diagnosis Date    Esophagitis     Hiatal hernia      Meniere's disease     Paroxysmal atrial fibrillation 3/7/2021    Sick sinus syndrome 2022    s/p Dual chamber pacemaker       Past Surgical History:   Procedure Laterality Date    A-V CARDIAC PACEMAKER INSERTION N/A 2022    Procedure: INSERTION, CARDIAC PACEMAKER, DUAL CHAMBER;  Surgeon: Ernesto Duncan MD;  Location: [facility] EP LAB;  Service: Cardiology;  Laterality: N/A;  SB, DUAL PPM, SJM, ANES, SK, [id number]    BREAST CYST ASPIRATION           SECTION      COLONOSCOPY N/A 2019    Procedure: COLONOSCOPY;  Surgeon: INGRID Russo MD;  [facility]  Service: Endoscopy;  Laterality: N/A;    HYSTERECTOMY      TONSILLECTOMY         Time Tracking:     OT Date of Treatment: 22  OT Start Time: 1255  OT Stop Time: 1318  OT Total Time (min): 23 min    Billable Minutes:Evaluation 15  Therapeutic Activity 8    2022

## 2022-02-05 NOTE — CONSULTS
[facility] - [facility] ([place] [id number])  Adult Nutrition  Consult Note    SUMMARY     Recommendations    1. Rec Cardiac diet with texture per SLP     2. Add Boost Plus TID     3. If TF warranted, rec Isosource 1.5 advancing as tolerated until goal of 40 mL/hr providing pt with 1440 kcal, 65 g protein, and 733 mL free water    Goals: Pt to meet >/= 75% EEN/EPN by f/u date  Nutrition Goal Status: new  Communication of RD Recs:  (POC)    Assessment and Plan    Nutrition Problem:  Moderate Protein-Calorie Malnutrition  Malnutrition in the context of Chronic Illness/Injury    Related to (etiology):  Inability to consume sufficient energy     Signs and Symptoms (as evidenced by):  Energy Intake: <75% of estimated energy requirement for >/= 1 month  Body Fat Depletion: TERRY  Muscle Mass Depletion: TERRY  Weight Loss: 5 % x 1 month     Interventions(treatment strategy):  Collaboration with other providers  Fat, Na, ?texture modified diet  Commercial beverage    Nutrition Diagnosis Status:  New       Malnutrition Assessment  Malnutrition Type: chronic illness          Weight Loss (Malnutrition): 5% in 1 month  Energy Intake (Malnutrition): less than 75% for greater than or equal to 1 month                         Reason for Assessment    Reason For Assessment: consult  Diagnosis:  (Paroxysmal atrial fibrillation with RVR)  Relevant Medical History: esophagitis, hiatal hernia, HF    General Information Comments: 67 y/o female presented from cardiology clinic with A-fib with RVR. Team at bedside, unable to assess. Per chart review, 5% wt loss x 1 month. Per H&P, pt with dysphagia and appetite loss PTA. NFPE unable to be completed. With current information, pt meet moderate malnutrition in the context of chronic illness per ASPEN guidelines.    Nutrition Discharge Planning: Cardiac diet with texture per SLP    Nutrition Risk Screen    Nutrition Risk Screen: dysphagia or difficulty swallowing    Nutrition/Diet  "History    Spiritual, Cultural Beliefs, [employer]al Practices, Values that Affect Care: no  Food Allergies: NKFA  Factors Affecting Nutritional Intake: difficulty/impaired swallowing,decreased appetite    Anthropometrics    Temp: 97.5 °F (36.4 °C)  Height Method: Stated  Height: 5' 1" (154.9 cm)  Height (inches): 61 in  Weight Method: Bed Scale  Weight: 56.7 kg (125 lb)  Weight (lb): 125 lb  Ideal Body Weight (IBW), Female: 105 lb  % Ideal Body Weight, Female (lb): 119.05 %  BMI (Calculated): 23.6  BMI Grade: 18.5-24.9 - normal  Weight Loss: unintentional       Lab/Procedures/Meds    Pertinent Labs Reviewed: reviewed  Pertinent Labs Comments: WNL  Pertinent Medications Reviewed: reviewed  Pertinent Medications Comments: pantoprazole    Estimated/Assessed Needs    Weight Used For Calorie Calculations: 56.7 kg (125 lb)  Energy Calorie Requirements (kcal): 1305 kcal/day  Energy Need Method: [place]-St Jeor (PAL 1.25)  Protein Requirements: 57-68 g/day (1.0-1.2 g/kg)  Weight Used For Protein Calculations: 56.7 kg (125 lb)  Fluid Requirements (mL): 1 mL/kcal or per MD  Estimated Fluid Requirement Method: RDA Method  RDA Method (mL): 1305         Nutrition Prescription Ordered    Current Diet Order: Cardiac diet  Nutrition Order Comments: 1500 mL FR    Evaluation of Received Nutrient/Fluid Intake    I/O: -1.2L since admit  Energy Calories Required: not meeting needs  Protein Required: not meeting needs  Fluid Required: not meeting needs  Comments: LBM: 2/5  Tolerance:  (Will monitor)  % Intake of Estimated Energy Needs: 0 - 25 %  % Meal Intake: 0 - 25 %    Nutrition Risk    Level of Risk/Frequency of Follow-up: low       Monitor and Evaluation    Food and Nutrient Intake: energy intake  Food and Nutrient Adminstration: diet order  Anthropometric Measurements: weight,weight change  Biochemical Data, Medical Tests and Procedures: electrolyte and renal panel,glucose/endocrine profile,inflammatory profile,gastrointestinal " profile,lipid profile  Nutrition-Focused Physical Findings: overall appearance       Nutrition Follow-Up    RD Follow-up?: Yes

## 2022-02-05 NOTE — ED NOTES
Pt care assumed. Report received by FREDERICK Michelle. Pt lying in stretcher in low and locked position and side rails raised x2. Call light, pt's belongings, and bedside table within pt's reach. Pt on continuous cardiac monitoring, pulse oximetry, and BP cycling every 30 minutes. Pt in NAD and verbalized no needs at this time. Family member x1 at bedside.

## 2022-02-06 LAB
ANION GAP SERPL CALC-SCNC: 12 MMOL/L (ref 8–16)
BASOPHILS # BLD AUTO: 0.07 K/UL (ref 0–0.2)
BASOPHILS NFR BLD: 1.3 % (ref 0–1.9)
BUN SERPL-MCNC: 22 MG/DL (ref 8–23)
CALCIUM SERPL-MCNC: 9.8 MG/DL (ref 8.7–10.5)
CHLORIDE SERPL-SCNC: 105 MMOL/L (ref 95–110)
CO2 SERPL-SCNC: 23 MMOL/L (ref 23–29)
CREAT SERPL-MCNC: 0.8 MG/DL (ref 0.5–1.4)
DIFFERENTIAL METHOD: ABNORMAL
EOSINOPHIL # BLD AUTO: 0.3 K/UL (ref 0–0.5)
EOSINOPHIL NFR BLD: 4.7 % (ref 0–8)
ERYTHROCYTE [DISTWIDTH] IN BLOOD BY AUTOMATED COUNT: 14.6 % (ref 11.5–14.5)
EST. GFR  (AFRICAN AMERICAN): >60 ML/MIN/1.73 M^2
EST. GFR  (NON AFRICAN AMERICAN): >60 ML/MIN/1.73 M^2
GLUCOSE SERPL-MCNC: 122 MG/DL (ref 70–110)
HCT VFR BLD AUTO: 36.3 % (ref 37–48.5)
HGB BLD-MCNC: 11.7 G/DL (ref 12–16)
IMM GRANULOCYTES # BLD AUTO: 0.03 K/UL (ref 0–0.04)
IMM GRANULOCYTES NFR BLD AUTO: 0.6 % (ref 0–0.5)
LYMPHOCYTES # BLD AUTO: 0.9 K/UL (ref 1–4.8)
LYMPHOCYTES NFR BLD: 17.6 % (ref 18–48)
MAGNESIUM SERPL-MCNC: 2 MG/DL (ref 1.6–2.6)
MCH RBC QN AUTO: 27.9 PG (ref 27–31)
MCHC RBC AUTO-ENTMCNC: 32.2 G/DL (ref 32–36)
MCV RBC AUTO: 86 FL (ref 82–98)
MONOCYTES # BLD AUTO: 0.4 K/UL (ref 0.3–1)
MONOCYTES NFR BLD: 7.6 % (ref 4–15)
NEUTROPHILS # BLD AUTO: 3.6 K/UL (ref 1.8–7.7)
NEUTROPHILS NFR BLD: 68.2 % (ref 38–73)
NRBC BLD-RTO: 0 /100 WBC
PHOSPHATE SERPL-MCNC: 3.5 MG/DL (ref 2.7–4.5)
PLATELET # BLD AUTO: 446 K/UL (ref 150–450)
PMV BLD AUTO: 9.7 FL (ref 9.2–12.9)
POTASSIUM SERPL-SCNC: 3.9 MMOL/L (ref 3.5–5.1)
RBC # BLD AUTO: 4.2 M/UL (ref 4–5.4)
SODIUM SERPL-SCNC: 140 MMOL/L (ref 136–145)
WBC # BLD AUTO: 5.27 K/UL (ref 3.9–12.7)

## 2022-02-06 PROCEDURE — 80048 BASIC METABOLIC PNL TOTAL CA: CPT | Mod: HCNC

## 2022-02-06 PROCEDURE — 84100 ASSAY OF PHOSPHORUS: CPT | Mod: HCNC

## 2022-02-06 PROCEDURE — 85025 COMPLETE CBC W/AUTO DIFF WBC: CPT | Mod: HCNC

## 2022-02-06 PROCEDURE — 36415 COLL VENOUS BLD VENIPUNCTURE: CPT | Mod: HCNC

## 2022-02-06 PROCEDURE — 83735 ASSAY OF MAGNESIUM: CPT | Mod: HCNC

## 2022-02-06 PROCEDURE — 25000003 PHARM REV CODE 250: Mod: HCNC

## 2022-02-06 PROCEDURE — 20600001 HC STEP DOWN PRIVATE ROOM: Mod: HCNC

## 2022-02-06 PROCEDURE — 99232 PR SUBSEQUENT HOSPITAL CARE,LEVL II: ICD-10-PCS | Mod: HCNC,GC,, | Performed by: HOSPITALIST

## 2022-02-06 PROCEDURE — 99232 SBSQ HOSP IP/OBS MODERATE 35: CPT | Mod: HCNC,GC,, | Performed by: HOSPITALIST

## 2022-02-06 RX ORDER — POLYETHYLENE GLYCOL 3350 17 G/17G
17 POWDER, FOR SOLUTION ORAL DAILY PRN
Status: DISCONTINUED | OUTPATIENT
Start: 2022-02-06 | End: 2022-02-07 | Stop reason: HOSPADM

## 2022-02-06 RX ORDER — FLECAINIDE ACETATE 100 MG/1
100 TABLET ORAL EVERY 12 HOURS
Status: DISCONTINUED | OUTPATIENT
Start: 2022-02-06 | End: 2022-02-07 | Stop reason: HOSPADM

## 2022-02-06 RX ORDER — FLECAINIDE ACETATE 100 MG/1
100 TABLET ORAL EVERY 12 HOURS
Status: DISCONTINUED | OUTPATIENT
Start: 2022-02-06 | End: 2022-02-06

## 2022-02-06 RX ADMIN — RIVAROXABAN 20 MG: 20 TABLET, FILM COATED ORAL at 05:02

## 2022-02-06 RX ADMIN — FLECAINIDE ACETATE 100 MG: 100 TABLET ORAL at 08:02

## 2022-02-06 RX ADMIN — FLECAINIDE ACETATE 100 MG: 100 TABLET ORAL at 12:02

## 2022-02-06 RX ADMIN — PANTOPRAZOLE SODIUM 40 MG: 40 TABLET, DELAYED RELEASE ORAL at 08:02

## 2022-02-06 NOTE — SUBJECTIVE & OBJECTIVE
Interval History: Patient complained of R. Sided squeezing pain. EKG shows no ST changes, Trop negative. In the morning she mentioned pain that goes around her abdomen and to her back, improved with heating pads. Patient content with heating pads over medication. Per cardiology recommendations, flecainide started.     Review of Systems   Constitutional: Negative for chills and fever.   Eyes: Negative for photophobia and visual disturbance.   Respiratory: Negative for cough, shortness of breath and wheezing.    Cardiovascular: Negative for chest pain, palpitations and leg swelling.   Gastrointestinal: Positive for abdominal pain. Negative for constipation, diarrhea, nausea and vomiting.   Genitourinary: Negative for difficulty urinating and dysuria.   Musculoskeletal: Positive for back pain. Negative for arthralgias.   Neurological: Positive for light-headedness and headaches (wtih metoprolol/diltiazem).   Psychiatric/Behavioral: Negative for agitation and behavioral problems.     Objective:     Vital Signs (Most Recent):  Temp: 98.1 °F (36.7 °C) (02/06/22 1115)  Pulse: 78 (02/06/22 1115)  Resp: 15 (02/05/22 0418)  BP: (!) 101/57 (02/06/22 1115)  SpO2: 98 % (02/06/22 1115) Vital Signs (24h Range):  Temp:  [97.8 °F (36.6 °C)-98.8 °F (37.1 °C)] 98.1 °F (36.7 °C)  Pulse:  [] 78  SpO2:  [94 %-98 %] 98 %  BP: ()/(53-66) 101/57     Weight: 56.6 kg (124 lb 12.5 oz)  Body mass index is 23.58 kg/m².    Intake/Output Summary (Last 24 hours) at 2/6/2022 1313  Last data filed at 2/6/2022 1203  Gross per 24 hour   Intake --   Output 1350 ml   Net -1350 ml      Physical Exam  Constitutional:       General: She is not in acute distress.     Appearance: She is normal weight. She is not ill-appearing.   HENT:      Head: Normocephalic and atraumatic.      Nose: Nose normal.      Mouth/Throat:      Mouth: Mucous membranes are moist.   Eyes:      General: No scleral icterus.     Conjunctiva/sclera: Conjunctivae normal.    Cardiovascular:      Rate and Rhythm: Normal rate. Rhythm irregular.      Pulses: Normal pulses.      Heart sounds: No murmur heard.  No gallop.       Comments: No JVD  Pulmonary:      Effort: Pulmonary effort is normal. No respiratory distress.      Breath sounds: Normal breath sounds. No wheezing or rales.   Abdominal:      General: Abdomen is flat. Bowel sounds are normal. There is no distension.      Palpations: Abdomen is soft.      Tenderness: There is no abdominal tenderness. There is no guarding.   Musculoskeletal:         General: No swelling or tenderness. Normal range of motion.      Cervical back: Normal range of motion. No rigidity.      Right lower leg: No edema.      Left lower leg: No edema.   Skin:     General: Skin is warm and dry.      Capillary Refill: Capillary refill takes less than 2 seconds.      Coloration: Skin is not jaundiced.   Neurological:      General: No focal deficit present.      Mental Status: She is alert and oriented to person, place, and time.   Psychiatric:         Mood and Affect: Mood normal.         Behavior: Behavior normal.         Significant Labs:   All pertinent labs within the past 24 hours have been reviewed.  CBC:   Recent Labs   Lab 02/05/22  0428 02/06/22  0918   WBC 6.89 5.27   HGB 11.2* 11.7*   HCT 35.6* 36.3*    446     CMP:   Recent Labs   Lab 02/05/22  0428 02/06/22  0918    140   K 4.2 3.9    105   CO2 24 23   GLU 92 122*   BUN 13 22   CREATININE 0.8 0.8   CALCIUM 9.9 9.8   ANIONGAP 11 12   EGFRNONAA >60.0 >60.0     Troponin:   Recent Labs   Lab 02/04/22  1537 02/05/22 2039   TROPONINI <0.006 <0.006       Significant Imaging: I have reviewed all pertinent imaging results/findings within the past 24 hours.

## 2022-02-06 NOTE — PLAN OF CARE
Patient noted to go in and out of atrial flutter and sinus rhythm this am. Discussed with staff. We recommend initiation of flecainide at this time 100 mg po bid (first dose now). Continue to monitor on telemetry overnight. Please keep NPO at midnight for possible RADHA/DCCV tomorrow. Continue anticoaguation. Will finalize plan tomorrow.

## 2022-02-06 NOTE — PROGRESS NOTES
[facility] ([place] [address])  [facility]  Progress Note    Patient Name: Trudi Mcknight  MRN: [id number]  Patient Class: IP- Inpatient   Admission Date: 2/4/2022  Length of Stay: 2 days  Attending Physician: Phil Leon MD  Primary Care Provider: Renuka Moreno MD        Subjective:     Principal Problem:Paroxysmal atrial fibrillation with RVR        HPI:  66 year old female with past medical history of sick sinus syndrome (s/p [employer] dual chamber pacemaker placement on 1/20), paroxysmal Afib (on xarelto for AC), diastolic HF (EF 65%), hiatal hernia, esophagitis, Meniere's disease who present from cardiology clinic with Afib with RVR. She reports crushing substernal/left sided chest pain radiating to her neck and arms, dyspnea on exertion, and palpitations. Her chest pain worsens with eating, improves with burping. She denies pleuritic/positional chest pain. She reports not being able to tolerate metoprolol and diltiazem in the past, experiencing headache, low BP, and palpitations when taking them. She reports that her dyspnea has worsened through the month of of January; she used to be able to walk 2 miles per day and now struggles to walk to the restroom 2/2 to dyspnea. She also reports occasional morning lightheadedness lasting 15 min. She denies orthopnea/PND/leg swelling. She was prescribed lasix 20 mg by her outpatient cardiologist two days ago, and has been taking it since. She has not been taking her diltiazem due her intolerance.     In the ED, patient received Lasix 20 mg. Troponins x2 negative, EKG shows Aflutter with variable AV block. Cardiology was consulted, recommend RADHA/DC cardioversion on Monday 2/7.       Overview/Hospital Course:  Patient has been rate controlled without any medications in the 60-90s mostly. Dc'd lasix due to hypotension and lightheadedness. Plan for DCCV on Monday, continue rivaroxaban for AC. Per Cardiology recommendations, flecainide started on  2/6.       Interval History: Patient complained of R. Sided squeezing pain. EKG shows no ST changes, Trop negative. In the morning she mentioned pain that goes around her abdomen and to her back, improved with heating pads. Patient content with heating pads over medication. Per cardiology recommendations, flecainide started.     Review of Systems   Constitutional: Negative for chills and fever.   Eyes: Negative for photophobia and visual disturbance.   Respiratory: Negative for cough, shortness of breath and wheezing.    Cardiovascular: Negative for chest pain, palpitations and leg swelling.   Gastrointestinal: Positive for abdominal pain. Negative for constipation, diarrhea, nausea and vomiting.   Genitourinary: Negative for difficulty urinating and dysuria.   Musculoskeletal: Positive for back pain. Negative for arthralgias.   Neurological: Positive for light-headedness and headaches (wtih metoprolol/diltiazem).   Psychiatric/Behavioral: Negative for agitation and behavioral problems.     Objective:     Vital Signs (Most Recent):  Temp: 98.1 °F (36.7 °C) (02/06/22 1115)  Pulse: 78 (02/06/22 1115)  Resp: 15 (02/05/22 0418)  BP: (!) 101/57 (02/06/22 1115)  SpO2: 98 % (02/06/22 1115) Vital Signs (24h Range):  Temp:  [97.8 °F (36.6 °C)-98.8 °F (37.1 °C)] 98.1 °F (36.7 °C)  Pulse:  [] 78  SpO2:  [94 %-98 %] 98 %  BP: ()/(53-66) 101/57     Weight: 56.6 kg (124 lb 12.5 oz)  Body mass index is 23.58 kg/m².    Intake/Output Summary (Last 24 hours) at 2/6/2022 1313  Last data filed at 2/6/2022 1203  Gross per 24 hour   Intake --   Output 1350 ml   Net -1350 ml      Physical Exam  Constitutional:       General: She is not in acute distress.     Appearance: She is normal weight. She is not ill-appearing.   HENT:      Head: Normocephalic and atraumatic.      Nose: Nose normal.      Mouth/Throat:      Mouth: Mucous membranes are moist.   Eyes:      General: No scleral icterus.     Conjunctiva/sclera: Conjunctivae  normal.   Cardiovascular:      Rate and Rhythm: Normal rate. Rhythm irregular.      Pulses: Normal pulses.      Heart sounds: No murmur heard.  No gallop.       Comments: No JVD  Pulmonary:      Effort: Pulmonary effort is normal. No respiratory distress.      Breath sounds: Normal breath sounds. No wheezing or rales.   Abdominal:      General: Abdomen is flat. Bowel sounds are normal. There is no distension.      Palpations: Abdomen is soft.      Tenderness: There is no abdominal tenderness. There is no guarding.   Musculoskeletal:         General: No swelling or tenderness. Normal range of motion.      Cervical back: Normal range of motion. No rigidity.      Right lower leg: No edema.      Left lower leg: No edema.   Skin:     General: Skin is warm and dry.      Capillary Refill: Capillary refill takes less than 2 seconds.      Coloration: Skin is not jaundiced.   Neurological:      General: No focal deficit present.      Mental Status: She is alert and oriented to person, place, and time.   Psychiatric:         Mood and Affect: Mood normal.         Behavior: Behavior normal.         Significant Labs:   All pertinent labs within the past 24 hours have been reviewed.  CBC:   Recent Labs   Lab 02/05/22  0428 02/06/22  0918   WBC 6.89 5.27   HGB 11.2* 11.7*   HCT 35.6* 36.3*    446     CMP:   Recent Labs   Lab 02/05/22  0428 02/06/22  0918    140   K 4.2 3.9    105   CO2 24 23   GLU 92 122*   BUN 13 22   CREATININE 0.8 0.8   CALCIUM 9.9 9.8   ANIONGAP 11 12   EGFRNONAA >60.0 >60.0     Troponin:   Recent Labs   Lab 02/04/22  1537 02/05/22 2039   TROPONINI <0.006 <0.006       Significant Imaging: I have reviewed all pertinent imaging results/findings within the past 24 hours.         Assessment/Plan:      * Paroxysmal atrial fibrillation with RVR  Patient with Paroxysmal (<7 days) atrial fibrillation which is uncontrolled currently with Calcium Channel Blocker. Patient is currently in atrial  fibrillation.[id number] Score: 3. Anticoagulation indicated. Anticoagulation done with rivaroxaban.    - Restarted home AC med xarelto, CHADS VASC 3  - Rate/rhythm control per cards recommendations. Patient has not tolerated metoprolol or diltiazem in the past.   - Started flecainide 100 mg BID on 2/6 per cardiology recommendations  - Cardioversion planned for Monday 2/7, NPO at midnight on 2/7          Acute on chronic diastolic CHF (congestive heart failure)  Heart failure with preserved EF (65%) on 1/20/22. No signs of fluid overload on physical exam. History not consistent with HF exacerbation as patient denies orthopnea/PND/recurrent LE edema. Started on Lasix 20 mg by her cardiologist Dr. Dominguez on 2/2/22. BNP was 196 on admission, down from 320 in January. Troponins x2 negative. Exam and labs not indicative of acute exacerbation. Likely that uncontrolled Afib contributing to HF.     - Hold Lasix  - daily weights  - Strict I/Os  - cardiac diet (low Na 2 g, fluid restriction 1.5 L)          Bilateral pleural effusion  Patient reports SOB, no orthopnea. History of HFpEF.   CXR (2/4): No particular change in the size of the small left pleural effusion and trace right pleural effusion.  Afebrile, no white count, no consolidation present, low concern for infectious process.     - HOLD lasix due to lightheadedness with hypotension    Esophagitis  Patient reports history of esophagitis and has trouble swallowing uncoated pills.     - continue home med pantoprozole      Typical atrial flutter    See paroxysmal afib with RVR    Chest pain  Patient complains of burning/crushing chest pain that radiating to neck/arms accompanied by SOB. Reports relief with burping and possible relief with omeprazole. Troponins x2 negative. No ST changes on EKG. EKG consistent with aflutter.     - Pantoprazole daily for esophagitis related chest pain  - prn EKG and trop stat if recurrence of chest pain        VTE Risk Mitigation (From  admission, onward)         Ordered     rivaroxaban tablet 20 mg  With dinner         02/04/22 1603     IP VTE HIGH RISK PATIENT  Once         02/04/22 1410     Place sequential compression device  Until discontinued         02/04/22 1410     Place sequential compression device  Until discontinued         02/04/22 1349                Discharge Planning   STEVE: 2/9/2022     Code Status: Full Code   Is the patient medically ready for discharge?: No    Reason for patient still in hospital (select all that apply): Treatment and Consult recommendations                     Js Sales MD  Department of Hospital Medicine   [facility] - Intensive Care ([facility]-)

## 2022-02-06 NOTE — ASSESSMENT & PLAN NOTE
Patient with Paroxysmal (<7 days) atrial fibrillation which is uncontrolled currently with Calcium Channel Blocker. Patient is currently in atrial fibrillation.[id number] Score: 3. Anticoagulation indicated. Anticoagulation done with rivaroxaban.    - Restarted home AC med xarelto, CHADS VASC 3  - Rate/rhythm control per cards recommendations. Patient has not tolerated metoprolol or diltiazem in the past.   - Started flecainide 100 mg BID on 2/6 per cardiology recommendations  - Cardioversion planned for Monday 2/7, NPO at midnight on 2/7

## 2022-02-07 ENCOUNTER — TELEPHONE (OUTPATIENT)
Dept: ELECTROPHYSIOLOGY | Facility: CLINIC | Age: 67
End: 2022-02-07
Payer: MEDICARE

## 2022-02-07 VITALS
WEIGHT: 124.75 LBS | SYSTOLIC BLOOD PRESSURE: 101 MMHG | DIASTOLIC BLOOD PRESSURE: 61 MMHG | OXYGEN SATURATION: 96 % | BODY MASS INDEX: 23.55 KG/M2 | HEART RATE: 64 BPM | HEIGHT: 61 IN | RESPIRATION RATE: 16 BRPM | TEMPERATURE: 98 F

## 2022-02-07 LAB
ANION GAP SERPL CALC-SCNC: 11 MMOL/L (ref 8–16)
BASOPHILS # BLD AUTO: 0.05 K/UL (ref 0–0.2)
BASOPHILS NFR BLD: 0.9 % (ref 0–1.9)
BUN SERPL-MCNC: 20 MG/DL (ref 8–23)
CALCIUM SERPL-MCNC: 9.3 MG/DL (ref 8.7–10.5)
CHLORIDE SERPL-SCNC: 105 MMOL/L (ref 95–110)
CO2 SERPL-SCNC: 21 MMOL/L (ref 23–29)
CREAT SERPL-MCNC: 0.8 MG/DL (ref 0.5–1.4)
DIFFERENTIAL METHOD: ABNORMAL
EOSINOPHIL # BLD AUTO: 0.3 K/UL (ref 0–0.5)
EOSINOPHIL NFR BLD: 5 % (ref 0–8)
ERYTHROCYTE [DISTWIDTH] IN BLOOD BY AUTOMATED COUNT: 14.5 % (ref 11.5–14.5)
EST. GFR  (AFRICAN AMERICAN): >60 ML/MIN/1.73 M^2
EST. GFR  (NON AFRICAN AMERICAN): >60 ML/MIN/1.73 M^2
GLUCOSE SERPL-MCNC: 93 MG/DL (ref 70–110)
HCT VFR BLD AUTO: 32.2 % (ref 37–48.5)
HGB BLD-MCNC: 10.4 G/DL (ref 12–16)
IMM GRANULOCYTES # BLD AUTO: 0.01 K/UL (ref 0–0.04)
IMM GRANULOCYTES NFR BLD AUTO: 0.2 % (ref 0–0.5)
LYMPHOCYTES # BLD AUTO: 1.4 K/UL (ref 1–4.8)
LYMPHOCYTES NFR BLD: 24.7 % (ref 18–48)
MAGNESIUM SERPL-MCNC: 2.1 MG/DL (ref 1.6–2.6)
MCH RBC QN AUTO: 27.6 PG (ref 27–31)
MCHC RBC AUTO-ENTMCNC: 32.3 G/DL (ref 32–36)
MCV RBC AUTO: 85 FL (ref 82–98)
MONOCYTES # BLD AUTO: 0.5 K/UL (ref 0.3–1)
MONOCYTES NFR BLD: 8.7 % (ref 4–15)
NEUTROPHILS # BLD AUTO: 3.5 K/UL (ref 1.8–7.7)
NEUTROPHILS NFR BLD: 60.5 % (ref 38–73)
NRBC BLD-RTO: 0 /100 WBC
PHOSPHATE SERPL-MCNC: 3.7 MG/DL (ref 2.7–4.5)
PLATELET # BLD AUTO: 419 K/UL (ref 150–450)
PMV BLD AUTO: 10 FL (ref 9.2–12.9)
POTASSIUM SERPL-SCNC: 3.6 MMOL/L (ref 3.5–5.1)
RBC # BLD AUTO: 3.77 M/UL (ref 4–5.4)
SODIUM SERPL-SCNC: 137 MMOL/L (ref 136–145)
WBC # BLD AUTO: 5.75 K/UL (ref 3.9–12.7)

## 2022-02-07 PROCEDURE — 99238 PR HOSPITAL DISCHARGE DAY,<30 MIN: ICD-10-PCS | Mod: HCNC,GC,, | Performed by: HOSPITALIST

## 2022-02-07 PROCEDURE — 1111F PR DISCHARGE MEDS RECONCILED W/ CURRENT OUTPATIENT MED LIST: ICD-10-PCS | Mod: HCNC,CPTII,GC, | Performed by: HOSPITALIST

## 2022-02-07 PROCEDURE — 80048 BASIC METABOLIC PNL TOTAL CA: CPT | Mod: HCNC

## 2022-02-07 PROCEDURE — 25000003 PHARM REV CODE 250: Mod: HCNC

## 2022-02-07 PROCEDURE — 83735 ASSAY OF MAGNESIUM: CPT | Mod: HCNC

## 2022-02-07 PROCEDURE — 25000003 PHARM REV CODE 250: Mod: HCNC | Performed by: INTERNAL MEDICINE

## 2022-02-07 PROCEDURE — 1111F DSCHRG MED/CURRENT MED MERGE: CPT | Mod: HCNC,CPTII,GC, | Performed by: HOSPITALIST

## 2022-02-07 PROCEDURE — 93010 ELECTROCARDIOGRAM REPORT: CPT | Mod: HCNC,,, | Performed by: INTERNAL MEDICINE

## 2022-02-07 PROCEDURE — 99238 HOSP IP/OBS DSCHRG MGMT 30/<: CPT | Mod: HCNC,GC,, | Performed by: HOSPITALIST

## 2022-02-07 PROCEDURE — 93010 EKG 12-LEAD: ICD-10-PCS | Mod: HCNC,,, | Performed by: INTERNAL MEDICINE

## 2022-02-07 PROCEDURE — 93005 ELECTROCARDIOGRAM TRACING: CPT | Mod: HCNC

## 2022-02-07 PROCEDURE — 36415 COLL VENOUS BLD VENIPUNCTURE: CPT | Mod: HCNC

## 2022-02-07 PROCEDURE — 85025 COMPLETE CBC W/AUTO DIFF WBC: CPT | Mod: HCNC

## 2022-02-07 PROCEDURE — 84100 ASSAY OF PHOSPHORUS: CPT | Mod: HCNC

## 2022-02-07 RX ORDER — QUETIAPINE FUMARATE 25 MG/1
50 TABLET, FILM COATED ORAL ONCE AS NEEDED
Status: DISCONTINUED | OUTPATIENT
Start: 2022-02-07 | End: 2022-02-07 | Stop reason: HOSPADM

## 2022-02-07 RX ORDER — DIGOXIN 125 MCG
0.12 TABLET ORAL EVERY OTHER DAY
Status: DISCONTINUED | OUTPATIENT
Start: 2022-02-07 | End: 2022-02-07 | Stop reason: HOSPADM

## 2022-02-07 RX ORDER — CALCIUM CARBONATE 200(500)MG
500 TABLET,CHEWABLE ORAL 3 TIMES DAILY PRN
Status: DISCONTINUED | OUTPATIENT
Start: 2022-02-07 | End: 2022-02-07 | Stop reason: HOSPADM

## 2022-02-07 RX ORDER — DIGOXIN 125 MCG
0.12 TABLET ORAL EVERY OTHER DAY
Qty: 15 TABLET | Refills: 1 | Status: SHIPPED | OUTPATIENT
Start: 2022-02-09 | End: 2022-03-14

## 2022-02-07 RX ORDER — MUPIROCIN 20 MG/G
OINTMENT TOPICAL 2 TIMES DAILY
Status: DISCONTINUED | OUTPATIENT
Start: 2022-02-07 | End: 2022-02-07 | Stop reason: HOSPADM

## 2022-02-07 RX ORDER — FLECAINIDE ACETATE 100 MG/1
100 TABLET ORAL EVERY 12 HOURS
Qty: 60 TABLET | Refills: 1 | Status: SHIPPED | OUTPATIENT
Start: 2022-02-07 | End: 2022-02-28 | Stop reason: SDUPTHER

## 2022-02-07 RX ADMIN — CALCIUM CARBONATE (ANTACID) CHEW TAB 500 MG 500 MG: 500 CHEW TAB at 01:02

## 2022-02-07 RX ADMIN — PANTOPRAZOLE SODIUM 40 MG: 40 TABLET, DELAYED RELEASE ORAL at 09:02

## 2022-02-07 RX ADMIN — FLECAINIDE ACETATE 100 MG: 100 TABLET ORAL at 11:02

## 2022-02-07 RX ADMIN — DIGOXIN 0.12 MG: 125 TABLET ORAL at 12:02

## 2022-02-07 NOTE — SUBJECTIVE & OBJECTIVE
Interval History: BP softer overnight 90/60s, and continues to have intermittent fatigue and palpitations. Got out of bed this morning and walked to the bathroom without any issues. Palpitations come on randomly, and are short-lived. Currently in NSR.    Review of Systems   Constitutional: Positive for malaise/fatigue.   HENT: Negative.    Eyes: Negative.    Cardiovascular: Positive for palpitations. Negative for chest pain.   Respiratory: Positive for shortness of breath.    Endocrine: Negative.    Skin: Negative.    Musculoskeletal: Negative.    Gastrointestinal: Negative.    Genitourinary: Negative.    Neurological: Negative.    Psychiatric/Behavioral: Negative.    Allergic/Immunologic: Negative.      Objective:     Vital Signs (Most Recent):  Temp: 98.1 °F (36.7 °C) (02/07/22 0810)  Pulse: 64 (02/07/22 1217)  Resp: 16 (02/07/22 0015)  BP: 101/61 (02/07/22 0430)  SpO2: 96 % (02/07/22 0603) Vital Signs (24h Range):  Temp:  [97.8 °F (36.6 °C)-98.5 °F (36.9 °C)] 98.1 °F (36.7 °C)  Pulse:  [62-84] 64  Resp:  [16] 16  SpO2:  [95 %-98 %] 96 %  BP: (101-111)/(59-67) 101/61     Weight: 56.6 kg (124 lb 12.5 oz)  Body mass index is 23.58 kg/m².     SpO2: 96 %  O2 Device (Oxygen Therapy): room air    Physical Exam  Constitutional:       General: She is not in acute distress.     Appearance: Normal appearance.      Comments: Pleasant elderly female laying in bed   HENT:      Mouth/Throat:      Mouth: Mucous membranes are moist.   Eyes:      Extraocular Movements: Extraocular movements intact.   Neck:      Comments: No JVD  Cardiovascular:      Rate and Rhythm: Normal rate and regular rhythm.      Pulses: Normal pulses.      Heart sounds: No murmur heard.       Comments: Occasional premature beats  Pulmonary:      Effort: Pulmonary effort is normal. No respiratory distress.      Breath sounds: Normal breath sounds. No wheezing.   Abdominal:      General: Bowel sounds are normal.      Palpations: Abdomen is soft.    Musculoskeletal:      Cervical back: Neck supple.      Right lower leg: No edema.      Left lower leg: No edema.   Skin:     General: Skin is warm.   Neurological:      General: No focal deficit present.      Mental Status: She is alert and oriented to person, place, and time.   Psychiatric:         Mood and Affect: Mood normal.         Behavior: Behavior normal.         Significant Labs:   BMP:   Recent Labs   Lab 02/06/22  0918 02/07/22  0415   * 93    137   K 3.9 3.6    105   CO2 23 21*   BUN 22 20   CREATININE 0.8 0.8   CALCIUM 9.8 9.3   MG 2.0 2.1   , CMP:   Recent Labs   Lab 02/06/22  0918 02/07/22  0415    137   K 3.9 3.6    105   CO2 23 21*   * 93   BUN 22 20   CREATININE 0.8 0.8   CALCIUM 9.8 9.3   ANIONGAP 12 11   ESTGFRAFRICA >60.0 >60.0   EGFRNONAA >60.0 >60.0   , CBC:   Recent Labs   Lab 02/06/22 0918 02/06/22 0918 02/07/22 0415   WBC 5.27  --  5.75   HGB 11.7*  --  10.4*   HCT 36.3*   < > 32.2*     --  419    < > = values in this interval not displayed.   , INR: No results for input(s): INR, PROTIME in the last 48 hours., Lipid Panel No results for input(s): CHOL, HDL, LDLCALC, TRIG, CHOLHDL in the last 48 hours. and Troponin   Recent Labs   Lab 02/05/22 2039   TROPONINI <0.006       Significant Imaging:  TTE 1/20/2022  · Technically challenging study.  · The left ventricle is normal in size with normal systolic function.  · The estimated ejection fraction is 65%.  · Normal left ventricular diastolic function.  · Normal right ventricular size with normal right ventricular systolic function.

## 2022-02-07 NOTE — PLAN OF CARE
02/07/22 1328   Post-Acute Status   Post-Acute Authorization Home Health   Home Health Status Referrals Sent       Referral sent to [facility]. Awaiting facility decision.    Tamara Bautista LMSW  Case Management Social Worker   [facility] [address]

## 2022-02-07 NOTE — ASSESSMENT & PLAN NOTE
Prior Hx of Afib, but presented with Aflutter with RVR. No evidence of decompensated HF and hemodynamically stable.  - In NSR currently, so no indication for TTE/DCCV  - Continue Flecainide 100 mg BID  - Start Digoxin 125 mg QOD as unable to tolerate BB or CCB previously  - Continue Xarelto 20 mg daily  - F/u with Dr Duncan in 4-6 on discharge as will tentatively makes plans for ablation as out once PPM leads heal from recent insertion last month

## 2022-02-07 NOTE — PROGRESS NOTES
[facility] ([place] [address])  Cardiac Electrophysiology  Progress Note    Admission Date: 2/4/2022  Code Status: Full Code   Attending Physician: Phil Leon MD   Expected Discharge Date: 2/7/2022  Principal Problem:Paroxysmal atrial fibrillation with RVR    Subjective:     Interval History: BP softer overnight 90/60s, and continues to have intermittent fatigue and palpitations. Got out of bed this morning and walked to the bathroom without any issues. Palpitations come on randomly, and are short-lived. Currently in NSR.    Review of Systems   Constitutional: Positive for malaise/fatigue.   HENT: Negative.    Eyes: Negative.    Cardiovascular: Positive for palpitations. Negative for chest pain.   Respiratory: Positive for shortness of breath.    Endocrine: Negative.    Skin: Negative.    Musculoskeletal: Negative.    Gastrointestinal: Negative.    Genitourinary: Negative.    Neurological: Negative.    Psychiatric/Behavioral: Negative.    Allergic/Immunologic: Negative.      Objective:     Vital Signs (Most Recent):  Temp: 98.1 °F (36.7 °C) (02/07/22 0810)  Pulse: 64 (02/07/22 1217)  Resp: 16 (02/07/22 0015)  BP: 101/61 (02/07/22 0430)  SpO2: 96 % (02/07/22 0603) Vital Signs (24h Range):  Temp:  [97.8 °F (36.6 °C)-98.5 °F (36.9 °C)] 98.1 °F (36.7 °C)  Pulse:  [62-84] 64  Resp:  [16] 16  SpO2:  [95 %-98 %] 96 %  BP: (101-111)/(59-67) 101/61     Weight: 56.6 kg (124 lb 12.5 oz)  Body mass index is 23.58 kg/m².     SpO2: 96 %  O2 Device (Oxygen Therapy): room air    Physical Exam  Constitutional:       General: She is not in acute distress.     Appearance: Normal appearance.      Comments: Pleasant elderly female laying in bed   HENT:      Mouth/Throat:      Mouth: Mucous membranes are moist.   Eyes:      Extraocular Movements: Extraocular movements intact.   Neck:      Comments: No JVD  Cardiovascular:      Rate and Rhythm: Normal rate and regular rhythm.      Pulses: Normal pulses.      Heart  sounds: No murmur heard.       Comments: Occasional premature beats  Pulmonary:      Effort: Pulmonary effort is normal. No respiratory distress.      Breath sounds: Normal breath sounds. No wheezing.   Abdominal:      General: Bowel sounds are normal.      Palpations: Abdomen is soft.   Musculoskeletal:      Cervical back: Neck supple.      Right lower leg: No edema.      Left lower leg: No edema.   Skin:     General: Skin is warm.   Neurological:      General: No focal deficit present.      Mental Status: She is alert and oriented to person, place, and time.   Psychiatric:         Mood and Affect: Mood normal.         Behavior: Behavior normal.         Significant Labs:   BMP:   Recent Labs   Lab 02/06/22  0918 02/07/22  0415   * 93    137   K 3.9 3.6    105   CO2 23 21*   BUN 22 20   CREATININE 0.8 0.8   CALCIUM 9.8 9.3   MG 2.0 2.1   , CMP:   Recent Labs   Lab 02/06/22  0918 02/07/22 0415    137   K 3.9 3.6    105   CO2 23 21*   * 93   BUN 22 20   CREATININE 0.8 0.8   CALCIUM 9.8 9.3   ANIONGAP 12 11   ESTGFRAFRICA >60.0 >60.0   EGFRNONAA >60.0 >60.0   , CBC:   Recent Labs   Lab 02/06/22 0918 02/06/22 0918 02/07/22 0415   WBC 5.27  --  5.75   HGB 11.7*  --  10.4*   HCT 36.3*   < > 32.2*     --  419    < > = values in this interval not displayed.   , INR: No results for input(s): INR, PROTIME in the last 48 hours., Lipid Panel No results for input(s): CHOL, HDL, LDLCALC, TRIG, CHOLHDL in the last 48 hours. and Troponin   Recent Labs   Lab 02/05/22 2039   TROPONINI <0.006       Significant Imaging:  TTE 1/20/2022  · Technically challenging study.  · The left ventricle is normal in size with normal systolic function.  · The estimated ejection fraction is 65%.  · Normal left ventricular diastolic function.  · Normal right ventricular size with normal right ventricular systolic function.        Assessment and Plan:     * Paroxysmal atrial fibrillation with  RVR  Patient with pAF (ISMLk1OEOe of 3), but presented with Aflutter with RVR.  - Continue Flecainide 100 mg BID  - Start Digoxin 125 mg QOD as unable to tolerated BB or CCB previsouly  - Continue Xarelto 20 mg daily    Atrial flutter with RVR  Prior Hx of Afib, but presented with Aflutter with RVR. Aflutter appears atypical on initial EKG from 2/4. No evidence of decompensated HF and hemodynamically stable.  - In NSR currently, so no indication for TTE/DCCV  - Continue Flecainide 100 mg BID  - Start Digoxin 125 mg QOD as unable to tolerate BB or CCB previously  - Continue Xarelto 20 mg daily  - F/u with Dr Duncan in 4-6 on discharge as will tentatively makes plans for ablation as out once PPM leads heal from recent insertion last month    Acute on chronic diastolic CHF (congestive heart failure)  Appears euvolemic on exam.  - Continue home HFpEF regimen on discharge    Case discussed with Dr Ernesto Duncan MD. Will sign-off and follow-up as outpt. Please call with any other questions or concerns.     Phil Alvarez MD  Cardiac Electrophysiology  [facility] ([place]-)

## 2022-02-07 NOTE — ASSESSMENT & PLAN NOTE
Patient with pAF ([id number] of 3), but presented with Aflutter with RVR.  - Continue Flecainide 100 mg BID  - Start Digoxin 125 mg QOD as unable to tolerated BB or CCB previsouly  - Continue Xarelto 20 mg daily

## 2022-02-07 NOTE — DISCHARGE INSTRUCTIONS
Take digoxin every other day  Take flecainide daily   continue rivaroxaban   Follow up with Dr. Duncan in 406 weeks  Follow up with PCP

## 2022-02-07 NOTE — NURSING
Calm no resp. distress Ambulated to restroom reporting gas pain in chest relived from belching, tele room called no ectopy in normal sinus rhythm no pacer spikes for the last 30 minutes. Will continue to monitor.

## 2022-02-07 NOTE — PLAN OF CARE
[facility] ([place] [address])      HOME HEALTH ORDERS  FACE TO FACE ENCOUNTER    Patient Name: Trudi Mcknight  YOB: 1955    PCP: Renuka Moreno MD   [facility] [address] [facility] [address]  PCP Phone Number: [phone]  PCP Fax: [phone]    Encounter Date: 2/4/22    Admit to Home Health    Diagnoses:  Active Hospital Problems    Diagnosis  POA    *Paroxysmal atrial fibrillation with RVR [I48.0]  Yes    Acute on chronic diastolic CHF (congestive heart failure) [I50.33]  Yes    Typical atrial flutter [I48.3]  Yes    Bilateral pleural effusion [J90]  Yes    Chest pain [R07.9]  Yes    Esophagitis [K20.90]  Yes      Resolved Hospital Problems   No resolved problems to display.       Follow Up Appointments:  Future Appointments   Date Time Provider Department Center   2/11/2022  2:45 PM [facility] OI-CT2 500 LB LIMIT [facility]   3/10/2022  7:45 AM LAB, [place] [facility] [place]   3/28/2022  9:20 AM Keiko Parnell NP [facility] GASTRO [facility]   4/27/2022  8:00 AM EKG, APPT [facility] EKG [facility]   4/27/2022  8:20 AM COORDINATED DEVICE CHECK [facility] ARRHPRO [facility]   4/27/2022  9:20 AM Ernesto Duncan MD [facility] ARRHYTH [facility]   4/28/2022  8:30 AM Kellie Geller MD [facility] CARDIO [facility]       Allergies:  Review of patient's allergies indicates:   Allergen Reactions    Penicillins Hives     causes congestion and hives    Tricyclic compounds        Medications: Review discharge medications with patient and family and provide education.    Current Facility-Administered Medications   Medication Dose Route Frequency Provider Last Rate Last Admin    acetaminophen tablet 650 mg  650 mg Oral Q4H PRN Js Sales MD        calcium carbonate 200 mg calcium (500 mg) chewable tablet 500 mg  500 mg Oral TID PRN Ricky Guillermo MD   500 mg at 02/07/22 0110    dextrose 10% bolus 125 mL  12.5 g Intravenous PRN Js Sales MD        dextrose 10% bolus 250 mL  25 g  Intravenous PRN Js Sales MD        digoxin tablet 0.125 mg  0.125 mg Oral Every other day Phil Alvarez MD   0.125 mg at 02/07/22 1201    flecainide tablet 100 mg  100 mg Oral Q12H Js Sales MD   100 mg at 02/07/22 1134    glucagon (human recombinant) injection 1 mg  1 mg Intramuscular PRN Js Sales MD        glucose chewable tablet 16 g  16 g Oral PRN Js Sales MD        glucose chewable tablet 24 g  24 g Oral PRN Js Sales MD        melatonin tablet 6 mg  6 mg Oral Nightly PRN Js Sales MD        mupirocin 2 % ointment   Nasal BID Phil Leon MD        pantoprazole EC tablet 40 mg  40 mg Oral Daily Js Sales MD   40 mg at 02/07/22 0936    polyethylene glycol packet 17 g  17 g Oral Daily PRN Js Sales MD        QUEtiapine tablet 50 mg  50 mg Oral Once PRN Ricky Guillermo MD        rivaroxaban tablet 20 mg  20 mg Oral Daily with dinner Js Sales MD   20 mg at 02/06/22 1758    sodium chloride 0.9% flush 10 mL  10 mL Intravenous PRN Sea Cohen MD        sodium chloride 0.9% flush 10 mL  10 mL Intravenous PRN Js Sales MD         Current Discharge Medication List      START taking these medications    Details   digoxin (LANOXIN) 125 mcg tablet Take 1 tablet (0.125 mg total) by mouth every other day.  Qty: 15 tablet, Refills: 1      flecainide (TAMBOCOR) 100 MG Tab Take 1 tablet (100 mg total) by mouth every 12 (twelve) hours.  Qty: 60 tablet, Refills: 1         CONTINUE these medications which have NOT CHANGED    Details   FLUZONE HIGHDOSE QUAD 21-22  mcg/0.7 mL Syrg       furosemide (LASIX) 20 MG tablet Take 1 tablet (20 mg total) by mouth once daily.  Qty: 20 tablet, Refills: 0      pantoprazole (PROTONIX) 40 MG tablet Take 1 tablet (40 mg total) by mouth once daily.  Qty: 30 tablet, Refills: 6    Associated Diagnoses: Gastroesophageal reflux disease, unspecified whether esophagitis present      polyethylene glycol (MIRALAX) 17 gram PwPk Take 17 g by  mouth once as needed (Constipation).      rivaroxaban (XARELTO) 20 mg Tab Take 1 tablet (20 mg total) by mouth daily with dinner or evening meal.  Qty: 30 tablet, Refills: 11    Comments: Start Date: 1/27/22 AFTER cardiology appt      sertraline (ZOLOFT) 25 MG tablet TAKE 1/2 TABLET BY MOUTH EVERY DAY  Qty: 45 tablet, Refills: 3         STOP taking these medications       diltiaZEM (CARDIZEM) 90 MG tablet Comments:   Reason for Stopping:                 I have seen and examined this patient within the last 30 days. My clinical findings that support the need for the home health skilled services and home bound status are the following:no   Weakness/numbness causing balance and gait disturbance due to Weakness/Debility making it taxing to leave home.     Diet:   2 gram sodium diet      Referrals/ Consults  Physical Therapy to evaluate and treat. Evaluate for home safety and equipment needs; Establish/upgrade home exercise program. Perform / instruct on therapeutic exercises, gait training, transfer training, and Range of Motion.  Occupational Therapy to evaluate and treat. Evaluate home environment for safety and equipment needs. Perform/Instruct on transfers, ADL training, ROM, and therapeutic exercises.    Activities:   activity as tolerated    Nursing:   Agency to admit patient within 24 hours of hospital discharge unless specified on physician order or at patient request    SN to complete comprehensive assessment including routine vital signs. Instruct on disease process and s/s of complications to report to MD. Review/verify medication list sent home with the patient at time of discharge  and instruct patient/caregiver as needed. Frequency may be adjusted depending on start of care date.     Skilled nurse to perform up to 3 visits PRN for symptoms related to diagnosis    Notify MD if SBP > 160 or < 90; DBP > 90 or < 50; HR > 120 or < 50; Temp > 101; O2 < 88%  Ok to schedule additional visits based on staff  availability and patient request on consecutive days within the home health episode.    When multiple disciplines ordered:    Start of Care occurs on Sunday - Wednesday schedule remaining discipline evaluations as ordered on separate consecutive days following the start of care.    Thursday SOC -schedule subsequent evaluations Friday and Monday the following week.     Friday - Saturday SOC - schedule subsequent discipline evaluations on consecutive days starting Monday of the following week.    For all post-discharge communication and subsequent orders please contact patient's primary care physician. If unable to reach primary care physician or do not receive response within 30 minutes, please contact PCP for clinical staff order clarification      Home Health Aide:  Physical Therapy Twice weekly and Occupational Therapy Twice weekly    Wound Care Orders  no    I certify that this patient is confined to her home and needs physical therapy and occupational therapy.

## 2022-02-07 NOTE — TELEPHONE ENCOUNTER
Patient admitted over the weekend with shortness of breath. She was started on Flecainide and Digoxin. Scheduled for RADHA/DCCV today, but converted to SR.

## 2022-02-07 NOTE — PLAN OF CARE
[facility] ([place] [address])  Initial Discharge Assessment       Primary Care Provider: Renuka Moreno MD    Admission Diagnosis: Paroxysmal atrial fibrillation [I48.0]  Typical atrial flutter [I48.3]  Chest pain [R07.9]  Congestive heart failure, unspecified HF chronicity, unspecified heart failure type [I50.9]    Admission Date: 2/4/2022  Expected Discharge Date: 2/9/2022    Discharge Barriers Identified: None    Payor: [employer] MANAGED MEDICARE / Plan: [employer] TOTAL CARE ADVANTAGE / Product Type: Medicare Advantage /     Extended Emergency Contact Information  Primary Emergency Contact: Jose Mcknight  Address: [address] [place] [address] [facility]  Mobile Phone: [phone]  Relation: Spouse    Discharge Plan A: Home with family  Discharge Plan B: Home Health      [id number] - [place] - [address]  [address]  [facility] [address]  Phone: [phone] Fax: [phone]      Initial Assessment (most recent)       Adult Discharge Assessment - 02/07/22 1043          Discharge Assessment    Assessment Type Discharge Planning Assessment     Confirmed/corrected address, phone number and insurance Yes     Confirmed Demographics Correct on Facesheet     Source of Information patient     Communicated STEVE with patient/caregiver Date not available/Unable to determine     Reason For Admission Paroxysmal atrial fibrillation with RVR     Lives With spouse     Facility Arrived From: Emergency room     Do you expect to return to your current living situation? Yes     Do you have help at home or someone to help you manage your care at home? Yes     Who are your caregiver(s) and their phone number(s)? spouseJose     Prior to hospitilization cognitive status: Alert/Oriented     Current cognitive status: Alert/Oriented     Walking or Climbing Stairs Difficulty none     Dressing/Bathing Difficulty none     Equipment Currently Used at Home other (see comments)    Pacer monitor    Readmission within 30 days? Yes     Patient currently being followed by outpatient case management? No     Do you currently have service(s) that help you manage your care at home? No     Do you take prescription medications? Yes     Do you have any problems affording any of your prescribed medications? No     Is the patient taking medications as prescribed? yes     Who is going to help you get home at discharge? spouse     How do you get to doctors appointments? family or friend will provide     Are you on dialysis? No     Do you take coumadin? No     Discharge Plan A Home with family     Discharge Plan B Home Health     DME Needed Upon Discharge  none     Discharge Plan discussed with: Patient     Discharge Barriers Identified None                 CM met with patient at bedside for discharge planning.  Patient states she lives with her spouse in a one story house with six steps to enter with a railing.  Patient states she will have the help of her spouse at home if needed.  Patient is independent with ADL's and ambulating.  Patient's spouse will transport patient upon discharge.  Plan is to discharge to home with family.

## 2022-02-07 NOTE — DISCHARGE SUMMARY
[facility] - Intensive Care ([place] [address])  [facility] Medicine  Discharge Summary      Patient Name: Trudi Mcknight  MRN: [id number]  Patient Class: IP- Inpatient  Admission Date: 2/4/2022  Hospital Length of Stay: 3 days  Discharge Date and Time:  02/07/2022 1:19 PM  Attending Physician: Phil Leon MD   Discharging Provider: Akash Aguirre MD  Primary Care Provider: Renuka Moreno MD  [facility] Medicine Team: [facility] HOSP MED 1 Akash Aguirre MD    HPI:   66 year old female with past medical history of sick sinus syndrome (s/p [employer] dual chamber pacemaker placement on 1/20), paroxysmal Afib (on xarelto for AC), diastolic HF (EF 65%), hiatal hernia, esophagitis, Meniere's disease who present from cardiology clinic with Afib with RVR. She reports crushing substernal/left sided chest pain radiating to her neck and arms, dyspnea on exertion, and palpitations. Her chest pain worsens with eating, improves with burping. She denies pleuritic/positional chest pain. She reports not being able to tolerate metoprolol and diltiazem in the past, experiencing headache, low BP, and palpitations when taking them. She reports that her dyspnea has worsened through the month of of January; she used to be able to walk 2 miles per day and now struggles to walk to the restroom 2/2 to dyspnea. She also reports occasional morning lightheadedness lasting 15 min. She denies orthopnea/PND/leg swelling. She was prescribed lasix 20 mg by her outpatient cardiologist two days ago, and has been taking it since. She has not been taking her diltiazem due her intolerance.     In the ED, patient received Lasix 20 mg. Troponins x2 negative, EKG shows Aflutter with variable AV block. Cardiology was consulted, recommend RADHA/DC cardioversion on Monday 2/7.       Procedure(s) (LRB):  Transesophageal echo (RADHA) intra-procedure log documentation (N/A)      Hospital Course:   Patient has been rate controlled without any medications in the 60-90s mostly.  Dc'd lasix due to hypotension and lightheadedness. She was admitted with the plan for cardioversion, but EP recs starting flecainide and digoxin and continue AC. Rhythm converted to sinus. Plan discussed with the patient. She will follow up with EP 4-6 weeks and PCP as well. All questions and concerned addressed.     Vitals:    02/07/22 0603 02/07/22 0810 02/07/22 1201 02/07/22 1217   BP:       BP Location:       Patient Position:       Pulse: 67 66 76 64   Resp:       Temp:  98.1 °F (36.7 °C)     TempSrc:  Oral     SpO2: 96%      Weight:       Height:           Physical Exam  Constitutional:       General: She is not in acute distress.     Appearance: She is normal weight. She is not ill-appearing.   HENT:      Head: Normocephalic and atraumatic.      Nose: Nose normal.      Mouth/Throat:      Mouth: Mucous membranes are moist.   Eyes:      General: No scleral icterus.     Conjunctiva/sclera: Conjunctivae normal.   Cardiovascular:      Rate and Rhythm: Normal rate. Rhythm regular.      Pulses: Normal pulses.      Heart sounds: No murmur heard.  No gallop.       Comments: No JVD  Pulmonary:      Effort: Pulmonary effort is normal. No respiratory distress.      Breath sounds: Normal breath sounds. No wheezing or rales.   Abdominal:      General: Abdomen is flat. Bowel sounds are normal. There is no distension.      Palpations: Abdomen is soft.      Tenderness: There is no abdominal tenderness. There is no guarding.   Musculoskeletal:         General: No swelling or tenderness. Normal range of motion.      Cervical back: Normal range of motion. No rigidity.      Right lower leg: No edema.      Left lower leg: No edema.   Skin:     General: Skin is warm and dry.      Capillary Refill: Capillary refill takes less than 2 seconds.      Coloration: Skin is not jaundiced.   Neurological:      General: No focal deficit present.      Mental Status: She is alert and oriented to person, place, and time.   Psychiatric:          Mood and Affect: Mood normal.           Goals of Care Treatment Preferences:  Code Status: Full Code      Consults:   Consults (From admission, onward)        Status Ordering Provider     Inpatient consult to Registered Dietitian/Nutritionist  Once        Provider:  (Not yet assigned)    Completed SANDY TERAN     Inpatient consult to Electrophysiology  Once        Provider:  (Not yet assigned)    Completed RONN GRANT          No new Assessment & Plan notes have been filed under this hospital service since the last note was generated.  Service: Hospital Medicine    Final Active Diagnoses:    Diagnosis Date Noted POA    PRINCIPAL PROBLEM:  Paroxysmal atrial fibrillation with RVR [I48.0] 01/17/2022 Yes    Acute on chronic diastolic CHF (congestive heart failure) [I50.33] 02/04/2022 Yes    Typical atrial flutter [I48.3] 02/04/2022 Yes    Bilateral pleural effusion [J90] 02/02/2022 Yes    Chest pain [R07.9] 01/17/2022 Yes    Esophagitis [K20.90] 12/15/2015 Yes      Problems Resolved During this Admission:       Discharged Condition: good    Disposition:     Follow Up:   Follow-up Information     Ernesto Duncan MD In 4 weeks.    Specialties: Electrophysiology, Cardiology  Contact information:  [address] [facility] [address]  [phone]             Renuka Moreno MD In 2 weeks.    Specialty: Internal Medicine  Contact information:  [address]  [place] [id number] [phone]                       Patient Instructions:   No discharge procedures on file.    Significant Diagnostic Studies: Labs:   BMP:   Recent Labs   Lab 02/06/22  0918 02/07/22  0415   * 93    137   K 3.9 3.6    105   CO2 23 21*   BUN 22 20   CREATININE 0.8 0.8   CALCIUM 9.8 9.3   MG 2.0 2.1   , CMP   Recent Labs   Lab 02/06/22  0918 02/07/22  0415    137   K 3.9 3.6    105   CO2 23 21*   * 93   BUN 22 20   CREATININE 0.8 0.8   CALCIUM 9.8 9.3   ANIONGAP 12 11   ESTGFRAFRICA  >60.0 >60.0   EGFRNONAA >60.0 >60.0   , CBC   Recent Labs   Lab 02/06/22  0918 02/06/22  0918 02/07/22  0415   WBC 5.27  --  5.75   HGB 11.7*  --  10.4*   HCT 36.3*   < > 32.2*     --  419    < > = values in this interval not displayed.    and All labs within the past 24 hours have been reviewed  Radiology: X-Ray: CXR: X-Ray Chest 1 View (CXR): No results found for this visit on 02/04/22. and X-Ray Chest PA and Lateral (CXR): No results found for this visit on 02/04/22.  Cardiac Graphics: Echocardiogram:   2D echo with color flow doppler: No results found for this or any previous visit. and Transthoracic echo (TTE) complete (Cupid Only):   Results for orders placed or performed during the hospital encounter of 01/17/22   Echo   Result Value Ref Range    LVIDd 3.60 3.5 - 6.0 cm    IVS 0.70 0.6 - 1.1 cm    Posterior Wall 0.65 0.6 - 1.1 cm    LVIDs 2.43 2.1 - 4.0 cm    FS 33 28 - 44 %    Sinus 3.51 cm    STJ 2.75 cm    Ascending aorta 3.05 cm    LV mass 62.69 g    LA size 2.52 cm    Left Ventricle Relative Wall Thickness 0.36 cm    LVOT diameter 2.14 cm    LVOT area 3.6 cm2    LV Systolic Volume 20.85 mL    LV Systolic Volume Index 13.5 mL/m2    LV Diastolic Volume 54.56 mL    LV Diastolic Volume Index 35.20 mL/m2    LV Mass Index 40 g/m2    BSA 1.57 m2    EF 65 %    Narrative    · Technically challenging study.  · The left ventricle is normal in size with normal systolic function.  · The estimated ejection fraction is 65%.  · Normal left ventricular diastolic function.  · Normal right ventricular size with normal right ventricular systolic   function.          Pending Diagnostic Studies:     None         Medications:  Reconciled Home Medications:      Medication List      START taking these medications    digoxin 125 mcg tablet  Commonly known as: LANOXIN  Take 1 tablet (0.125 mg total) by mouth every other day.  Start taking on: February 9, 2022     flecainide 100 MG Tab  Commonly known as: TAMBOCOR  Take 1  tablet (100 mg total) by mouth every 12 (twelve) hours.        CONTINUE taking these medications    FLUZONE HIGHDOSE QUAD 21-22  mcg/0.7 mL Syrg  Generic drug: flu vacc [id number](65yr up)-PF     furosemide 20 MG tablet  Commonly known as: LASIX  Take 1 tablet (20 mg total) by mouth once daily.     MIRALAX 17 gram Pwpk  Generic drug: polyethylene glycol  Take 17 g by mouth once as needed (Constipation).     pantoprazole 40 MG tablet  Commonly known as: PROTONIX  Take 1 tablet (40 mg total) by mouth once daily.     rivaroxaban 20 mg Tab  Commonly known as: XARELTO  Take 1 tablet (20 mg total) by mouth daily with dinner or evening meal.     sertraline 25 MG tablet  Commonly known as: ZOLOFT  TAKE 1/2 TABLET BY MOUTH EVERY DAY        STOP taking these medications    diltiaZEM 90 MG tablet  Commonly known as: CARDIZEM            Indwelling Lines/Drains at time of discharge:   Lines/Drains/Airways     None                 Time spent on the discharge of patient: 35 minutes         Akash Aguirre MD  Department of Hospital Medicine  [facility] - Intensive Care ([place]-14)

## 2022-02-07 NOTE — PLAN OF CARE
[facility] - Intensive Care ([facility]14)  Discharge Final Note    Primary Care Provider: Renuka Moreno MD    Expected Discharge Date: 2/7/2022    Final Discharge Note (most recent)       Final Note - 02/07/22 1716          Final Note    Assessment Type Final Discharge Note (P)      Anticipated Discharge Disposition Home-Health Care Svc (P)         Post-Acute Status    Post-Acute Authorization Home Health (P)      Home Health Status Patient declined/refused (P)      Discharge Delays None known at this time (P)                      Important Message from Medicare             Contact Info       Ernesto Duncan MD   Specialty: Electrophysiology, Cardiology    [address] [place] [address]   Phone: [phone]       Next Steps: Follow up in 4 week(s)    Renuka Moreno MD   Specialty: Internal Medicine   Relationship: PCP - General    [address] [address]   Phone: [phone]       Next Steps: Follow up in 2 week(s)          SW advised by Sawyer at [facility] that pt refused  services. Pt was advised to contac tPCP if she changed her mind about receipt of services.     No further post acute needs.     Tamara Bautista LMSW  Case Management Social Worker   [facility] [address]

## 2022-02-07 NOTE — TELEPHONE ENCOUNTER
----- Message from Ernesto Duncan MD sent at 2/2/2022 10:54 AM CST -----  RADHA.  If out of rhythm >> DCCV + amiodarone.  If not >> amiodarone.    ----- Message -----  From: Joselin Rosen MD  Sent: 2/2/2022   9:51 AM CST  To: MD Ernesto Longoria,    Since she had the pacemaker placed, she has developed bilateral pleural effusions, her BNP is elevated, she has orthopnea and can no longer walk. This morning just showering has wiped her out.   She is complaining of chest pain but only after meals.  She has seen two providers within the cardiology department who she states she conveyed these complaints to.  They were attributed all of these findings to heart burn.  She is a lifetime nonsmoker, who has been referred to us for dyspnea.      I do not feel PFTs would be reliable at this point, we can perform a 6 MWT.      If you could please get her assessed, it would be appreciated.    Joselin

## 2022-02-08 ENCOUNTER — TELEPHONE (OUTPATIENT)
Dept: ELECTROPHYSIOLOGY | Facility: CLINIC | Age: 67
End: 2022-02-08
Payer: MEDICARE

## 2022-02-08 NOTE — TELEPHONE ENCOUNTER
Spoke with pt to get sooner appointment. Dr. Duncan and KAROL Grayson is all booked. Pt chose to keep appointment in April.

## 2022-02-09 ENCOUNTER — TELEPHONE (OUTPATIENT)
Dept: CARDIOLOGY | Facility: HOSPITAL | Age: 67
End: 2022-02-09
Payer: MEDICARE

## 2022-02-09 ENCOUNTER — TELEPHONE (OUTPATIENT)
Dept: ELECTROPHYSIOLOGY | Facility: CLINIC | Age: 67
End: 2022-02-09
Payer: MEDICARE

## 2022-02-09 ENCOUNTER — PATIENT OUTREACH (OUTPATIENT)
Dept: ADMINISTRATIVE | Facility: CLINIC | Age: 67
End: 2022-02-09
Payer: MEDICARE

## 2022-02-09 ENCOUNTER — PATIENT MESSAGE (OUTPATIENT)
Dept: ELECTROPHYSIOLOGY | Facility: CLINIC | Age: 67
End: 2022-02-09
Payer: MEDICARE

## 2022-02-09 ENCOUNTER — PATIENT MESSAGE (OUTPATIENT)
Dept: CARDIOLOGY | Facility: CLINIC | Age: 67
End: 2022-02-09
Payer: MEDICARE

## 2022-02-09 NOTE — TELEPHONE ENCOUNTER
----- Message from Rohan Guzman sent at 2/9/2022  3:43 PM CST -----  Delvin Reis,    Helped Mrs. Mcknight with sending a manual transmission.  At the time of the recording she was in AF that was ongoing for 26 mins.  Max duration of AF (2/3/22), 4 hrs, 41 mins.  Pt is having RVR with 38.5% > 110 bpm.  I did put a note in the pt's chart as well.     Thanks,  Rohan

## 2022-02-09 NOTE — TELEPHONE ENCOUNTER
Can you please call the patient to walk her through a manual transmission? She is having symptomatic AF and thinks her HR is in the 120's.  Thanks

## 2022-02-09 NOTE — TELEPHONE ENCOUNTER
Contacted the pt on this afternoon and assisted her in sending a manual remote transmission.  Informed the pt the transmission was received and reviewed and that the information would be sent to Dr. Duncan's RN.  Understanding was verbalized.     Presenting rhythm @ time of transmission was AF/Vs. Current event ongoing for 26 mins. AF with RVR events also recorded.  V rates 38.5% > 110 bpm.     Remote transmission information given to Dr. Duncan's RN via in basket messaging.

## 2022-02-09 NOTE — PROGRESS NOTES
C3 nurse attempted to contact Trudi Mcknight   for a TCC post hospital discharge follow up call. The patient is unable to conduct the call @ this time. The patient requested a callback.    The patient does not have a scheduled HOSFU appointment within 7-14 days post hospital discharge date 2/8/2022 Message sent to Physician staff to assist with HOSFU appointment scheduling.

## 2022-02-09 NOTE — TELEPHONE ENCOUNTER
Patient has decided to remain OFF of the Zoloft to try the increased flecainide.   She did agree to proceed with PVI. Scheduled for 3/29/22 (PPM was implanted 1/20/22).   Please provide case details.  Thanks

## 2022-02-09 NOTE — PT/OT/SLP DISCHARGE
Occupational Therapy Discharge Summary    Trudi Mcknight  MRN: [id number]   Principal Problem: Paroxysmal atrial fibrillation with RVR      Patient Discharged from acute Occupational Therapy on 2/9/22  Please refer to prior OT note dated 2/5/22 for functional status.    Assessment:      Patient appropriate for care in another setting.    Objective:     GOALS:   Multidisciplinary Problems     Occupational Therapy Goals        Problem: Occupational Therapy Goal    Goal Priority Disciplines Outcome Interventions   Occupational Therapy Goal     OT, PT/OT Ongoing, Progressing    Description: Goals to be met by: 3/7/22    Patient will increase functional independence with ADLs by performing:    Grooming while standing at sink with Supervision.  Step transfer with Supervision                      Reasons for Discontinuation of Therapy Services  Transfer to alternate level of care.      Plan:     Patient Discharged to: Home no OT services needed    2/9/2022

## 2022-02-09 NOTE — TELEPHONE ENCOUNTER
----- Message from Antoine Walekr MA sent at 2/9/2022 12:29 PM CST -----  Regarding: FW: AFIB    ----- Message -----  From: Grecia Jacob  Sent: 2/9/2022  12:00 PM CST  To: Dakota Orozco Staff  Subject: AFIB                                             The pt is calling to see what she needs to do because she is in AFIB. Please call her back @ [phone]. Thanks, Grecia

## 2022-02-09 NOTE — TELEPHONE ENCOUNTER
Discussed with Dr Duncan. He recommends increasing flecainide from 100mg bid to 150mg bid. He also continues to recommend PVI (will need to wait 2 months post PPM implant). She verbalized understanding of recommendations and is strongly thinking about the PVI. She understands that this is ultimately the answer and will make a decision soon. Will keep me posted as to how she is doing on the increased Flecainide.

## 2022-02-10 ENCOUNTER — PATIENT MESSAGE (OUTPATIENT)
Dept: ELECTROPHYSIOLOGY | Facility: CLINIC | Age: 67
End: 2022-02-10
Payer: MEDICARE

## 2022-02-10 DIAGNOSIS — I49.8 OTHER SPECIFIED CARDIAC ARRHYTHMIAS: ICD-10-CM

## 2022-02-10 DIAGNOSIS — I48.0 PAROXYSMAL ATRIAL FIBRILLATION: Primary | ICD-10-CM

## 2022-02-11 ENCOUNTER — HOSPITAL ENCOUNTER (OUTPATIENT)
Dept: RADIOLOGY | Facility: HOSPITAL | Age: 67
Discharge: HOME | End: 2022-02-11
Attending: INTERNAL MEDICINE
Payer: MEDICARE

## 2022-02-11 DIAGNOSIS — Z82.49 FAMILY HISTORY OF PREMATURE CAD: ICD-10-CM

## 2022-02-11 DIAGNOSIS — I48.0 PAROXYSMAL ATRIAL FIBRILLATION: ICD-10-CM

## 2022-02-11 PROCEDURE — 75571 CT CALCIUM SCORING CARDIAC: ICD-10-PCS | Mod: 26,HCNC,, | Performed by: RADIOLOGY

## 2022-02-11 PROCEDURE — 75571 CT HRT W/O DYE W/CA TEST: CPT | Mod: TC,HCNC

## 2022-02-11 PROCEDURE — 75571 CT HRT W/O DYE W/CA TEST: CPT | Mod: 26,HCNC,, | Performed by: RADIOLOGY

## 2022-02-11 NOTE — PHYSICIAN QUERY
PT Name: Trudi Mcknight  MR #: [id number]     Documentation Clarification      CDS/: Citlali Martin               Contact information:[email or web address]    This form is a permanent document in the medical record.     Query Date: February 10, 2022    By submitting this query, we are merely seeking further clarification of documentation. Please utilize your independent clinical judgment when addressing the question(s) below.    The Medical Record reflects the following:    Clinical Findings Location in Medical Records   Acute on chronic diastolic CHF (congestive heart failure)  Heart failure with preserved EF (65%) on 1/20/22.     No signs of fluid overload on physical exam. History not consistent with HF as patient denies orthopnea/PND/recurrent LE edema. Started on Lasix 20 mg by her cardiologist Dr. Dominguez on 2/2/22.    BNP was 196 on admission, down from 320 in January. Troponins x2 negative. Exam and labs not indicative of acute exacerbation, will continue maintenance diuretic dose.      - start home dose lasix 20 mg daily  - daily weights  - Strict I/Os  - cardiac diet (low Na 2 g, fluid restriction 1.5 L) H&P     P.o Furosemide   Mar 2-5                                                                                Provider, please specify the clinical indicators,sign and or symptoms that support  the diagnosis of Acute on chronic diastolic CHF:      [   ] Acute on chronic diastolic CHF diagnosis is confirmed and additional clinical support/decision-making indicators for the diagnosis include (please specify):________________     [   ] Acute on chronic diastolic  diagnosis is not confirmed and/or it has been ruled out..  Chronic diastolic heart failure     [   ] Acute on chronic diastolic CHF diagnosis is not confirmed and/or it has been ruled out, other diagnosis ruled in (please specify):_______________     [   ] Other clarification (please specify): ___________________   [  ]    Clinically undetermined         I did not provide primary cardiology care for this patient.

## 2022-02-12 ENCOUNTER — DOCUMENTATION ONLY (OUTPATIENT)
Dept: CARDIOLOGY | Facility: HOSPITAL | Age: 67
End: 2022-02-12
Payer: MEDICARE

## 2022-02-12 NOTE — PROGRESS NOTES
Ms. Mcknight paged EP this AM. She reports generalized fatigue, chest discomfort around generator site (implant 1/20/22), abdominal discomfort, and diarrhea. Recently discharged with atypical AFL with RVR on digoxin and flecainide. HR 60 bpm (suspec pacing). Denies fevers, swelling, or drainage from pocket site. Does not want to take digoxin anymore as she thinks this is related to her symptoms (intolerant to BB and CCB). Instructed to take Tylenol for discomfort around pocket site, and what to monitor for (swelling, drainage, fevers, etc.). Instructed her to come to the ED if she is feels she needs to be evaluated. She did not want to come to the ED at this time. PVI planned for 3/29 with Dr. Duncan.     Adolfo Edmond MD  Cardiology  PGY-6  Pager: [phone]

## 2022-02-14 ENCOUNTER — TELEPHONE (OUTPATIENT)
Dept: CARDIOLOGY | Facility: CLINIC | Age: 67
End: 2022-02-14
Payer: MEDICARE

## 2022-02-14 ENCOUNTER — HOSPITAL ENCOUNTER (OUTPATIENT)
Dept: CARDIOLOGY | Facility: HOSPITAL | Age: 67
Discharge: HOME | End: 2022-02-14
Attending: INTERNAL MEDICINE
Payer: MEDICARE

## 2022-02-14 VITALS
HEART RATE: 60 BPM | SYSTOLIC BLOOD PRESSURE: 130 MMHG | DIASTOLIC BLOOD PRESSURE: 72 MMHG | HEIGHT: 61 IN | BODY MASS INDEX: 23.41 KG/M2 | WEIGHT: 124 LBS

## 2022-02-14 DIAGNOSIS — I31.39 PERICARDIAL EFFUSION: Primary | ICD-10-CM

## 2022-02-14 DIAGNOSIS — Z95.0 PRESENCE OF PERMANENT CARDIAC PACEMAKER: ICD-10-CM

## 2022-02-14 DIAGNOSIS — I48.0 PAF (PAROXYSMAL ATRIAL FIBRILLATION): ICD-10-CM

## 2022-02-14 DIAGNOSIS — I31.39 PERICARDIAL EFFUSION: ICD-10-CM

## 2022-02-14 LAB
ASCENDING AORTA: 2.9 CM
AV INDEX (PROSTH): 0.79
AV MEAN GRADIENT: 4 MMHG
AV PEAK GRADIENT: 6 MMHG
AV VALVE AREA: 2.51 CM2
AV VELOCITY RATIO: 0.79
BSA FOR ECHO PROCEDURE: 1.56 M2
CV ECHO LV RWT: 0.41 CM
DOP CALC AO PEAK VEL: 1.26 M/S
DOP CALC AO VTI: 25.19 CM
DOP CALC LVOT AREA: 3.2 CM2
DOP CALC LVOT DIAMETER: 2.01 CM
DOP CALC LVOT PEAK VEL: 0.99 M/S
DOP CALC LVOT STROKE VOLUME: 63.33 CM3
DOP CALCLVOT PEAK VEL VTI: 19.97 CM
E WAVE DECELERATION TIME: 262.49 MSEC
E/A RATIO: 0.94
E/E' RATIO: 8 M/S
ECHO LV POSTERIOR WALL: 0.71 CM (ref 0.6–1.1)
EJECTION FRACTION: 65 %
FRACTIONAL SHORTENING: 41 % (ref 28–44)
INTERVENTRICULAR SEPTUM: 0.69 CM (ref 0.6–1.1)
LA MAJOR: 4.73 CM
LA MINOR: 4.85 CM
LA WIDTH: 3.54 CM
LEFT ATRIUM SIZE: 2.55 CM
LEFT ATRIUM VOLUME INDEX MOD: 25 ML/M2
LEFT ATRIUM VOLUME INDEX: 23.9 ML/M2
LEFT ATRIUM VOLUME MOD: 38.52 CM3
LEFT ATRIUM VOLUME: 36.75 CM3
LEFT INTERNAL DIMENSION IN SYSTOLE: 2.03 CM (ref 2.1–4)
LEFT VENTRICLE DIASTOLIC VOLUME INDEX: 31.59 ML/M2
LEFT VENTRICLE DIASTOLIC VOLUME: 48.65 ML
LEFT VENTRICLE MASS INDEX: 40 G/M2
LEFT VENTRICLE SYSTOLIC VOLUME INDEX: 8.6 ML/M2
LEFT VENTRICLE SYSTOLIC VOLUME: 13.22 ML
LEFT VENTRICULAR INTERNAL DIMENSION IN DIASTOLE: 3.44 CM (ref 3.5–6)
LEFT VENTRICULAR MASS: 61.06 G
LV LATERAL E/E' RATIO: 8 M/S
LV SEPTAL E/E' RATIO: 8 M/S
MV A" WAVE DURATION": 6.57 MSEC
MV PEAK A VEL: 0.68 M/S
MV PEAK E VEL: 0.64 M/S
MV STENOSIS PRESSURE HALF TIME: 76.12 MS
MV VALVE AREA P 1/2 METHOD: 2.89 CM2
PISA TR MAX VEL: 2.3 M/S
PULM VEIN S/D RATIO: 1.68
PV PEAK D VEL: 0.34 M/S
PV PEAK S VEL: 0.57 M/S
RA MAJOR: 4.59 CM
RA PRESSURE: 3 MMHG
RA WIDTH: 3.51 CM
RV TISSUE DOPPLER FREE WALL SYSTOLIC VELOCITY 1 (APICAL 4 CHAMBER VIEW): 15.1 CM/S
SINUS: 3.34 CM
STJ: 2.69 CM
TDI LATERAL: 0.08 M/S
TDI SEPTAL: 0.08 M/S
TDI: 0.08 M/S
TR MAX PG: 21 MMHG
TRICUSPID ANNULAR PLANE SYSTOLIC EXCURSION: 1.1 CM
TV REST PULMONARY ARTERY PRESSURE: 24 MMHG

## 2022-02-14 PROCEDURE — 93306 TTE W/DOPPLER COMPLETE: CPT | Mod: 26,HCNC,, | Performed by: INTERNAL MEDICINE

## 2022-02-14 PROCEDURE — 93306 TTE W/DOPPLER COMPLETE: CPT | Mod: HCNC

## 2022-02-14 PROCEDURE — 93306 ECHO (CUPID ONLY): ICD-10-PCS | Mod: 26,HCNC,, | Performed by: INTERNAL MEDICINE

## 2022-02-14 NOTE — TELEPHONE ENCOUNTER
Please let the patient know that she has appointment at 230 pm for Echo. Please come to the 3rd floor diagnostic to get an echo done.

## 2022-02-17 NOTE — PHYSICIAN QUERY
PT Name: Trudi Mcknight  MR #: [id number]     Documentation Clarification      CDS/: Citlali Martin               Contact information:[email or web address]    This form is a permanent document in the medical record.     Query Date: February 17, 2022    By submitting this query, we are merely seeking further clarification of documentation. Please utilize your independent clinical judgment when addressing the question(s) below.    The Medical Record reflects the following:    Clinical Findings Location in Medical Records   Acute on chronic diastolic CHF (congestive heart failure)  Heart failure with preserved EF (65%) on 1/20/22.     No signs of fluid overload on physical exam. History not consistent with HF as patient denies orthopnea/PND/recurrent LE edema. Started on Lasix 20 mg by her cardiologist Dr. Dominguez on 2/2/22.    BNP was 196 on admission, down from 320 in January. Troponins x2 negative. Exam and labs not indicative of acute exacerbation, will continue maintenance diuretic dose.      - start home dose lasix 20 mg daily  - daily weights  - Strict I/Os  - cardiac diet (low Na 2 g, fluid restriction 1.5 L) H&P     P.o Furosemide   Mar 2-5                                                                                Provider, please specify the clinical indicators,sign and or symptoms that support  the diagnosis of Acute on chronic diastolic CHF:      [   ] Acute on chronic diastolic CHF diagnosis is confirmed and additional clinical support/decision-making indicators for the diagnosis include (please specify):________________     [   ] Acute on chronic diastolic  diagnosis is not confirmed and/or it has been ruled out..  Chronic diastolic heart failure     [   ] Acute on chronic diastolic CHF diagnosis is not confirmed and/or it has been ruled out, other diagnosis ruled in (please specify):_______________     [   ] Other clarification (please specify): ___________________   [ x ]    Clinically undetermined

## 2022-02-18 ENCOUNTER — PATIENT MESSAGE (OUTPATIENT)
Dept: ADMINISTRATIVE | Facility: OTHER | Age: 67
End: 2022-02-18
Payer: MEDICARE

## 2022-02-18 ENCOUNTER — TELEPHONE (OUTPATIENT)
Dept: ELECTROPHYSIOLOGY | Facility: CLINIC | Age: 67
End: 2022-02-18
Payer: MEDICARE

## 2022-02-18 NOTE — TELEPHONE ENCOUNTER
No ma'am. Device is working appropriately and storing her information. She has just had too many transmissions or transmissions w/device checks with RF waves. We will still get her alerts once the lockout is cleared.

## 2022-02-18 NOTE — TELEPHONE ENCOUNTER
No. The more time she sends, the longer it stays in telemetry lockout. We can have her transmit on Monday if no transmission has been automatically received over the weekend.

## 2022-02-18 NOTE — TELEPHONE ENCOUNTER
We can't see anything today. She may have to come for EKG or device check in office.  We have a telemetry lockout right now.

## 2022-02-18 NOTE — TELEPHONE ENCOUNTER
Patient states she had a rough day with AF yesterday. I have asked her to send a manual transmission. Can you please take a look?  Thanks

## 2022-02-20 ENCOUNTER — PATIENT MESSAGE (OUTPATIENT)
Dept: GASTROENTEROLOGY | Facility: CLINIC | Age: 67
End: 2022-02-20
Payer: MEDICARE

## 2022-02-21 ENCOUNTER — HOSPITAL ENCOUNTER (EMERGENCY)
Facility: HOSPITAL | Age: 67
Discharge: HOME | End: 2022-02-21
Attending: EMERGENCY MEDICINE
Payer: MEDICARE

## 2022-02-21 ENCOUNTER — PATIENT MESSAGE (OUTPATIENT)
Dept: GASTROENTEROLOGY | Facility: CLINIC | Age: 67
End: 2022-02-21
Payer: MEDICARE

## 2022-02-21 ENCOUNTER — PATIENT MESSAGE (OUTPATIENT)
Dept: ELECTROPHYSIOLOGY | Facility: CLINIC | Age: 67
End: 2022-02-21
Payer: MEDICARE

## 2022-02-21 ENCOUNTER — PATIENT OUTREACH (OUTPATIENT)
Dept: EMERGENCY MEDICINE | Facility: HOSPITAL | Age: 67
End: 2022-02-21
Payer: MEDICARE

## 2022-02-21 VITALS
SYSTOLIC BLOOD PRESSURE: 125 MMHG | DIASTOLIC BLOOD PRESSURE: 64 MMHG | HEIGHT: 61 IN | RESPIRATION RATE: 21 BRPM | WEIGHT: 120 LBS | OXYGEN SATURATION: 96 % | HEART RATE: 60 BPM | TEMPERATURE: 98 F | BODY MASS INDEX: 22.66 KG/M2

## 2022-02-21 DIAGNOSIS — R06.02 SOB (SHORTNESS OF BREATH): ICD-10-CM

## 2022-02-21 DIAGNOSIS — R68.89 MULTIPLE COMPLAINTS: ICD-10-CM

## 2022-02-21 DIAGNOSIS — K21.9 GASTROESOPHAGEAL REFLUX DISEASE, UNSPECIFIED WHETHER ESOPHAGITIS PRESENT: Primary | ICD-10-CM

## 2022-02-21 LAB
ALBUMIN SERPL BCP-MCNC: 3.6 G/DL (ref 3.5–5.2)
ALP SERPL-CCNC: 79 U/L (ref 55–135)
ALT SERPL W/O P-5'-P-CCNC: 9 U/L (ref 10–44)
ANION GAP SERPL CALC-SCNC: 12 MMOL/L (ref 8–16)
AST SERPL-CCNC: 16 U/L (ref 10–40)
BASOPHILS # BLD AUTO: 0.04 K/UL (ref 0–0.2)
BASOPHILS NFR BLD: 0.6 % (ref 0–1.9)
BILIRUB SERPL-MCNC: 0.6 MG/DL (ref 0.1–1)
BILIRUB UR QL STRIP: NEGATIVE
BUN SERPL-MCNC: 8 MG/DL (ref 8–23)
CALCIUM SERPL-MCNC: 9.5 MG/DL (ref 8.7–10.5)
CHLORIDE SERPL-SCNC: 109 MMOL/L (ref 95–110)
CLARITY UR REFRACT.AUTO: CLEAR
CO2 SERPL-SCNC: 21 MMOL/L (ref 23–29)
COLOR UR AUTO: YELLOW
CREAT SERPL-MCNC: 0.9 MG/DL (ref 0.5–1.4)
CTP QC/QA: YES
DIFFERENTIAL METHOD: ABNORMAL
EOSINOPHIL # BLD AUTO: 0.1 K/UL (ref 0–0.5)
EOSINOPHIL NFR BLD: 1.7 % (ref 0–8)
ERYTHROCYTE [DISTWIDTH] IN BLOOD BY AUTOMATED COUNT: 14.6 % (ref 11.5–14.5)
EST. GFR  (AFRICAN AMERICAN): >60 ML/MIN/1.73 M^2
EST. GFR  (NON AFRICAN AMERICAN): >60 ML/MIN/1.73 M^2
GLUCOSE SERPL-MCNC: 98 MG/DL (ref 70–110)
GLUCOSE UR QL STRIP: NEGATIVE
HCT VFR BLD AUTO: 39.3 % (ref 37–48.5)
HGB BLD-MCNC: 12.5 G/DL (ref 12–16)
HGB UR QL STRIP: NEGATIVE
IMM GRANULOCYTES # BLD AUTO: 0.03 K/UL (ref 0–0.04)
IMM GRANULOCYTES NFR BLD AUTO: 0.4 % (ref 0–0.5)
KETONES UR QL STRIP: ABNORMAL
LEUKOCYTE ESTERASE UR QL STRIP: NEGATIVE
LIPASE SERPL-CCNC: 36 U/L (ref 4–60)
LYMPHOCYTES # BLD AUTO: 1.2 K/UL (ref 1–4.8)
LYMPHOCYTES NFR BLD: 16.8 % (ref 18–48)
MCH RBC QN AUTO: 27.6 PG (ref 27–31)
MCHC RBC AUTO-ENTMCNC: 31.8 G/DL (ref 32–36)
MCV RBC AUTO: 87 FL (ref 82–98)
MONOCYTES # BLD AUTO: 0.6 K/UL (ref 0.3–1)
MONOCYTES NFR BLD: 8.6 % (ref 4–15)
NEUTROPHILS # BLD AUTO: 4.9 K/UL (ref 1.8–7.7)
NEUTROPHILS NFR BLD: 71.9 % (ref 38–73)
NITRITE UR QL STRIP: NEGATIVE
NRBC BLD-RTO: 0 /100 WBC
PH UR STRIP: 7 [PH] (ref 5–8)
PLATELET # BLD AUTO: 365 K/UL (ref 150–450)
PMV BLD AUTO: 10.9 FL (ref 9.2–12.9)
POTASSIUM SERPL-SCNC: 3.5 MMOL/L (ref 3.5–5.1)
PROT SERPL-MCNC: 7.1 G/DL (ref 6–8.4)
PROT UR QL STRIP: NEGATIVE
RBC # BLD AUTO: 4.53 M/UL (ref 4–5.4)
SARS-COV-2 RDRP RESP QL NAA+PROBE: NEGATIVE
SODIUM SERPL-SCNC: 142 MMOL/L (ref 136–145)
SP GR UR STRIP: 1.01 (ref 1–1.03)
TROPONIN I SERPL DL<=0.01 NG/ML-MCNC: <0.006 NG/ML (ref 0–0.03)
URN SPEC COLLECT METH UR: ABNORMAL
WBC # BLD AUTO: 6.86 K/UL (ref 3.9–12.7)

## 2022-02-21 PROCEDURE — U0002 COVID-19 LAB TEST NON-CDC: HCPCS | Mod: HCNC

## 2022-02-21 PROCEDURE — 99284 EMERGENCY DEPT VISIT MOD MDM: CPT | Mod: HCNC,CS,, | Performed by: EMERGENCY MEDICINE

## 2022-02-21 PROCEDURE — 80053 COMPREHEN METABOLIC PANEL: CPT | Mod: HCNC

## 2022-02-21 PROCEDURE — 96361 HYDRATE IV INFUSION ADD-ON: CPT

## 2022-02-21 PROCEDURE — 93005 ELECTROCARDIOGRAM TRACING: CPT | Mod: HCNC

## 2022-02-21 PROCEDURE — 63600175 PHARM REV CODE 636 W HCPCS: Mod: HCNC

## 2022-02-21 PROCEDURE — 93010 EKG 12-LEAD: ICD-10-PCS | Mod: HCNC,,, | Performed by: INTERNAL MEDICINE

## 2022-02-21 PROCEDURE — 84484 ASSAY OF TROPONIN QUANT: CPT | Mod: HCNC

## 2022-02-21 PROCEDURE — 96360 HYDRATION IV INFUSION INIT: CPT

## 2022-02-21 PROCEDURE — 93010 ELECTROCARDIOGRAM REPORT: CPT | Mod: HCNC,,, | Performed by: INTERNAL MEDICINE

## 2022-02-21 PROCEDURE — 99284 EMERGENCY DEPT VISIT MOD MDM: CPT | Mod: 25

## 2022-02-21 PROCEDURE — 81003 URINALYSIS AUTO W/O SCOPE: CPT | Mod: HCNC

## 2022-02-21 PROCEDURE — 99284 PR EMERGENCY DEPT VISIT,LEVEL IV: ICD-10-PCS | Mod: HCNC,CS,, | Performed by: EMERGENCY MEDICINE

## 2022-02-21 PROCEDURE — 83690 ASSAY OF LIPASE: CPT | Mod: HCNC

## 2022-02-21 PROCEDURE — 85025 COMPLETE CBC W/AUTO DIFF WBC: CPT | Mod: HCNC

## 2022-02-21 RX ORDER — ESOMEPRAZOLE MAGNESIUM 40 MG/1
40 CAPSULE, DELAYED RELEASE ORAL
Qty: 1 CAPSULE | Refills: 2 | Status: SHIPPED | OUTPATIENT
Start: 2022-02-21 | End: 2022-03-04

## 2022-02-21 RX ADMIN — SODIUM CHLORIDE, SODIUM LACTATE, POTASSIUM CHLORIDE, AND CALCIUM CHLORIDE 1000 ML: .6; .31; .03; .02 INJECTION, SOLUTION INTRAVENOUS at 09:02

## 2022-02-21 NOTE — ED PROVIDER NOTES
"Encounter Date: 2022       History     Chief Complaint   Patient presents with    Multiple complaints     Hx afib, nausea no vomiting and no diarrhea, no stool     Trudi Mcknight is a 66 y.o. female with history of AFib on Xarelto, sick sinus syndrome status post pacemaker placement presenting with chief complaint of nausea, constipation, lower abdominal pain, back pain, and dehydration     Context:  Patient is coming to the emergency department today primarily worried about her dehydration and nausea.  She says her stomach is fell on ED for some time now and that she has been dry for months".  She drinks as much water she can and states that she was up all night peeing.  She endorses pain on her lower abdomen and upper right back.     Onset:  3 days ago  Location:  Lower abdomen  Duration:  Intermittent  Quality:  Achy  Associated Symptoms:  Chills, shortness of breath, cough           Review of patient's allergies indicates:   Allergen Reactions    Penicillins Hives     causes congestion and hives    Tricyclic compounds      Past Medical History:   Diagnosis Date    Esophagitis     Hiatal hernia     Meniere's disease     Paroxysmal atrial fibrillation 3/7/2021    Shingles     Sick sinus syndrome 2022    s/p Dual chamber pacemaker     Past Surgical History:   Procedure Laterality Date    A-V CARDIAC PACEMAKER INSERTION N/A 2022    Procedure: INSERTION, CARDIAC PACEMAKER, DUAL CHAMBER;  Surgeon: Ernesto Duncan MD;  Location: [facility] EP LAB;  Service: Cardiology;  Laterality: N/A;  SB, DUAL PPM, SJM, ANES, SK, [phone]    BREAST CYST ASPIRATION           SECTION      COLONOSCOPY N/A 2019    Procedure: COLONOSCOPY;  Surgeon: INGRID Russo MD;  Location: [facility] ENDO [address]);  Service: Endoscopy;  Laterality: N/A;    HYSTERECTOMY      TONSILLECTOMY       Family History   Problem Relation Age of Onset    Heart disease Mother 55        bypass at 55    Breast cancer Mother     " Hypertension Mother     Hyperlipidemia Mother     Heart disease Father     Hypertension Father     Heart disease Brother     Diverticulitis Brother     Diverticulitis Sister     Breast cancer Paternal Grandmother     Colon cancer Neg Hx     Melanoma Neg Hx     Amblyopia Neg Hx     Blindness Neg Hx     Cataracts Neg Hx     Glaucoma Neg Hx     Macular degeneration Neg Hx     Strabismus Neg Hx     Retinal detachment Neg Hx      Social History     Tobacco Use    Smoking status: Never Smoker    Smokeless tobacco: Never Used   Substance Use Topics    Alcohol use: No     Alcohol/week: 0.0 standard drinks     Comment: rarely    Drug use: No     Review of Systems   Constitutional: Positive for chills. Negative for fever.   HENT: Negative for congestion and sore throat.    Respiratory: Positive for cough and shortness of breath.    Cardiovascular: Negative for chest pain and palpitations.   Gastrointestinal: Positive for constipation and nausea. Negative for abdominal pain, diarrhea and vomiting.   Genitourinary: Negative for dysuria and hematuria.   Musculoskeletal: Positive for back pain. Negative for arthralgias and myalgias.   Skin: Negative for rash and wound.   Neurological: Negative for dizziness and weakness.   Psychiatric/Behavioral: Negative for agitation and behavioral problems.       Physical Exam     Initial Vitals [02/21/22 0849]   BP Pulse Resp Temp SpO2   134/67 78 18 98.2 °F (36.8 °C) 100 %      MAP       --         Physical Exam    Nursing note and vitals reviewed.  Constitutional: She appears well-developed and well-nourished.   HENT:   Head: Normocephalic and atraumatic.   Eyes: Conjunctivae are normal. No scleral icterus.   Neck: Neck supple. No tracheal deviation present. No JVD present.   Cardiovascular: Normal rate, regular rhythm and normal heart sounds. Exam reveals no gallop and no friction rub.    No murmur heard.  Pulmonary/Chest: Breath sounds normal. No stridor. No  respiratory distress. She has no wheezes. She has no rhonchi. She has no rales.   Abdominal: Abdomen is soft. Bowel sounds are normal. She exhibits no distension. There is abdominal tenderness ( Left lower quadrant, suprapubic). There is no rebound and no guarding.   Musculoskeletal:         General: No tenderness or edema.      Cervical back: Neck supple.     Neurological: She is alert and oriented to person, place, and time. GCS score is 15. GCS eye subscore is 4. GCS verbal subscore is 5. GCS motor subscore is 6.   Skin: Skin is warm and dry. Capillary refill takes less than 2 seconds. No rash noted. No erythema.   Psychiatric: Thought content normal.         ED Course   Procedures  Labs Reviewed   CBC W/ AUTO DIFFERENTIAL - Abnormal; Notable for the following components:       Result Value    MCHC 31.8 (*)     RDW 14.6 (*)     Lymph % 16.8 (*)     All other components within normal limits   COMPREHENSIVE METABOLIC PANEL - Abnormal; Notable for the following components:    CO2 21 (*)     ALT 9 (*)     All other components within normal limits   URINALYSIS, REFLEX TO URINE CULTURE - Abnormal; Notable for the following components:    Ketones, UA Trace (*)     All other components within normal limits    Narrative:     Specimen Source->Urine   SARS-COV-2 RDRP GENE - Normal    Narrative:     This test utilizes isothermal nucleic acid amplification   technology to detect the SARS-CoV-2 RdRp nucleic acid segment.   The analytical sensitivity (limit of detection) is 125 genome   equivalents/mL.   A POSITIVE result implies infection with the SARS-CoV-2 virus;   the patient is presumed to be contagious.     A NEGATIVE result means that SARS-CoV-2 nucleic acids are not   present above the limit of detection. A NEGATIVE result should be   treated as presumptive. It does not rule out the possibility of   COVID-19 and should not be the sole basis for treatment decisions.   If COVID-19 is strongly suspected based on clinical  and exposure   history, re-testing using an alternate molecular assay should be   considered.   This test is only for use under the Food and Drug   Administration s Emergency Use Authorization (EUA).   Commercial kits are provided by [employer].   Performance characteristics of the EUA have been independently   verified by [facility] Department of   Pathology and Laboratory Medicine.   _________________________________________________________________   The authorized Fact Sheet for Healthcare Providers and the authorized Fact   Sheet for Patients of the ID NOW COVID-19 are available on the FDA   website:     https://www.fda.gov/media/750462/download  https://www.fda.gov/media/338346/download          LIPASE   TROPONIN I     EKG Readings: (Independently Interpreted)   Initial Reading: No STEMI. Rhythm: Atrially paced rhythm. Heart Rate: 60. Ectopy: No Ectopy. Axis: Normal.     ECG Results          EKG 12-lead (Final result)  Result time 02/21/22 10:21:21    Final result by Interface, Lab In [facility] (02/21/22 10:21:21)                 Narrative:    Test Reason : R68.89,    Vent. Rate : 060 BPM     Atrial Rate : 060 BPM     P-R Int : 204 ms          QRS Dur : 084 ms      QT Int : 442 ms       P-R-T Axes : 054 085 090 degrees     QTc Int : 442 ms    Atrial-paced rhythm  T wave abnormality, consider anterior ischemia  Abnormal ECG  When compared with ECG of 07-FEB-2022 07:40,  Electronic atrial pacemaker has replaced Sinus rhythm  Nonspecific T wave abnormality, improved in Lateral leads  Confirmed by Yair NORRIS MD ([id number]) on 2/21/2022 10:21:08 AM    Referred By:             Confirmed By:Yair NORRIS MD                            Imaging Results          X-Ray Chest PA And Lateral (Final result)  Result time 02/21/22 11:51:44    Final result by Regan Rob III, MD (02/21/22 11:51:44)                 Impression:      No acute process seen.      Electronically signed by: Regan Rob  MD  Date:    02/21/2022  Time:    11:51             Narrative:    EXAMINATION:  XR CHEST PA AND LATERAL    CLINICAL HISTORY:  Shortness of breath    FINDINGS:  There is a pacer.  There is cardiomegaly and aortic plaque.  Lungs are clear.  The bones showed DJD.                                 Medications   lactated ringers bolus 1,000 mL (0 mLs Intravenous Stopped 2/21/22 1208)     Medical Decision Making:   Initial Assessment:   66-year-old female here with nausea and dehydration  Differential Diagnosis:   Gastroenteritis, dehydration, ACS  Clinical Tests:   Lab Tests: Ordered and Reviewed  Radiological Study: Ordered and Reviewed  Medical Tests: Ordered and Reviewed  ED Management:  Basic workup started.  Patient given 1 liter of LR for dehydration.  EKG showing no acute signs of ischemia.  CXR showing no acute intrathoracic process.  COVID negative.  CMP unremarkable.  Urinalysis unremarkable.  CBC showing no leukocytosis or anemia.  Troponin and lipase within normal limits.  Upon re-examination patient feels somewhat better after receiving fluids.  At this time there is no emergent cause of patient's symptoms or need to keep her in the hospital.  After discussing with patient she feels okay to go home with close follow-up and return precautions.  Patient discharged with return precautions and instructions to follow-up with PCP            Attending Attestation:   Physician Attestation Statement for Resident:  As the supervising MD   Physician Attestation Statement: I have personally seen and examined this patient.   I agree with the above history. -:   As the supervising MD I agree with the above PE.    As the supervising MD I agree with the above treatment, course, plan, and disposition.                         Clinical Impression:   Final diagnoses:  [R68.89] Multiple complaints  [R06.02] SOB (shortness of breath)          ED Disposition Condition    Discharge Stable        ED Prescriptions     None         Follow-up Information     Follow up With Specialties Details Why Contact Info    Renuka Moreno MD Internal Medicine   [address]  [facility] [address]  [phone]      [facility] - Emergency Dept Emergency Medicine Go to  As needed, If symptoms worsen [address] [facility] [id number] [phone]           Chilango Easley MD  Resident  02/21/22 1651       Erika Valentine MD  02/24/22 1312

## 2022-02-21 NOTE — TELEPHONE ENCOUNTER
Spoke with patient. States she is sitting in ER now for evaluation. States she thinks she is dehydrated due to constipation. Advised to keep us posted.

## 2022-02-21 NOTE — PROGRESS NOTES
Patient declined ED navigation assessment and denied any needs at this time.     Patient states she has an upcoming appt. With her PCP (3/10/22 with Renuka Moreno).      Nelly Toth, ED Navigator, [facility]  [phone], ext. [phone]

## 2022-02-21 NOTE — DISCHARGE INSTRUCTIONS
It was a pleasure taking care of you today!       Follow-Up Plan:  - Schedule appointment with pulmonologist. They should call you   - Follow-up with primary care doctor within 3 - 5 days to discuss your recent ER visit and any additional concerns that you may have.  - Additional testing and/or evaluation as directed by your primary doctor    Return to the Emergency Department for symptoms including but not limited to: persistence or worsening of symptoms, shortness of breath or chest pain, inability to drink without vomiting, passing out/fainting/ loss of consciousness, or if you have other concerns.

## 2022-02-21 NOTE — ED NOTES
"Patient has c/o SOB, palpitations, feeling "dry".  C/o some nausea and abdominal discomfort. LBM was Saturday tried taking miralax but not helping. Has not taken meds yet today  "

## 2022-02-21 NOTE — PROVIDER PROGRESS NOTES - EMERGENCY DEPT.
Encounter Date: 2/21/2022    ED Physician Progress Notes         EKG - STEMI Decision  Initial Reading: No STEMI present.    Heart rate 60, no STEMI, atrial paced rhythm, nonspecific T-wave abnormality.    Attila Smith DO, FAAEM  Emergency Staff Physician   Dept of Emergency Medicine   [facility]  Spectralink: [phone]        Disclaimer: This note has been generated using voice-recognition software. There may be typographical errors that have been missed during proof-reading.

## 2022-02-25 ENCOUNTER — HOSPITAL ENCOUNTER (OUTPATIENT)
Dept: CARDIOLOGY | Facility: HOSPITAL | Age: 67
Discharge: HOME | End: 2022-02-25
Attending: INTERNAL MEDICINE
Payer: MEDICARE

## 2022-02-25 ENCOUNTER — TELEPHONE (OUTPATIENT)
Dept: CARDIOLOGY | Facility: CLINIC | Age: 67
End: 2022-02-25
Payer: MEDICARE

## 2022-02-25 VITALS
DIASTOLIC BLOOD PRESSURE: 65 MMHG | HEART RATE: 60 BPM | HEIGHT: 61 IN | WEIGHT: 124 LBS | SYSTOLIC BLOOD PRESSURE: 118 MMHG | BODY MASS INDEX: 23.41 KG/M2

## 2022-02-25 DIAGNOSIS — I31.39 PERICARDIAL EFFUSION: ICD-10-CM

## 2022-02-25 LAB
ASCENDING AORTA: 3.04 CM
AV INDEX (PROSTH): 0.78
AV MEAN GRADIENT: 3 MMHG
AV PEAK GRADIENT: 6 MMHG
AV VALVE AREA: 3.1 CM2
AV VELOCITY RATIO: 0.76
BSA FOR ECHO PROCEDURE: 1.56 M2
CV ECHO LV RWT: 0.34 CM
DOP CALC AO PEAK VEL: 1.18 M/S
DOP CALC AO VTI: 26.72 CM
DOP CALC LVOT AREA: 4 CM2
DOP CALC LVOT DIAMETER: 2.25 CM
DOP CALC LVOT PEAK VEL: 0.9 M/S
DOP CALC LVOT STROKE VOLUME: 82.9 CM3
DOP CALCLVOT PEAK VEL VTI: 20.86 CM
E WAVE DECELERATION TIME: 239.82 MSEC
E/A RATIO: 0.9
E/E' RATIO: 8.63 M/S
ECHO LV POSTERIOR WALL: 0.68 CM (ref 0.6–1.1)
EJECTION FRACTION: 60 %
FRACTIONAL SHORTENING: 38 % (ref 28–44)
INTERVENTRICULAR SEPTUM: 0.8 CM (ref 0.6–1.1)
LA MAJOR: 4.45 CM
LA MINOR: 4.3 CM
LA WIDTH: 2.88 CM
LEFT ATRIUM SIZE: 3.51 CM
LEFT ATRIUM VOLUME INDEX MOD: 17 ML/M2
LEFT ATRIUM VOLUME INDEX: 24.4 ML/M2
LEFT ATRIUM VOLUME MOD: 26.19 CM3
LEFT ATRIUM VOLUME: 37.58 CM3
LEFT INTERNAL DIMENSION IN SYSTOLE: 2.53 CM (ref 2.1–4)
LEFT VENTRICLE DIASTOLIC VOLUME INDEX: 46.92 ML/M2
LEFT VENTRICLE DIASTOLIC VOLUME: 72.25 ML
LEFT VENTRICLE MASS INDEX: 56 G/M2
LEFT VENTRICLE SYSTOLIC VOLUME INDEX: 15 ML/M2
LEFT VENTRICLE SYSTOLIC VOLUME: 23.08 ML
LEFT VENTRICULAR INTERNAL DIMENSION IN DIASTOLE: 4.05 CM (ref 3.5–6)
LEFT VENTRICULAR MASS: 86.03 G
LV LATERAL E/E' RATIO: 7.67 M/S
LV SEPTAL E/E' RATIO: 9.86 M/S
MV A" WAVE DURATION": 9.13 MSEC
MV PEAK A VEL: 0.77 M/S
MV PEAK E VEL: 0.69 M/S
MV STENOSIS PRESSURE HALF TIME: 69.55 MS
MV VALVE AREA P 1/2 METHOD: 3.16 CM2
PISA TR MAX VEL: 2.45 M/S
PULM VEIN S/D RATIO: 1.05
PV PEAK D VEL: 0.37 M/S
PV PEAK S VEL: 0.39 M/S
RA MAJOR: 3.9 CM
RA PRESSURE: 3 MMHG
RA WIDTH: 2.91 CM
RIGHT VENTRICULAR END-DIASTOLIC DIMENSION: 2.93 CM
RV TISSUE DOPPLER FREE WALL SYSTOLIC VELOCITY 1 (APICAL 4 CHAMBER VIEW): 12.95 CM/S
SINUS: 3.11 CM
STJ: 3.38 CM
TDI LATERAL: 0.09 M/S
TDI SEPTAL: 0.07 M/S
TDI: 0.08 M/S
TR MAX PG: 24 MMHG
TRICUSPID ANNULAR PLANE SYSTOLIC EXCURSION: 1.91 CM
TV REST PULMONARY ARTERY PRESSURE: 27 MMHG

## 2022-02-25 PROCEDURE — 93306 TTE W/DOPPLER COMPLETE: CPT

## 2022-02-25 PROCEDURE — 93306 ECHO (CUPID ONLY): ICD-10-PCS | Mod: 26,,, | Performed by: INTERNAL MEDICINE

## 2022-02-25 PROCEDURE — 93306 TTE W/DOPPLER COMPLETE: CPT | Mod: 26,,, | Performed by: INTERNAL MEDICINE

## 2022-02-25 NOTE — TELEPHONE ENCOUNTER
I spoke with the patient regarding repeat echo today that the fluid around the heart appears unchanged, she feels well. Will follow her in the clinic second week of March

## 2022-02-28 ENCOUNTER — PATIENT MESSAGE (OUTPATIENT)
Dept: ELECTROPHYSIOLOGY | Facility: CLINIC | Age: 67
End: 2022-02-28
Payer: MEDICARE

## 2022-02-28 DIAGNOSIS — I48.0 PAROXYSMAL ATRIAL FIBRILLATION WITH RVR: Primary | ICD-10-CM

## 2022-02-28 RX ORDER — FLECAINIDE ACETATE 100 MG/1
100 TABLET ORAL EVERY 12 HOURS
Qty: 60 TABLET | Refills: 1 | Status: ON HOLD | OUTPATIENT
Start: 2022-02-28 | End: 2022-03-19 | Stop reason: SDUPTHER

## 2022-03-02 ENCOUNTER — PATIENT MESSAGE (OUTPATIENT)
Dept: INTERNAL MEDICINE | Facility: CLINIC | Age: 67
End: 2022-03-02
Payer: MEDICARE

## 2022-03-02 ENCOUNTER — PATIENT MESSAGE (OUTPATIENT)
Dept: MEDSURG UNIT | Facility: HOSPITAL | Age: 67
End: 2022-03-02
Payer: MEDICARE

## 2022-03-02 DIAGNOSIS — N30.90 CYSTITIS: Primary | ICD-10-CM

## 2022-03-02 RX ORDER — NITROFURANTOIN 25; 75 MG/1; MG/1
100 CAPSULE ORAL 2 TIMES DAILY
Qty: 10 CAPSULE | Refills: 0 | Status: SHIPPED | OUTPATIENT
Start: 2022-03-02 | End: 2022-03-04

## 2022-03-03 ENCOUNTER — PATIENT MESSAGE (OUTPATIENT)
Dept: INTERNAL MEDICINE | Facility: CLINIC | Age: 67
End: 2022-03-03
Payer: MEDICARE

## 2022-03-03 DIAGNOSIS — N30.90 CYSTITIS: Primary | ICD-10-CM

## 2022-03-04 ENCOUNTER — OFFICE VISIT (OUTPATIENT)
Dept: INTERNAL MEDICINE | Facility: CLINIC | Age: 67
End: 2022-03-04
Payer: MEDICARE

## 2022-03-04 VITALS
OXYGEN SATURATION: 98 % | BODY MASS INDEX: 22.76 KG/M2 | SYSTOLIC BLOOD PRESSURE: 124 MMHG | HEART RATE: 60 BPM | HEIGHT: 61 IN | WEIGHT: 120.56 LBS | DIASTOLIC BLOOD PRESSURE: 72 MMHG

## 2022-03-04 DIAGNOSIS — I48.0 PAROXYSMAL ATRIAL FIBRILLATION: ICD-10-CM

## 2022-03-04 DIAGNOSIS — N30.90 CYSTITIS: Primary | ICD-10-CM

## 2022-03-04 PROCEDURE — 3044F PR MOST RECENT HEMOGLOBIN A1C LEVEL <7.0%: ICD-10-PCS | Mod: CPTII,S$GLB,, | Performed by: INTERNAL MEDICINE

## 2022-03-04 PROCEDURE — 99214 PR OFFICE/OUTPT VISIT, EST, LEVL IV, 30-39 MIN: ICD-10-PCS | Mod: S$GLB,,, | Performed by: INTERNAL MEDICINE

## 2022-03-04 PROCEDURE — 1101F PT FALLS ASSESS-DOCD LE1/YR: CPT | Mod: CPTII,S$GLB,, | Performed by: INTERNAL MEDICINE

## 2022-03-04 PROCEDURE — 3008F BODY MASS INDEX DOCD: CPT | Mod: CPTII,S$GLB,, | Performed by: INTERNAL MEDICINE

## 2022-03-04 PROCEDURE — 3044F HG A1C LEVEL LT 7.0%: CPT | Mod: CPTII,S$GLB,, | Performed by: INTERNAL MEDICINE

## 2022-03-04 PROCEDURE — 1159F PR MEDICATION LIST DOCUMENTED IN MEDICAL RECORD: ICD-10-PCS | Mod: CPTII,S$GLB,, | Performed by: INTERNAL MEDICINE

## 2022-03-04 PROCEDURE — 3078F DIAST BP <80 MM HG: CPT | Mod: CPTII,S$GLB,, | Performed by: INTERNAL MEDICINE

## 2022-03-04 PROCEDURE — 1111F PR DISCHARGE MEDS RECONCILED W/ CURRENT OUTPATIENT MED LIST: ICD-10-PCS | Mod: CPTII,S$GLB,, | Performed by: INTERNAL MEDICINE

## 2022-03-04 PROCEDURE — 3288F PR FALLS RISK ASSESSMENT DOCUMENTED: ICD-10-PCS | Mod: CPTII,S$GLB,, | Performed by: INTERNAL MEDICINE

## 2022-03-04 PROCEDURE — 1101F PR PT FALLS ASSESS DOC 0-1 FALLS W/OUT INJ PAST YR: ICD-10-PCS | Mod: CPTII,S$GLB,, | Performed by: INTERNAL MEDICINE

## 2022-03-04 PROCEDURE — 3074F PR MOST RECENT SYSTOLIC BLOOD PRESSURE < 130 MM HG: ICD-10-PCS | Mod: CPTII,S$GLB,, | Performed by: INTERNAL MEDICINE

## 2022-03-04 PROCEDURE — 1159F MED LIST DOCD IN RCRD: CPT | Mod: CPTII,S$GLB,, | Performed by: INTERNAL MEDICINE

## 2022-03-04 PROCEDURE — 1125F AMNT PAIN NOTED PAIN PRSNT: CPT | Mod: CPTII,S$GLB,, | Performed by: INTERNAL MEDICINE

## 2022-03-04 PROCEDURE — 1160F RVW MEDS BY RX/DR IN RCRD: CPT | Mod: CPTII,S$GLB,, | Performed by: INTERNAL MEDICINE

## 2022-03-04 PROCEDURE — 3078F PR MOST RECENT DIASTOLIC BLOOD PRESSURE < 80 MM HG: ICD-10-PCS | Mod: CPTII,S$GLB,, | Performed by: INTERNAL MEDICINE

## 2022-03-04 PROCEDURE — 3288F FALL RISK ASSESSMENT DOCD: CPT | Mod: CPTII,S$GLB,, | Performed by: INTERNAL MEDICINE

## 2022-03-04 PROCEDURE — 3074F SYST BP LT 130 MM HG: CPT | Mod: CPTII,S$GLB,, | Performed by: INTERNAL MEDICINE

## 2022-03-04 PROCEDURE — 99214 OFFICE O/P EST MOD 30 MIN: CPT | Mod: S$GLB,,, | Performed by: INTERNAL MEDICINE

## 2022-03-04 PROCEDURE — 1111F DSCHRG MED/CURRENT MED MERGE: CPT | Mod: CPTII,S$GLB,, | Performed by: INTERNAL MEDICINE

## 2022-03-04 PROCEDURE — 1125F PR PAIN SEVERITY QUANTIFIED, PAIN PRESENT: ICD-10-PCS | Mod: CPTII,S$GLB,, | Performed by: INTERNAL MEDICINE

## 2022-03-04 PROCEDURE — 1160F PR REVIEW ALL MEDS BY PRESCRIBER/CLIN PHARMACIST DOCUMENTED: ICD-10-PCS | Mod: CPTII,S$GLB,, | Performed by: INTERNAL MEDICINE

## 2022-03-04 PROCEDURE — 99999 PR PBB SHADOW E&M-EST. PATIENT-LVL III: ICD-10-PCS | Mod: PBBFAC,,, | Performed by: INTERNAL MEDICINE

## 2022-03-04 PROCEDURE — 99999 PR PBB SHADOW E&M-EST. PATIENT-LVL III: CPT | Mod: PBBFAC,,, | Performed by: INTERNAL MEDICINE

## 2022-03-04 PROCEDURE — 3008F PR BODY MASS INDEX (BMI) DOCUMENTED: ICD-10-PCS | Mod: CPTII,S$GLB,, | Performed by: INTERNAL MEDICINE

## 2022-03-04 RX ORDER — NITROFURANTOIN 25; 75 MG/1; MG/1
100 CAPSULE ORAL 2 TIMES DAILY
Qty: 4 CAPSULE | Refills: 0 | COMMUNITY
Start: 2022-03-04 | End: 2022-03-08 | Stop reason: SDUPTHER

## 2022-03-04 NOTE — PROGRESS NOTES
"Subjective:       Patient ID: Trudi Mcknight is a 66 y.o. female.    Chief Complaint: Urinary Tract Infection    HPI   67 yo F here for evaluation of possible UTI    Started macrobid and broke out in hives but continued taking it and has been fine. Now she has realized the hives were due to using her 's shampoo.    POCT urinalysis today neg leukocytes and nitrates and blood.     Her a fib seemed to be more frequent starting 3/1. None today.     Still mild burning and urgency but getting much better than at onset.   Review of Systems   Constitutional: Negative for fever.   Respiratory: Negative for shortness of breath.    Cardiovascular: Negative for chest pain.   Musculoskeletal: Negative.    Skin: Negative.        Objective:   /72 (BP Location: Left arm, Patient Position: Sitting, BP Method: Medium (Manual))   Pulse 60   Ht 5' 1" (1.549 m)   Wt 54.7 kg (120 lb 9.5 oz)   SpO2 98%   BMI 22.79 kg/m²      Physical Exam  Constitutional:       General: She is not in acute distress.     Appearance: She is well-developed. She is not diaphoretic.   HENT:      Head: Normocephalic and atraumatic.   Cardiovascular:      Rate and Rhythm: Normal rate and regular rhythm.   Pulmonary:      Effort: Pulmonary effort is normal. No respiratory distress.      Breath sounds: No wheezing or rales.   Skin:     General: Skin is warm and dry.   Neurological:      Mental Status: She is alert and oriented to person, place, and time.   Psychiatric:         Behavior: Behavior normal.         Assessment:       1. Cystitis    2. Paroxysmal atrial fibrillation        Plan:       Trudi was seen today for urinary tract infection.    Diagnoses and all orders for this visit:    Cystitis  Continue macrobid- extend course additional 2 days as she did have break in course  Previously listed allergy deemed insignificant (Hives were due to new shampoo)    Paroxysmal a fib  More events at onset of uti but improving now          "

## 2022-03-06 DIAGNOSIS — N30.90 CYSTITIS: ICD-10-CM

## 2022-03-07 ENCOUNTER — PATIENT MESSAGE (OUTPATIENT)
Dept: ELECTROPHYSIOLOGY | Facility: CLINIC | Age: 67
End: 2022-03-07
Payer: MEDICARE

## 2022-03-07 ENCOUNTER — HOSPITAL ENCOUNTER (EMERGENCY)
Facility: HOSPITAL | Age: 67
Discharge: HOME | End: 2022-03-07
Attending: EMERGENCY MEDICINE
Payer: MEDICARE

## 2022-03-07 VITALS
DIASTOLIC BLOOD PRESSURE: 70 MMHG | WEIGHT: 120 LBS | RESPIRATION RATE: 16 BRPM | BODY MASS INDEX: 22.67 KG/M2 | TEMPERATURE: 98 F | OXYGEN SATURATION: 99 % | HEART RATE: 60 BPM | SYSTOLIC BLOOD PRESSURE: 136 MMHG

## 2022-03-07 DIAGNOSIS — R30.0 DYSURIA: ICD-10-CM

## 2022-03-07 DIAGNOSIS — R53.1 WEAKNESS: Primary | ICD-10-CM

## 2022-03-07 LAB
ALBUMIN SERPL BCP-MCNC: 3.5 G/DL (ref 3.5–5.2)
ALP SERPL-CCNC: 77 U/L (ref 55–135)
ALT SERPL W/O P-5'-P-CCNC: 11 U/L (ref 10–44)
ANION GAP SERPL CALC-SCNC: 11 MMOL/L (ref 8–16)
AST SERPL-CCNC: 14 U/L (ref 10–40)
BASOPHILS # BLD AUTO: 0.04 K/UL (ref 0–0.2)
BASOPHILS NFR BLD: 0.6 % (ref 0–1.9)
BILIRUB SERPL-MCNC: 0.6 MG/DL (ref 0.1–1)
BILIRUB UR QL STRIP: NEGATIVE
BNP SERPL-MCNC: 37 PG/ML (ref 0–99)
BUN SERPL-MCNC: 12 MG/DL (ref 8–23)
CALCIUM SERPL-MCNC: 9.4 MG/DL (ref 8.7–10.5)
CHLORIDE SERPL-SCNC: 110 MMOL/L (ref 95–110)
CLARITY UR REFRACT.AUTO: CLEAR
CO2 SERPL-SCNC: 20 MMOL/L (ref 23–29)
COLOR UR AUTO: YELLOW
CREAT SERPL-MCNC: 0.7 MG/DL (ref 0.5–1.4)
DIFFERENTIAL METHOD: ABNORMAL
EOSINOPHIL # BLD AUTO: 0.2 K/UL (ref 0–0.5)
EOSINOPHIL NFR BLD: 2.8 % (ref 0–8)
ERYTHROCYTE [DISTWIDTH] IN BLOOD BY AUTOMATED COUNT: 14.9 % (ref 11.5–14.5)
EST. GFR  (AFRICAN AMERICAN): >60 ML/MIN/1.73 M^2
EST. GFR  (NON AFRICAN AMERICAN): >60 ML/MIN/1.73 M^2
GLUCOSE SERPL-MCNC: 97 MG/DL (ref 70–110)
GLUCOSE UR QL STRIP: NEGATIVE
HCT VFR BLD AUTO: 37.5 % (ref 37–48.5)
HGB BLD-MCNC: 12.3 G/DL (ref 12–16)
HGB UR QL STRIP: NEGATIVE
IMM GRANULOCYTES # BLD AUTO: 0.02 K/UL (ref 0–0.04)
IMM GRANULOCYTES NFR BLD AUTO: 0.3 % (ref 0–0.5)
KETONES UR QL STRIP: NEGATIVE
LEUKOCYTE ESTERASE UR QL STRIP: NEGATIVE
LYMPHOCYTES # BLD AUTO: 1.7 K/UL (ref 1–4.8)
LYMPHOCYTES NFR BLD: 26.5 % (ref 18–48)
MCH RBC QN AUTO: 27.9 PG (ref 27–31)
MCHC RBC AUTO-ENTMCNC: 32.8 G/DL (ref 32–36)
MCV RBC AUTO: 85 FL (ref 82–98)
MONOCYTES # BLD AUTO: 0.5 K/UL (ref 0.3–1)
MONOCYTES NFR BLD: 8.2 % (ref 4–15)
NEUTROPHILS # BLD AUTO: 3.9 K/UL (ref 1.8–7.7)
NEUTROPHILS NFR BLD: 61.6 % (ref 38–73)
NITRITE UR QL STRIP: NEGATIVE
NRBC BLD-RTO: 0 /100 WBC
PH UR STRIP: 7 [PH] (ref 5–8)
PLATELET # BLD AUTO: 324 K/UL (ref 150–450)
PMV BLD AUTO: 10.1 FL (ref 9.2–12.9)
POTASSIUM SERPL-SCNC: 3.6 MMOL/L (ref 3.5–5.1)
PROT SERPL-MCNC: 6.6 G/DL (ref 6–8.4)
PROT UR QL STRIP: NEGATIVE
RBC # BLD AUTO: 4.41 M/UL (ref 4–5.4)
SODIUM SERPL-SCNC: 141 MMOL/L (ref 136–145)
SP GR UR STRIP: 1.01 (ref 1–1.03)
TROPONIN I SERPL DL<=0.01 NG/ML-MCNC: <0.006 NG/ML (ref 0–0.03)
URN SPEC COLLECT METH UR: NORMAL
WBC # BLD AUTO: 6.34 K/UL (ref 3.9–12.7)

## 2022-03-07 PROCEDURE — 99285 EMERGENCY DEPT VISIT HI MDM: CPT | Mod: 25

## 2022-03-07 PROCEDURE — 93010 ELECTROCARDIOGRAM REPORT: CPT | Mod: ,,, | Performed by: INTERNAL MEDICINE

## 2022-03-07 PROCEDURE — 99284 EMERGENCY DEPT VISIT MOD MDM: CPT | Mod: ,,, | Performed by: EMERGENCY MEDICINE

## 2022-03-07 PROCEDURE — 85025 COMPLETE CBC W/AUTO DIFF WBC: CPT | Performed by: EMERGENCY MEDICINE

## 2022-03-07 PROCEDURE — 63600175 PHARM REV CODE 636 W HCPCS: Performed by: EMERGENCY MEDICINE

## 2022-03-07 PROCEDURE — 99284 PR EMERGENCY DEPT VISIT,LEVEL IV: ICD-10-PCS | Mod: ,,, | Performed by: EMERGENCY MEDICINE

## 2022-03-07 PROCEDURE — 81003 URINALYSIS AUTO W/O SCOPE: CPT | Performed by: EMERGENCY MEDICINE

## 2022-03-07 PROCEDURE — 25000003 PHARM REV CODE 250

## 2022-03-07 PROCEDURE — 93005 ELECTROCARDIOGRAM TRACING: CPT

## 2022-03-07 PROCEDURE — 83880 ASSAY OF NATRIURETIC PEPTIDE: CPT | Performed by: EMERGENCY MEDICINE

## 2022-03-07 PROCEDURE — 80053 COMPREHEN METABOLIC PANEL: CPT | Performed by: EMERGENCY MEDICINE

## 2022-03-07 PROCEDURE — 84484 ASSAY OF TROPONIN QUANT: CPT | Performed by: EMERGENCY MEDICINE

## 2022-03-07 PROCEDURE — 96360 HYDRATION IV INFUSION INIT: CPT

## 2022-03-07 PROCEDURE — 93010 EKG 12-LEAD: ICD-10-PCS | Mod: ,,, | Performed by: INTERNAL MEDICINE

## 2022-03-07 RX ORDER — LIDOCAINE 50 MG/G
1 PATCH TOPICAL
Status: DISCONTINUED | OUTPATIENT
Start: 2022-03-07 | End: 2022-03-07 | Stop reason: HOSPADM

## 2022-03-07 RX ORDER — NITROFURANTOIN 25; 75 MG/1; MG/1
CAPSULE ORAL
Qty: 10 CAPSULE | OUTPATIENT
Start: 2022-03-07

## 2022-03-07 RX ADMIN — LIDOCAINE 1 PATCH: 50 PATCH CUTANEOUS at 03:03

## 2022-03-07 RX ADMIN — SODIUM CHLORIDE, SODIUM LACTATE, POTASSIUM CHLORIDE, AND CALCIUM CHLORIDE 1000 ML: .6; .31; .03; .02 INJECTION, SOLUTION INTRAVENOUS at 02:03

## 2022-03-07 NOTE — TELEPHONE ENCOUNTER
Looks like patient was seen in ED today  Received refill request for macrobid but she was evaluated for UTI in ED and urine was normal  Please see how she is doing and if she needs anything else

## 2022-03-07 NOTE — ED PROVIDER NOTES
"Encounter Date: 3/7/2022       History     Chief Complaint   Patient presents with    Fatigue     Pt c/o generalized weakness/fatigue and dehydration. Pt recently admitted for a-fib and had pacemaker placed on 2/20. Also recently dx'd w/ UTI and on antibiotics. States "I can't keep on top of the fluids. I'm so tired and dehydrated."      65 yo female with history of Afib on Flecainide and SSS s/p pacemaker who presents with multiple complaints. Patient's primary complaint is urinary urgency, frequency and dysuria which is very distressing to patient. She was recently treated for UTI on 3/4/22 with Macrobid, for which she's been treated for 3 days thus far. She states her antibiotic is not helping as she continues to have symptoms. Denies hematuria, fever, chills. She also endorses fatigue and weakness which began around the time she started taking Macrobid. Denies any falls and states she's able to complete her ADLs without assistance. She mentions a burning sensation that begins underneath her left breast and travels into her left axilla into her left shoulder blade. This has been present for 3 weeks and the only associated skin changes are three small colorless papules located underneath her left breast. She also complains of URI symptoms of sore throat, rhinorrhea and productive couth without any dyspnea. She endorses mild nausea and constipation, last bowel movements approximately three days ago. She has not tried anything for constipation. She has been able to tolerate PO. Of note, patient was recently seen in the ED on 2/21/22 for similar complaints and was discharged after a negative workup and receiving IVF.        Review of patient's allergies indicates:   Allergen Reactions    Penicillins Hives     causes congestion and hives    Tricyclic compounds      Past Medical History:   Diagnosis Date    Esophagitis     Hiatal hernia     Meniere's disease     Paroxysmal atrial fibrillation 3/7/2021    " Shingles     Sick sinus syndrome 2022    s/p Dual chamber pacemaker     Past Surgical History:   Procedure Laterality Date    A-V CARDIAC PACEMAKER INSERTION N/A 2022    Procedure: INSERTION, CARDIAC PACEMAKER, DUAL CHAMBER;  Surgeon: Ernesto Duncan MD;  Location: [facility] EP LAB;  Service: Cardiology;  Laterality: N/A;  SB, DUAL PPM, SJM, ANES, SK, [id number]    BREAST CYST ASPIRATION           SECTION      COLONOSCOPY N/A 2019    Procedure: COLONOSCOPY;  Surgeon: INGRID Russo MD;  Location: [facility] ENDO ([address] [place] [address]);  Service: Endoscopy;  Laterality: N/A;    HYSTERECTOMY      TONSILLECTOMY       Family History   Problem Relation Age of Onset    Heart disease Mother 55        bypass at 55    Breast cancer Mother     Hypertension Mother     Hyperlipidemia Mother     Heart disease Father     Hypertension Father     Heart disease Brother     Diverticulitis Brother     Diverticulitis Sister     Breast cancer Paternal Grandmother     Colon cancer Neg Hx     Melanoma Neg Hx     Amblyopia Neg Hx     Blindness Neg Hx     Cataracts Neg Hx     Glaucoma Neg Hx     Macular degeneration Neg Hx     Strabismus Neg Hx     Retinal detachment Neg Hx      Social History     Tobacco Use    Smoking status: Never Smoker    Smokeless tobacco: Never Used   Substance Use Topics    Alcohol use: No     Alcohol/week: 0.0 standard drinks     Comment: rarely    Drug use: No     Review of Systems   Constitutional: Positive for activity change and fatigue. Negative for chills and fever.   HENT: Positive for congestion, rhinorrhea and sore throat. Negative for sinus pain and trouble swallowing.    Eyes: Negative for photophobia and visual disturbance.   Respiratory: Positive for cough and shortness of breath. Negative for choking, chest tightness and wheezing.    Cardiovascular: Negative for chest pain, palpitations and leg swelling.   Gastrointestinal: Positive for abdominal pain (mild, mostly on LLQ),  constipation and nausea. Negative for diarrhea and vomiting.   Endocrine: Negative for polydipsia, polyphagia and polyuria.   Genitourinary: Positive for dysuria, frequency and urgency. Negative for decreased urine volume and difficulty urinating.   Musculoskeletal: Negative for arthralgias and back pain.   Skin: Negative for rash and wound.   Neurological: Positive for weakness. Negative for dizziness, seizures, syncope and headaches.   Psychiatric/Behavioral: Negative for agitation and confusion. The patient is nervous/anxious.        Physical Exam     Initial Vitals [03/07/22 0151]   BP Pulse Resp Temp SpO2   129/65 61 18 98.3 °F (36.8 °C) 99 %      MAP       --         Physical Exam    Constitutional: She appears well-developed and well-nourished. No distress.   Patient sitting up in bed, appears anxious, not distressed   HENT:   Head: Normocephalic and atraumatic.   Nose: Nose normal.   Mouth/Throat: No oropharyngeal exudate.   Eyes: Conjunctivae are normal. Right eye exhibits no discharge. Left eye exhibits no discharge.   Neck: Neck supple. No JVD present.   Normal range of motion.  Cardiovascular: Normal rate, regular rhythm and normal heart sounds.   No murmur heard.  Pulmonary/Chest: Breath sounds normal. No respiratory distress. She has no wheezes. She has no rales.   Abdominal: Abdomen is soft. She exhibits no distension. There is abdominal tenderness (slight LLQ TTP).   Musculoskeletal:         General: No tenderness or edema. Normal range of motion.      Cervical back: Normal range of motion and neck supple.     Lymphadenopathy:     She has no cervical adenopathy.   Neurological: She is alert and oriented to person, place, and time. No sensory deficit.   Skin: Skin is warm. No rash noted.   Area under left breast with 2-3 very small flesh-colored papules, without vesicle or erythema  No obvious rash noted  Allodynia to area beneath left breast, axilla and to left scapular area         ED Course    Procedures  Labs Reviewed   CBC W/ AUTO DIFFERENTIAL - Abnormal; Notable for the following components:       Result Value    RDW 14.9 (*)     All other components within normal limits   COMPREHENSIVE METABOLIC PANEL - Abnormal; Notable for the following components:    CO2 20 (*)     All other components within normal limits   URINALYSIS, REFLEX TO URINE CULTURE    Narrative:     Specimen Source->Urine   TROPONIN I   TROPONIN I    Narrative:     Add on Trop Per Dr Akash Aguirre on 3/7/22 @ 0241 Order#[id number]   B-TYPE NATRIURETIC PEPTIDE   B-TYPE NATRIURETIC PEPTIDE          Imaging Results          X-Ray Chest AP Portable (Final result)  Result time 03/07/22 04:06:50    Final result by David Garcia MD (03/07/22 04:06:50)                 Impression:      No detrimental change when compared with 02/21/2022.    Electronically signed by resident: Lopez Jimenez  Date:    03/07/2022  Time:    04:00    Electronically signed by: David Garcia MD  Date:    03/07/2022  Time:    04:06             Narrative:    EXAMINATION:  XR CHEST AP PORTABLE    CLINICAL HISTORY:  Weakness    TECHNIQUE:  Single frontal view of the chest was performed.    COMPARISON:  Chest radiograph 02/21/2022, 02/07/2022.    FINDINGS:  Cardiac wires overlie the thorax.  Left-sided cardiac pacing device with transvenous leads in stable positioning.  The trachea is midline.  Cardiomediastinal silhouette is borderline enlarged but similar to prior study.  There is aortic plaque.  Stable left lower lobe atelectasis.  Questionable trace left pleural effusion.  No sizable pleural effusion.  No confluent consolidation.  Hiatal hernia is present.  Osseous structures appear intact.                                 Medications   LIDOcaine 5 % patch 1 patch (1 patch Transdermal Patch Applied 3/7/22 0302)   lactated ringers bolus 1,000 mL (0 mLs Intravenous Stopped 3/7/22 6008)     Medical Decision Making:   History:   Old Medical Records: I decided to obtain  old medical records.  Initial Assessment:   67 yo female with history of Afib on Flecainide and SSS s/p pacemaker who presents with multiple complaints which include urinary symptoms, nausea, constipation, burning sensation underneath left breast radiating to back, weakness, fatigue.  Differential Diagnosis:   DDx: UTI, constipation, hypercalcemia, shingles, anxiety  Independently Interpreted Test(s):   I have ordered and independently interpreted EKG Reading(s) - see summary below       <> Summary of EKG Reading(s): Normal sinus rhythm, rate 60, pacer spikes apparent, no ischemic changes  Clinical Tests:   Lab Tests: Ordered and Reviewed  Medical Tests: Ordered and Reviewed  ED Management:  Patient presents afebrile and HDS. She appears very anxious, and her symptoms cause her much anxiety.  She recently presented to ED with similar symptoms. W/u at the time was largely negative. She was given IVF and felt better afterwards, so was discharged.  ECG without evidence of ischemia.   Possibility for shingles given burning sensation in dermatomal pattern on left chest to back. Patient has received shingles vaccine. No obvious lesions present or skin changes. Lidocaine patch for analgesia.  Troponin out of precaution given patient states burning pain is deeper than previously; however, very low concern for cardiac origin.  Obtain U/A to evaluate for UTI, however patient currently on Macrobid.   U/A dipstick without evidence of infection.  Basic labs to evaluate for cause of fatigue, weakness, other complaints.  CBC without leukocytosis, anemia.   End of shift. Discussed patient with Dr. Ramon.  Other:   I discussed test(s) with the performing physician.            Attending Attestation:   Physician Attestation Statement for Resident:  As the supervising MD   Physician Attestation Statement: I have personally seen and examined this patient.   I agree with the above history. -: Patient states that she is recovering  "from a UTI, but that she feels dehydrated and generally weak, and feels "too dehydrated to fight the infection".  She also states that since starting antibiotics she has not had any problems with her heart, but does feel a left-sided burning pain. Denies SOB, F/C.    As the supervising MD I agree with the above PE.    As the supervising MD I agree with the above treatment, course, plan, and disposition.   -: Patient is nontoxic appearing, no distress, blood pressure 115/55, mild chest wall tenderness to palpation, but no EKG changes and I do not suspect ACS but will obtain troponin.  Petechial rash on chest wall, patient states chronic.    3:21 AM  No leukocytosis, no anemia, bicarb 20 but otherwise CMP within normal limits, urinalysis negative.  Troponin and IV fluids pending.      Trop negative, pt stable for d/c.    3:45 AM  Patient now complains that when she lays flat she awakens "with a jolt", gasping for air.  Will obtain chest x-ray and BNP although I do not suspect pneumonia and patient does not appear volume overloaded.    4:18 AM  CXR unchanged from prior, no pulm edema.     4:57 AM  BNP neg.  Patient states she sometimes has dysuria when she does not have urinary infection, advised to follow-up with urology.  Stable for d/c, I discussed outpatient follow up and return precautions with pt and answered all questions.    I have reviewed and agree with the residents interpretation of the following: x-rays, lab data and EKG.  I have reviewed the following: old records at this facility.                         Clinical Impression:   Final diagnoses:  [R53.1] Weakness (Primary)  [R30.0] Dysuria          ED Disposition Condition    Discharge Stable        ED Prescriptions     None        Follow-up Information     Follow up With Specialties Details Why Contact Info    Renuka Moreno MD Internal Medicine Schedule an appointment as soon as possible for a visit   [address]  [facility]" [address]  [phone]      [facility] - Emergency Dept Emergency Medicine Go to  As needed, If symptoms worsen [address]  [facility] [id number] [phone]           Jared Darby MD  Resident  03/07/22 0302       Michelle Ramon MD  03/07/22 7094

## 2022-03-07 NOTE — ED NOTES
Discharge instructions, diagnosis, medications, and follow up discussed with patient by provider. Patient verbalized understanding. All questions and concerns answered. No needs expressed at the time. Pt is awake, alert and oriented with no acute distress noted. Respirations even and unlabored. Ambulatory out of ED.

## 2022-03-07 NOTE — TELEPHONE ENCOUNTER
Spoke with pt, she took the last of the antibiotics, she is still feeling tired, SOB and nervous.

## 2022-03-07 NOTE — DISCHARGE INSTRUCTIONS
You were seen in the ED for fatigue and weakness, likely related to your recent UTI. You were given IV fluids. Your labs and imaging were either normal or at baseline. Please follow up with your PCP.  Return to the ED for any concerning symptoms.     Labs Reviewed   CBC W/ AUTO DIFFERENTIAL - Abnormal; Notable for the following components:       Result Value    RDW 14.9 (*)     All other components within normal limits   COMPREHENSIVE METABOLIC PANEL - Abnormal; Notable for the following components:    CO2 20 (*)     All other components within normal limits   URINALYSIS, REFLEX TO URINE CULTURE    Narrative:     Specimen Source->Urine   TROPONIN I   TROPONIN I    Narrative:     Add on Trop Per Dr Akash Aguirre on 3/7/22 @ 0241 Order#[id number]   B-TYPE NATRIURETIC PEPTIDE   B-TYPE NATRIURETIC PEPTIDE    Narrative:     ADD ON BNP PER DR CURLY BURDICK/ORDER# [id number] @ 4:04AM 3/7/2022    Add on Trop Per Dr Akash Aguirre on 3/7/22 @ 0241 Order#[id number]     Imaging Results              X-Ray Chest AP Portable (Final result)  Result time 03/07/22 04:06:50      Final result by David Garcia MD (03/07/22 04:06:50)                   Impression:      No detrimental change when compared with 02/21/2022.    Electronically signed by resident: Lopez Jimenez  Date:    03/07/2022  Time:    04:00    Electronically signed by: David Garcia MD  Date:    03/07/2022  Time:    04:06               Narrative:    EXAMINATION:  XR CHEST AP PORTABLE    CLINICAL HISTORY:  Weakness    TECHNIQUE:  Single frontal view of the chest was performed.    COMPARISON:  Chest radiograph 02/21/2022, 02/07/2022.    FINDINGS:  Cardiac wires overlie the thorax.  Left-sided cardiac pacing device with transvenous leads in stable positioning.  The trachea is midline.  Cardiomediastinal silhouette is borderline enlarged but similar to prior study.  There is aortic plaque.  Stable left lower lobe atelectasis.  Questionable trace left pleural effusion.  No  sizable pleural effusion.  No confluent consolidation.  Hiatal hernia is present.  Osseous structures appear intact.

## 2022-03-08 ENCOUNTER — PATIENT MESSAGE (OUTPATIENT)
Dept: INTERNAL MEDICINE | Facility: CLINIC | Age: 67
End: 2022-03-08
Payer: MEDICARE

## 2022-03-08 DIAGNOSIS — N30.90 CYSTITIS: ICD-10-CM

## 2022-03-08 RX ORDER — NITROFURANTOIN 25; 75 MG/1; MG/1
100 CAPSULE ORAL 2 TIMES DAILY
Qty: 4 CAPSULE | Refills: 0 | Status: SHIPPED | OUTPATIENT
Start: 2022-03-08 | End: 2022-03-10

## 2022-03-08 RX ORDER — NITROFURANTOIN 25; 75 MG/1; MG/1
CAPSULE ORAL
Qty: 10 CAPSULE | OUTPATIENT
Start: 2022-03-08

## 2022-03-09 ENCOUNTER — OFFICE VISIT (OUTPATIENT)
Dept: UROLOGY | Facility: CLINIC | Age: 67
End: 2022-03-09
Payer: MEDICARE

## 2022-03-09 VITALS
DIASTOLIC BLOOD PRESSURE: 83 MMHG | HEIGHT: 61 IN | HEART RATE: 83 BPM | WEIGHT: 121.25 LBS | SYSTOLIC BLOOD PRESSURE: 134 MMHG | BODY MASS INDEX: 22.89 KG/M2

## 2022-03-09 DIAGNOSIS — R30.0 DYSURIA: ICD-10-CM

## 2022-03-09 PROCEDURE — 1125F PR PAIN SEVERITY QUANTIFIED, PAIN PRESENT: ICD-10-PCS | Mod: CPTII,S$GLB,, | Performed by: UROLOGY

## 2022-03-09 PROCEDURE — 1101F PR PT FALLS ASSESS DOC 0-1 FALLS W/OUT INJ PAST YR: ICD-10-PCS | Mod: CPTII,S$GLB,, | Performed by: UROLOGY

## 2022-03-09 PROCEDURE — 3044F HG A1C LEVEL LT 7.0%: CPT | Mod: CPTII,S$GLB,, | Performed by: UROLOGY

## 2022-03-09 PROCEDURE — 1111F DSCHRG MED/CURRENT MED MERGE: CPT | Mod: CPTII,S$GLB,, | Performed by: UROLOGY

## 2022-03-09 PROCEDURE — 3008F PR BODY MASS INDEX (BMI) DOCUMENTED: ICD-10-PCS | Mod: CPTII,S$GLB,, | Performed by: UROLOGY

## 2022-03-09 PROCEDURE — 1160F RVW MEDS BY RX/DR IN RCRD: CPT | Mod: CPTII,S$GLB,, | Performed by: UROLOGY

## 2022-03-09 PROCEDURE — 99999 PR PBB SHADOW E&M-EST. PATIENT-LVL III: ICD-10-PCS | Mod: PBBFAC,,, | Performed by: UROLOGY

## 2022-03-09 PROCEDURE — 1125F AMNT PAIN NOTED PAIN PRSNT: CPT | Mod: CPTII,S$GLB,, | Performed by: UROLOGY

## 2022-03-09 PROCEDURE — 3079F DIAST BP 80-89 MM HG: CPT | Mod: CPTII,S$GLB,, | Performed by: UROLOGY

## 2022-03-09 PROCEDURE — 3044F PR MOST RECENT HEMOGLOBIN A1C LEVEL <7.0%: ICD-10-PCS | Mod: CPTII,S$GLB,, | Performed by: UROLOGY

## 2022-03-09 PROCEDURE — 3288F PR FALLS RISK ASSESSMENT DOCUMENTED: ICD-10-PCS | Mod: CPTII,S$GLB,, | Performed by: UROLOGY

## 2022-03-09 PROCEDURE — 1159F MED LIST DOCD IN RCRD: CPT | Mod: CPTII,S$GLB,, | Performed by: UROLOGY

## 2022-03-09 PROCEDURE — 3288F FALL RISK ASSESSMENT DOCD: CPT | Mod: CPTII,S$GLB,, | Performed by: UROLOGY

## 2022-03-09 PROCEDURE — 1159F PR MEDICATION LIST DOCUMENTED IN MEDICAL RECORD: ICD-10-PCS | Mod: CPTII,S$GLB,, | Performed by: UROLOGY

## 2022-03-09 PROCEDURE — 99204 PR OFFICE/OUTPT VISIT, NEW, LEVL IV, 45-59 MIN: ICD-10-PCS | Mod: S$GLB,,, | Performed by: UROLOGY

## 2022-03-09 PROCEDURE — 99204 OFFICE O/P NEW MOD 45 MIN: CPT | Mod: S$GLB,,, | Performed by: UROLOGY

## 2022-03-09 PROCEDURE — 3075F PR MOST RECENT SYSTOLIC BLOOD PRESS GE 130-139MM HG: ICD-10-PCS | Mod: CPTII,S$GLB,, | Performed by: UROLOGY

## 2022-03-09 PROCEDURE — 1101F PT FALLS ASSESS-DOCD LE1/YR: CPT | Mod: CPTII,S$GLB,, | Performed by: UROLOGY

## 2022-03-09 PROCEDURE — 1160F PR REVIEW ALL MEDS BY PRESCRIBER/CLIN PHARMACIST DOCUMENTED: ICD-10-PCS | Mod: CPTII,S$GLB,, | Performed by: UROLOGY

## 2022-03-09 PROCEDURE — 99999 PR PBB SHADOW E&M-EST. PATIENT-LVL III: CPT | Mod: PBBFAC,,, | Performed by: UROLOGY

## 2022-03-09 PROCEDURE — 3079F PR MOST RECENT DIASTOLIC BLOOD PRESSURE 80-89 MM HG: ICD-10-PCS | Mod: CPTII,S$GLB,, | Performed by: UROLOGY

## 2022-03-09 PROCEDURE — 3008F BODY MASS INDEX DOCD: CPT | Mod: CPTII,S$GLB,, | Performed by: UROLOGY

## 2022-03-09 PROCEDURE — 1111F PR DISCHARGE MEDS RECONCILED W/ CURRENT OUTPATIENT MED LIST: ICD-10-PCS | Mod: CPTII,S$GLB,, | Performed by: UROLOGY

## 2022-03-09 PROCEDURE — 3075F SYST BP GE 130 - 139MM HG: CPT | Mod: CPTII,S$GLB,, | Performed by: UROLOGY

## 2022-03-09 NOTE — PROGRESS NOTES
[facility] Department of Urology      New Recurrent Urinary Tract Infection Note    3/9/2022    Referred by:  Michelle Ramon MD    HPI: Trudi Mcknigth is a very pleasant 66 y.o. female who is a new patient to our department referred for evaluation of recurrent urinary tract infections. She reports that frequent infections began 6 months ago. These episodes are marked by symptoms including dysuria.  Her symptoms typically resolve with antibiotic therapy. The frequency of these episodes is typically every 2-3 months with approximately 3 infections over the last 6 months.  Urine cultures were not available for review. .    Independent of episodes of infection, she reports symptoms of irritative voiding including frequency. She denies symptoms of obstructive voiding including decreased stream, hesitancy, intermittency, post void dribbling and sense of incomplete emptying. Bladder scan PVR was 178mL.  Her history includes no notation of urolithiasis, hematuria, prior pelvic surgery, previous prolapse or incontinence procedures or neurological symptoms/diagnoses. She reports no urinary incontinence.       Previous evaluation for her infections have included no upper or lower tract evaluation. Previous treatments for her recurrent infections have included antibiotics for each infection.     A review of 10+ systems was conducted with pertinent positive and negative findings documented in HPI with all other systems reviewed and negative.    Past medical, family, surgical and social history reviewed as documented in chart with pertinent positive medical, family, surgical and social history detailed in HPI.    Exam Findings:    Const: no acute distress, conversant and alert  Eyes: anicteric, extraocular muscles intact  ENMT: normocephalic, Nl oral membranes  Cardio: no cyanosis, nl cap refill  Pulm: no tachypnea; no resp distress  Abd: soft, no tenderness  Musc: no laceration, no tenderness  Neuro: alert; oriented x 3  Skin:  warm, dry; no petichiae  Psych: no anxiety; normal speech       Assessment/Plan:    Recurrent Urinary Tract Infection (new, addt'l workup): Her history suggests recurrent urinary tract infections based on history of symptoms and response to therapy.  Given her history, I have recommended cystoscopy with no need for additional upper tract evaluation. . We can perform independent flow rate and repeat PVR next visit to further screen for any voiding dysfunction. In the meantime, during her evaluation we can begin measures to prevent further infections.     1. Schedule office cystoscopy  2. Daily probiotics containing Lactobacillus species (e.g. RepHresh Pro-B, probiotic yogurts)  3. D-mannose 1000 mg bid

## 2022-03-10 ENCOUNTER — LAB VISIT (OUTPATIENT)
Dept: LAB | Facility: HOSPITAL | Age: 67
End: 2022-03-10
Attending: INTERNAL MEDICINE
Payer: MEDICARE

## 2022-03-10 ENCOUNTER — OFFICE VISIT (OUTPATIENT)
Dept: INTERNAL MEDICINE | Facility: CLINIC | Age: 67
End: 2022-03-10
Payer: MEDICARE

## 2022-03-10 VITALS
OXYGEN SATURATION: 98 % | HEIGHT: 61 IN | SYSTOLIC BLOOD PRESSURE: 108 MMHG | BODY MASS INDEX: 22.64 KG/M2 | DIASTOLIC BLOOD PRESSURE: 64 MMHG | HEART RATE: 60 BPM | WEIGHT: 119.94 LBS

## 2022-03-10 DIAGNOSIS — I48.91 ATRIAL FIBRILLATION, UNSPECIFIED TYPE: Primary | ICD-10-CM

## 2022-03-10 DIAGNOSIS — G47.00 INSOMNIA, UNSPECIFIED TYPE: ICD-10-CM

## 2022-03-10 DIAGNOSIS — I49.5 SICK SINUS SYNDROME: ICD-10-CM

## 2022-03-10 DIAGNOSIS — E78.00 HYPERCHOLESTEREMIA: ICD-10-CM

## 2022-03-10 LAB
CHOLEST SERPL-MCNC: 161 MG/DL (ref 120–199)
CHOLEST/HDLC SERPL: 3.1 {RATIO} (ref 2–5)
HDLC SERPL-MCNC: 52 MG/DL (ref 40–75)
HDLC SERPL: 32.3 % (ref 20–50)
LDLC SERPL CALC-MCNC: 89.8 MG/DL (ref 63–159)
NONHDLC SERPL-MCNC: 109 MG/DL
TRIGL SERPL-MCNC: 96 MG/DL (ref 30–150)

## 2022-03-10 PROCEDURE — 99213 OFFICE O/P EST LOW 20 MIN: CPT | Mod: S$GLB,,, | Performed by: INTERNAL MEDICINE

## 2022-03-10 PROCEDURE — 3008F PR BODY MASS INDEX (BMI) DOCUMENTED: ICD-10-PCS | Mod: CPTII,S$GLB,, | Performed by: INTERNAL MEDICINE

## 2022-03-10 PROCEDURE — 3078F PR MOST RECENT DIASTOLIC BLOOD PRESSURE < 80 MM HG: ICD-10-PCS | Mod: CPTII,S$GLB,, | Performed by: INTERNAL MEDICINE

## 2022-03-10 PROCEDURE — 3044F PR MOST RECENT HEMOGLOBIN A1C LEVEL <7.0%: ICD-10-PCS | Mod: CPTII,S$GLB,, | Performed by: INTERNAL MEDICINE

## 2022-03-10 PROCEDURE — 99213 PR OFFICE/OUTPT VISIT, EST, LEVL III, 20-29 MIN: ICD-10-PCS | Mod: S$GLB,,, | Performed by: INTERNAL MEDICINE

## 2022-03-10 PROCEDURE — 3074F PR MOST RECENT SYSTOLIC BLOOD PRESSURE < 130 MM HG: ICD-10-PCS | Mod: CPTII,S$GLB,, | Performed by: INTERNAL MEDICINE

## 2022-03-10 PROCEDURE — 1160F RVW MEDS BY RX/DR IN RCRD: CPT | Mod: CPTII,S$GLB,, | Performed by: INTERNAL MEDICINE

## 2022-03-10 PROCEDURE — 80061 LIPID PANEL: CPT | Performed by: INTERNAL MEDICINE

## 2022-03-10 PROCEDURE — 1101F PT FALLS ASSESS-DOCD LE1/YR: CPT | Mod: CPTII,S$GLB,, | Performed by: INTERNAL MEDICINE

## 2022-03-10 PROCEDURE — 99999 PR PBB SHADOW E&M-EST. PATIENT-LVL IV: ICD-10-PCS | Mod: PBBFAC,,, | Performed by: INTERNAL MEDICINE

## 2022-03-10 PROCEDURE — 99999 PR PBB SHADOW E&M-EST. PATIENT-LVL IV: CPT | Mod: PBBFAC,,, | Performed by: INTERNAL MEDICINE

## 2022-03-10 PROCEDURE — 1160F PR REVIEW ALL MEDS BY PRESCRIBER/CLIN PHARMACIST DOCUMENTED: ICD-10-PCS | Mod: CPTII,S$GLB,, | Performed by: INTERNAL MEDICINE

## 2022-03-10 PROCEDURE — 3044F HG A1C LEVEL LT 7.0%: CPT | Mod: CPTII,S$GLB,, | Performed by: INTERNAL MEDICINE

## 2022-03-10 PROCEDURE — 1101F PR PT FALLS ASSESS DOC 0-1 FALLS W/OUT INJ PAST YR: ICD-10-PCS | Mod: CPTII,S$GLB,, | Performed by: INTERNAL MEDICINE

## 2022-03-10 PROCEDURE — 3288F PR FALLS RISK ASSESSMENT DOCUMENTED: ICD-10-PCS | Mod: CPTII,S$GLB,, | Performed by: INTERNAL MEDICINE

## 2022-03-10 PROCEDURE — 3074F SYST BP LT 130 MM HG: CPT | Mod: CPTII,S$GLB,, | Performed by: INTERNAL MEDICINE

## 2022-03-10 PROCEDURE — 36415 COLL VENOUS BLD VENIPUNCTURE: CPT | Mod: PO | Performed by: INTERNAL MEDICINE

## 2022-03-10 PROCEDURE — 3078F DIAST BP <80 MM HG: CPT | Mod: CPTII,S$GLB,, | Performed by: INTERNAL MEDICINE

## 2022-03-10 PROCEDURE — 1159F PR MEDICATION LIST DOCUMENTED IN MEDICAL RECORD: ICD-10-PCS | Mod: CPTII,S$GLB,, | Performed by: INTERNAL MEDICINE

## 2022-03-10 PROCEDURE — 1159F MED LIST DOCD IN RCRD: CPT | Mod: CPTII,S$GLB,, | Performed by: INTERNAL MEDICINE

## 2022-03-10 PROCEDURE — 1125F AMNT PAIN NOTED PAIN PRSNT: CPT | Mod: CPTII,S$GLB,, | Performed by: INTERNAL MEDICINE

## 2022-03-10 PROCEDURE — 3008F BODY MASS INDEX DOCD: CPT | Mod: CPTII,S$GLB,, | Performed by: INTERNAL MEDICINE

## 2022-03-10 PROCEDURE — 1125F PR PAIN SEVERITY QUANTIFIED, PAIN PRESENT: ICD-10-PCS | Mod: CPTII,S$GLB,, | Performed by: INTERNAL MEDICINE

## 2022-03-10 PROCEDURE — 3288F FALL RISK ASSESSMENT DOCD: CPT | Mod: CPTII,S$GLB,, | Performed by: INTERNAL MEDICINE

## 2022-03-10 NOTE — PROGRESS NOTES
"Subjective:       Patient ID: Trudi Mcknight is a 67 y.o. female.    Chief Complaint: Follow-up    HPI   67 y.o. female presents for an Emergency Department follow up visit. I have reviewed ED note as well as all relevant laboratory and pathology results and imaging.     Patient was seen on 3/7/22 for fatigue and intermittent shortness of breath. Treated with IVF. CXR unchanged. Urinalysis negative.     She saw urology yesterday and they recommend cystoscopy, probiotic, d-mannose.   Plans for EP ablation 3/29. Afib on Flecainide and SSS s/p pacemaker   Still having some burning. U/a on 3/7 and yesterday was without infection.    Pantoprazole worsened depressin/anxiety. Better when stopped.   But macrobid caused side effects. Starting to feel better again now that course compelte.  Trouble sleeping, up pacing all night. This was better when she was taking zoloft.    Not taking zoloft now because of also taking flecainide. Will plan to restart after ablation if can get off/decrease flecainide.     Review of Systems   Constitutional: Positive for activity change and unexpected weight change.   HENT: Negative for hearing loss, rhinorrhea and trouble swallowing.    Eyes: Negative for discharge and visual disturbance.   Respiratory: Positive for chest tightness. Negative for wheezing.    Cardiovascular: Positive for chest pain and palpitations.   Gastrointestinal: Positive for constipation. Negative for blood in stool, diarrhea and vomiting.   Endocrine: Negative for polydipsia and polyuria.   Genitourinary: Positive for difficulty urinating and dysuria. Negative for hematuria and menstrual problem.   Musculoskeletal: Negative for arthralgias, joint swelling and neck pain.   Neurological: Negative for weakness and headaches.   Psychiatric/Behavioral: Positive for dysphoric mood. Negative for confusion.       Objective:   /64   Pulse 60   Ht 5' 1" (1.549 m)   Wt 54.4 kg (119 lb 14.9 oz)   SpO2 98%   BMI 22.66 " kg/m²      Physical Exam  Constitutional:       General: She is not in acute distress.     Appearance: She is well-developed. She is not diaphoretic.   HENT:      Head: Normocephalic and atraumatic.   Cardiovascular:      Rate and Rhythm: Normal rate.      Comments: irregular  Pulmonary:      Effort: Pulmonary effort is normal. No respiratory distress.      Breath sounds: No wheezing or rales.   Skin:     General: Skin is warm and dry.   Neurological:      Mental Status: She is alert and oriented to person, place, and time.   Psychiatric:         Behavior: Behavior normal.         Assessment:       1. Atrial fibrillation, unspecified type    2. Sick sinus syndrome    3. Insomnia, unspecified type        Plan:       Trudi was seen today for follow-up.    Diagnoses and all orders for this visit:    Atrial fibrillation, unspecified type  Sick sinus syndrome  Insomnia, unspcified type  Continue current medicationregimen  When possible will restart her zoloft  Cardiac ablation scheduled 3/29

## 2022-03-11 ENCOUNTER — PATIENT MESSAGE (OUTPATIENT)
Dept: ELECTROPHYSIOLOGY | Facility: CLINIC | Age: 67
End: 2022-03-11
Payer: MEDICARE

## 2022-03-14 ENCOUNTER — OFFICE VISIT (OUTPATIENT)
Dept: CARDIOLOGY | Facility: CLINIC | Age: 67
End: 2022-03-14
Payer: MEDICARE

## 2022-03-14 VITALS
SYSTOLIC BLOOD PRESSURE: 133 MMHG | WEIGHT: 121.25 LBS | HEART RATE: 123 BPM | BODY MASS INDEX: 22.89 KG/M2 | DIASTOLIC BLOOD PRESSURE: 80 MMHG | HEIGHT: 61 IN

## 2022-03-14 DIAGNOSIS — I31.39 PERICARDIAL EFFUSION: ICD-10-CM

## 2022-03-14 DIAGNOSIS — R06.09 DOE (DYSPNEA ON EXERTION): Primary | ICD-10-CM

## 2022-03-14 DIAGNOSIS — I48.0 PAROXYSMAL ATRIAL FIBRILLATION: ICD-10-CM

## 2022-03-14 DIAGNOSIS — Z95.0 CARDIAC PACEMAKER IN SITU: ICD-10-CM

## 2022-03-14 PROCEDURE — 3008F BODY MASS INDEX DOCD: CPT | Mod: CPTII,S$GLB,, | Performed by: INTERNAL MEDICINE

## 2022-03-14 PROCEDURE — 3079F DIAST BP 80-89 MM HG: CPT | Mod: CPTII,S$GLB,, | Performed by: INTERNAL MEDICINE

## 2022-03-14 PROCEDURE — 99999 PR PBB SHADOW E&M-EST. PATIENT-LVL III: CPT | Mod: PBBFAC,,, | Performed by: INTERNAL MEDICINE

## 2022-03-14 PROCEDURE — 1101F PT FALLS ASSESS-DOCD LE1/YR: CPT | Mod: CPTII,S$GLB,, | Performed by: INTERNAL MEDICINE

## 2022-03-14 PROCEDURE — 1159F MED LIST DOCD IN RCRD: CPT | Mod: CPTII,S$GLB,, | Performed by: INTERNAL MEDICINE

## 2022-03-14 PROCEDURE — 3075F SYST BP GE 130 - 139MM HG: CPT | Mod: CPTII,S$GLB,, | Performed by: INTERNAL MEDICINE

## 2022-03-14 PROCEDURE — 3288F FALL RISK ASSESSMENT DOCD: CPT | Mod: CPTII,S$GLB,, | Performed by: INTERNAL MEDICINE

## 2022-03-14 PROCEDURE — 1126F PR PAIN SEVERITY QUANTIFIED, NO PAIN PRESENT: ICD-10-PCS | Mod: CPTII,S$GLB,, | Performed by: INTERNAL MEDICINE

## 2022-03-14 PROCEDURE — 99214 OFFICE O/P EST MOD 30 MIN: CPT | Mod: S$GLB,,, | Performed by: INTERNAL MEDICINE

## 2022-03-14 PROCEDURE — 99999 PR PBB SHADOW E&M-EST. PATIENT-LVL III: ICD-10-PCS | Mod: PBBFAC,,, | Performed by: INTERNAL MEDICINE

## 2022-03-14 PROCEDURE — 3044F HG A1C LEVEL LT 7.0%: CPT | Mod: CPTII,S$GLB,, | Performed by: INTERNAL MEDICINE

## 2022-03-14 PROCEDURE — 3008F PR BODY MASS INDEX (BMI) DOCUMENTED: ICD-10-PCS | Mod: CPTII,S$GLB,, | Performed by: INTERNAL MEDICINE

## 2022-03-14 PROCEDURE — 1160F PR REVIEW ALL MEDS BY PRESCRIBER/CLIN PHARMACIST DOCUMENTED: ICD-10-PCS | Mod: CPTII,S$GLB,, | Performed by: INTERNAL MEDICINE

## 2022-03-14 PROCEDURE — 99214 PR OFFICE/OUTPT VISIT, EST, LEVL IV, 30-39 MIN: ICD-10-PCS | Mod: S$GLB,,, | Performed by: INTERNAL MEDICINE

## 2022-03-14 PROCEDURE — 3288F PR FALLS RISK ASSESSMENT DOCUMENTED: ICD-10-PCS | Mod: CPTII,S$GLB,, | Performed by: INTERNAL MEDICINE

## 2022-03-14 PROCEDURE — 3044F PR MOST RECENT HEMOGLOBIN A1C LEVEL <7.0%: ICD-10-PCS | Mod: CPTII,S$GLB,, | Performed by: INTERNAL MEDICINE

## 2022-03-14 PROCEDURE — 1101F PR PT FALLS ASSESS DOC 0-1 FALLS W/OUT INJ PAST YR: ICD-10-PCS | Mod: CPTII,S$GLB,, | Performed by: INTERNAL MEDICINE

## 2022-03-14 PROCEDURE — 1159F PR MEDICATION LIST DOCUMENTED IN MEDICAL RECORD: ICD-10-PCS | Mod: CPTII,S$GLB,, | Performed by: INTERNAL MEDICINE

## 2022-03-14 PROCEDURE — 3075F PR MOST RECENT SYSTOLIC BLOOD PRESS GE 130-139MM HG: ICD-10-PCS | Mod: CPTII,S$GLB,, | Performed by: INTERNAL MEDICINE

## 2022-03-14 PROCEDURE — 1126F AMNT PAIN NOTED NONE PRSNT: CPT | Mod: CPTII,S$GLB,, | Performed by: INTERNAL MEDICINE

## 2022-03-14 PROCEDURE — 1160F RVW MEDS BY RX/DR IN RCRD: CPT | Mod: CPTII,S$GLB,, | Performed by: INTERNAL MEDICINE

## 2022-03-14 PROCEDURE — 3079F PR MOST RECENT DIASTOLIC BLOOD PRESSURE 80-89 MM HG: ICD-10-PCS | Mod: CPTII,S$GLB,, | Performed by: INTERNAL MEDICINE

## 2022-03-14 NOTE — PROGRESS NOTES
"Subjective:   Patient ID:  Trudi Mcknight is a 67 y.o. female who presents for follow-up of Follow-up    Symptomatic Bradycardia s/p Dual chamber pacemaker  Small to moderate pericardial effusion post procedure found incidentally  AF with RVR intermittently  Hiatal Hernia  GERD     Echo 01/2022  · Technically challenging study.  · The left ventricle is normal in size with normal systolic function.  · The estimated ejection fraction is 65%.  · Normal left ventricular diastolic function.  · Normal right ventricular size with normal right ventricular systolic function.     HPI:   Patient is c/o chest pain that radiates to the neck and the arm.  Pain comes on a couple of hours after eating, Much worse on lying.   Non smoker  Mother had CABG at 65. Dad has AF. Younger brother has a pacemaker has congential heart disease. Older brother passed away at 60 from heart disease.   She has gastritis and esophagitis and still taking omeprazole.  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 Her HRs have been above 100-135 in prior EKGs. She has been prescribed Diltiazem q8h by EP.         The 10-year ASCVD risk score ([place] TOMASA Jr., et al., 2013) is: 6.5%    Values used to calculate the score:      Age: 66 years      Sex: Female      Is Non- : No      Diabetic: No      Tobacco smoker: No      Systolic Blood Pressure: 133 mmHg      Is BP treated: No      HDL Cholesterol: 46 mg/dL      Total Cholesterol: 166 mg/dL     HPI:   Patient has been experiencing chest pain and dizzy in November and was   In jan 2nd she was very dizzy presyncopal and was very dizzy, she was prescribed MTP and diagnosed with AF. She feels bad with Metoprolol.  She could not tolerate beta blocker and had a sinus pause  For which she underwent pacemaker implantation.   She has pacemaker 1/20 and xeralto 1/26. " "  Patient appears to be very winded  She took diltiazem last Sunday she took the whole tab of 90 mg Cardizem  Before /73 HR 99 and her BP dropped to 83 mmHg and HR to 63 bpm.       HPI:   "I Can't go on with this medicine (FLEICANIDE), it gives me anxiety and makes me depressed".  No chest pain, Orthopnea, PND of heart failure symptoms.   MARLEY.   Denies palpitations or fluttering in the chest  Repeat pulse in the office is  100 bpm irregular. She has stopped Digoxin and also not taking lasix.      Echo 02/2022  · The left ventricle is normal in size with normal systolic function.  · The estimated ejection fraction is 60%.  · Normal left ventricular diastolic function.  · Normal right ventricular size with normal right ventricular systolic function.  · Mild tricuspid regurgitation.  · Normal central venous pressure (3 mmHg).  · The estimated PA systolic pressure is 27 mmHg.  · Small-moderate pericardial effusion  · Effusion appears roughly similar to prior, however heart has shifted somewhat posteriorly, so anterior effusion slightly larger, posterior effusion slightly smaller.        Patient Active Problem List   Diagnosis    Esophagitis    History of gastritis    Screening for malignant neoplasm of colon    Paroxysmal atrial fibrillation    Neuralgia and neuritis, unspecified    Hiatal hernia with GERD and esophagitis    Disorder of thyroid, unspecified    Paroxysmal atrial fibrillation with RVR    Chest pain    Sick sinus syndrome    Bilateral pleural effusion    Acute on chronic diastolic CHF (congestive heart failure)    Typical atrial flutter     /80 (BP Location: Left arm, Patient Position: Sitting, BP Method: Medium (Automatic))   Pulse (!) 123   Ht 5' 1" (1.549 m)   Wt 55 kg (121 lb 4.1 oz)   BMI 22.91 kg/m²   Body mass index is 22.91 kg/m².  CrCl cannot be calculated (Patient's most recent lab result is older than the maximum 7 days allowed.).    Lab Results   Component Value " Date     03/07/2022    K 3.6 03/07/2022     03/07/2022    CO2 20 (L) 03/07/2022    BUN 12 03/07/2022    CREATININE 0.7 03/07/2022    GLU 97 03/07/2022    HGBA1C 5.5 01/20/2022    MG 2.1 02/07/2022    AST 14 03/07/2022    ALT 11 03/07/2022    ALBUMIN 3.5 03/07/2022    PROT 6.6 03/07/2022    BILITOT 0.6 03/07/2022    WBC 6.34 03/07/2022    HGB 12.3 03/07/2022    HCT 37.5 03/07/2022    HCT 43 01/02/2022    MCV 85 03/07/2022     03/07/2022    TSH 1.295 02/04/2022    CHOL 161 03/10/2022    HDL 52 03/10/2022    LDLCALC 89.8 03/10/2022    TRIG 96 03/10/2022       Current Outpatient Medications   Medication Sig    flecainide (TAMBOCOR) 100 MG Tab Take 1 tablet (100 mg total) by mouth every 12 (twelve) hours.    rivaroxaban (XARELTO) 20 mg Tab Take 1 tablet (20 mg total) by mouth daily with dinner or evening meal.    FLUZONE HIGHDOSE QUAD 21-22  mcg/0.7 mL Syrg     polyethylene glycol (GLYCOLAX) 17 gram PwPk Take 17 g by mouth once as needed (Constipation).     No current facility-administered medications for this visit.       Review of Systems   Constitutional: Negative for chills, decreased appetite, malaise/fatigue, night sweats, weight gain and weight loss.   Eyes: Negative for blurred vision, double vision, visual disturbance and visual halos.   Cardiovascular: Positive for dyspnea on exertion. Negative for chest pain, claudication, cyanosis, irregular heartbeat, leg swelling, near-syncope, orthopnea, palpitations, paroxysmal nocturnal dyspnea and syncope.   Respiratory: Negative for cough, hemoptysis, snoring, sputum production and wheezing.    Endocrine: Negative for cold intolerance, heat intolerance, polydipsia and polyphagia.   Hematologic/Lymphatic: Negative for adenopathy and bleeding problem. Does not bruise/bleed easily.   Skin: Negative for flushing, itching, poor wound healing and rash.   Musculoskeletal: Negative for arthritis, back pain, falls, gout, joint pain, joint swelling,  muscle cramps, muscle weakness, myalgias, neck pain and stiffness.   Gastrointestinal: Negative for bloating, abdominal pain, anorexia, diarrhea, dysphagia, excessive appetite, flatus, hematemesis, jaundice, melena and nausea.   Genitourinary: Negative for hesitancy and incomplete emptying.   Neurological: Negative for aphonia, brief paralysis, difficulty with concentration, disturbances in coordination, excessive daytime sleepiness, dizziness, focal weakness, light-headedness, loss of balance and weakness.   Psychiatric/Behavioral: Negative for altered mental status, depression, hallucinations, hypervigilance, memory loss, substance abuse and suicidal ideas. The patient does not have insomnia and is not nervous/anxious.        Objective:   Physical Exam  Constitutional:       General: She is not in acute distress.     Appearance: She is well-developed. She is not diaphoretic.   HENT:      Head: Normocephalic and atraumatic.      Nose: Nose normal.      Mouth/Throat:      Pharynx: No oropharyngeal exudate.   Eyes:      General: No scleral icterus.        Right eye: No discharge.         Left eye: No discharge.      Conjunctiva/sclera: Conjunctivae normal.      Pupils: Pupils are equal, round, and reactive to light.   Neck:      Thyroid: No thyromegaly.      Vascular: No JVD.      Trachea: No tracheal deviation.   Cardiovascular:      Rate and Rhythm: Normal rate and regular rhythm.      Pulses: Intact distal pulses.      Heart sounds: Normal heart sounds. No murmur heard.    No friction rub. No gallop.   Pulmonary:      Effort: Pulmonary effort is normal. No respiratory distress.      Breath sounds: Normal breath sounds. No stridor. No wheezing or rales.   Chest:      Chest wall: No tenderness.   Abdominal:      General: Bowel sounds are normal. There is no distension.      Palpations: Abdomen is soft. There is no mass.      Tenderness: There is no abdominal tenderness. There is no guarding or rebound.    Musculoskeletal:         General: No tenderness. Normal range of motion.      Cervical back: Normal range of motion and neck supple.   Lymphadenopathy:      Cervical: No cervical adenopathy.   Skin:     General: Skin is warm.      Coloration: Skin is not pale.      Findings: No erythema or rash.   Neurological:      Mental Status: She is alert and oriented to person, place, and time.      Cranial Nerves: No cranial nerve deficit.      Motor: No abnormal muscle tone.      Coordination: Coordination normal.      Deep Tendon Reflexes: Reflexes are normal and symmetric.   Psychiatric:         Behavior: Behavior normal.         Thought Content: Thought content normal.         Judgment: Judgment normal.         Assessment:     1. MARLEY (dyspnea on exertion)    2. Paroxysmal atrial fibrillation    3. Pericardial effusion    4. Cardiac pacemaker in situ        Plan:     Repeat Echo appears Unchanged re pericardial effusion (small to moderate) without hemodynamic compromise. On ambulation her oxygen levels are unchanged (98%). Her SOB is most likely related to her bouts of tachycardia. She is taking fleicanide for now. If she gets PND recommend taking lasix then.   Re limited Echo for echo before scheduled ablation.   Trudi was seen today for follow-up.    Diagnoses and all orders for this visit:    MARLEY (dyspnea on exertion)    Paroxysmal atrial fibrillation    Pericardial effusion  -     Echo Saline Bubble? No    Cardiac pacemaker in situ

## 2022-03-15 ENCOUNTER — HOSPITAL ENCOUNTER (OUTPATIENT)
Dept: RADIOLOGY | Facility: HOSPITAL | Age: 67
Discharge: HOME | End: 2022-03-15
Attending: INTERNAL MEDICINE
Payer: MEDICARE

## 2022-03-15 ENCOUNTER — TELEPHONE (OUTPATIENT)
Dept: CARDIOLOGY | Facility: CLINIC | Age: 67
End: 2022-03-15
Payer: MEDICARE

## 2022-03-15 DIAGNOSIS — I49.8 OTHER SPECIFIED CARDIAC ARRHYTHMIAS: ICD-10-CM

## 2022-03-15 DIAGNOSIS — I48.0 PAROXYSMAL ATRIAL FIBRILLATION: ICD-10-CM

## 2022-03-15 PROCEDURE — 71275 CT ANGIOGRAPHY CHEST: CPT | Mod: 26,,, | Performed by: RADIOLOGY

## 2022-03-15 PROCEDURE — 25500020 PHARM REV CODE 255: Performed by: INTERNAL MEDICINE

## 2022-03-15 PROCEDURE — 71275 CTA CHEST NON CORONARY: ICD-10-PCS | Mod: 26,,, | Performed by: RADIOLOGY

## 2022-03-15 PROCEDURE — 71275 CT ANGIOGRAPHY CHEST: CPT | Mod: TC

## 2022-03-15 RX ADMIN — IOHEXOL 75 ML: 350 INJECTION, SOLUTION INTRAVENOUS at 09:03

## 2022-03-16 ENCOUNTER — TELEPHONE (OUTPATIENT)
Dept: ELECTROPHYSIOLOGY | Facility: CLINIC | Age: 67
End: 2022-03-16
Payer: MEDICARE

## 2022-03-16 ENCOUNTER — PATIENT MESSAGE (OUTPATIENT)
Dept: PULMONOLOGY | Facility: CLINIC | Age: 67
End: 2022-03-16
Payer: MEDICARE

## 2022-03-16 NOTE — TELEPHONE ENCOUNTER
Spoke with patient and relayed results/recommendations of CT Scan, per Dr Duncan's note:    Please relay results of stable 3 mm micronodule.  If low risk >> no further work-up indicated. If not low risk >> refer to Pulmonary    Patient states she has never smoked but was raised in a household of heavy smokers. She does see Dr Joselin Rosen in Pulmonology here at [facility]. She will call to have her look at CT Scan and make recommendations.

## 2022-03-16 NOTE — TELEPHONE ENCOUNTER
----- Message from Ernesto Duncan MD sent at 3/16/2022  7:17 AM CDT -----  Please relay results of stable 3 mm micronodule.  If low risk >> no further work-up indicated. If not low risk >> refer to Pulmonary

## 2022-03-17 ENCOUNTER — TELEPHONE (OUTPATIENT)
Dept: CARDIOLOGY | Facility: HOSPITAL | Age: 67
End: 2022-03-17
Payer: MEDICARE

## 2022-03-17 ENCOUNTER — PATIENT MESSAGE (OUTPATIENT)
Dept: ELECTROPHYSIOLOGY | Facility: CLINIC | Age: 67
End: 2022-03-17
Payer: MEDICARE

## 2022-03-17 NOTE — TELEPHONE ENCOUNTER
Discussed with Dr Duncan and advised of transmission findings as well. He wants to offer Amiodarone in place of Flecainide. She would need a 3 day washout prior to starting the Amio. Patient states she has no interest in trying the Amiodarone. She has read about it and is scared of the side effects. She also does not want to stop the Flecainide for 3 days. She really feels like this was precipitated by the GERD. She has trouble tolerating PPI's as well and is currently on esomazole. She has had side effects to pantoprazole and others.   She wants to stay on Flecainide and will continue to keep us posted. I, once again, emphasized the need to go to ER for HR's of 160 and crushing chest pain.

## 2022-03-17 NOTE — TELEPHONE ENCOUNTER
Patient stopped her digoxin due to side effects. She also continues to self adjust her flecainide (sometimes takes 100mg, 125mg, or 150mg) depending on whether or not she is in AF. Will discuss with Dr Duncan.

## 2022-03-17 NOTE — TELEPHONE ENCOUNTER
Can you please take a look at her transmissions? She states she had AF last night with HR in the 160's.  thanks

## 2022-03-17 NOTE — TELEPHONE ENCOUNTER
"Contacted the pt on this am in relation to a message received via the pt portal. (please see pt portal message from this am). Informed the pt there were 6 HVR recorded events that occurred on 3/16/22.  Reviewed the recorded time of events with the pt who stated this did not correlate with the time (~ before 11:00 pm) she stated she was having the "really, really bad chest pain" and a HR of 160 bpm.  The manual transmission received on this am @ 10:32 am, with a presenting rhythm As/Vs. AF burden 10% with V rates 73% > than 110 bpm.  Pt stated she keeps a record of the date and time of any fast/irregular heart beats and her symptoms.  Pt is asymptomatic on today.  Reiterated to the pt the instructions she was given by Dr. Duncan's RN that is she is to go to the ED anytime she has any type of immense CP especially if she also feels that her HR is in the 160's.  Informed the pt that this information would be sent to Dr. Duncan's RN.  Understanding was verbalized.  Pt appreciated the call.     Message sent to Dr. Duncan's RN via [employer].   "

## 2022-03-18 ENCOUNTER — HOSPITAL ENCOUNTER (OUTPATIENT)
Facility: HOSPITAL | Age: 67
Discharge: HOME | End: 2022-03-19
Attending: EMERGENCY MEDICINE | Admitting: EMERGENCY MEDICINE
Payer: MEDICARE

## 2022-03-18 DIAGNOSIS — I48.91 ATRIAL FIBRILLATION WITH RVR: ICD-10-CM

## 2022-03-18 DIAGNOSIS — R10.32 LEFT LOWER QUADRANT ABDOMINAL PAIN: ICD-10-CM

## 2022-03-18 DIAGNOSIS — I49.5 SICK SINUS SYNDROME: ICD-10-CM

## 2022-03-18 DIAGNOSIS — I48.0 PAROXYSMAL ATRIAL FIBRILLATION WITH RVR: ICD-10-CM

## 2022-03-18 DIAGNOSIS — R07.9 CHEST PAIN: Primary | ICD-10-CM

## 2022-03-18 DIAGNOSIS — R07.9 CHEST PAIN, UNSPECIFIED TYPE: ICD-10-CM

## 2022-03-18 DIAGNOSIS — K21.00 HIATAL HERNIA WITH GERD AND ESOPHAGITIS: ICD-10-CM

## 2022-03-18 DIAGNOSIS — M54.9 LEFT-SIDED BACK PAIN, UNSPECIFIED BACK LOCATION, UNSPECIFIED CHRONICITY: ICD-10-CM

## 2022-03-18 DIAGNOSIS — K44.9 HIATAL HERNIA WITH GERD AND ESOPHAGITIS: ICD-10-CM

## 2022-03-18 PROBLEM — R10.9 LEFT FLANK PAIN: Status: ACTIVE | Noted: 2022-03-18

## 2022-03-18 PROBLEM — Z95.0 S/P PLACEMENT OF CARDIAC PACEMAKER: Status: ACTIVE | Noted: 2022-03-18

## 2022-03-18 PROBLEM — I50.33 ACUTE ON CHRONIC DIASTOLIC CHF (CONGESTIVE HEART FAILURE): Status: RESOLVED | Noted: 2022-02-04 | Resolved: 2022-03-18

## 2022-03-18 LAB
ALBUMIN SERPL BCP-MCNC: 3.8 G/DL (ref 3.5–5.2)
ALP SERPL-CCNC: 77 U/L (ref 55–135)
ALT SERPL W/O P-5'-P-CCNC: 14 U/L (ref 10–44)
ANION GAP SERPL CALC-SCNC: 11 MMOL/L (ref 8–16)
AST SERPL-CCNC: 17 U/L (ref 10–40)
BASOPHILS # BLD AUTO: 0.04 K/UL (ref 0–0.2)
BASOPHILS NFR BLD: 0.4 % (ref 0–1.9)
BILIRUB SERPL-MCNC: 0.6 MG/DL (ref 0.1–1)
BILIRUB UR QL STRIP: NEGATIVE
BNP SERPL-MCNC: 38 PG/ML (ref 0–99)
BUN SERPL-MCNC: 17 MG/DL (ref 8–23)
CALCIUM SERPL-MCNC: 9.8 MG/DL (ref 8.7–10.5)
CHLORIDE SERPL-SCNC: 108 MMOL/L (ref 95–110)
CLARITY UR REFRACT.AUTO: CLEAR
CO2 SERPL-SCNC: 21 MMOL/L (ref 23–29)
COLOR UR AUTO: COLORLESS
CREAT SERPL-MCNC: 0.9 MG/DL (ref 0.5–1.4)
DIFFERENTIAL METHOD: ABNORMAL
DIGOXIN SERPL-MCNC: <0.2 NG/ML (ref 0.8–2)
EOSINOPHIL # BLD AUTO: 0.1 K/UL (ref 0–0.5)
EOSINOPHIL NFR BLD: 1.3 % (ref 0–8)
ERYTHROCYTE [DISTWIDTH] IN BLOOD BY AUTOMATED COUNT: 15.2 % (ref 11.5–14.5)
EST. GFR  (AFRICAN AMERICAN): >60 ML/MIN/1.73 M^2
EST. GFR  (NON AFRICAN AMERICAN): >60 ML/MIN/1.73 M^2
GLUCOSE SERPL-MCNC: 94 MG/DL (ref 70–110)
GLUCOSE UR QL STRIP: NEGATIVE
HCT VFR BLD AUTO: 41.9 % (ref 37–48.5)
HGB BLD-MCNC: 13.9 G/DL (ref 12–16)
HGB UR QL STRIP: NEGATIVE
IMM GRANULOCYTES # BLD AUTO: 0.02 K/UL (ref 0–0.04)
IMM GRANULOCYTES NFR BLD AUTO: 0.2 % (ref 0–0.5)
KETONES UR QL STRIP: ABNORMAL
LEUKOCYTE ESTERASE UR QL STRIP: NEGATIVE
LYMPHOCYTES # BLD AUTO: 1.8 K/UL (ref 1–4.8)
LYMPHOCYTES NFR BLD: 19.5 % (ref 18–48)
MAGNESIUM SERPL-MCNC: 2.1 MG/DL (ref 1.6–2.6)
MCH RBC QN AUTO: 28.4 PG (ref 27–31)
MCHC RBC AUTO-ENTMCNC: 33.2 G/DL (ref 32–36)
MCV RBC AUTO: 86 FL (ref 82–98)
MONOCYTES # BLD AUTO: 0.7 K/UL (ref 0.3–1)
MONOCYTES NFR BLD: 8.1 % (ref 4–15)
NEUTROPHILS # BLD AUTO: 6.3 K/UL (ref 1.8–7.7)
NEUTROPHILS NFR BLD: 70.5 % (ref 38–73)
NITRITE UR QL STRIP: NEGATIVE
NRBC BLD-RTO: 0 /100 WBC
PH UR STRIP: 5 [PH] (ref 5–8)
PHOSPHATE SERPL-MCNC: 2.6 MG/DL (ref 2.7–4.5)
PLATELET # BLD AUTO: 334 K/UL (ref 150–450)
PMV BLD AUTO: 10.4 FL (ref 9.2–12.9)
POTASSIUM SERPL-SCNC: 3.5 MMOL/L (ref 3.5–5.1)
PROT SERPL-MCNC: 7.1 G/DL (ref 6–8.4)
PROT UR QL STRIP: NEGATIVE
RBC # BLD AUTO: 4.9 M/UL (ref 4–5.4)
SODIUM SERPL-SCNC: 140 MMOL/L (ref 136–145)
SP GR UR STRIP: 1 (ref 1–1.03)
TROPONIN I SERPL DL<=0.01 NG/ML-MCNC: <0.006 NG/ML (ref 0–0.03)
URN SPEC COLLECT METH UR: ABNORMAL
WBC # BLD AUTO: 8.99 K/UL (ref 3.9–12.7)

## 2022-03-18 PROCEDURE — 25000003 PHARM REV CODE 250

## 2022-03-18 PROCEDURE — 99291 CRITICAL CARE FIRST HOUR: CPT | Mod: 25

## 2022-03-18 PROCEDURE — 99291 PR CRITICAL CARE, E/M 30-74 MINUTES: ICD-10-PCS | Mod: ,,, | Performed by: EMERGENCY MEDICINE

## 2022-03-18 PROCEDURE — 84100 ASSAY OF PHOSPHORUS: CPT

## 2022-03-18 PROCEDURE — 80162 ASSAY OF DIGOXIN TOTAL: CPT | Performed by: EMERGENCY MEDICINE

## 2022-03-18 PROCEDURE — 83735 ASSAY OF MAGNESIUM: CPT

## 2022-03-18 PROCEDURE — 93005 ELECTROCARDIOGRAM TRACING: CPT

## 2022-03-18 PROCEDURE — G0378 HOSPITAL OBSERVATION PER HR: HCPCS

## 2022-03-18 PROCEDURE — 93010 EKG 12-LEAD: ICD-10-PCS | Mod: ,,, | Performed by: INTERNAL MEDICINE

## 2022-03-18 PROCEDURE — 99291 CRITICAL CARE FIRST HOUR: CPT | Mod: ,,, | Performed by: EMERGENCY MEDICINE

## 2022-03-18 PROCEDURE — 80053 COMPREHEN METABOLIC PANEL: CPT

## 2022-03-18 PROCEDURE — 99900035 HC TECH TIME PER 15 MIN (STAT)

## 2022-03-18 PROCEDURE — 99220 PR INITIAL OBSERVATION CARE,LEVL III: ICD-10-PCS | Mod: ,,,

## 2022-03-18 PROCEDURE — 93010 ELECTROCARDIOGRAM REPORT: CPT | Mod: ,,, | Performed by: INTERNAL MEDICINE

## 2022-03-18 PROCEDURE — 99220 PR INITIAL OBSERVATION CARE,LEVL III: CPT | Mod: ,,,

## 2022-03-18 PROCEDURE — 84484 ASSAY OF TROPONIN QUANT: CPT

## 2022-03-18 PROCEDURE — 94761 N-INVAS EAR/PLS OXIMETRY MLT: CPT

## 2022-03-18 PROCEDURE — 83880 ASSAY OF NATRIURETIC PEPTIDE: CPT

## 2022-03-18 PROCEDURE — 81003 URINALYSIS AUTO W/O SCOPE: CPT | Performed by: EMERGENCY MEDICINE

## 2022-03-18 PROCEDURE — 85025 COMPLETE CBC W/AUTO DIFF WBC: CPT

## 2022-03-18 RX ORDER — MAG HYDROX/ALUMINUM HYD/SIMETH 200-200-20
30 SUSPENSION, ORAL (FINAL DOSE FORM) ORAL 4 TIMES DAILY PRN
Status: DISCONTINUED | OUTPATIENT
Start: 2022-03-18 | End: 2022-03-19 | Stop reason: HOSPADM

## 2022-03-18 RX ORDER — PROCHLORPERAZINE EDISYLATE 5 MG/ML
5 INJECTION INTRAMUSCULAR; INTRAVENOUS EVERY 6 HOURS PRN
Status: DISCONTINUED | OUTPATIENT
Start: 2022-03-18 | End: 2022-03-19 | Stop reason: HOSPADM

## 2022-03-18 RX ORDER — IBUPROFEN 200 MG
24 TABLET ORAL
Status: DISCONTINUED | OUTPATIENT
Start: 2022-03-18 | End: 2022-03-19 | Stop reason: HOSPADM

## 2022-03-18 RX ORDER — POLYETHYLENE GLYCOL 3350 17 G/17G
17 POWDER, FOR SOLUTION ORAL DAILY PRN
Status: DISCONTINUED | OUTPATIENT
Start: 2022-03-19 | End: 2022-03-19 | Stop reason: HOSPADM

## 2022-03-18 RX ORDER — IPRATROPIUM BROMIDE AND ALBUTEROL SULFATE 2.5; .5 MG/3ML; MG/3ML
3 SOLUTION RESPIRATORY (INHALATION) EVERY 4 HOURS PRN
Status: DISCONTINUED | OUTPATIENT
Start: 2022-03-18 | End: 2022-03-19

## 2022-03-18 RX ORDER — METOPROLOL TARTRATE 1 MG/ML
2.5 INJECTION, SOLUTION INTRAVENOUS
Status: DISCONTINUED | OUTPATIENT
Start: 2022-03-18 | End: 2022-03-18

## 2022-03-18 RX ORDER — POLYETHYLENE GLYCOL 3350 17 G/17G
17 POWDER, FOR SOLUTION ORAL DAILY
Status: DISCONTINUED | OUTPATIENT
Start: 2022-03-19 | End: 2022-03-18

## 2022-03-18 RX ORDER — FLECAINIDE ACETATE 50 MG/1
150 TABLET ORAL
Status: COMPLETED | OUTPATIENT
Start: 2022-03-18 | End: 2022-03-18

## 2022-03-18 RX ORDER — DIGOXIN 125 MCG
0.12 TABLET ORAL
Status: DISCONTINUED | OUTPATIENT
Start: 2022-03-19 | End: 2022-03-19 | Stop reason: HOSPADM

## 2022-03-18 RX ORDER — NALOXONE HCL 0.4 MG/ML
0.02 VIAL (ML) INJECTION
Status: DISCONTINUED | OUTPATIENT
Start: 2022-03-18 | End: 2022-03-19 | Stop reason: HOSPADM

## 2022-03-18 RX ORDER — GLUCAGON 1 MG
1 KIT INJECTION
Status: DISCONTINUED | OUTPATIENT
Start: 2022-03-18 | End: 2022-03-19 | Stop reason: HOSPADM

## 2022-03-18 RX ORDER — ACETAMINOPHEN 325 MG/1
650 TABLET ORAL EVERY 6 HOURS PRN
Status: DISCONTINUED | OUTPATIENT
Start: 2022-03-18 | End: 2022-03-19 | Stop reason: HOSPADM

## 2022-03-18 RX ORDER — SODIUM CHLORIDE 0.9 % (FLUSH) 0.9 %
10 SYRINGE (ML) INJECTION EVERY 8 HOURS PRN
Status: DISCONTINUED | OUTPATIENT
Start: 2022-03-18 | End: 2022-03-19 | Stop reason: HOSPADM

## 2022-03-18 RX ORDER — ONDANSETRON 8 MG/1
8 TABLET, ORALLY DISINTEGRATING ORAL EVERY 8 HOURS PRN
Status: DISCONTINUED | OUTPATIENT
Start: 2022-03-18 | End: 2022-03-19 | Stop reason: HOSPADM

## 2022-03-18 RX ORDER — IBUPROFEN 200 MG
16 TABLET ORAL
Status: DISCONTINUED | OUTPATIENT
Start: 2022-03-18 | End: 2022-03-19 | Stop reason: HOSPADM

## 2022-03-18 RX ORDER — METOPROLOL TARTRATE 1 MG/ML
5 INJECTION, SOLUTION INTRAVENOUS EVERY 5 MIN PRN
Status: DISCONTINUED | OUTPATIENT
Start: 2022-03-18 | End: 2022-03-18

## 2022-03-18 RX ADMIN — FLECAINIDE ACETATE 150 MG: 50 TABLET ORAL at 10:03

## 2022-03-19 VITALS
BODY MASS INDEX: 22.43 KG/M2 | DIASTOLIC BLOOD PRESSURE: 57 MMHG | RESPIRATION RATE: 16 BRPM | HEIGHT: 61 IN | HEART RATE: 60 BPM | OXYGEN SATURATION: 99 % | TEMPERATURE: 99 F | WEIGHT: 118.81 LBS | SYSTOLIC BLOOD PRESSURE: 121 MMHG

## 2022-03-19 LAB — TROPONIN I SERPL DL<=0.01 NG/ML-MCNC: <0.006 NG/ML (ref 0–0.03)

## 2022-03-19 PROCEDURE — 99214 PR OFFICE/OUTPT VISIT, EST, LEVL IV, 30-39 MIN: ICD-10-PCS | Mod: 25,,, | Performed by: INTERNAL MEDICINE

## 2022-03-19 PROCEDURE — G0378 HOSPITAL OBSERVATION PER HR: HCPCS

## 2022-03-19 PROCEDURE — 25000003 PHARM REV CODE 250

## 2022-03-19 PROCEDURE — 36415 COLL VENOUS BLD VENIPUNCTURE: CPT

## 2022-03-19 PROCEDURE — 99217 PR OBSERVATION CARE DISCHARGE: CPT | Mod: ,,, | Performed by: PHYSICIAN ASSISTANT

## 2022-03-19 PROCEDURE — 94760 N-INVAS EAR/PLS OXIMETRY 1: CPT

## 2022-03-19 PROCEDURE — 99214 OFFICE O/P EST MOD 30 MIN: CPT | Mod: 25,,, | Performed by: INTERNAL MEDICINE

## 2022-03-19 PROCEDURE — 99217 PR OBSERVATION CARE DISCHARGE: ICD-10-PCS | Mod: ,,, | Performed by: PHYSICIAN ASSISTANT

## 2022-03-19 PROCEDURE — 84484 ASSAY OF TROPONIN QUANT: CPT

## 2022-03-19 RX ORDER — FLECAINIDE ACETATE 100 MG/1
125 TABLET ORAL EVERY 12 HOURS
Qty: 60 TABLET | Refills: 1 | Status: ON HOLD
Start: 2022-03-19 | End: 2022-03-29 | Stop reason: SDUPTHER

## 2022-03-19 RX ADMIN — DIGOXIN 0.12 MG: 125 TABLET ORAL at 09:03

## 2022-03-19 RX ADMIN — FLECAINIDE ACETATE 150 MG: 100 TABLET ORAL at 09:03

## 2022-03-19 NOTE — H&P
[facility] - Cardiology [facility] Medicine  History & Physical    Patient Name: Trudi Mcknight  MRN: [id number]  Patient Class: OP- Observation  Admission Date: 3/18/2022  Attending Physician: Eugenio Knight MD   Primary Care Provider: Renuka Moreno MD         Patient information was obtained from patient and ER records.     Subjective:     Principal Problem:Paroxysmal atrial fibrillation with RVR    Chief Complaint:   Chief Complaint   Patient presents with    Chest Pain     Cp x 30 min, hx of afib, having palpitations        HPI: Trudi Mcknight is a 68 yo woman with sinus pauses and bradycardia s/p DC-PPM on 1/20/2022, atrial flutter and paroxysmal atrial fibrillation on chronic anticoagulation, and hiatal hernia who presents to the ED out of concern for afib with RVR. The patient reports chest pain, abdominal cramping, and flank pain that began this afternoon. When she checked her HR it was elevated to 140, prompting her to come to the ED. She has been taking flecainide 125 BID, and started back taking digoxin 0.125 q48 hours yesterday after being told to restart it by her cardiologist. She reported being compliant with flecainide, digoxin, and xarelto. She endorses shortness of breath, palpitations, fatigue, nausea, chest pain, headache, abdominal and bilateral flank pain. She denies any fever, chills, productive cough, vomiting, diarrhea, bleeding, myalgias, or dizziness.    In the ED, the patient is afebrile without leukocytosis. Initially tachycardic to 121 bpm max, but since resolved and stable in 60's. RR 20. Vitals otherwise stable. Labs unremarkable. BNP and trop wnl. CXR reveals stable hiatal hernia, no acute cardiopulmonary process. UA non-infectious. EP was consulted to perform a device check, which confirmed afib with RVR. Heart rate resolved without intervention and vitals are now stable.          Past Medical History:   Diagnosis Date    Esophagitis     Hiatal hernia     Meniere's  disease     Paroxysmal atrial fibrillation 3/7/2021    Shingles     Sick sinus syndrome 2022    s/p Dual chamber pacemaker       Past Surgical History:   Procedure Laterality Date    A-V CARDIAC PACEMAKER INSERTION N/A 2022    Procedure: INSERTION, CARDIAC PACEMAKER, DUAL CHAMBER;  Surgeon: Ernesto Duncan MD;  Location: [facility] EP LAB;  Service: Cardiology;  Laterality: N/A;  SB, DUAL PPM, SJM, ANES, SK, [id number]    BREAST CYST ASPIRATION           SECTION      COLONOSCOPY N/A 2019    Procedure: COLONOSCOPY;  Surgeon: INGRID Russo MD;  Location: [facility] ENDO ([facility] FLR);  Service: Endoscopy;  Laterality: N/A;    HYSTERECTOMY      TONSILLECTOMY         Review of patient's allergies indicates:   Allergen Reactions    Penicillins Hives     causes congestion and hives    Tricyclic compounds        No current facility-administered medications on file prior to encounter.     Current Outpatient Medications on File Prior to Encounter   Medication Sig    flecainide (TAMBOCOR) 100 MG Tab Take 1 tablet (100 mg total) by mouth every 12 (twelve) hours.    FLUZONE HIGHDOSE QUAD 21-22  mcg/0.7 mL Syrg     polyethylene glycol (GLYCOLAX) 17 gram PwPk Take 17 g by mouth once as needed (Constipation).    rivaroxaban (XARELTO) 20 mg Tab Take 1 tablet (20 mg total) by mouth daily with dinner or evening meal.     Family History       Problem Relation (Age of Onset)    Breast cancer Mother, Paternal Grandmother    Diverticulitis Brother, Sister    Heart disease Mother (55), Father, Brother    Hyperlipidemia Mother    Hypertension Mother, Father          Tobacco Use    Smoking status: Never Smoker    Smokeless tobacco: Never Used   Substance and Sexual Activity    Alcohol use: No     Alcohol/week: 0.0 standard drinks     Comment: rarely    Drug use: No    Sexual activity: Not Currently     Review of Systems   Constitutional:  Positive for fatigue. Negative for activity change, chills,  diaphoresis and fever.   HENT:  Negative for congestion, facial swelling, rhinorrhea and sore throat.    Eyes:  Negative for photophobia, itching and visual disturbance.   Respiratory:  Positive for shortness of breath. Negative for cough, chest tightness and wheezing.    Cardiovascular:  Positive for chest pain and palpitations. Negative for leg swelling.   Gastrointestinal:  Positive for abdominal pain, constipation and nausea. Negative for abdominal distention, blood in stool, diarrhea and vomiting.   Genitourinary:  Positive for flank pain. Negative for difficulty urinating, dysuria, frequency, hematuria and urgency.   Musculoskeletal:  Negative for arthralgias, back pain and neck stiffness.   Neurological:  Positive for weakness and headaches. Negative for dizziness, tremors, seizures, syncope, light-headedness and numbness.   Psychiatric/Behavioral:  Negative for agitation, confusion and hallucinations.    Objective:     Vital Signs (Most Recent):  Temp: 97.7 °F (36.5 °C) (03/18/22 1938)  Pulse: 60 (03/18/22 2245)  Resp: 20 (03/18/22 2245)  BP: (!) 119/56 (03/18/22 2230)  SpO2: 100 % (03/18/22 2245)   Vital Signs (24h Range):  Temp:  [97.7 °F (36.5 °C)] 97.7 °F (36.5 °C)  Pulse:  [] 60  Resp:  [] 20  SpO2:  [97 %-100 %] 100 %  BP: (106-140)/(54-85) 119/56     Weight: 54.4 kg (120 lb)  Body mass index is 22.67 kg/m².    Physical Exam  Vitals and nursing note reviewed.   Constitutional:       General: She is not in acute distress.     Appearance: She is well-developed. She is not diaphoretic.   HENT:      Head: Normocephalic and atraumatic.      Right Ear: External ear normal.      Left Ear: External ear normal.      Nose: Nose normal. No congestion.      Mouth/Throat:      Pharynx: Oropharynx is clear.   Eyes:      General: No scleral icterus.     Extraocular Movements: Extraocular movements intact.   Cardiovascular:      Rate and Rhythm: Normal rate and regular rhythm.      Pulses: Normal pulses.       Heart sounds: Normal heart sounds. No murmur heard.  Pulmonary:      Effort: Pulmonary effort is normal. No respiratory distress.      Breath sounds: Normal breath sounds. No wheezing or rales.      Comments: Stable on room air. No increased WOB. Lungs clear to auscultation.   Abdominal:      General: Bowel sounds are normal. There is no distension.      Palpations: Abdomen is soft.      Tenderness: There is abdominal tenderness. There is left CVA tenderness. There is no guarding or rebound.      Comments: Mild epigastric and RUQ tenderness on palpation.    Musculoskeletal:      Cervical back: Normal range of motion.      Right lower leg: No edema.      Left lower leg: No edema.      Comments: Thoracolumbar paraspinal tenderness bilaterally.    Skin:     General: Skin is warm and dry.      Capillary Refill: Capillary refill takes less than 2 seconds.   Neurological:      General: No focal deficit present.      Mental Status: She is alert and oriented to person, place, and time. Mental status is at baseline.   Psychiatric:         Behavior: Behavior normal.         Thought Content: Thought content normal.           Significant Labs: All pertinent labs within the past 24 hours have been reviewed.  CBC:   Recent Labs   Lab 03/18/22 2046   WBC 8.99   HGB 13.9   HCT 41.9        CMP:   Recent Labs   Lab 03/18/22 2046      K 3.5      CO2 21*   GLU 94   BUN 17   CREATININE 0.9   CALCIUM 9.8   PROT 7.1   ALBUMIN 3.8   BILITOT 0.6   ALKPHOS 77   AST 17   ALT 14   ANIONGAP 11   EGFRNONAA >60.0       Significant Imaging: I have reviewed all pertinent imaging results/findings within the past 24 hours.      Assessment/Plan:     * Paroxysmal atrial fibrillation with RVR  Chest pain    Patient with Paroxysmal (<7 days) atrial fibrillation which is uncontrolled currently with Digoxin and flecainide. Patient is currently in sinus rhythm.[id number] Score: 3. Anticoagulation indicated. Anticoagulation done  with xarelto.  - In the ED patient found to be in afib with RVR, spontaneously concerted to NSR. HR stable in 60's without intervention. Pt reports chest pain resolved.   - Scheduled for ablation on 3/29.   - Continue flecainide 125 mg BID, digoxin 0.125 q48 hours (last dose 3/17)   - Continue xarelto 20 mg daily   - EP consulted and cardiology following.   - Per cards rec:  If her HR persistently > 110 BPM and she is symptomatic, consider very low dose of metoprolol tartrate 12.5 mg PO PRN.  - EKG prn for chest pain  - tele       Left flank pain  Left CVA tenderness on exam. However, diffuse paraspinal thoracolumbar tenderness present as well.   - Pt reports recent treatment for UTI  - UA 3/18 non-infectious   - prn tylenol for pain   - Cr stable, monitor bmp       S/P placement of cardiac pacemaker  Sick sinus syndrome       Typical atrial flutter  - chronic, continue management of afib       Chest pain  Likely due to afib with RVR on presentation, seen on EKG. Now resolved.   - BNP and troponin wnl.   - CXR stable  - prn EKG for recurrence of CP       Hiatal hernia with GERD and esophagitis  Patient previously taking daily PPI, but believes it is causing her adverse side effects including dryness, abdominal pain, kidney pain, and depression.   Mild epigastric pain on exam today. Will monitor.   - Manage with prn [facility] inpatient       VTE Risk Mitigation (From admission, onward)         Ordered     rivaroxaban tablet 20 mg  with dinner         03/18/22 2242     IP VTE HIGH RISK PATIENT  Once         03/18/22 2242     Place sequential compression device  Until discontinued         03/18/22 2242                   Tatiana Fletcher PA-C  Department of Hospital Medicine   [facility] - Cardiology Stepdown

## 2022-03-19 NOTE — ASSESSMENT & PLAN NOTE
Left CVA tenderness on exam. However, diffuse paraspinal thoracolumbar tenderness present as well.   - Pt reports recent treatment for UTI  - UA 3/18 non-infectious   - prn tylenol for pain   - Cr stable, monitor bmp   - KUB unremarkable

## 2022-03-19 NOTE — SUBJECTIVE & OBJECTIVE
Past Medical History:   Diagnosis Date    Esophagitis     Hiatal hernia     Meniere's disease     Paroxysmal atrial fibrillation 3/7/2021    Shingles     Sick sinus syndrome 2022    s/p Dual chamber pacemaker       Past Surgical History:   Procedure Laterality Date    A-V CARDIAC PACEMAKER INSERTION N/A 2022    Procedure: INSERTION, CARDIAC PACEMAKER, DUAL CHAMBER;  Surgeon: Ernesto Duncan MD;  Location: [facility] EP LAB;  Service: Cardiology;  Laterality: N/A;  SB, DUAL PPM, SJM, ANES, SK, [id number]    BREAST CYST ASPIRATION           SECTION      COLONOSCOPY N/A 2019    Procedure: COLONOSCOPY;  Surgeon: INGRID Russo MD;  Location: [facility] ENDO ([address] [place] [address]);  Service: Endoscopy;  Laterality: N/A;    HYSTERECTOMY      TONSILLECTOMY         Review of patient's allergies indicates:   Allergen Reactions    Penicillins Hives     causes congestion and hives    Tricyclic compounds        No current facility-administered medications on file prior to encounter.     Current Outpatient Medications on File Prior to Encounter   Medication Sig    flecainide (TAMBOCOR) 100 MG Tab Take 1 tablet (100 mg total) by mouth every 12 (twelve) hours.    FLUZONE HIGHDOSE QUAD 21-22  mcg/0.7 mL Syrg     polyethylene glycol (GLYCOLAX) 17 gram PwPk Take 17 g by mouth once as needed (Constipation).    rivaroxaban (XARELTO) 20 mg Tab Take 1 tablet (20 mg total) by mouth daily with dinner or evening meal.     Family History       Problem Relation (Age of Onset)    Breast cancer Mother, Paternal Grandmother    Diverticulitis Brother, Sister    Heart disease Mother (55), Father, Brother    Hyperlipidemia Mother    Hypertension Mother, Father          Tobacco Use    Smoking status: Never Smoker    Smokeless tobacco: Never Used   Substance and Sexual Activity    Alcohol use: No     Alcohol/week: 0.0 standard drinks     Comment: rarely    Drug use: No    Sexual activity: Not Currently     Review of Systems   Constitutional:   Positive for fatigue. Negative for activity change, chills, diaphoresis and fever.   HENT:  Negative for congestion, facial swelling, rhinorrhea and sore throat.    Eyes:  Negative for photophobia, itching and visual disturbance.   Respiratory:  Positive for shortness of breath. Negative for cough, chest tightness and wheezing.    Cardiovascular:  Positive for chest pain and palpitations. Negative for leg swelling.   Gastrointestinal:  Positive for abdominal pain, constipation and nausea. Negative for abdominal distention, blood in stool, diarrhea and vomiting.   Genitourinary:  Positive for flank pain. Negative for difficulty urinating, dysuria, frequency, hematuria and urgency.   Musculoskeletal:  Negative for arthralgias, back pain and neck stiffness.   Neurological:  Positive for weakness and headaches. Negative for dizziness, tremors, seizures, syncope, light-headedness and numbness.   Psychiatric/Behavioral:  Negative for agitation, confusion and hallucinations.    Objective:     Vital Signs (Most Recent):  Temp: 97.7 °F (36.5 °C) (03/18/22 1938)  Pulse: 60 (03/18/22 2245)  Resp: 20 (03/18/22 2245)  BP: (!) 119/56 (03/18/22 2230)  SpO2: 100 % (03/18/22 2245)   Vital Signs (24h Range):  Temp:  [97.7 °F (36.5 °C)] 97.7 °F (36.5 °C)  Pulse:  [] 60  Resp:  [] 20  SpO2:  [97 %-100 %] 100 %  BP: (106-140)/(54-85) 119/56     Weight: 54.4 kg (120 lb)  Body mass index is 22.67 kg/m².    Physical Exam  Vitals and nursing note reviewed.   Constitutional:       General: She is not in acute distress.     Appearance: She is well-developed. She is not diaphoretic.   HENT:      Head: Normocephalic and atraumatic.      Right Ear: External ear normal.      Left Ear: External ear normal.      Nose: Nose normal. No congestion.      Mouth/Throat:      Pharynx: Oropharynx is clear.   Eyes:      General: No scleral icterus.     Extraocular Movements: Extraocular movements intact.   Cardiovascular:      Rate and Rhythm:  Normal rate and regular rhythm.      Pulses: Normal pulses.      Heart sounds: Normal heart sounds. No murmur heard.  Pulmonary:      Effort: Pulmonary effort is normal. No respiratory distress.      Breath sounds: Normal breath sounds. No wheezing or rales.      Comments: Stable on room air. No increased WOB. Lungs clear to auscultation.   Abdominal:      General: Bowel sounds are normal. There is no distension.      Palpations: Abdomen is soft.      Tenderness: There is abdominal tenderness. There is left CVA tenderness. There is no guarding or rebound.      Comments: Mild epigastric and RUQ tenderness on palpation.    Musculoskeletal:      Cervical back: Normal range of motion.      Right lower leg: No edema.      Left lower leg: No edema.      Comments: Thoracolumbar paraspinal tenderness bilaterally.    Skin:     General: Skin is warm and dry.      Capillary Refill: Capillary refill takes less than 2 seconds.   Neurological:      General: No focal deficit present.      Mental Status: She is alert and oriented to person, place, and time. Mental status is at baseline.   Psychiatric:         Behavior: Behavior normal.         Thought Content: Thought content normal.           Significant Labs: All pertinent labs within the past 24 hours have been reviewed.  CBC:   Recent Labs   Lab 03/18/22 2046   WBC 8.99   HGB 13.9   HCT 41.9        CMP:   Recent Labs   Lab 03/18/22 2046      K 3.5      CO2 21*   GLU 94   BUN 17   CREATININE 0.9   CALCIUM 9.8   PROT 7.1   ALBUMIN 3.8   BILITOT 0.6   ALKPHOS 77   AST 17   ALT 14   ANIONGAP 11   EGFRNONAA >60.0       Significant Imaging: I have reviewed all pertinent imaging results/findings within the past 24 hours.

## 2022-03-19 NOTE — ASSESSMENT & PLAN NOTE
- chronic, continue management of afib      Liquid cough medication sent for her to try instead.     KEISHA Nowak MD  Internal Medicine-Pediatrics

## 2022-03-19 NOTE — CARE UPDATE
67/F with DC-PPM on 1/20/2022 for tachybrady conversion pauses. Has atrial flutter and paroxysmal atrial fibrillation previously intolerant to beta blocker (fatigue and lightheadedness), and intolerant to CCB (hypotension). Now on Flecainide 100 mg BID and Digoxin.       Admitted with AF/RVR and chest discomfort (chronic). Since midnight AP 60s with a 45 minute episode of AF/RVR.     Plan:  No changes to therapy   Plan for PVI 3/29/22  Okay to discharge from EP standpoint   High anxiety about AF and procedure and extensive reassurance was given to patient    Will sign off. Please call us back with any question

## 2022-03-19 NOTE — PLAN OF CARE
Patient is ready for discharge. Patient stable alert and oriented. IVs removed. No complaints of pain. Discussed discharge plan. Reviewed medications and side effects, appointments, and answered questions with patient and family. Medications delivered to bedside.

## 2022-03-19 NOTE — ASSESSMENT & PLAN NOTE
Ms. Trudi Mcknight, 67 y.o. woman with sinus pauses and bradycardia s/p DC-PPM on 1/20/2022, Atrial flutter and paroxysmal atrial fibrillation previously intolerant to beta blocker (causing fatigue and lightheadedness), and intolerant to CCB (hypotension).    - This presentation of atrial fibrillation with rapid ventricular response most likely triggered by her complain of abdominal pain.  - Please resume her AAD medication at Flecainide 125 mg BID (she is due for PM dose)  - She took Digoxin yesterday and she took it every 48 hours. Resume tomorrow   - If her HR persistently > 110 BPM and she is symptomatic, consider very low dose of metoprolol tartrate 12.5 mg PO PRN.  - Ms. Mcknight agreed to try very low dose of metoprolol tartrate if needed   - She has a functioning PPM in case of bradycardia   - Eventually, the plan for her to undergo PVI later this month.  - Elevated CHADSVASC score, continue anticoagulation with rivaroxaban.

## 2022-03-19 NOTE — CONSULTS
"[facility] - Emergency Dept  Cardiac Electrophysiology  Consult Note    Admission Date: 3/18/2022  Code Status: Prior   Attending Provider: Haile Ireland MD  Consulting Provider: Soy Moreno MD  Principal Problem:<principal problem not specified>    Inpatient consult to Electrophysiology  Consult performed by: Soy Moreno MD  Consult ordered by: Lanie Vargas MD        Subjective:     Chief Complaint:  AF    HPI:   Ms. Trudi Mcknight, 67 y.o. woman with sinus pauses and bradycardia s/p DC-PPM on 1/20/2022, Atrial flutter and paroxysmal atrial fibrillation previously intolerant to beta blocker (causing fatigue and lightheadedness), and intolerant to CCB (hypotension). She was on Flecainide 100 mg BID which she recently discontinued due to "anxiety and feeling depressed with the medication". She presented to ED with symptoms of heart racing, chest discomfort associated with palpitation, and vague abdominal pain. Her ECG from last week showed atrial paced rhythm, this presentation showed atrial fibrillation with rapid ventricular response. She reported being compliant with flecainide and digoxin. Last time she took Dig was yesterday, and last time she took Flecainide this morning. Her dose is 125 mg (not 100 mg) BID. Device interrogation showed AF events started ~ 3/17 and multiple entries on 3/18/2022 ranged from minutes to hours prior her presentation.  She denies fever, recent travel or sick contact, she reported compliance with her medication including anticoagulation Rivaroxaban. Cardiology was consulted for managing atrial fibrillation with rapid ventricular response.     Past Medical History:   Diagnosis Date    Esophagitis     Hiatal hernia     Meniere's disease     Paroxysmal atrial fibrillation 3/7/2021    Shingles     Sick sinus syndrome 01/21/2022    s/p Dual chamber pacemaker       Past Surgical History:   Procedure Laterality Date    A-V CARDIAC PACEMAKER INSERTION N/A 1/20/2022    " Procedure: INSERTION, CARDIAC PACEMAKER, DUAL CHAMBER;  Surgeon: Ernesto Duncan MD;  Location: [facility] EP LAB;  Service: Cardiology;  Laterality: N/A;  SB, DUAL PPM, SJM, ANES, SK, [id number]    BREAST CYST ASPIRATION           SECTION      COLONOSCOPY N/A 2019    Procedure: COLONOSCOPY;  Surgeon: INGRID Russo MD;  Location: [facility]O [facility]  Service: Endoscopy;  Laterality: N/A;    HYSTERECTOMY      TONSILLECTOMY         Review of patient's allergies indicates:   Allergen Reactions    Penicillins Hives     causes congestion and hives    Tricyclic compounds        No current facility-administered medications on file prior to encounter.     Current Outpatient Medications on File Prior to Encounter   Medication Sig    flecainide (TAMBOCOR) 100 MG Tab Take 1 tablet (100 mg total) by mouth every 12 (twelve) hours.    FLUZONE HIGHDOSE QUAD 21-22  mcg/0.7 mL Syrg     polyethylene glycol (GLYCOLAX) 17 gram PwPk Take 17 g by mouth once as needed (Constipation).    rivaroxaban (XARELTO) 20 mg Tab Take 1 tablet (20 mg total) by mouth daily with dinner or evening meal.     Family History       Problem Relation (Age of Onset)    Breast cancer Mother, Paternal Grandmother    Diverticulitis Brother, Sister    Heart disease Mother (55), Father, Brother    Hyperlipidemia Mother    Hypertension Mother, Father          Tobacco Use    Smoking status: Never Smoker    Smokeless tobacco: Never Used   Substance and Sexual Activity    Alcohol use: No     Alcohol/week: 0.0 standard drinks     Comment: rarely    Drug use: No    Sexual activity: Not Currently     Review of Systems   Constitutional: Positive for malaise/fatigue. Negative for chills and decreased appetite.   HENT:  Negative for congestion.    Cardiovascular:  Positive for irregular heartbeat and palpitations. Negative for chest pain and leg swelling.   Respiratory:  Negative for cough and shortness of breath.    Endocrine: Negative for cold  intolerance and heat intolerance.   Skin:  Negative for rash.   Musculoskeletal:  Negative for arthritis and back pain.   Gastrointestinal:  Positive for abdominal pain. Negative for constipation and diarrhea.   Genitourinary:  Negative for dysuria and hematuria.   Neurological:  Negative for dizziness and headaches.   Psychiatric/Behavioral:  Negative for altered mental status.    Objective:     Vital Signs (Most Recent):  Temp: 97.7 °F (36.5 °C) (03/18/22 1938)  Pulse: 105 (03/18/22 2130)  Resp: 20 (03/18/22 2130)  BP: (!) 140/63 (03/18/22 2130)  SpO2: 98 % (03/18/22 2130) Vital Signs (24h Range):  Temp:  [97.7 °F (36.5 °C)] 97.7 °F (36.5 °C)  Pulse:  [] 105  Resp:  [] 20  SpO2:  [98 %-99 %] 98 %  BP: (109-140)/(54-85) 140/63     Weight: 54.4 kg (120 lb)  Body mass index is 22.67 kg/m².    SpO2: 98 %  O2 Device (Oxygen Therapy): room air    No intake or output data in the 24 hours ending 03/18/22 2145      Lines/Drains/Airways       Peripheral Intravenous Line  Duration                  Peripheral IV - Single Lumen 03/18/22 2047 20 G Right Antecubital <1 day                    Physical Exam  Vitals reviewed.   Constitutional:       General: She is not in acute distress.     Appearance: She is not ill-appearing.   HENT:      Head: Normocephalic.   Eyes:      Pupils: Pupils are equal, round, and reactive to light.   Neck:      Thyroid: No thyromegaly.      Vascular: No JVD.   Cardiovascular:      Rate and Rhythm: Tachycardia present. Rhythm irregular.      Heart sounds: Murmur heard.     No friction rub.   Pulmonary:      Effort: Pulmonary effort is normal. No respiratory distress.   Skin:            Comments: PPM scar  No signs of infection    Neurological:      General: No focal deficit present.      Mental Status: She is alert and oriented to person, place, and time.       Significant Labs:   CMP   Recent Labs   Lab 03/18/22 2046      K 3.5      CO2 21*   GLU 94   BUN 17   CREATININE 0.9  "  CALCIUM 9.8   PROT 7.1   ALBUMIN 3.8   BILITOT 0.6   ALKPHOS 77   AST 17   ALT 14   ANIONGAP 11   ESTGFRAFRICA >60.0   EGFRNONAA >60.0     , CBC   Recent Labs   Lab 03/18/22 2046   WBC 8.99   HGB 13.9   HCT 41.9        , Troponin   Recent Labs   Lab 03/18/22 2046   TROPONINI <0.006      and All pertinent lab results from the last 24 hours have been reviewed.    Significant Imaging: Echocardiogram:   2D echo with color flow doppler: No results found for this or any previous visit. and Transthoracic echo (TTE) complete (Cupid Only):   Results for orders placed or performed during the hospital encounter of 02/25/22   Echo Saline Bubble? No   Result Value Ref Range    Ascending aorta 3.04 cm    STJ 3.38 cm    AV mean gradient 3 mmHg    Ao peak mark 1.18 m/s    Ao VTI 26.72 cm    IVS 0.80 0.6 - 1.1 cm    LA size 3.51 cm    Left Atrium Major Axis 4.45 cm    Left Atrium Minor Axis 4.30 cm    LVIDd 4.05 3.5 - 6.0 cm    LVIDs 2.53 2.1 - 4.0 cm    LVOT diameter 2.25 cm    LVOT peak VTI 20.86 cm    Posterior Wall 0.68 0.6 - 1.1 cm    MV Peak A Mark 0.77 m/s    E wave deceleration time 239.82 msec    MV Peak E Mark 0.69 m/s    PV Peak D Mark 0.37 m/s    PV Peak S Mark 0.39 m/s    RA Major Axis 3.90 cm    RA Width 2.91 cm    RVDD 2.93 cm    Sinus 3.11 cm    TAPSE 1.91 cm    TR Max Mark 2.45 m/s    TDI LATERAL 0.09 m/s    TDI SEPTAL 0.07 m/s    LA WIDTH 2.88 cm    MV stenosis pressure 1/2 time 69.55 ms    LV Diastolic Volume 72.25 mL    LV Systolic Volume 23.08 mL    RV S' 12.95 cm/s    LVOT peak mark 0.90 m/s    LA volume (mod) 26.19 cm3    MV "A" wave duration 9.13 msec    LV LATERAL E/E' RATIO 7.67 m/s    LV SEPTAL E/E' RATIO 9.86 m/s    FS 38 %    LA volume 37.58 cm3    LV mass 86.03 g    Left Ventricle Relative Wall Thickness 0.34 cm    AV valve area 3.10 cm2    AV Velocity Ratio 0.76     AV index (prosthetic) 0.78     MV valve area p 1/2 method 3.16 cm2    E/A ratio 0.90     Mean e' 0.08 m/s    Pulm vein S/D ratio 1.05  "    LVOT area 4.0 cm2    LVOT stroke volume 82.90 cm3    AV peak gradient 6 mmHg    E/E' ratio 8.63 m/s    Triscuspid Valve Regurgitation Peak Gradient 24 mmHg    BSA 1.56 m2    LV Systolic Volume Index 15.0 mL/m2    LV Diastolic Volume Index 46.92 mL/m2    LA Volume Index 24.4 mL/m2    LV Mass Index 56 g/m2    LA Volume Index (Mod) 17.0 mL/m2    Right Atrial Pressure (from IVC) 3 mmHg    EF 60 %    TV rest pulmonary artery pressure 27 mmHg    Narrative    · The left ventricle is normal in size with normal systolic function.  · The estimated ejection fraction is 60%.  · Normal left ventricular diastolic function.  · Normal right ventricular size with normal right ventricular systolic   function.  · Mild tricuspid regurgitation.  · Normal central venous pressure (3 mmHg).  · The estimated PA systolic pressure is 27 mmHg.  · Small-moderate pericardial effusion  · Effusion appears roughly similar to prior, however heart has shifted   somewhat posteriorly, so anterior effusion slightly larger, posterior   effusion slightly smaller.              Assessment and Plan:     Atrial fibrillation  Ms. Trudi Mcknight, 67 y.o. woman with sinus pauses and bradycardia s/p DC-PPM on 1/20/2022, Atrial flutter and paroxysmal atrial fibrillation previously intolerant to beta blocker (causing fatigue and lightheadedness), and intolerant to CCB (hypotension).    - This presentation of atrial fibrillation with rapid ventricular response most likely triggered by her complain of abdominal pain.  - Please resume her AAD medication at Flecainide 150 mg BID (she is due for PM dose)  - She took Digoxin yesterday and she took it every 48 hours. Resume tomorrow   - If her HR persistently > 110 BPM and she is symptomatic, consider very low dose of metoprolol tartrate 12.5 mg PO PRN.  - Ms. Mcknight agreed to try very low dose of metoprolol tartrate if needed   - She has a functioning PPM in case of bradycardia   - Eventually, the plan for her to  undergo PVI later this month.  - Elevated CHADSVASC score, continue anticoagulation with rivaroxaban.     Thank you for your consult. I will follow-up with patient. Please contact us if you have any additional questions.    Soy Moreno MD  Cardiac Electrophysiology  [facility] - Emergency Dept

## 2022-03-19 NOTE — HPI
Trudi Mcknight is a 66 yo woman with sinus pauses and bradycardia s/p DC-PPM on 1/20/2022, atrial flutter and paroxysmal atrial fibrillation on chronic anticoagulation, and hiatal hernia who presents to the ED out of concern for afib with RVR. The patient reports chest pain, abdominal cramping, and flank pain that began this afternoon. When she checked her HR it was elevated to 140, prompting her to come to the ED. She has been taking flecainide 125 BID, and started back taking digoxin 0.125 q48 hours yesterday after being told to restart it by her cardiologist. She reported being compliant with flecainide, digoxin, and xarelto. She endorses shortness of breath, palpitations, fatigue, nausea, chest pain, headache, abdominal and bilateral flank pain. She denies any fever, chills, productive cough, vomiting, diarrhea, bleeding, myalgias, or dizziness.    In the ED, the patient is afebrile without leukocytosis. Initially tachycardic to 121 bpm max, but since resolved and stable in 60's. RR 20. Vitals otherwise stable. Labs unremarkable. BNP and trop wnl. CXR reveals stable hiatal hernia, no acute cardiopulmonary process. UA non-infectious. EP was consulted to perform a device check, which confirmed afib with RVR. Heart rate resolved without intervention and vitals are now stable.

## 2022-03-19 NOTE — PLAN OF CARE
Pt arrived from ED at midnight. Pt maintained free from falls/ trauma/ injuries and skin breakdown. Pt denied pain or discomfort. Free from SOB. Afib, pace on tele. Plan of care reviewed. Pt verbalized understanding. All questions and concerns addressed. Will continue to monitor.

## 2022-03-19 NOTE — ED TRIAGE NOTES
"Trudi Mcknight, a 67 y.o. female presents to the ED w/ complaint of midsternal intermittent chest pain for 30 min PTA. Pt reports that "whenever I go into A-fib I feel that I have to urinate a lot more." Pt reports that she felt an increased urge to urinate earlier today and was concerned for A-fib. Pt reports cramping pain to left abdomen for the past few days. Pt states she has a new pacemaker and feels tenderness around site of pacemaker upon palpation. Pt reports nausea. Denies v/d. Pt reports tenderness to left flank and lower back. Denies SOB. No peripheral edema noted. Pt reports that her chest pain is mostly gone now and feels more like an "itchiness in my throat."    Triage note:  Chief Complaint   Patient presents with    Chest Pain     Cp x 30 min, hx of afib, having palpitations     Review of patient's allergies indicates:   Allergen Reactions    Penicillins Hives     causes congestion and hives    Tricyclic compounds      Past Medical History:   Diagnosis Date    Esophagitis     Hiatal hernia     Meniere's disease     Paroxysmal atrial fibrillation 3/7/2021    Shingles     Sick sinus syndrome 01/21/2022    s/p Dual chamber pacemaker     Patient identifiers verified and correct for Trudi Mcknight    LOC: The patient is awake, alert, and aware of environment. The patient is AOX4 and speaking appropriately.   APPEARANCE: No acute distress noted.   HEENT: WDL, PERRLA  PSYCHOSOCIAL: Patient is calm and cooperative. Denies SI/HI.  SKIN: The skin is warm, dry, color consistent with ethnicity. No breakdown or brusing visible.  RESPIRATORY: Airway is open and patent. Bilateral chest rise and fall. Respiratory rate even and unlabored.  No accessory muscle use noted.  CARDIAC: A-fib noted. Pt has pacemaker. Pt reports mid-sternal chest pain.  ABDOMEN/GI: Soft, non tender. No distention noted. Reports nausea. Denies v/d.  URINARY:  Voids independently without difficulty. Reports increased frequency. "   NEUROLOGIC: Eyes open spontaneously. Speech clear.  Able to follow commands, demonstrating ability to actively and appropriately communicate within context of current conversation. Symmetrical facial muscles. Movement is purposeful. Denies dizziness/lightheadedness.  MUSCULOSKELETAL: No obvious deformities noted. Full ROM in all extremities.  PERIPHERAL VASCULAR: Cap refill <3 secs bilaterally. No peripheral edema noted. Denies numbness and tingling in extremities.

## 2022-03-19 NOTE — ASSESSMENT & PLAN NOTE
Chest pain    Patient with Paroxysmal (<7 days) atrial fibrillation which is uncontrolled currently with Digoxin and flecainide. Patient is currently in sinus rhythm.[id number] Score: 3. Anticoagulation indicated. Anticoagulation done with xarelto.  - In the ED patient found to be in afib with RVR, spontaneously concerted to NSR. HR stable in 60's without intervention. Pt reports chest pain resolved.   - Scheduled for ablation on 3/29.   - Continue flecainide 125 mg BID, digoxin 0.125 q48 hours (last dose 3/17)   - Continue xarelto 20 mg daily   - EP consulted and cardiology followed  - f.u with cardiology

## 2022-03-19 NOTE — SUBJECTIVE & OBJECTIVE
Past Medical History:   Diagnosis Date    Esophagitis     Hiatal hernia     Meniere's disease     Paroxysmal atrial fibrillation 3/7/2021    Shingles     Sick sinus syndrome 2022    s/p Dual chamber pacemaker       Past Surgical History:   Procedure Laterality Date    A-V CARDIAC PACEMAKER INSERTION N/A 2022    Procedure: INSERTION, CARDIAC PACEMAKER, DUAL CHAMBER;  Surgeon: Ernesto Duncan MD;  Location: [facility] EP LAB;  Service: Cardiology;  Laterality: N/A;  SB, DUAL PPM, SJM, ANES, SK, [id number]    BREAST CYST ASPIRATION           SECTION      COLONOSCOPY N/A 2019    Procedure: COLONOSCOPY;  Surgeon: INGRID Russo MD;  Location: [facility] ENDO ([address] [place] [address]);  Service: Endoscopy;  Laterality: N/A;    HYSTERECTOMY      TONSILLECTOMY         Review of patient's allergies indicates:   Allergen Reactions    Penicillins Hives     causes congestion and hives    Tricyclic compounds        No current facility-administered medications on file prior to encounter.     Current Outpatient Medications on File Prior to Encounter   Medication Sig    flecainide (TAMBOCOR) 100 MG Tab Take 1 tablet (100 mg total) by mouth every 12 (twelve) hours.    FLUZONE HIGHDOSE QUAD 21-22  mcg/0.7 mL Syrg     polyethylene glycol (GLYCOLAX) 17 gram PwPk Take 17 g by mouth once as needed (Constipation).    rivaroxaban (XARELTO) 20 mg Tab Take 1 tablet (20 mg total) by mouth daily with dinner or evening meal.     Family History       Problem Relation (Age of Onset)    Breast cancer Mother, Paternal Grandmother    Diverticulitis Brother, Sister    Heart disease Mother (55), Father, Brother    Hyperlipidemia Mother    Hypertension Mother, Father          Tobacco Use    Smoking status: Never Smoker    Smokeless tobacco: Never Used   Substance and Sexual Activity    Alcohol use: No     Alcohol/week: 0.0 standard drinks     Comment: rarely    Drug use: No    Sexual activity: Not Currently     Review of Systems   Constitutional:  Positive for malaise/fatigue. Negative for chills and decreased appetite.   HENT:  Negative for congestion.    Cardiovascular:  Positive for irregular heartbeat and palpitations. Negative for chest pain and leg swelling.   Respiratory:  Negative for cough and shortness of breath.    Endocrine: Negative for cold intolerance and heat intolerance.   Skin:  Negative for rash.   Musculoskeletal:  Negative for arthritis and back pain.   Gastrointestinal:  Positive for abdominal pain. Negative for constipation and diarrhea.   Genitourinary:  Negative for dysuria and hematuria.   Neurological:  Negative for dizziness and headaches.   Psychiatric/Behavioral:  Negative for altered mental status.    Objective:     Vital Signs (Most Recent):  Temp: 97.7 °F (36.5 °C) (03/18/22 1938)  Pulse: 105 (03/18/22 2130)  Resp: 20 (03/18/22 2130)  BP: (!) 140/63 (03/18/22 2130)  SpO2: 98 % (03/18/22 2130) Vital Signs (24h Range):  Temp:  [97.7 °F (36.5 °C)] 97.7 °F (36.5 °C)  Pulse:  [] 105  Resp:  [] 20  SpO2:  [98 %-99 %] 98 %  BP: (109-140)/(54-85) 140/63     Weight: 54.4 kg (120 lb)  Body mass index is 22.67 kg/m².    SpO2: 98 %  O2 Device (Oxygen Therapy): room air    No intake or output data in the 24 hours ending 03/18/22 2145      Lines/Drains/Airways       Peripheral Intravenous Line  Duration                  Peripheral IV - Single Lumen 03/18/22 2047 20 G Right Antecubital <1 day                    Physical Exam  Vitals reviewed.   Constitutional:       General: She is not in acute distress.     Appearance: She is not ill-appearing.   HENT:      Head: Normocephalic.   Eyes:      Pupils: Pupils are equal, round, and reactive to light.   Neck:      Thyroid: No thyromegaly.      Vascular: No JVD.   Cardiovascular:      Rate and Rhythm: Tachycardia present. Rhythm irregular.      Heart sounds: Murmur heard.     No friction rub.   Pulmonary:      Effort: Pulmonary effort is normal. No respiratory distress.   Skin:        "     Comments: PPM scar  No signs of infection    Neurological:      General: No focal deficit present.      Mental Status: She is alert and oriented to person, place, and time.       Significant Labs:   CMP   Recent Labs   Lab 03/18/22 2046      K 3.5      CO2 21*   GLU 94   BUN 17   CREATININE 0.9   CALCIUM 9.8   PROT 7.1   ALBUMIN 3.8   BILITOT 0.6   ALKPHOS 77   AST 17   ALT 14   ANIONGAP 11   ESTGFRAFRICA >60.0   EGFRNONAA >60.0     , CBC   Recent Labs   Lab 03/18/22 2046   WBC 8.99   HGB 13.9   HCT 41.9        , Troponin   Recent Labs   Lab 03/18/22 2046   TROPONINI <0.006      and All pertinent lab results from the last 24 hours have been reviewed.    Significant Imaging: Echocardiogram:   2D echo with color flow doppler: No results found for this or any previous visit. and Transthoracic echo (TTE) complete (Cupid Only):   Results for orders placed or performed during the hospital encounter of 02/25/22   Echo Saline Bubble? No   Result Value Ref Range    Ascending aorta 3.04 cm    STJ 3.38 cm    AV mean gradient 3 mmHg    Ao peak mark 1.18 m/s    Ao VTI 26.72 cm    IVS 0.80 0.6 - 1.1 cm    LA size 3.51 cm    Left Atrium Major Axis 4.45 cm    Left Atrium Minor Axis 4.30 cm    LVIDd 4.05 3.5 - 6.0 cm    LVIDs 2.53 2.1 - 4.0 cm    LVOT diameter 2.25 cm    LVOT peak VTI 20.86 cm    Posterior Wall 0.68 0.6 - 1.1 cm    MV Peak A Mark 0.77 m/s    E wave deceleration time 239.82 msec    MV Peak E Amrk 0.69 m/s    PV Peak D Mark 0.37 m/s    PV Peak S Mark 0.39 m/s    RA Major Axis 3.90 cm    RA Width 2.91 cm    RVDD 2.93 cm    Sinus 3.11 cm    TAPSE 1.91 cm    TR Max Mark 2.45 m/s    TDI LATERAL 0.09 m/s    TDI SEPTAL 0.07 m/s    LA WIDTH 2.88 cm    MV stenosis pressure 1/2 time 69.55 ms    LV Diastolic Volume 72.25 mL    LV Systolic Volume 23.08 mL    RV S' 12.95 cm/s    LVOT peak mark 0.90 m/s    LA volume (mod) 26.19 cm3    MV "A" wave duration 9.13 msec    LV LATERAL E/E' RATIO 7.67 m/s    LV SEPTAL " E/E' RATIO 9.86 m/s    FS 38 %    LA volume 37.58 cm3    LV mass 86.03 g    Left Ventricle Relative Wall Thickness 0.34 cm    AV valve area 3.10 cm2    AV Velocity Ratio 0.76     AV index (prosthetic) 0.78     MV valve area p 1/2 method 3.16 cm2    E/A ratio 0.90     Mean e' 0.08 m/s    Pulm vein S/D ratio 1.05     LVOT area 4.0 cm2    LVOT stroke volume 82.90 cm3    AV peak gradient 6 mmHg    E/E' ratio 8.63 m/s    Triscuspid Valve Regurgitation Peak Gradient 24 mmHg    BSA 1.56 m2    LV Systolic Volume Index 15.0 mL/m2    LV Diastolic Volume Index 46.92 mL/m2    LA Volume Index 24.4 mL/m2    LV Mass Index 56 g/m2    LA Volume Index (Mod) 17.0 mL/m2    Right Atrial Pressure (from IVC) 3 mmHg    EF 60 %    TV rest pulmonary artery pressure 27 mmHg    Narrative    · The left ventricle is normal in size with normal systolic function.  · The estimated ejection fraction is 60%.  · Normal left ventricular diastolic function.  · Normal right ventricular size with normal right ventricular systolic   function.  · Mild tricuspid regurgitation.  · Normal central venous pressure (3 mmHg).  · The estimated PA systolic pressure is 27 mmHg.  · Small-moderate pericardial effusion  · Effusion appears roughly similar to prior, however heart has shifted   somewhat posteriorly, so anterior effusion slightly larger, posterior   effusion slightly smaller.

## 2022-03-19 NOTE — NURSING TRANSFER
Nursing Transfer Note      3/18/2022     Reason patient is being transferred:ED    Transfer From: ED    Transfer via stretcher    Transfer with cardiac monitoring    Transported by Transporter    Medicines sent: none      Chart send with patient: Yes      Patient reassessed at: 3/18/22 @2330    Upon arrival to floor: cardiac monitor applied, patient oriented to room, call bell in reach and bed in lowest position

## 2022-03-19 NOTE — ED PROVIDER NOTES
Encounter Date: 3/18/2022       History     Chief Complaint   Patient presents with    Chest Pain     Cp x 30 min, hx of afib, having palpitations     Ms. Mcknight is a 67 year old woman with a history of Afib, s/p PM insertion (2022), hiatal hernia, who presents to the ED with symptoms of abdominal, flank pain and chest pain which started this afternoon. The patient states that she took her esomeprazole this afternoon and started having sharp lower abdominal and flank pain. She states she has had this discomfort before when taking esomeprazole. She states she went to change her clothes this evening when she started having left sided chest pain and noted that her heart rate was elevated to 140s and she felt that she was in atrial fibrillation. She endorses shortness of breath, nausea, chest pain, headache, abdominal and left sided flank pain. She denies any fever, chills, productive cough, vomiting, myalgias, dizziness, weakness.        Review of patient's allergies indicates:   Allergen Reactions    Penicillins Hives     causes congestion and hives    Tricyclic compounds      Past Medical History:   Diagnosis Date    Esophagitis     Hiatal hernia     Meniere's disease     Paroxysmal atrial fibrillation 3/7/2021    Shingles     Sick sinus syndrome 2022    s/p Dual chamber pacemaker     Past Surgical History:   Procedure Laterality Date    A-V CARDIAC PACEMAKER INSERTION N/A 2022    Procedure: INSERTION, CARDIAC PACEMAKER, DUAL CHAMBER;  Surgeon: Ernesto Duncan MD;  Location: [facility] EP LAB;  Service: Cardiology;  Laterality: N/A;  SB, DUAL PPM, SJM, ANES, SK, [id number]    BREAST CYST ASPIRATION           SECTION      COLONOSCOPY N/A 2019    Procedure: COLONOSCOPY;  Surgeon: INGRID Russo MD;  Location: [facility] ENDO ([address] [place] [address]);  Service: Endoscopy;  Laterality: N/A;    HYSTERECTOMY      TONSILLECTOMY       Family History   Problem Relation Age of Onset    Heart disease Mother 55         bypass at 55    Breast cancer Mother     Hypertension Mother     Hyperlipidemia Mother     Heart disease Father     Hypertension Father     Heart disease Brother     Diverticulitis Brother     Diverticulitis Sister     Breast cancer Paternal Grandmother     Colon cancer Neg Hx     Melanoma Neg Hx     Amblyopia Neg Hx     Blindness Neg Hx     Cataracts Neg Hx     Glaucoma Neg Hx     Macular degeneration Neg Hx     Strabismus Neg Hx     Retinal detachment Neg Hx      Social History     Tobacco Use    Smoking status: Never Smoker    Smokeless tobacco: Never Used   Substance Use Topics    Alcohol use: No     Alcohol/week: 0.0 standard drinks     Comment: rarely    Drug use: No     Review of Systems   Constitutional: Negative for appetite change, chills, fatigue and fever.   HENT: Negative for congestion and sore throat.    Eyes: Negative for pain and visual disturbance.   Respiratory: Positive for cough (dry, chronic) and shortness of breath.    Cardiovascular: Positive for chest pain and palpitations. Negative for leg swelling.   Gastrointestinal: Positive for abdominal pain. Negative for nausea.   Genitourinary: Positive for frequency. Negative for dysuria and hematuria.   Musculoskeletal: Positive for back pain (left). Negative for arthralgias and myalgias.   Skin: Negative for rash.   Neurological: Positive for headaches. Negative for dizziness, weakness, light-headedness and numbness.   Hematological: Does not bruise/bleed easily.   Psychiatric/Behavioral: Negative for agitation and confusion.       Physical Exam     Initial Vitals [03/18/22 1938]   BP Pulse Resp Temp SpO2   133/85 95 (!) 115 97.7 °F (36.5 °C) 99 %      MAP       --         Physical Exam    Constitutional: She is not diaphoretic. No distress.   Patient sitting upright, appearing short of breath   HENT:   Head: Normocephalic and atraumatic.   Eyes: Conjunctivae and EOM are normal.   Neck:   Normal range of  motion.  Cardiovascular: Intact distal pulses.   Irregular rhythm. Tachycardic.    Pulmonary/Chest: She exhibits tenderness (left chest wall over pacemaker leads).   Abdominal: Abdomen is soft. Bowel sounds are normal. She exhibits no distension and no mass. There is abdominal tenderness.   Musculoskeletal:         General: Tenderness (low back) present. No edema. Normal range of motion.      Cervical back: Normal range of motion.     Neurological: She is alert and oriented to person, place, and time. No cranial nerve deficit.   Skin: Skin is warm. Capillary refill takes less than 2 seconds. No erythema.   Psychiatric: She has a normal mood and affect.         ED Course   Procedures  Labs Reviewed   CBC W/ AUTO DIFFERENTIAL - Abnormal; Notable for the following components:       Result Value    RDW 15.2 (*)     All other components within normal limits   COMPREHENSIVE METABOLIC PANEL - Abnormal; Notable for the following components:    CO2 21 (*)     All other components within normal limits   PHOSPHORUS - Abnormal; Notable for the following components:    Phosphorus 2.6 (*)     All other components within normal limits   DIGOXIN LEVEL - Abnormal; Notable for the following components:    Digoxin Lvl <0.2 (*)     All other components within normal limits   URINALYSIS, REFLEX TO URINE CULTURE - Abnormal; Notable for the following components:    Color, UA Colorless (*)     Ketones, UA Trace (*)     All other components within normal limits    Narrative:     Specimen Source->Urine   TROPONIN I   B-TYPE NATRIURETIC PEPTIDE   MAGNESIUM     EKG Readings: (Independently Interpreted)   Initial Reading: No STEMI. Rhythm: Atrial Fibrillation. Heart Rate: 112. Clinical Impression: Atrial Fibrillation with RVR     ECG Results          EKG 12-lead (In process)  Result time 03/19/22 08:43:16    In process by Interface, Lab In [facility] (03/19/22 08:43:16)                 Narrative:    Test Reason : R07.9,    Vent. Rate : 112 BPM      Atrial Rate : 104 BPM     P-R Int : 000 ms          QRS Dur : 088 ms      QT Int : 330 ms       P-R-T Axes : 000 092 269 degrees     QTc Int : 450 ms    Atrial fibrillation with rapid ventricular response  Rightward axis  ST and T wave abnormality, consider inferior ischemia  ST and T wave abnormality, consider anterolateral ischemia  Abnormal ECG  When compared with ECG of 07-MAR-2022 02:21,  Significant changes have occurred    Referred By: [email or web address]   SELF           Confirmed By:                             Imaging Results          X-Ray Chest AP Portable (Final result)  Result time 03/18/22 21:27:11    Final result by Judah Bonilla MD (03/18/22 21:27:11)                 Impression:      No acute process.    Hiatal hernia.      Electronically signed by: Judah Bonilla MD  Date:    03/18/2022  Time:    21:27             Narrative:    EXAMINATION:  XR CHEST AP PORTABLE    CLINICAL HISTORY:  chest pain;    TECHNIQUE:  Single frontal view of the chest was performed.    COMPARISON:  03/07/2022.    FINDINGS:  There is stable appearance of left-sided AICD.  Monitoring EKG leads are present.    The trachea is unremarkable.  There are calcifications of the aortic knob.  The cardiomediastinal silhouette is within normal limits.  The hemidiaphragms are unremarkable.  There is a hiatal hernia.  There are no pleural effusions.  There is no evidence of a pneumothorax.  There is no evidence of pneumomediastinum.  No airspace opacity is present.  The osseous structures are unremarkable.                                 Medications   flecainide tablet 150 mg (150 mg Oral Given 3/19/22 0910)   digoxin tablet 0.125 mg (0.125 mg Oral Given 3/19/22 0910)   sodium chloride 0.9% flush 10 mL (has no administration in time range)   ondansetron disintegrating tablet 8 mg (has no administration in time range)   prochlorperazine injection Soln 5 mg (has no administration in time range)   acetaminophen tablet 650 mg (has no administration in  time range)   aluminum-magnesium hydroxide-simethicone 200-200-20 mg/5 mL suspension 30 mL (has no administration in time range)   naloxone 0.4 mg/mL injection 0.02 mg (has no administration in time range)   glucose chewable tablet 16 g (has no administration in time range)   glucose chewable tablet 24 g (has no administration in time range)   dextrose 10% bolus 125 mL (has no administration in time range)   dextrose 10% bolus 250 mL (has no administration in time range)   glucagon (human recombinant) injection 1 mg (has no administration in time range)   polyethylene glycol packet 17 g (has no administration in time range)   rivaroxaban tablet 15 mg (has no administration in time range)   flecainide tablet 150 mg (150 mg Oral Given 3/18/22 2232)     Medical Decision Making:   Initial Assessment:   Patient presents with chest pain, Afib, abdominal and back pain following ingestion of esomeprazole medication this afternoon. Patient notes continued chest pain that started this evening. Her chest pain is associated with shortness of breath and EKG shows Afib w/RVR. Patient reports sensitivity to metoprolol and diltiazem, however HR elevated to 140s in the ED. CXR, BNP, Troponin, CBC, CMP, magnesium, phosphorus ordered. UA ordered to rule out UTI.  Differential Diagnosis:   Atrial fibrillation w/RVR, ACS, PE, costochondritis, pneumonia, pancreatitis, UTI, diverticulitis, nephrolithiasis, GERD  Clinical Tests:   Lab Tests: Ordered  Radiological Study: Ordered and Reviewed  Medical Tests: Ordered and Reviewed  ED Management:  Patient's HR elevated to 140s with Afib, however patient states that she is sensitive to metoprolol and diltiazem, causing her BP to drop significantly. Case was discussed with cardiology and their recommendation was to use one time dose of metoprolol, slow push IV to bring down HR <110. Blood pressure systolic 105. EP consulted and device check was performed. Will administer evening dose of  flecainide 150 mg. CBC, CMP, CXR unremarkable. BNP, Troponin negative.  Magnesium 1.6, digoxin level <0.2. Patient to be admitted for observation with EP to follow up in the morning.            Attending Attestation:   Physician Attestation Statement for Resident:  As the supervising MD   Physician Attestation Statement: I have personally seen and examined this patient.   I agree with the above history. -:   As the supervising MD I agree with the above PE.    As the supervising MD I agree with the above treatment, course, plan, and disposition.                       Critical Care   Date: 03/19/2022  Performed by: Haile Ireland MD   Authorized by: Haile Ireland MD    Total critical care time (exclusive of procedural time) : 30 minutes  Critical care was necessary to treat or prevent imminent or life-threatening deterioration of the following conditions:  afib/rvr    Clinical Impression:   Final diagnoses:  [R07.9] Chest pain (Primary)  [I48.91] Atrial fibrillation with RVR  [R10.32] Left lower quadrant abdominal pain  [M54.9] Left-sided back pain, unspecified back location, unspecified chronicity          ED Disposition Condition    Observation               Lanie Vargas MD  Resident  03/18/22 [id number]       Haile Ireland MD  03/19/22 [id number]

## 2022-03-19 NOTE — ED NOTES
Pt states she is not able to tolerate metoprolol or diltiazem due to rapid decrease in VBP. MD notified. MD consulted cardiology. Per cardioloy MD okay to give metoprolol over slow push.

## 2022-03-19 NOTE — ASSESSMENT & PLAN NOTE
Chest pain    Patient with Paroxysmal (<7 days) atrial fibrillation which is uncontrolled currently with Digoxin and flecainide. Patient is currently in sinus rhythm.[id number] Score: 3. Anticoagulation indicated. Anticoagulation done with xarelto.  - In the ED patient found to be in afib with RVR, spontaneously concerted to NSR. HR stable in 60's without intervention. Pt reports chest pain resolved.   - Scheduled for ablation on 3/29.   - Continue flecainide 125 mg BID, digoxin 0.125 q48 hours (last dose 3/17)   - Continue xarelto 20 mg daily   - EP consulted and cardiology following.   - Metoprolol 5 mg IV (slow drip due to hx of intolerance to BB) as needed for rate control  - EKG prn for chest pain  - tele

## 2022-03-19 NOTE — HPI
"Ms. Trudi Mcknight, 67 y.o. woman with sinus pauses and bradycardia s/p DC-PPM on 1/20/2022, Atrial flutter and paroxysmal atrial fibrillation previously intolerant to beta blocker (causing fatigue and lightheadedness), and intolerant to CCB (hypotension). She was on Flecainide 100 mg BID which she recently discontinued due to "anxiety and feeling depressed with the medication". She presented to ED with symptoms of heart racing, chest discomfort associated with palpitation, and vague abdominal pain. Her ECG from last week showed atrial paced rhythm, this presentation showed atrial fibrillation with rapid ventricular response. She reported being compliant with flecainide and digoxin. Last time she took Dig was yesterday, and last time she took Flecainide this morning. Her dose is 125 mg (not 100 mg) BID. Device interrogation showed AF events started ~ 3/17 and multiple entries on 3/18/2022 ranged from minutes to hours prior her presentation.  She denies fever, recent travel or sick contact, she reported compliance with her medication including anticoagulation Rivaroxaban. Cardiology was consulted for managing atrial fibrillation with rapid ventricular response.   "

## 2022-03-19 NOTE — ASSESSMENT & PLAN NOTE
Likely due to afib with RVR on presentation, seen on EKG. Now resolved.   - BNP and troponin wnl.   - CXR stable  - prn EKG for recurrence of CP

## 2022-03-19 NOTE — NURSING
Patient called complaining of chest pain and neck pain. VS were stable. HR was in 130s -140s. Pain subsided after a few minutes. Notified HUI Maciel. No new orders given. Will continue to monitor.

## 2022-03-19 NOTE — ASSESSMENT & PLAN NOTE
Patient previously taking daily PPI, but believes it is causing her adverse side effects including dryness, abdominal pain, kidney pain, and depression.   Mild epigastric pain on exam today. Will monitor.   - Manage with prn mylanta inpatient

## 2022-03-21 ENCOUNTER — LAB VISIT (OUTPATIENT)
Dept: LAB | Facility: HOSPITAL | Age: 67
End: 2022-03-21
Attending: INTERNAL MEDICINE
Payer: MEDICARE

## 2022-03-21 DIAGNOSIS — I48.0 PAROXYSMAL ATRIAL FIBRILLATION: ICD-10-CM

## 2022-03-21 DIAGNOSIS — I49.8 OTHER SPECIFIED CARDIAC ARRHYTHMIAS: ICD-10-CM

## 2022-03-21 LAB
ANION GAP SERPL CALC-SCNC: 9 MMOL/L (ref 8–16)
APTT BLDCRRT: 29.2 SEC (ref 21–32)
BASOPHILS # BLD AUTO: 0.04 K/UL (ref 0–0.2)
BASOPHILS NFR BLD: 0.5 % (ref 0–1.9)
BUN SERPL-MCNC: 20 MG/DL (ref 8–23)
CALCIUM SERPL-MCNC: 10 MG/DL (ref 8.7–10.5)
CHLORIDE SERPL-SCNC: 108 MMOL/L (ref 95–110)
CO2 SERPL-SCNC: 24 MMOL/L (ref 23–29)
CREAT SERPL-MCNC: 0.9 MG/DL (ref 0.5–1.4)
DIFFERENTIAL METHOD: ABNORMAL
EOSINOPHIL # BLD AUTO: 0.1 K/UL (ref 0–0.5)
EOSINOPHIL NFR BLD: 1.3 % (ref 0–8)
ERYTHROCYTE [DISTWIDTH] IN BLOOD BY AUTOMATED COUNT: 15.5 % (ref 11.5–14.5)
EST. GFR  (AFRICAN AMERICAN): >60 ML/MIN/1.73 M^2
EST. GFR  (NON AFRICAN AMERICAN): >60 ML/MIN/1.73 M^2
GLUCOSE SERPL-MCNC: 112 MG/DL (ref 70–110)
HCT VFR BLD AUTO: 42.5 % (ref 37–48.5)
HGB BLD-MCNC: 13.4 G/DL (ref 12–16)
IMM GRANULOCYTES # BLD AUTO: 0.03 K/UL (ref 0–0.04)
IMM GRANULOCYTES NFR BLD AUTO: 0.4 % (ref 0–0.5)
LYMPHOCYTES # BLD AUTO: 1.3 K/UL (ref 1–4.8)
LYMPHOCYTES NFR BLD: 16.6 % (ref 18–48)
MCH RBC QN AUTO: 27.8 PG (ref 27–31)
MCHC RBC AUTO-ENTMCNC: 31.5 G/DL (ref 32–36)
MCV RBC AUTO: 88 FL (ref 82–98)
MONOCYTES # BLD AUTO: 0.6 K/UL (ref 0.3–1)
MONOCYTES NFR BLD: 7.9 % (ref 4–15)
NEUTROPHILS # BLD AUTO: 5.6 K/UL (ref 1.8–7.7)
NEUTROPHILS NFR BLD: 73.3 % (ref 38–73)
NRBC BLD-RTO: 0 /100 WBC
PLATELET # BLD AUTO: 332 K/UL (ref 150–450)
PMV BLD AUTO: 11.6 FL (ref 9.2–12.9)
POTASSIUM SERPL-SCNC: 3.8 MMOL/L (ref 3.5–5.1)
RBC # BLD AUTO: 4.82 M/UL (ref 4–5.4)
SODIUM SERPL-SCNC: 141 MMOL/L (ref 136–145)
WBC # BLD AUTO: 7.7 K/UL (ref 3.9–12.7)

## 2022-03-21 PROCEDURE — 36415 COLL VENOUS BLD VENIPUNCTURE: CPT | Mod: PO | Performed by: INTERNAL MEDICINE

## 2022-03-21 PROCEDURE — 85025 COMPLETE CBC W/AUTO DIFF WBC: CPT | Performed by: INTERNAL MEDICINE

## 2022-03-21 PROCEDURE — 85730 THROMBOPLASTIN TIME PARTIAL: CPT | Performed by: INTERNAL MEDICINE

## 2022-03-21 PROCEDURE — 80048 BASIC METABOLIC PNL TOTAL CA: CPT | Performed by: INTERNAL MEDICINE

## 2022-03-21 NOTE — ASSESSMENT & PLAN NOTE
Likely due to afib with RVR on presentation, seen on EKG. Now resolved.   - BNP and troponin wnl.   - CXR stable

## 2022-03-21 NOTE — DISCHARGE SUMMARY
[facility] Medicine  Discharge Summary      Patient Name: Trudi Mcknight  MRN: [id number]  Patient Class: OP- Observation  Admission Date: 3/18/2022  Hospital Length of Stay: 0 days  Discharge Date and Time: 3/19/2022  6:28 PM  Attending Physician: No att. providers found   Discharging Provider: Mercedez Huerta PA-C  Primary Care Provider: Renuka Moreno MD  [facility] Medicine Team: [facility] MED E Mercedez Huerta PA-C    HPI:   Trudi Mcknight is a 66 yo woman with sinus pauses and bradycardia s/p DC-PPM on 1/20/2022, atrial flutter and paroxysmal atrial fibrillation on chronic anticoagulation, and hiatal hernia who presents to the ED out of concern for afib with RVR. The patient reports chest pain, abdominal cramping, and flank pain that began this afternoon. When she checked her HR it was elevated to 140, prompting her to come to the ED. She has been taking flecainide 125 BID, and started back taking digoxin 0.125 q48 hours yesterday after being told to restart it by her cardiologist. She reported being compliant with flecainide, digoxin, and xarelto. She endorses shortness of breath, palpitations, fatigue, nausea, chest pain, headache, abdominal and bilateral flank pain. She denies any fever, chills, productive cough, vomiting, diarrhea, bleeding, myalgias, or dizziness.    In the ED, the patient is afebrile without leukocytosis. Initially tachycardic to 121 bpm max, but since resolved and stable in 60's. RR 20. Vitals otherwise stable. Labs unremarkable. BNP and trop wnl. CXR reveals stable hiatal hernia, no acute cardiopulmonary process. UA non-infectious. EP was consulted to perform a device check, which confirmed afib with RVR. Heart rate resolved without intervention and vitals are now stable.          * No surgery found *      Hospital Course:   Trudi Mcknight is a 67 y.o. female admitted to observation for Afib with RVR that converted without intervention. Cardiology  consulted, started on increased dose of flecainide (125 to 150mg) and continued digoxin every other day. Patient has planned ablation for later this month. Cardiology and Arrhthymia team recommended that patient can be discharged back on home Flecainide dose of 125mg BID and home digoxin. PharmD recommended decrease in Xarelto dose from 20mg to 15mg due to kidney function. Patient complains of vague intermittent abdominal pain. KUB and labs unremarkable. Patient stable for discharge home. Return precautions given.        Goals of Care Treatment Preferences:  Code Status: Full Code      Consults:   Consults (From admission, onward)        Status Ordering Provider     Inpatient consult to Electrophysiology  Once        Provider:  (Not yet assigned)    Completed UNIQUE ARROYO          * Paroxysmal atrial fibrillation with RVR  Chest pain    Patient with Paroxysmal (<7 days) atrial fibrillation which is uncontrolled currently with Digoxin and flecainide. Patient is currently in sinus rhythm.[id number] Score: 3. Anticoagulation indicated. Anticoagulation done with xarelto.  - In the ED patient found to be in afib with RVR, spontaneously concerted to NSR. HR stable in 60's without intervention. Pt reports chest pain resolved.   - Scheduled for ablation on 3/29.   - Continue flecainide 125 mg BID, digoxin 0.125 q48 hours (last dose 3/17)   - Continue xarelto 20 mg daily   - EP consulted and cardiology followed  - f.u with cardiology    Left flank pain  Left CVA tenderness on exam. However, diffuse paraspinal thoracolumbar tenderness present as well.   - Pt reports recent treatment for UTI  - UA 3/18 non-infectious   - prn tylenol for pain   - Cr stable, monitor bmp   - KUB unremarkable    Chest pain  Likely due to afib with RVR on presentation, seen on EKG. Now resolved.   - BNP and troponin wnl.   - CXR stable        Final Active Diagnoses:    Diagnosis Date Noted POA    PRINCIPAL PROBLEM:  Paroxysmal atrial fibrillation  with RVR [I48.0] 01/17/2022 Yes    S/P placement of cardiac pacemaker [Z95.0] 03/18/2022 Yes    Left flank pain [R10.9] 03/18/2022 Yes    Typical atrial flutter [I48.3] 02/04/2022 Yes    Sick sinus syndrome [I49.5] 01/21/2022 Yes    Chest pain [R07.9] 01/17/2022 Yes    Hiatal hernia with GERD and esophagitis [K44.9, K21.00] 03/07/2021 Yes      Problems Resolved During this Admission:    Diagnosis Date Noted Date Resolved POA    Left lower quadrant abdominal pain [R10.32]  03/18/2022 Yes    Left-sided back pain [M54.9]  03/18/2022 Yes    Atrial fibrillation [I48.91] 03/07/2021 03/18/2022 Yes       Discharged Condition: stable    Disposition: Home or Self Care    Follow Up:   Follow-up Information     Renuka Moreno MD Follow up in 5 day(s).    Specialty: Internal Medicine  Contact information:  [address]  [place] [address]  [phone]                       Patient Instructions:      Diet Cardiac     Notify your health care provider if you experience any of the following:  severe uncontrolled pain     Notify your health care provider if you experience any of the following:  difficulty breathing or increased cough     Notify your health care provider if you experience any of the following:  increased confusion or weakness     Notify your health care provider if you experience any of the following:  persistent dizziness, light-headedness, or visual disturbances     Activity as tolerated       Significant Diagnostic Studies: Labs: All labs within the past 24 hours have been reviewed    Pending Diagnostic Studies:     None         Medications:  Reconciled Home Medications:      Medication List      CHANGE how you take these medications    XARELTO 15 mg Tab  Generic drug: rivaroxaban  Take 1 tablet (15 mg total) by mouth daily with dinner or evening meal.  What changed:   · medication strength  · how much to take        CONTINUE taking these medications    flecainide 125 MG Tab  Commonly known  as: TAMBOCOR  Take 1 tablet (125 mg total) by mouth every 12 (twelve) hours.     FLUZONE HIGHDOSE QUAD 21-22  mcg/0.7 mL Syrg  Generic drug: flu vacc [id number](65yr up)-PF     polyethylene glycol 17 gram Pwpk  Commonly known as: GLYCOLAX  Take 17 g by mouth once as needed (Constipation).            Indwelling Lines/Drains at time of discharge:   Lines/Drains/Airways     None                 Time spent on the discharge of patient: 38 minutes         Mercedez Huerta PA-C  Department of Hospital Medicine  [facility] - Cardiology Stepdown

## 2022-03-21 NOTE — HOSPITAL COURSE
Trudi Mcknight is a 67 y.o. female admitted to observation for Afib with RVR that converted without intervention. Cardiology consulted, started on increased dose of flecainide (125 to 150mg) and continued digoxin every other day. Patient has planned ablation for later this month. Cardiology and Arrhthymia team recommended that patient can be discharged back on home Flecainide dose of 125mg BID and home digoxin. PharmD recommended decrease in Xarelto dose from 20mg to 15mg due to kidney function. Patient complains of vague intermittent abdominal pain. KUB and labs unremarkable. Patient stable for discharge home. Return precautions given.

## 2022-03-22 ENCOUNTER — HOSPITAL ENCOUNTER (OUTPATIENT)
Dept: CARDIOLOGY | Facility: HOSPITAL | Age: 67
Discharge: HOME | End: 2022-03-22
Attending: INTERNAL MEDICINE
Payer: MEDICARE

## 2022-03-22 VITALS
DIASTOLIC BLOOD PRESSURE: 68 MMHG | HEIGHT: 61 IN | WEIGHT: 121 LBS | SYSTOLIC BLOOD PRESSURE: 124 MMHG | HEART RATE: 60 BPM | BODY MASS INDEX: 22.84 KG/M2

## 2022-03-22 PROCEDURE — 93306 ECHO (CUPID ONLY): ICD-10-PCS | Mod: 26,,, | Performed by: INTERNAL MEDICINE

## 2022-03-22 PROCEDURE — 93306 TTE W/DOPPLER COMPLETE: CPT

## 2022-03-22 PROCEDURE — 93306 TTE W/DOPPLER COMPLETE: CPT | Mod: 26,,, | Performed by: INTERNAL MEDICINE

## 2022-03-23 ENCOUNTER — PATIENT MESSAGE (OUTPATIENT)
Dept: ELECTROPHYSIOLOGY | Facility: CLINIC | Age: 67
End: 2022-03-23
Payer: MEDICARE

## 2022-03-23 LAB
ASCENDING AORTA: 2.99 CM
AV INDEX (PROSTH): 0.86
AV MEAN GRADIENT: 2 MMHG
AV PEAK GRADIENT: 4 MMHG
AV VALVE AREA: 2.75 CM2
AV VELOCITY RATIO: 0.81
BSA FOR ECHO PROCEDURE: 1.54 M2
CV ECHO LV RWT: 2.59 CM
DOP CALC AO PEAK VEL: 1.02 M/S
DOP CALC AO VTI: 24.33 CM
DOP CALC LVOT AREA: 3.2 CM2
DOP CALC LVOT DIAMETER: 2.02 CM
DOP CALC LVOT PEAK VEL: 0.83 M/S
DOP CALC LVOT STROKE VOLUME: 66.82 CM3
DOP CALCLVOT PEAK VEL VTI: 20.86 CM
E WAVE DECELERATION TIME: 205.2 MSEC
E/A RATIO: 0.79
E/E' RATIO: 9.38 M/S
ECHO LV POSTERIOR WALL: 2.62 CM (ref 0.6–1.1)
EJECTION FRACTION: 60 %
FRACTIONAL SHORTENING: 6 % (ref 28–44)
INTERVENTRICULAR SEPTUM: 0.7 CM (ref 0.6–1.1)
LA MAJOR: 4.05 CM
LA MINOR: 4.55 CM
LA WIDTH: 3.19 CM
LEFT ATRIUM SIZE: 2.95 CM
LEFT ATRIUM VOLUME INDEX MOD: 24.3 ML/M2
LEFT ATRIUM VOLUME INDEX: 22.4 ML/M2
LEFT ATRIUM VOLUME MOD: 37.21 CM3
LEFT ATRIUM VOLUME: 34.28 CM3
LEFT INTERNAL DIMENSION IN SYSTOLE: 1.9 CM (ref 2.1–4)
LEFT VENTRICLE DIASTOLIC VOLUME INDEX: 8.58 ML/M2
LEFT VENTRICLE DIASTOLIC VOLUME: 13.13 ML
LEFT VENTRICLE MASS INDEX: 79 G/M2
LEFT VENTRICLE SYSTOLIC VOLUME INDEX: 7.3 ML/M2
LEFT VENTRICLE SYSTOLIC VOLUME: 11.19 ML
LEFT VENTRICULAR INTERNAL DIMENSION IN DIASTOLE: 2.02 CM (ref 3.5–6)
LEFT VENTRICULAR MASS: 120.43 G
LV LATERAL E/E' RATIO: 7.63 M/S
LV SEPTAL E/E' RATIO: 12.2 M/S
MV A" WAVE DURATION": 9.99 MSEC
MV PEAK A VEL: 0.77 M/S
MV PEAK E VEL: 0.61 M/S
MV STENOSIS PRESSURE HALF TIME: 59.51 MS
MV VALVE AREA P 1/2 METHOD: 3.7 CM2
PISA TR MAX VEL: 2.38 M/S
PULM VEIN S/D RATIO: 0.91
PV PEAK D VEL: 0.46 M/S
PV PEAK S VEL: 0.42 M/S
RA MAJOR: 4.32 CM
RA PRESSURE: 3 MMHG
RA WIDTH: 3.09 CM
RIGHT VENTRICULAR END-DIASTOLIC DIMENSION: 3.36 CM
RV TISSUE DOPPLER FREE WALL SYSTOLIC VELOCITY 1 (APICAL 4 CHAMBER VIEW): 11.23 CM/S
SINUS: 3.53 CM
STJ: 3.12 CM
TDI LATERAL: 0.08 M/S
TDI SEPTAL: 0.05 M/S
TDI: 0.07 M/S
TR MAX PG: 23 MMHG
TRICUSPID ANNULAR PLANE SYSTOLIC EXCURSION: 1.97 CM
TV REST PULMONARY ARTERY PRESSURE: 26 MMHG

## 2022-03-26 ENCOUNTER — TELEPHONE (OUTPATIENT)
Dept: CARDIOLOGY | Facility: HOSPITAL | Age: 67
End: 2022-03-26
Payer: MEDICARE

## 2022-03-26 NOTE — TELEPHONE ENCOUNTER
Patient called regarding AF RVR. Patient previously on flecainide. Has been holding it as part of protocol prior to PVI. Patient went into AF earlier today but just this evening went into RVR. Patient called asking what to do. Patient has previously prescribed lopressor 25mg still at house. Patient states stopped secondary to pulse+BP. Patient now with PPM and patient noted to be hypertensive at home (Bps 140s/low 100s). Given this I advised that she take her lopressor 25mg and could take an additional dose if not improved in an hour. Patient understanding and agreed. Discussed warning signs to go to ED.

## 2022-03-28 ENCOUNTER — ANESTHESIA EVENT (OUTPATIENT)
Dept: MEDSURG UNIT | Facility: HOSPITAL | Age: 67
End: 2022-03-28
Payer: MEDICARE

## 2022-03-28 ENCOUNTER — TELEPHONE (OUTPATIENT)
Dept: ELECTROPHYSIOLOGY | Facility: CLINIC | Age: 67
End: 2022-03-28
Payer: MEDICARE

## 2022-03-28 NOTE — TELEPHONE ENCOUNTER
Spoke to patient    CONFIRMED procedure arrival time of 5:15am  tomorrow for PVI Cryo with Dr Duncan     Reiterated instructions including:  -Directions to check in desk  -NPO after midnight night prior to procedure  -High importance of HOLDING Flecainide for 3 days prior to procedure. Confirmed that last dose was 3/25/22   -Confirmed compliance of Xarelto daily with evening meal. She will take it this evening.   -Pre-procedure LABS reviewed, no alerts noted.   -COVID test not required. Covid vax on file  -Confirmed no fever, cough, or shortness of breath in the past 30 days  -Confirmed no redness, rash, irritation, or yeast infection to groin area.   -Reviewed current visitor policy    Patient verbalizes understanding of above and appreciates call.

## 2022-03-29 ENCOUNTER — ANESTHESIA (OUTPATIENT)
Dept: MEDSURG UNIT | Facility: HOSPITAL | Age: 67
End: 2022-03-29
Payer: MEDICARE

## 2022-03-29 ENCOUNTER — HOSPITAL ENCOUNTER (OUTPATIENT)
Facility: HOSPITAL | Age: 67
Discharge: HOME | End: 2022-03-29
Attending: INTERNAL MEDICINE | Admitting: INTERNAL MEDICINE
Payer: MEDICARE

## 2022-03-29 VITALS
OXYGEN SATURATION: 96 % | BODY MASS INDEX: 21.71 KG/M2 | DIASTOLIC BLOOD PRESSURE: 58 MMHG | TEMPERATURE: 99 F | HEART RATE: 108 BPM | WEIGHT: 115 LBS | SYSTOLIC BLOOD PRESSURE: 129 MMHG | HEIGHT: 61 IN | RESPIRATION RATE: 18 BRPM

## 2022-03-29 DIAGNOSIS — I49.9 ARRHYTHMIA: ICD-10-CM

## 2022-03-29 DIAGNOSIS — I48.0 PAROXYSMAL ATRIAL FIBRILLATION: ICD-10-CM

## 2022-03-29 DIAGNOSIS — I48.0 PAROXYSMAL ATRIAL FIBRILLATION WITH RVR: ICD-10-CM

## 2022-03-29 DIAGNOSIS — I49.8 OTHER SPECIFIED CARDIAC ARRHYTHMIAS: ICD-10-CM

## 2022-03-29 DIAGNOSIS — I48.91 ATRIAL FIBRILLATION: ICD-10-CM

## 2022-03-29 LAB
INR PPP: 1.1 (ref 0.8–1.2)
POC ACTIVATED CLOTTING TIME K: 136 SEC (ref 74–137)
POC ACTIVATED CLOTTING TIME K: 142 SEC (ref 74–137)
POC ACTIVATED CLOTTING TIME K: 333 SEC (ref 74–137)
POC ACTIVATED CLOTTING TIME K: 386 SEC (ref 74–137)
PROTHROMBIN TIME: 11.9 SEC (ref 9–12.5)
SAMPLE: ABNORMAL
SAMPLE: NORMAL

## 2022-03-29 PROCEDURE — 93656 PR ELECTROPHYS EVAL, COMPREHEN, W/ABLATION OF ATRIAL FIBR: ICD-10-PCS | Mod: ,,, | Performed by: INTERNAL MEDICINE

## 2022-03-29 PROCEDURE — 25000003 PHARM REV CODE 250: Performed by: INTERNAL MEDICINE

## 2022-03-29 PROCEDURE — 63600175 PHARM REV CODE 636 W HCPCS: Performed by: INTERNAL MEDICINE

## 2022-03-29 PROCEDURE — 85610 PROTHROMBIN TIME: CPT | Performed by: NURSE PRACTITIONER

## 2022-03-29 PROCEDURE — C1733 CATH, EP, OTHR THAN COOL-TIP: HCPCS | Performed by: INTERNAL MEDICINE

## 2022-03-29 PROCEDURE — 93010 EKG 12-LEAD: ICD-10-PCS | Mod: ,,, | Performed by: INTERNAL MEDICINE

## 2022-03-29 PROCEDURE — 93005 ELECTROCARDIOGRAM TRACING: CPT

## 2022-03-29 PROCEDURE — 93656 COMPRE EP EVAL ABLTJ ATR FIB: CPT | Performed by: INTERNAL MEDICINE

## 2022-03-29 PROCEDURE — C1769 GUIDE WIRE: HCPCS | Performed by: INTERNAL MEDICINE

## 2022-03-29 PROCEDURE — 63600175 PHARM REV CODE 636 W HCPCS: Performed by: NURSE ANESTHETIST, CERTIFIED REGISTERED

## 2022-03-29 PROCEDURE — 25500020 PHARM REV CODE 255: Performed by: INTERNAL MEDICINE

## 2022-03-29 PROCEDURE — 93656 COMPRE EP EVAL ABLTJ ATR FIB: CPT | Mod: ,,, | Performed by: INTERNAL MEDICINE

## 2022-03-29 PROCEDURE — D9220A PRA ANESTHESIA: ICD-10-PCS | Mod: ,,, | Performed by: ANESTHESIOLOGY

## 2022-03-29 PROCEDURE — 37000009 HC ANESTHESIA EA ADD 15 MINS: Performed by: INTERNAL MEDICINE

## 2022-03-29 PROCEDURE — 93010 ELECTROCARDIOGRAM REPORT: CPT | Mod: ,,, | Performed by: INTERNAL MEDICINE

## 2022-03-29 PROCEDURE — 25000003 PHARM REV CODE 250: Performed by: NURSE PRACTITIONER

## 2022-03-29 PROCEDURE — D9220A PRA ANESTHESIA: Mod: ,,, | Performed by: ANESTHESIOLOGY

## 2022-03-29 PROCEDURE — 63600175 PHARM REV CODE 636 W HCPCS: Performed by: ANESTHESIOLOGY

## 2022-03-29 PROCEDURE — 36620 INSERTION CATHETER ARTERY: CPT | Mod: 59,,, | Performed by: ANESTHESIOLOGY

## 2022-03-29 PROCEDURE — 27201037 HC PRESSURE MONITORING SET UP

## 2022-03-29 PROCEDURE — 37000008 HC ANESTHESIA 1ST 15 MINUTES: Performed by: INTERNAL MEDICINE

## 2022-03-29 PROCEDURE — 25000003 PHARM REV CODE 250: Performed by: NURSE ANESTHETIST, CERTIFIED REGISTERED

## 2022-03-29 PROCEDURE — 36620 PR INSERT CATH,ART,PERCUT,SHORTTERM: ICD-10-PCS | Mod: 59,,, | Performed by: ANESTHESIOLOGY

## 2022-03-29 PROCEDURE — 27201423 OPTIME MED/SURG SUP & DEVICES STERILE SUPPLY: Performed by: INTERNAL MEDICINE

## 2022-03-29 PROCEDURE — C1753 CATH, INTRAVAS ULTRASOUND: HCPCS | Performed by: INTERNAL MEDICINE

## 2022-03-29 PROCEDURE — C1766 INTRO/SHEATH,STRBLE,NON-PEEL: HCPCS | Performed by: INTERNAL MEDICINE

## 2022-03-29 PROCEDURE — C1894 INTRO/SHEATH, NON-LASER: HCPCS | Performed by: INTERNAL MEDICINE

## 2022-03-29 PROCEDURE — C1730 CATH, EP, 19 OR FEW ELECT: HCPCS | Performed by: INTERNAL MEDICINE

## 2022-03-29 RX ORDER — PHENYLEPHRINE HYDROCHLORIDE 10 MG/ML
INJECTION INTRAVENOUS
Status: DISCONTINUED | OUTPATIENT
Start: 2022-03-29 | End: 2022-03-29

## 2022-03-29 RX ORDER — OMEPRAZOLE 20 MG/1
20 TABLET, ORALLY DISINTEGRATING, DELAYED RELEASE ORAL DAILY
COMMUNITY
Start: 2022-03-29 | End: 2022-04-28

## 2022-03-29 RX ORDER — HALOPERIDOL 5 MG/ML
0.5 INJECTION INTRAMUSCULAR ONCE
Status: COMPLETED | OUTPATIENT
Start: 2022-03-29 | End: 2022-03-29

## 2022-03-29 RX ORDER — FENTANYL CITRATE 50 UG/ML
25 INJECTION, SOLUTION INTRAMUSCULAR; INTRAVENOUS EVERY 5 MIN PRN
Status: DISCONTINUED | OUTPATIENT
Start: 2022-03-29 | End: 2022-03-29

## 2022-03-29 RX ORDER — SODIUM CHLORIDE 0.9 % (FLUSH) 0.9 %
10 SYRINGE (ML) INJECTION
Status: DISCONTINUED | OUTPATIENT
Start: 2022-03-29 | End: 2022-03-29 | Stop reason: HOSPADM

## 2022-03-29 RX ORDER — HEPARIN SODIUM 10000 [USP'U]/100ML
INJECTION, SOLUTION INTRAVENOUS CONTINUOUS PRN
Status: DISCONTINUED | OUTPATIENT
Start: 2022-03-29 | End: 2022-03-29

## 2022-03-29 RX ORDER — VANCOMYCIN HCL IN 5 % DEXTROSE 1G/250ML
1000 PLASTIC BAG, INJECTION (ML) INTRAVENOUS
Status: DISCONTINUED | OUTPATIENT
Start: 2022-03-29 | End: 2022-03-29 | Stop reason: HOSPADM

## 2022-03-29 RX ORDER — FENTANYL CITRATE 50 UG/ML
INJECTION, SOLUTION INTRAMUSCULAR; INTRAVENOUS
Status: DISCONTINUED | OUTPATIENT
Start: 2022-03-29 | End: 2022-03-29

## 2022-03-29 RX ORDER — ACETAMINOPHEN 10 MG/ML
1000 INJECTION, SOLUTION INTRAVENOUS ONCE
Status: COMPLETED | OUTPATIENT
Start: 2022-03-29 | End: 2022-03-29

## 2022-03-29 RX ORDER — LIDOCAINE HYDROCHLORIDE 20 MG/ML
INJECTION, SOLUTION EPIDURAL; INFILTRATION; INTRACAUDAL; PERINEURAL
Status: DISCONTINUED | OUTPATIENT
Start: 2022-03-29 | End: 2022-03-29

## 2022-03-29 RX ORDER — IODIXANOL 320 MG/ML
INJECTION, SOLUTION INTRAVASCULAR
Status: DISCONTINUED | OUTPATIENT
Start: 2022-03-29 | End: 2022-03-29

## 2022-03-29 RX ORDER — FLECAINIDE ACETATE 50 MG/1
50 TABLET ORAL EVERY 12 HOURS
Qty: 60 TABLET | Refills: 1 | Status: SHIPPED | OUTPATIENT
Start: 2022-03-29 | End: 2022-04-07

## 2022-03-29 RX ORDER — LORAZEPAM 2 MG/ML
0.5 INJECTION INTRAMUSCULAR ONCE
Status: DISCONTINUED | OUTPATIENT
Start: 2022-03-29 | End: 2022-03-29

## 2022-03-29 RX ORDER — ONDANSETRON 2 MG/ML
INJECTION INTRAMUSCULAR; INTRAVENOUS
Status: DISCONTINUED | OUTPATIENT
Start: 2022-03-29 | End: 2022-03-29

## 2022-03-29 RX ORDER — PANTOPRAZOLE SODIUM 40 MG/1
40 TABLET, DELAYED RELEASE ORAL DAILY
Qty: 30 TABLET | Refills: 0 | Status: SHIPPED | OUTPATIENT
Start: 2022-03-29 | End: 2022-03-29 | Stop reason: HOSPADM

## 2022-03-29 RX ORDER — HEPARIN SOD,PORCINE/0.9 % NACL 1000/500ML
INTRAVENOUS SOLUTION INTRAVENOUS
Status: DISCONTINUED | OUTPATIENT
Start: 2022-03-29 | End: 2022-03-29

## 2022-03-29 RX ORDER — LIDOCAINE HYDROCHLORIDE 20 MG/ML
INJECTION, SOLUTION INFILTRATION; PERINEURAL
Status: DISCONTINUED | OUTPATIENT
Start: 2022-03-29 | End: 2022-03-29

## 2022-03-29 RX ORDER — PROTAMINE SULFATE 10 MG/ML
INJECTION, SOLUTION INTRAVENOUS
Status: DISCONTINUED | OUTPATIENT
Start: 2022-03-29 | End: 2022-03-29

## 2022-03-29 RX ORDER — HEPARIN SODIUM 1000 [USP'U]/ML
INJECTION, SOLUTION INTRAVENOUS; SUBCUTANEOUS
Status: DISCONTINUED | OUTPATIENT
Start: 2022-03-29 | End: 2022-03-29

## 2022-03-29 RX ORDER — DEXAMETHASONE SODIUM PHOSPHATE 4 MG/ML
INJECTION, SOLUTION INTRA-ARTICULAR; INTRALESIONAL; INTRAMUSCULAR; INTRAVENOUS; SOFT TISSUE
Status: DISCONTINUED | OUTPATIENT
Start: 2022-03-29 | End: 2022-03-29

## 2022-03-29 RX ORDER — ROCURONIUM BROMIDE 10 MG/ML
INJECTION, SOLUTION INTRAVENOUS
Status: DISCONTINUED | OUTPATIENT
Start: 2022-03-29 | End: 2022-03-29

## 2022-03-29 RX ORDER — IBUPROFEN 600 MG/1
600 TABLET ORAL 3 TIMES DAILY
Status: DISCONTINUED | OUTPATIENT
Start: 2022-03-29 | End: 2022-03-29 | Stop reason: HOSPADM

## 2022-03-29 RX ORDER — LORAZEPAM 2 MG/ML
0.5 INJECTION INTRAMUSCULAR ONCE
Status: DISCONTINUED | OUTPATIENT
Start: 2022-03-29 | End: 2022-03-29 | Stop reason: HOSPADM

## 2022-03-29 RX ORDER — ACETAMINOPHEN 325 MG/1
650 TABLET ORAL EVERY 4 HOURS PRN
Status: DISCONTINUED | OUTPATIENT
Start: 2022-03-29 | End: 2022-03-29 | Stop reason: HOSPADM

## 2022-03-29 RX ORDER — SUCCINYLCHOLINE CHLORIDE 20 MG/ML
INJECTION INTRAMUSCULAR; INTRAVENOUS
Status: DISCONTINUED | OUTPATIENT
Start: 2022-03-29 | End: 2022-03-29

## 2022-03-29 RX ORDER — FLECAINIDE ACETATE 50 MG/1
50 TABLET ORAL EVERY 12 HOURS
Qty: 60 TABLET | Refills: 1
Start: 2022-03-29 | End: 2022-03-29 | Stop reason: SDUPTHER

## 2022-03-29 RX ORDER — PROPOFOL 10 MG/ML
VIAL (ML) INTRAVENOUS
Status: DISCONTINUED | OUTPATIENT
Start: 2022-03-29 | End: 2022-03-29

## 2022-03-29 RX ORDER — FLECAINIDE ACETATE 50 MG/1
50 TABLET ORAL EVERY 12 HOURS
Qty: 60 TABLET | Refills: 1 | Status: SHIPPED | OUTPATIENT
Start: 2022-03-29 | End: 2022-03-29 | Stop reason: SDUPTHER

## 2022-03-29 RX ADMIN — HEPARIN SODIUM 10400 UNITS: 1000 INJECTION INTRAVENOUS; SUBCUTANEOUS at 08:03

## 2022-03-29 RX ADMIN — ROCURONIUM BROMIDE 5 MG: 10 INJECTION, SOLUTION INTRAVENOUS at 07:03

## 2022-03-29 RX ADMIN — PROTAMINE SULFATE 5 MG: 10 INJECTION, SOLUTION INTRAVENOUS at 09:03

## 2022-03-29 RX ADMIN — HEPARIN SODIUM AND DEXTROSE 12 UNITS/KG/HR: 10000; 5 INJECTION INTRAVENOUS at 08:03

## 2022-03-29 RX ADMIN — HEPARIN SODIUM 1600 UNITS: 1000 INJECTION INTRAVENOUS; SUBCUTANEOUS at 08:03

## 2022-03-29 RX ADMIN — LIDOCAINE HYDROCHLORIDE 60 MG: 20 INJECTION, SOLUTION EPIDURAL; INFILTRATION; INTRACAUDAL; PERINEURAL at 07:03

## 2022-03-29 RX ADMIN — SUCCINYLCHOLINE CHLORIDE 60 MG: 20 INJECTION, SOLUTION INTRAMUSCULAR; INTRAVENOUS at 07:03

## 2022-03-29 RX ADMIN — PROPOFOL 100 MG: 10 INJECTION, EMULSION INTRAVENOUS at 07:03

## 2022-03-29 RX ADMIN — ONDANSETRON HYDROCHLORIDE 4 MG: 2 INJECTION INTRAMUSCULAR; INTRAVENOUS at 07:03

## 2022-03-29 RX ADMIN — HALOPERIDOL LACTATE 0.5 MG: 5 INJECTION, SOLUTION INTRAMUSCULAR at 10:03

## 2022-03-29 RX ADMIN — SODIUM CHLORIDE: 0.9 INJECTION, SOLUTION INTRAVENOUS at 07:03

## 2022-03-29 RX ADMIN — PROPOFOL 30 MG: 10 INJECTION, EMULSION INTRAVENOUS at 07:03

## 2022-03-29 RX ADMIN — FENTANYL CITRATE 50 MCG: 50 INJECTION, SOLUTION INTRAMUSCULAR; INTRAVENOUS at 09:03

## 2022-03-29 RX ADMIN — SODIUM CHLORIDE, SODIUM GLUCONATE, SODIUM ACETATE, POTASSIUM CHLORIDE, MAGNESIUM CHLORIDE, SODIUM PHOSPHATE, DIBASIC, AND POTASSIUM PHOSPHATE: .53; .5; .37; .037; .03; .012; .00082 INJECTION, SOLUTION INTRAVENOUS at 07:03

## 2022-03-29 RX ADMIN — PROTAMINE SULFATE 10 MG: 10 INJECTION, SOLUTION INTRAVENOUS at 09:03

## 2022-03-29 RX ADMIN — PROTAMINE SULFATE 37 MG: 10 INJECTION, SOLUTION INTRAVENOUS at 09:03

## 2022-03-29 RX ADMIN — DEXAMETHASONE SODIUM PHOSPHATE 4 MG: 4 INJECTION, SOLUTION INTRAMUSCULAR; INTRAVENOUS at 07:03

## 2022-03-29 RX ADMIN — IBUPROFEN 600 MG: 600 TABLET, FILM COATED ORAL at 02:03

## 2022-03-29 RX ADMIN — ACETAMINOPHEN 1000 MG: 10 INJECTION INTRAVENOUS at 10:03

## 2022-03-29 RX ADMIN — FENTANYL CITRATE 50 MCG: 50 INJECTION, SOLUTION INTRAMUSCULAR; INTRAVENOUS at 07:03

## 2022-03-29 RX ADMIN — PHENYLEPHRINE HYDROCHLORIDE 200 MCG: 10 INJECTION, SOLUTION INTRAMUSCULAR; INTRAVENOUS; SUBCUTANEOUS at 07:03

## 2022-03-29 RX ADMIN — SODIUM CHLORIDE 0.5 MCG/KG/MIN: 9 INJECTION, SOLUTION INTRAVENOUS at 07:03

## 2022-03-29 NOTE — H&P
"[facility] - Short Stay Cardiac Unit  Cardiac Electrophysiology  History and Physical     Admission Date: 3/29/2022  Code Status: Prior   Attending Provider: Ernesto Duncan MD   Principal Problem:<principal problem not specified>    Subjective:     Chief Complaint:  Paroxysmal AF     HPI:  Trudi Mcknight presents to short stay cardiac unit on 03/29/2022 for planned cryo PVI with Dr. Duncan.     Ms. Mcknight is a 67 y.o. female with sinus pauses and bradycardia s/p DC-PPM on 1/20/2022, atrial flutter and paroxysmal atrial fibrillation previously intolerant to beta blocker (causing fatigue and lightheadedness), and intolerant to CCB (hypotension). She was on Flecainide 100 mg BID which she recently discontinued due to "anxiety and feeling depressed with the medication". She presented to ED on 3/18/22 with symptoms of heart racing, chest discomfort associated with palpitation, and vague abdominal pain. Her ECG from last week showed atrial paced rhythm, however ECG on most recent ED visit showed atrial fibrillation with rapid ventricular response. She reported being compliant with flecainide and digoxin. Device interrogation showed AF events started ~ 3/17 and multiple entries on 3/18/2022 ranged from minutes to hours prior her presentation. Prior to discharge, Ms. Mcknight converted back to sinus rhythm. She is followed by Dr. Parson and the long-term plan, prior to most recent admission was to proceed with catheter ablation for definitive treatment of AF.     Today, Ms. Mcknight reports feeling well. She reports that on 3/26/22 she had recurrence of AF and resumed metoprolol for rate control. She had been holding flecainide as part of PVI protocol. She is anticoagulated with xarelto 15 mg. Review of recent labs show normal renal function with Cr 0.9. CBC and coags WNL.     EKG:  My independent visualization of most recent EKG is AP/VS at 60 bpm       Past Medical History:   Diagnosis Date    Anticoagulant long-term use     " Esophagitis     Hiatal hernia     Meniere's disease     Paroxysmal atrial fibrillation 2021    Shingles     Sick sinus syndrome 2022    s/p Dual chamber pacemaker    Thyroid disease        Past Surgical History:   Procedure Laterality Date    A-V CARDIAC PACEMAKER INSERTION N/A 2022    Procedure: INSERTION, CARDIAC PACEMAKER, DUAL CHAMBER;  Surgeon: Ernesto Duncan MD;  Location: [facility] EP LAB;  Service: Cardiology;  Laterality: N/A;  SB, DUAL PPM, SJM, ANES, SK, [id number]    BREAST CYST ASPIRATION           SECTION      COLONOSCOPY N/A 2019    Procedure: COLONOSCOPY;  Surgeon: INGRID Russo MD;  Location: [facility] ENDO ([address] [place] [address]);  Service: Endoscopy;  Laterality: N/A;    deviated septum repair      HYSTERECTOMY      lump removed from tongue      TONSILLECTOMY         Review of patient's allergies indicates:   Allergen Reactions    Lasix [furosemide] Shortness Of Breath    Penicillins Hives     causes congestion and hives    Tricyclic compounds        No current facility-administered medications on file prior to encounter.     Current Outpatient Medications on File Prior to Encounter   Medication Sig    polyethylene glycol (GLYCOLAX) 17 gram PwPk Take 17 g by mouth once as needed (Constipation).    FLUZONE HIGHDOSE QUAD 21-22  mcg/0.7 mL Syrg      Family History       Problem Relation (Age of Onset)    Breast cancer Mother, Paternal Grandmother    Diverticulitis Brother, Sister    Heart disease Mother (55), Father, Brother    Hyperlipidemia Mother    Hypertension Mother, Father          Tobacco Use    Smoking status: Never Smoker    Smokeless tobacco: Never Used   Substance and Sexual Activity    Alcohol use: No     Alcohol/week: 0.0 standard drinks     Comment: rarely    Drug use: No    Sexual activity: Not Currently     Review of Systems   Constitutional: Negative for malaise/fatigue.   Cardiovascular:  Positive for dyspnea on exertion, irregular heartbeat  and palpitations. Negative for chest pain, leg swelling and syncope.   Hematologic/Lymphatic: Negative for bleeding problem.   Neurological:  Negative for light-headedness.   Psychiatric/Behavioral:  Negative for altered mental status. The patient is not nervous/anxious.    Objective:     Vital Signs (Most Recent):  Temp: 99.1 °F (37.3 °C) (03/29/22 0556)  Pulse: 81 (03/29/22 0556)  Resp: 18 (03/29/22 0556)  BP: 134/64 (03/29/22 0557)  SpO2: 96 % (03/29/22 0556) Vital Signs (24h Range):  Temp:  [99.1 °F (37.3 °C)] 99.1 °F (37.3 °C)  Pulse:  [81] 81  Resp:  [18] 18  SpO2:  [96 %] 96 %  BP: (123-134)/(64-78) 134/64       Weight: 52.2 kg (115 lb)  Body mass index is 21.73 kg/m².    SpO2: 96 %  O2 Device (Oxygen Therapy): room air    Physical Exam  Vitals reviewed.   Constitutional:       Appearance: She is well-developed.   Cardiovascular:      Rate and Rhythm: Normal rate and regular rhythm.      Pulses: Intact distal pulses.      Heart sounds: Normal heart sounds, S1 normal and S2 normal.   Pulmonary:      Effort: Pulmonary effort is normal.      Breath sounds: Normal breath sounds.   Musculoskeletal:         General: Normal range of motion.   Skin:     General: Skin is warm and dry.   Neurological:      Mental Status: She is alert and oriented to person, place, and time.       Assessment and Plan:     Paroxysmal atrial fibrillation with RVR  - Anticoagulated on xarelto.  Last dose on 3/28/22  - Atrial paced rhythm, defer RADHA  - Anesthesia for sedation   - Cryo PVI  - ERIN     Informed Consent  -The risks, benefits & alternatives of the procedure were explained to the patient.    -The risks of atrial fibrillation ablation include but are not limited to: bleeding, hematoma, vascular damage, cardiac tamponade, stroke, PV stenosis, AE fistula, phrenic nerve damage, and death.  After considering her options she has decided to proceed.   -Informed consent was obtained & the patient is agreeable to proceed with the  procedure.             Ernestina Rios NP  Cardiac Electrophysiology  [facility] - Short Stay Cardiac Unit

## 2022-03-29 NOTE — TRANSFER OF CARE
"Anesthesia Transfer of Care Note    Patient: Trudi Mcknight    Procedure(s) Performed: Procedure(s) (LRB):  ABLATION, ATRIAL FIBRILLATION, CRYO (N/A)    Patient location: PACU    Anesthesia Type: general    Transport from OR: Transported from OR on 6-10 L/min O2 by face mask with adequate spontaneous ventilation    Post pain: adequate analgesia    Post assessment: no apparent anesthetic complications and tolerated procedure well    Post vital signs: stable    Level of consciousness: awake and responds to stimulation    Nausea/Vomiting: no nausea/vomiting    Complications: none    Transfer of care protocol was followed      Last vitals:   Visit Vitals  /64 (BP Location: Right arm, Patient Position: Lying)   Pulse 81   Temp 37.3 °C (99.1 °F) (Temporal)   Resp 18   Ht 5' 1" (1.549 m)   Wt 52.2 kg (115 lb)   SpO2 96%   Breastfeeding No   BMI 21.73 kg/m²     "

## 2022-03-29 NOTE — Clinical Note
Patient's initial device settings DDD 60 changed to AAI 60. Witnessed and verified by CECILIO Muir RN

## 2022-03-29 NOTE — ANESTHESIA PREPROCEDURE EVALUATION
03/29/2022  Pre-operative evaluation for Procedure(s) (LRB):  ABLATION, ATRIAL FIBRILLATION, CRYO (N/A)  Transesophageal echo (RADHA) intra-procedure log documentation (N/A)    Trudi Mcknight is a 67 y.o. female hx of afib, gerd, s/p ppm who presents for the above procedure    · The left ventricle is normal in size with concentric remodeling and normal systolic function. The estimated ejection fraction is 60%.  · Normal right ventricular size with normal right ventricular systolic function.  · Normal left ventricular diastolic function.  · The estimated PA systolic pressure is 26 mmHg.  · Normal central venous pressure (3 mmHg).  · Small pericardial effusion without hemodynamic consequence.        Patient Active Problem List   Diagnosis    Esophagitis    History of gastritis    Screening for malignant neoplasm of colon    Neuralgia and neuritis, unspecified    Hiatal hernia with GERD and esophagitis    Disorder of thyroid, unspecified    Paroxysmal atrial fibrillation with RVR    Chest pain    Sick sinus syndrome    Bilateral pleural effusion    Typical atrial flutter    S/P placement of cardiac pacemaker    Left flank pain       Review of patient's allergies indicates:   Allergen Reactions    Lasix [furosemide] Shortness Of Breath    Penicillins Hives     causes congestion and hives    Tricyclic compounds        No current facility-administered medications on file prior to encounter.     Current Outpatient Medications on File Prior to Encounter   Medication Sig Dispense Refill    polyethylene glycol (GLYCOLAX) 17 gram PwPk Take 17 g by mouth once as needed (Constipation).      FLUZONE HIGHDOSE QUAD 21-22  mcg/0.7 mL Syrg          Past Surgical History:   Procedure Laterality Date    A-V CARDIAC PACEMAKER INSERTION N/A 01/20/2022    Procedure: INSERTION, CARDIAC PACEMAKER, DUAL CHAMBER;   Surgeon: Ernesto Duncan MD;  Location: [facility] EP LAB;  Service: Cardiology;  Laterality: N/A;  SB, DUAL PPM, SJM, ANES, SK, [id number]    BREAST CYST ASPIRATION      1990     SECTION      COLONOSCOPY N/A 2019    Procedure: COLONOSCOPY;  Surgeon: INGRID Russo MD;  Location: [facility] ENDO (4TH FLR);  Service: Endoscopy;  Laterality: N/A;    deviated septum repair      HYSTERECTOMY      lump removed from tongue      TONSILLECTOMY         Social History     Socioeconomic History    Marital status:    Tobacco Use    Smoking status: Never Smoker    Smokeless tobacco: Never Used   Substance and Sexual Activity    Alcohol use: No     Alcohol/week: 0.0 standard drinks     Comment: rarely    Drug use: No    Sexual activity: Not Currently     Social Determinants of Health     Financial Resource Strain: Low Risk     Difficulty of Paying Living Expenses: Not hard at all   Food Insecurity: No Food Insecurity    Worried About Running Out of Food in the Last Year: Never true    Ran Out of Food in the Last Year: Never true   Transportation Needs: No Transportation Needs    Lack of Transportation (Medical): No    Lack of Transportation (Non-Medical): No   Physical Activity: Inactive    Days of Exercise per Week: 0 days    Minutes of Exercise per Session: 20 min   Stress: Stress Concern Present    Feeling of Stress : Very much   Social Connections: Unknown    Frequency of Communication with Friends and Family: More than three times a week    Frequency of Social Gatherings with Friends and Family: More than three times a week    Active Member of Clubs or Organizations: Yes    Attends Club or Organization Meetings: More than 4 times per year    Marital Status:    Housing Stability: Low Risk     Unable to Pay for Housing in the Last Year: No    Number of Places Lived in the Last Year: 1    Unstable Housing in the Last Year: No         CBC: No results for input(s): WBC, RBC, HGB, HCT, PLT,  MCV, MCH, MCHC in the last 72 hours.    CMP: No results for input(s): NA, K, CL, CO2, BUN, CREATININE, GLU, MG, PHOS, CALCIUM, ALBUMIN, PROT, ALKPHOS, ALT, AST, BILITOT in the last 72 hours.    INR  No results for input(s): PT, INR, PROTIME, APTT in the last 72 hours.        Diagnostic Studies:      EKG:  Atrial fibrillation with rapid ventricular response   Rightward axis   ST and T wave abnormality, consider inferior ischemia   ST and T wave abnormality, consider anterolateral ischemia   Abnormal ECG   When compared with ECG of 07-MAR-2022 02:21,   Significant changes have occurred   Confirmed by ESTEPHANIE FINNEY MD ([id number]) on 3/19/2022 10:43:12 PM     2D Echo:  No results found for this or any previous visit.        Pre-op Assessment    I have reviewed the Patient Summary Reports.     I have reviewed the Nursing Notes. I have reviewed the NPO Status.   I have reviewed the Medications.     Review of Systems  Anesthesia Hx:  No problems with previous Anesthesia  History of prior surgery of interest to airway management or planning: Denies Family Hx of Anesthesia complications.   Denies Personal Hx of Anesthesia complications.   Hematology/Oncology:         -- Denies Anemia:   Cardiovascular:   Exercise tolerance: poor Pacemaker Denies Hypertension.  Denies Valvular problems/Murmurs.  Denies MI.  Denies CAD.    Denies CABG/stent. Dysrhythmias atrial fibrillation     MARLEY ECG has been reviewed.    Pulmonary:   Denies COPD.  Denies Asthma.  Denies Sleep Apnea.    Renal/:   Denies Chronic Renal Disease.     Hepatic/GI:   Hiatal Hernia, GERD Denies Liver Disease.    Neurological:   Denies CVA. Denies Seizures.    Endocrine:   Denies Diabetes.        Physical Exam  General: Well nourished    Airway:  Mallampati: III / II  Mouth Opening: Normal  TM Distance: Normal  Tongue: Normal  Neck ROM: Normal ROM    Dental:  Intact    Chest/Lungs:  Clear to auscultation, Normal Respiratory Rate    Heart:  Rate: Normal  Rhythm: Regular  Rhythm  Sounds: Normal        Anesthesia Plan  Type of Anesthesia, risks & benefits discussed:    Anesthesia Type: Gen ETT  Intra-op Monitoring Plan: Standard ASA Monitors and Art Line  Post Op Pain Control Plan: multimodal analgesia  Induction:  IV  Airway Plan: Direct  Informed Consent: Informed consent signed with the Patient and all parties understand the risks and agree with anesthesia plan.  All questions answered. Patient consented to blood products? Yes  ASA Score: 2  Day of Surgery Review of History & Physical: H&P Update referred to the surgeon/provider.    Ready For Surgery From Anesthesia Perspective.     .

## 2022-03-29 NOTE — ANESTHESIA PROCEDURE NOTES
Arterial    Diagnosis: afib  Doctor requesting consult: sharon    Patient location during procedure: done in OR    Staffing  Authorizing Provider: Ventura Clements Jr., MD  Performing Provider: Ventura Clements Jr., MD    Anesthesiologist was present at the time of the procedure.    Preanesthetic Checklist  Completed: patient identified, IV checked, site marked, risks and benefits discussed, surgical consent, monitors and equipment checked, pre-op evaluation, timeout performed and anesthesia consent givenArterial  Skin Prep: chlorhexidine gluconate and isopropyl alcohol  Orientation: right  Location: radial    Catheter Size: 20 G  Catheter placement by Ultrasound guidance. Heme positive aspiration all ports.   Vessel Caliber: medium, patent, compressibility normal  Vascular Doppler:  not done  Needle advanced into vessel with real time Ultrasound guidance.Insertion Attempts: 1  Assessment  Dressing: secured with tape and tegaderm  Patient: Tolerated well

## 2022-03-29 NOTE — NURSING TRANSFER
Nursing Transfer Note      3/29/2022     Reason patient is being transferred: d/c criteria met     Transfer To: [facility]    Transfer via stretcher    Transfer with cardiac monitoring    Transported by aki dickson     Medicines sent: none    Any special needs or follow-up needed: groin checks      Chart send with patient: Yes    Notified: reported to teresa dickson     Patient reassessed at: see epic  (date, time)    Upon arrival to floor: to room no complaints no distress noted.

## 2022-03-29 NOTE — HPI
"Trudi Mcknight presents to short stay cardiac unit on 03/29/2022 for planned cryo PVI with Dr. Duncan.     Ms. Mcknight is a 67 y.o. female with sinus pauses and bradycardia s/p DC-PPM on 1/20/2022, atrial flutter and paroxysmal atrial fibrillation previously intolerant to beta blocker (causing fatigue and lightheadedness), and intolerant to CCB (hypotension). She was on Flecainide 100 mg BID which she recently discontinued due to "anxiety and feeling depressed with the medication". She presented to ED on 3/18/22 with symptoms of heart racing, chest discomfort associated with palpitation, and vague abdominal pain. Her ECG from last week showed atrial paced rhythm, however ECG on most recent ED visit showed atrial fibrillation with rapid ventricular response. She reported being compliant with flecainide and digoxin. Device interrogation showed AF events started ~ 3/17 and multiple entries on 3/18/2022 ranged from minutes to hours prior her presentation. Prior to discharge, Ms. Mcknight converted back to sinus rhythm. She is followed by Dr. Parson and the long-term plan, prior to most recent admission was to proceed with catheter ablation for definitive treatment of AF.     Today, Ms. Mcknight reports feeling well. She reports that on 3/26/22 she had recurrence of AF and resumed metoprolol for rate control. She had been holding flecainide as part of PVI protocol. She is anticoagulated with xarelto 15 mg. Review of recent labs show normal renal function with Cr 0.9. CBC and coags WNL.     EKG:  My independent visualization of most recent EKG is AP/VS at 60 bpm       "

## 2022-03-29 NOTE — ASSESSMENT & PLAN NOTE
s/p PV cryoablation     Plan:  - Pain relief with Tylenol 650 mg q4h PRN. For acute chest pain, recommend Ibuprofen 600 mg TID for 2-3 days only   - Anticoagulation: continue xarelto 20 mg with evening meal  - Anti-arrythmic: continue flecainide 50 mg BID    -Protonix 40 mg daily x 4 weeks     - Follow up with Dr. Duncan in 3 months  - Discharge plans/instructions discussed with patient who verbalized understanding and agreement of plans of care. No further questions or concerns voiced at this time.

## 2022-03-29 NOTE — Clinical Note
8 ml of contrast were injected throughout the case. 92 mL of contrast was the total wasted during the case. 100 mL was the total amount used during the case.

## 2022-03-29 NOTE — ANESTHESIA POSTPROCEDURE EVALUATION
Anesthesia Post Evaluation    Patient: Trudi Mcknight    Procedure(s) Performed: Procedure(s) (LRB):  ABLATION, ATRIAL FIBRILLATION, CRYO (N/A)    Final Anesthesia Type: general      Patient location during evaluation: PACU  Patient participation: Yes- Able to Participate  Level of consciousness: awake and alert and oriented  Post-procedure vital signs: reviewed and stable  Pain management: adequate  Airway patency: patent    PONV status at discharge: No PONV  Anesthetic complications: no      Cardiovascular status: blood pressure returned to baseline, hemodynamically stable and stable  Respiratory status: unassisted, room air and spontaneous ventilation  Hydration status: euvolemic  Follow-up not needed.          Vitals Value Taken Time   /59 03/29/22 1315   Temp 37.1 °C (98.8 °F) 03/29/22 1210   Pulse 95 03/29/22 1315   Resp 18 03/29/22 1210   SpO2 96 % 03/29/22 1210         Event Time   Out of Recovery 12:06:00         Pain/Leanna Score: Pain Rating Prior to Med Admin: 8 (3/29/2022 10:30 AM)  Pain Rating Post Med Admin: 0 (3/29/2022 10:45 AM)  Leanna Score: 10 (3/29/2022 10:15 AM)

## 2022-03-29 NOTE — HOSPITAL COURSE
Ms. Mcknight underwent successful PV cryoablation. She tolerated the procedure well without any acute complications. Admitted to PACU and post-procedure ECG sinus rhythm. Bilateral groin sites intact and without evidence of hematoma. Sutures removed and completed bedrest x 6 hours.  Ambulated without difficulty. She was monitored on telemetry.  No acute arrhythmias. Instructed to continue PTA medications including xarelto with this evening's dose and flecainide 50 mg BID. Post-procedure Ms. Mcknight reported abdominal cramping and back muscle spasms. These are not new problems. She has reported these same problems prior to the procedure; believes they are side effects of taking xarelto and flecainide. Ms. Mcknight was assessed at bedside prior to discharge. She denied chest discomfort, shortness of breath, palpitations, lightheadedness, or any other acute symptoms. Discharge instructions were discussed and all questions were answered. Ms. Mcknight was discharged home in stable condition.

## 2022-03-29 NOTE — DISCHARGE SUMMARY
"[facility] - Short Stay Cardiac Unit  Cardiac Electrophysiology  Discharge Summary      Patient Name: Trudi Mcknight  MRN: [id number]  Admission Date: 3/29/2022  Hospital Length of Stay: 0 days  Discharge Date and Time:  03/29/2022 2:51 PM  Attending Physician: Ernesto Duncan MD    Discharging Provider: Ernestina Rios NP  Primary Care Physician: Renuka Moreno MD    HPI:   Trudi Mcknight presents to short stay cardiac unit on 03/29/2022 for planned cryo PVI with Dr. Duncan.     Ms. Mcknight is a 67 y.o. female with sinus pauses and bradycardia s/p DC-PPM on 1/20/2022, atrial flutter and paroxysmal atrial fibrillation previously intolerant to beta blocker (causing fatigue and lightheadedness), and intolerant to CCB (hypotension). She was on Flecainide 100 mg BID which she recently discontinued due to "anxiety and feeling depressed with the medication". She presented to ED on 3/18/22 with symptoms of heart racing, chest discomfort associated with palpitation, and vague abdominal pain. Her ECG from last week showed atrial paced rhythm, however ECG on most recent ED visit showed atrial fibrillation with rapid ventricular response. She reported being compliant with flecainide and digoxin. Device interrogation showed AF events started ~ 3/17 and multiple entries on 3/18/2022 ranged from minutes to hours prior her presentation. Prior to discharge, Ms. Mcknight converted back to sinus rhythm. She is followed by Dr. Parson and the long-term plan, prior to most recent admission was to proceed with catheter ablation for definitive treatment of AF.     Today, Ms. Mcknight reports feeling well. She reports that on 3/26/22 she had recurrence of AF and resumed metoprolol for rate control. She had been holding flecainide as part of PVI protocol. She is anticoagulated with xarelto 15 mg. Review of recent labs show normal renal function with Cr 0.9. CBC and coags WNL.     EKG:  My independent visualization of most recent EKG is AP/VS at 60 " bpm         Procedure(s) (LRB):  ABLATION, ATRIAL FIBRILLATION, CRYO (N/A)     Indwelling Lines/Drains at time of discharge:  Lines/Drains/Airways     None             Hospital Course:  Ms. Mcknight underwent successful PV cryoablation. She tolerated the procedure well without any acute complications. Admitted to PACU and post-procedure ECG sinus rhythm. Bilateral groin sites intact and without evidence of hematoma. Sutures removed and completed bedrest x 6 hours.  Ambulated without difficulty. She was monitored on telemetry.  No acute arrhythmias. Instructed to continue PTA medications including xarelto with this evening's dose and flecainide 50 mg BID. Post-procedure Ms. Mcknight reported abdominal cramping and back muscle spasms. These are not new problems. She has reported these same problems prior to the procedure; believes they are side effects of taking xarelto and flecainide. Ms. Mcknight was assessed at bedside prior to discharge. She denied chest discomfort, shortness of breath, palpitations, lightheadedness, or any other acute symptoms. Discharge instructions were discussed and all questions were answered. Ms. Mcknight was discharged home in stable condition.     Pending Diagnostic Studies:     None          Final Active Diagnoses:    Diagnosis Date Noted POA    Paroxysmal atrial fibrillation with RVR [I48.0] 01/17/2022 Yes      Problems Resolved During this Admission:     Paroxysmal atrial fibrillation with RVR  s/p PV cryoablation     Plan:  - Pain relief with Tylenol 650 mg q4h PRN. For acute chest pain, recommend Ibuprofen 600 mg TID for 2-3 days only   - Anticoagulation: continue xarelto 20 mg with evening meal  - Anti-arrythmic: continue flecainide 50 mg BID    -Protonix 40 mg daily x 4 weeks     - Follow up with Dr. Duncan on 4/27/22 (s/p PPM) and return in ~ 4 months s/p PVI  - Discharge plans/instructions discussed with patient who verbalized understanding and agreement of plans of care. No further  questions or concerns voiced at this time.       Discharged Condition: good    Disposition:     Follow Up:   Follow-up Information     Renuka Toth NP Follow up in 4 month(s).    Specialty: Cardiology  Why: s/p cryo PVI  Contact information:  [facility]  [facility] [address]  [phone]                       Patient Instructions:      Notify your health care provider if you experience any of the following:  difficulty breathing or increased cough     Notify your health care provider if you experience any of the following:  persistent dizziness, light-headedness, or visual disturbances     Other restrictions (specify):   Order Comments: Restrictions  1. Do not strain or lift anything greater than 5 lb for 1 week.   2. Do not drive or operate any dangerous machinery for minimally 24 hours, but optimally 48-72 hours since you were given general anesthesia.   3. After 24 hours, a shower is allowed.   4. Clean punctures sites with mild soap and water. Allow to air dry. No need to reapply a bandage. Avoid lotions or ointments on these sites.  5. Once the skin has healed (1 week), bathing in a tub, swimming, or hot tub is allowed.   6. Inspect the groin site daily and report to the physician any signs of infection at the site: redness, pain, fever >100.4, unusual pain at the access site or affected extremity, unusual swelling at the access site, or any yellow, white or green drainage.     Seek immediate medical attention:  Bleeding from the puncture site that you cannot stop by doing the following:   Relax and lie down right away. Keep your leg flat and apply firm pressure to the site using your fingers and a gauze pad. Keep the pressure on for 10 minutes. Continue this until the bleeding stops. This may take awhile. When bleeding stops, cover the site with a sterile bandage and keep your leg still as much as possible.     Go to the Emergency Department if you develop:   -Severe bleeding   -Acute Weakness  or numbness   -Visual, gait or speech disturbances   -New chest pain, palpitations, shortness of breath, fainting     You may experience acute chest discomfort 1-3 days after ablation due to inflammation around the heart. This condition is called pericarditis and it is common after ablation. If this occurs, recommend taking a non-steroidal anti-inflammatory agent such as ibuprofen, motrin, or advil 600 mg three times daily for no more than 2-3 days. It is okay to take a non-steroidal anti-inflammatory agent with your blood thinning medication for a brief period of time. Ibuprofen should be taken with food.       Any need to reschedule appointments, or any questions regarding your procedures should be addressed directly with the Arrhythmia Department Nurses at the following phone number: [phone].     Activity as tolerated     Medications:  Reconciled Home Medications:      Medication List      START taking these medications    pantoprazole 40 MG tablet  Commonly known as: PROTONIX  Take 1 tablet (40 mg total) by mouth once daily.        CHANGE how you take these medications    flecainide 50 MG Tab  Commonly known as: TAMBOCOR  Take 1 tablet (50 mg total) by mouth every 12 (twelve) hours.  What changed:   · medication strength  · how much to take        CONTINUE taking these medications    FLUZONE HIGHDOSE QUAD 21-22  mcg/0.7 mL Syrg  Generic drug: flu vacc [id number](65yr up)-PF     polyethylene glycol 17 gram Pwpk  Commonly known as: GLYCOLAX  Take 17 g by mouth once as needed (Constipation).     XARELTO 15 mg Tab  Generic drug: rivaroxaban  Take 1 tablet (15 mg total) by mouth daily with dinner or evening meal.            Time spent on the discharge of patient: 30 minutes    Ernestina Rios NP  Cardiac Electrophysiology  [facility] - Short Stay Cardiac Unit

## 2022-03-29 NOTE — SUBJECTIVE & OBJECTIVE
Past Medical History:   Diagnosis Date    Anticoagulant long-term use     Esophagitis     Hiatal hernia     Meniere's disease     Paroxysmal atrial fibrillation 2021    Shingles     Sick sinus syndrome 2022    s/p Dual chamber pacemaker    Thyroid disease        Past Surgical History:   Procedure Laterality Date    A-V CARDIAC PACEMAKER INSERTION N/A 2022    Procedure: INSERTION, CARDIAC PACEMAKER, DUAL CHAMBER;  Surgeon: Ernesto Duncan MD;  Location: [facility] EP LAB;  Service: Cardiology;  Laterality: N/A;  SB, DUAL PPM, SJM, ANES, SK, 7071    BREAST CYST ASPIRATION           SECTION      COLONOSCOPY N/A 2019    Procedure: COLONOSCOPY;  Surgeon: INGRID uRsso MD;  [facility] ([facility]  Service: Endoscopy;  Laterality: N/A;    deviated septum repair      HYSTERECTOMY      lump removed from tongue      TONSILLECTOMY         Review of patient's allergies indicates:   Allergen Reactions    Lasix [furosemide] Shortness Of Breath    Penicillins Hives     causes congestion and hives    Tricyclic compounds        No current facility-administered medications on file prior to encounter.     Current Outpatient Medications on File Prior to Encounter   Medication Sig    polyethylene glycol (GLYCOLAX) 17 gram PwPk Take 17 g by mouth once as needed (Constipation).    FLUZONE HIGHDOSE QUAD 21-22  mcg/0.7 mL Syrg      Family History       Problem Relation (Age of Onset)    Breast cancer Mother, Paternal Grandmother    Diverticulitis Brother, Sister    Heart disease Mother (55), Father, Brother    Hyperlipidemia Mother    Hypertension Mother, Father          Tobacco Use    Smoking status: Never Smoker    Smokeless tobacco: Never Used   Substance and Sexual Activity    Alcohol use: No     Alcohol/week: 0.0 standard drinks     Comment: rarely    Drug use: No    Sexual activity: Not Currently     Review of Systems   Constitutional: Negative for malaise/fatigue.   Cardiovascular:   Positive for dyspnea on exertion, irregular heartbeat and palpitations. Negative for chest pain, leg swelling and syncope.   Hematologic/Lymphatic: Negative for bleeding problem.   Neurological:  Negative for light-headedness.   Psychiatric/Behavioral:  Negative for altered mental status. The patient is not nervous/anxious.    Objective:     Vital Signs (Most Recent):  Temp: 99.1 °F (37.3 °C) (03/29/22 0556)  Pulse: 81 (03/29/22 0556)  Resp: 18 (03/29/22 0556)  BP: 134/64 (03/29/22 0557)  SpO2: 96 % (03/29/22 0556) Vital Signs (24h Range):  Temp:  [99.1 °F (37.3 °C)] 99.1 °F (37.3 °C)  Pulse:  [81] 81  Resp:  [18] 18  SpO2:  [96 %] 96 %  BP: (123-134)/(64-78) 134/64       Weight: 52.2 kg (115 lb)  Body mass index is 21.73 kg/m².    SpO2: 96 %  O2 Device (Oxygen Therapy): room air    Physical Exam  Vitals reviewed.   Constitutional:       Appearance: She is well-developed.   Cardiovascular:      Rate and Rhythm: Normal rate and regular rhythm.      Pulses: Intact distal pulses.      Heart sounds: Normal heart sounds, S1 normal and S2 normal.   Pulmonary:      Effort: Pulmonary effort is normal.      Breath sounds: Normal breath sounds.   Musculoskeletal:         General: Normal range of motion.   Skin:     General: Skin is warm and dry.   Neurological:      Mental Status: She is alert and oriented to person, place, and time.

## 2022-03-29 NOTE — ANESTHESIA PROCEDURE NOTES
Intubation    Date/Time: 3/29/2022 7:34 AM  Performed by: Shadi Javed CRNA  Authorized by: Ventura Clements Jr., MD     Intubation:     Induction:  Intravenous    Intubated:  Postinduction    Mask Ventilation:  Easy mask    Attempts:  1    Attempted By:  CRNA    Method of Intubation:  Video laryngoscopy    Blade:  Suresh 3    Laryngeal View Grade: Grade I - full view of cords      Difficult Airway Encountered?: No      Complications:  None    Airway Device:  Oral endotracheal tube    Airway Device Size:  7.0    Style/Cuff Inflation:  Cuffed    Inflation Amount (mL):  4    Tube secured:  19    Secured at:  The lips    Placement Verified By:  Capnometry    Complicating Factors:  None    Findings Post-Intubation:  BS equal bilateral and atraumatic/condition of teeth unchanged

## 2022-03-29 NOTE — Clinical Note
vein was performed. A percutaneous stick to the left groin and right groin was performed. Ultrasound guidance was used to obtain access.

## 2022-03-29 NOTE — NURSING
Sutures removed to bilateral groin.  Tolerated well without bleed or hematoma.  Dry gauze with tegaderm placed.  Refuses to have catheter removed at this time.  Instructed her that she would have to void prior to discharge.  Patient verbalized understanding.

## 2022-03-29 NOTE — ASSESSMENT & PLAN NOTE
- Anticoagulated on xarelto.  Last dose on 3/28/22  - Atrial paced rhythm, defer RADHA  - Anesthesia for sedation   - Cryo PVI  - ERIN     Informed Consent  -The risks, benefits & alternatives of the procedure were explained to the patient.    -The risks of atrial fibrillation ablation include but are not limited to: bleeding, hematoma, vascular damage, cardiac tamponade, stroke, PV stenosis, AE fistula, phrenic nerve damage, and death.  After considering her options she has decided to proceed.   -Informed consent was obtained & the patient is agreeable to proceed with the procedure.

## 2022-03-29 NOTE — PLAN OF CARE
Patient aao x4. Vss. Ambulated to bathroom and voided. Bilateral groins with dry gauze/teagderm dressings intact. No bleeding, no hematoma noted. + pedal pulses.   at bedside. AVS printed. Home care iinstructions reviewed with patient.  All questions answered. Prescriptions to be picked up from local pharmacy. Iv hep lock removed. Catheter tip intact. Wheelchair provided for discharge.

## 2022-03-29 NOTE — Clinical Note
Ablation Location: Left Atrium at the pulmonary veins. Transition Communication Hand-off for Care Transitions to Next Level of Care Provider    Name: Keyon Farias  : 1962  MRN #: [id number]  Primary Care Provider: Carlos Gomez     Primary Clinic: [address] [address]     Reason for Hospitalization:  Pneumonia of both lower lobes due to infectious organism (H) [J18.1]  Admit Date/Time: 2019  4:43 PM  Discharge Date: 2019  Payor Source: Payor: MEDICARE / Plan: MEDICARE / Product Type: Medicare /     Readmission Assessment Measure (HANS) Risk Score/category: extreme          Reason for Communication Hand-off Referral: Avoidable readmission within 30 days    Discharge Plan:d/c to home with resumption of HC       Concern for non-adherence with plan of care:   Y/N N  Discharge Needs Assessment:      Already enrolled in Tele-monitoring program and name of program:  N  Follow-up specialty is recommended: Yes    Follow-up plan:  No future appointments.    Any outstanding tests or procedures:        Referrals     Future Labs/Procedures    Home care nursing referral     Comments:    Resume Home Care as previously ordered.    Infectious Disease Referral             [facility] Recommendations:  Keyon Farias is a 57 year old male with PMH cardiac arrest, TBI, seizures, septic shock, nonverbal baseline status, aspiration pneumonia and pnuemonitis, who was admitted on 2019 with weakness, fever, SOB, found to have pneumonia. Patient has been admitted over 15 times this year, the majority of admissions related to sepsis, and aspiration pneumonia or pneumonitis. Patient presented with with mother due to weakness, fever, shortness of breath, and fever. Patient was confused and had a borderline leukocytosis and tachycardia. He was initially treated with Zosyn which resulted in a fairly rapid and significant improvement in his mental status. Patient was NPO this admission and received feeding tubes. I reviewed the chart and it's not clear how  to further decrease his risk for aspiration, I will defer to his PCP for further evaluation. However, I think there could be a role for suppressive antibiotics potentially, so I will place an infectious disease consult.     Thanks for following up with this patient post discharge from hospital.  Please see he has a follow up appt as we were not able to schedule this at discharge. He needs follow up with ID.    Alee Sousa RN    AVS/Discharge Summary is the source of truth; this is a helpful guide for improved communication of patient story

## 2022-03-29 NOTE — PROGRESS NOTES
Patient admitted to recovery see [facility] for complete assessment pacu bcg's maintained safety measures verified patient instructed on pain scale and patient verbalized understanding. Called for ekg and called patient's  and updated on patient location. Also neuro check done . No deficits noted at this time and patient able to smile equal and stick out tongue and wiggle side to side. Also ekg done and in chart. 955 padmaja naqvi here at bedside aware of patient status and condition. 1025 patient c/o headache went to pull po tylenol and patient states she doesn't think she could swallow it due to slight nausea called dr. Clements and let him know new orders noted will follow orders and monitor. 1033 dr. herrera here to see patient and speak with patient updated him on patient c/ ha and nausea. No change in neuro status. 1045 patient states ha better and no more nausea noted. ekg in chart. 1050 padmaja naqvi here updated her on above and let her know ekg done.

## 2022-03-31 ENCOUNTER — TELEPHONE (OUTPATIENT)
Dept: ELECTROPHYSIOLOGY | Facility: CLINIC | Age: 67
End: 2022-03-31
Payer: MEDICARE

## 2022-03-31 NOTE — TELEPHONE ENCOUNTER
"Spoke with patient to follow up on procedure (s/p PVI Cryo on 3/29/2022).  States she is feeling "pretty good". Taking flecainide as prescribed. She is not taking Protonix (states it increases her anxiety and depression) but she is taking Omeprazole. She has not needed any Ibuprofen, denies any chest discomfort. States femoral insertion sites are fine. Advised to call us if she needs us. She was very appreciative of the call.   "

## 2022-04-04 ENCOUNTER — HOSPITAL ENCOUNTER (OUTPATIENT)
Dept: CARDIOLOGY | Facility: CLINIC | Age: 67
Discharge: HOME | End: 2022-04-04
Payer: MEDICARE

## 2022-04-04 ENCOUNTER — HOSPITAL ENCOUNTER (INPATIENT)
Facility: HOSPITAL | Age: 67
LOS: 2 days | Discharge: HOME | DRG: 309 | End: 2022-04-07
Attending: EMERGENCY MEDICINE | Admitting: HOSPITALIST
Payer: MEDICARE

## 2022-04-04 ENCOUNTER — OFFICE VISIT (OUTPATIENT)
Dept: CARDIOLOGY | Facility: CLINIC | Age: 67
End: 2022-04-04
Payer: MEDICARE

## 2022-04-04 ENCOUNTER — PATIENT MESSAGE (OUTPATIENT)
Dept: ELECTROPHYSIOLOGY | Facility: CLINIC | Age: 67
End: 2022-04-04
Payer: MEDICARE

## 2022-04-04 VITALS
SYSTOLIC BLOOD PRESSURE: 124 MMHG | HEART RATE: 74 BPM | DIASTOLIC BLOOD PRESSURE: 74 MMHG | BODY MASS INDEX: 21.64 KG/M2 | HEIGHT: 61 IN | WEIGHT: 114.63 LBS | OXYGEN SATURATION: 96 %

## 2022-04-04 DIAGNOSIS — Z79.01 ON RIVAROXABAN THERAPY: Chronic | ICD-10-CM

## 2022-04-04 DIAGNOSIS — Z95.0 S/P PLACEMENT OF CARDIAC PACEMAKER: ICD-10-CM

## 2022-04-04 DIAGNOSIS — I31.39 PERICARDIAL EFFUSION: ICD-10-CM

## 2022-04-04 DIAGNOSIS — Z98.890 STATUS POST CIRCUMFERENTIAL ABLATION OF PULMONARY VEIN: ICD-10-CM

## 2022-04-04 DIAGNOSIS — E83.39 HYPOPHOSPHATEMIA: Primary | ICD-10-CM

## 2022-04-04 DIAGNOSIS — Z98.890 S/P ABLATION OF ATRIAL FIBRILLATION: ICD-10-CM

## 2022-04-04 DIAGNOSIS — R00.0 TACHYCARDIA: ICD-10-CM

## 2022-04-04 DIAGNOSIS — Z86.79 S/P ABLATION OF ATRIAL FIBRILLATION: ICD-10-CM

## 2022-04-04 DIAGNOSIS — I31.39 PERICARDIAL EFFUSION: Primary | ICD-10-CM

## 2022-04-04 DIAGNOSIS — R06.09 DOE (DYSPNEA ON EXERTION): ICD-10-CM

## 2022-04-04 DIAGNOSIS — R53.83 OTHER FATIGUE: ICD-10-CM

## 2022-04-04 DIAGNOSIS — I50.30 (HFPEF) HEART FAILURE WITH PRESERVED EJECTION FRACTION: ICD-10-CM

## 2022-04-04 DIAGNOSIS — I48.91 A-FIB: ICD-10-CM

## 2022-04-04 DIAGNOSIS — R07.9 CHEST PAIN: ICD-10-CM

## 2022-04-04 PROBLEM — E78.5 HYPERLIPEMIA: Status: ACTIVE | Noted: 2022-04-04

## 2022-04-04 PROBLEM — R47.01 EXPRESSIVE APHASIA: Status: ACTIVE | Noted: 2022-04-04

## 2022-04-04 LAB
ALBUMIN SERPL BCP-MCNC: 3.7 G/DL (ref 3.5–5.2)
ALP SERPL-CCNC: 84 U/L (ref 55–135)
ALT SERPL W/O P-5'-P-CCNC: 17 U/L (ref 10–44)
ANION GAP SERPL CALC-SCNC: 13 MMOL/L (ref 8–16)
AST SERPL-CCNC: 25 U/L (ref 10–40)
BASOPHILS # BLD AUTO: 0.05 K/UL (ref 0–0.2)
BASOPHILS NFR BLD: 0.6 % (ref 0–1.9)
BILIRUB SERPL-MCNC: 0.7 MG/DL (ref 0.1–1)
BUN SERPL-MCNC: 18 MG/DL (ref 8–23)
CALCIUM SERPL-MCNC: 9.8 MG/DL (ref 8.7–10.5)
CHLORIDE SERPL-SCNC: 110 MMOL/L (ref 95–110)
CHOLEST SERPL-MCNC: 186 MG/DL (ref 120–199)
CHOLEST/HDLC SERPL: 3.6 {RATIO} (ref 2–5)
CO2 SERPL-SCNC: 18 MMOL/L (ref 23–29)
CREAT SERPL-MCNC: 0.6 MG/DL (ref 0.5–1.4)
CREAT SERPL-MCNC: 0.9 MG/DL (ref 0.5–1.4)
DIFFERENTIAL METHOD: ABNORMAL
EOSINOPHIL # BLD AUTO: 0.1 K/UL (ref 0–0.5)
EOSINOPHIL NFR BLD: 1.5 % (ref 0–8)
ERYTHROCYTE [DISTWIDTH] IN BLOOD BY AUTOMATED COUNT: 15.3 % (ref 11.5–14.5)
EST. GFR  (AFRICAN AMERICAN): >60 ML/MIN/1.73 M^2
EST. GFR  (NON AFRICAN AMERICAN): >60 ML/MIN/1.73 M^2
GLUCOSE SERPL-MCNC: 122 MG/DL (ref 70–110)
HCT VFR BLD AUTO: 42.5 % (ref 37–48.5)
HDLC SERPL-MCNC: 51 MG/DL (ref 40–75)
HDLC SERPL: 27.4 % (ref 20–50)
HGB BLD-MCNC: 14.4 G/DL (ref 12–16)
IMM GRANULOCYTES # BLD AUTO: 0.02 K/UL (ref 0–0.04)
IMM GRANULOCYTES NFR BLD AUTO: 0.2 % (ref 0–0.5)
INR PPP: 1.3 (ref 0.8–1.2)
LDLC SERPL CALC-MCNC: 107.8 MG/DL (ref 63–159)
LYMPHOCYTES # BLD AUTO: 1.7 K/UL (ref 1–4.8)
LYMPHOCYTES NFR BLD: 20.1 % (ref 18–48)
MAGNESIUM SERPL-MCNC: 2.1 MG/DL (ref 1.6–2.6)
MCH RBC QN AUTO: 27.5 PG (ref 27–31)
MCHC RBC AUTO-ENTMCNC: 33.9 G/DL (ref 32–36)
MCV RBC AUTO: 81 FL (ref 82–98)
MONOCYTES # BLD AUTO: 0.8 K/UL (ref 0.3–1)
MONOCYTES NFR BLD: 9.4 % (ref 4–15)
NEUTROPHILS # BLD AUTO: 5.6 K/UL (ref 1.8–7.7)
NEUTROPHILS NFR BLD: 68.2 % (ref 38–73)
NONHDLC SERPL-MCNC: 135 MG/DL
NRBC BLD-RTO: 0 /100 WBC
PHOSPHATE SERPL-MCNC: <1 MG/DL (ref 2.7–4.5)
PLATELET # BLD AUTO: 371 K/UL (ref 150–450)
PMV BLD AUTO: 10.3 FL (ref 9.2–12.9)
POC PTINR: 1.5 (ref 0.9–1.2)
POC PTWBT: 17.8 SEC (ref 9.7–14.3)
POTASSIUM SERPL-SCNC: 3.6 MMOL/L (ref 3.5–5.1)
PROT SERPL-MCNC: 7.4 G/DL (ref 6–8.4)
PROTHROMBIN TIME: 13.7 SEC (ref 9–12.5)
RBC # BLD AUTO: 5.23 M/UL (ref 4–5.4)
SAMPLE: ABNORMAL
SAMPLE: NORMAL
SODIUM SERPL-SCNC: 141 MMOL/L (ref 136–145)
TRIGL SERPL-MCNC: 136 MG/DL (ref 30–150)
TSH SERPL DL<=0.005 MIU/L-ACNC: 3.22 UIU/ML (ref 0.4–4)
WBC # BLD AUTO: 8.27 K/UL (ref 3.9–12.7)

## 2022-04-04 PROCEDURE — 85025 COMPLETE CBC W/AUTO DIFF WBC: CPT | Performed by: STUDENT IN AN ORGANIZED HEALTH CARE EDUCATION/TRAINING PROGRAM

## 2022-04-04 PROCEDURE — 1101F PR PT FALLS ASSESS DOC 0-1 FALLS W/OUT INJ PAST YR: ICD-10-PCS | Mod: CPTII,S$GLB,, | Performed by: INTERNAL MEDICINE

## 2022-04-04 PROCEDURE — 93000 ELECTROCARDIOGRAM COMPLETE: CPT | Mod: S$GLB,,, | Performed by: INTERNAL MEDICINE

## 2022-04-04 PROCEDURE — 99499 UNLISTED E&M SERVICE: CPT | Mod: ,,, | Performed by: PSYCHIATRY & NEUROLOGY

## 2022-04-04 PROCEDURE — 3008F BODY MASS INDEX DOCD: CPT | Mod: CPTII,S$GLB,, | Performed by: INTERNAL MEDICINE

## 2022-04-04 PROCEDURE — 93000 EKG 12-LEAD: ICD-10-PCS | Mod: S$GLB,,, | Performed by: INTERNAL MEDICINE

## 2022-04-04 PROCEDURE — 93010 ELECTROCARDIOGRAM REPORT: CPT | Mod: 76,,, | Performed by: INTERNAL MEDICINE

## 2022-04-04 PROCEDURE — 93005 ELECTROCARDIOGRAM TRACING: CPT

## 2022-04-04 PROCEDURE — 96360 HYDRATION IV INFUSION INIT: CPT

## 2022-04-04 PROCEDURE — 99499 NO LOS: ICD-10-PCS | Mod: ,,, | Performed by: PSYCHIATRY & NEUROLOGY

## 2022-04-04 PROCEDURE — 3078F DIAST BP <80 MM HG: CPT | Mod: CPTII,S$GLB,, | Performed by: INTERNAL MEDICINE

## 2022-04-04 PROCEDURE — 84100 ASSAY OF PHOSPHORUS: CPT | Performed by: STUDENT IN AN ORGANIZED HEALTH CARE EDUCATION/TRAINING PROGRAM

## 2022-04-04 PROCEDURE — 99285 EMERGENCY DEPT VISIT HI MDM: CPT | Mod: ,,, | Performed by: EMERGENCY MEDICINE

## 2022-04-04 PROCEDURE — 3078F PR MOST RECENT DIASTOLIC BLOOD PRESSURE < 80 MM HG: ICD-10-PCS | Mod: CPTII,S$GLB,, | Performed by: INTERNAL MEDICINE

## 2022-04-04 PROCEDURE — 85610 PROTHROMBIN TIME: CPT | Performed by: STUDENT IN AN ORGANIZED HEALTH CARE EDUCATION/TRAINING PROGRAM

## 2022-04-04 PROCEDURE — 3288F FALL RISK ASSESSMENT DOCD: CPT | Mod: CPTII,S$GLB,, | Performed by: INTERNAL MEDICINE

## 2022-04-04 PROCEDURE — 80061 LIPID PANEL: CPT | Performed by: STUDENT IN AN ORGANIZED HEALTH CARE EDUCATION/TRAINING PROGRAM

## 2022-04-04 PROCEDURE — 3044F PR MOST RECENT HEMOGLOBIN A1C LEVEL <7.0%: ICD-10-PCS | Mod: CPTII,S$GLB,, | Performed by: INTERNAL MEDICINE

## 2022-04-04 PROCEDURE — 99999 PR PBB SHADOW E&M-EST. PATIENT-LVL IV: ICD-10-PCS | Mod: PBBFAC,,, | Performed by: INTERNAL MEDICINE

## 2022-04-04 PROCEDURE — 83735 ASSAY OF MAGNESIUM: CPT | Performed by: STUDENT IN AN ORGANIZED HEALTH CARE EDUCATION/TRAINING PROGRAM

## 2022-04-04 PROCEDURE — 3074F PR MOST RECENT SYSTOLIC BLOOD PRESSURE < 130 MM HG: ICD-10-PCS | Mod: CPTII,S$GLB,, | Performed by: INTERNAL MEDICINE

## 2022-04-04 PROCEDURE — 3074F SYST BP LT 130 MM HG: CPT | Mod: CPTII,S$GLB,, | Performed by: INTERNAL MEDICINE

## 2022-04-04 PROCEDURE — 3008F PR BODY MASS INDEX (BMI) DOCUMENTED: ICD-10-PCS | Mod: CPTII,S$GLB,, | Performed by: INTERNAL MEDICINE

## 2022-04-04 PROCEDURE — 99214 PR OFFICE/OUTPT VISIT, EST, LEVL IV, 30-39 MIN: ICD-10-PCS | Mod: S$GLB,,, | Performed by: INTERNAL MEDICINE

## 2022-04-04 PROCEDURE — 93005 ELECTROCARDIOGRAM TRACING: CPT | Performed by: INTERNAL MEDICINE

## 2022-04-04 PROCEDURE — 99900035 HC TECH TIME PER 15 MIN (STAT)

## 2022-04-04 PROCEDURE — 25500020 PHARM REV CODE 255: Performed by: EMERGENCY MEDICINE

## 2022-04-04 PROCEDURE — 25000003 PHARM REV CODE 250: Performed by: STUDENT IN AN ORGANIZED HEALTH CARE EDUCATION/TRAINING PROGRAM

## 2022-04-04 PROCEDURE — 1126F PR PAIN SEVERITY QUANTIFIED, NO PAIN PRESENT: ICD-10-PCS | Mod: CPTII,S$GLB,, | Performed by: INTERNAL MEDICINE

## 2022-04-04 PROCEDURE — 99285 PR EMERGENCY DEPT VISIT,LEVEL V: ICD-10-PCS | Mod: ,,, | Performed by: EMERGENCY MEDICINE

## 2022-04-04 PROCEDURE — 99214 OFFICE O/P EST MOD 30 MIN: CPT | Mod: S$GLB,,, | Performed by: INTERNAL MEDICINE

## 2022-04-04 PROCEDURE — 99999 PR PBB SHADOW E&M-EST. PATIENT-LVL IV: CPT | Mod: PBBFAC,,, | Performed by: INTERNAL MEDICINE

## 2022-04-04 PROCEDURE — 1159F PR MEDICATION LIST DOCUMENTED IN MEDICAL RECORD: ICD-10-PCS | Mod: CPTII,S$GLB,, | Performed by: INTERNAL MEDICINE

## 2022-04-04 PROCEDURE — 93010 ELECTROCARDIOGRAM REPORT: CPT | Mod: ,,, | Performed by: INTERNAL MEDICINE

## 2022-04-04 PROCEDURE — 82565 ASSAY OF CREATININE: CPT

## 2022-04-04 PROCEDURE — 82962 GLUCOSE BLOOD TEST: CPT

## 2022-04-04 PROCEDURE — 1101F PT FALLS ASSESS-DOCD LE1/YR: CPT | Mod: CPTII,S$GLB,, | Performed by: INTERNAL MEDICINE

## 2022-04-04 PROCEDURE — 3288F PR FALLS RISK ASSESSMENT DOCUMENTED: ICD-10-PCS | Mod: CPTII,S$GLB,, | Performed by: INTERNAL MEDICINE

## 2022-04-04 PROCEDURE — 99285 EMERGENCY DEPT VISIT HI MDM: CPT | Mod: 25

## 2022-04-04 PROCEDURE — 84443 ASSAY THYROID STIM HORMONE: CPT | Performed by: STUDENT IN AN ORGANIZED HEALTH CARE EDUCATION/TRAINING PROGRAM

## 2022-04-04 PROCEDURE — 3044F HG A1C LEVEL LT 7.0%: CPT | Mod: CPTII,S$GLB,, | Performed by: INTERNAL MEDICINE

## 2022-04-04 PROCEDURE — 85610 PROTHROMBIN TIME: CPT

## 2022-04-04 PROCEDURE — 93010 EKG 12-LEAD: ICD-10-PCS | Mod: ,,, | Performed by: INTERNAL MEDICINE

## 2022-04-04 PROCEDURE — 80053 COMPREHEN METABOLIC PANEL: CPT | Performed by: STUDENT IN AN ORGANIZED HEALTH CARE EDUCATION/TRAINING PROGRAM

## 2022-04-04 PROCEDURE — 1159F MED LIST DOCD IN RCRD: CPT | Mod: CPTII,S$GLB,, | Performed by: INTERNAL MEDICINE

## 2022-04-04 PROCEDURE — 1126F AMNT PAIN NOTED NONE PRSNT: CPT | Mod: CPTII,S$GLB,, | Performed by: INTERNAL MEDICINE

## 2022-04-04 RX ORDER — SODIUM,POTASSIUM PHOSPHATES 280-250MG
1 POWDER IN PACKET (EA) ORAL ONCE
Status: COMPLETED | OUTPATIENT
Start: 2022-04-04 | End: 2022-04-04

## 2022-04-04 RX ADMIN — POTASSIUM & SODIUM PHOSPHATES POWDER PACK 280-160-250 MG 1 PACKET: 280-160-250 PACK at 09:04

## 2022-04-04 RX ADMIN — IOHEXOL 100 ML: 350 INJECTION, SOLUTION INTRAVENOUS at 08:04

## 2022-04-04 RX ADMIN — SODIUM CHLORIDE 1000 ML: 0.9 INJECTION, SOLUTION INTRAVENOUS at 09:04

## 2022-04-04 NOTE — PROGRESS NOTES
"Subjective:   Patient ID:  Trudi Mcknight is a 67 y.o. female who presents for follow-up of Follow-up (Fluid in heart,A-Fib f/u."Per pt"), Shortness of Breath, and Fatigue    Symptomatic Bradycardia s/p Dual chamber pacemaker  Small to moderate pericardial effusion post procedure found incidentally  AF with RVR intermittently  Hiatal Hernia  GERD     Echo 01/2022  · Technically challenging study.  · The left ventricle is normal in size with normal systolic function.  · The estimated ejection fraction is 65%.  · Normal left ventricular diastolic function.  · Normal right ventricular size with normal right ventricular systolic function.     HPI:   Patient is c/o chest pain that radiates to the neck and the arm.  Pain comes on a couple of hours after eating, Much worse on lying.   Non smoker  Mother had CABG at 65. Dad has AF. Younger brother has a pacemaker has congential heart disease. Older brother passed away at 60 from heart disease.   She has gastritis and esophagitis and still taking omeprazole.  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 Her HRs have been above 100-135 in prior EKGs. She has been prescribed Diltiazem q8h by EP.         The 10-year ASCVD risk score (Ridgeway TOMASA Jr., et al., 2013) is: 6.5%    Values used to calculate the score:      Age: 66 years      Sex: Female      Is Non- : No      Diabetic: No      Tobacco smoker: No      Systolic Blood Pressure: 133 mmHg      Is BP treated: No      HDL Cholesterol: 46 mg/dL      Total Cholesterol: 166 mg/dL     HPI:   Patient has been experiencing chest pain and dizzy in November and was   In jan 2nd she was very dizzy presyncopal and was very dizzy, she was prescribed MTP and diagnosed with AF. She feels bad with Metoprolol.  She could not tolerate beta blocker and had a sinus pause  For which she underwent " "pacemaker implantation.   She has pacemaker 1/20 and xeralto 1/26.   Patient appears to be very winded  She took diltiazem last Sunday she took the whole tab of 90 mg Cardizem  Before /73 HR 99 and her BP dropped to 83 mmHg and HR to 63 bpm.       HPI:   "I Can't go on with this medicine (FLEICANIDE), it gives me anxiety and makes me depressed".  No chest pain, Orthopnea, PND of heart failure symptoms.   MARLEY.   Denies palpitations or fluttering in the chest  Repeat pulse in the office is  100 bpm irregular. She has stopped Digoxin and also not taking lasix.       Echo 02/2022  · The left ventricle is normal in size with normal systolic function.  · The estimated ejection fraction is 60%.  · Normal left ventricular diastolic function.  · Normal right ventricular size with normal right ventricular systolic function.  · Mild tricuspid regurgitation.  · Normal central venous pressure (3 mmHg).  · The estimated PA systolic pressure is 27 mmHg.  · Small-moderate pericardial effusion  · Effusion appears roughly similar to prior, however heart has shifted somewhat posteriorly, so anterior effusion slightly larger, posterior effusion slightly smaller.        HPI:      s/p Successful pulmonary vein cryoablation repeat EKG NSR  No chest pain, Orthopnea, PND of heart failure symptoms.   Feeling skipped beats that causes chest pain and then goes up to the neck  Minimal activity since ablation still on Flecainide.       Patient Active Problem List   Diagnosis    Esophagitis    History of gastritis    Screening for malignant neoplasm of colon    Neuralgia and neuritis, unspecified    Hiatal hernia with GERD and esophagitis    Disorder of thyroid, unspecified    Paroxysmal atrial fibrillation with RVR    Chest pain    Sick sinus syndrome    Bilateral pleural effusion    Typical atrial flutter    S/P placement of cardiac pacemaker    Left flank pain     /74 (BP Location: Left arm, Patient Position: Sitting, " "BP Method: Medium (Automatic))   Pulse 74   Ht 5' 1" (1.549 m)   Wt 52 kg (114 lb 10.2 oz)   SpO2 96%   BMI 21.66 kg/m²   Body mass index is 21.66 kg/m².  CrCl cannot be calculated (Patient's most recent lab result is older than the maximum 7 days allowed.).    Lab Results   Component Value Date     03/21/2022    K 3.8 03/21/2022     03/21/2022    CO2 24 03/21/2022    BUN 20 03/21/2022    CREATININE 0.9 03/21/2022     (H) 03/21/2022    HGBA1C 5.5 01/20/2022    MG 2.1 03/18/2022    AST 17 03/18/2022    ALT 14 03/18/2022    ALBUMIN 3.8 03/18/2022    PROT 7.1 03/18/2022    BILITOT 0.6 03/18/2022    WBC 7.70 03/21/2022    HGB 13.4 03/21/2022    HCT 42.5 03/21/2022    HCT 43 01/02/2022    MCV 88 03/21/2022     03/21/2022    INR 1.1 03/29/2022    TSH 1.295 02/04/2022    CHOL 161 03/10/2022    HDL 52 03/10/2022    LDLCALC 89.8 03/10/2022    TRIG 96 03/10/2022       Current Outpatient Medications   Medication Sig    flecainide (TAMBOCOR) 50 MG Tab Take 1 tablet (50 mg total) by mouth every 12 (twelve) hours.    FLUZONE HIGHDOSE QUAD 21-22  mcg/0.7 mL Syrg     omeprazole 20 mg TbLD Take 20 mg by mouth Daily.    polyethylene glycol (GLYCOLAX) 17 gram PwPk Take 17 g by mouth once as needed (Constipation).    rivaroxaban (XARELTO) 15 mg Tab Take 1 tablet (15 mg total) by mouth daily with dinner or evening meal.     No current facility-administered medications for this visit.       Review of Systems   Constitutional: Positive for malaise/fatigue. Negative for chills, decreased appetite, night sweats, weight gain and weight loss.   Eyes: Negative for blurred vision, double vision, visual disturbance and visual halos.   Cardiovascular: Negative for chest pain, claudication, cyanosis, dyspnea on exertion, irregular heartbeat, leg swelling, near-syncope, orthopnea, palpitations, paroxysmal nocturnal dyspnea and syncope.   Respiratory: Positive for shortness of breath. Negative for cough, " hemoptysis, snoring, sputum production and wheezing.    Endocrine: Negative for cold intolerance, heat intolerance, polydipsia and polyphagia.   Hematologic/Lymphatic: Negative for adenopathy and bleeding problem. Does not bruise/bleed easily.   Skin: Negative for flushing, itching, poor wound healing and rash.   Musculoskeletal: Negative for arthritis, back pain, falls, gout, joint pain, joint swelling, muscle cramps, muscle weakness, myalgias, neck pain and stiffness.   Gastrointestinal: Negative for bloating, abdominal pain, anorexia, diarrhea, dysphagia, excessive appetite, flatus, hematemesis, jaundice, melena and nausea.   Genitourinary: Negative for hesitancy and incomplete emptying.   Neurological: Negative for aphonia, brief paralysis, difficulty with concentration, disturbances in coordination, excessive daytime sleepiness, dizziness, focal weakness, light-headedness, loss of balance and weakness.   Psychiatric/Behavioral: Negative for altered mental status, depression, hallucinations, hypervigilance, memory loss, substance abuse and suicidal ideas. The patient does not have insomnia and is not nervous/anxious.        Objective:   Physical Exam  Constitutional:       General: She is not in acute distress.     Appearance: She is well-developed. She is not diaphoretic.   HENT:      Head: Normocephalic and atraumatic.      Nose: Nose normal.      Mouth/Throat:      Pharynx: No oropharyngeal exudate.   Eyes:      General: No scleral icterus.        Right eye: No discharge.         Left eye: No discharge.      Conjunctiva/sclera: Conjunctivae normal.      Pupils: Pupils are equal, round, and reactive to light.   Neck:      Thyroid: No thyromegaly.      Vascular: No JVD.      Trachea: No tracheal deviation.   Cardiovascular:      Rate and Rhythm: Normal rate and regular rhythm.      Pulses: Intact distal pulses.      Heart sounds: Normal heart sounds. No murmur heard.    No friction rub. No gallop.   Pulmonary:       Effort: Pulmonary effort is normal. No respiratory distress.      Breath sounds: Normal breath sounds. No stridor. No wheezing or rales.   Chest:      Chest wall: No tenderness.   Abdominal:      General: Bowel sounds are normal. There is no distension.      Palpations: Abdomen is soft. There is no mass.      Tenderness: There is no abdominal tenderness. There is no guarding or rebound.   Musculoskeletal:         General: No tenderness. Normal range of motion.      Cervical back: Normal range of motion and neck supple.   Lymphadenopathy:      Cervical: No cervical adenopathy.   Skin:     General: Skin is warm.      Coloration: Skin is not pale.      Findings: No erythema or rash.   Neurological:      Mental Status: She is alert and oriented to person, place, and time.      Cranial Nerves: No cranial nerve deficit.      Motor: No abnormal muscle tone.      Coordination: Coordination normal.      Deep Tendon Reflexes: Reflexes are normal and symmetric.   Psychiatric:         Behavior: Behavior normal.         Thought Content: Thought content normal.         Judgment: Judgment normal.         Assessment:     1. Pericardial effusion    2. S/P ablation of atrial fibrillation    3. S/P placement of cardiac pacemaker    4. Other fatigue    5. MARLEY (dyspnea on exertion)        Plan:     Will re echo (limited). Patient feels better after ablation. Continue Fleicainide. Encouraged gradual return to activity.     Trudi was seen today for follow-up, shortness of breath and fatigue.    Diagnoses and all orders for this visit:    Pericardial effusion  -     Echo Saline Bubble? No; Future    S/P ablation of atrial fibrillation  -     IN OFFICE EKG 12-LEAD (to Muse)    S/P placement of cardiac pacemaker    Other fatigue    MARLEY (dyspnea on exertion)      RTC 3 months.

## 2022-04-05 ENCOUNTER — TELEPHONE (OUTPATIENT)
Dept: CARDIOLOGY | Facility: HOSPITAL | Age: 67
End: 2022-04-05
Payer: MEDICARE

## 2022-04-05 ENCOUNTER — TELEPHONE (OUTPATIENT)
Dept: ELECTROPHYSIOLOGY | Facility: CLINIC | Age: 67
End: 2022-04-05
Payer: MEDICARE

## 2022-04-05 PROBLEM — I49.5 SICK SINUS SYNDROME: Chronic | Status: ACTIVE | Noted: 2022-01-21

## 2022-04-05 PROBLEM — F41.0 GENERALIZED ANXIETY DISORDER WITH PANIC ATTACKS: Status: ACTIVE | Noted: 2022-04-05

## 2022-04-05 PROBLEM — E78.5 HYPERLIPEMIA: Chronic | Status: ACTIVE | Noted: 2022-04-04

## 2022-04-05 PROBLEM — Z95.0 S/P PLACEMENT OF CARDIAC PACEMAKER: Chronic | Status: ACTIVE | Noted: 2022-03-18

## 2022-04-05 PROBLEM — K21.00 HIATAL HERNIA WITH GERD AND ESOPHAGITIS: Chronic | Status: ACTIVE | Noted: 2021-03-07

## 2022-04-05 PROBLEM — F41.1 GENERALIZED ANXIETY DISORDER WITH PANIC ATTACKS: Chronic | Status: ACTIVE | Noted: 2022-04-05

## 2022-04-05 PROBLEM — F41.0 GENERALIZED ANXIETY DISORDER WITH PANIC ATTACKS: Chronic | Status: ACTIVE | Noted: 2022-04-05

## 2022-04-05 PROBLEM — Z98.890 STATUS POST CIRCUMFERENTIAL ABLATION OF PULMONARY VEIN: Chronic | Status: ACTIVE | Noted: 2022-03-29

## 2022-04-05 PROBLEM — R07.9 CHEST PAIN: Status: RESOLVED | Noted: 2022-01-17 | Resolved: 2022-04-05

## 2022-04-05 PROBLEM — F41.1 GENERALIZED ANXIETY DISORDER WITH PANIC ATTACKS: Status: ACTIVE | Noted: 2022-04-05

## 2022-04-05 PROBLEM — K21.9 GERD (GASTROESOPHAGEAL REFLUX DISEASE): Status: ACTIVE | Noted: 2022-04-05

## 2022-04-05 PROBLEM — I48.0 PAROXYSMAL ATRIAL FIBRILLATION WITH RVR: Chronic | Status: ACTIVE | Noted: 2022-01-17

## 2022-04-05 PROBLEM — E83.39 HYPOPHOSPHATEMIA: Status: ACTIVE | Noted: 2022-04-05

## 2022-04-05 PROBLEM — R47.01 EXPRESSIVE APHASIA: Status: RESOLVED | Noted: 2022-04-04 | Resolved: 2022-04-05

## 2022-04-05 PROBLEM — Z79.01 ON RIVAROXABAN THERAPY: Chronic | Status: ACTIVE | Noted: 2022-04-05

## 2022-04-05 PROBLEM — K44.9 HIATAL HERNIA WITH GERD AND ESOPHAGITIS: Chronic | Status: ACTIVE | Noted: 2021-03-07

## 2022-04-05 PROBLEM — E83.39 HYPOPHOSPHATEMIA: Status: RESOLVED | Noted: 2022-04-05 | Resolved: 2022-04-05

## 2022-04-05 LAB
AMPHET+METHAMPHET UR QL: NEGATIVE
ANION GAP SERPL CALC-SCNC: 11 MMOL/L (ref 8–16)
BARBITURATES UR QL SCN>200 NG/ML: NEGATIVE
BENZODIAZ UR QL SCN>200 NG/ML: NEGATIVE
BUN SERPL-MCNC: 12 MG/DL (ref 8–23)
BZE UR QL SCN: NEGATIVE
CALCIUM SERPL-MCNC: 9.1 MG/DL (ref 8.7–10.5)
CANNABINOIDS UR QL SCN: NEGATIVE
CHLORIDE SERPL-SCNC: 109 MMOL/L (ref 95–110)
CO2 SERPL-SCNC: 18 MMOL/L (ref 23–29)
CREAT SERPL-MCNC: 0.7 MG/DL (ref 0.5–1.4)
CREAT UR-MCNC: 78 MG/DL (ref 15–325)
EST. GFR  (AFRICAN AMERICAN): >60 ML/MIN/1.73 M^2
EST. GFR  (NON AFRICAN AMERICAN): >60 ML/MIN/1.73 M^2
ETHANOL UR-MCNC: <10 MG/DL
GLUCOSE SERPL-MCNC: 106 MG/DL (ref 70–110)
MAGNESIUM SERPL-MCNC: 2.1 MG/DL (ref 1.6–2.6)
METHADONE UR QL SCN>300 NG/ML: NEGATIVE
OPIATES UR QL SCN: NEGATIVE
PCP UR QL SCN>25 NG/ML: NEGATIVE
PHOSPHATE SERPL-MCNC: 3.1 MG/DL (ref 2.7–4.5)
POCT GLUCOSE: 119 MG/DL (ref 70–110)
POTASSIUM SERPL-SCNC: 3.5 MMOL/L (ref 3.5–5.1)
SODIUM SERPL-SCNC: 138 MMOL/L (ref 136–145)
T4 FREE SERPL-MCNC: 1.18 NG/DL (ref 0.71–1.51)
TOXICOLOGY INFORMATION: NORMAL

## 2022-04-05 PROCEDURE — 83735 ASSAY OF MAGNESIUM: CPT | Performed by: HOSPITALIST

## 2022-04-05 PROCEDURE — 84100 ASSAY OF PHOSPHORUS: CPT | Performed by: HOSPITALIST

## 2022-04-05 PROCEDURE — 84439 ASSAY OF FREE THYROXINE: CPT | Performed by: HOSPITALIST

## 2022-04-05 PROCEDURE — 99223 1ST HOSP IP/OBS HIGH 75: CPT | Mod: ,,, | Performed by: HOSPITALIST

## 2022-04-05 PROCEDURE — 36415 COLL VENOUS BLD VENIPUNCTURE: CPT | Performed by: HOSPITALIST

## 2022-04-05 PROCEDURE — 25000003 PHARM REV CODE 250: Performed by: HOSPITALIST

## 2022-04-05 PROCEDURE — 20600001 HC STEP DOWN PRIVATE ROOM

## 2022-04-05 PROCEDURE — 99223 PR INITIAL HOSPITAL CARE,LEVL III: ICD-10-PCS | Mod: ,,, | Performed by: HOSPITALIST

## 2022-04-05 PROCEDURE — 93010 ELECTROCARDIOGRAM REPORT: CPT | Mod: ,,, | Performed by: INTERNAL MEDICINE

## 2022-04-05 PROCEDURE — 63600175 PHARM REV CODE 636 W HCPCS: Performed by: HOSPITALIST

## 2022-04-05 PROCEDURE — 93010 EKG 12-LEAD: ICD-10-PCS | Mod: ,,, | Performed by: INTERNAL MEDICINE

## 2022-04-05 PROCEDURE — 80048 BASIC METABOLIC PNL TOTAL CA: CPT | Performed by: HOSPITALIST

## 2022-04-05 PROCEDURE — 80307 DRUG TEST PRSMV CHEM ANLYZR: CPT | Performed by: HOSPITALIST

## 2022-04-05 PROCEDURE — 99024 POSTOP FOLLOW-UP VISIT: CPT | Mod: ,,, | Performed by: INTERNAL MEDICINE

## 2022-04-05 PROCEDURE — 99024 PR POST-OP FOLLOW-UP VISIT: ICD-10-PCS | Mod: ,,, | Performed by: INTERNAL MEDICINE

## 2022-04-05 PROCEDURE — 94761 N-INVAS EAR/PLS OXIMETRY MLT: CPT

## 2022-04-05 PROCEDURE — 93005 ELECTROCARDIOGRAM TRACING: CPT

## 2022-04-05 RX ORDER — SODIUM CHLORIDE 0.9 % (FLUSH) 0.9 %
10 SYRINGE (ML) INJECTION EVERY 12 HOURS PRN
Status: DISCONTINUED | OUTPATIENT
Start: 2022-04-05 | End: 2022-04-07 | Stop reason: HOSPADM

## 2022-04-05 RX ORDER — FLECAINIDE ACETATE 50 MG/1
50 TABLET ORAL EVERY 12 HOURS
Status: DISCONTINUED | OUTPATIENT
Start: 2022-04-05 | End: 2022-04-05

## 2022-04-05 RX ORDER — GLUCAGON 1 MG
1 KIT INJECTION
Status: DISCONTINUED | OUTPATIENT
Start: 2022-04-05 | End: 2022-04-07 | Stop reason: HOSPADM

## 2022-04-05 RX ORDER — SODIUM,POTASSIUM PHOSPHATES 280-250MG
2 POWDER IN PACKET (EA) ORAL
Status: DISCONTINUED | OUTPATIENT
Start: 2022-04-05 | End: 2022-04-05

## 2022-04-05 RX ORDER — TALC
6 POWDER (GRAM) TOPICAL NIGHTLY PRN
Status: DISCONTINUED | OUTPATIENT
Start: 2022-04-05 | End: 2022-04-07 | Stop reason: HOSPADM

## 2022-04-05 RX ORDER — IBUPROFEN 200 MG
24 TABLET ORAL
Status: DISCONTINUED | OUTPATIENT
Start: 2022-04-05 | End: 2022-04-07 | Stop reason: HOSPADM

## 2022-04-05 RX ORDER — ONDANSETRON 2 MG/ML
4 INJECTION INTRAMUSCULAR; INTRAVENOUS EVERY 8 HOURS PRN
Status: DISCONTINUED | OUTPATIENT
Start: 2022-04-05 | End: 2022-04-07 | Stop reason: HOSPADM

## 2022-04-05 RX ORDER — POLYETHYLENE GLYCOL 3350 17 G/17G
17 POWDER, FOR SOLUTION ORAL ONCE AS NEEDED
Status: DISCONTINUED | OUTPATIENT
Start: 2022-04-05 | End: 2022-04-07 | Stop reason: HOSPADM

## 2022-04-05 RX ORDER — ACETAMINOPHEN 325 MG/1
650 TABLET ORAL EVERY 4 HOURS PRN
Status: DISCONTINUED | OUTPATIENT
Start: 2022-04-05 | End: 2022-04-07 | Stop reason: HOSPADM

## 2022-04-05 RX ORDER — MAG HYDROX/ALUMINUM HYD/SIMETH 200-200-20
30 SUSPENSION, ORAL (FINAL DOSE FORM) ORAL 4 TIMES DAILY PRN
Status: DISCONTINUED | OUTPATIENT
Start: 2022-04-05 | End: 2022-04-07 | Stop reason: HOSPADM

## 2022-04-05 RX ORDER — NALOXONE HCL 0.4 MG/ML
0.02 VIAL (ML) INJECTION
Status: DISCONTINUED | OUTPATIENT
Start: 2022-04-05 | End: 2022-04-07 | Stop reason: HOSPADM

## 2022-04-05 RX ORDER — SERTRALINE HYDROCHLORIDE 25 MG/1
25 TABLET, FILM COATED ORAL DAILY
Status: DISCONTINUED | OUTPATIENT
Start: 2022-04-05 | End: 2022-04-07 | Stop reason: HOSPADM

## 2022-04-05 RX ORDER — LORAZEPAM 2 MG/ML
1 INJECTION INTRAMUSCULAR ONCE
Status: COMPLETED | OUTPATIENT
Start: 2022-04-05 | End: 2022-04-05

## 2022-04-05 RX ORDER — IBUPROFEN 200 MG
16 TABLET ORAL
Status: DISCONTINUED | OUTPATIENT
Start: 2022-04-05 | End: 2022-04-07 | Stop reason: HOSPADM

## 2022-04-05 RX ADMIN — LORAZEPAM 1 MG: 2 INJECTION INTRAMUSCULAR; INTRAVENOUS at 05:04

## 2022-04-05 RX ADMIN — SODIUM CHLORIDE 1000 ML: 0.9 INJECTION, SOLUTION INTRAVENOUS at 03:04

## 2022-04-05 RX ADMIN — AMIODARONE HYDROCHLORIDE 1 MG/MIN: 50 INJECTION, SOLUTION INTRAVENOUS at 06:04

## 2022-04-05 RX ADMIN — AMIODARONE HYDROCHLORIDE 0.5 MG/MIN: 50 INJECTION, SOLUTION INTRAVENOUS at 11:04

## 2022-04-05 RX ADMIN — AMIODARONE HYDROCHLORIDE 150 MG: 1.5 INJECTION, SOLUTION INTRAVENOUS at 06:04

## 2022-04-05 RX ADMIN — AMIODARONE HYDROCHLORIDE 0.5 MG/MIN: 50 INJECTION, SOLUTION INTRAVENOUS at 12:04

## 2022-04-05 RX ADMIN — SERTRALINE HYDROCHLORIDE 25 MG: 25 TABLET ORAL at 06:04

## 2022-04-05 RX ADMIN — RIVAROXABAN 15 MG: 15 TABLET, FILM COATED ORAL at 06:04

## 2022-04-05 NOTE — PLAN OF CARE
Notified by ER about patient requiring urgent MRI for workup of CVA. She has a [facility] DC PPM. I called and spoke with [facility] device support, who confirmed device was MRI safe and guided me to change settings to MRI mode.     Current conduction was sinus tach with HR of 104 with narrow complex QRS. Pt has had < 2% V pacing.    Current MRI Device settings set to AOO with base rate of 110. Once MRI completed, MRI settings can be disabled using .    Addendum ---------------------------------------------------------------------    MRI setting disables once patient returned to room.

## 2022-04-05 NOTE — CONSULTS
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 Her course recently has been complicated by frequent occurrences of symptomatic AF with RVR and poor tolerance to flecainide (reports anxiety and depression with the med) and digoxin. She ultimately underwent PVI with Dr. Duncan on 3/29/22 with successful termination of afib at the time. She tolerated PVI well and discharged home same day with lower dose flecainide 50 mg BID, xarelto 15 mg daily and PPI. She reports intermittent palpitations at home since the PVI but these were self limited and did not last more than a few seconds. Yesterday she again developed palpitations with dizziness and lightheadedness, also reports some speech difficulty, her symptoms persisted so she decided to presented to ED. On presentation she was in and out of AF/RVR and sinus tachycardia but hemodynamically stable. Due to speech difficulty stroke workup was obtained with negative CTA head/neck and MRI brain.     EKGs since admission have shown sinus tach with frequent atrial ectopy as well as atrial fibrillation with RVR. She became persistently tachycardic as high as 150s around 5 AM this morning with mild hypotension and amiodarone drip was initiated by primary team. On my assessment she is somewhat somnolent but had just received iv benzodiazepine for anxiety. She confirms the above history.     Device interrogated at bedside. She had one episode of apparent AT/AF on 3/30  the day following her ablation, after this no events until 4/4 when she states her current symptoms began. No episodes of VT detected.     Medications - Outpatient  Prior to Admission medications    Medication Sig Start Date End Date Taking? Authorizing Provider   flecainide (TAMBOCOR) 50 MG Tab Take 1 tablet (50 mg total) by mouth every 12 (twelve) hours. 3/29/22 5/28/22  Ernestina Rios NP   FLUZONE HIGHDOSE QUAD 21-22  mcg/0.7 mL Syrg  10/2/21   Historical Provider   omeprazole 20 mg TbLD Take 20 mg by mouth Daily. 3/29/22 4/28/22  Ernestina Rios NP   polyethylene glycol (GLYCOLAX) 17 gram PwPk Take 17 g by mouth once as needed (Constipation).    Historical Provider   rivaroxaban (XARELTO) 15 mg Tab Take 1 tablet (15 mg total) by mouth daily with dinner or evening meal. 3/19/22 3/19/23  Mercedez Huerta PA-C   pantoprazole (PROTONIX) 40 MG tablet Take 1 tablet (40 mg total) by mouth once daily. 3/29/22 3/29/22  Ernestina Rios NP         Medications - Current  Scheduled Meds:   flecainide  50 mg Oral Q12H    rivaroxaban  15 mg Oral Daily with dinner     Continuous Infusions:   amiodarone in dextrose 5% 1 mg/min (04/05/22 0624)    amiodarone in dextrose 5%       PRN Meds:.acetaminophen, aluminum-magnesium hydroxide-simethicone, dextrose 10%, dextrose 10%, glucagon (human recombinant), glucose, glucose, melatonin, naloxone, ondansetron, polyethylene glycol, sodium chloride 0.9%      Allergies  Review of patient's allergies indicates:   Allergen Reactions    Lasix [furosemide] Shortness Of Breath    Penicillins Hives     causes congestion and hives    Tricyclic compounds          Past Medical History  Past Medical History:   Diagnosis Date    Anticoagulant long-term use     Esophagitis     Hiatal hernia     Meniere's disease     Paroxysmal atrial fibrillation 03/07/2021    Shingles     Sick sinus syndrome 01/21/2022    s/p Dual chamber pacemaker    Thyroid disease          Past Surgical  History  Past Surgical History:   Procedure Laterality Date    A-V CARDIAC PACEMAKER INSERTION N/A 2022    Procedure: INSERTION, CARDIAC PACEMAKER, DUAL CHAMBER;  Surgeon: Ernesto Duncan MD;  Location: [facility];  Service: Cardiology;  Laterality: N/A;  SB, DUAL PPM, SJM, ANES, SK, [id number]    BREAST CYST ASPIRATION      1990     SECTION      COLONOSCOPY N/A 2019    Procedure: COLONOSCOPY;  Surgeon: INGRID Russo MD;  Location: [facility] ([address] [place] [address]);  Service: Endoscopy;  Laterality: N/A;    deviated septum repair      HYSTERECTOMY      lump removed from tongue      TONSILLECTOMY           Social History  Social History     Socioeconomic History    Marital status:    Tobacco Use    Smoking status: Never Smoker    Smokeless tobacco: Never Used   Substance and Sexual Activity    Alcohol use: No     Alcohol/week: 0.0 standard drinks     Comment: rarely    Drug use: No    Sexual activity: Not Currently     Social Determinants of Health     Financial Resource Strain: Low Risk     Difficulty of Paying Living Expenses: Not hard at all   Food Insecurity: No Food Insecurity    Worried About Running Out of Food in the Last Year: Never true    Ran Out of Food in the Last Year: Never true   Transportation Needs: No Transportation Needs    Lack of Transportation (Medical): No    Lack of Transportation (Non-Medical): No   Physical Activity: Inactive    Days of Exercise per Week: 0 days    Minutes of Exercise per Session: 10 min   Stress: Stress Concern Present    Feeling of Stress : Very much   Social Connections: Unknown    Frequency of Communication with Friends and Family: More than three times a week    Frequency of Social Gatherings with Friends and Family: More than three times a week    Active Member of Clubs or Organizations: Yes    Attends Club or Organization Meetings: More than 4 times per year    Marital Status:    Housing Stability: Low Risk     Unable to  Pay for Housing in the Last Year: No    Number of Places Lived in the Last Year: 1    Unstable Housing in the Last Year: No         ROS  10 point ROS performed and negative except as stated in HPI       Physical Examination         Vital Signs  Vitals  Temp: 98.3 °F (36.8 °C)  Temp src: Oral  Pulse: (!) 126  Heart Rate Source: Monitor  Resp: 18  SpO2: 97 %  Pulse Oximetry Type: Continuous  Flow (L/min): 1  O2 Device (Oxygen Therapy): nasal cannula  BP: (!) 86/55  MAP (mmHg): 67  BP Location: Left arm  BP Method: Automatic  Patient Position: Lying          24 Hour VS Range    Temp:  [98.2 °F (36.8 °C)-98.8 °F (37.1 °C)]   Pulse:  []   Resp:  [12-26]   BP: ()/(46-98)   SpO2:  [96 %-100 %]     Intake/Output Summary (Last 24 hours) at 4/5/2022 0921  Last data filed at 4/5/2022 0554  Gross per 24 hour   Intake 2240 ml   Output 800 ml   Net 1440 ml           Physical Exam:   Constitutional: no acute distress  HEENT: NCAT, EOMI, no scleral icterus  Cardiovascular: Irregularly irregular rhythm, tachycardic, no murmurs appreciated. No appreciable JVD  Pulmonary: Clear to auscultation bilaterally   Abdomen: nontender, non-distended   Neuro: alert and oriented, no focal deficits  Extremities: warm, no edema   MSK: no deformities  Integument: intact, no rashes       Data       Recent Labs   Lab 04/04/22 2015   WBC 8.27   HGB 14.4   HCT 42.5           Recent Labs   Lab 04/04/22 2015   INR 1.3*        Recent Labs   Lab 04/04/22 2015 04/05/22  0309    138   K 3.6 3.5    109   CO2 18* 18*   BUN 18 12   CREATININE 0.9 0.7   ANIONGAP 13 11   CALCIUM 9.8 9.1        Recent Labs   Lab 04/04/22 2015   PROT 7.4   ALBUMIN 3.7   BILITOT 0.7   ALKPHOS 84   AST 25   ALT 17        No results for input(s): TROPONINI in the last 168 hours.     BNP (pg/mL)   Date Value   03/18/2022 38   03/07/2022 37   02/04/2022 196 (H)   01/26/2022 320 (H)   01/17/2022 33       No results for input(s): ANGIE in the last  168 hours.     Telemetry: Frequent runs of apparent AF/RVR. Additionally there are multiple 5-10 second runs of wide complex tachycardia at a fixed rate of 130 BPM     Multiple ECGs since admission demonstrate sinus tachycardia, frequent PACs, at least one 12 lead with AF/RVR       Assessment & Plan   67 year old female with PMH of paroxysmal atrial fibrillation, tachy-madelaine syndrome/sinus node dysfunction requiring dual chamber PPM 1/2022 who presented to [facility] with palpitations and lightheadedness. Underwent PVI on 3/29 which went without apparent complication, now re-presents in AF/RVR and neurologic symptoms. MRI unremarkable for acute CVA.     Atrial fibrillation with RVR:   Continue amiodarone gtt for the time being. Will transition to oral amiodarone load tomorrow AM. She is still in blanking period following ablation so will treat conservatively at this time.   -Amio drip --> 200 mg BID starting tomorrow AM  -Given rapid oscillation between sinus/fib, uncertain utility of elective cardioversion. If unstable AF/RVR then emergently cardiovert  -If neurologic symptoms persist/recur would check STAT CT chest without contrast to eval for atrioesophageal fistula   -Repeat echocardiogram   -Follow up neurology recommendations regarding neuro symptoms    Wide complex tachycardia:   All appear to be at a fixed rate of 130 which is the max track rate of her V pacing lead. She was temporarily V-paced at a rate of 100 and QRS morphology identical to that of her wide complex tachycardia. This likely represents a pacemaker mediated tachycardia caused by ventricular tracking of an atrial tachyarrhythmia rather than true VT. In any case we are loading with amiodarone.     Thank you for the consult. We will continue to follow.       Kelton Mccoy MD  [facility]   Cardiovascular Disease PGY-IV

## 2022-04-05 NOTE — MEDICAL/APP STUDENT
[facility] - Cardiology Stepdown  Psychiatry  History & Physical     Patient Name: Trudi Mcknight  MRN: [id number]  Patient Class: IP- Inpatient  Admission Date: 4/4/2022  Attending Physician: Too Giraldo MD   Primary Care Provider: Renuka Moreno MD    Patient information was obtained from patient and ER records.   Trudi Mcknight is a 67 y.o. female with a past psychiatric history of generalized anxiety, depression, and insomnia, currently presenting with Paroxysmal atrial fibrillation with RVR.  Psychiatry was originally consulted to address the patient's symptoms of anxiety and panic attack.    Initial Evaluation   Per Primary MD:  Ms. Mcknight is a 67 year old female with PMH of paroxysmal aifb on flecainide and xarelto, sick sinus syndrome s/p PPM, recent pulmonary vein ablation on 3/29, GERD, hiatal hernia, anxiety, depression who presented to ED for evaluation of palpitation, sob and chest pain. Patient states last evening while she was on dinner table with her  she suddenly felt palpitation, SOB, chest pain, dizziness, lightheadedness. She felt like she would faint and had difficulty speaking clearly. Patient thought she was having another episode of her afib as she had similar symptoms in the past during afib with RVR. Patient also reports feeling stressed out with her uncontrolled generalized anxiety, depression and trouble sleeping. Patient reports she has not been herself since diagnosed with afib on January 20th of this year. She is concerned about her flecainide as she is very sensitive to medicine and does not like to take new medications. Patient reports worsening of her anxiety and lower abdominal cramping after recently taking prilosec for acid reflux. She then took milk of magnesium yesterday for abdominal cramping and subsequently has 3-4 episodes of loose non bloody diarrhea. She took took her zoloft yesterday for first time in 3 months. She denies fever, chills, focal weakness or  "weakness. Workup in ED including CTA head and neck, MRI brain negative for stroke. Her symptoms improved in ED. She is found to have severe hypophosphatemia < 1. She denies tobacco, alcohol or other drug use. EKG showed sinus tachy @ 106 bpm and recent echo 2 weeks ago with EF 60%.     During my interview patient awake, conversant, but appears anxious.     Per C-L Psych MD:  Patient was alert and awake, pleasant and cooperative with interview. She was lying in bed, looked pale and somewhat drowsy. Patient believes she is in the hospital due to poor side effects from medications. Patient endorses that she had a panic attack and this is not the first, however she could not tell me when the last time was that she had one. Patient endorses apprehension and anxiety in most new situations, but panic attack symptoms are rare. Patient says her mood is "normal," but speaks softly with a flat affect. Patient endorses poor appetite, reduced energy, and poor sleep. She endorses feelings of guilt/worthlessness. She could not provide a timeline for how long she has been feeling like this. She also endorses that every couple of months she has a couple of weeks of increased energy, impulsiveness, increased activity, while sleeping 2-3 hours a night. No history of trauma. She has a good support system with her  and two sons who live close by. She is a retired  and home- for her grandchildren. She expressed her brother has some sort of psychiatric illness, but could not provide any more details. No access to firearms. No hx of violence or SA. Denies SI/HI/AVH. No paranoia, feels safe at the hospital. Patient does not have an outpatient psychiatrist, but reports she takes Zoloft 12.5mg qd. Patient was oriented to person and the year. She stated she was in the ER, the day of the week was Monday, and the month was May. Patient could not remember any objects even with prompting after 1 or 5 minutes. Patient " answers CAM ICU questions correctly, follows commands correctly, completes SAVEAHAART with one error (squeezed on all A's and final T).    Collateral:   No    Psychiatric Review Of Systems - Is patient experiencing or having changes in:  sleep: yes, problems falling asleep and staying asleep  appetite/weight: yes, decreased  energy/anergy: yes, decreased  interest/pleasure/anhedonia: no  somatic symptoms: no  guilty/hopelessness: yes  concentration: no  S.I.B.s/risky behavior: no  SI/SA:  no  anxiety/panic: yes, panic attack  Irritability: no  Racing thoughts: no  Pressured speech:  no  Paranoia:no  Delusions: no  AVH:no    Medical Review Of Systems:  Pertinent items noted in HPI    Hospital Course   See HPI    History   Psychiatric History:  Diagnose(s): Yes - depression, DANIELLE  Current Psychiatric Medications: Yes - Zoloft, 12.5 mg qd  Previous Medication Trials: 13 rounds of ECT, Prozac  Previous Psychiatric Hospitalizations: 3-4, depressed mood with SI  Previous Suicide Attempts: No  History of Violence: No  Outpatient Psychiatrist: No    Social History:  Marital Status:   Children: 2   Employment Status: retired  Housing Status: Yes - lives with   History of Abuse: No  Access to Gun: No    Substance Abuse History:  Recreational Drugs: n/a  Use of Alcohol: denied  Rehab History: No  Tobacco Use: No    Legal History:  None at this time.    Psychosocial Factors:  Psychosocial Stressors: health.   Functioning Relationships: good support system, good relationship with spouse or significant other and good relationship with children    Family Psychiatric History:   Yes - brother, OCD    Past Medical/Surgical History:  Past Medical History:   Diagnosis Date    Anticoagulant long-term use     Esophagitis     Hiatal hernia     Meniere's disease     Paroxysmal atrial fibrillation 03/07/2021    Shingles     Sick sinus syndrome 01/21/2022    s/p Dual chamber pacemaker    Thyroid disease      Past  "Surgical History:   Procedure Laterality Date    A-V CARDIAC PACEMAKER INSERTION N/A 2022    Procedure: INSERTION, CARDIAC PACEMAKER, DUAL CHAMBER;  Surgeon: Ernesto Duncan MD;  Location: [facility] EP LAB;  Service: Cardiology;  Laterality: N/A;  SB, DUAL PPM, SJM, ANES, SK, [id number]    BREAST CYST ASPIRATION           SECTION      COLONOSCOPY N/A 2019    Procedure: COLONOSCOPY;  Surgeon: INGRID Russo MD;  Location: [facility] ENDO ([address] [place] [address]);  Service: Endoscopy;  Laterality: N/A;    deviated septum repair      HYSTERECTOMY      lump removed from tongue      TONSILLECTOMY         Objective:   Vital Signs:  Temp:  [98.2 °F (36.8 °C)-98.8 °F (37.1 °C)]   Pulse:  []   Resp:  [12-26]   BP: ()/(46-98)   SpO2:  [96 %-100 %]     Mental Status Exam:  Appearance: unremarkable, age appropriate, lying in bed  Level of Consciousness: alert and awake  Behavior/Cooperation: normal, cooperative  Psychomotor: within normal limits   Speech: normal tone, normal rate, normal pitch, soft  Language: english, fluid  Orientation: person, situation, year, believed location was the ED, stated day of week was Monday, stated month was May  Attention Span/Concentration: intact  Memory: Impaired to some degree, could not remember any objects after 5 minutes even with prompting  Mood: "normal"  Affect: flat  Thought Process: linear, normal and logical  Associations: normal and logical  Thought Content: normal, no suicidality, no homicidality, delusions, or paranoia  Fund of Knowledge: Aware of current events, Intact and Vocabulary appropriate   Insight: fair  Judgment: fair    Laboratory Data:  CBC:   Recent Labs   Lab 22   WBC 8.27   HGB 14.4   HCT 42.5        CMP:   Recent Labs   Lab 22  0309    138   K 3.6 3.5    109   CO2 18* 18*   * 106   BUN 18 12   CALCIUM 9.8 9.1   PROT 7.4  --    ALBUMIN 3.7  --    BILITOT 0.7  --    ALKPHOS 84  --    AST 25  --  "   ALT 17  --    ANIONGAP 13 11   EGFRNONAA >60.0 >60.0       Significant Labs: All pertinent labs within the past 24 hours have been reviewed.  Significant Imaging: I have reviewed all pertinent imaging results/findings within the past 24 hours.  ASSESSMENT     Trudi Mcknight is a 67 y.o. female with a past psychiatric history of depression, generalized anxiety, and insomnia, who [facility] [place] [facility] due to expressive aphasia and evaluation of palpitations, SOB, and chest pain. Psychiatry was consulted for panic attack.    IMPRESSION  Generalized Anxiety Disorder  Depression  Insomnia    RECOMMENDATION(S)      1. Scheduled Medication(s):  Zoloft 25 mg qd    2. PRN Medication(s):  None at this time.    3.  Monitor:  Please obtain daily EKG to monitor QTc    4. Legal Status/Precaution(s):  Patient does not meet criteria for PEC or inpatient psychiatric admission at this time. Recommend to rescind PEC if one was placed. Patient is not currently an imminent danger to self or others and is not gravely disabled due to a psychiatric illness.    6. Other:  CAM ICU - negative  DELIRIUM BEHAVIOR MANAGEMENT   PLEASE utilize CHEMICAL restraints with PRN meds first for agitation. Minimize use of PHYSICAL restraints   Keep window shades open and room lit during day and room dim at night in order to promote normal sleep-wake cycles   Encourage family at bedside. [place] patient often to situation, location, date   Continue to Limit or Discontinue use of Narcotics, Benzos and Anti-cholinergic medications as they may worsen delirium   Continue medical workup for causative etiology of Delirium    César Horta, M3 Student  [place]

## 2022-04-05 NOTE — ED NOTES
MRI called stated unable to perform MRI due to pacemaker placed less than 4 months ago. Stated need approval from attending physician and radiologist. MD notified

## 2022-04-05 NOTE — ASSESSMENT & PLAN NOTE
Stroke RF   Went into Afib RVR at the ER. She took her evening xarelto   - no tpa given AC   - Continue AC

## 2022-04-05 NOTE — HPI
"68 y/o F w PMH Afib on xarelto, symptomatic bradycardia s/p pacemaker who was at her normal state of health earlier today but developed acute onset of word salad and expressive aphasia per  at around 7:30pm. Prior to this she received her evening medications including xarelto at 6:30pm.  brought her to the ER and stroke code was called at 8:13 for expressive aphasia. Patient at the CT scanner when stroke team arrived, but when she was roomed she develop an acute onset of tachypnea and Afib RVR. She was complaining of shortness of breath while satting 100%. She was repeating "I am getting better, I am getting better" when asked to do a neuro exam. She was placed on a NRB for comfort and she began to be able to follow commands. No cranial nerve deficits, no upper or lower extremity drift. Noted to have RUE tremor that are her baseline per . Unable to answer questions appropriately. NIHSS 5. CTA head and neck with no hypo/hyperdensity. Vessels open with no stenosis or occlusion. Patient not a tpa candidate given recent xarelto and no IR recommended.     Patient was then re-examined and more comfortable. Able to say she was in the hospital, oriented to time but not situation. Was requesting water and able to hold a cup of water without any deficits. Still having some word finding difficulties but anxiety was improving.   "

## 2022-04-05 NOTE — CONSULTS
"[facility] - Emergency Dept  Vascular Neurology  [facility]  Consult Note    Inpatient consult to Vascular (Stroke) Neurology  Consult performed by: Shonna Lynn MD  Consult ordered by: Hang Esquivel MD        Assessment/Plan:     Patient is a 67 y.o. year old female with:    Hyperlipemia  Stroke RF   -- Atorvastatin 40mg    Expressive aphasia  66 y/o F w PMH Afib on xarelto and symptomatic bradycardia s/p pacemaker who presented to the ER for acute onset of expressive aphasia at 7:30pm noted by . She was incoherent, unable to put her words together for which came to the ER. Stroke code activated, patient took her xarelto at 6:30 so not a candidate for tpa. CTA h/n done with no acute hypendnsities, no LVO or HG stenosis. On exam she was having tachypnea without hypoxia, c/o SOB. Repeating "I am getting better, I am getting better", but not answering questions appropriately but able to follow commands. Unable to give history. No focal neuro deficits. NIHSS 5. After getting oxygen and some fluids she had some improvement, AAOx3.     Recommendations   -- tpa contraindicated and not a IR candidate   -- Presentation unclear if purely vascular given anxiety component that could be mimicking a stroke.   -- Treat current symptoms of anxiety, patient tachypneic and tachycardic   -- If symptoms do not improve with medical management, then get MRI brain w/o contrast. Currently too unstable and imaging might be motion degraded given patient's state   -- Dispo per ED   -- Case discussed with Vascular Neurology staff and ED team             Paroxysmal atrial fibrillation with RVR  Stroke RF   Went into Afib RVR at the ER. She took her evening xarelto   - no tpa given AC   - Continue AC         STROKE DOCUMENTATION     Acute Stroke Times   Last Known Normal Date: 04/04/22  Last Known Normal Time: 1930  Symptom Onset Date: 04/04/22  Symptom Onset Time: 1930  Stroke Team Called Date: " 04/04/22  Stroke Team Called Time: 2013  Stroke Team Arrival Date: 04/04/22  Stroke Team Arrival Time: 2017  CT Interpretation Time: 2040  Alteplase Recommended: No  CTA Interpretation Time: 2040  Thrombectomy Recommended: No    NIH Scale:  1a. Level of Consciousness: 0-->Alert, keenly responsive  1b. LOC Questions: 2-->Answers neither question correctly  1c. LOC Commands: 1-->Performs one task correctly  2. Best Gaze: 0-->Normal  3. Visual: 0-->No visual loss  4. Facial Palsy: 0-->Normal symmetrical movements  5a. Motor Arm, Left: 0-->No drift, limb holds 90 (or 45) degrees for full 10 secs  5b. Motor Arm, Right: 0-->No drift, limb holds 90 (or 45) degrees for full 10 secs  6a. Motor Leg, Left: 0-->No drift, leg holds 30 degree position for full 5 secs  6b. Motor Leg, Right: 0-->No drift, leg holds 30 degree position for full 5 secs  7. Limb Ataxia: 0-->Absent  8. Sensory: 0-->Normal, no sensory loss  9. Best Language: 2-->Severe aphasia, all communication is through fragmentary expression, great need for inference, questioning, and guessing by the listener. Range of information that can be exchanged is limited, listener carries burden of. . . (see row details)  10. Dysarthria: 0-->Normal  11. Extinction and Inattention (formerly Neglect): 0-->No abnormality  Total (NIH Stroke Scale): 5    Modified [place]    Moran Coma Scale:    ABCD2 Score:    [id number] Score:   HAS -BLED Score:   ICH Score:   Hunt & Tejada Classification:       Thrombolysis Candidate? No, Current use of direct thrombin inhibitors (dabigatran) or direct factor Xa inhibitors (rivaroxaban, apixaban, edoxaban) with elevated sensitive laboratory tests     Delays to Thrombolysis?  No    Interventional Revascularization Candidate?   Is the patient eligible for mechanical endovascular reperfusion (JANA)?  No; No large vessel occlusion identified on imaging  and No; at this time symptoms not suggestive of large vessel occlusion    Delays to  "Thrombectomy? No    Hemorrhagic change of an Ischemic Stroke: Does this patient have an ischemic stroke with hemorrhagic changes? No     Subjective:     History of Present Illness:  66 y/o F w PMH Afib on xarelto, symptomatic bradycardia s/p pacemaker who was at her normal state of health earlier today but developed acute onset of word salad and expressive aphasia per  at around 7:30pm. Prior to this she received her evening medications including xarelto at 6:30pm.  brought her to the ER and stroke code was called at 8:13 for expressive aphasia. Patient at the CT scanner when stroke team arrived, but when she was roomed she develop an acute onset of tachypnea and Afib RVR. She was complaining of shortness of breath while satting 100%. She was repeating "I am getting better, I am getting better" when asked to do a neuro exam. She was placed on a NRB for comfort and she began to be able to follow commands. No cranial nerve deficits, no upper or lower extremity drift. Noted to have RUE tremor that are her baseline per . Unable to answer questions appropriately. NIHSS 5. CTA head and neck with no hypo/hyperdensity. Vessels open with no stenosis or occlusion. Patient not a tpa candidate given recent xarelto and no IR recommended.     Patient was then re-examined and more comfortable. Able to say she was in the hospital, oriented to time but not situation. Was requesting water and able to hold a cup of water without any deficits. Still having some word finding difficulties but anxiety was improving.           Past Medical History:   Diagnosis Date    Anticoagulant long-term use     Esophagitis     Hiatal hernia     Meniere's disease     Paroxysmal atrial fibrillation 03/07/2021    Shingles     Sick sinus syndrome 01/21/2022    s/p Dual chamber pacemaker    Thyroid disease      Past Surgical History:   Procedure Laterality Date    A-V CARDIAC PACEMAKER INSERTION N/A 01/20/2022    Procedure: " INSERTION, CARDIAC PACEMAKER, DUAL CHAMBER;  Surgeon: Ernesto Duncan MD;  Location: [facility] EP LAB;  Service: Cardiology;  Laterality: N/A;  SB, DUAL PPM, SJM, ANES, SK, [id number]    BREAST CYST ASPIRATION           SECTION      COLONOSCOPY N/A 2019    Procedure: COLONOSCOPY;  Surgeon: INGRID Russo MD;  [facility]  Service: Endoscopy;  Laterality: N/A;    deviated septum repair      HYSTERECTOMY      lump removed from tongue      TONSILLECTOMY       Family History   Problem Relation Age of Onset    Heart disease Mother 55        bypass at 55    Breast cancer Mother     Hypertension Mother     Hyperlipidemia Mother     Heart disease Father     Hypertension Father     Heart disease Brother     Diverticulitis Brother     Diverticulitis Sister     Breast cancer Paternal Grandmother     Colon cancer Neg Hx     Melanoma Neg Hx     Amblyopia Neg Hx     Blindness Neg Hx     Cataracts Neg Hx     Glaucoma Neg Hx     Macular degeneration Neg Hx     Strabismus Neg Hx     Retinal detachment Neg Hx      Social History     Tobacco Use    Smoking status: Never Smoker    Smokeless tobacco: Never Used   Substance Use Topics    Alcohol use: No     Alcohol/week: 0.0 standard drinks     Comment: rarely    Drug use: No     Review of patient's allergies indicates:   Allergen Reactions    Lasix [furosemide] Shortness Of Breath    Penicillins Hives     causes congestion and hives    Tricyclic compounds        Medications: I have reviewed the current medication administration record.    (Not in a hospital admission)      Review of Systems   Constitutional:  Positive for activity change. Negative for appetite change, chills and fever.   HENT:  Negative for congestion, sore throat, trouble swallowing and voice change.    Eyes: Negative.    Respiratory:  Positive for chest tightness and shortness of breath. Negative for cough.    Cardiovascular:  Negative for chest pain and leg  swelling.   Gastrointestinal:  Negative for abdominal distention, abdominal pain, constipation, nausea and vomiting.   Endocrine: Negative.    Genitourinary: Negative.    Musculoskeletal:  Negative for back pain and gait problem.   Skin: Negative.    Neurological:  Positive for speech difficulty. Negative for weakness and headaches.   Psychiatric/Behavioral:  Positive for agitation and decreased concentration. The patient is nervous/anxious.    Objective:     Vital Signs (Most Recent):  Pulse: 106 (04/04/22 2130)  Resp: 20 (04/04/22 2130)  BP: (!) 167/98 (04/04/22 2130)  SpO2: 100 % (04/04/22 2130)    Vital Signs Range (Last 24H):  Pulse:  []   Resp:  [20-26]   BP: (121-167)/(56-98)   SpO2:  [96 %-100 %]     Physical Exam  Constitutional:       General: She is in acute distress.   HENT:      Head: Normocephalic.      Nose: Nose normal.      Mouth/Throat:      Mouth: Mucous membranes are moist.   Eyes:      Pupils: Pupils are equal, round, and reactive to light.   Cardiovascular:      Rate and Rhythm: Tachycardia present. Rhythm irregular.   Pulmonary:      Effort: Respiratory distress present.   Abdominal:      General: Abdomen is flat.   Musculoskeletal:         General: Normal range of motion.   Neurological:      Mental Status: She is alert.      Cranial Nerves: No cranial nerve deficit.      Sensory: No sensory deficit.      Motor: No weakness.       Neurological Exam:   LOC: alert  Attention Span: poor  Language: Expressive aphasia, Repetition impaired  Articulation: No dysarthria  Orientation: Person, Place, Time   Visual Fields: Full  EOM (CN III, IV, VI): Full/intact  Facial Movement (CN VII): Symmetric facial expression    Motor: Arm left  Normal 5/5  Leg left  Normal 5/5  Arm right  Normal 5/5  Leg right Normal 5/5  Cerebellum: No evidence of appendicular or axial ataxia  Sensation: Intact to light touch, temperature and vibration      Laboratory:  CMP:   Recent Labs   Lab 04/04/22 2015   CALCIUM  9.8   ALBUMIN 3.7   PROT 7.4      K 3.6   CO2 18*      BUN 18   CREATININE 0.9   ALKPHOS 84   ALT 17   AST 25   BILITOT 0.7     CBC:   Recent Labs   Lab 04/04/22 2015   WBC 8.27   RBC 5.23   HGB 14.4   HCT 42.5      MCV 81*   MCH 27.5   MCHC 33.9     Lipid Panel:   Recent Labs   Lab 04/04/22 2015   CHOL 186   LDLCALC 107.8   HDL 51   TRIG 136     Hgb A1C: No results for input(s): HGBA1C in the last 168 hours.  TSH:   Recent Labs   Lab 04/04/22 2015   TSH 3.221       Diagnostic Results:      Brain imaging:  CT head   The ventricles are normal in size without evidence of hydrocephalus.     The brain appears within normal limits. No parenchymal mass, hemorrhage, edema or major vascular distribution infarct.     No extra-axial blood or fluid collections.     No acute displaced calvarial fracture.  Mastoid air cells and paranasal sinuses are essentially clear.    Vessel Imaging:  CTA   CTA without evidence of high-grade stenosis or large vessel occlusion.    Cardiac Evaluation:   ECHO 3/22/22  · The left ventricle is normal in size with concentric remodeling and normal systolic function. The estimated ejection fraction is 60%.  · Normal right ventricular size with normal right ventricular systolic function.  · Normal left ventricular diastolic function.  · The estimated PA systolic pressure is 26 mmHg.  · Normal central venous pressure (3 mmHg).  · Small pericardial effusion without hemodynamic consequence.          Shonna Lynn MD  [facility]  Department of Vascular Neurology   [facility] - Emergency Dept

## 2022-04-05 NOTE — ED TRIAGE NOTES
"Trudi Mcknight, a 67 y.o. female presents to the ED w/ complaint of aphasia and palpitations. Pt presents with hyperventilation and is panicking during CT and during initial assessment. Pt has incomprehensible speech and repeatedly stating "I can't take a breath." Pt able to follow some commands. Pt not able to formulate words. LKN around 1930 today    Triage note:  Chief Complaint   Patient presents with    Palpitations     Pt with known afib and recent ablation brought in by  for "being in and out of afib today and just not feeling well". Pt found to be aphasic at triage.     Review of patient's allergies indicates:   Allergen Reactions    Lasix [furosemide] Shortness Of Breath    Penicillins Hives     causes congestion and hives    Tricyclic compounds      Past Medical History:   Diagnosis Date    Anticoagulant long-term use     Esophagitis     Hiatal hernia     Meniere's disease     Paroxysmal atrial fibrillation 03/07/2021    Shingles     Sick sinus syndrome 01/21/2022    s/p Dual chamber pacemaker    Thyroid disease      Patient identifiers verified and correct for Trudi Mcknight    LOC: The patient is anxious and alert. Alert to person. Pt does not answer other orientation questions correctly.  APPEARANCE: Pt appears anxious and is shaking.  HEENT: WDL, PERRLA  PSYCHOSOCIAL: Patient is calm and cooperative. Denies SI/HI.  SKIN: The skin is warm, dry, color consistent with ethnicity. No breakdown or brusing visible.  RESPIRATORY: Airway is open and patent. Bilateral chest rise and fall. Work of breathing increased. Pt is tachypneic.  CARDIAC: Patient is tachycardic. Pt reports chest pain.  ABDOMEN/GI: Soft, non tender. No distention noted.  URINARY:  Voids independently without difficulty. No complaints of frequency, urgency, burning, or blood in urine.   NEUROLOGIC: Eyes open spontaneously. Speech clear.  Able to follow some commands after repeatedly asking. Symmetrical facial muscles. " Movement is purposeful.   MUSCULOSKELETAL: No obvious deformities noted. Full ROM in all extremities.  PERIPHERAL VASCULAR: Cap refill <3 secs bilaterally. No peripheral edema noted.

## 2022-04-05 NOTE — NURSING
Patients  called, goals and plan of care reviewed with patient and him and mutually agreed upon including: continue treatments as ordered, increase diet and activity as tolerated, monitor vs and tele, frequent neuro assessments, remain fever/fall/pain free. All topics educated on, verbalized understanding, no further questions at this time. Patient encouraged to call for assistance with ambulation, bed in lowest and locked position, all belongings and call bell in reach

## 2022-04-05 NOTE — ED PROVIDER NOTES
"Encounter Date: 2022       History     Chief Complaint   Patient presents with    Palpitations     Pt with known afib and recent ablation brought in by  for "being in and out of afib today and just not feeling well". Pt found to be aphasic at triage.     Patient is a 67-year-old female with a past medical history of AFib on Xarelto with a pacemaker in place presenting to the emergency department for aphasia that began approximately 1 hour prior to arrival.   states that she was conversing normally, when she suddenly began having difficulty responding to questions coherently.  She had no associated weakness or dysarthria.  Further history is limited secondary to patient's inability to answer questions.        Review of patient's allergies indicates:   Allergen Reactions    Lasix [furosemide] Shortness Of Breath    Penicillins Hives     causes congestion and hives    Tricyclic compounds      Past Medical History:   Diagnosis Date    Anticoagulant long-term use     Esophagitis     Hiatal hernia     Meniere's disease     Paroxysmal atrial fibrillation 2021    Shingles     Sick sinus syndrome 2022    s/p Dual chamber pacemaker    Thyroid disease      Past Surgical History:   Procedure Laterality Date    A-V CARDIAC PACEMAKER INSERTION N/A 2022    Procedure: INSERTION, CARDIAC PACEMAKER, DUAL CHAMBER;  Surgeon: Ernesto Duncan MD;  Location: [facility] EP LAB;  Service: Cardiology;  Laterality: N/A;  SB, DUAL PPM, SJM, ANES, SK, 7071    BREAST CYST ASPIRATION           SECTION      COLONOSCOPY N/A 2019    Procedure: COLONOSCOPY;  Surgeon: INGRID Russo MD;  Location: [facility] ENDO ([address] [place] [address]);  Service: Endoscopy;  Laterality: N/A;    deviated septum repair      HYSTERECTOMY      lump removed from tongue      TONSILLECTOMY       Family History   Problem Relation Age of Onset    Heart disease Mother 55        bypass at 55    Breast cancer Mother     " Hypertension Mother     Hyperlipidemia Mother     Heart disease Father     Hypertension Father     Heart disease Brother     Diverticulitis Brother     Diverticulitis Sister     Breast cancer Paternal Grandmother     Colon cancer Neg Hx     Melanoma Neg Hx     Amblyopia Neg Hx     Blindness Neg Hx     Cataracts Neg Hx     Glaucoma Neg Hx     Macular degeneration Neg Hx     Strabismus Neg Hx     Retinal detachment Neg Hx      Social History     Tobacco Use    Smoking status: Never Smoker    Smokeless tobacco: Never Used   Substance Use Topics    Alcohol use: No     Alcohol/week: 0.0 standard drinks     Comment: rarely    Drug use: No     Review of Systems   Constitutional: Negative for activity change, chills and fever.   HENT: Negative for congestion, ear pain and sore throat.    Respiratory: Negative for shortness of breath and stridor.    Cardiovascular: Negative for chest pain and palpitations.   Gastrointestinal: Negative for abdominal pain, nausea and vomiting.   Genitourinary: Negative for dysuria.   Musculoskeletal: Negative for back pain.   Skin: Negative for rash.   Neurological: Positive for speech difficulty (Aphasia). Negative for dizziness, syncope, weakness and headaches.   Hematological: Does not bruise/bleed easily.       Physical Exam     Initial Vitals   BP Pulse Resp Temp SpO2   04/04/22 2030 04/04/22 2030 04/04/22 2030 04/04/22 2157 04/04/22 2030   121/63 (!) 129 (!) 26 98.2 °F (36.8 °C) 100 %      MAP       --                Physical Exam    Nursing note and vitals reviewed.  Constitutional: She appears well-developed and well-nourished. She is not diaphoretic. No distress.   Anxious appearing. Speaking full, although incoherent sentences.  No acute distress   HENT:   Head: Normocephalic and atraumatic.   Right Ear: External ear normal.   Left Ear: External ear normal.   Neck: Neck supple.   Cardiovascular: Normal heart sounds and intact distal pulses. An irregularly  irregular rhythm present.  Tachycardia present.    Pulmonary/Chest: Breath sounds normal. No respiratory distress. She has no wheezes. She has no rhonchi. She has no rales.   Abdominal: Abdomen is soft. She exhibits no distension. There is no abdominal tenderness. There is no rebound and no guarding.   Musculoskeletal:      Cervical back: Neck supple.     Neurological: She is alert and oriented to person, place, and time. GCS score is 15. GCS eye subscore is 4. GCS verbal subscore is 5. GCS motor subscore is 6.   CN 2 through 12 intact.  Intact motor/strength and sensation to upper and lower extremities bilaterally. Normal finger to nose bilaterally. Aphasic; no dysarthria.   Skin: Skin is warm. Capillary refill takes less than 2 seconds. No rash noted.   Psychiatric: She has a normal mood and affect.         ED Course   Procedures  Labs Reviewed   CBC W/ AUTO DIFFERENTIAL - Abnormal; Notable for the following components:       Result Value    MCV 81 (*)     RDW 15.3 (*)     All other components within normal limits   COMPREHENSIVE METABOLIC PANEL - Abnormal; Notable for the following components:    CO2 18 (*)     Glucose 122 (*)     All other components within normal limits    Narrative:     add on mag and phos per dr boone perez/order#[id number]   @21:09 04/04/2022   PROTIME-INR - Abnormal; Notable for the following components:    Prothrombin Time 13.7 (*)     INR 1.3 (*)     All other components within normal limits   PHOSPHORUS - Abnormal; Notable for the following components:    Phosphorus <1.0 (*)     All other components within normal limits    Narrative:     add on mag and phos per dr boone perez/order#[id number]   @21:09 04/04/2022   ISTAT PROCEDURE - Abnormal; Notable for the following components:    POC PTWBT 17.8 (*)     POC PTINR 1.5 (*)     All other components within normal limits   TSH    Narrative:     add on mag and phos per dr boone perez/order#[id number]    @21:09 04/04/2022   LIPID PANEL   MAGNESIUM   PHOSPHORUS   MAGNESIUM    Narrative:     add on mag and phos per dr boone perez/order#[id number]   @21:09 04/04/2022   POCT GLUCOSE, HAND-HELD DEVICE   ISTAT CREATININE     EKG Readings: (Independently Interpreted)   Initial Reading: No STEMI. Previous EKG: Compared with most recent EKG Rhythm: Atrial Fibrillation. Heart Rate: 134. Ectopy: No Ectopy. Conduction: Normal.       Imaging Results          MRI Brain Without Contrast (Final result)  Result time 04/04/22 23:36:40    Final result by Judah Bonilla MD (04/04/22 23:36:40)                 Impression:      1. No evidence of acute intracranial pathology.  2. Right cerebral hemisphere developmental venous anomaly.    Electronically signed by resident: Olayinka Sawyer  Date:    04/04/2022  Time:    23:26    Electronically signed by: Judah Bonilla MD  Date:    04/04/2022  Time:    23:36             Narrative:    EXAMINATION:  MRI BRAIN WITHOUT CONTRAST    CLINICAL HISTORY:  Transient ischemic attack (TIA);Aphasia;    TECHNIQUE:  Multiplanar multisequence MR imaging of the brain was performed without intravenous contrast.    COMPARISON:  Same day CTA stroke multiphase    FINDINGS:  Intracranial Compartment:    The ventricles are stable in size for age without evidence of hydrocephalus.    The brain parenchyma appears within normal limits for age.  No mass, hemorrhage, or recent or remote major vascular distribution infarct.    Redemonstration of developmental venous anomaly in the right parietal lobe.    No extra-axial blood or fluid collections.    Normal vascular flow voids are preserved.    Skull/Extracranial Contents (limited evaluation):    Bone marrow signal intensity is normal.                               X-Ray Chest AP Portable (Final result)  Result time 04/04/22 21:55:27    Final result by David Garcia MD (04/04/22 21:55:27)                 Impression:      No acute process or detrimental change  "when compared with 03/18/2022.      Electronically signed by: David Garcia MD  Date:    04/04/2022  Time:    21:55             Narrative:    EXAMINATION:  XR CHEST AP PORTABLE    CLINICAL HISTORY:  Provided history is "  Tachycardia, unspecified".    TECHNIQUE:  One view of the chest.    COMPARISON:  03/18/2022.    FINDINGS:  Left chest wall cardiac pacing device is present with transvenous leads overlying the heart.  Cardiac silhouette is stable and not significantly enlarged.  Hiatal hernia is noted.  Lungs are essentially clear.  No large focal consolidation, sizeable pleural effusion, or pneumothorax.                               CTA STROKE MULTI-PHASE (Final result)  Result time 04/04/22 21:02:17    Final result by Judah Bonilla MD (04/04/22 21:02:17)                 Impression:      1. Motion artifact degrades exam.  2. No acute intracranial abnormality.  3. CTA without evidence of high-grade stenosis or large vessel occlusion.  4. Right cerebral hemisphere developmental venous anomaly.  5. Additional findings as above.    Electronically signed by resident: Olayinka Sawyer  Date:    04/04/2022  Time:    20:36    Electronically signed by: Judah Bonilla MD  Date:    04/04/2022  Time:    21:02             Narrative:    EXAMINATION:  CTA STROKE MULTI-PHASE    CLINICAL HISTORY:  Neuro deficit, acute, stroke suspected;    TECHNIQUE:  Non contrast low dose axial images were obtained thought the head. CT angiogram was performed from the level of the estiven to the top of the head following the IV administration of 100 mL of Omnipaque 350.   Sagittal and coronal reconstructions and maximum intensity projection reconstructions were performed. Arterial stenosis percentages are based on NASCET measurement criteria.  Two additional phases of immediate post-contrast CTA images were performed through the head alone.    CT source data was analyzed using artificial intelligence software for detection of a large vessel occlusions " (LVO) in order to enable computer assisted triage notification and aid clinical stroke decision making.    COMPARISON:  None    FINDINGS:  The ventricles are normal in size without evidence of hydrocephalus.    The brain appears within normal limits. No parenchymal mass, hemorrhage, edema or major vascular distribution infarct.    No extra-axial blood or fluid collections.    No acute displaced calvarial fracture.  Mastoid air cells and paranasal sinuses are essentially clear.      CTA:    Motion artifact at the level of the skull base and carotid bifurcations degrades exam.    The aortic arch maintains a normal branching pattern with mild calcific atherosclerosis.    The common and internal carotid arteries are normal in course and caliber. No calcific atherosclerosis or significant stenosis in either carotid bifurcation.    The vertebral origins are patent. The cervical vertebral arteries are normal in course and caliber.  Left vertebral artery is dominant.  Vertebrobasilar system is within normal limits without focal abnormality.    The anterior, middle, and posterior cerebral arteries are within normal limits, without evidence of significant stenosis, focal occlusion, or intracranial aneurysm formation.    Blood flow remains symmetric on post arterial phases.    Developmental venous anomaly in the right frontoparietal region.    Additional findings:    Partially visualized left chest AICD with 2 transvenous leads.  Lung apices clear without consolidation.  Visualized thyroid, parotid, and submandibular glands unremarkable.  Degenerative change of the spine without acute fracture.                              X-Rays:   Independently Interpreted Readings:   Chest X-Ray: Pacemaker in place     Medications   iohexoL (OMNIPAQUE 350) injection 100 mL (100 mLs Intravenous Given 4/4/22 2021)   sodium chloride 0.9% bolus 1,000 mL (0 mLs Intravenous Stopped 4/4/22 2238)   potassium, sodium phosphates 280-160-250 mg packet  1 packet (1 packet Oral Given 4/4/22 1480)     Medical Decision Making:   History:   I obtained history from: someone other than patient.  Old Medical Records: I decided to obtain old medical records.  Initial Assessment:   Emergent evaluation of aphasia.  She is afebrile, tachycardic, but hemodynamically stable.  Differential Diagnosis:   CVA, TIA, hyperthyroid state, metabolic encephalopathy, electrolyte abnormality, dehydration  Clinical Tests:   Lab Tests: Ordered and Reviewed  Radiological Study: Ordered and Reviewed  Medical Tests: Reviewed and Ordered  ED Management:  Evaluated at bedside by vascular Neurology.  CTA is negative for any acute changes concerning for a CVA.  During CT, patient did began to hyperventilate, worsening her initial complaints of palpitations.  EKG showed AFib with RVR at an initial rate of 150. She was given IVF, and after some time, she improved to the 1 teens.  Labs are remarkable for a phosphorus of less than 1. This was repleted.  On repeat exam, patient was still aphasic.  Decision made to get MRI.  At this point, the patient was signed out to the oncoming team at shift change pending MRI.  Plan to admit to medicine if MRI negative for stroke, given her electrolyte abnormality, which likely precipitated her atrial fibrillation.                      Clinical Impression:   Final diagnoses:  [I48.91] A-fib  [R00.0] Tachycardia  [E83.39] Hypophosphatemia (Primary)          ED Disposition Condition    Observation               Hang Esquivel MD  Resident  04/04/22 6120

## 2022-04-05 NOTE — ASSESSMENT & PLAN NOTE
-patient reports uncontrolled anxiety since diagnosed with afib on January 20th of this year   -she reports trouble sleeping, decreased appetite and weight loss   -states she worries all the time   -lives at home with , denies HI/SI   -she sporadically takes zoloft as she took one yesterday after 3 months   -will consult psychiatry

## 2022-04-05 NOTE — ASSESSMENT & PLAN NOTE
-Likely related to an episode of afib w RVR vs panic attack as mentioned above   -CTA head and neck, MRI brain negative for stroke   -vascular neurology consult noted and appreciated

## 2022-04-05 NOTE — ASSESSMENT & PLAN NOTE
Patient with Paroxysmal (<7 days) atrial fibrillation which is uncontrolled currently with flecainide. Patient is currently in sinus rhythm.[id number] Score: 3. Anticoagulation indicated. Anticoagulation done with xarelto   -patient presented to ED with sudden onset palpitation, chest pain, sob, dizziness, lightheadedness and difficulty with speech.  -patient states having similar episodes in the past related to afib w RVR   -EKG showed sinus tachy @ 106 bpm   -panic attack in setting of her uncontrolled anxiety could also present with similar symptoms   -telemetry monitoring  -continue flecainide and xarelto   -check TSH and FT4  -will consult ED cardiology

## 2022-04-05 NOTE — ED NOTES
Pt is now alert to person, place, and time and able to follow most commands and answer most questions.

## 2022-04-05 NOTE — PLAN OF CARE
[facility] - Cardiology Stepdown  Initial Discharge Assessment       Primary Care Provider: Renuka Moreno MD    Admission Diagnosis: Hypophosphatemia [E83.39]  A-fib [I48.91]  Tachycardia [R00.0]    Admission Date: 4/4/2022  Expected Discharge Date:     Discharge Barriers Identified: None    Payor: [employer] MANAGED MEDICARE / Plan: [employer] TOTAL CARE ADVANTAGE / Product Type: Medicare Advantage /     Extended Emergency Contact Information  Primary Emergency Contact: Jose Mcknight  Address: [address] [place] [address] [place]  Mobile Phone: [phone]  Relation: Spouse    Discharge Plan A: Home with family  Discharge Plan B: Home      [id number] [place] - [address]  [address] [place] [address]  Phone: [phone] Fax: [phone]    [facility]  [address]  [facility] [address]  Phone: [phone] Fax: [phone]      Initial Assessment (most recent)     Adult Discharge Assessment - 04/05/22 1516        Discharge Assessment    Assessment Type Discharge Planning Assessment     Confirmed/corrected address, phone number and insurance Yes     Confirmed Demographics Correct on Facesheet     Source of Information patient     Communicated STEVE with patient/caregiver Date not available/Unable to determine     Lives With spouse     Do you expect to return to your current living situation? Yes     Do you have help at home or someone to help you manage your care at home? Yes     Who are your caregiver(s) and their phone number(s)? Jose () [phone]     Prior to hospitilization cognitive status: Alert/Oriented     Current cognitive status: Alert/Oriented     Walking or Climbing Stairs Difficulty none     Dressing/Bathing Difficulty none     Equipment Currently Used at Home none     Patient currently being followed by outpatient case management? No     Do you currently have service(s) that help you manage your care at home? No      Do you have prescription coverage? Yes     Coverage [employer]     Do you have any problems affording any of your prescribed medications? TBD     Who is going to help you get home at discharge?      How do you get to doctors appointments? family or friend will provide     Are you on dialysis? No     Do you take coumadin? No     Discharge Plan A Home with family     Discharge Plan B Home     DME Needed Upon Discharge  none     Discharge Plan discussed with: Patient     Discharge Barriers Identified None                    Pt admitted 4/4/2022  8:13 PM    For Hypophosphatemia [E83.39]  A-fib [I48.91]  Tachycardia [R00.0]       CM to bedside for assessment. Information obtained from patient.  Pt lives with spouse in house.  Family will bring patient home at discharge. Will have support from family at d/c. Anticipate d/c to home with no needs.    Pt is followed by Dr Geller for cardiology at [facility] and Dr Duncan for EP.    PCP is Renuka Moreno MD  [phone] (p)  [phone] (f)      Payor: [employer] MANAGED MEDICARE / Plan: [employer] TOTAL CARE ADVANTAGE / Product Type: Medicare Advantage /       [id number] [place] - [address] [place] [address]  Phone: [phone] Fax: [phone]    [facility] [address] [address]  Phone: [phone] Fax: [phone]      Extended Emergency Contact Information  Primary Emergency Contact: Jose Mcknight  Address: [address] [place] [address] of [facility]  Mobile Phone: [phone]  Relation: Spouse    Future Appointments   Date Time Provider Department Center   4/7/2022  8:45 AM ECHO, [facility] NOM ECHOSTR [facility]   4/20/2022 10:00 AM HOME MONITOR DEVICE CHECK, [facility] DAVID [facility]   4/27/2022  7:30 AM EKG, APPT [facility] EKG [facility]   4/27/2022  8:20 AM COORDINATED DEVICE CHECK [facility] DAVID [facility]   4/27/2022  9:20 AM Ernesto Dnucan MD [facility] ARRHYTH [facility]    7/11/2022  4:30 PM Kellie Geller MD [facility] CARDIO [facility]   9/21/2022  8:20 AM COORDINATED DEVICE CHECK [facility] ARRHPRO [facility]   9/21/2022  8:45 AM EKG, APPT [facility] EKG [facility]   9/21/2022  9:40 AM Ernesto Duncan MD [facility] ARRHYTH [facility]         Julie Haase RN  Case Management [phone]

## 2022-04-05 NOTE — PLAN OF CARE
Trudi Mcknight is a 67 year old white woman with sick sinus syndrome status post [employer] dual chamber cardiac pacemaker on 1/20/2022, atrial fibrillation diagnosed on 1/20/2022 status post pulmonary vein ablation on 3/29/2022 (anticoagulated on rivaroxaban), hyperlipidemia, hiatal hernia with gastroesophageal reflux disease, anxiety and depression. She lives in [place], [place]. She is . Her primary care physician is Dr. Renuka Moreno. Her electrophysiologist is Dr. Ernesto Duncan.   She was seen in Cardiology clinic on 4/4/2022. She reported chest pain radiating to her neck and arm after eating and worse when lying down, but not with exertion. She reported that flecainide gave her anxiety and depression. That evening, while eating dinner with her , she had palpitations, shortness of breath, chest pain, dizziness, lightheadedness, and difficulty speaking clearly. She had similar symptoms in the past with atrial fibrillation with rapid ventricular response. She had been having trouble sleeping along with her anxiety and depression. She reported that she had not been herself since diagnosed with atrial fibrillation and was anxious about flecainide because she is sensitive to medicine and does not like to take new medications. She reported worsening of her anxiety and lower abdominal cramping after recently taking omeprazole for acid reflux. She took milk of magnesium for abdominal cramping and subsequently had 3 to 4 episodes of diarrhea without blood. She took her sertraline for the first time in 3 months.   In the emergency department, CTA head and neck and brain MRI showed no stroke. Her symptoms improved. She was found to have severe hypophosphatemia (phosporus less than 1 mg/dL. EKG showed sinus tachycardia with rate of 106 beats per minute. She was given 1 liter of normal saline. She was admitted to Hospital Medicine Team A.    She was given sodium phosphates with resolution of  hypophosphatemia. She was put on amiodarone infusion. She was given lorazepam for anxiety. Electrophysiology and Psychiatry were consulted.     X-Ray Chest AP Portable 4/04/22: FINDINGS:   Left chest wall cardiac pacing device is present with transvenous leads overlying the heart.  Cardiac silhouette is stable and not significantly enlarged.  Hiatal hernia is noted.  Lungs are essentially clear.  No large focal consolidation, sizeable pleural effusion, or pneumothorax.   Impression:  No acute process or detrimental change when compared with 03/18/2022.   CTA STROKE MULTI-PHASE 4/04/22: FINDINGS:   The ventricles are normal in size without evidence of hydrocephalus.   The brain appears within normal limits. No parenchymal mass, hemorrhage, edema or major vascular distribution infarct.   No extra-axial blood or fluid collections.   No acute displaced calvarial fracture.  Mastoid air cells and paranasal sinuses are essentially clear.   CTA:   Motion artifact at the level of the skull base and carotid bifurcations degrades exam.   The aortic arch maintains a normal branching pattern with mild calcific atherosclerosis.   The common and internal carotid arteries are normal in course and caliber. No calcific atherosclerosis or significant stenosis in either carotid bifurcation.   The vertebral origins are patent. The cervical vertebral arteries are normal in course and caliber.  Left vertebral artery is dominant.  Vertebrobasilar system is within normal limits without focal abnormality.   The anterior, middle, and posterior cerebral arteries are within normal limits, without evidence of significant stenosis, focal occlusion, or intracranial aneurysm formation.   Blood flow remains symmetric on post arterial phases.   Developmental venous anomaly in the right frontoparietal region.   Additional findings:   Partially visualized left chest AICD with 2 transvenous leads.  Lung apices clear without consolidation.  Visualized  thyroid, parotid, and submandibular glands unremarkable.  Degenerative change of the spine without acute fracture.   Impression:  1. Motion artifact degrades exam.   2. No acute intracranial abnormality.   3. CTA without evidence of high-grade stenosis or large vessel occlusion.   4. Right cerebral hemisphere developmental venous anomaly.   5. Additional findings as above.   MRI Brain Without Contrast 4/04/22: FINDINGS:   Intracranial Compartment:   The ventricles are stable in size for age without evidence of hydrocephalus.   The brain parenchyma appears within normal limits for age.  No mass, hemorrhage, or recent or remote major vascular distribution infarct.   Redemonstration of developmental venous anomaly in the right parietal lobe.   No extra-axial blood or fluid collections.   Normal vascular flow voids are preserved.   Skull/Extracranial Contents (limited evaluation):   Bone marrow signal intensity is normal.   Impression:  1. No evidence of acute intracranial pathology.   2. Right cerebral hemisphere developmental venous anomaly.

## 2022-04-05 NOTE — SUBJECTIVE & OBJECTIVE
Past Medical History:   Diagnosis Date    Anticoagulant long-term use     Esophagitis     Hiatal hernia     Meniere's disease     Paroxysmal atrial fibrillation 2021    Shingles     Sick sinus syndrome 2022    s/p Dual chamber pacemaker    Thyroid disease      Past Surgical History:   Procedure Laterality Date    A-V CARDIAC PACEMAKER INSERTION N/A 2022    Procedure: INSERTION, CARDIAC PACEMAKER, DUAL CHAMBER;  Surgeon: Ernesto Duncan MD;  Location: [facility] EP LAB;  Service: Cardiology;  Laterality: N/A;  SB, DUAL PPM, SJM, ANES, SK, [id number]    BREAST CYST ASPIRATION           SECTION      COLONOSCOPY N/A 2019    Procedure: COLONOSCOPY;  Surgeon: INGRID Russo MD;  Location: [facility] ENDO ([address] [place] [address]);  Service: Endoscopy;  Laterality: N/A;    deviated septum repair      HYSTERECTOMY      lump removed from tongue      TONSILLECTOMY       Family History   Problem Relation Age of Onset    Heart disease Mother 55        bypass at 55    Breast cancer Mother     Hypertension Mother     Hyperlipidemia Mother     Heart disease Father     Hypertension Father     Heart disease Brother     Diverticulitis Brother     Diverticulitis Sister     Breast cancer Paternal Grandmother     Colon cancer Neg Hx     Melanoma Neg Hx     Amblyopia Neg Hx     Blindness Neg Hx     Cataracts Neg Hx     Glaucoma Neg Hx     Macular degeneration Neg Hx     Strabismus Neg Hx     Retinal detachment Neg Hx      Social History     Tobacco Use    Smoking status: Never Smoker    Smokeless tobacco: Never Used   Substance Use Topics    Alcohol use: No     Alcohol/week: 0.0 standard drinks     Comment: rarely    Drug use: No     Review of patient's allergies indicates:   Allergen Reactions    Lasix [furosemide] Shortness Of Breath    Penicillins Hives     causes congestion and hives    Tricyclic compounds        Medications: I have reviewed the current medication administration record.    (Not in a hospital  admission)      Review of Systems   Constitutional:  Positive for activity change. Negative for appetite change, chills and fever.   HENT:  Negative for congestion, sore throat, trouble swallowing and voice change.    Eyes: Negative.    Respiratory:  Positive for chest tightness and shortness of breath. Negative for cough.    Cardiovascular:  Negative for chest pain and leg swelling.   Gastrointestinal:  Negative for abdominal distention, abdominal pain, constipation, nausea and vomiting.   Endocrine: Negative.    Genitourinary: Negative.    Musculoskeletal:  Negative for back pain and gait problem.   Skin: Negative.    Neurological:  Positive for speech difficulty. Negative for weakness and headaches.   Psychiatric/Behavioral:  Positive for agitation and decreased concentration. The patient is nervous/anxious.    Objective:     Vital Signs (Most Recent):  Pulse: 106 (04/04/22 2130)  Resp: 20 (04/04/22 2130)  BP: (!) 167/98 (04/04/22 2130)  SpO2: 100 % (04/04/22 2130)    Vital Signs Range (Last 24H):  Pulse:  []   Resp:  [20-26]   BP: (121-167)/(56-98)   SpO2:  [96 %-100 %]     Physical Exam  Constitutional:       General: She is in acute distress.   HENT:      Head: Normocephalic.      Nose: Nose normal.      Mouth/Throat:      Mouth: Mucous membranes are moist.   Eyes:      Pupils: Pupils are equal, round, and reactive to light.   Cardiovascular:      Rate and Rhythm: Tachycardia present. Rhythm irregular.   Pulmonary:      Effort: Respiratory distress present.   Abdominal:      General: Abdomen is flat.   Musculoskeletal:         General: Normal range of motion.   Neurological:      Mental Status: She is alert.      Cranial Nerves: No cranial nerve deficit.      Sensory: No sensory deficit.      Motor: No weakness.       Neurological Exam:   LOC: alert  Attention Span: poor  Language: Expressive aphasia, Repetition impaired  Articulation: No dysarthria  Orientation: Person, Place, Time   Visual Fields:  Full  EOM (CN III, IV, VI): Full/intact  Facial Movement (CN VII): Symmetric facial expression    Motor: Arm left  Normal 5/5  Leg left  Normal 5/5  Arm right  Normal 5/5  Leg right Normal 5/5  Cerebellum: No evidence of appendicular or axial ataxia  Sensation: Intact to light touch, temperature and vibration      Laboratory:  CMP:   Recent Labs   Lab 04/04/22 2015   CALCIUM 9.8   ALBUMIN 3.7   PROT 7.4      K 3.6   CO2 18*      BUN 18   CREATININE 0.9   ALKPHOS 84   ALT 17   AST 25   BILITOT 0.7     CBC:   Recent Labs   Lab 04/04/22 2015   WBC 8.27   RBC 5.23   HGB 14.4   HCT 42.5      MCV 81*   MCH 27.5   MCHC 33.9     Lipid Panel:   Recent Labs   Lab 04/04/22 2015   CHOL 186   LDLCALC 107.8   HDL 51   TRIG 136     Hgb A1C: No results for input(s): HGBA1C in the last 168 hours.  TSH:   Recent Labs   Lab 04/04/22 2015   TSH 3.221       Diagnostic Results:      Brain imaging:  CT head   The ventricles are normal in size without evidence of hydrocephalus.     The brain appears within normal limits. No parenchymal mass, hemorrhage, edema or major vascular distribution infarct.     No extra-axial blood or fluid collections.     No acute displaced calvarial fracture.  Mastoid air cells and paranasal sinuses are essentially clear.    Vessel Imaging:  CTA   CTA without evidence of high-grade stenosis or large vessel occlusion.    Cardiac Evaluation:   ECHO 3/22/22  The left ventricle is normal in size with concentric remodeling and normal systolic function. The estimated ejection fraction is 60%.  Normal right ventricular size with normal right ventricular systolic function.  Normal left ventricular diastolic function.  The estimated PA systolic pressure is 26 mmHg.  Normal central venous pressure (3 mmHg).  Small pericardial effusion without hemodynamic consequence.

## 2022-04-05 NOTE — ASSESSMENT & PLAN NOTE
-likely due to diarrhea after taking milk of magnesium for lower abdominal cramping   -reports 3-4 episodes loose stool yesterday   -phos < 1  -other electrolytes wnl  -abdomen benign   -continue neutraphos and follow up with repeat phos

## 2022-04-05 NOTE — CARE UPDATE
RAPID RESPONSE NURSE PROACTIVE ROUNDING NOTE       Time of Visit: 530    Admit Date: 2022  LOS: 0  Code Status: Full Code   Date of Visit: 2022  : 1955  Age: 67 y.o.  Sex: female  Race: White  Bed: 347/347 A:   MRN: [id number]  Was the patient discharged from an ICU this admission? No   Was the patient discharged from a PACU within last 24 hours? No   Did the patient receive conscious sedation/general anesthesia in last 24 hours? No  Was the patient in the ED within the past 24 hours? Yes  Was the patient on NIPPV within the past 24 hours? No   Attending Physician: Too Giraldo MD  Primary Service: [facility] HOSP MED A   Time spent at the bedside: 30-45min    SITUATION    Notified by charge RN during rounding.  Reason for alert: tachycardia  Called to evaluate the patient for Circulatory    BACKGROUND     Why is the patient in the hospital?: Paroxysmal atrial fibrillation with RVR    Patient has a past medical history of Anticoagulant long-term use, Esophagitis, Hiatal hernia, Meniere's disease, Paroxysmal atrial fibrillation, Shingles, Sick sinus syndrome, and Thyroid disease.    Last Vitals:  Temp: 98.8 °F (37.1 °C) (501)  Pulse: 143 ( 050)  Resp: 18 ( 050)  BP: 118/80 ( 0506)  SpO2: 99 % ( 0158)    24 Hours Vitals Range:  Temp:  [98.2 °F (36.8 °C)-98.8 °F (37.1 °C)]   Pulse:  []   Resp:  [18-26]   BP: (118-167)/(56-98)   SpO2:  [96 %-100 %]     Labs:  Recent Labs     22   WBC 8.27   HGB 14.4   HCT 42.5          Recent Labs     22  0309    138   K 3.6 3.5    109   CO2 18* 18*   CREATININE 0.9 0.7   * 106   PHOS <1.0* 3.1   MG 2.1 2.1        No results for input(s): PH, PCO2, PO2, HCO3, POCSATURATED, BE in the last 72 hours.     ASSESSMENT    Physical Exam  Cardiovascular:      Rate and Rhythm: Tachycardia present. Rhythm irregular.      Pulses: Normal pulses.   Pulmonary:      Effort: Pulmonary effort is  "normal.      Breath sounds: Normal breath sounds.   Skin:     General: Skin is warm and dry.      Capillary Refill: Capillary refill takes less than 2 seconds.   Neurological:      Mental Status: She is alert and oriented to person, place, and time.   Psychiatric:         Mood and Affect: Mood is anxious.       /80 (91)  Pt anxious, states she can feel her heart "jumping and racing." Rhythm upon review of continuous telemetry appears irregular, with frequent PVCs    INTERVENTIONS    The patient was seen for a Cardiac problem. Staff concerns included tachycardia. The following interventions were performed: EKG.   Dr Mo with hospital medicine to bedside, ordered 1mg IVP ativan for anxiety, 1L NS bolus, and to start IV amiodarone with 150mg bolus    RRN IV START       IV started during emergency event. 18g placed to R hand.     RECOMMENDATIONS    Monitor HR, VS closely  Provide education to patient to not get OOB without calling for assistance  Maintain adequate IV access  Consider cardiology consult    PROVIDER ESCALATION    Yes/No  yes    Orders received and case discussed with Dr. Mo.    Disposition: Remain in room 347.    FOLLOW-UP    charge RNCarmen, bedside DUSTIN Gibbs updated on plan of care. Instructed to call the Rapid Response Nurse, Melodie Rico RN at [phone] for additional questions or concerns.            "

## 2022-04-05 NOTE — H&P
"[facility]  History & Physical    Patient Name: Trudi Mcknight  MRN: [id number]  Patient Class: IP- Inpatient  Admission Date: 4/4/2022  Attending Physician: Too Giraldo MD   Primary Care Provider: Renuka Moreno MD         Patient information was obtained from patient and ER records.     Subjective:     Principal Problem:Paroxysmal atrial fibrillation with RVR    Chief Complaint:   Chief Complaint   Patient presents with    Palpitations     Pt with known afib and recent ablation brought in by  for "being in and out of afib today and just not feeling well". Pt found to be aphasic at triage.        HPI: Ms. Mcknight is a 67 year old female with PMH of paroxysmal aifb on flecainide and xarelto, sick sinus syndrome s/p PPM, recent pulmonary vein ablation on 3/29, GERD, hiatal hernia, anxiety, depression who presented to ED for evaluation of palpitation, sob and chest pain. Patient states last evening while she was on dinner table with her  she suddenly felt palpitation, SOB, chest pain, dizziness, lightheadedness. She felt like she would faint and had difficulty speaking clearly. Patient thought she was having another episode of her afib as she had similar symptoms in the past during afib with RVR. Patient also reports feeling stressed out with her uncontrolled generalized anxiety, depression and trouble sleeping. Patient reports she has not been herself since diagnosed with afib on January 20th of this year. She is concerned about her flecainide as she is very sensitive to medicine and does not like to take new medications. Patient reports worsening of her anxiety and lower abdominal cramping after recently taking prilosec for acid reflux. She then took milk of magnesium yesterday for abdominal cramping and subsequently has 3-4 episodes of loose non bloody diarrhea. She took took her zoloft yesterday for first time in 3 months. She denies fever, chills, focal " weakness or weakness. Workup in ED including CTA head and neck, MRI brain negative for stroke. Her symptoms improved in ED. She is found to have severe hypophosphatemia < 1. She denies tobacco, alcohol or other drug use. EKG showed sinus tachy @ 106 bpm and recent echo 2 weeks ago with EF 60%.    During my interview patient awake, conversant, but appears anxious.            Past Medical History:   Diagnosis Date    Anticoagulant long-term use     Esophagitis     Hiatal hernia     Meniere's disease     Paroxysmal atrial fibrillation 2021    Shingles     Sick sinus syndrome 2022    s/p Dual chamber pacemaker    Thyroid disease        Past Surgical History:   Procedure Laterality Date    A-V CARDIAC PACEMAKER INSERTION N/A 2022    Procedure: INSERTION, CARDIAC PACEMAKER, DUAL CHAMBER;  Surgeon: Ernesto Duncan MD;  Location: [facility] EP LAB;  Service: Cardiology;  Laterality: N/A;  SB, DUAL PPM, SJM, ANES, SK, [id number]    BREAST CYST ASPIRATION           SECTION      COLONOSCOPY N/A 2019    Procedure: COLONOSCOPY;  Surgeon: INGRID Russo MD;  Location: [facility] ENDO ([address] [place] [address]);  Service: Endoscopy;  Laterality: N/A;    deviated septum repair      HYSTERECTOMY      lump removed from tongue      TONSILLECTOMY         Review of patient's allergies indicates:   Allergen Reactions    Lasix [furosemide] Shortness Of Breath    Penicillins Hives     causes congestion and hives    Tricyclic compounds        No current facility-administered medications on file prior to encounter.     Current Outpatient Medications on File Prior to Encounter   Medication Sig    flecainide (TAMBOCOR) 50 MG Tab Take 1 tablet (50 mg total) by mouth every 12 (twelve) hours.    FLUZONE HIGHDOSE QUAD 21-22  mcg/0.7 mL Syrg     omeprazole 20 mg TbLD Take 20 mg by mouth Daily.    polyethylene glycol (GLYCOLAX) 17 gram PwPk Take 17 g by mouth once as needed (Constipation).    rivaroxaban (XARELTO) 15  mg Tab Take 1 tablet (15 mg total) by mouth daily with dinner or evening meal.    [DISCONTINUED] pantoprazole (PROTONIX) 40 MG tablet Take 1 tablet (40 mg total) by mouth once daily.     Family History       Problem Relation (Age of Onset)    Breast cancer Mother, Paternal Grandmother    Diverticulitis Brother, Sister    Heart disease Mother (55), Father, Brother    Hyperlipidemia Mother    Hypertension Mother, Father          Tobacco Use    Smoking status: Never Smoker    Smokeless tobacco: Never Used   Substance and Sexual Activity    Alcohol use: No     Alcohol/week: 0.0 standard drinks     Comment: rarely    Drug use: No    Sexual activity: Not Currently     Review of Systems   Constitutional:  Negative for activity change, appetite change, chills, diaphoresis, fatigue and fever.   HENT:  Negative for congestion, dental problem, drooling, ear discharge, ear pain, facial swelling, hearing loss, mouth sores, nosebleeds, postnasal drip, rhinorrhea, sinus pressure, sneezing, sore throat, tinnitus, trouble swallowing and voice change.    Eyes:  Negative for photophobia, pain, discharge, redness, itching and visual disturbance.   Respiratory:  Positive for shortness of breath. Negative for apnea, cough, choking, chest tightness, wheezing and stridor.    Cardiovascular:  Positive for chest pain and palpitations. Negative for leg swelling.   Gastrointestinal:  Negative for abdominal distention, abdominal pain, anal bleeding, blood in stool, constipation, diarrhea, nausea, rectal pain and vomiting.   Endocrine: Negative for cold intolerance, heat intolerance, polydipsia, polyphagia and polyuria.   Genitourinary:  Negative for decreased urine volume, difficulty urinating, dyspareunia, dysuria, enuresis, flank pain, frequency, genital sores, hematuria, menstrual problem, pelvic pain, urgency, vaginal bleeding, vaginal discharge and vaginal pain.   Musculoskeletal:  Negative for arthralgias, back pain, gait problem,  joint swelling, myalgias, neck pain and neck stiffness.   Skin:  Negative for color change, pallor, rash and wound.   Allergic/Immunologic: Negative for environmental allergies, food allergies and immunocompromised state.   Neurological:  Positive for dizziness, speech difficulty and light-headedness. Negative for tremors, seizures, syncope, facial asymmetry, weakness, numbness and headaches.   Hematological:  Negative for adenopathy. Does not bruise/bleed easily.   Psychiatric/Behavioral:  Negative for agitation, behavioral problems, confusion, decreased concentration, dysphoric mood, hallucinations, self-injury, sleep disturbance and suicidal ideas. The patient is not nervous/anxious and is not hyperactive.    Objective:     Vital Signs (Most Recent):  Temp: 98.6 °F (37 °C) (04/05/22 0158)  Pulse: 98 (04/05/22 0158)  Resp: 20 (04/05/22 0158)  BP: 130/64 (04/05/22 0158)  SpO2: 99 % (04/05/22 0158) Vital Signs (24h Range):  Temp:  [98.2 °F (36.8 °C)-98.6 °F (37 °C)] 98.6 °F (37 °C)  Pulse:  [] 98  Resp:  [19-26] 20  SpO2:  [96 %-100 %] 99 %  BP: (121-167)/(56-98) 130/64        There is no height or weight on file to calculate BMI.    Physical Exam  Constitutional:       General: She is not in acute distress.     Appearance: She is well-developed. She is not diaphoretic.   HENT:      Head: Normocephalic and atraumatic.      Right Ear: External ear normal.      Left Ear: External ear normal.      Nose: Nose normal.      Mouth/Throat:      Pharynx: No oropharyngeal exudate.   Eyes:      General: No scleral icterus.     Conjunctiva/sclera: Conjunctivae normal.      Pupils: Pupils are equal, round, and reactive to light.   Neck:      Thyroid: No thyromegaly.      Vascular: No JVD.      Trachea: No tracheal deviation.   Cardiovascular:      Rate and Rhythm: Normal rate and regular rhythm.      Heart sounds: Normal heart sounds. No murmur heard.    No gallop.   Pulmonary:      Effort: Pulmonary effort is normal. No  respiratory distress.      Breath sounds: Normal breath sounds. No wheezing or rales.   Chest:      Chest wall: No tenderness.   Abdominal:      General: Bowel sounds are normal. There is no distension.      Palpations: Abdomen is soft. There is no mass.      Tenderness: There is no abdominal tenderness. There is no guarding or rebound.   Genitourinary:     Vagina: No vaginal discharge.   Musculoskeletal:         General: No tenderness.      Cervical back: Neck supple.   Lymphadenopathy:      Cervical: No cervical adenopathy.   Skin:     General: Skin is warm and dry.      Coloration: Skin is not pale.      Findings: No erythema or rash.   Neurological:      Mental Status: She is alert and oriented to person, place, and time.      Cranial Nerves: No cranial nerve deficit.      Motor: No abnormal muscle tone.      Coordination: Coordination normal.      Deep Tendon Reflexes: Reflexes are normal and symmetric. Reflexes normal.      Comments: Mild tremors of upper extremities    Psychiatric:         Behavior: Behavior normal.         Thought Content: Thought content normal.         Judgment: Judgment normal.      Comments: Anxious, Flat affect          CRANIAL NERVES     CN III, IV, VI   Pupils are equal, round, and reactive to light.     Significant Labs: All pertinent labs within the past 24 hours have been reviewed.  None    Recent Results (from the past 24 hour(s))   CBC W/ AUTO DIFFERENTIAL    Collection Time: 04/04/22  8:15 PM   Result Value Ref Range    WBC 8.27 3.90 - 12.70 K/uL    RBC 5.23 4.00 - 5.40 M/uL    Hemoglobin 14.4 12.0 - 16.0 g/dL    Hematocrit 42.5 37.0 - 48.5 %    MCV 81 (L) 82 - 98 fL    MCH 27.5 27.0 - 31.0 pg    MCHC 33.9 32.0 - 36.0 g/dL    RDW 15.3 (H) 11.5 - 14.5 %    Platelets 371 150 - 450 K/uL    MPV 10.3 9.2 - 12.9 fL    Immature Granulocytes 0.2 0.0 - 0.5 %    Gran # (ANC) 5.6 1.8 - 7.7 K/uL    Immature Grans (Abs) 0.02 0.00 - 0.04 K/uL    Lymph # 1.7 1.0 - 4.8 K/uL    Mono # 0.8 0.3 -  1.0 K/uL    Eos # 0.1 0.0 - 0.5 K/uL    Baso # 0.05 0.00 - 0.20 K/uL    nRBC 0 0 /100 WBC    Gran % 68.2 38.0 - 73.0 %    Lymph % 20.1 18.0 - 48.0 %    Mono % 9.4 4.0 - 15.0 %    Eosinophil % 1.5 0.0 - 8.0 %    Basophil % 0.6 0.0 - 1.9 %    Differential Method Automated    Comprehensive metabolic panel    Collection Time: 04/04/22  8:15 PM   Result Value Ref Range    Sodium 141 136 - 145 mmol/L    Potassium 3.6 3.5 - 5.1 mmol/L    Chloride 110 95 - 110 mmol/L    CO2 18 (L) 23 - 29 mmol/L    Glucose 122 (H) 70 - 110 mg/dL    BUN 18 8 - 23 mg/dL    Creatinine 0.9 0.5 - 1.4 mg/dL    Calcium 9.8 8.7 - 10.5 mg/dL    Total Protein 7.4 6.0 - 8.4 g/dL    Albumin 3.7 3.5 - 5.2 g/dL    Total Bilirubin 0.7 0.1 - 1.0 mg/dL    Alkaline Phosphatase 84 55 - 135 U/L    AST 25 10 - 40 U/L    ALT 17 10 - 44 U/L    Anion Gap 13 8 - 16 mmol/L    eGFR if African American >60.0 >60 mL/min/1.73 m^2    eGFR if non African American >60.0 >60 mL/min/1.73 m^2   Protime-INR    Collection Time: 04/04/22  8:15 PM   Result Value Ref Range    Prothrombin Time 13.7 (H) 9.0 - 12.5 sec    INR 1.3 (H) 0.8 - 1.2   TSH    Collection Time: 04/04/22  8:15 PM   Result Value Ref Range    TSH 3.221 0.400 - 4.000 uIU/mL   LDL - Lipid Panel    Collection Time: 04/04/22  8:15 PM   Result Value Ref Range    Cholesterol 186 120 - 199 mg/dL    Triglycerides 136 30 - 150 mg/dL    HDL 51 40 - 75 mg/dL    LDL Cholesterol 107.8 63.0 - 159.0 mg/dL    HDL/Cholesterol Ratio 27.4 20.0 - 50.0 %    Total Cholesterol/HDL Ratio 3.6 2.0 - 5.0    Non-HDL Cholesterol 135 mg/dL   Magnesium    Collection Time: 04/04/22  8:15 PM   Result Value Ref Range    Magnesium 2.1 1.6 - 2.6 mg/dL   Phosphorus    Collection Time: 04/04/22  8:15 PM   Result Value Ref Range    Phosphorus <1.0 (LL) 2.7 - 4.5 mg/dL   POCT glucose    Collection Time: 04/04/22  8:31 PM   Result Value Ref Range    POCT Glucose 119 (H) 70 - 110 mg/dL   ISTAT PROCEDURE    Collection Time: 04/04/22  8:48 PM   Result  Value Ref Range    POC PTWBT 17.8 (H) 9.7 - 14.3 sec    POC PTINR 1.5 (H) 0.9 - 1.2    Sample unknown    ISTAT CREATININE    Collection Time: 04/04/22  8:48 PM   Result Value Ref Range    POC Creatinine 0.6 0.5 - 1.4 mg/dL    Sample unknown        Significant Imaging: I have reviewed all pertinent imaging results/findings within the past 24 hours.    Assessment/Plan:     * Paroxysmal atrial fibrillation with RVR  Patient with Paroxysmal (<7 days) atrial fibrillation which is uncontrolled currently with flecainide. Patient is currently in sinus rhythm.[id number] Score: 3. Anticoagulation indicated. Anticoagulation done with xarelto   -patient presented to ED with sudden onset palpitation, chest pain, sob, dizziness, lightheadedness and difficulty with speech.  -patient states having similar episodes in the past related to afib w RVR   -EKG showed sinus tachy @ 106 bpm   -panic attack in setting of her uncontrolled anxiety could also present with similar symptoms   -telemetry monitoring  -continue flecainide and xarelto   -check TSH and FT4  -will consult ED cardiology         Expressive aphasia  -Likely related to an episode of afib w RVR vs panic attack as mentioned above   -CTA head and neck, MRI brain negative for stroke   -vascular neurology consult noted and appreciated       Hypophosphatemia  -likely due to diarrhea after taking milk of magnesium for lower abdominal cramping   -reports 3-4 episodes loose stool yesterday   -phos < 1  -other electrolytes wnl  -abdomen benign   -continue neutraphos and follow up with repeat phos       Generalized anxiety disorder with panic attacks  -patient reports uncontrolled anxiety since diagnosed with afib on January 20th of this year   -she reports trouble sleeping, decreased appetite and weight loss   -states she worries all the time   -lives at home with , denies HI/SI   -she sporadically takes zoloft as she took one yesterday after 3 months   -will consult  psychiatry       Sick sinus syndrome  -s/p PPM      Hiatal hernia with GERD and esophagitis  -takes omeprazole as needed which she reports lately causing anxiety and lower abdominal cramping   -will try pepcid bid       VTE Risk Mitigation (From admission, onward)         Ordered     rivaroxaban tablet 15 mg  With dinner         04/05/22 0230     IP VTE HIGH RISK PATIENT  Once         04/05/22 0230     Place sequential compression device  Until discontinued         04/05/22 0230                   Gerald Mo,   Department of Hospital Medicine   [facility] Step[facility]

## 2022-04-05 NOTE — ED PROVIDER NOTES
ED Resident HAND-OFF NOTE:  10:17 PM 4/5/2022  Trudi Mcknight is a 67 y.o. female who presented to the ED on 4/4/2022 and has been managed by Dr. Esquivel, who reports patient C/O aphasia. I assumed care of patient from off-going ED physician team at 10:17 PM pending MRI, admission to medicine vs vascular neurology pending MRI results.    Briefly, this is a 67-year-old female with history of AFib on Xarelto and status post AICD presenting to the ED with aphasia.  She has normal conversing, had difficulty responding to questions coherently.  Approximately 1 hour prior to arrival, symptoms started.  No other neurologic symptoms.    CBC was without leukocytosis or anemia.  CMP showed no abnormalities concerning for hospitalization.  Mag within normal limits..  Phosphorus undetectable.  Repleted.  CTA multiphase is negative for any large vessel occlusion.  Previous team discussed with vascular Neurology who recommended MRI.    Discussed with cardiology to change pt's pacemaker to MRI mode.  MRI performed which showed no concern for ischemia.  Discussed with Hospital Medicine for admission for further workup and management of patient's encephalopathy.  ______________________  Mook Barraza MD  Emergency Medicine Resident  10:17 PM 4/4/2022             Mook Barraza MD  Resident  04/05/22 0705       Erika Valentine MD  04/08/22 3219

## 2022-04-05 NOTE — SUBJECTIVE & OBJECTIVE
Past Medical History:   Diagnosis Date    Anticoagulant long-term use     Esophagitis     Hiatal hernia     Meniere's disease     Paroxysmal atrial fibrillation 2021    Shingles     Sick sinus syndrome 2022    s/p Dual chamber pacemaker    Thyroid disease        Past Surgical History:   Procedure Laterality Date    A-V CARDIAC PACEMAKER INSERTION N/A 2022    Procedure: INSERTION, CARDIAC PACEMAKER, DUAL CHAMBER;  Surgeon: Ernesto Duncan MD;  [place] [facility];  Service: Cardiology;  Laterality: N/A;  SB, DUAL PPM, SJM, ANES, SK, [id number]    BREAST CYST ASPIRATION           SECTION      COLONOSCOPY N/A 2019    Procedure: COLONOSCOPY;  Surgeon: INGRID Russo MD;  [facility]  Service: Endoscopy;  Laterality: N/A;    deviated septum repair      HYSTERECTOMY      lump removed from tongue      TONSILLECTOMY         Review of patient's allergies indicates:   Allergen Reactions    Lasix [furosemide] Shortness Of Breath    Penicillins Hives     causes congestion and hives    Tricyclic compounds        No current facility-administered medications on file prior to encounter.     Current Outpatient Medications on File Prior to Encounter   Medication Sig    flecainide (TAMBOCOR) 50 MG Tab Take 1 tablet (50 mg total) by mouth every 12 (twelve) hours.    FLUZONE HIGHDOSE QUAD 21-22  mcg/0.7 mL Syrg     omeprazole 20 mg TbLD Take 20 mg by mouth Daily.    polyethylene glycol (GLYCOLAX) 17 gram PwPk Take 17 g by mouth once as needed (Constipation).    rivaroxaban (XARELTO) 15 mg Tab Take 1 tablet (15 mg total) by mouth daily with dinner or evening meal.    [DISCONTINUED] pantoprazole (PROTONIX) 40 MG tablet Take 1 tablet (40 mg total) by mouth once daily.     Family History       Problem Relation (Age of Onset)    Breast cancer Mother, Paternal Grandmother    Diverticulitis Brother, Sister    Heart disease Mother (55), Father, Brother    Hyperlipidemia Mother     Hypertension Mother, Father          Tobacco Use    Smoking status: Never Smoker    Smokeless tobacco: Never Used   Substance and Sexual Activity    Alcohol use: No     Alcohol/week: 0.0 standard drinks     Comment: rarely    Drug use: No    Sexual activity: Not Currently     Review of Systems   Constitutional:  Negative for activity change, appetite change, chills, diaphoresis, fatigue and fever.   HENT:  Negative for congestion, dental problem, drooling, ear discharge, ear pain, facial swelling, hearing loss, mouth sores, nosebleeds, postnasal drip, rhinorrhea, sinus pressure, sneezing, sore throat, tinnitus, trouble swallowing and voice change.    Eyes:  Negative for photophobia, pain, discharge, redness, itching and visual disturbance.   Respiratory:  Positive for shortness of breath. Negative for apnea, cough, choking, chest tightness, wheezing and stridor.    Cardiovascular:  Positive for chest pain and palpitations. Negative for leg swelling.   Gastrointestinal:  Negative for abdominal distention, abdominal pain, anal bleeding, blood in stool, constipation, diarrhea, nausea, rectal pain and vomiting.   Endocrine: Negative for cold intolerance, heat intolerance, polydipsia, polyphagia and polyuria.   Genitourinary:  Negative for decreased urine volume, difficulty urinating, dyspareunia, dysuria, enuresis, flank pain, frequency, genital sores, hematuria, menstrual problem, pelvic pain, urgency, vaginal bleeding, vaginal discharge and vaginal pain.   Musculoskeletal:  Negative for arthralgias, back pain, gait problem, joint swelling, myalgias, neck pain and neck stiffness.   Skin:  Negative for color change, pallor, rash and wound.   Allergic/Immunologic: Negative for environmental allergies, food allergies and immunocompromised state.   Neurological:  Positive for dizziness, speech difficulty and light-headedness. Negative for tremors, seizures, syncope, facial asymmetry, weakness, numbness and headaches.    Hematological:  Negative for adenopathy. Does not bruise/bleed easily.   Psychiatric/Behavioral:  Negative for agitation, behavioral problems, confusion, decreased concentration, dysphoric mood, hallucinations, self-injury, sleep disturbance and suicidal ideas. The patient is not nervous/anxious and is not hyperactive.    Objective:     Vital Signs (Most Recent):  Temp: 98.6 °F (37 °C) (04/05/22 0158)  Pulse: 98 (04/05/22 0158)  Resp: 20 (04/05/22 0158)  BP: 130/64 (04/05/22 0158)  SpO2: 99 % (04/05/22 0158) Vital Signs (24h Range):  Temp:  [98.2 °F (36.8 °C)-98.6 °F (37 °C)] 98.6 °F (37 °C)  Pulse:  [] 98  Resp:  [19-26] 20  SpO2:  [96 %-100 %] 99 %  BP: (121-167)/(56-98) 130/64        There is no height or weight on file to calculate BMI.    Physical Exam  Constitutional:       General: She is not in acute distress.     Appearance: She is well-developed. She is not diaphoretic.   HENT:      Head: Normocephalic and atraumatic.      Right Ear: External ear normal.      Left Ear: External ear normal.      Nose: Nose normal.      Mouth/Throat:      Pharynx: No oropharyngeal exudate.   Eyes:      General: No scleral icterus.     Conjunctiva/sclera: Conjunctivae normal.      Pupils: Pupils are equal, round, and reactive to light.   Neck:      Thyroid: No thyromegaly.      Vascular: No JVD.      Trachea: No tracheal deviation.   Cardiovascular:      Rate and Rhythm: Normal rate and regular rhythm.      Heart sounds: Normal heart sounds. No murmur heard.    No gallop.   Pulmonary:      Effort: Pulmonary effort is normal. No respiratory distress.      Breath sounds: Normal breath sounds. No wheezing or rales.   Chest:      Chest wall: No tenderness.   Abdominal:      General: Bowel sounds are normal. There is no distension.      Palpations: Abdomen is soft. There is no mass.      Tenderness: There is no abdominal tenderness. There is no guarding or rebound.   Genitourinary:     Vagina: No vaginal discharge.    Musculoskeletal:         General: No tenderness.      Cervical back: Neck supple.   Lymphadenopathy:      Cervical: No cervical adenopathy.   Skin:     General: Skin is warm and dry.      Coloration: Skin is not pale.      Findings: No erythema or rash.   Neurological:      Mental Status: She is alert and oriented to person, place, and time.      Cranial Nerves: No cranial nerve deficit.      Motor: No abnormal muscle tone.      Coordination: Coordination normal.      Deep Tendon Reflexes: Reflexes are normal and symmetric. Reflexes normal.      Comments: Mild tremors of upper extremities    Psychiatric:         Behavior: Behavior normal.         Thought Content: Thought content normal.         Judgment: Judgment normal.      Comments: Anxious, Flat affect          CRANIAL NERVES     CN III, IV, VI   Pupils are equal, round, and reactive to light.     Significant Labs: All pertinent labs within the past 24 hours have been reviewed.  None    Recent Results (from the past 24 hour(s))   CBC W/ AUTO DIFFERENTIAL    Collection Time: 04/04/22  8:15 PM   Result Value Ref Range    WBC 8.27 3.90 - 12.70 K/uL    RBC 5.23 4.00 - 5.40 M/uL    Hemoglobin 14.4 12.0 - 16.0 g/dL    Hematocrit 42.5 37.0 - 48.5 %    MCV 81 (L) 82 - 98 fL    MCH 27.5 27.0 - 31.0 pg    MCHC 33.9 32.0 - 36.0 g/dL    RDW 15.3 (H) 11.5 - 14.5 %    Platelets 371 150 - 450 K/uL    MPV 10.3 9.2 - 12.9 fL    Immature Granulocytes 0.2 0.0 - 0.5 %    Gran # (ANC) 5.6 1.8 - 7.7 K/uL    Immature Grans (Abs) 0.02 0.00 - 0.04 K/uL    Lymph # 1.7 1.0 - 4.8 K/uL    Mono # 0.8 0.3 - 1.0 K/uL    Eos # 0.1 0.0 - 0.5 K/uL    Baso # 0.05 0.00 - 0.20 K/uL    nRBC 0 0 /100 WBC    Gran % 68.2 38.0 - 73.0 %    Lymph % 20.1 18.0 - 48.0 %    Mono % 9.4 4.0 - 15.0 %    Eosinophil % 1.5 0.0 - 8.0 %    Basophil % 0.6 0.0 - 1.9 %    Differential Method Automated    Comprehensive metabolic panel    Collection Time: 04/04/22  8:15 PM   Result Value Ref Range    Sodium 141 136 - 145  mmol/L    Potassium 3.6 3.5 - 5.1 mmol/L    Chloride 110 95 - 110 mmol/L    CO2 18 (L) 23 - 29 mmol/L    Glucose 122 (H) 70 - 110 mg/dL    BUN 18 8 - 23 mg/dL    Creatinine 0.9 0.5 - 1.4 mg/dL    Calcium 9.8 8.7 - 10.5 mg/dL    Total Protein 7.4 6.0 - 8.4 g/dL    Albumin 3.7 3.5 - 5.2 g/dL    Total Bilirubin 0.7 0.1 - 1.0 mg/dL    Alkaline Phosphatase 84 55 - 135 U/L    AST 25 10 - 40 U/L    ALT 17 10 - 44 U/L    Anion Gap 13 8 - 16 mmol/L    eGFR if African American >60.0 >60 mL/min/1.73 m^2    eGFR if non African American >60.0 >60 mL/min/1.73 m^2   Protime-INR    Collection Time: 04/04/22  8:15 PM   Result Value Ref Range    Prothrombin Time 13.7 (H) 9.0 - 12.5 sec    INR 1.3 (H) 0.8 - 1.2   TSH    Collection Time: 04/04/22  8:15 PM   Result Value Ref Range    TSH 3.221 0.400 - 4.000 uIU/mL   LDL - Lipid Panel    Collection Time: 04/04/22  8:15 PM   Result Value Ref Range    Cholesterol 186 120 - 199 mg/dL    Triglycerides 136 30 - 150 mg/dL    HDL 51 40 - 75 mg/dL    LDL Cholesterol 107.8 63.0 - 159.0 mg/dL    HDL/Cholesterol Ratio 27.4 20.0 - 50.0 %    Total Cholesterol/HDL Ratio 3.6 2.0 - 5.0    Non-HDL Cholesterol 135 mg/dL   Magnesium    Collection Time: 04/04/22  8:15 PM   Result Value Ref Range    Magnesium 2.1 1.6 - 2.6 mg/dL   Phosphorus    Collection Time: 04/04/22  8:15 PM   Result Value Ref Range    Phosphorus <1.0 (LL) 2.7 - 4.5 mg/dL   POCT glucose    Collection Time: 04/04/22  8:31 PM   Result Value Ref Range    POCT Glucose 119 (H) 70 - 110 mg/dL   ISTAT PROCEDURE    Collection Time: 04/04/22  8:48 PM   Result Value Ref Range    POC PTWBT 17.8 (H) 9.7 - 14.3 sec    POC PTINR 1.5 (H) 0.9 - 1.2    Sample unknown    ISTAT CREATININE    Collection Time: 04/04/22  8:48 PM   Result Value Ref Range    POC Creatinine 0.6 0.5 - 1.4 mg/dL    Sample unknown        Significant Imaging: I have reviewed all pertinent imaging results/findings within the past 24 hours.

## 2022-04-05 NOTE — HPI
Trudi Mcknight is a 67 year old white woman with sick sinus syndrome status post [employer] dual chamber cardiac pacemaker on 1/20/2022, atrial fibrillation diagnosed on 1/20/2022 status post pulmonary vein ablation on 3/29/2022 (anticoagulated on rivaroxaban), hyperlipidemia, hiatal hernia with gastroesophageal reflux disease, anxiety and depression. She lives in [place], [place]. She is . Her primary care physician is Dr. Renuka Moreno. Her electrophysiologist is Dr. Ernesto Duncan.   She was seen in Cardiology clinic on 4/4/2022. She reported chest pain radiating to her neck and arm after eating and worse when lying down, but not with exertion. She reported that flecainide gave her anxiety and depression. That evening, while eating dinner with her , she had palpitations, shortness of breath, chest pain, dizziness, lightheadedness, and difficulty speaking clearly. She had similar symptoms in the past with atrial fibrillation with rapid ventricular response. She had been having trouble sleeping along with her anxiety and depression. She reported that she had not been herself since diagnosed with atrial fibrillation and was anxious about flecainide because she is sensitive to medicine and does not like to take new medications. She reported worsening of her anxiety and lower abdominal cramping after recently taking omeprazole for acid reflux. She took milk of magnesium for abdominal cramping and subsequently had 3 to 4 episodes of diarrhea without blood. She took her sertraline for the first time in 3 months.

## 2022-04-05 NOTE — TELEPHONE ENCOUNTER
Message received from Dr. Duncan's RN in relation to this pt. (please see messages from 4/4/22 from the pt portal)  Remote transmissions reviewed with Device RN (Yeimi).    Pt is having multiple AT/AF episodes with RVR.   Her episodes are brief, longest AMS episode was 1 min 6 secs and the longest HVR was 1 min 47 secs.  AT/AF burden 2.8% with poor rate control since ablation on 3/29/22. Will cont to monitor for persistence or increase in AT/AF burden.     Message sent to Dr. Duncan's RN via in [employer].

## 2022-04-05 NOTE — ASSESSMENT & PLAN NOTE
"68 y/o F w PMH Afib on xarelto and symptomatic bradycardia s/p pacemaker who presented to the ER for acute onset of expressive aphasia at 7:30pm noted by . She was incoherent, unable to put her words together for which came to the ER. Stroke code activated, patient took her xarelto at 6:30 so not a candidate for tpa. CTA h/n done with no acute hypendnsities, no LVO or HG stenosis. On exam she was having tachypnea without hypoxia, c/o SOB. Repeating "I am getting better, I am getting better", but not answering questions appropriately but able to follow commands. Unable to give history. No focal neuro deficits. NIHSS 5. After getting oxygen and some fluids she had some improvement, AAOx3.     Recommendations   -- tpa contraindicated and not a IR candidate   -- Presentation unclear if purely vascular given anxiety component that could be mimicking a stroke.   -- Treat current symptoms of anxiety, patient tachypneic and tachycardic   -- If symptoms do not improve with medical management, then get MRI brain w/o contrast. Currently too unstable and imaging might be motion degraded given patient's state   -- Dispo per ED   -- Case discussed with Vascular Neurology staff and ED team           "

## 2022-04-05 NOTE — ED NOTES
Pt with known afib and recent ablation found to be aphasic in EKG room prior to triage.  reports this started 20-30 mins ago, not pt's baseline. Dr. Musa with pt to evaluate for stroke activation.

## 2022-04-05 NOTE — ASSESSMENT & PLAN NOTE
-takes omeprazole as needed which she reports lately causing anxiety and lower abdominal cramping   -will try pepcid bid

## 2022-04-05 NOTE — NURSING
Bed alarming patient found standing at bedside heading to bathroom. Assisted the rest of the way, gait slow, unsteady, denies sob/weankess/pain. Patient reminded of necessity to call for assistance with ambulation. Assisted back to bed, all belongings and call bell in reach, bed alarm replaced

## 2022-04-05 NOTE — NURSING
Patient noted with run of V-Tach. On Call physician notified. Dr. Mo  At bedside. New orders received. See MAR.

## 2022-04-05 NOTE — HOSPITAL COURSE
In the emergency department, CTA head and neck and brain MRI showed no stroke. Her symptoms improved. She was found to have severe hypophosphatemia (phosporus less than 1 mg/dL. EKG showed sinus tachycardia with rate of 106 beats per minute. She was given 1 liter of normal saline. She was admitted to Hospital Medicine Team A. She was given sodium phosphates with resolution of hypophosphatemia. She was put on amiodarone infusion. She was given lorazepam for anxiety. Electrophysiology and Psychiatry were consulted. Electrophysiology recommended continuing amiodarone, which was switched to oral on 4/6/2022. Psychiatry recommended starting sertraline, with lorazepam 0.5 mg used sparingly for panic. Patient improved and was stable on amiodarone. Patient deemed ready for discharge. Plan discussed with pt, who was agreeable and amenable; medications were discussed and reviewed, outpatient follow-up arranged, ER precautions were given, all questions were answered to the pt's satisfaction, and Trudi Mcknight  was subsequently discharged.

## 2022-04-05 NOTE — NURSING
Patient received from ED, alert and oriented x 4, resp reg and unlabored at room air. Pt made comfortable in room, oriented to surrounding, call light and TV control. Will continue to endorse care.

## 2022-04-05 NOTE — CONSULTS
Consultation-Liaison Psychiatry Consult Note    4/5/2022 10:16 AM  Trudi Mcknight  MRN: [id number]    Chief Complaint / Reason for Consult: anxiety    SUBJECTIVE     History of Present Illness:   Trudi Mcknight is a 67 y.o. female with a past psychiatric history of depression and anxiety, currently presenting with Paroxysmal atrial fibrillation with RVR.  Psychiatry was originally consulted to address the patient's symptoms of anxiety.    Per Primary MD:  Ms. Mcknight is a 67 year old female with PMH of paroxysmal aifb on flecainide and xarelto, sick sinus syndrome s/p PPM, recent pulmonary vein ablation on 3/29, GERD, hiatal hernia, anxiety, depression who presented to ED for evaluation of palpitation, sob and chest pain. Patient states last evening while she was on dinner table with her  she suddenly felt palpitation, SOB, chest pain, dizziness, lightheadedness. She felt like she would faint and had difficulty speaking clearly. Patient thought she was having another episode of her afib as she had similar symptoms in the past during afib with RVR. Patient also reports feeling stressed out with her uncontrolled generalized anxiety, depression and trouble sleeping. Patient reports she has not been herself since diagnosed with afib on January 20th of this year. She is concerned about her flecainide as she is very sensitive to medicine and does not like to take new medications. Patient reports worsening of her anxiety and lower abdominal cramping after recently taking prilosec for acid reflux. She then took milk of magnesium yesterday for abdominal cramping and subsequently has 3-4 episodes of loose non bloody diarrhea. She took took her zoloft yesterday for first time in 3 months. She denies fever, chills, focal weakness or weakness. Workup in ED including CTA head and neck, MRI brain negative for stroke. Her symptoms improved in ED. She is found to have severe hypophosphatemia < 1. She denies tobacco, alcohol or  "other drug use. EKG showed sinus tachy @ 106 bpm and recent echo 2 weeks ago with EF 60%.     During my interview patient awake, conversant, but appears anxious.     Per C-L MD:  Chart reviewed; pt seen and interviewed at bedside. Pt reports that she has been dealing with depression and anxiety intermittently for most of her life. States that her most recent episode of depression and anxiety started on January 2, when she was diagnosed with a. Fib with RVR. States that she worries about her medical condition, her prescribed medications, and the potential side effects of those medications. Reports that she feels particularly anxious every 2-3 days or so, particularly when she experiences abdominal discomfort. Calls these periods "panic attacks", though she describes them as lasting all day and primarily consisting of her socially withdrawing for the day. Reports that during her typical "panic attacks" she does not experience any associated physical sx. Reports that she has felt depressed since her dx; endorses associated insomnia, anhedonia, social withdrawal, fatigue, decreased appetite, PMR. Takes zoloft 12.5mg PRN anxiety and mood; reports that this has been helpful in the past. Denies current SI, HI, AH, VH.    Endorses a hx of 3-4 psychiatric hospitalizations for depression with SI over the course of her lifetime, most recently "decades ago". Denies previous SA. States that she has trialed prozac and TCAs in the past for her mood and anxiety sx. Has found zoloft most helpful. Also endorses a course of "about 13" ECT treatments which were helpful for her mood.    Psychiatric Review Of Systems - Is patient experiencing or having changes in:  sleep: yes  appetite: yes  weight: no  energy/anergy: yes  interest/pleasure/anhedonia: yes  somatic symptoms: yes  guilty/hopelessness: no  concentration: no  S.I.B.s/risky behavior: no  SI/SA:  no    anxiety/panic: yes  Agoraphobia:  no  Social phobia:  no  Recurrent " nightmares:  no  hyper startle response:  no  Avoidance: no  Recurrent thoughts:  yes  Recurrent behaviors:  no    Irritability: no  Racing thoughts: no  Impulsive behaviors: no  Pressured speech:  no    Paranoia:no  Delusions: no  AVH:no    Psychiatric History:  Diagnose(s): Yes - depression and anxiety  Previous Medication Trials: Yes - prozac, TCAs, zoloft  Previous Psychiatric Hospitalizations: Yes - reports 4 previous for depression with SI  Previous Suicide Attempts: No  History of Violence: No  Outpatient Psychiatrist: No    Social History:  Marital Status:   Children: 2   Employment Status: retired  Education: college graduate  Special Ed: no   History: no  Housing Status: Yes - with   Developmental History: No  History of Abuse: No  Access to Gun: No    Substance Abuse History:  Recreational Drugs: none  Use of Alcohol: denied  Rehab History: No  Tobacco Use: No  Legal consequences of chemical use: No  Is the patient aware of the biomedical complications associated with substance abuse and mental illness? No    Legal History:  Past Charges/Incarcerations: No  Pending Charges: No    Family Psychiatric History:   Yes - brother with OCD    Psychosocial Stressors: health.   Functioning Relationships: good support system    Psychosocial Factors:  Maladaptive or problem behaviors: No  Peer group, social, ethic, cultural, emotional, and health factors: Yes - a. Fib with RVR  Living situation, family constellation, family circumstances/home: No  Recovery environment: No  Community resources used by patient: No  Treatment acceptance/motivation for change: Yes     Collateral:   No    Medical Review Of Systems:  Pertinent items noted in HPI    Scheduled Meds:   rivaroxaban  15 mg Oral Daily with dinner     acetaminophen, aluminum-magnesium hydroxide-simethicone, dextrose 10%, dextrose 10%, glucagon (human recombinant), glucose, glucose, melatonin, naloxone, ondansetron, polyethylene glycol,  sodium chloride 0.9%  Psychotherapeutics (From admission, onward)            None        PRN Meds:  acetaminophen, aluminum-magnesium hydroxide-simethicone, dextrose 10%, dextrose 10%, glucagon (human recombinant), glucose, glucose, melatonin, naloxone, ondansetron, polyethylene glycol, sodium chloride 0.9%  Home Meds:  Prior to Admission medications    Medication Sig Start Date End Date Taking? Authorizing Provider   flecainide (TAMBOCOR) 50 MG Tab Take 1 tablet (50 mg total) by mouth every 12 (twelve) hours. 3/29/22 5/28/22  Ernestina Rios NP   FLUZONE HIGHDOSE QUAD -  mcg/0.7 mL Syrg  10/2/21   Historical Provider   omeprazole 20 mg TbLD Take 20 mg by mouth Daily. 3/29/22 4/28/22  Ernestina Rios NP   polyethylene glycol (GLYCOLAX) 17 gram PwPk Take 17 g by mouth once as needed (Constipation).    Historical Provider   rivaroxaban (XARELTO) 15 mg Tab Take 1 tablet (15 mg total) by mouth daily with dinner or evening meal. 3/19/22 3/19/23  Mercedez Huerta PA-C   pantoprazole (PROTONIX) 40 MG tablet Take 1 tablet (40 mg total) by mouth once daily. 3/29/22 3/29/22  Ernestina Rios NP     Allergies:  Lasix [furosemide], Penicillins, and Tricyclic compounds  Past Medical/Surgical History:  Past Medical History:   Diagnosis Date    Anticoagulant long-term use     Esophagitis     Hiatal hernia     Meniere's disease     Paroxysmal atrial fibrillation 2021    Shingles     Sick sinus syndrome 2022    s/p Dual chamber pacemaker    Thyroid disease      Past Surgical History:   Procedure Laterality Date    A-V CARDIAC PACEMAKER INSERTION N/A 2022    Procedure: INSERTION, CARDIAC PACEMAKER, DUAL CHAMBER;  Surgeon: Ernesto Duncan MD;  Location: [facility] EP LAB;  Service: Cardiology;  Laterality: N/A;  SB, DUAL PPM, SJM, ANES, SK, [id number]    BREAST CYST ASPIRATION           SECTION      COLONOSCOPY N/A 2019    Procedure: COLONOSCOPY;  Surgeon: INGRID Russo MD;  Location: [facility]  "ENDO (4TH FLR);  Service: Endoscopy;  Laterality: N/A;    deviated septum repair      HYSTERECTOMY      lump removed from tongue      TONSILLECTOMY       OBJECTIVE     Vital Signs:  Temp:  [98.2 °F (36.8 °C)-98.8 °F (37.1 °C)]   Pulse:  []   Resp:  [12-26]   BP: ()/(46-98)   SpO2:  [96 %-100 %]     Modified CAM-ICU:  1. Acute change and/or fluctuating course of mental status: No  2. Inattention (SAVEAHAART): No  · "Squeeze my hand, only when you hear, the letter 'A'."  3. Altered Level of Consciousness: No  4. Disorganized Thinking (Errors >1/6): No  · "Will a stone float on water?"  · "Are there fish in the sea?"  · "Does one pound weigh more than two?"  · "Can you use a hammer to pound a nail?"  · Command(s):  · "Hold up 2 fingers."  · "Now do the same thing with the other hand."    Score: 1+2 AND, either 3 or 4 present = Positive CAM-ICU    Mental Status Exam:  Appearance: age appropriate, lying in bed  Level of Consciousness: alert, awake  Behavior/Cooperation: cooperative, eye contact normal  Psychomotor: unremarkable   Speech: normal tone, normal pitch, slowed, soft  Language: english, fluid  Orientation: grossly intact  Attention Span/Concentration: intact  Memory: Grossly intact  Mood: "average I guess"  Affect: constricted and occasionally tearful  Thought Process: linear, normal and logical  Associations: normal and logical  Thought Content: normal, no suicidality, no homicidality, delusions, or paranoia  Fund of Knowledge: Aware of current events  Abstraction: not asked  Insight: fair  Judgment: fair    Laboratory Data:  Recent Results (from the past 48 hour(s))   CBC W/ AUTO DIFFERENTIAL    Collection Time: 04/04/22  8:15 PM   Result Value Ref Range    WBC 8.27 3.90 - 12.70 K/uL    RBC 5.23 4.00 - 5.40 M/uL    Hemoglobin 14.4 12.0 - 16.0 g/dL    Hematocrit 42.5 37.0 - 48.5 %    MCV 81 (L) 82 - 98 fL    MCH 27.5 27.0 - 31.0 pg    MCHC 33.9 32.0 - 36.0 g/dL    RDW 15.3 (H) 11.5 - 14.5 %    " Platelets 371 150 - 450 K/uL    MPV 10.3 9.2 - 12.9 fL    Immature Granulocytes 0.2 0.0 - 0.5 %    Gran # (ANC) 5.6 1.8 - 7.7 K/uL    Immature Grans (Abs) 0.02 0.00 - 0.04 K/uL    Lymph # 1.7 1.0 - 4.8 K/uL    Mono # 0.8 0.3 - 1.0 K/uL    Eos # 0.1 0.0 - 0.5 K/uL    Baso # 0.05 0.00 - 0.20 K/uL    nRBC 0 0 /100 WBC    Gran % 68.2 38.0 - 73.0 %    Lymph % 20.1 18.0 - 48.0 %    Mono % 9.4 4.0 - 15.0 %    Eosinophil % 1.5 0.0 - 8.0 %    Basophil % 0.6 0.0 - 1.9 %    Differential Method Automated    Comprehensive metabolic panel    Collection Time: 04/04/22  8:15 PM   Result Value Ref Range    Sodium 141 136 - 145 mmol/L    Potassium 3.6 3.5 - 5.1 mmol/L    Chloride 110 95 - 110 mmol/L    CO2 18 (L) 23 - 29 mmol/L    Glucose 122 (H) 70 - 110 mg/dL    BUN 18 8 - 23 mg/dL    Creatinine 0.9 0.5 - 1.4 mg/dL    Calcium 9.8 8.7 - 10.5 mg/dL    Total Protein 7.4 6.0 - 8.4 g/dL    Albumin 3.7 3.5 - 5.2 g/dL    Total Bilirubin 0.7 0.1 - 1.0 mg/dL    Alkaline Phosphatase 84 55 - 135 U/L    AST 25 10 - 40 U/L    ALT 17 10 - 44 U/L    Anion Gap 13 8 - 16 mmol/L    eGFR if African American >60.0 >60 mL/min/1.73 m^2    eGFR if non African American >60.0 >60 mL/min/1.73 m^2   Protime-INR    Collection Time: 04/04/22  8:15 PM   Result Value Ref Range    Prothrombin Time 13.7 (H) 9.0 - 12.5 sec    INR 1.3 (H) 0.8 - 1.2   TSH    Collection Time: 04/04/22  8:15 PM   Result Value Ref Range    TSH 3.221 0.400 - 4.000 uIU/mL   LDL - Lipid Panel    Collection Time: 04/04/22  8:15 PM   Result Value Ref Range    Cholesterol 186 120 - 199 mg/dL    Triglycerides 136 30 - 150 mg/dL    HDL 51 40 - 75 mg/dL    LDL Cholesterol 107.8 63.0 - 159.0 mg/dL    HDL/Cholesterol Ratio 27.4 20.0 - 50.0 %    Total Cholesterol/HDL Ratio 3.6 2.0 - 5.0    Non-HDL Cholesterol 135 mg/dL   Magnesium    Collection Time: 04/04/22  8:15 PM   Result Value Ref Range    Magnesium 2.1 1.6 - 2.6 mg/dL   Phosphorus    Collection Time: 04/04/22  8:15 PM   Result Value Ref  Range    Phosphorus <1.0 (LL) 2.7 - 4.5 mg/dL   POCT glucose    Collection Time: 04/04/22  8:31 PM   Result Value Ref Range    POCT Glucose 119 (H) 70 - 110 mg/dL   ISTAT PROCEDURE    Collection Time: 04/04/22  8:48 PM   Result Value Ref Range    POC PTWBT 17.8 (H) 9.7 - 14.3 sec    POC PTINR 1.5 (H) 0.9 - 1.2    Sample unknown    ISTAT CREATININE    Collection Time: 04/04/22  8:48 PM   Result Value Ref Range    POC Creatinine 0.6 0.5 - 1.4 mg/dL    Sample unknown    Basic Metabolic Panel (BMP)    Collection Time: 04/05/22  3:09 AM   Result Value Ref Range    Sodium 138 136 - 145 mmol/L    Potassium 3.5 3.5 - 5.1 mmol/L    Chloride 109 95 - 110 mmol/L    CO2 18 (L) 23 - 29 mmol/L    Glucose 106 70 - 110 mg/dL    BUN 12 8 - 23 mg/dL    Creatinine 0.7 0.5 - 1.4 mg/dL    Calcium 9.1 8.7 - 10.5 mg/dL    Anion Gap 11 8 - 16 mmol/L    eGFR if African American >60.0 >60 mL/min/1.73 m^2    eGFR if non African American >60.0 >60 mL/min/1.73 m^2   Magnesium    Collection Time: 04/05/22  3:09 AM   Result Value Ref Range    Magnesium 2.1 1.6 - 2.6 mg/dL   Phosphorus    Collection Time: 04/05/22  3:09 AM   Result Value Ref Range    Phosphorus 3.1 2.7 - 4.5 mg/dL   T4, free    Collection Time: 04/05/22  3:39 AM   Result Value Ref Range    Free T4 1.18 0.71 - 1.51 ng/dL      No results found for: PHENYTOIN, PHENOBARB, VALPROATE, CBMZ  Imaging:  Imaging Results          MRI Brain Without Contrast (Final result)  Result time 04/04/22 23:36:40    Final result by Judah Bonilla MD (04/04/22 23:36:40)                 Impression:      1. No evidence of acute intracranial pathology.  2. Right cerebral hemisphere developmental venous anomaly.    Electronically signed by resident: Olayinka Sawyer  Date:    04/04/2022  Time:    23:26    Electronically signed by: Judah Bonilla MD  Date:    04/04/2022  Time:    23:36             Narrative:    EXAMINATION:  MRI BRAIN WITHOUT CONTRAST    CLINICAL HISTORY:  Transient ischemic attack  "(TIA);Aphasia;    TECHNIQUE:  Multiplanar multisequence MR imaging of the brain was performed without intravenous contrast.    COMPARISON:  Same day CTA stroke multiphase    FINDINGS:  Intracranial Compartment:    The ventricles are stable in size for age without evidence of hydrocephalus.    The brain parenchyma appears within normal limits for age.  No mass, hemorrhage, or recent or remote major vascular distribution infarct.    Redemonstration of developmental venous anomaly in the right parietal lobe.    No extra-axial blood or fluid collections.    Normal vascular flow voids are preserved.    Skull/Extracranial Contents (limited evaluation):    Bone marrow signal intensity is normal.                               X-Ray Chest AP Portable (Final result)  Result time 04/04/22 21:55:27    Final result by David Garcia MD (04/04/22 21:55:27)                 Impression:      No acute process or detrimental change when compared with 03/18/2022.      Electronically signed by: David Garcia MD  Date:    04/04/2022  Time:    21:55             Narrative:    EXAMINATION:  XR CHEST AP PORTABLE    CLINICAL HISTORY:  Provided history is "  Tachycardia, unspecified".    TECHNIQUE:  One view of the chest.    COMPARISON:  03/18/2022.    FINDINGS:  Left chest wall cardiac pacing device is present with transvenous leads overlying the heart.  Cardiac silhouette is stable and not significantly enlarged.  Hiatal hernia is noted.  Lungs are essentially clear.  No large focal consolidation, sizeable pleural effusion, or pneumothorax.                               CTA STROKE MULTI-PHASE (Final result)  Result time 04/04/22 21:02:17    Final result by Judah Bonilla MD (04/04/22 21:02:17)                 Impression:      1. Motion artifact degrades exam.  2. No acute intracranial abnormality.  3. CTA without evidence of high-grade stenosis or large vessel occlusion.  4. Right cerebral hemisphere developmental venous anomaly.  5. " Additional findings as above.    Electronically signed by resident: Olayinka Sawyer  Date:    04/04/2022  Time:    20:36    Electronically signed by: Judah Bonilla MD  Date:    04/04/2022  Time:    21:02             Narrative:    EXAMINATION:  CTA STROKE MULTI-PHASE    CLINICAL HISTORY:  Neuro deficit, acute, stroke suspected;    TECHNIQUE:  Non contrast low dose axial images were obtained thought the head. CT angiogram was performed from the level of the estiven to the top of the head following the IV administration of 100 mL of Omnipaque 350.   Sagittal and coronal reconstructions and maximum intensity projection reconstructions were performed. Arterial stenosis percentages are based on NASCET measurement criteria.  Two additional phases of immediate post-contrast CTA images were performed through the head alone.    CT source data was analyzed using artificial intelligence software for detection of a large vessel occlusions (LVO) in order to enable computer assisted triage notification and aid clinical stroke decision making.    COMPARISON:  None    FINDINGS:  The ventricles are normal in size without evidence of hydrocephalus.    The brain appears within normal limits. No parenchymal mass, hemorrhage, edema or major vascular distribution infarct.    No extra-axial blood or fluid collections.    No acute displaced calvarial fracture.  Mastoid air cells and paranasal sinuses are essentially clear.      CTA:    Motion artifact at the level of the skull base and carotid bifurcations degrades exam.    The aortic arch maintains a normal branching pattern with mild calcific atherosclerosis.    The common and internal carotid arteries are normal in course and caliber. No calcific atherosclerosis or significant stenosis in either carotid bifurcation.    The vertebral origins are patent. The cervical vertebral arteries are normal in course and caliber.  Left vertebral artery is dominant.  Vertebrobasilar system is within normal  limits without focal abnormality.    The anterior, middle, and posterior cerebral arteries are within normal limits, without evidence of significant stenosis, focal occlusion, or intracranial aneurysm formation.    Blood flow remains symmetric on post arterial phases.    Developmental venous anomaly in the right frontoparietal region.    Additional findings:    Partially visualized left chest AICD with 2 transvenous leads.  Lung apices clear without consolidation.  Visualized thyroid, parotid, and submandibular glands unremarkable.  Degenerative change of the spine without acute fracture.                                 ASSESSMENT     Trudi Mcknight is a 67 y.o. female with a past psychiatric history of depression and anxiety, currently presenting with Paroxysmal atrial fibrillation with RVR.  Psychiatry was originally consulted to address the patient's symptoms of anxiety. Appears depressed and anxious on exam, discussed likely helpfulness of consistent SSRI use.    IMPRESSION  MDD, severe, recurrent  Unspecified Anxiety Disorder    RECOMMENDATION(S)      1. Scheduled Medication(s):  Start sertraline 12.5mg PO qD for depression and anxiety.    2. PRN Medication(s):  Can consider Ativan 0.5mg PO PRN panic. Would use as sparingly as possible.    3.  Monitor:  -    4. Legal Status/Precaution(s):  Does not appear to meet criteria for PEC at this time.    Silviano Mitchell MD  [facility], PGY-4  [facility]

## 2022-04-06 PROBLEM — I31.39 PERICARDIAL EFFUSION: Status: ACTIVE | Noted: 2022-03-22

## 2022-04-06 LAB
ANION GAP SERPL CALC-SCNC: 8 MMOL/L (ref 8–16)
ASCENDING AORTA: 2.97 CM
AV INDEX (PROSTH): 0.96
AV MEAN GRADIENT: 2 MMHG
AV PEAK GRADIENT: 4 MMHG
AV VALVE AREA: 3.41 CM2
AV VELOCITY RATIO: 0.94
BSA FOR ECHO PROCEDURE: 1.5 M2
BUN SERPL-MCNC: 9 MG/DL (ref 8–23)
CALCIUM SERPL-MCNC: 8.8 MG/DL (ref 8.7–10.5)
CHLORIDE SERPL-SCNC: 108 MMOL/L (ref 95–110)
CO2 SERPL-SCNC: 22 MMOL/L (ref 23–29)
CREAT SERPL-MCNC: 0.9 MG/DL (ref 0.5–1.4)
CV ECHO LV RWT: 0.42 CM
DOP CALC AO PEAK VEL: 0.99 M/S
DOP CALC AO VTI: 19.18 CM
DOP CALC LVOT AREA: 3.6 CM2
DOP CALC LVOT DIAMETER: 2.13 CM
DOP CALC LVOT PEAK VEL: 0.93 M/S
DOP CALC LVOT STROKE VOLUME: 65.39 CM3
DOP CALCLVOT PEAK VEL VTI: 18.36 CM
E WAVE DECELERATION TIME: 190.05 MSEC
E/A RATIO: 0.98
E/E' RATIO: 10.83 M/S
ECHO LV POSTERIOR WALL: 0.79 CM (ref 0.6–1.1)
EJECTION FRACTION: 55 %
EST. GFR  (AFRICAN AMERICAN): >60 ML/MIN/1.73 M^2
EST. GFR  (NON AFRICAN AMERICAN): >60 ML/MIN/1.73 M^2
FRACTIONAL SHORTENING: 28 % (ref 28–44)
GLUCOSE SERPL-MCNC: 88 MG/DL (ref 70–110)
INTERVENTRICULAR SEPTUM: 0.81 CM (ref 0.6–1.1)
LA MAJOR: 5.5 CM
LA MINOR: 5.38 CM
LA WIDTH: 3.56 CM
LEFT ATRIUM SIZE: 3.6 CM
LEFT ATRIUM VOLUME INDEX MOD: 23.3 ML/M2
LEFT ATRIUM VOLUME INDEX: 39.8 ML/M2
LEFT ATRIUM VOLUME MOD: 34.77 CM3
LEFT ATRIUM VOLUME: 59.25 CM3
LEFT INTERNAL DIMENSION IN SYSTOLE: 2.74 CM (ref 2.1–4)
LEFT VENTRICLE DIASTOLIC VOLUME INDEX: 41.7 ML/M2
LEFT VENTRICLE DIASTOLIC VOLUME: 62.14 ML
LEFT VENTRICLE MASS INDEX: 58 G/M2
LEFT VENTRICLE SYSTOLIC VOLUME INDEX: 18.8 ML/M2
LEFT VENTRICLE SYSTOLIC VOLUME: 28.02 ML
LEFT VENTRICULAR INTERNAL DIMENSION IN DIASTOLE: 3.8 CM (ref 3.5–6)
LEFT VENTRICULAR MASS: 85.96 G
LV LATERAL E/E' RATIO: 10.83 M/S
LV SEPTAL E/E' RATIO: 10.83 M/S
MAGNESIUM SERPL-MCNC: 2.1 MG/DL (ref 1.6–2.6)
MV A" WAVE DURATION": 11.04 MSEC
MV PEAK A VEL: 0.66 M/S
MV PEAK E VEL: 0.65 M/S
MV STENOSIS PRESSURE HALF TIME: 55.11 MS
MV VALVE AREA P 1/2 METHOD: 3.99 CM2
PHOSPHATE SERPL-MCNC: 3.4 MG/DL (ref 2.7–4.5)
POCT GLUCOSE: 103 MG/DL (ref 70–110)
POCT GLUCOSE: 96 MG/DL (ref 70–110)
POTASSIUM SERPL-SCNC: 3.9 MMOL/L (ref 3.5–5.1)
PULM VEIN S/D RATIO: 1.35
PV PEAK D VEL: 0.51 M/S
PV PEAK S VEL: 0.69 M/S
RA MAJOR: 4.44 CM
RA PRESSURE: 8 MMHG
RA WIDTH: 2.62 CM
RIGHT VENTRICULAR END-DIASTOLIC DIMENSION: 2.89 CM
SINUS: 3.26 CM
SODIUM SERPL-SCNC: 138 MMOL/L (ref 136–145)
STJ: 2.35 CM
TDI LATERAL: 0.06 M/S
TDI SEPTAL: 0.06 M/S
TDI: 0.06 M/S
TRICUSPID ANNULAR PLANE SYSTOLIC EXCURSION: 1.42 CM

## 2022-04-06 PROCEDURE — 80048 BASIC METABOLIC PNL TOTAL CA: CPT | Performed by: HOSPITALIST

## 2022-04-06 PROCEDURE — 20600001 HC STEP DOWN PRIVATE ROOM

## 2022-04-06 PROCEDURE — 25000003 PHARM REV CODE 250: Performed by: HOSPITALIST

## 2022-04-06 PROCEDURE — 99900035 HC TECH TIME PER 15 MIN (STAT)

## 2022-04-06 PROCEDURE — 36415 COLL VENOUS BLD VENIPUNCTURE: CPT | Performed by: HOSPITALIST

## 2022-04-06 PROCEDURE — 93010 EKG 12-LEAD: ICD-10-PCS | Mod: ,,, | Performed by: INTERNAL MEDICINE

## 2022-04-06 PROCEDURE — 83735 ASSAY OF MAGNESIUM: CPT | Performed by: HOSPITALIST

## 2022-04-06 PROCEDURE — 93005 ELECTROCARDIOGRAM TRACING: CPT

## 2022-04-06 PROCEDURE — 93010 ELECTROCARDIOGRAM REPORT: CPT | Mod: ,,, | Performed by: INTERNAL MEDICINE

## 2022-04-06 PROCEDURE — 94761 N-INVAS EAR/PLS OXIMETRY MLT: CPT

## 2022-04-06 PROCEDURE — 84100 ASSAY OF PHOSPHORUS: CPT | Performed by: HOSPITALIST

## 2022-04-06 PROCEDURE — 99232 PR SUBSEQUENT HOSPITAL CARE,LEVL II: ICD-10-PCS | Mod: ,,, | Performed by: HOSPITALIST

## 2022-04-06 PROCEDURE — 63600175 PHARM REV CODE 636 W HCPCS: Performed by: HOSPITALIST

## 2022-04-06 PROCEDURE — 99232 SBSQ HOSP IP/OBS MODERATE 35: CPT | Mod: ,,, | Performed by: HOSPITALIST

## 2022-04-06 PROCEDURE — 25000003 PHARM REV CODE 250: Performed by: INTERNAL MEDICINE

## 2022-04-06 RX ORDER — PANTOPRAZOLE SODIUM 40 MG/1
40 TABLET, DELAYED RELEASE ORAL DAILY
Status: DISCONTINUED | OUTPATIENT
Start: 2022-04-06 | End: 2022-04-07 | Stop reason: HOSPADM

## 2022-04-06 RX ORDER — LORAZEPAM 0.5 MG/1
0.5 TABLET ORAL EVERY 8 HOURS PRN
Status: DISCONTINUED | OUTPATIENT
Start: 2022-04-06 | End: 2022-04-07 | Stop reason: HOSPADM

## 2022-04-06 RX ORDER — AMIODARONE HYDROCHLORIDE 200 MG/1
400 TABLET ORAL 2 TIMES DAILY
Status: DISCONTINUED | OUTPATIENT
Start: 2022-04-06 | End: 2022-04-07 | Stop reason: HOSPADM

## 2022-04-06 RX ADMIN — SERTRALINE HYDROCHLORIDE 25 MG: 25 TABLET ORAL at 09:04

## 2022-04-06 RX ADMIN — ONDANSETRON 4 MG: 2 INJECTION INTRAMUSCULAR; INTRAVENOUS at 04:04

## 2022-04-06 RX ADMIN — RIVAROXABAN 15 MG: 15 TABLET, FILM COATED ORAL at 05:04

## 2022-04-06 RX ADMIN — PANTOPRAZOLE SODIUM 40 MG: 40 TABLET, DELAYED RELEASE ORAL at 09:04

## 2022-04-06 RX ADMIN — AMIODARONE HYDROCHLORIDE 400 MG: 200 TABLET ORAL at 11:04

## 2022-04-06 RX ADMIN — LORAZEPAM 0.5 MG: 0.5 TABLET ORAL at 09:04

## 2022-04-06 RX ADMIN — AMIODARONE HYDROCHLORIDE 400 MG: 200 TABLET ORAL at 09:04

## 2022-04-06 NOTE — PROGRESS NOTES
[facility] - Cardiology Stepdown  Psychiatry  Progress Note    Patient Name: Trudi Mcknight  MRN: [id number]   Code Status: Full Code  Admission Date: 4/4/2022  Hospital Length of Stay: 1 days  Expected Discharge Date: 4/6/2022  Attending Physician: Too Giraldo MD  Primary Care Provider: Renuka Moreno MD    Current Legal Status: Uncontested    Patient information was obtained from patient, past medical records and ER records.       Subjective:     Patient is a 67 y.o., female, presents with:    Principal Problem:Paroxysmal atrial fibrillation with RVR    Chief Complaint: anxiety    HPI: History of Present Illness:   Trudi Mcknight is a 67 y.o. female with a past psychiatric history of depression and anxiety, currently presenting with Paroxysmal atrial fibrillation with RVR.  Psychiatry was originally consulted to address the patient's symptoms of anxiety.     Per Primary MD:  Ms. Mcknight is a 67 year old female with PMH of paroxysmal aifb on flecainide and xarelto, sick sinus syndrome s/p PPM, recent pulmonary vein ablation on 3/29, GERD, hiatal hernia, anxiety, depression who presented to ED for evaluation of palpitation, sob and chest pain. Patient states last evening while she was on dinner table with her  she suddenly felt palpitation, SOB, chest pain, dizziness, lightheadedness. She felt like she would faint and had difficulty speaking clearly. Patient thought she was having another episode of her afib as she had similar symptoms in the past during afib with RVR. Patient also reports feeling stressed out with her uncontrolled generalized anxiety, depression and trouble sleeping. Patient reports she has not been herself since diagnosed with afib on January 20th of this year. She is concerned about her flecainide as she is very sensitive to medicine and does not like to take new medications. Patient reports worsening of her anxiety and lower abdominal cramping after recently taking prilosec for acid  "reflux. She then took milk of magnesium yesterday for abdominal cramping and subsequently has 3-4 episodes of loose non bloody diarrhea. She took took her zoloft yesterday for first time in 3 months. She denies fever, chills, focal weakness or weakness. Workup in ED including CTA head and neck, MRI brain negative for stroke. Her symptoms improved in ED. She is found to have severe hypophosphatemia < 1. She denies tobacco, alcohol or other drug use. EKG showed sinus tachy @ 106 bpm and recent echo 2 weeks ago with EF 60%.     During my interview patient awake, conversant, but appears anxious.      Per C-L MD:  Chart reviewed; pt seen and interviewed at bedside. Pt reports that she has been dealing with depression and anxiety intermittently for most of her life. States that her most recent episode of depression and anxiety started on January 2, when she was diagnosed with a. Fib with RVR. States that she worries about her medical condition, her prescribed medications, and the potential side effects of those medications. Reports that she feels particularly anxious every 2-3 days or so, particularly when she experiences abdominal discomfort. Calls these periods "panic attacks", though she describes them as lasting all day and primarily consisting of her socially withdrawing for the day. Reports that during her typical "panic attacks" she does not experience any associated physical sx. Reports that she has felt depressed since her dx; endorses associated insomnia, anhedonia, social withdrawal, fatigue, decreased appetite, PMR. Takes zoloft 12.5mg PRN anxiety and mood; reports that this has been helpful in the past. Denies current SI, HI, AH, VH.     Endorses a hx of 3-4 psychiatric hospitalizations for depression with SI over the course of her lifetime, most recently "decades ago". Denies previous SA. States that she has trialed prozac and TCAs in the past for her mood and anxiety sx. Has found zoloft most helpful. Also " "endorses a course of "about 13" ECT treatments which were helpful for her mood.      Hospital Course: 04/06/2022  Pt seen and interviewed at bedside. Reports that her mood is "alright I guess" today. States that she has taken two doses of zoloft so far and has not experienced any adverse effects related to this medication. Notes still with some anxious ruminations. Hopeful that zoloft will be helpful for both her anxiety and her mood. Also notes that she received a dose of PRN ativan for anxiety and found this to be effective. Denies SI, HI, AH, VH. Concerned about potential side effects related to new medication amiodarone, states that she "always get[s] all the weird side effects." No other questions or concerns at time of interview.      Interval History:     Family History       Problem Relation (Age of Onset)    Breast cancer Mother, Paternal Grandmother    Diverticulitis Brother, Sister    Heart disease Mother (55), Father, Brother    Hyperlipidemia Mother    Hypertension Mother, Father          Tobacco Use    Smoking status: Never Smoker    Smokeless tobacco: Never Used   Substance and Sexual Activity    Alcohol use: No     Alcohol/week: 0.0 standard drinks     Comment: rarely    Drug use: No    Sexual activity: Not Currently     Psychotherapeutics (From admission, onward)                Start     Stop Route Frequency Ordered    04/06/22 0946  LORazepam tablet 0.5 mg         -- Oral Every 8 hours PRN 04/06/22 0846    04/05/22 1645  sertraline tablet 25 mg         -- Oral Daily 04/05/22 1639             Review of Systems  Objective:     Vital Signs (Most Recent):  Temp: 96.7 °F (35.9 °C) (04/06/22 1135)  Pulse: 81 (04/06/22 1135)  Resp: 20 (04/06/22 1135)  BP: (!) 136/59 (04/06/22 1135)  SpO2: 95 % (04/06/22 1135)   Vital Signs (24h Range):  Temp:  [96.7 °F (35.9 °C)-99.1 °F (37.3 °C)] 96.7 °F (35.9 °C)  Pulse:  [59-92] 81  Resp:  [16-20] 20  SpO2:  [91 %-98 %] 95 %  BP: ()/(51-62) 136/59 " "    Height: 5' 1" (154.9 cm)  Weight: 52.2 kg (115 lb)  Body mass index is 21.73 kg/m².      Intake/Output Summary (Last 24 hours) at 4/6/2022 1248  Last data filed at 4/6/2022 0400  Gross per 24 hour   Intake 300 ml   Output 950 ml   Net -650 ml       Physical Exam     Mental Status Exam:  Appearance: age appropriate, lying in bed  Level of Consciousness: alert, awake  Behavior/Cooperation: cooperative, eye contact normal  Psychomotor: unremarkable   Speech: normal tone, normal pitch, slowed, soft  Language: english, fluid  Orientation: grossly intact  Attention Span/Concentration: intact  Memory: Grossly intact  Mood: "alright"  Affect: constricted  Thought Process: linear, normal and logical  Associations: normal and logical  Thought Content: normal, no suicidality, no homicidality, delusions, or paranoia  Fund of Knowledge: Aware of current events  Abstraction: not asked  Insight: fair  Judgment: fair    Significant Labs: Last 24 Hours:   Recent Lab Results         04/06/22  1132   04/06/22  0914   04/06/22  0340        Anion Gap     8       Ascending aorta   2.97         Ao peak mandy   0.99         Ao VTI   19.18         AV valve area   3.41         AV mean gradient   2         AV index (prosthetic)   0.96         AV peak gradient   4         AV Velocity Ratio   0.94         BSA   1.5         BUN     9       Calcium     8.8       Chloride     108       CO2     22       Creatinine     0.9       Left Ventricle Relative Wall Thickness   0.42         E/A ratio   0.98         E/E' ratio   10.83         eGFR if      >60.0       eGFR if non      >60.0  Comment: Calculation used to obtain the estimated glomerular filtration  rate (eGFR) is the CKD-EPI equation.          EF   55         E wave deceleration time   190.05         FS   28         Glucose     88       IVS   0.81         LA WIDTH   3.56         Left Atrium Major Axis   5.50         Left Atrium Minor Axis   5.38         LA size  " " 3.60         LA volume   59.25            34.77         LA Volume Index   39.8         LA Volume Index (Mod)   23.3         LVOT area   3.6         LV LATERAL E/E' RATIO   10.83         LV SEPTAL E/E' RATIO   10.83         LV Diastolic Volume   62.14         LV Diastolic Volume Index   41.70         LVIDd   3.80         LVIDs   2.74         LV mass   85.96         LV Mass Index   58         LVOT diameter   2.13         LVOT peak mark   0.93         LVOT stroke volume   65.39         LVOT peak VTI   18.36         LV Systolic Volume   28.02         LV Systolic Volume Index   18.8         Magnesium     2.1       Mean e'   0.06         MV valve area p 1/2 method   3.99         MV "A" wave duration   11.04         MV Peak A Mark   0.66         MV Peak E Mark   0.65         MV stenosis pressure 1/2 time   55.11         Phosphorus     3.4       POCT Glucose 96           Potassium     3.9       PV Peak D Mark   0.51         PV Peak S Mark   0.69         Pulm vein S/D ratio   1.35         Posterior Wall   0.79         Right Atrial Pressure (from IVC)   8         RA Major Axis   4.44         RA Width   2.62         RVDD   2.89         Sinus   3.26         Sodium     138       STJ   2.35         TAPSE   1.42         TDI SEPTAL   0.06         TDI LATERAL   0.06                 Significant Imaging: I have reviewed all pertinent imaging results/findings within the past 24 hours.       Scheduled Medications:   amiodarone  400 mg Oral BID    pantoprazole  40 mg Oral Daily    rivaroxaban  15 mg Oral Daily with dinner    sertraline  25 mg Oral Daily       PRN Medications:  acetaminophen, aluminum-magnesium hydroxide-simethicone, dextrose 10%, dextrose 10%, glucagon (human recombinant), glucose, glucose, LORazepam, melatonin, naloxone, ondansetron, polyethylene glycol, sodium chloride 0.9%    Review of patient's allergies indicates:   Allergen Reactions    Lasix [furosemide] Shortness Of Breath    Penicillins Hives     causes " congestion and hives    Tricyclic compounds        Assessment/Plan:     * Paroxysmal atrial fibrillation with RVR  1. Scheduled Medication(s):  Continue sertraline 25mg PO qD for depression and anxiety.     2. PRN Medication(s):  Ativan 0.5mg PO PRN panic. Would use as sparingly as possible.     3.  Monitor:  -     4. Legal Status/Precaution(s):  Does not appear to meet criteria for PEC at this time.     Silviano Mitchell MD  [facility], PGY-4  [facility]         Need for Continued Hospitalization:  No need for inpatient psychiatric hospitalization. Continue medical care as per the primary team.    Anticipated Disposition:  Home or Self Care    Total time:  25 with greater than 50% of this time spent in counseling and/or coordination of care.       Silviano Mitchell MD   Psychiatry  [facility] - Cardiology Stepdown

## 2022-04-06 NOTE — HOSPITAL COURSE
"04/06/2022  Pt seen and interviewed at bedside. Reports that her mood is "alright I guess" today. States that she has taken two doses of zoloft so far and has not experienced any adverse effects related to this medication. Notes still with some anxious ruminations. Hopeful that zoloft will be helpful for both her anxiety and her mood. Also notes that she received a dose of PRN ativan for anxiety and found this to be effective. Denies SI, HI, AH, VH. Concerned about potential side effects related to new medication amiodarone, states that she "always get[s] all the weird side effects." No other questions or concerns at time of interview.    04/07/2022  Chart reviewed. Pt seen and examined at bedside. She reports that she is feeling "better" today as compared to yesterday. Clarifies that by this she means that she is both less anxious and feeling less "stomach upset" than she was yesterday. States that she is hopeful regarding sertraline's potential helpfulness for her anxiety. Received ativan PRN yesterday for anxiety, found this to be effective. Has not required ativan PRN anxiety thus far this morning. Denies SI, HI, AH, VH. No other questions or concerns at time of interview.  "

## 2022-04-06 NOTE — ASSESSMENT & PLAN NOTE
Takes omeprazole as needed, which she reports lately causing anxiety and lower abdominal cramping. Trying famotidine.

## 2022-04-06 NOTE — HPI
History of Present Illness:   Trudi Mcknight is a 67 y.o. female with a past psychiatric history of depression and anxiety, currently presenting with Paroxysmal atrial fibrillation with RVR.  Psychiatry was originally consulted to address the patient's symptoms of anxiety.     Per Primary MD:  Ms. Mcknight is a 67 year old female with PMH of paroxysmal aifb on flecainide and xarelto, sick sinus syndrome s/p PPM, recent pulmonary vein ablation on 3/29, GERD, hiatal hernia, anxiety, depression who presented to ED for evaluation of palpitation, sob and chest pain. Patient states last evening while she was on dinner table with her  she suddenly felt palpitation, SOB, chest pain, dizziness, lightheadedness. She felt like she would faint and had difficulty speaking clearly. Patient thought she was having another episode of her afib as she had similar symptoms in the past during afib with RVR. Patient also reports feeling stressed out with her uncontrolled generalized anxiety, depression and trouble sleeping. Patient reports she has not been herself since diagnosed with afib on January 20th of this year. She is concerned about her flecainide as she is very sensitive to medicine and does not like to take new medications. Patient reports worsening of her anxiety and lower abdominal cramping after recently taking prilosec for acid reflux. She then took milk of magnesium yesterday for abdominal cramping and subsequently has 3-4 episodes of loose non bloody diarrhea. She took took her zoloft yesterday for first time in 3 months. She denies fever, chills, focal weakness or weakness. Workup in ED including CTA head and neck, MRI brain negative for stroke. Her symptoms improved in ED. She is found to have severe hypophosphatemia < 1. She denies tobacco, alcohol or other drug use. EKG showed sinus tachy @ 106 bpm and recent echo 2 weeks ago with EF 60%.     During my interview patient awake, conversant, but appears anxious.     "  Per C-L MD:  Chart reviewed; pt seen and interviewed at bedside. Pt reports that she has been dealing with depression and anxiety intermittently for most of her life. States that her most recent episode of depression and anxiety started on January 2, when she was diagnosed with a. Fib with RVR. States that she worries about her medical condition, her prescribed medications, and the potential side effects of those medications. Reports that she feels particularly anxious every 2-3 days or so, particularly when she experiences abdominal discomfort. Calls these periods "panic attacks", though she describes them as lasting all day and primarily consisting of her socially withdrawing for the day. Reports that during her typical "panic attacks" she does not experience any associated physical sx. Reports that she has felt depressed since her dx; endorses associated insomnia, anhedonia, social withdrawal, fatigue, decreased appetite, PMR. Takes zoloft 12.5mg PRN anxiety and mood; reports that this has been helpful in the past. Denies current SI, HI, AH, VH.     Endorses a hx of 3-4 psychiatric hospitalizations for depression with SI over the course of her lifetime, most recently "decades ago". Denies previous SA. States that she has trialed prozac and TCAs in the past for her mood and anxiety sx. Has found zoloft most helpful. Also endorses a course of "about 13" ECT treatments which were helpful for her mood.  "

## 2022-04-06 NOTE — PLAN OF CARE
Goals and plan of care reviewed and mutually agreed upon including: continue treatments as ordered, increase diet and activity as tolerated, remain fever/fall/pain free. All topics educated on, verbalized understanding, no further questions at this time. Patient encouraged to call for assistance with ambulation, bed in lowest and locked position, all belongings and call bell in reach.

## 2022-04-06 NOTE — ASSESSMENT & PLAN NOTE
Status post circumferential ablation of pulmonary vein  On rivaroxaban therapy  Continue home rivaroxaban. Appreciate Cardiology. Flecainide changed to amiodarone.

## 2022-04-06 NOTE — SUBJECTIVE & OBJECTIVE
"Interval History:     Family History       Problem Relation (Age of Onset)    Breast cancer Mother, Paternal Grandmother    Diverticulitis Brother, Sister    Heart disease Mother (55), Father, Brother    Hyperlipidemia Mother    Hypertension Mother, Father          Tobacco Use    Smoking status: Never Smoker    Smokeless tobacco: Never Used   Substance and Sexual Activity    Alcohol use: No     Alcohol/week: 0.0 standard drinks     Comment: rarely    Drug use: No    Sexual activity: Not Currently     Psychotherapeutics (From admission, onward)                Start     Stop Route Frequency Ordered    04/06/22 0946  LORazepam tablet 0.5 mg         -- Oral Every 8 hours PRN 04/06/22 0846    04/05/22 1645  sertraline tablet 25 mg         -- Oral Daily 04/05/22 1639             Review of Systems  Objective:     Vital Signs (Most Recent):  Temp: 96.7 °F (35.9 °C) (04/06/22 1135)  Pulse: 81 (04/06/22 1135)  Resp: 20 (04/06/22 1135)  BP: (!) 136/59 (04/06/22 1135)  SpO2: 95 % (04/06/22 1135)   Vital Signs (24h Range):  Temp:  [96.7 °F (35.9 °C)-99.1 °F (37.3 °C)] 96.7 °F (35.9 °C)  Pulse:  [59-92] 81  Resp:  [16-20] 20  SpO2:  [91 %-98 %] 95 %  BP: ()/(51-62) 136/59     Height: 5' 1" (154.9 cm)  Weight: 52.2 kg (115 lb)  Body mass index is 21.73 kg/m².      Intake/Output Summary (Last 24 hours) at 4/6/2022 1248  Last data filed at 4/6/2022 0400  Gross per 24 hour   Intake 300 ml   Output 950 ml   Net -650 ml       Physical Exam     Mental Status Exam:  Appearance: age appropriate, lying in bed  Level of Consciousness: alert, awake  Behavior/Cooperation: cooperative, eye contact normal  Psychomotor: unremarkable   Speech: normal tone, normal pitch, slowed, soft  Language: english, fluid  Orientation: grossly intact  Attention Span/Concentration: intact  Memory: Grossly intact  Mood: "alright"  Affect: constricted  Thought Process: linear, normal and logical  Associations: normal and logical  Thought Content: normal, no " "suicidality, no homicidality, delusions, or paranoia  Fund of Knowledge: Aware of current events  Abstraction: not asked  Insight: fair  Judgment: fair    Significant Labs: Last 24 Hours:   Recent Lab Results         04/06/22  1132   04/06/22  0914   04/06/22  0340        Anion Gap     8       Ascending aorta   2.97         Ao peak mark   0.99         Ao VTI   19.18         AV valve area   3.41         AV mean gradient   2         AV index (prosthetic)   0.96         AV peak gradient   4         AV Velocity Ratio   0.94         BSA   1.5         BUN     9       Calcium     8.8       Chloride     108       CO2     22       Creatinine     0.9       Left Ventricle Relative Wall Thickness   0.42         E/A ratio   0.98         E/E' ratio   10.83         eGFR if      >60.0       eGFR if non      >60.0  Comment: Calculation used to obtain the estimated glomerular filtration  rate (eGFR) is the CKD-EPI equation.          EF   55         E wave deceleration time   190.05         FS   28         Glucose     88       IVS   0.81         LA WIDTH   3.56         Left Atrium Major Axis   5.50         Left Atrium Minor Axis   5.38         LA size   3.60         LA volume   59.25            34.77         LA Volume Index   39.8         LA Volume Index (Mod)   23.3         LVOT area   3.6         LV LATERAL E/E' RATIO   10.83         LV SEPTAL E/E' RATIO   10.83         LV Diastolic Volume   62.14         LV Diastolic Volume Index   41.70         LVIDd   3.80         LVIDs   2.74         LV mass   85.96         LV Mass Index   58         LVOT diameter   2.13         LVOT peak mark   0.93         LVOT stroke volume   65.39         LVOT peak VTI   18.36         LV Systolic Volume   28.02         LV Systolic Volume Index   18.8         Magnesium     2.1       Mean e'   0.06         MV valve area p 1/2 method   3.99         MV "A" wave duration   11.04         MV Peak A Mark   0.66         MV Peak E Mark   " 0.65         MV stenosis pressure 1/2 time   55.11         Phosphorus     3.4       POCT Glucose 96           Potassium     3.9       PV Peak D Mark   0.51         PV Peak S Mark   0.69         Pulm vein S/D ratio   1.35         Posterior Wall   0.79         Right Atrial Pressure (from IVC)   8         RA Major Axis   4.44         RA Width   2.62         RVDD   2.89         Sinus   3.26         Sodium     138       STJ   2.35         TAPSE   1.42         TDI SEPTAL   0.06         TDI LATERAL   0.06                 Significant Imaging: I have reviewed all pertinent imaging results/findings within the past 24 hours.

## 2022-04-06 NOTE — MEDICAL/APP STUDENT
[facility] - Cardiology Stepdown  Psychiatry  History & Physical     Patient Name: Trudi Mcknight  MRN: [id number]  Patient Class: IP- Inpatient  Admission Date: 4/4/2022  Attending Physician: Too Giraldo MD   Primary Care Provider: Renuka Moreno MD     Patient information was obtained from patient and ER records.     Trudi Mcknight is a 67 y.o. female with a past psychiatric history of depression and anxiety currently presenting with Paroxysmal atrial fibrillation with RVR.  Psychiatry was originally consulted to address the patient's symptoms of anxiety.      Initial Evaluation   Per Primary MD:  Ms. Mcknight is a 67 year old female with PMH of paroxysmal aifb on flecainide and xarelto, sick sinus syndrome s/p PPM, recent pulmonary vein ablation on 3/29, GERD, hiatal hernia, anxiety, depression who presented to ED for evaluation of palpitation, sob and chest pain. Patient states last evening while she was on dinner table with her  she suddenly felt palpitation, SOB, chest pain, dizziness, lightheadedness. She felt like she would faint and had difficulty speaking clearly. Patient thought she was having another episode of her afib as she had similar symptoms in the past during afib with RVR. Patient also reports feeling stressed out with her uncontrolled generalized anxiety, depression and trouble sleeping. Patient reports she has not been herself since diagnosed with afib on January 20th of this year. She is concerned about her flecainide as she is very sensitive to medicine and does not like to take new medications. Patient reports worsening of her anxiety and lower abdominal cramping after recently taking prilosec for acid reflux. She then took milk of magnesium yesterday for abdominal cramping and subsequently has 3-4 episodes of loose non bloody diarrhea. She took took her zoloft yesterday for first time in 3 months. She denies fever, chills, focal weakness or weakness. Workup in ED including CTA  "head and neck, MRI brain negative for stroke. Her symptoms improved in ED. She is found to have severe hypophosphatemia < 1. She denies tobacco, alcohol or other drug use. EKG showed sinus tachy @ 106 bpm and recent echo 2 weeks ago with EF 60%.     During my interview patient awake, conversant, but appears anxious.     Per C-L Psych MD:  Chart reviewed; pt seen and interviewed at bedside. Pt reports that she has been dealing with depression and anxiety intermittently for most of her life. States that her most recent episode of depression and anxiety started on January 2, when she was diagnosed with a. Fib with RVR. States that she worries about her medical condition, her prescribed medications, and the potential side effects of those medications. Reports that she feels particularly anxious every 2-3 days or so, particularly when she experiences abdominal discomfort. Calls these periods "panic attacks", though she describes them as lasting all day and primarily consisting of her socially withdrawing for the day. Reports that during her typical "panic attacks" she does not experience any associated physical sx. Reports that she has felt depressed since her dx; endorses associated insomnia, anhedonia, social withdrawal, fatigue, decreased appetite, PMR. Takes zoloft 12.5mg PRN anxiety and mood; reports that this has been helpful in the past. Denies current SI, HI, AH, VH.     Endorses a hx of 3-4 psychiatric hospitalizations for depression with SI over the course of her lifetime, most recently "decades ago". Denies previous SA. States that she has trialed prozac and TCAs in the past for her mood and anxiety sx. Has found zoloft most helpful. Also endorses a course of "about 13" ECT treatments which were helpful for her mood.    Hospital Course   4/6/22  Pt was sitting up in bed with empty cup for potential emesis, reporting feeling nauseous, and untouched breakfast at bedside. Patient reports feeling "weak and " "nauseous" and then stating that amiodarone "puts me on the verge of having hallucinations." She also explains how she is scared to sleep/rest because she is having crazy dreams. She endorses that this is not just symptoms from her current cardiac problems, saying "my heart has never made me feel this way." She reports sleeping 8-9 hours. She received PRN lorazepam 0.5mg this morning for anxiety. When asked about side effects, patient endorses that Zoloft makes her nauseous if taken regularly. Patient was oriented x4 and successfully passed BCAM assessment. Denies SI/HI. Believes the clock looks like a dragonfly, but then it "corrects" when she puts her glasses on. Patient tearful as she began expressing immense gratitude for having psychiatry team consulted, for she believes her depression and anxiety are not just surrounding the medical problems at hand.    Objective:   Vital Signs:  Temp:  [98.1 °F (36.7 °C)-99.1 °F (37.3 °C)]   Pulse:  [59-92]   Resp:  [16-20]   BP: ()/(51-62)   SpO2:  [91 %-98 %]     Mental Status Exam:  Appearance: unremarkable, age appropriate, normal weight  Level of Consciousness: alert and awake  Behavior/Cooperation: normal, friendly and cooperative  Psychomotor: within normal limits   Speech: normal tone, normal rate, normal pitch, normal volume  Language: english, fluid  Orientation: person, place, situation, month of year, year, stated day of week as thursday  Attention Span/Concentration: intact  Memory: Grossly intact  Mood: "weak"  Affect: normal and much improved, reactive from yesterday  Thought Process: linear, normal and logical  Associations: normal and logical  Thought Content: normal, no suicidality, no homicidality, delusions, or paranoia  Fund of Knowledge: Aware of current events, Intact and Vocabulary appropriate   Insight: fair  Judgment: fair    Laboratory Data:  CBC:   Recent Labs   Lab 04/04/22 2015   WBC 8.27   HGB 14.4   HCT 42.5        CMP:   Recent " Labs   Lab 04/04/22 2015 04/05/22  0309 04/06/22  0340      < > 138   K 3.6   < > 3.9      < > 108   CO2 18*   < > 22*   *   < > 88   BUN 18   < > 9   CALCIUM 9.8   < > 8.8   PROT 7.4  --   --    ALBUMIN 3.7  --   --    BILITOT 0.7  --   --    ALKPHOS 84  --   --    AST 25  --   --    ALT 17  --   --    ANIONGAP 13   < > 8   EGFRNONAA >60.0   < > >60.0    < > = values in this interval not displayed.       Significant Labs: All pertinent labs within the past 24 hours have been reviewed.  Significant Imaging: I have reviewed all pertinent imaging results/findings within the past 24 hours.    ASSESSMENT     Trudi Mcknight is a 67 y.o. female with a past psychiatric history of depression and anxiety, who presented to the [facility] due to paroxysmal atrial fibrillation with RVR. Psychiatry was originally consulted to address patient's symptoms of anxiety. Patient displaying mild improvement, while still anxious regarding medications.    IMPRESSION  MDD, severe, recurrent  Unspecified Anxiety Disorder    RECOMMENDATION(S)      1. Scheduled Medication(s):  Continue Sertraline 25 mg PO qD for depression and anxiety.    2. PRN Medication(s):  Ativant 0.5 mg PO PRN for anxiety or panic symptoms. Use as sparingly as possible.    3.  Monitor:  n/a    4. Legal Status/Precaution(s):  Patient does not meet criteria for PEC or inpatient psychiatric admission at this time. Recommend to rescind PEC if one was placed. Patient is not currently an imminent danger to self or others and is not gravely disabled due to a psychiatric illness.    5. Other:  CAM ICU - negative  DELIRIUM BEHAVIOR MANAGEMENT   PLEASE utilize CHEMICAL restraints with PRN meds first for agitation. Minimize use of PHYSICAL restraints   Keep window shades open and room lit during day and room dim at night in order to promote normal sleep-wake cycles   Encourage family at bedside. [place] patient often to situation, location, date   Continue to Limit  or Discontinue use of Narcotics, Benzos and Anti-cholinergic medications as they may worsen delirium   Continue medical workup for causative etiology of Delirium    César Horta, M3 Student  [facility]

## 2022-04-06 NOTE — PROGRESS NOTES
[facility]  Electrophysiology  Progress Note     Hospital Length of Stay: 1    Interval History:  Ms Mcknight was seen and examined this morning. Overnight she remained in sinus rhythm vs paced rhythm until about 5 AM, at which time she began to have frequent runs of irregular tachycardia with appearance of atrial fibrillation vs atrial tachycardia on telemetry, punctuated by periods of sinus rhythm with frequent atrial ectopy. This morning she states she is having some nausea and feels generally weak, denies shortness of breath, palpitations or focal weakness. Speech difficulty has remained resolved.     Vitals:  Temp:  [98.1 °F (36.7 °C)-99.1 °F (37.3 °C)] 99.1 °F (37.3 °C)  Pulse:  [] 92  Resp:  [16-20] 16  SpO2:  [91 %-98 %] 91 %  BP: ()/(51-62) 114/56    Intake/Output    Intake/Output Summary (Last 24 hours) at 4/6/2022 0744  Last data filed at 4/6/2022 0400  Gross per 24 hour   Intake 360 ml   Output 1150 ml   Net -790 ml       Physical Exam:   Constitutional: no acute distress  HEENT: NCAT, EOMI, no scleral icterus  Cardiovascular: Irregularly irregular rhythm, tachycardic, no murmurs appreciated. No appreciable JVD  Pulmonary: Clear to auscultation bilaterally   Abdomen: nontender, non-distended   Neuro: alert and oriented, EOMI, no facial asymmetry, no dysarthria/aphasia noted, 5/5 strength upper and lower extremities   Extremities: warm, no edema   MSK: no deformities   Integument: intact, no rashes     Labs:  Recent Labs   Lab 04/04/22 2015   WBC 8.27   HGB 14.4   HCT 42.5        Recent Labs   Lab 04/04/22 2015 04/05/22  0309 04/06/22  0340    138 138   K 3.6 3.5 3.9    109 108   CO2 18* 18* 22*   BUN 18 12 9   CREATININE 0.9 0.7 0.9   * 106 88   CALCIUM 9.8 9.1 8.8   MG 2.1 2.1 2.1   PHOS <1.0* 3.1 3.4          Current Meds:   rivaroxaban  15 mg Oral Daily with dinner    sertraline  25 mg Oral Daily       Continuous Infusions:   amiodarone in  dextrose 5% 0.5 mg/min (04/05/22 3799)       PRN:  acetaminophen, aluminum-magnesium hydroxide-simethicone, dextrose 10%, dextrose 10%, glucagon (human recombinant), glucose, glucose, melatonin, naloxone, ondansetron, polyethylene glycol, sodium chloride 0.9%    Assessment and Plan:  67 year old female with PMH of paroxysmal atrial fibrillation, tachy-madelaine syndrome/sinus node dysfunction requiring dual chamber PPM 1/2022 who presented to [facility] with palpitations and lightheadedness. Underwent PVI on 3/29 which went without apparent complication, now re-presents with intermittent narrow complex irregular tachycardia and neurologic symptoms. MRI unremarkable for acute CVA.      Narrow complex irregular tachycardia:   Frequent bouts of AF vs AT, largely controlled overnight but recurring early this morning. Can transition to amiodarone 400 mg BID x2 weeks followed by 200 mg daily thereafter. She is still in blanking period following ablation so will treat conservatively at this time.   -Amio 400 mg BID  -Given rapid oscillation between sinus/fib/AT, uncertain utility of elective cardioversion. If unstable AF/RVR then emergently cardiovert  -If neurologic symptoms persist/recur would check STAT CT chest without contrast to eval for atrioesophageal fistula   -Repeat echocardiogram      Wide complex tachycardia:   All appear to be at a fixed rate of ~130 which is the max track rate of her V pacing lead. She was temporarily V-paced at a rate of 100 and QRS morphology identical to that of her wide complex tachycardia. This likely represents a pacemaker mediated tachycardia caused by ventricular tracking of an atrial tachyarrhythmia rather than true VT. In any case we are continuing with amiodarone.      Staff attestation to follow. EP will sign off at this time. Please call back with any additional questions/concerns.       Kelton Mccoy MD  [facility]  Cardiovascular Disease, PGY-IV

## 2022-04-06 NOTE — ASSESSMENT & PLAN NOTE
Appreciate Psychiatry. Used to take sertraline. Restarted sertraline. Giving lorazepam as needed. Will continue to monitor and see if anxiety improves.

## 2022-04-06 NOTE — PLAN OF CARE
Problem: Adult Inpatient Plan of Care  Goal: Plan of Care Review  Outcome: Ongoing, Progressing  Goal: Patient-Specific Goal (Individualized)  Outcome: Ongoing, Progressing  Goal: Absence of Hospital-Acquired Illness or Injury  Outcome: Ongoing, Progressing  Goal: Optimal Comfort and Wellbeing  Outcome: Ongoing, Progressing  Goal: Readiness for Transition of Care  Outcome: Ongoing, Progressing     Problem: Fall Injury Risk  Goal: Absence of Fall and Fall-Related Injury  Outcome: Ongoing, Progressing   Pt free from fall, trauma and skin breakdown. Denied pain or discomfort. Free from SOB. Amiodarone infusing. Pt encouraged to call for assistance for transfer to bedside commode. Plan of care reviewed. Pt verbalized understanding. All questions and concern adressed.  Bed in lowest position, locked, call bell in reach. Will continue to monitor patient.

## 2022-04-06 NOTE — PROGRESS NOTES
[facility] - Cardiology [facility] Medicine  Progress Note    Patient Name: Trudi Mcknight  MRN: [id number]  Patient Class: IP- Inpatient   Admission Date: 4/4/2022  Length of Stay: 1 days  Attending Physician: Too Giraldo MD  Primary Care Provider: Renuka Moreno MD        Subjective:     Principal Problem:Paroxysmal atrial fibrillation with RVR        HPI:  Trudi Mcknight is a 67 year old white woman with sick sinus syndrome status post [employer] dual chamber cardiac pacemaker on 1/20/2022, atrial fibrillation diagnosed on 1/20/2022 status post pulmonary vein ablation on 3/29/2022 (anticoagulated on rivaroxaban), hyperlipidemia, hiatal hernia with gastroesophageal reflux disease, anxiety and depression. She lives in [place], [place]. She is . Her primary care physician is Dr. Renuka Moreno. Her electrophysiologist is Dr. Ernesto Duncan.   She was seen in Cardiology clinic on 4/4/2022. She reported chest pain radiating to her neck and arm after eating and worse when lying down, but not with exertion. She reported that flecainide gave her anxiety and depression. That evening, while eating dinner with her , she had palpitations, shortness of breath, chest pain, dizziness, lightheadedness, and difficulty speaking clearly. She had similar symptoms in the past with atrial fibrillation with rapid ventricular response. She had been having trouble sleeping along with her anxiety and depression. She reported that she had not been herself since diagnosed with atrial fibrillation and was anxious about flecainide because she is sensitive to medicine and does not like to take new medications. She reported worsening of her anxiety and lower abdominal cramping after recently taking omeprazole for acid reflux. She took milk of magnesium for abdominal cramping and subsequently had 3 to 4 episodes of diarrhea without blood. She took her sertraline for the first time in 3 months.   In the emergency  department, CTA head and neck and brain MRI showed no stroke. Her symptoms improved. She was found to have severe hypophosphatemia (phosporus less than 1 mg/dL. EKG showed sinus tachycardia with rate of 106 beats per minute. She was given 1 liter of normal saline. She was admitted to Hospital Medicine Team A.           Overview/Hospital Course:  She was given sodium phosphates with resolution of hypophosphatemia. She was put on amiodarone infusion. She was given lorazepam for anxiety. Electrophysiology and Psychiatry were consulted. Electrophysiology recommended continuing amiodarone, which was switched to oral on 4/6/2022. Psychiatry recommended starting sertraline, with lorazepam 0.5 mg used sparingly for panic.       Interval History: Started sertraline yesterday. Needed a dose of lorazepam 0.5 mg, which helped. Amiodarone changed to oral. She feels that the amiodarone is causing anxiety.    Review of Systems   Constitutional:  Negative for chills and fever.   Gastrointestinal:  Negative for nausea and vomiting.   Neurological:  Negative for seizures and syncope.   Psychiatric/Behavioral:  The patient is nervous/anxious.    Objective:     Vital Signs (Most Recent):  Temp: 96.7 °F (35.9 °C) (04/06/22 1135)  Pulse: 81 (04/06/22 1135)  Resp: 20 (04/06/22 1135)  BP: (!) 136/59 (04/06/22 1135)  SpO2: 95 % (04/06/22 1135)   Vital Signs (24h Range):  Temp:  [96.7 °F (35.9 °C)-99.1 °F (37.3 °C)] 96.7 °F (35.9 °C)  Pulse:  [59-92] 81  Resp:  [16-20] 20  SpO2:  [91 %-98 %] 95 %  BP: ()/(51-62) 136/59     Weight: 52.2 kg (115 lb)  Body mass index is 21.73 kg/m².    Intake/Output Summary (Last 24 hours) at 4/6/2022 1255  Last data filed at 4/6/2022 0400  Gross per 24 hour   Intake 300 ml   Output 950 ml   Net -650 ml      Physical Exam  Vitals and nursing note reviewed.   Constitutional:       General: She is not in acute distress.     Appearance: She is well-developed. She is not toxic-appearing or diaphoretic.    Pulmonary:      Effort: Pulmonary effort is normal. No respiratory distress.      Breath sounds: No wheezing or rhonchi.   Skin:     General: Skin is warm and dry.   Neurological:      Mental Status: She is alert and oriented to person, place, and time. Mental status is at baseline.      Motor: No atrophy or seizure activity.   Psychiatric:         Attention and Perception: Attention normal.         Behavior: Behavior is cooperative.         Cognition and Memory: Cognition and memory normal.         Significant Labs:  CBC:  Recent Labs   Lab 04/04/22 2015   WBC 8.27   HGB 14.4   HCT 42.5        CMP:  Recent Labs   Lab 04/04/22 2015 04/05/22  0309 04/06/22  0340    138 138   K 3.6 3.5 3.9    109 108   CO2 18* 18* 22*   * 106 88   BUN 18 12 9   CREATININE 0.9 0.7 0.9   CALCIUM 9.8 9.1 8.8   PROT 7.4  --   --    ALBUMIN 3.7  --   --    BILITOT 0.7  --   --    ALKPHOS 84  --   --    AST 25  --   --    ALT 17  --   --    ANIONGAP 13 11 8   EGFRNONAA >60.0 >60.0 >60.0     PTINR:  Recent Labs   Lab 04/04/22 2015   INR 1.3*     Transthoracic echo complete 4/06/22:  · The left ventricle is normal in size with normal systolic function. The estimated ejection fraction is 55%.  · Normal right ventricular size with normal right ventricular systolic function.  · Indeterminate left ventricular diastolic function.  · Mild left atrial enlargement.  · Intermediate central venous pressure (8 mmHg).  · Small circumferential pericardial effusion, largest inferolaterally. No evidence of tamponade.  · There was frequent ventricular ectopy throughout the examination.        Assessment/Plan:      * Paroxysmal atrial fibrillation with RVR  Status post circumferential ablation of pulmonary vein  On rivaroxaban therapy  Continue home rivaroxaban. Appreciate Cardiology. Flecainide changed to amiodarone.    Pericardial effusion  Seen previously. Still present on echocardiogram.    Generalized anxiety disorder with  panic attacks  Appreciate Psychiatry. Used to take sertraline. Restarted sertraline. Giving lorazepam as needed. Will continue to monitor and see if anxiety improves.    Sick sinus syndrome  S/P placement of cardiac pacemaker    Hiatal hernia with GERD and esophagitis  Takes omeprazole as needed, which she reports lately causing anxiety and lower abdominal cramping. Trying famotidine.    VTE Risk Mitigation (From admission, onward)         Ordered     rivaroxaban tablet 15 mg  With dinner         04/05/22 0230     IP VTE HIGH RISK PATIENT  Once         04/05/22 0230     Place sequential compression device  Until discontinued         04/05/22 0230                Discharge Planning   STEVE: 4/7/2022     Code Status: Full Code   Is the patient medically ready for discharge?:     Reason for patient still in hospital (select all that apply): Patient trending condition and Treatment  Discharge Plan A: Home with family    Discharge home if anxiety improves some.              Too Giraldo MD  Department of Hospital Medicine   [facility] - Cardiology Stepdown

## 2022-04-06 NOTE — ASSESSMENT & PLAN NOTE
1. Scheduled Medication(s):  Continue sertraline 25mg PO qD for depression and anxiety.     2. PRN Medication(s):  Ativan 0.5mg PO PRN panic. Would use as sparingly as possible.     3.  Monitor:  -     4. Legal Status/Precaution(s):  Does not appear to meet criteria for PEC at this time.     Silviano Mitchell MD  [facility], PGY-4  [facility]

## 2022-04-06 NOTE — SUBJECTIVE & OBJECTIVE
Interval History: Started sertraline yesterday. Needed a dose of lorazepam 0.5 mg, which helped. Amiodarone changed to oral. She feels that the amiodarone is causing anxiety.    Review of Systems   Constitutional:  Negative for chills and fever.   Gastrointestinal:  Negative for nausea and vomiting.   Neurological:  Negative for seizures and syncope.   Psychiatric/Behavioral:  The patient is nervous/anxious.    Objective:     Vital Signs (Most Recent):  Temp: 96.7 °F (35.9 °C) (04/06/22 1135)  Pulse: 81 (04/06/22 1135)  Resp: 20 (04/06/22 1135)  BP: (!) 136/59 (04/06/22 1135)  SpO2: 95 % (04/06/22 1135)   Vital Signs (24h Range):  Temp:  [96.7 °F (35.9 °C)-99.1 °F (37.3 °C)] 96.7 °F (35.9 °C)  Pulse:  [59-92] 81  Resp:  [16-20] 20  SpO2:  [91 %-98 %] 95 %  BP: ()/(51-62) 136/59     Weight: 52.2 kg (115 lb)  Body mass index is 21.73 kg/m².    Intake/Output Summary (Last 24 hours) at 4/6/2022 1255  Last data filed at 4/6/2022 0400  Gross per 24 hour   Intake 300 ml   Output 950 ml   Net -650 ml      Physical Exam  Vitals and nursing note reviewed.   Constitutional:       General: She is not in acute distress.     Appearance: She is well-developed. She is not toxic-appearing or diaphoretic.   Pulmonary:      Effort: Pulmonary effort is normal. No respiratory distress.      Breath sounds: No wheezing or rhonchi.   Skin:     General: Skin is warm and dry.   Neurological:      Mental Status: She is alert and oriented to person, place, and time. Mental status is at baseline.      Motor: No atrophy or seizure activity.   Psychiatric:         Attention and Perception: Attention normal.         Behavior: Behavior is cooperative.         Cognition and Memory: Cognition and memory normal.         Significant Labs:  CBC:  Recent Labs   Lab 04/04/22 2015   WBC 8.27   HGB 14.4   HCT 42.5        CMP:  Recent Labs   Lab 04/04/22 2015 04/05/22  0309 04/06/22  0340    138 138   K 3.6 3.5 3.9    109 108   CO2  18* 18* 22*   * 106 88   BUN 18 12 9   CREATININE 0.9 0.7 0.9   CALCIUM 9.8 9.1 8.8   PROT 7.4  --   --    ALBUMIN 3.7  --   --    BILITOT 0.7  --   --    ALKPHOS 84  --   --    AST 25  --   --    ALT 17  --   --    ANIONGAP 13 11 8   EGFRNONAA >60.0 >60.0 >60.0     PTINR:  Recent Labs   Lab 04/04/22 2015   INR 1.3*     Transthoracic echo complete 4/06/22:  The left ventricle is normal in size with normal systolic function. The estimated ejection fraction is 55%.  Normal right ventricular size with normal right ventricular systolic function.  Indeterminate left ventricular diastolic function.  Mild left atrial enlargement.  Intermediate central venous pressure (8 mmHg).  Small circumferential pericardial effusion, largest inferolaterally. No evidence of tamponade.  There was frequent ventricular ectopy throughout the examination.

## 2022-04-07 VITALS
SYSTOLIC BLOOD PRESSURE: 94 MMHG | DIASTOLIC BLOOD PRESSURE: 57 MMHG | WEIGHT: 115 LBS | OXYGEN SATURATION: 99 % | HEART RATE: 82 BPM | RESPIRATION RATE: 20 BRPM | BODY MASS INDEX: 21.71 KG/M2 | HEIGHT: 61 IN | TEMPERATURE: 97 F

## 2022-04-07 LAB
ANION GAP SERPL CALC-SCNC: 10 MMOL/L (ref 8–16)
BUN SERPL-MCNC: 15 MG/DL (ref 8–23)
CALCIUM SERPL-MCNC: 9.2 MG/DL (ref 8.7–10.5)
CHLORIDE SERPL-SCNC: 109 MMOL/L (ref 95–110)
CO2 SERPL-SCNC: 19 MMOL/L (ref 23–29)
CREAT SERPL-MCNC: 0.9 MG/DL (ref 0.5–1.4)
EST. GFR  (AFRICAN AMERICAN): >60 ML/MIN/1.73 M^2
EST. GFR  (NON AFRICAN AMERICAN): >60 ML/MIN/1.73 M^2
GLUCOSE SERPL-MCNC: 99 MG/DL (ref 70–110)
MAGNESIUM SERPL-MCNC: 2.3 MG/DL (ref 1.6–2.6)
PHOSPHATE SERPL-MCNC: 5.2 MG/DL (ref 2.7–4.5)
POTASSIUM SERPL-SCNC: 3.9 MMOL/L (ref 3.5–5.1)
SODIUM SERPL-SCNC: 138 MMOL/L (ref 136–145)

## 2022-04-07 PROCEDURE — 99239 HOSP IP/OBS DSCHRG MGMT >30: CPT | Mod: ,,, | Performed by: STUDENT IN AN ORGANIZED HEALTH CARE EDUCATION/TRAINING PROGRAM

## 2022-04-07 PROCEDURE — 36415 COLL VENOUS BLD VENIPUNCTURE: CPT | Performed by: HOSPITALIST

## 2022-04-07 PROCEDURE — 25000003 PHARM REV CODE 250: Performed by: HOSPITALIST

## 2022-04-07 PROCEDURE — 83735 ASSAY OF MAGNESIUM: CPT | Performed by: HOSPITALIST

## 2022-04-07 PROCEDURE — 80048 BASIC METABOLIC PNL TOTAL CA: CPT | Performed by: HOSPITALIST

## 2022-04-07 PROCEDURE — 1111F PR DISCHARGE MEDS RECONCILED W/ CURRENT OUTPATIENT MED LIST: ICD-10-PCS | Mod: CPTII,,, | Performed by: STUDENT IN AN ORGANIZED HEALTH CARE EDUCATION/TRAINING PROGRAM

## 2022-04-07 PROCEDURE — 1111F DSCHRG MED/CURRENT MED MERGE: CPT | Mod: CPTII,,, | Performed by: STUDENT IN AN ORGANIZED HEALTH CARE EDUCATION/TRAINING PROGRAM

## 2022-04-07 PROCEDURE — 99239 PR HOSPITAL DISCHARGE DAY,>30 MIN: ICD-10-PCS | Mod: ,,, | Performed by: STUDENT IN AN ORGANIZED HEALTH CARE EDUCATION/TRAINING PROGRAM

## 2022-04-07 PROCEDURE — 25000003 PHARM REV CODE 250: Performed by: INTERNAL MEDICINE

## 2022-04-07 PROCEDURE — 84100 ASSAY OF PHOSPHORUS: CPT | Performed by: HOSPITALIST

## 2022-04-07 RX ORDER — AMIODARONE HYDROCHLORIDE 200 MG/1
TABLET ORAL
Qty: 112 TABLET | Refills: 0 | Status: SHIPPED | OUTPATIENT
Start: 2022-04-07 | End: 2022-04-09 | Stop reason: SDUPTHER

## 2022-04-07 RX ORDER — SERTRALINE HYDROCHLORIDE 25 MG/1
25 TABLET, FILM COATED ORAL DAILY
Qty: 30 TABLET | Refills: 11 | Status: SHIPPED | OUTPATIENT
Start: 2022-04-08 | End: 2022-05-26

## 2022-04-07 RX ADMIN — AMIODARONE HYDROCHLORIDE 400 MG: 200 TABLET ORAL at 09:04

## 2022-04-07 RX ADMIN — SERTRALINE HYDROCHLORIDE 25 MG: 25 TABLET ORAL at 09:04

## 2022-04-07 NOTE — RESPIRATORY THERAPY
RAPID RESPONSE RESPIRATORY CHART CHECK       Chart check completed, instructed to call [phone] for further concerns or assistance.

## 2022-04-07 NOTE — ASSESSMENT & PLAN NOTE
1. Scheduled Medication(s):  Continue sertraline 25mg PO qD for depression and anxiety.     2. PRN Medication(s):  Ativan 0.5mg PO PRN panic. Would use as sparingly as possible.     3.  Resources:  Would likely benefit from therapy and regular outpatient psychiatric follow up. Will place outpatient psychiatric resources in patient instructions section of discharge tab.     4. Legal Status/Precaution(s):  Does not appear to meet criteria for PEC at this time.     Silviano Mitchell MD  [facility], PGY-4  [facility]

## 2022-04-07 NOTE — SUBJECTIVE & OBJECTIVE
"Interval History:     Family History       Problem Relation (Age of Onset)    Breast cancer Mother, Paternal Grandmother    Diverticulitis Brother, Sister    Heart disease Mother (55), Father, Brother    Hyperlipidemia Mother    Hypertension Mother, Father          Tobacco Use    Smoking status: Never Smoker    Smokeless tobacco: Never Used   Substance and Sexual Activity    Alcohol use: No     Alcohol/week: 0.0 standard drinks     Comment: rarely    Drug use: No    Sexual activity: Not Currently     Psychotherapeutics (From admission, onward)                Start     Stop Route Frequency Ordered    04/06/22 0946  LORazepam tablet 0.5 mg         -- Oral Every 8 hours PRN 04/06/22 0846    04/05/22 1645  sertraline tablet 25 mg         -- Oral Daily 04/05/22 1639             Review of Systems  Objective:     Vital Signs (Most Recent):  Temp: 96.9 °F (36.1 °C) (04/07/22 0730)  Pulse: 76 (04/07/22 0730)  Resp: 20 (04/07/22 0730)  BP: (!) 95/52 (04/07/22 0730)  SpO2: 95 % (04/07/22 0730)   Vital Signs (24h Range):  Temp:  [96.7 °F (35.9 °C)-98.5 °F (36.9 °C)] 96.9 °F (36.1 °C)  Pulse:  [71-85] 76  Resp:  [18-20] 20  SpO2:  [95 %-98 %] 95 %  BP: ()/(52-74) 95/52     Height: 5' 1" (154.9 cm)  Weight: 52.2 kg (115 lb)  Body mass index is 21.73 kg/m².      Intake/Output Summary (Last 24 hours) at 4/7/2022 1000  Last data filed at 4/7/2022 0300  Gross per 24 hour   Intake 820 ml   Output 600 ml   Net 220 ml       Physical Exam   Mental Status Exam:  Appearance: age appropriate, lying in bed  Level of Consciousness: alert, awake  Behavior/Cooperation: cooperative, eye contact normal  Psychomotor: unremarkable   Speech: normal tone, normal pitch, slowed, soft  Language: english, fluid  Orientation: grossly intact  Attention Span/Concentration: intact  Memory: Grossly intact  Mood: "better"  Affect: constricted, though more reactive than on previous interview  Thought Process: linear, normal and logical  Associations: " normal and logical  Thought Content: normal, no suicidality, no homicidality, delusions, or paranoia  Fund of Knowledge: Aware of current events  Abstraction: not asked  Insight: fair  Judgment: fair    Significant Labs: Last 24 Hours:   Recent Lab Results         04/07/22  0455   04/06/22  1531   04/06/22  1132        Anion Gap 10           BUN 15           Calcium 9.2           Chloride 109           CO2 19           Creatinine 0.9           eGFR if  >60.0           eGFR if non  >60.0  Comment: Calculation used to obtain the estimated glomerular filtration  rate (eGFR) is the CKD-EPI equation.              Glucose 99           Magnesium 2.3           Phosphorus 5.2           POCT Glucose   103   96       Potassium 3.9           Sodium 138                   Significant Imaging: I have reviewed all pertinent imaging results/findings within the past 24 hours.

## 2022-04-07 NOTE — PLAN OF CARE
" Patient has been scheduled for a PCP hospital follow up with Sallie Cabral MD on Thursday April 14, 2022 at  10:30 AM.     [facility]   [address]   [facility] [id number] [phone]    Please arrive approximately 15 minutes before your scheduled appointment time and ensure that you have a valid government issued ID and your insurance card. ePre-Check is available and completion prior to your arrival will assist with a quicker registration process.     Three Options to Check-In for Your Appointment     With [facility] Mobile Check-In simply complete ePre-Check before your appointment and click "I'm Here" in the sandra when you park.  Don't see the Mobile Check-In option? In some locations you can call from the parking area to let us know you've arrived. Just look for the banners with the phone number to call.  Or Visit the registration desk to check-in for your appointment.     Masks are required for all patients and visitors.                            Raymond Javed    Case Management  Ext. [phone]  "

## 2022-04-07 NOTE — DISCHARGE INSTRUCTIONS
[facility]  - [facility]: [address] [place], [phone]  - [place] [facility]: [address], [phone]  - [facility]: [address], [place], [phone]  - [facility]: [address], [phone]  - [facility] ([facility]): [address] [place] [address], [phone]  - [facility] ([facility]): [address] [facility] [address], [phone]  - Rajani Vargas ([place]): [phone]  - [facility] [phone]     [facility]  - [facility]: [address] 4th floor, [phone]  - [facility]: [address] ([facility], 3rd Floor)[facility], [phone]  - [facility]: [address], [phone], [phone]  - On license of [facility]: [address], [address], [phone]    Outpatient Group Therapy  - Cancer Support Group: [facility], [address], [place], [phone]  - Depression/Bipolar Support [facility]: [facility], [address], [place], 7:30pm 1st & 3rd Tuesdays in cafeteria, [phone]  - Heart Transplant Support Group: [facility], 3rd Wednesday, 7th floor conference room, [place], [phone]  - [facility] ([place]): [address], [place], [phone]  - [facility]: [facility] room [address], [place], [phone]    s Offices:  - [facility] s Office: Dr. Garcia, [phone], [phone]  - [facility] s Office: Dr Trenton Benton: [phone]    Crisis  - [facility] Hotline, [phone]  - National Suicide Prevention Crisis Hotline: [phone]

## 2022-04-07 NOTE — MEDICAL/APP STUDENT
[facility] - Cardiology Stepdown  Psychiatry  Progress Note     Patient Name: Trudi Mcknight  MRN: [id number]   Code Status: Full Code  Admission Date: 4/4/2022  Hospital Length of Stay: 3 days  Expected Discharge Date: 4/6/2022  Attending Physician: Too Giraldo MD  Primary Care Provider: Renuka Moreno MD     Current Legal Status: Uncontested     Patient information was obtained from patient, past medical records and ER records.      Subjective:      Patient is a 67 y.o., female, presents with:     Principal Problem: Paroxysmal atrial fibrillation with RVR     Chief Complaint: Anxiety     HPI:   Trudi Mcknight is a 67 y.o. female with a past psychiatric history of depression and anxiety, currently presenting with Paroxysmal atrial fibrillation with RVR.  Psychiatry was originally consulted to address the patient's symptoms of anxiety.    Initial Evaluation   Per Primary MD:  Ms. Mcknight is a 67 year old female with PMH of paroxysmal aifb on flecainide and xarelto, sick sinus syndrome s/p PPM, recent pulmonary vein ablation on 3/29, GERD, hiatal hernia, anxiety, depression who presented to ED for evaluation of palpitation, sob and chest pain. Patient states last evening while she was on dinner table with her  she suddenly felt palpitation, SOB, chest pain, dizziness, lightheadedness. She felt like she would faint and had difficulty speaking clearly. Patient thought she was having another episode of her afib as she had similar symptoms in the past during afib with RVR. Patient also reports feeling stressed out with her uncontrolled generalized anxiety, depression and trouble sleeping. Patient reports she has not been herself since diagnosed with afib on January 20th of this year. She is concerned about her flecainide as she is very sensitive to medicine and does not like to take new medications. Patient reports worsening of her anxiety and lower abdominal cramping after recently taking prilosec for acid  "reflux. She then took milk of magnesium yesterday for abdominal cramping and subsequently has 3-4 episodes of loose non bloody diarrhea. She took took her zoloft yesterday for first time in 3 months. She denies fever, chills, focal weakness or weakness. Workup in ED including CTA head and neck, MRI brain negative for stroke. Her symptoms improved in ED. She is found to have severe hypophosphatemia < 1. She denies tobacco, alcohol or other drug use. EKG showed sinus tachy @ 106 bpm and recent echo 2 weeks ago with EF 60%.     During my interview patient awake, conversant, but appears anxious.     Per [facility] Psych MD:  Chart reviewed; pt seen and interviewed at bedside. Pt reports that she has been dealing with depression and anxiety intermittently for most of her life. States that her most recent episode of depression and anxiety started on January 2, when she was diagnosed with a. Fib with RVR. States that she worries about her medical condition, her prescribed medications, and the potential side effects of those medications. Reports that she feels particularly anxious every 2-3 days or so, particularly when she experiences abdominal discomfort. Calls these periods "panic attacks", though she describes them as lasting all day and primarily consisting of her socially withdrawing for the day. Reports that during her typical "panic attacks" she does not experience any associated physical sx. Reports that she has felt depressed since her dx; endorses associated insomnia, anhedonia, social withdrawal, fatigue, decreased appetite, PMR. Takes zoloft 12.5mg PRN anxiety and mood; reports that this has been helpful in the past. Denies current SI, HI, AH, VH.     Endorses a hx of 3-4 psychiatric hospitalizations for depression with SI over the course of her lifetime, most recently "decades ago". Denies previous SA. States that she has trialed prozac and TCAs in the past for her mood and anxiety sx. Has found zoloft most helpful. " "Also endorses a course of "about 13" ECT treatments which were helpful for her mood.    Hospital Course   04/06/2022  Pt seen and interviewed at bedside. Reports that her mood is "alright I guess" today. States that she has taken two doses of zoloft so far and has not experienced any adverse effects related to this medication. Notes still with some anxious ruminations. Hopeful that zoloft will be helpful for both her anxiety and her mood. Also notes that she received a dose of PRN ativan for anxiety and found this to be effective. Denies SI, HI, AH, VH. Concerned about potential side effects related to new medication amiodarone, states that she "always get[s] all the weird side effects." No other questions or concerns at time of interview.    04/07/2022  Pt seen and interviewed at bedside. Reports her mood is "afraid." She explains she is afraid to eat because her hernia affects her heart. Patient says it has "turned into a phobia." She believes she won't be able to separate heartburn from arrhythmia. Demonstrates significant fear and anxiety around medical condition and the medication amiodarone. She feels the medication makes her feel like she has the flu, where her head is fuzzy and she is "kind of out of it." Patient is worried about amiodarone's effect on thyroid function and lists other side effects. Patient slept well, fell asleep quickly at 9 PM. Patient reports she tends to "yell in my sleep. I'll yell and kick and fight. I've even gotten out of bed this year...seemed to start with COVID." Appetite has improved from yesterday. Affect was labile; patient becomes tearful throughout the interview as she expresses her desire for things to be back to the way they were before she was sick. Expresses she feels like a burden to her family, because she cannot do all the things she used to. Also expresses guilt from when her grandchildren have seen her sick. Patient is open to seeing an outpatient psychiatrist and " "has inquired about [facility]. Denies SI/HI/AVH.    Objective:   Vital Signs:  Temp:  [96.7 °F (35.9 °C)-98.5 °F (36.9 °C)]   Pulse:  [71-85]   Resp:  [18-20]   BP: ()/(52-74)   SpO2:  [95 %-98 %]     Mental Status Exam:  Appearance: unremarkable, age appropriate, lying in bed  Level of Consciousness: alert and awake  Behavior/Cooperation: normal, cooperative, eye contact normal  Psychomotor: unremarkable   Speech: normal tone, normal rate, normal pitch, normal volume  Language: english, fluid  Orientation: grossly intact  Attention Span/Concentration: intact  Memory: Grossly intact  Mood: "afraid"  Affect: dysphoric and labile  Thought Process: linear, normal and logical  Associations: normal and logical  Thought Content: normal, no suicidality, no homicidality, delusions, or paranoia  Fund of Knowledge: Intact and Vocabulary appropriate   Insight: fair  Judgment: fair    Laboratory Data:  CBC:   Recent Labs   Lab 04/04/22 2015   WBC 8.27   HGB 14.4   HCT 42.5        CMP:   Recent Labs   Lab 04/04/22 2015 04/05/22  0309 04/07/22  0455      < > 138   K 3.6   < > 3.9      < > 109   CO2 18*   < > 19*   *   < > 99   BUN 18   < > 15   CALCIUM 9.8   < > 9.2   PROT 7.4  --   --    ALBUMIN 3.7  --   --    BILITOT 0.7  --   --    ALKPHOS 84  --   --    AST 25  --   --    ALT 17  --   --    ANIONGAP 13   < > 10   EGFRNONAA >60.0   < > >60.0    < > = values in this interval not displayed.       Significant Labs: All pertinent labs within the past 24 hours have been reviewed.  Significant Imaging: I have reviewed all pertinent imaging results/findings within the past 24 hours.  ASSESSMENT     Trudi Mcknight is a 67 y.o. female with a past psychiatric history of depression and anxiety, who presented to the [facility] due to paroxysmal atrial fibrillation with RVR. Psychiatry was consulted to manage symptoms of anxiety.    IMPRESSION  MDD, severe, recurrent  Unspecified Anxiety " Disorder    RECOMMENDATION(S)      1. Scheduled Medication(s):  Continue sertraline 25 mg PO qD for depression and anxiety.  Consult psychology to talk therapy.    2. PRN Medication(s):  Ativan 0.5 mg PO PRN panic. Would use as sparingly as possible.    3.  Monitor:  N/A    4. Legal Status/Precaution(s):  Patient does not meet criteria for PEC or inpatient psychiatric admission at this time. Patient is not currently an imminent danger to self or others and is not gravely disabled due to a psychiatric illness.    Need for Continued Hospitalization:  No need for inpatient psychiatric hospitalization. Continue medical care as per the primary team.     Anticipated Disposition:  Home or Self Care      César Horta, M3 Student  [facility]

## 2022-04-07 NOTE — NURSING
Patient discharged home with  at bedside. Discharge paperwork received and medications in hand from pharmacy.

## 2022-04-07 NOTE — PROGRESS NOTES
[facility] Stepdown  Psychiatry  Progress Note    Patient Name: Trudi Mcknight  MRN: [id number]   Code Status: Full Code  Admission Date: 4/4/2022  Hospital Length of Stay: 2 days  Expected Discharge Date: 4/7/2022  Attending Physician: Chilango Altman*  Primary Care Provider: Renuka Moreno MD    Current Legal Status: Uncontested    Patient information was obtained from patient, past medical records and ER records.       Subjective:     Patient is a 67 y.o., female, presents with:    Principal Problem:Paroxysmal atrial fibrillation with RVR    Chief Complaint: anxiety    HPI: History of Present Illness:   Trudi Mcknight is a 67 y.o. female with a past psychiatric history of depression and anxiety, currently presenting with Paroxysmal atrial fibrillation with RVR.  Psychiatry was originally consulted to address the patient's symptoms of anxiety.     Per Primary MD:  Ms. Mcknight is a 67 year old female with PMH of paroxysmal aifb on flecainide and xarelto, sick sinus syndrome s/p PPM, recent pulmonary vein ablation on 3/29, GERD, hiatal hernia, anxiety, depression who presented to ED for evaluation of palpitation, sob and chest pain. Patient states last evening while she was on dinner table with her  she suddenly felt palpitation, SOB, chest pain, dizziness, lightheadedness. She felt like she would faint and had difficulty speaking clearly. Patient thought she was having another episode of her afib as she had similar symptoms in the past during afib with RVR. Patient also reports feeling stressed out with her uncontrolled generalized anxiety, depression and trouble sleeping. Patient reports she has not been herself since diagnosed with afib on January 20th of this year. She is concerned about her flecainide as she is very sensitive to medicine and does not like to take new medications. Patient reports worsening of her anxiety and lower abdominal cramping after recently taking prilosec for  "acid reflux. She then took milk of magnesium yesterday for abdominal cramping and subsequently has 3-4 episodes of loose non bloody diarrhea. She took took her zoloft yesterday for first time in 3 months. She denies fever, chills, focal weakness or weakness. Workup in ED including CTA head and neck, MRI brain negative for stroke. Her symptoms improved in ED. She is found to have severe hypophosphatemia < 1. She denies tobacco, alcohol or other drug use. EKG showed sinus tachy @ 106 bpm and recent echo 2 weeks ago with EF 60%.     During my interview patient awake, conversant, but appears anxious.      Per C-L MD:  Chart reviewed; pt seen and interviewed at bedside. Pt reports that she has been dealing with depression and anxiety intermittently for most of her life. States that her most recent episode of depression and anxiety started on January 2, when she was diagnosed with a. Fib with RVR. States that she worries about her medical condition, her prescribed medications, and the potential side effects of those medications. Reports that she feels particularly anxious every 2-3 days or so, particularly when she experiences abdominal discomfort. Calls these periods "panic attacks", though she describes them as lasting all day and primarily consisting of her socially withdrawing for the day. Reports that during her typical "panic attacks" she does not experience any associated physical sx. Reports that she has felt depressed since her dx; endorses associated insomnia, anhedonia, social withdrawal, fatigue, decreased appetite, PMR. Takes zoloft 12.5mg PRN anxiety and mood; reports that this has been helpful in the past. Denies current SI, HI, AH, VH.     Endorses a hx of 3-4 psychiatric hospitalizations for depression with SI over the course of her lifetime, most recently "decades ago". Denies previous SA. States that she has trialed prozac and TCAs in the past for her mood and anxiety sx. Has found zoloft most helpful. " "Also endorses a course of "about 13" ECT treatments which were helpful for her mood.      Hospital Course: 04/06/2022  Pt seen and interviewed at bedside. Reports that her mood is "alright I guess" today. States that she has taken two doses of zoloft so far and has not experienced any adverse effects related to this medication. Notes still with some anxious ruminations. Hopeful that zoloft will be helpful for both her anxiety and her mood. Also notes that she received a dose of PRN ativan for anxiety and found this to be effective. Denies SI, HI, AH, . Concerned about potential side effects related to new medication amiodarone, states that she "always get[s] all the weird side effects." No other questions or concerns at time of interview.    04/07/2022  Chart reviewed. Pt seen and examined at bedside. She reports that she is feeling "better" today as compared to yesterday. Clarifies that by this she means that she is both less anxious and feeling less "stomach upset" than she was yesterday. States that she is hopeful regarding sertraline's potential helpfulness for her anxiety. Received ativan PRN yesterday for anxiety, found this to be effective. Has not required ativan PRN anxiety thus far this morning. Denies SI, HI, AH, VH. No other questions or concerns at time of interview.      Interval History:     Family History       Problem Relation (Age of Onset)    Breast cancer Mother, Paternal Grandmother    Diverticulitis Brother, Sister    Heart disease Mother (55), Father, Brother    Hyperlipidemia Mother    Hypertension Mother, Father          Tobacco Use    Smoking status: Never Smoker    Smokeless tobacco: Never Used   Substance and Sexual Activity    Alcohol use: No     Alcohol/week: 0.0 standard drinks     Comment: rarely    Drug use: No    Sexual activity: Not Currently     Psychotherapeutics (From admission, onward)                Start     Stop Route Frequency Ordered    04/06/22 0910  LORazepam " "tablet 0.5 mg         -- Oral Every 8 hours PRN 04/06/22 0846    04/05/22 1645  sertraline tablet 25 mg         -- Oral Daily 04/05/22 1639             Review of Systems  Objective:     Vital Signs (Most Recent):  Temp: 96.9 °F (36.1 °C) (04/07/22 0730)  Pulse: 76 (04/07/22 0730)  Resp: 20 (04/07/22 0730)  BP: (!) 95/52 (04/07/22 0730)  SpO2: 95 % (04/07/22 0730)   Vital Signs (24h Range):  Temp:  [96.7 °F (35.9 °C)-98.5 °F (36.9 °C)] 96.9 °F (36.1 °C)  Pulse:  [71-85] 76  Resp:  [18-20] 20  SpO2:  [95 %-98 %] 95 %  BP: ()/(52-74) 95/52     Height: 5' 1" (154.9 cm)  Weight: 52.2 kg (115 lb)  Body mass index is 21.73 kg/m².      Intake/Output Summary (Last 24 hours) at 4/7/2022 1000  Last data filed at 4/7/2022 0300  Gross per 24 hour   Intake 820 ml   Output 600 ml   Net 220 ml       Physical Exam   Mental Status Exam:  Appearance: age appropriate, lying in bed  Level of Consciousness: alert, awake  Behavior/Cooperation: cooperative, eye contact normal  Psychomotor: unremarkable   Speech: normal tone, normal pitch, slowed, soft  Language: english, fluid  Orientation: grossly intact  Attention Span/Concentration: intact  Memory: Grossly intact  Mood: "better"  Affect: constricted, though more reactive than on previous interview  Thought Process: linear, normal and logical  Associations: normal and logical  Thought Content: normal, no suicidality, no homicidality, delusions, or paranoia  Fund of Knowledge: Aware of current events  Abstraction: not asked  Insight: fair  Judgment: fair    Significant Labs: Last 24 Hours:   Recent Lab Results         04/07/22  0455   04/06/22  1531   04/06/22  1132        Anion Gap 10           BUN 15           Calcium 9.2           Chloride 109           CO2 19           Creatinine 0.9           eGFR if  >60.0           eGFR if non  >60.0  Comment: Calculation used to obtain the estimated glomerular filtration  rate (eGFR) is the CKD-EPI equation. "              Glucose 99           Magnesium 2.3           Phosphorus 5.2           POCT Glucose   103   96       Potassium 3.9           Sodium 138                   Significant Imaging: I have reviewed all pertinent imaging results/findings within the past 24 hours.       Scheduled Medications:   amiodarone  400 mg Oral BID    pantoprazole  40 mg Oral Daily    rivaroxaban  15 mg Oral Daily with dinner    sertraline  25 mg Oral Daily       PRN Medications:  acetaminophen, aluminum-magnesium hydroxide-simethicone, dextrose 10%, dextrose 10%, glucagon (human recombinant), glucose, glucose, LORazepam, melatonin, naloxone, ondansetron, polyethylene glycol, sodium chloride 0.9%    Review of patient's allergies indicates:   Allergen Reactions    Lasix [furosemide] Shortness Of Breath    Penicillins Hives     causes congestion and hives    Tricyclic compounds        Assessment/Plan:      * Paroxysmal atrial fibrillation with RVR  1. Scheduled Medication(s):  Continue sertraline 25mg PO qD for depression and anxiety.  Consider psychology consult for supportive psychotherapy if pt to remain in hospital.      2. PRN Medication(s):  Ativan 0.5mg PO PRN panic. Would use as sparingly as possible.     3.  Resources:  Would likely benefit from therapy and regular outpatient psychiatric follow up. Will place outpatient psychiatric resources in patient instructions section of discharge tab.     4. Legal Status/Precaution(s):  Does not appear to meet criteria for PEC at this time.    Need for Continued Hospitalization:  No need for inpatient psychiatric hospitalization. Continue medical care as per the primary team.    Anticipated Disposition:  Home or Self Care    Total time:  25 with greater than 50% of this time spent in counseling and/or coordination of care.     Psychiatry will sign off. Please contact with any further questions or concerns.    Silviano Mitchell MD   Psychiatry  [facility]

## 2022-04-07 NOTE — PLAN OF CARE
[facility]  Discharge Final Note    Primary Care Provider: Renuka Moreno MD    Expected Discharge Date: 4/7/2022    Final Discharge Note (most recent)     Final Note - 04/07/22 1413        Final Note    Assessment Type Final Discharge Note     Anticipated Discharge Disposition Home or Self Care     Hospital Resources/Appts/Education Provided Appointments scheduled and added to AVS                 Important Message from Medicare  Important Message from Medicare regarding Discharge Appeal Rights: Given to patient/caregiver, Explained to patient/caregiver, Signed/date by patient/caregiver     Date IMM was signed: 04/07/22  Time IMM was signed: 1057       Renuka Baker LMSW  [facility]  [id number]

## 2022-04-08 ENCOUNTER — NURSE TRIAGE (OUTPATIENT)
Dept: ADMINISTRATIVE | Facility: CLINIC | Age: 67
End: 2022-04-08
Payer: MEDICARE

## 2022-04-08 ENCOUNTER — PATIENT OUTREACH (OUTPATIENT)
Dept: ADMINISTRATIVE | Facility: CLINIC | Age: 67
End: 2022-04-08
Payer: MEDICARE

## 2022-04-08 LAB — POCT GLUCOSE: 92 MG/DL (ref 70–110)

## 2022-04-08 NOTE — PROGRESS NOTES
C3 nurse spoke with Trudi Mcknight  for a TCC post hospital discharge follow up call. The patient has a scheduled HOSFU appointment with Sallie Cabral on 4/14/22 @ 1030.

## 2022-04-09 ENCOUNTER — NURSE TRIAGE (OUTPATIENT)
Dept: ADMINISTRATIVE | Facility: CLINIC | Age: 67
End: 2022-04-09
Payer: MEDICARE

## 2022-04-09 ENCOUNTER — HOSPITAL ENCOUNTER (EMERGENCY)
Facility: HOSPITAL | Age: 67
Discharge: HOME | End: 2022-04-09
Attending: EMERGENCY MEDICINE
Payer: MEDICARE

## 2022-04-09 VITALS
HEART RATE: 84 BPM | TEMPERATURE: 98 F | SYSTOLIC BLOOD PRESSURE: 155 MMHG | OXYGEN SATURATION: 99 % | RESPIRATION RATE: 22 BRPM | DIASTOLIC BLOOD PRESSURE: 80 MMHG

## 2022-04-09 DIAGNOSIS — R11.10 EMESIS: ICD-10-CM

## 2022-04-09 DIAGNOSIS — G44.209 TENSION-TYPE HEADACHE, NOT INTRACTABLE, UNSPECIFIED CHRONICITY PATTERN: ICD-10-CM

## 2022-04-09 DIAGNOSIS — R10.11 RIGHT UPPER QUADRANT ABDOMINAL PAIN: ICD-10-CM

## 2022-04-09 DIAGNOSIS — R19.7 DIARRHEA, UNSPECIFIED TYPE: Primary | ICD-10-CM

## 2022-04-09 DIAGNOSIS — R53.83 FATIGUE, UNSPECIFIED TYPE: ICD-10-CM

## 2022-04-09 DIAGNOSIS — I48.20 CHRONIC A-FIB: ICD-10-CM

## 2022-04-09 PROBLEM — R11.2 NAUSEA AND VOMITING: Status: ACTIVE | Noted: 2022-04-09

## 2022-04-09 PROBLEM — R79.89 ELEVATED TROPONIN: Status: ACTIVE | Noted: 2022-04-09

## 2022-04-09 LAB
ALBUMIN SERPL BCP-MCNC: 3.6 G/DL (ref 3.5–5.2)
ALP SERPL-CCNC: 83 U/L (ref 55–135)
ALT SERPL W/O P-5'-P-CCNC: 11 U/L (ref 10–44)
ANION GAP SERPL CALC-SCNC: 15 MMOL/L (ref 8–16)
AST SERPL-CCNC: 15 U/L (ref 10–40)
BACTERIA #/AREA URNS AUTO: NORMAL /HPF
BASOPHILS # BLD AUTO: 0.04 K/UL (ref 0–0.2)
BASOPHILS NFR BLD: 0.6 % (ref 0–1.9)
BILIRUB SERPL-MCNC: 1.1 MG/DL (ref 0.1–1)
BILIRUB UR QL STRIP: NEGATIVE
BUN SERPL-MCNC: 11 MG/DL (ref 8–23)
CALCIUM SERPL-MCNC: 9.8 MG/DL (ref 8.7–10.5)
CHLORIDE SERPL-SCNC: 110 MMOL/L (ref 95–110)
CLARITY UR REFRACT.AUTO: CLEAR
CO2 SERPL-SCNC: 16 MMOL/L (ref 23–29)
COLOR UR AUTO: YELLOW
CREAT SERPL-MCNC: 0.8 MG/DL (ref 0.5–1.4)
DIFFERENTIAL METHOD: ABNORMAL
EOSINOPHIL # BLD AUTO: 0.1 K/UL (ref 0–0.5)
EOSINOPHIL NFR BLD: 1.7 % (ref 0–8)
ERYTHROCYTE [DISTWIDTH] IN BLOOD BY AUTOMATED COUNT: 15 % (ref 11.5–14.5)
EST. GFR  (AFRICAN AMERICAN): >60 ML/MIN/1.73 M^2
EST. GFR  (NON AFRICAN AMERICAN): >60 ML/MIN/1.73 M^2
GLUCOSE SERPL-MCNC: 99 MG/DL (ref 70–110)
GLUCOSE UR QL STRIP: NEGATIVE
HCT VFR BLD AUTO: 43.2 % (ref 37–48.5)
HGB BLD-MCNC: 14.2 G/DL (ref 12–16)
HGB UR QL STRIP: ABNORMAL
IMM GRANULOCYTES # BLD AUTO: 0.02 K/UL (ref 0–0.04)
IMM GRANULOCYTES NFR BLD AUTO: 0.3 % (ref 0–0.5)
KETONES UR QL STRIP: ABNORMAL
LEUKOCYTE ESTERASE UR QL STRIP: NEGATIVE
LIPASE SERPL-CCNC: 30 U/L (ref 4–60)
LYMPHOCYTES # BLD AUTO: 1.3 K/UL (ref 1–4.8)
LYMPHOCYTES NFR BLD: 18.8 % (ref 18–48)
MAGNESIUM SERPL-MCNC: 2.1 MG/DL (ref 1.6–2.6)
MCH RBC QN AUTO: 27.9 PG (ref 27–31)
MCHC RBC AUTO-ENTMCNC: 32.9 G/DL (ref 32–36)
MCV RBC AUTO: 85 FL (ref 82–98)
MICROSCOPIC COMMENT: NORMAL
MONOCYTES # BLD AUTO: 0.6 K/UL (ref 0.3–1)
MONOCYTES NFR BLD: 8.7 % (ref 4–15)
NEUTROPHILS # BLD AUTO: 5 K/UL (ref 1.8–7.7)
NEUTROPHILS NFR BLD: 69.9 % (ref 38–73)
NITRITE UR QL STRIP: NEGATIVE
NRBC BLD-RTO: 0 /100 WBC
PH UR STRIP: 6 [PH] (ref 5–8)
PHOSPHATE SERPL-MCNC: 2.7 MG/DL (ref 2.7–4.5)
PLATELET # BLD AUTO: 366 K/UL (ref 150–450)
PMV BLD AUTO: 11.1 FL (ref 9.2–12.9)
POTASSIUM SERPL-SCNC: 3.5 MMOL/L (ref 3.5–5.1)
PROT SERPL-MCNC: 7.3 G/DL (ref 6–8.4)
PROT UR QL STRIP: NEGATIVE
RBC # BLD AUTO: 5.09 M/UL (ref 4–5.4)
RBC #/AREA URNS AUTO: 1 /HPF (ref 0–4)
SARS-COV-2 RDRP RESP QL NAA+PROBE: NEGATIVE
SODIUM SERPL-SCNC: 141 MMOL/L (ref 136–145)
SP GR UR STRIP: 1.01 (ref 1–1.03)
SQUAMOUS #/AREA URNS AUTO: 1 /HPF
T4 FREE SERPL-MCNC: 1.49 NG/DL (ref 0.71–1.51)
TROPONIN I SERPL DL<=0.01 NG/ML-MCNC: 0.04 NG/ML (ref 0–0.03)
TROPONIN I SERPL DL<=0.01 NG/ML-MCNC: 0.05 NG/ML (ref 0–0.03)
TSH SERPL DL<=0.005 MIU/L-ACNC: 4.96 UIU/ML (ref 0.4–4)
URN SPEC COLLECT METH UR: ABNORMAL
WBC # BLD AUTO: 7.09 K/UL (ref 3.9–12.7)
WBC #/AREA URNS AUTO: 1 /HPF (ref 0–5)

## 2022-04-09 PROCEDURE — 63600175 PHARM REV CODE 636 W HCPCS

## 2022-04-09 PROCEDURE — 99285 EMERGENCY DEPT VISIT HI MDM: CPT | Mod: 25

## 2022-04-09 PROCEDURE — 96361 HYDRATE IV INFUSION ADD-ON: CPT

## 2022-04-09 PROCEDURE — 85025 COMPLETE CBC W/AUTO DIFF WBC: CPT

## 2022-04-09 PROCEDURE — 84439 ASSAY OF FREE THYROXINE: CPT

## 2022-04-09 PROCEDURE — 93005 ELECTROCARDIOGRAM TRACING: CPT

## 2022-04-09 PROCEDURE — 96376 TX/PRO/DX INJ SAME DRUG ADON: CPT

## 2022-04-09 PROCEDURE — 93010 EKG 12-LEAD: ICD-10-PCS | Mod: ,,, | Performed by: INTERNAL MEDICINE

## 2022-04-09 PROCEDURE — 84443 ASSAY THYROID STIM HORMONE: CPT

## 2022-04-09 PROCEDURE — U0002 COVID-19 LAB TEST NON-CDC: HCPCS

## 2022-04-09 PROCEDURE — 99285 PR EMERGENCY DEPT VISIT,LEVEL V: ICD-10-PCS | Mod: ,,, | Performed by: EMERGENCY MEDICINE

## 2022-04-09 PROCEDURE — 83735 ASSAY OF MAGNESIUM: CPT

## 2022-04-09 PROCEDURE — 93010 ELECTROCARDIOGRAM REPORT: CPT | Mod: ,,, | Performed by: INTERNAL MEDICINE

## 2022-04-09 PROCEDURE — 80053 COMPREHEN METABOLIC PANEL: CPT

## 2022-04-09 PROCEDURE — 84100 ASSAY OF PHOSPHORUS: CPT

## 2022-04-09 PROCEDURE — 87502 INFLUENZA DNA AMP PROBE: CPT

## 2022-04-09 PROCEDURE — 25000003 PHARM REV CODE 250

## 2022-04-09 PROCEDURE — 81001 URINALYSIS AUTO W/SCOPE: CPT

## 2022-04-09 PROCEDURE — 99285 EMERGENCY DEPT VISIT HI MDM: CPT | Mod: ,,, | Performed by: EMERGENCY MEDICINE

## 2022-04-09 PROCEDURE — 84484 ASSAY OF TROPONIN QUANT: CPT

## 2022-04-09 PROCEDURE — 83690 ASSAY OF LIPASE: CPT

## 2022-04-09 PROCEDURE — 96374 THER/PROPH/DIAG INJ IV PUSH: CPT

## 2022-04-09 RX ORDER — LORAZEPAM 2 MG/ML
0.5 INJECTION INTRAMUSCULAR
Status: DISCONTINUED | OUTPATIENT
Start: 2022-04-09 | End: 2022-04-09

## 2022-04-09 RX ORDER — ONDANSETRON 2 MG/ML
4 INJECTION INTRAMUSCULAR; INTRAVENOUS
Status: COMPLETED | OUTPATIENT
Start: 2022-04-09 | End: 2022-04-09

## 2022-04-09 RX ORDER — ONDANSETRON 4 MG/1
4 TABLET, FILM COATED ORAL EVERY 8 HOURS PRN
Qty: 15 TABLET | Refills: 0 | Status: SHIPPED | OUTPATIENT
Start: 2022-04-09 | End: 2022-04-14

## 2022-04-09 RX ORDER — AMIODARONE HYDROCHLORIDE 200 MG/1
TABLET ORAL
Qty: 86 TABLET | Refills: 0 | Status: SHIPPED | OUTPATIENT
Start: 2022-04-09 | End: 2022-06-28

## 2022-04-09 RX ADMIN — ONDANSETRON 4 MG: 2 INJECTION INTRAMUSCULAR; INTRAVENOUS at 08:04

## 2022-04-09 RX ADMIN — SODIUM CHLORIDE 1000 ML: 0.9 INJECTION, SOLUTION INTRAVENOUS at 08:04

## 2022-04-09 RX ADMIN — SODIUM CHLORIDE 1000 ML: 0.9 INJECTION, SOLUTION INTRAVENOUS at 11:04

## 2022-04-09 RX ADMIN — ONDANSETRON 4 MG: 2 INJECTION INTRAMUSCULAR; INTRAVENOUS at 01:04

## 2022-04-09 NOTE — DISCHARGE SUMMARY
[facility] Medicine  Discharge Summary      Patient Name: Trudi Mcknight  MRN: [id number]  Patient Class: IP- Inpatient  Admission Date: 4/4/2022  Hospital Length of Stay: 2 days  Discharge Date and Time: 4/7/22  Attending Physician: No att. providers found   Discharging Provider: Chilango Hooker MD  Primary Care Provider: Renuka Moreno MD  [facility] Medicine Team: [facility] Chilango Hooker MD    HPI:   Trudi Mcknight is a 67 year old white woman with sick sinus syndrome status post [employer] dual chamber cardiac pacemaker on 1/20/2022, atrial fibrillation diagnosed on 1/20/2022 status post pulmonary vein ablation on 3/29/2022 (anticoagulated on rivaroxaban), hyperlipidemia, hiatal hernia with gastroesophageal reflux disease, anxiety and depression. She lives in [place], [place]. She is . Her primary care physician is Dr. Renuka Moreno. Her electrophysiologist is Dr. Ernesto Duncan.   She was seen in Cardiology clinic on 4/4/2022. She reported chest pain radiating to her neck and arm after eating and worse when lying down, but not with exertion. She reported that flecainide gave her anxiety and depression. That evening, while eating dinner with her , she had palpitations, shortness of breath, chest pain, dizziness, lightheadedness, and difficulty speaking clearly. She had similar symptoms in the past with atrial fibrillation with rapid ventricular response. She had been having trouble sleeping along with her anxiety and depression. She reported that she had not been herself since diagnosed with atrial fibrillation and was anxious about flecainide because she is sensitive to medicine and does not like to take new medications. She reported worsening of her anxiety and lower abdominal cramping after recently taking omeprazole for acid reflux. She took milk of magnesium for abdominal cramping and subsequently had 3 to 4 episodes of diarrhea without  blood. She took her sertraline for the first time in 3 months.             Hospital Course:   In the emergency department, CTA head and neck and brain MRI showed no stroke. Her symptoms improved. She was found to have severe hypophosphatemia (phosporus less than 1 mg/dL. EKG showed sinus tachycardia with rate of 106 beats per minute. She was given 1 liter of normal saline. She was admitted to Hospital Medicine Team A. She was given sodium phosphates with resolution of hypophosphatemia. She was put on amiodarone infusion. She was given lorazepam for anxiety. Electrophysiology and Psychiatry were consulted. Electrophysiology recommended continuing amiodarone, which was switched to oral on 4/6/2022. Psychiatry recommended starting sertraline, with lorazepam 0.5 mg used sparingly for panic. Patient improved and was stable on amiodarone. Patient deemed ready for discharge. Plan discussed with pt, who was agreeable and amenable; medications were discussed and reviewed, outpatient follow-up arranged, ER precautions were given, all questions were answered to the pt's satisfaction, and Trudi Mcknight  was subsequently discharged.       Goals of Care Treatment Preferences:  Code Status: Full Code      Consults:   Consults (From admission, onward)        Status Ordering Provider     Inpatient consult to Electrophysiology  Once        Provider:  (Not yet assigned)    Completed PANCHO BAUMANN     Inpatient consult to Psychiatry  Once        Provider:  (Not yet assigned)    Completed PANCHO BAUMANN     Inpatient consult to Vascular (Stroke) Neurology  Once        Provider:  (Not yet assigned)    Completed ALEXANDRO DIAZ            Final Active Diagnoses:    Diagnosis Date Noted POA    PRINCIPAL PROBLEM:  Paroxysmal atrial fibrillation with RVR [I48.0] 01/17/2022 Yes     Chronic    Generalized anxiety disorder with panic attacks [F41.1, F41.0] 04/05/2022 Yes     Chronic    On rivaroxaban therapy [Z79.01] 04/05/2022 Not  Applicable     Chronic    Hyperlipemia [E78.5] 04/04/2022 Yes     Chronic    Status post circumferential ablation of pulmonary vein [Z98.890] 03/29/2022 Not Applicable     Chronic    Pericardial effusion [I31.3] 03/22/2022 Yes    S/P placement of cardiac pacemaker [Z95.0] 03/18/2022 Yes     Chronic    Sick sinus syndrome [I49.5] 01/21/2022 Yes     Chronic    Hiatal hernia with GERD and esophagitis [K44.9, K21.00] 03/07/2021 Yes     Chronic      Problems Resolved During this Admission:    Diagnosis Date Noted Date Resolved POA    Hypophosphatemia [E83.39] 04/05/2022 04/05/2022 Yes    Expressive aphasia [R47.01] 04/04/2022 04/05/2022 Yes       Discharged Condition: good    Disposition: Home or Self Care    Follow Up:    Patient Instructions:      Diet Cardiac     Notify your health care provider if you experience any of the following:  temperature >100.4     Notify your health care provider if you experience any of the following:  persistent nausea and vomiting or diarrhea     Notify your health care provider if you experience any of the following:  severe uncontrolled pain     Notify your health care provider if you experience any of the following:  difficulty breathing or increased cough     Notify your health care provider if you experience any of the following:  severe persistent headache     Notify your health care provider if you experience any of the following:  worsening rash     Notify your health care provider if you experience any of the following:  persistent dizziness, light-headedness, or visual disturbances     Notify your health care provider if you experience any of the following:  increased confusion or weakness     Activity as tolerated       Significant Diagnostic Studies: Labs: All labs within the past 24 hours have been reviewed    Pending Diagnostic Studies:     None         Medications:  Reconciled Home Medications:      Medication List      START taking these medications    sertraline  25 MG tablet  Commonly known as: ZOLOFT  Take 1 tablet (25 mg total) by mouth once daily.        CONTINUE taking these medications    omeprazole 20 mg Tbld  Take 20 mg by mouth Daily.     polyethylene glycol 17 gram Pwpk  Commonly known as: GLYCOLAX  Take 17 g by mouth once as needed (Constipation).     XARELTO 15 mg Tab  Generic drug: rivaroxaban  Take 1 tablet (15 mg total) by mouth daily with dinner or evening meal.        STOP taking these medications    flecainide 50 MG Tab  Commonly known as: TAMBOCOR     FLUZONE HIGHDOSE QUAD 21-22  mcg/0.7 mL Syrg  Generic drug: flu vacc [id number](65yr up)-PF     pantoprazole 40 MG tablet  Commonly known as: PROTONIX            Indwelling Lines/Drains at time of discharge:   Lines/Drains/Airways     None                 Time spent on the discharge of patient: 35 minutes         Chilango Hooker MD  Department of Hospital Medicine  [facility] - Cardiology Stepdown

## 2022-04-09 NOTE — ED PROVIDER NOTES
Encounter Date: 4/9/2022       History     Chief Complaint   Patient presents with    Emesis     Pt c/o vomiting/diarrhea throughout the night. Pt reports starting amiodarone on Monday and vomiting started after her evening dose last night. Hx of afib.      68 y/o F PMHx sick sinus syndrome (s/p PPM), Afib (PV ablation on 3/29/22) on Xarelto, anxiety, Meniere's disease, gastritis presenting with emesis/diarrhea. She says her emesis started last night after her evening dose of Amiodarone. Her diarrhea has been ongoing for about a week since starting Amiodarone. She describes the emesis as non-bilious/non-bloody. Has been able to keep down water but has not tried eating any food. She says the emesis has been associated with a frontal b/l tension-like headache as well as generalized fatigue and intermittent chest discomfort. Her diarrhea has been watery and non-formed since recently starting Amiodarone. She has also experienced intermittent RUQ pain since her emesis began last night. She was hospitalized last week for uncontrolled Afib w/ RVR. At that time, she had several brief runs of VT. Was started on amiodarone for VT and uncontrolled Afib w/ RVR. EP was consulted, believed her runs of VT were not true VT but rather pacemaker mediated tachycardia. Denies, any fevers, cough, SOB, constipation, vision changes, dizziness, dysuria.        Review of patient's allergies indicates:   Allergen Reactions    Lasix [furosemide] Shortness Of Breath    Penicillins Hives     causes congestion and hives    Tricyclic compounds      Past Medical History:   Diagnosis Date    Anticoagulant long-term use     Esophagitis     Hiatal hernia     Meniere's disease     Paroxysmal atrial fibrillation 03/07/2021    Shingles     Sick sinus syndrome 01/21/2022    s/p Dual chamber pacemaker    Thyroid disease      Past Surgical History:   Procedure Laterality Date    A-V CARDIAC PACEMAKER INSERTION N/A 01/20/2022    Procedure:  INSERTION, CARDIAC PACEMAKER, DUAL CHAMBER;  Surgeon: Ernesto Duncan MD;  Location: [facility] EP LAB;  Service: Cardiology;  Laterality: N/A;  SB, DUAL PPM, SJM, ANES, SK, [id number]    BREAST CYST ASPIRATION           SECTION      COLONOSCOPY N/A 2019    Procedure: COLONOSCOPY;  Surgeon: INGRID Russo MD;  [facility]  Service: Endoscopy;  Laterality: N/A;    deviated septum repair      HYSTERECTOMY      lump removed from tongue      TONSILLECTOMY       Family History   Problem Relation Age of Onset    Heart disease Mother 55        bypass at 55    Breast cancer Mother     Hypertension Mother     Hyperlipidemia Mother     Heart disease Father     Hypertension Father     Heart disease Brother     Diverticulitis Brother     Diverticulitis Sister     Breast cancer Paternal Grandmother     Colon cancer Neg Hx     Melanoma Neg Hx     Amblyopia Neg Hx     Blindness Neg Hx     Cataracts Neg Hx     Glaucoma Neg Hx     Macular degeneration Neg Hx     Strabismus Neg Hx     Retinal detachment Neg Hx      Social History     Tobacco Use    Smoking status: Never Smoker    Smokeless tobacco: Never Used   Substance Use Topics    Alcohol use: No     Alcohol/week: 0.0 standard drinks     Comment: rarely    Drug use: No     Review of Systems   Constitutional: Positive for activity change, chills and fatigue. Negative for fever.   HENT: Negative for congestion and sore throat.    Eyes: Negative for visual disturbance.   Respiratory: Negative for cough, chest tightness and shortness of breath.    Cardiovascular: Positive for chest pain and palpitations. Negative for leg swelling.   Gastrointestinal: Positive for abdominal pain (RUQ), diarrhea, nausea and vomiting. Negative for abdominal distention, blood in stool and constipation.   Genitourinary: Negative for difficulty urinating, dysuria and frequency.   Musculoskeletal: Negative for arthralgias and myalgias.   Skin: Negative  for color change and rash.   Neurological: Positive for headaches (frontal). Negative for dizziness and weakness.   Hematological: Negative for adenopathy.   Psychiatric/Behavioral: Negative for confusion. The patient is nervous/anxious.        Physical Exam     Initial Vitals [04/09/22 0648]   BP Pulse Resp Temp SpO2   121/77 93 18 97.6 °F (36.4 °C) 98 %      MAP       --         Physical Exam    Nursing note and vitals reviewed.  Constitutional: She is not diaphoretic. She appears distressed.   HENT:   Head: Normocephalic and atraumatic.   Mouth/Throat: Oropharynx is clear and moist. No oropharyngeal exudate.   Eyes: Conjunctivae and EOM are normal. Pupils are equal, round, and reactive to light. No scleral icterus.   Neck: Neck supple. No thyromegaly present. No JVD present.   Normal range of motion.  Cardiovascular: Normal rate, regular rhythm, normal heart sounds and intact distal pulses. Exam reveals no gallop and no friction rub.    No murmur heard.  Pulmonary/Chest: Breath sounds normal. No respiratory distress. She has no wheezes. She has no rhonchi. She has no rales.   Abdominal: Abdomen is soft. Bowel sounds are normal. She exhibits no distension. There is abdominal tenderness (RUQ TTP).   Mccoy's neg There is no rebound and no guarding.   Musculoskeletal:         General: No tenderness or edema. Normal range of motion.      Cervical back: Normal range of motion and neck supple.     Neurological: She is alert and oriented to person, place, and time. She has normal strength. No cranial nerve deficit or sensory deficit. GCS score is 15. GCS eye subscore is 4. GCS verbal subscore is 5. GCS motor subscore is 6.   Skin: Skin is warm and dry. Capillary refill takes less than 2 seconds. No rash noted. No erythema. No pallor.         ED Course   Procedures  Labs Reviewed   CBC W/ AUTO DIFFERENTIAL - Abnormal; Notable for the following components:       Result Value    RDW 15.0 (*)     All other components within  normal limits   COMPREHENSIVE METABOLIC PANEL - Abnormal; Notable for the following components:    CO2 16 (*)     Total Bilirubin 1.1 (*)     All other components within normal limits   TROPONIN I - Abnormal; Notable for the following components:    Troponin I 0.046 (*)     All other components within normal limits   URINALYSIS, REFLEX TO URINE CULTURE - Abnormal; Notable for the following components:    Ketones, UA 1+ (*)     Occult Blood UA 1+ (*)     All other components within normal limits    Narrative:     Specimen Source->Urine   TSH - Abnormal; Notable for the following components:    TSH 4.961 (*)     All other components within normal limits   TROPONIN I - Abnormal; Notable for the following components:    Troponin I 0.040 (*)     All other components within normal limits   INFLUENZA A & B BY MOLECULAR   LIPASE   MAGNESIUM   PHOSPHORUS   TSH   URINALYSIS MICROSCOPIC    Narrative:     Specimen Source->Urine   PHOSPHORUS   SARS-COV-2 RNA AMPLIFICATION, QUAL   T4, FREE     EKG Readings: (Independently Interpreted)   Initial Reading: No STEMI. Previous EKG: Compared with most recent EKG Rhythm: Normal Sinus Rhythm. Heart Rate: 89. Ectopy: No Ectopy. Conduction: Normal. ST Segments: Normal ST Segments. T Waves: Normal. Axis: Normal. Clinical Impression: Normal Sinus Rhythm   QTc 469     ECG Results          EKG 12-lead (Final result)  Result time 04/09/22 09:18:21    Final result by Interface, Lab In [facility] (04/09/22 09:18:21)                 Narrative:    Test Reason :     Vent. Rate : 082 BPM     Atrial Rate : 082 BPM     P-R Int : 168 ms          QRS Dur : 076 ms      QT Int : 402 ms       P-R-T Axes : 069 092 081 degrees     QTc Int : 469 ms    Normal sinus rhythm  Rightward axis  Borderline Abnormal ECG  When compared with ECG of 06-APR-2022 07:00,  Sinus rhythm has replaced Electronic ventricular pacemaker  Confirmed by Yair NORRIS MD ([id number]) on 4/9/2022 9:18:14 AM    Referred By: VEENA   SELF            Confirmed By:Yair NORRIS MD                            Imaging Results          CT Head Without Contrast (Final result)  Result time 04/09/22 10:49:49    Final result by Emery Do DO (04/09/22 10:49:49)                 Impression:      1. No acute intracranial abnormality.  2. 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      Electronically signed by: Emery Do  Date:    04/09/2022  Time:    10:49             Narrative:    EXAMINATION:  CT HEAD WITHOUT CONTRAST    CLINICAL HISTORY:  Headache, new or worsening (Age >= 50y);    TECHNIQUE:  Low dose axial CT images obtained throughout the head without intravenous contrast. Sagittal and coronal reconstructions were performed.    COMPARISON:  MRI brain from 04/04/2022.    FINDINGS:  Ventricles and sulci are normal in size for age without evidence of hydrocephalus. No extra-axial blood or fluid collections.  There is a linear hyperdensity in the right frontal lobe compatible with a developmental venous anomaly.  There is a 3 mm hyperdensity in the left posterior temporal lobe corresponding to a small cavernoma as seen on MRI brain from 04/04/2022.    No edema or major vascular distribution infarct.    No calvarial fracture.  The scalp is unremarkable.  Bilateral paranasal sinuses and mastoid air cells are clear.                               US Abdomen Limited (Gallbladder) (Final result)  Result time 04/09/22 09:38:36    Final result by Emery Do DO (04/09/22 09:38:36)                 Impression:      No cholelithiasis or acute cholecystitis.      Electronically signed by: Emery Do  Date:    04/09/2022  Time:    09:38             Narrative:    EXAMINATION:  US ABDOMEN LIMITED    TECHNIQUE:  Limited ultrasound of the right upper quadrant of the abdomen (including pancreas, liver, gallbladder, common bile duct, and spleen) was performed.    COMPARISON:  01/02/2022.    FINDINGS:  Liver: Partially imaged portions of the hepatic  parenchyma are unremarkable    Gallbladder: There are no gallstones.  There are tiny hyper echogenicities compatible with crystals or sludge.  No gallbladder wall thickening.  Sonographic Mccoy sign is negative.  No gallbladder wall hyperemia.    Biliary system: The common duct is not dilated, measuring 3 mm.  No intrahepatic ductal dilatation.    Miscellaneous: No upper abdominal ascites.                               X-Ray Chest PA And Lateral (Final result)  Result time 04/09/22 09:11:07    Final result by Emery Do DO (04/09/22 09:11:07)                 Impression:      Probable small right pleural effusion.  Otherwise no acute cardiopulmonary abnormality.      Electronically signed by: Emery Do  Date:    04/09/2022  Time:    09:11             Narrative:    EXAMINATION:  XR CHEST PA AND LATERAL    CLINICAL HISTORY:  Pain chest (786.50);    TECHNIQUE:  PA and lateral views of the chest were performed.    COMPARISON:  04/04/2022    FINDINGS:  There is a cardiac pacer, unchanged in position from prior.    The lungs are well expanded and clear. No focal opacities are seen. Probable small right pleural effusion.  No pneumothorax.    The cardiac silhouette is enlarged, unchanged.    The visualized osseous structures are intact.                                 Medications   sodium chloride 0.9% bolus 1,000 mL (0 mLs Intravenous Stopped 4/9/22 1009)   ondansetron injection 4 mg (4 mg Intravenous Given 4/9/22 0859)   sodium chloride 0.9% bolus 1,000 mL (0 mLs Intravenous Stopped 4/9/22 1216)     Medical Decision Making:   Initial Assessment:   66 y/o F presenting with new onset emesis, RUQ abdominal pain, fatigue, diarrhea, chest discomfort and headache.  Differential Diagnosis:   Low concern for acute intracranial pathology such as hemorrhage or CVA. No focal neuro deficits. Patient is having new frontal headaches with N/V but neck is supple, mentating well unlikely meningitis or encephalitis. RUQ pain may  be 2/2 gastroenteritis, acute hepatitis or cholecystitis. Less concerned for cholecystitis given negative stout's sign. Patient does also complain of diarrhea raising concern for gastroenteritis. Recent hospitalization raises concerns for C diff given profuse watery diarrhea however would not explain vomiting. Chest discomfort concerning for cardiac etiology given recent uncontrolled Afib and possible VT however EKG showing no signs of ischemia. Amiodarone-induced toxicity high on the differential given association of symptoms with starting the amiodarone.  Clinical Tests:   Lab Tests: Ordered and Reviewed  Radiological Study: Ordered and Reviewed  Medical Tests: Ordered and Reviewed  ED Management:  CBC, CMP, TSH, Mag, Phos, Troponin, UA, EKG, CXR, CTH, COVID, Influenza. Patient given 2L NS and zofran for emesis.  Labs notable for mild troponin elevation, repeat downtrending. TSH elevation, T4 wnl. No ischemic changes on EKG. Likely 2/2 recent ablation, trended to peak. CXR and RUQ U/S unremarkable. Nausea/vomiting improved with Zofran. Cardiology consulted for amiodarone management, recommend cutting Amiodarone dose in half and following up with Cardiology at discharge. Referral placed for outpatient follow up with Cardiology and Endocrinology.  Other:   I have discussed this case with another health care provider.       <> Summary of the Discussion: Case discussed with attending physician Dr Lou.            Attending Attestation:   Physician Attestation Statement for Resident:  As the supervising MD   Physician Attestation Statement: I have personally seen and examined this patient.   I agree with the above history. -:   As the supervising MD I agree with the above PE.    As the supervising MD I agree with the above treatment, course, plan, and disposition.                         Clinical Impression:   Final diagnoses:  [I48.20] Chronic a-fib  [R11.10] Emesis  [R19.7] Diarrhea, unspecified type  (Primary)  [G44.209] Tension-type headache, not intractable, unspecified chronicity pattern  [R10.11] Right upper quadrant abdominal pain  [R53.83] Fatigue, unspecified type                      Mushtaq Heck MD  Resident  04/09/22 1255       Ervin Lou MD  04/10/22 0727

## 2022-04-09 NOTE — TELEPHONE ENCOUNTER
"Patient states she has been vomiting and having diarrhea ALL night long. Patient is advised as per protocol  Reason for Disposition   [1] SEVERE vomiting (e.g., 6 or more times/day) AND [2] present > 8 hours    Additional Information   Negative: Shock suspected (e.g., cold/pale/clammy skin, too weak to stand, low BP, rapid pulse)   Negative: Difficult to awaken or acting confused  (e.g., disoriented, slurred speech)   Negative: Sounds like a life-threatening emergency to the triager   Negative: Vomiting occurs only while coughing   Negative: [1] Pregnant < 20 Weeks AND [2] nausea/vomiting began in early pregnancy (i.e., 4-8 weeks pregnant)   Negative: Chest pain   Negative: [1] SEVERE headache AND [2] similar to prior migraines   Negative: [1] Vomiting AND [2] contains red blood or black ("coffee ground") material  (Exception: few red streaks in vomit that only happened once)   Negative: Severe pain in one eye   Negative: Recent head injury (within last 3 days)   Negative: Recent abdominal injury (within last 3 days)   Negative: [1] Insulin-dependent diabetes (Type I) AND [2] glucose > 400 mg/dl (22 mmol/l)    Protocols used: ST VOMITING-A-AH    "

## 2022-04-09 NOTE — HPI
Ms Trudi Mcknight is a 67 year old female with PMH of paroxysmal atrial fibrillation, tachy-madelaine syndrome/sinus node dysfunction requiring dual chamber PPM 1/2022 who presents to the ED with complaints of N/V which she believes is related to her amiodarone. She recently underwent PVI on 3/29 with Dr. Duncan. She had issues with frequent AT and AF with RVR and started on amiodarone. She reports issues with flecainide previously (abdominal discomfort and constipation). She was also restarted on Zoloft at discharge. In the ED, she is HD stable and sating well on RA. CBC wnl. CMP unremarkable except for Tbili 1.1 and Bicarb 16. Normal anion gap. ECG with NSR and NS ST-T changes. CXR with possible small R sided pleural effusion, but otherwise unremarkable. TSH 5 with normal free T4. RUQ US without evidence of acute hepatobiliary abnormality.     4/6/22 TTE  The left ventricle is normal in size with normal systolic function. The estimated ejection fraction is 55%.  Normal right ventricular size with normal right ventricular systolic function.  Indeterminate left ventricular diastolic function.  Mild left atrial enlargement.  Intermediate central venous pressure (8 mmHg).  Small circumferential pericardial effusion, largest inferolaterally. No evidence of tamponade.  There was frequent ventricular ectopy throughout the examination.

## 2022-04-09 NOTE — TELEPHONE ENCOUNTER
Reason for Disposition   [1] Chest pain lasts > 5 minutes AND [2] age > 44    Additional Information   Negative: SEVERE difficulty breathing (e.g., struggling for each breath, speaks in single words)   Negative: Difficult to awaken or acting confused (e.g., disoriented, slurred speech)   Negative: Shock suspected (e.g., cold/pale/clammy skin, too weak to stand, low BP, rapid pulse)   Negative: Passed out (i.e., fainted, collapsed and was not responding)    Protocols used: CHEST PAIN-A-AH  pt states that she started Monday taking amiodarone and having side effects. pt states she is having nausea and feels weak, pt states she has to hold wall to walk around, some CP that started today. CP above nipples. rates pain 3-4. coughing since med started. an hour after taking having med this evening started with dry heaves, HA. BP good all day.  MI=389/81 HR=71 , o2 sat = 99% feeling like throat closing sometimes but its random. rec EMS. pt states she will think about it. spoke with dr manjarrez re above. MD states to hold amiodarone for this weekend. if CP increasing severity intensity duration come to ED. sip water, drink half strength Gatorade and call us with update in am. pt notified. pt states CP getting better. pt agrees to call with update in am. call back with questions

## 2022-04-09 NOTE — ED TRIAGE NOTES
Pt. Came to ER with c/c nausea and vomiting since last night. Pt. States that she does not know how many times she has vomited, but states that it has been clear and not coffee ground like. Pt. Denies c/p but states that she has mild SOB upon exertion. Pt. Has h/x a-fib.

## 2022-04-09 NOTE — SUBJECTIVE & OBJECTIVE
Past Medical History:   Diagnosis Date    Anticoagulant long-term use     Esophagitis     Hiatal hernia     Meniere's disease     Paroxysmal atrial fibrillation 2021    Shingles     Sick sinus syndrome 2022    s/p Dual chamber pacemaker    Thyroid disease        Past Surgical History:   Procedure Laterality Date    A-V CARDIAC PACEMAKER INSERTION N/A 2022    Procedure: INSERTION, CARDIAC PACEMAKER, DUAL CHAMBER;  Surgeon: Ernesto Duncan MD;  Location: [facility] EP LAB;  Service: Cardiology;  Laterality: N/A;  SB, DUAL PPM, SJM, ANES, SK, [id number]    BREAST CYST ASPIRATION           SECTION      COLONOSCOPY N/A 2019    Procedure: COLONOSCOPY;  Surgeon: INGRID Russo MD;  [facility]  Service: Endoscopy;  Laterality: N/A;    deviated septum repair      HYSTERECTOMY      lump removed from tongue      TONSILLECTOMY         Review of patient's allergies indicates:   Allergen Reactions    Lasix [furosemide] Shortness Of Breath    Penicillins Hives     causes congestion and hives    Tricyclic compounds        No current facility-administered medications on file prior to encounter.     Current Outpatient Medications on File Prior to Encounter   Medication Sig    amiodarone (PACERONE) 200 MG Tab Take 2 tablets (400 mg total) by mouth 2 (two) times daily for 13 days, THEN 1 tablet (200 mg total) once daily.    rivaroxaban (XARELTO) 15 mg Tab Take 1 tablet (15 mg total) by mouth daily with dinner or evening meal.    sertraline (ZOLOFT) 25 MG tablet Take 1 tablet (25 mg total) by mouth once daily.    omeprazole 20 mg TbLD Take 20 mg by mouth Daily.    polyethylene glycol (GLYCOLAX) 17 gram PwPk Take 17 g by mouth once as needed (Constipation).    [DISCONTINUED] flecainide (TAMBOCOR) 50 MG Tab Take 1 tablet (50 mg total) by mouth every 12 (twelve) hours.    [DISCONTINUED] pantoprazole (PROTONIX) 40 MG tablet Take 1 tablet (40 mg total) by mouth once daily.     Family History        Problem Relation (Age of Onset)    Breast cancer Mother, Paternal Grandmother    Diverticulitis Brother, Sister    Heart disease Mother (55), Father, Brother    Hyperlipidemia Mother    Hypertension Mother, Father          Tobacco Use    Smoking status: Never Smoker    Smokeless tobacco: Never Used   Substance and Sexual Activity    Alcohol use: No     Alcohol/week: 0.0 standard drinks     Comment: rarely    Drug use: No    Sexual activity: Not Currently     Review of Systems   All other systems reviewed and are negative.  Objective:     Vital Signs (Most Recent):  Temp: 97.6 °F (36.4 °C) (04/09/22 0648)  Pulse: 75 (04/09/22 1102)  Resp: (!) 24 (04/09/22 1106)  BP: (!) 152/72 (04/09/22 1102)  SpO2: 100 % (04/09/22 1102)   Vital Signs (24h Range):  Temp:  [97.6 °F (36.4 °C)] 97.6 °F (36.4 °C)  Pulse:  [75-93] 75  Resp:  [18-24] 24  SpO2:  [98 %-100 %] 100 %  BP: (121-164)/(72-84) 152/72        There is no height or weight on file to calculate BMI.    SpO2: 100 %  O2 Device (Oxygen Therapy): room air      Intake/Output Summary (Last 24 hours) at 4/9/2022 1250  Last data filed at 4/9/2022 1216  Gross per 24 hour   Intake 2000 ml   Output --   Net 2000 ml       Lines/Drains/Airways       Peripheral Intravenous Line  Duration                  Peripheral IV - Single Lumen 04/09/22 0724 20 G Left Hand <1 day                    Physical Exam  General: NAD. AAO  HENT: EOMI  Neck: supple. No JVD  CV: RRR. Normal S1/S2. No gallops, rubs, or murmurs. 2+ radial pulses  Resp: CTAB. No increased work of breathing  Abd: normal abdominal sounds. No rebound or guarding.  Ext: warm. No edema.     Significant Labs: All pertinent lab results from the last 24 hours have been reviewed.    Significant Imaging:  Reviewed

## 2022-04-09 NOTE — ED NOTES
Patient identifiers verified and correct for Trudi Mcknight    LOC: The patient is awake, alert and aware of environment with an appropriate affect, the patient is oriented x 3 and speaking appropriately.   APPEARANCE: Patient appears comfortable and in no acute distress, patient is clean and well groomed.  SKIN: The skin is warm and dry, color consistent with ethnicity, patient has normal skin turgor and moist mucus membranes, skin intact, no breakdown or bruising noted.   MUSCULOSKELETAL: Patient moving all extremities spontaneously, no swelling noted.  RESPIRATORY: Airway is open and patent, respirations are spontaneous, patient has a normal effort and rate, no accessory muscle use noted, pt placed on continuous pulse ox with O2 sats noted at 100% on room air.  CARDIAC: Pt placed on cardiac monitor. Patient has a normal rate and regular rhythm, no edema noted, capillary refill < 3 seconds.    GASTRO: Soft, round, not distended and tender in all quadrants of abdomen.   : Pt denies any pain or frequency with urination.  NEURO: Pt opens eyes spontaneously, behavior appropriate to situation, follows commands, facial expression symmetrical, bilateral hand grasp equal and even, purposeful motor response noted, normal sensation in all extremities when touched with a finger.

## 2022-04-09 NOTE — CONSULTS
[facility] Dept  Cardiology  Consult Note    Patient Name: Trudi Mcknight  MRN: [id number]  Admission Date: 4/9/2022  Hospital Length of Stay: 0 days  Code Status: Prior   Attending Provider: Ervin Lou MD   Consulting Provider: Paul Edmond MD  Primary Care Physician: Renuka Moreno MD  Principal Problem:<principal problem not specified>    Patient information was obtained from patient and ER records.     Inpatient consult to Cardiology  Consult performed by: Paul Edmond MD  Consult ordered by: Mushtaq Heck MD        Subjective:     Chief Complaint:  N/V     HPI:   Ms Trudi Mcknight is a 67 year old female with PMH of paroxysmal atrial fibrillation, tachy-madelaine syndrome/sinus node dysfunction requiring dual chamber PPM 1/2022 who presents to the ED with complaints of N/V which she believes is related to her amiodarone. She recently underwent PVI on 3/29 with Dr. Duncan. She had issues with frequent AT and AF with RVR and started on amiodarone. She reports issues with flecainide previously (abdominal discomfort and constipation). She was also restarted on Zoloft at discharge. In the ED, she is HD stable and sating well on RA. CBC wnl. CMP unremarkable except for Tbili 1.1 and Bicarb 16. Normal anion gap. ECG with NSR and NS ST-T changes. CXR with possible small R sided pleural effusion, but otherwise unremarkable. TSH 5 with normal free T4. RUQ US without evidence of acute hepatobiliary abnormality.     4/6/22 TTE  · The left ventricle is normal in size with normal systolic function. The estimated ejection fraction is 55%.  · Normal right ventricular size with normal right ventricular systolic function.  · Indeterminate left ventricular diastolic function.  · Mild left atrial enlargement.  · Intermediate central venous pressure (8 mmHg).  · Small circumferential pericardial effusion, largest inferolaterally. No evidence of tamponade.  · There was frequent ventricular ectopy throughout  the examination.        Past Medical History:   Diagnosis Date    Anticoagulant long-term use     Esophagitis     Hiatal hernia     Meniere's disease     Paroxysmal atrial fibrillation 2021    Shingles     Sick sinus syndrome 2022    s/p Dual chamber pacemaker    Thyroid disease        Past Surgical History:   Procedure Laterality Date    A-V CARDIAC PACEMAKER INSERTION N/A 2022    Procedure: INSERTION, CARDIAC PACEMAKER, DUAL CHAMBER;  Surgeon: Ernesto Duncan MD;  Location: [facility] EP LAB;  Service: Cardiology;  Laterality: N/A;  SB, DUAL PPM, SJM, ANES, SK, [id number]    BREAST CYST ASPIRATION           SECTION      COLONOSCOPY N/A 2019    Procedure: COLONOSCOPY;  Surgeon: INGRID Russo MD;  [facility]  Service: Endoscopy;  Laterality: N/A;    deviated septum repair      HYSTERECTOMY      lump removed from tongue      TONSILLECTOMY         Review of patient's allergies indicates:   Allergen Reactions    Lasix [furosemide] Shortness Of Breath    Penicillins Hives     causes congestion and hives    Tricyclic compounds        No current facility-administered medications on file prior to encounter.     Current Outpatient Medications on File Prior to Encounter   Medication Sig    amiodarone (PACERONE) 200 MG Tab Take 2 tablets (400 mg total) by mouth 2 (two) times daily for 13 days, THEN 1 tablet (200 mg total) once daily.    rivaroxaban (XARELTO) 15 mg Tab Take 1 tablet (15 mg total) by mouth daily with dinner or evening meal.    sertraline (ZOLOFT) 25 MG tablet Take 1 tablet (25 mg total) by mouth once daily.    omeprazole 20 mg TbLD Take 20 mg by mouth Daily.    polyethylene glycol (GLYCOLAX) 17 gram PwPk Take 17 g by mouth once as needed (Constipation).    [DISCONTINUED] flecainide (TAMBOCOR) 50 MG Tab Take 1 tablet (50 mg total) by mouth every 12 (twelve) hours.    [DISCONTINUED] pantoprazole (PROTONIX) 40 MG tablet Take 1 tablet (40 mg  total) by mouth once daily.     Family History       Problem Relation (Age of Onset)    Breast cancer Mother, Paternal Grandmother    Diverticulitis Brother, Sister    Heart disease Mother (55), Father, Brother    Hyperlipidemia Mother    Hypertension Mother, Father          Tobacco Use    Smoking status: Never Smoker    Smokeless tobacco: Never Used   Substance and Sexual Activity    Alcohol use: No     Alcohol/week: 0.0 standard drinks     Comment: rarely    Drug use: No    Sexual activity: Not Currently     Review of Systems   All other systems reviewed and are negative.  Objective:     Vital Signs (Most Recent):  Temp: 97.6 °F (36.4 °C) (04/09/22 0648)  Pulse: 75 (04/09/22 1102)  Resp: (!) 24 (04/09/22 1106)  BP: (!) 152/72 (04/09/22 1102)  SpO2: 100 % (04/09/22 1102)   Vital Signs (24h Range):  Temp:  [97.6 °F (36.4 °C)] 97.6 °F (36.4 °C)  Pulse:  [75-93] 75  Resp:  [18-24] 24  SpO2:  [98 %-100 %] 100 %  BP: (121-164)/(72-84) 152/72        There is no height or weight on file to calculate BMI.    SpO2: 100 %  O2 Device (Oxygen Therapy): room air      Intake/Output Summary (Last 24 hours) at 4/9/2022 1250  Last data filed at 4/9/2022 1216  Gross per 24 hour   Intake 2000 ml   Output --   Net 2000 ml       Lines/Drains/Airways       Peripheral Intravenous Line  Duration                  Peripheral IV - Single Lumen 04/09/22 0724 20 G Left Hand <1 day                    Physical Exam  General: NAD. AAO  HENT: EOMI  Neck: supple. No JVD  CV: RRR. Normal S1/S2. No gallops, rubs, or murmurs. 2+ radial pulses  Resp: CTAB. No increased work of breathing  Abd: normal abdominal sounds. No rebound or guarding.  Ext: warm. No edema.     Significant Labs: All pertinent lab results from the last 24 hours have been reviewed.    Significant Imaging:  Reviewed    Assessment and Plan:     Nausea and vomiting  Unclear etiology. No red flag features. History of somatic complaints previously which are not in correlation to  objective evidence. N/V may be secondary to amiodarone. She was recently initiated with IV amio then amio 400 mg BID x 13 days at discharge. She underwent PVI 3/29 with difficult to control atrial arrhythmias. Intolerant to flecainide (abd pain and constipation). TSH very mildly elevated with normal free T4 (subclinical hypothyroidism). US abd unrevealing. CMP unrevealing.   - Decrease amiodarone dose to 200 mg BID from 400 mg BID. She has 44 tablets left to take over the next 22 days. She will then go to 200 mg daily  - Continue to monitor thyroid function    Elevated troponin  0.04 and flat. ECG with evidence of ischemia. Not actively having chest pain. Likely related to myocardial injury from ablation procedure. No angina symptoms. CCS 97 this year. No stress or angio on file. Recent echo with normal EF and small pleural effusion.  - No indication for ACS therapy    Discussed case with cardiology staff and EP fellow on call    VTE Risk Mitigation (From admission, onward)    None          Thank you for your consult. I will sign off. Please contact us if you have any additional questions.    Paul Edmond MD  Cardiology   [facility]

## 2022-04-09 NOTE — ED NOTES
LOC: The patient is awake, alert, and oriented to self, place, time, and situation. Pt is calm and cooperative. Affect is appropriate.  Speech is appropriate and clear.     APPEARANCE: Patient resting uncomfortably, reporting nausea and vomiting,  in no acute distress.  Patient is clean and well groomed.    SKIN: The skin is warm and dry; color consistent with ethnicity.  Patient has normal skin turgor and dry mucus membranes.  Skin intact; no breakdown or bruising noted.     MUSCULOSKELETAL: Patient moving upper and lower extremities without difficulty; denies pain in the extremities or back.  Reporting  weakness.     RESPIRATORY: Airway is open and patent. Respirations spontaneous,  and non-labored.  Patient has an increased  effort and rate.  No accessory muscle use noted. Denies cough.     CARDIAC:    No peripheral edema noted. No complaints of chest pain.      ABDOMEN: Soft and non tender to palpation.  No distention noted. Pt reporting nausea, vomiting, diarrhea.     NEUROLOGIC: Eyes open spontaneously.  Behavior appropriate to situation.  Follows commands; facial expression symmetrical.  Purposeful motor response noted; normal sensation in all extremities. Pt denies headache; denies lightheadedness or dizziness; denies visual disturbances; denies loss of balance; denies unilateral weakness.

## 2022-04-09 NOTE — ASSESSMENT & PLAN NOTE
0.04 and flat. ECG with evidence of ischemia. Not actively having chest pain. Likely related to myocardial injury from ablation procedure. No angina symptoms. CCS 97 this year. No stress or angio on file. Recent echo with normal EF and small pleural effusion.  - No indication for ACS therapy

## 2022-04-11 ENCOUNTER — TELEPHONE (OUTPATIENT)
Dept: CARDIOLOGY | Facility: CLINIC | Age: 67
End: 2022-04-11
Payer: MEDICARE

## 2022-04-11 ENCOUNTER — OFFICE VISIT (OUTPATIENT)
Dept: ENDOCRINOLOGY | Facility: CLINIC | Age: 67
End: 2022-04-11
Payer: MEDICARE

## 2022-04-11 ENCOUNTER — TELEPHONE (OUTPATIENT)
Dept: ELECTROPHYSIOLOGY | Facility: CLINIC | Age: 67
End: 2022-04-11
Payer: MEDICARE

## 2022-04-11 VITALS
WEIGHT: 112.5 LBS | DIASTOLIC BLOOD PRESSURE: 75 MMHG | BODY MASS INDEX: 21.26 KG/M2 | SYSTOLIC BLOOD PRESSURE: 121 MMHG | HEART RATE: 80 BPM | TEMPERATURE: 98 F

## 2022-04-11 DIAGNOSIS — T46.2X5A AMIODARONE-INDUCED THYROIDITIS: Primary | ICD-10-CM

## 2022-04-11 DIAGNOSIS — F41.0 GENERALIZED ANXIETY DISORDER WITH PANIC ATTACKS: Chronic | ICD-10-CM

## 2022-04-11 DIAGNOSIS — E03.9 HYPOTHYROIDISM, UNSPECIFIED TYPE: ICD-10-CM

## 2022-04-11 DIAGNOSIS — I48.0 PAROXYSMAL ATRIAL FIBRILLATION WITH RVR: Chronic | ICD-10-CM

## 2022-04-11 DIAGNOSIS — F41.1 GENERALIZED ANXIETY DISORDER WITH PANIC ATTACKS: Chronic | ICD-10-CM

## 2022-04-11 DIAGNOSIS — E06.4 AMIODARONE-INDUCED THYROIDITIS: Primary | ICD-10-CM

## 2022-04-11 PROCEDURE — 3288F FALL RISK ASSESSMENT DOCD: CPT | Mod: CPTII,S$GLB,, | Performed by: HOSPITALIST

## 2022-04-11 PROCEDURE — 3074F SYST BP LT 130 MM HG: CPT | Mod: CPTII,S$GLB,, | Performed by: HOSPITALIST

## 2022-04-11 PROCEDURE — 1159F MED LIST DOCD IN RCRD: CPT | Mod: CPTII,S$GLB,, | Performed by: HOSPITALIST

## 2022-04-11 PROCEDURE — 1101F PT FALLS ASSESS-DOCD LE1/YR: CPT | Mod: CPTII,S$GLB,, | Performed by: HOSPITALIST

## 2022-04-11 PROCEDURE — 1125F PR PAIN SEVERITY QUANTIFIED, PAIN PRESENT: ICD-10-PCS | Mod: CPTII,S$GLB,, | Performed by: HOSPITALIST

## 2022-04-11 PROCEDURE — 99999 PR PBB SHADOW E&M-EST. PATIENT-LVL III: CPT | Mod: PBBFAC,,, | Performed by: HOSPITALIST

## 2022-04-11 PROCEDURE — 3288F PR FALLS RISK ASSESSMENT DOCUMENTED: ICD-10-PCS | Mod: CPTII,S$GLB,, | Performed by: HOSPITALIST

## 2022-04-11 PROCEDURE — 3078F PR MOST RECENT DIASTOLIC BLOOD PRESSURE < 80 MM HG: ICD-10-PCS | Mod: CPTII,S$GLB,, | Performed by: HOSPITALIST

## 2022-04-11 PROCEDURE — 3044F PR MOST RECENT HEMOGLOBIN A1C LEVEL <7.0%: ICD-10-PCS | Mod: CPTII,S$GLB,, | Performed by: HOSPITALIST

## 2022-04-11 PROCEDURE — 3008F PR BODY MASS INDEX (BMI) DOCUMENTED: ICD-10-PCS | Mod: CPTII,S$GLB,, | Performed by: HOSPITALIST

## 2022-04-11 PROCEDURE — 3044F HG A1C LEVEL LT 7.0%: CPT | Mod: CPTII,S$GLB,, | Performed by: HOSPITALIST

## 2022-04-11 PROCEDURE — 1101F PR PT FALLS ASSESS DOC 0-1 FALLS W/OUT INJ PAST YR: ICD-10-PCS | Mod: CPTII,S$GLB,, | Performed by: HOSPITALIST

## 2022-04-11 PROCEDURE — 99204 PR OFFICE/OUTPT VISIT, NEW, LEVL IV, 45-59 MIN: ICD-10-PCS | Mod: S$GLB,,, | Performed by: HOSPITALIST

## 2022-04-11 PROCEDURE — 1125F AMNT PAIN NOTED PAIN PRSNT: CPT | Mod: CPTII,S$GLB,, | Performed by: HOSPITALIST

## 2022-04-11 PROCEDURE — 99999 PR PBB SHADOW E&M-EST. PATIENT-LVL III: ICD-10-PCS | Mod: PBBFAC,,, | Performed by: HOSPITALIST

## 2022-04-11 PROCEDURE — 99204 OFFICE O/P NEW MOD 45 MIN: CPT | Mod: S$GLB,,, | Performed by: HOSPITALIST

## 2022-04-11 PROCEDURE — 3008F BODY MASS INDEX DOCD: CPT | Mod: CPTII,S$GLB,, | Performed by: HOSPITALIST

## 2022-04-11 PROCEDURE — 1159F PR MEDICATION LIST DOCUMENTED IN MEDICAL RECORD: ICD-10-PCS | Mod: CPTII,S$GLB,, | Performed by: HOSPITALIST

## 2022-04-11 PROCEDURE — 1111F PR DISCHARGE MEDS RECONCILED W/ CURRENT OUTPATIENT MED LIST: ICD-10-PCS | Mod: CPTII,S$GLB,, | Performed by: HOSPITALIST

## 2022-04-11 PROCEDURE — 1111F DSCHRG MED/CURRENT MED MERGE: CPT | Mod: CPTII,S$GLB,, | Performed by: HOSPITALIST

## 2022-04-11 PROCEDURE — 3078F DIAST BP <80 MM HG: CPT | Mod: CPTII,S$GLB,, | Performed by: HOSPITALIST

## 2022-04-11 PROCEDURE — 3074F PR MOST RECENT SYSTOLIC BLOOD PRESSURE < 130 MM HG: ICD-10-PCS | Mod: CPTII,S$GLB,, | Performed by: HOSPITALIST

## 2022-04-11 RX ORDER — LEVOTHYROXINE SODIUM 25 UG/1
25 TABLET ORAL
Qty: 90 TABLET | Refills: 1 | Status: SHIPPED | OUTPATIENT
Start: 2022-04-11 | End: 2022-06-10 | Stop reason: SDUPTHER

## 2022-04-11 NOTE — TELEPHONE ENCOUNTER
Called patient to follow up on her ER visit over the weekend. States her nausea is almost completely gone. She is now on amiodarone 200mg bid instead of 400mg bid. She will go to maintenance dose of 200mg daily in 11 days. States today is a really bad anxiety day, but no AF and nausea greatly improved. She saw endocrine today and is going to start on Synthroid .25mg daily.   She was very appreciative of the call and will keep us posted.

## 2022-04-11 NOTE — PROGRESS NOTES
Subjective:      Patient ID: Trudi Mcknight is a 67 y.o. female presented to [facility] on 4/11/2022.  Chief Complaint:  Abnormal Lab      History of Present Illness: Trudi Mcknight is a 67 y.o. female here for hypothyroidism/abnormal thyroid function    Other significant past medical history:  Atrial fibrillation ( Amiodarone and Xarelto)      Diagnosed with hypothyroidism:   First noted on lab work:  4/9/2022  Started on amiodarone by Cardiology.  With weaning protocol.  Currently on 400mg >> 200mg wean down to  Family history thyroid disorder:  Dad and brother>> hypothyrodism  She had issue with hypothyroidism during her pregnancy     Not on it thyroid hormone replacement therapy.    She is feeling depressed/anxiety  Current symptoms:   No   Yes  [x]    []   Weight loss>> Since Jan  []    [x]   Fatigue  [x]    []   Constipation  []    [x]   Hair loss  [x]    []   Brittle nails  []    []   Mental fog  []    [x]   Cold intolerance  []    []   Memory impaired   []    []   Bradycardia    []    [x]   Recent severe illness  [x]    []   Neck swelling, pain, pressure  [x]    []   Hoarseness      Lab Results   Component Value Date    TSH 4.961 (H) 04/09/2022    TSH 3.221 04/04/2022    TSH 1.295 02/04/2022    FREET4 1.49 04/09/2022    FREET4 1.18 04/05/2022    FREET4 0.99 08/11/2016       Reviewed past surgical, medical, family, social history and updated as appropriate.    Review of Systems: see HPI above    Objective:   /75 (BP Location: Right arm)   Pulse 80   Temp 97.7 °F (36.5 °C) (Oral)   Wt 51 kg (112 lb 8 oz)   BMI 21.26 kg/m²     Body mass index is 21.26 kg/m².  Vital signs reviewed    Physical Exam  Vitals and nursing note reviewed.   Constitutional:       General: She is not in acute distress.     Appearance: Normal appearance. She is well-developed. She is obese. She is not toxic-appearing.   Neck:      Thyroid: No thyromegaly.      Comments: No goiter  Cardiovascular:      Heart  sounds: Normal heart sounds.   Pulmonary:      Effort: Pulmonary effort is normal. No respiratory distress.   Musculoskeletal:         General: No deformity. Normal range of motion.      Cervical back: Normal range of motion.   Skin:     Findings: No bruising.   Neurological:      Mental Status: She is alert and oriented to person, place, and time.   Psychiatric:         Mood and Affect: Mood normal.         Lab Reviewed:   Lab Results   Component Value Date    HGBA1C 5.5 01/20/2022       Lab Results   Component Value Date    CHOL 186 04/04/2022    HDL 51 04/04/2022    LDLCALC 107.8 04/04/2022    TRIG 136 04/04/2022    CHOLHDL 27.4 04/04/2022       Lab Results   Component Value Date     04/09/2022    K 3.5 04/09/2022     04/09/2022    CO2 16 (L) 04/09/2022    GLU 99 04/09/2022    BUN 11 04/09/2022    CREATININE 0.8 04/09/2022    CALCIUM 9.8 04/09/2022    PROT 7.3 04/09/2022    ALBUMIN 3.6 04/09/2022    BILITOT 1.1 (H) 04/09/2022    ALKPHOS 83 04/09/2022    AST 15 04/09/2022    ALT 11 04/09/2022    ANIONGAP 15 04/09/2022    ESTGFRAFRICA >60.0 04/09/2022    EGFRNONAA >60.0 04/09/2022    TSH 4.961 (H) 04/09/2022        Lab Results   Component Value Date    GNLSKCGS36DN 27 (L) 08/05/2019    CALCIUM 9.8 04/09/2022    CALCIUM 9.2 04/07/2022    CALCIUM 8.8 04/06/2022    PHOS 2.7 04/09/2022    PHOS 5.2 (H) 04/07/2022    PHOS 3.4 04/06/2022    ALKPHOS 83 04/09/2022    ALKPHOS 84 04/04/2022    ALKPHOS 77 03/18/2022    TSH 4.961 (H) 04/09/2022       Assessment     1. Amiodarone-induced thyroiditis  levothyroxine (SYNTHROID) 25 MCG tablet   2. Hypothyroidism, unspecified type  levothyroxine (SYNTHROID) 25 MCG tablet    TSH    T4, Free    Thyroid Peroxidase Antibody   3. Generalized anxiety disorder with panic attacks     4. Paroxysmal atrial fibrillation with RVR          Plan     No problem-specific Assessment & Plan notes found for this encounter.     Amiodarone-induced thyroiditis  - Hypothyroidism, unspecified  type, possible autoimmune given family history of hypothyroidism  - Pathophysiology of hypothyroidism, the role of TSH, free T4 were explained to patient  - TSH elevation noted, symptoms as above  - plan to start patient low-dose trial of levothyroxine 25 mcg daily.  - repeat lab work in 2 months to re-evaluate.  Check ab      Generalized anxiety disorder with panic attacks  - advised patient to continue may PCP, unclear if related to thyroid      Paroxysmal atrial fibrillation with RVR  - on amiodarone per Cardiology      Advised patient to follow up with PCP for routine health maintenance care.   RTC in 2 months/mc Palmer M.D.  Endocrinology  [facility]  4/11/2022      Disclaimer: This note has been generated in part with the use of voice-recognition software. There may be typographical errors that have been missed during proof-reading.

## 2022-04-11 NOTE — TELEPHONE ENCOUNTER
I spoke to pt following phone call to [facility] on call this week-end.  Pt states on that Friday night she took her regular 400mg amiodarone and had N/V, ended up vomiting all night.  On Saturday she went to the ER and missed her Am dose. She was told in the ER to decrease amiodarone to 200mg bid for 22 days then change to 200mg qd.  She was ordered some Zofran. She had an appt w. endocrinology this Am to address hypothyroidism, Dx: amiodarone induced thyroiditis.  Currently she is c/o nausea and anxiety. She has not taken any ondansetron at that point. She c/o epigastric sharp pains (abd pain addressed in the ER). She gets CP on lt side, above nipples on and off. Per ER visit, related to ablation.  I told her to get back in touch with us if not improving after decreasing her amiodarone, f/u w. PCP for abd pain (she say IM was consulted during her ER visit) and take ondansetron if nauseous to prevent poor intake/ vomiting meds. She verbalized understanding.

## 2022-04-12 ENCOUNTER — PATIENT MESSAGE (OUTPATIENT)
Dept: ELECTROPHYSIOLOGY | Facility: CLINIC | Age: 67
End: 2022-04-12
Payer: MEDICARE

## 2022-04-13 NOTE — PROGRESS NOTES
Trudi Mcknight  1955        Subjective     Chief Complaint: Hosp Follow-up    History of Present Illness:  Ms. Trudi Mcknight is a 67 y.o. female who presents to clinic for hospital f/u.  PCP: Dr. Renuka Moreno. Discharged 4/7/22, admitted for Afib with RVR and pacemaker mediated tachycardia. Discharged 4/7 but returned to ED 4/9 for vomiting and diarrhea. Seen by EP in ED who was concerned for Amiodarone causing symptoms. Per their note, decreased her amiodarone dose to 200 mg BID from 400 mg BID. Seen by Endo in clinic 4/11 after ED d/c as well. Started on Levothyroxine 25 mcg. Concerned for amiodaroneinduced thyroid dysfunction.     Today: pt presents with , hysterically crying. States she is having a hard time eating on Amiodarone, very nauseous, feels anxiety is very bad. Cries every time she takes the amiodarone. Had similar issues with Flecainide. S/p ablation. Still has episodes of anxiety and SOB but oxygen always stable at home. She does report intermittently drops into 40s HR at home. Multiple ED visits recently. Was told by ED to dose reduce to 100 mg BID of Amiodarone but still causing issues. Pulse level today 87, O2 99, /76. Was given IV ativan while admitted last week but states it made her confused. On Zoloft 15 mg ay home, declines wanting to increase dose. Became very upset when she realized this appointment was not Psychiatry.     Next EP visit in June 2022. Does not have Psych follow-up scheduled.      Med Hx:  Sick sinus syndrome with PM--Follows with Dr. Duncan.  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 She reported chest pain radiating to her neck and arm after eating and worse when lying down, but not with exertion. She reported that flecainide  gave her anxiety and depression. That evening, while eating dinner with her , she had palpitations, shortness of breath, chest pain, dizziness, lightheadedness, and difficulty speaking clearly. She had similar symptoms in the past with atrial fibrillation with rapid ventricular response. She had been having trouble sleeping along with her anxiety and depression. She reported that she had not been herself since diagnosed with atrial fibrillation and was anxious about flecainide because she is sensitive to medicine and does not like to take new medications. She reported worsening of her anxiety and lower abdominal cramping after recently taking omeprazole for acid reflux. She took milk of magnesium for abdominal cramping and subsequently had 3 to 4 episodes of diarrhea without blood. She took her sertraline for the first time in 3 months.     Hospital Course:   In the emergency department, CTA head and neck and brain MRI showed no stroke. Her symptoms improved. She was found to have severe hypophosphatemia (phosporus less than 1 mg/dL. EKG showed sinus tachycardia with rate of 106 beats per minute. She was given 1 liter of normal saline. She was admitted to Hospital Medicine Team A. She was given sodium phosphates with resolution of hypophosphatemia. She was put on amiodarone infusion. She was given lorazepam for anxiety. Electrophysiology and Psychiatry were consulted. Electrophysiology recommended continuing amiodarone, which was switched to oral on 4/6/2022. Psychiatry recommended starting sertraline, with lorazepam 0.5 mg used sparingly for panic. Patient improved and was stable on amiodarone. Patient deemed ready for discharge. Plan discussed with pt, who was agreeable and amenable; medications were discussed and reviewed, outpatient follow-up arranged, ER precautions were given, all questions were answered to the pt's satisfaction, and Trudi Mcknight  was subsequently discharged.    Review of Systems    Constitutional: Positive for malaise/fatigue.   Respiratory: Positive for shortness of breath.    Cardiovascular: Negative for chest pain.   Gastrointestinal: Positive for diarrhea, nausea and vomiting. Negative for abdominal pain.   Neurological: Positive for weakness.   Psychiatric/Behavioral: The patient is nervous/anxious.         PAST HISTORY:     Past Medical History:   Diagnosis Date    Anticoagulant long-term use     Esophagitis     Hiatal hernia     Meniere's disease     Paroxysmal atrial fibrillation 2021    Shingles     Sick sinus syndrome 2022    s/p Dual chamber pacemaker    Thyroid disease        Past Surgical History:   Procedure Laterality Date    A-V CARDIAC PACEMAKER INSERTION N/A 2022    Procedure: INSERTION, CARDIAC PACEMAKER, DUAL CHAMBER;  Surgeon: Ernesto Duncan MD;  Location: [facility] EP LAB;  Service: Cardiology;  Laterality: N/A;  SB, DUAL PPM, SJM, ANES, SK, [id number]    BREAST CYST ASPIRATION           SECTION      COLONOSCOPY N/A 2019    Procedure: COLONOSCOPY;  Surgeon: INGRID Russo MD;  [facility] ENDO ([facility]);  Service: Endoscopy;  Laterality: N/A;    deviated septum repair      HYSTERECTOMY      lump removed from tongue      TONSILLECTOMY         Family History   Problem Relation Age of Onset    Heart disease Mother 55        bypass at 55    Breast cancer Mother     Hypertension Mother     Hyperlipidemia Mother     Heart disease Father     Hypertension Father     Heart disease Brother     Diverticulitis Brother     Diverticulitis Sister     Breast cancer Paternal Grandmother     Colon cancer Neg Hx     Melanoma Neg Hx     Amblyopia Neg Hx     Blindness Neg Hx     Cataracts Neg Hx     Glaucoma Neg Hx     Macular degeneration Neg Hx     Strabismus Neg Hx     Retinal detachment Neg Hx        Social History     Socioeconomic History    Marital status:    Tobacco Use    Smoking status: Never Smoker     Smokeless tobacco: Never Used   Substance and Sexual Activity    Alcohol use: No     Alcohol/week: 0.0 standard drinks     Comment: rarely    Drug use: No    Sexual activity: Not Currently     Social Determinants of Health     Financial Resource Strain: Low Risk     Difficulty of Paying Living Expenses: Not hard at all   Food Insecurity: No Food Insecurity    Worried About Running Out of Food in the Last Year: Never true    Ran Out of Food in the Last Year: Never true   Transportation Needs: No Transportation Needs    Lack of Transportation (Medical): No    Lack of Transportation (Non-Medical): No   Physical Activity: Inactive    Days of Exercise per Week: 0 days    Minutes of Exercise per Session: 10 min   Stress: Stress Concern Present    Feeling of Stress : Very much   Social Connections: Unknown    Frequency of Communication with Friends and Family: More than three times a week    Frequency of Social Gatherings with Friends and Family: More than three times a week    Active Member of Clubs or Organizations: Yes    Attends Club or Organization Meetings: More than 4 times per year    Marital Status:    Housing Stability: Low Risk     Unable to Pay for Housing in the Last Year: No    Number of Places Lived in the Last Year: 1    Unstable Housing in the Last Year: No       MEDICATIONS & ALLERGIES:     Current Outpatient Medications on File Prior to Visit   Medication Sig    amiodarone (PACERONE) 200 MG Tab Take 1 tablet (200 mg total) by mouth 2 (two) times daily for 13 days, THEN 1 tablet (200 mg total) once daily.    levothyroxine (SYNTHROID) 25 MCG tablet Take 1 tablet (25 mcg total) by mouth before breakfast.    omeprazole 20 mg TbLD Take 20 mg by mouth Daily.    ondansetron (ZOFRAN) 4 MG tablet Take 1 tablet (4 mg total) by mouth every 8 (eight) hours as needed for Nausea.    polyethylene glycol (GLYCOLAX) 17 gram PwPk Take 17 g by mouth once as needed (Constipation).     "rivaroxaban (XARELTO) 15 mg Tab Take 1 tablet (15 mg total) by mouth daily with dinner or evening meal.    sertraline (ZOLOFT) 25 MG tablet Take 1 tablet (25 mg total) by mouth once daily.    [DISCONTINUED] flecainide (TAMBOCOR) 50 MG Tab Take 1 tablet (50 mg total) by mouth every 12 (twelve) hours.    [DISCONTINUED] pantoprazole (PROTONIX) 40 MG tablet Take 1 tablet (40 mg total) by mouth once daily.     No current facility-administered medications on file prior to visit.       Review of patient's allergies indicates:   Allergen Reactions    Lasix [furosemide] Shortness Of Breath    Penicillins Hives     causes congestion and hives    Tricyclic compounds        OBJECTIVE:     Vital Signs:  Vitals:    04/14/22 1046   BP: 114/76   BP Location: Left arm   Patient Position: Sitting   BP Method: Medium (Manual)   Pulse: 87   SpO2: 99%   Weight: 50.8 kg (111 lb 15.9 oz)   Height: 5' 1" (1.549 m)       Body mass index is 21.16 kg/m².     Physical Exam:  Physical Exam  Vitals and nursing note reviewed. Exam conducted with a chaperone present.   Constitutional:       Appearance: She is underweight.      Comments: Crying throughout interview   at bedside   HENT:      Head: Normocephalic and atraumatic.      Nose: Rhinorrhea present.   Eyes:      Extraocular Movements: Extraocular movements intact.      Conjunctiva/sclera: Conjunctivae normal.   Cardiovascular:      Rate and Rhythm: Normal rate.   Pulmonary:      Effort: Pulmonary effort is normal.   Musculoskeletal:         General: Normal range of motion.      Cervical back: Normal range of motion.      Right lower leg: No edema.      Left lower leg: No edema.   Skin:     General: Skin is warm and dry.   Psychiatric:         Mood and Affect: Mood is anxious and depressed. Affect is labile and tearful.         Speech: Speech normal. Speech is not delayed or slurred.            Laboratory  Lab Results   Component Value Date    WBC 7.09 04/09/2022    HGB 14.2 " 04/09/2022    HCT 43.2 04/09/2022    MCV 85 04/09/2022     04/09/2022     Lab Results   Component Value Date    GLU 99 04/09/2022     04/09/2022    K 3.5 04/09/2022     04/09/2022    CO2 16 (L) 04/09/2022    BUN 11 04/09/2022    CREATININE 0.8 04/09/2022    CALCIUM 9.8 04/09/2022    MG 2.1 04/09/2022     Lab Results   Component Value Date    INR 1.3 (H) 04/04/2022    INR 1.1 03/29/2022     Lab Results   Component Value Date    HGBA1C 5.5 01/20/2022     No results for input(s): POCTGLUCOSE in the last 72 hours.        ASSESSMENT & PLAN:   Ms. Trudi Mcknight is a 67 y.o. female who was seen today in clinic for hospital follow-up. Severe anxiety related to Amiodarone. Cried though entire appointment, inconsolable. Was upset because she thought this was Psych. States Amio makes her anxious, nauseous. Nervous to eat. Was admitted twice for this. Per , was also occurring with Flecainide prior to Amio, also s/p ablation. Symptoms/episdoes of anxiety not new but worse since Amio. Was seen by Psych in hospital. On Zoloft 15 mg, does not want any increase in dose. Not following with Psych. Now on dose reduced 100 mg BID of Amio and symptoms better than originally but still causing severe anxiety.    Vitals wnl-HR 87, /76. Pulse ox 99% on room air. Denied chest pain. Feels all anxiety related to doses of Amio. Will message EP urgently to get her into for dose adjustment.    Diagnoses and all orders for this visit:    Generalized anxiety disorder with panic attacks  Hospital discharge follow-up  Paroxysmal atrial fibrillation with RVR  S/P placement of cardiac pacemaker  Sick sinus syndrome  -     Ambulatory referral/consult to Psychiatry; Future-urgent-Psych clinic # given to  to call and make appointment  -Counseled on when to go to ED (if unable to re-direct anxiety, SOB, chest pain, palpitations, light-headedness, dizziness)  -Message sent to EP (Dr. Duncan and Dr. Colin to try and  obtain earlier appointment)  -Note sent to PCP  -     ALPRAZolam (XANAX) 0.25 MG tablet; Take 1 tablet (0.25 mg total) by mouth 3 (three) times daily as needed for Anxiety.  -     Will only do a low dose for 3 days to get her through this episode, pt needs to establish with Psych for non-benzo regimen if possible       Hypothyroidism due to medication  -On Synthroid 25 mcg, following with Endo    History of gastritis  -May be related to amiodarone, having N/V only with pills, will try and get her into EP asap    Chronic anticoagulation  -Continue current regimen     F/u with PCP in 1 week.    Sallie Cabral MD  Internal Medicine PGY-3  [facility]  [address] [place] [address] [phone]

## 2022-04-14 ENCOUNTER — OFFICE VISIT (OUTPATIENT)
Dept: INTERNAL MEDICINE | Facility: CLINIC | Age: 67
End: 2022-04-14
Payer: MEDICARE

## 2022-04-14 ENCOUNTER — PATIENT MESSAGE (OUTPATIENT)
Dept: INTERNAL MEDICINE | Facility: CLINIC | Age: 67
End: 2022-04-14

## 2022-04-14 VITALS
DIASTOLIC BLOOD PRESSURE: 76 MMHG | WEIGHT: 112 LBS | SYSTOLIC BLOOD PRESSURE: 114 MMHG | HEART RATE: 87 BPM | BODY MASS INDEX: 21.14 KG/M2 | OXYGEN SATURATION: 99 % | HEIGHT: 61 IN

## 2022-04-14 DIAGNOSIS — Z87.19 HISTORY OF GASTRITIS: ICD-10-CM

## 2022-04-14 DIAGNOSIS — Z95.0 S/P PLACEMENT OF CARDIAC PACEMAKER: ICD-10-CM

## 2022-04-14 DIAGNOSIS — E03.2 HYPOTHYROIDISM DUE TO MEDICATION: ICD-10-CM

## 2022-04-14 DIAGNOSIS — I49.5 SICK SINUS SYNDROME: ICD-10-CM

## 2022-04-14 DIAGNOSIS — Z79.01 CHRONIC ANTICOAGULATION: ICD-10-CM

## 2022-04-14 DIAGNOSIS — F41.1 GENERALIZED ANXIETY DISORDER WITH PANIC ATTACKS: Primary | ICD-10-CM

## 2022-04-14 DIAGNOSIS — Z09 HOSPITAL DISCHARGE FOLLOW-UP: ICD-10-CM

## 2022-04-14 DIAGNOSIS — I48.0 PAROXYSMAL ATRIAL FIBRILLATION WITH RVR: ICD-10-CM

## 2022-04-14 DIAGNOSIS — F41.0 GENERALIZED ANXIETY DISORDER WITH PANIC ATTACKS: Primary | ICD-10-CM

## 2022-04-14 PROCEDURE — 99999 PR PBB SHADOW E&M-EST. PATIENT-LVL III: CPT | Mod: PBBFAC,GC,, | Performed by: STUDENT IN AN ORGANIZED HEALTH CARE EDUCATION/TRAINING PROGRAM

## 2022-04-14 PROCEDURE — 99214 PR OFFICE/OUTPT VISIT, EST, LEVL IV, 30-39 MIN: ICD-10-PCS | Mod: GC,S$GLB,, | Performed by: STUDENT IN AN ORGANIZED HEALTH CARE EDUCATION/TRAINING PROGRAM

## 2022-04-14 PROCEDURE — 1111F DSCHRG MED/CURRENT MED MERGE: CPT | Mod: CPTII,GC,S$GLB, | Performed by: STUDENT IN AN ORGANIZED HEALTH CARE EDUCATION/TRAINING PROGRAM

## 2022-04-14 PROCEDURE — 99999 PR PBB SHADOW E&M-EST. PATIENT-LVL III: ICD-10-PCS | Mod: PBBFAC,GC,, | Performed by: STUDENT IN AN ORGANIZED HEALTH CARE EDUCATION/TRAINING PROGRAM

## 2022-04-14 PROCEDURE — 99214 OFFICE O/P EST MOD 30 MIN: CPT | Mod: GC,S$GLB,, | Performed by: STUDENT IN AN ORGANIZED HEALTH CARE EDUCATION/TRAINING PROGRAM

## 2022-04-14 PROCEDURE — 1111F PR DISCHARGE MEDS RECONCILED W/ CURRENT OUTPATIENT MED LIST: ICD-10-PCS | Mod: CPTII,GC,S$GLB, | Performed by: STUDENT IN AN ORGANIZED HEALTH CARE EDUCATION/TRAINING PROGRAM

## 2022-04-14 RX ORDER — ALPRAZOLAM 0.25 MG/1
0.25 TABLET ORAL 3 TIMES DAILY PRN
Qty: 9 TABLET | Refills: 0 | Status: SHIPPED | OUTPATIENT
Start: 2022-04-14 | End: 2022-04-21 | Stop reason: SDUPTHER

## 2022-04-14 NOTE — Clinical Note
Hello,   We saw this pt today for a hospital follow-up for Afib. She was hysterically crying through interview, thought this was a Psych appointment. Thinks all her symptoms are coming from the Amiodarone. We gave her a very small amount of xanax to help her get through this anxiety. We are arranging to get her into EP sooner than her current appointment and also will refer her urgently to Psych. Just wanted to keep you in the loop.

## 2022-04-18 NOTE — PROGRESS NOTES
I have reviewed the notes, assessments, and/or procedures performed this visit, and I concur with the documentation for Ms Eloina Mcknight.    Patient with severe anxiety and crying in clinic, especially with taking amiodarone. Messaged Dr. Colin with EP (as her EP Dr. Duncan currently out of office) to schedule patient ASAP. Prescribed short course of xanax and patient will follow up with Psychiatry.    Trisha George MD  [facility] Medicine Assistant Staff  [facility]

## 2022-04-20 ENCOUNTER — CLINICAL SUPPORT (OUTPATIENT)
Dept: CARDIOLOGY | Facility: HOSPITAL | Age: 67
End: 2022-04-20
Payer: MEDICARE

## 2022-04-20 DIAGNOSIS — I48.91 UNSPECIFIED ATRIAL FIBRILLATION: ICD-10-CM

## 2022-04-20 DIAGNOSIS — Z95.0 PRESENCE OF CARDIAC PACEMAKER: ICD-10-CM

## 2022-04-20 PROCEDURE — 93294 CARDIAC DEVICE CHECK - REMOTE: ICD-10-PCS | Mod: ,,, | Performed by: INTERNAL MEDICINE

## 2022-04-20 PROCEDURE — 93294 REM INTERROG EVL PM/LDLS PM: CPT | Mod: ,,, | Performed by: INTERNAL MEDICINE

## 2022-04-20 PROCEDURE — 93296 REM INTERROG EVL PM/IDS: CPT | Performed by: INTERNAL MEDICINE

## 2022-04-21 ENCOUNTER — PATIENT MESSAGE (OUTPATIENT)
Dept: INTERNAL MEDICINE | Facility: CLINIC | Age: 67
End: 2022-04-21
Payer: MEDICARE

## 2022-04-21 DIAGNOSIS — F41.1 GENERALIZED ANXIETY DISORDER WITH PANIC ATTACKS: ICD-10-CM

## 2022-04-21 DIAGNOSIS — F41.0 GENERALIZED ANXIETY DISORDER WITH PANIC ATTACKS: ICD-10-CM

## 2022-04-21 RX ORDER — ALPRAZOLAM 0.25 MG/1
0.25 TABLET ORAL 2 TIMES DAILY PRN
Qty: 14 TABLET | Refills: 0 | Status: SHIPPED | OUTPATIENT
Start: 2022-04-21 | End: 2022-04-28 | Stop reason: SDUPTHER

## 2022-04-28 ENCOUNTER — PATIENT MESSAGE (OUTPATIENT)
Dept: ELECTROPHYSIOLOGY | Facility: CLINIC | Age: 67
End: 2022-04-28
Payer: MEDICARE

## 2022-04-28 ENCOUNTER — PATIENT MESSAGE (OUTPATIENT)
Dept: INTERNAL MEDICINE | Facility: CLINIC | Age: 67
End: 2022-04-28
Payer: MEDICARE

## 2022-04-28 DIAGNOSIS — F41.1 GENERALIZED ANXIETY DISORDER WITH PANIC ATTACKS: ICD-10-CM

## 2022-04-28 DIAGNOSIS — F41.0 GENERALIZED ANXIETY DISORDER WITH PANIC ATTACKS: ICD-10-CM

## 2022-04-28 RX ORDER — ALPRAZOLAM 0.25 MG/1
0.25 TABLET ORAL 3 TIMES DAILY PRN
Qty: 30 TABLET | Refills: 0 | Status: SHIPPED | OUTPATIENT
Start: 2022-04-28 | End: 2022-05-11 | Stop reason: SDUPTHER

## 2022-04-29 ENCOUNTER — PATIENT MESSAGE (OUTPATIENT)
Dept: INTERNAL MEDICINE | Facility: CLINIC | Age: 67
End: 2022-04-29
Payer: MEDICARE

## 2022-05-02 ENCOUNTER — PATIENT MESSAGE (OUTPATIENT)
Dept: ENDOCRINOLOGY | Facility: CLINIC | Age: 67
End: 2022-05-02
Payer: MEDICARE

## 2022-05-02 ENCOUNTER — PATIENT MESSAGE (OUTPATIENT)
Dept: INTERNAL MEDICINE | Facility: CLINIC | Age: 67
End: 2022-05-02
Payer: MEDICARE

## 2022-05-02 RX ORDER — CLONAZEPAM 0.25 MG/1
0.25 TABLET, ORALLY DISINTEGRATING ORAL 2 TIMES DAILY
Qty: 60 TABLET | Refills: 1 | Status: SHIPPED | OUTPATIENT
Start: 2022-05-02 | End: 2022-05-26

## 2022-05-11 DIAGNOSIS — F41.0 GENERALIZED ANXIETY DISORDER WITH PANIC ATTACKS: ICD-10-CM

## 2022-05-11 DIAGNOSIS — F41.1 GENERALIZED ANXIETY DISORDER WITH PANIC ATTACKS: ICD-10-CM

## 2022-05-11 RX ORDER — ALPRAZOLAM 0.25 MG/1
0.25 TABLET ORAL 3 TIMES DAILY PRN
Qty: 42 TABLET | Refills: 0 | Status: SHIPPED | OUTPATIENT
Start: 2022-05-11 | End: 2022-05-26

## 2022-05-16 ENCOUNTER — PATIENT MESSAGE (OUTPATIENT)
Dept: INTERNAL MEDICINE | Facility: CLINIC | Age: 67
End: 2022-05-16
Payer: MEDICARE

## 2022-05-17 ENCOUNTER — PATIENT OUTREACH (OUTPATIENT)
Dept: ADMINISTRATIVE | Facility: OTHER | Age: 67
End: 2022-05-17
Payer: MEDICARE

## 2022-05-17 NOTE — PROGRESS NOTES
Health Maintenance Due   Topic Date Due    DEXA Scan  Never done    Shingles Vaccine (2 of 3) 02/09/2016    Pneumococcal Vaccines (Age 65+) (1 - PCV) Never done    COVID-19 Vaccine (4 - Booster for Pfizer series) 02/02/2022     Updates were requested from care everywhere.  Chart was reviewed for overdue Proactive [facility]counters ([facility]) topics (CRS, Breast Cancer Screening, Eye exam)  Health Maintenance has been updated.  LINKS immunization registry triggered.  Immunizations were reconciled.

## 2022-05-19 ENCOUNTER — OFFICE VISIT (OUTPATIENT)
Dept: GASTROENTEROLOGY | Facility: CLINIC | Age: 67
End: 2022-05-19
Payer: MEDICARE

## 2022-05-19 ENCOUNTER — TELEPHONE (OUTPATIENT)
Dept: GASTROENTEROLOGY | Facility: CLINIC | Age: 67
End: 2022-05-19

## 2022-05-19 ENCOUNTER — TELEPHONE (OUTPATIENT)
Dept: ELECTROPHYSIOLOGY | Facility: CLINIC | Age: 67
End: 2022-05-19
Payer: MEDICARE

## 2022-05-19 DIAGNOSIS — K44.9 HIATAL HERNIA: ICD-10-CM

## 2022-05-19 DIAGNOSIS — R10.13 DYSPEPSIA: Primary | ICD-10-CM

## 2022-05-19 PROCEDURE — 1159F MED LIST DOCD IN RCRD: CPT | Mod: CPTII,95,, | Performed by: NURSE PRACTITIONER

## 2022-05-19 PROCEDURE — 1160F RVW MEDS BY RX/DR IN RCRD: CPT | Mod: CPTII,95,, | Performed by: NURSE PRACTITIONER

## 2022-05-19 PROCEDURE — 3044F HG A1C LEVEL LT 7.0%: CPT | Mod: CPTII,95,, | Performed by: NURSE PRACTITIONER

## 2022-05-19 PROCEDURE — 3044F PR MOST RECENT HEMOGLOBIN A1C LEVEL <7.0%: ICD-10-PCS | Mod: CPTII,95,, | Performed by: NURSE PRACTITIONER

## 2022-05-19 PROCEDURE — 99215 OFFICE O/P EST HI 40 MIN: CPT | Mod: 95,,, | Performed by: NURSE PRACTITIONER

## 2022-05-19 PROCEDURE — 99215 PR OFFICE/OUTPT VISIT, EST, LEVL V, 40-54 MIN: ICD-10-PCS | Mod: 95,,, | Performed by: NURSE PRACTITIONER

## 2022-05-19 PROCEDURE — 1160F PR REVIEW ALL MEDS BY PRESCRIBER/CLIN PHARMACIST DOCUMENTED: ICD-10-PCS | Mod: CPTII,95,, | Performed by: NURSE PRACTITIONER

## 2022-05-19 PROCEDURE — 1159F PR MEDICATION LIST DOCUMENTED IN MEDICAL RECORD: ICD-10-PCS | Mod: CPTII,95,, | Performed by: NURSE PRACTITIONER

## 2022-05-19 NOTE — TELEPHONE ENCOUNTER
----- Message from Keiko Parnell NP sent at 5/19/2022  9:59 AM CDT -----  Regarding: clearance for procedure  Hi. I'm the GI NP who is following Ms. Mcknight.  I had an appointment with her today and we discussed the need for an EGD to further evaluate the dyspepsia and hiatal hernia.  Her ablation was performed in 3/2022.  Are there any recommendations on whether or not she would be cleared for an EGD sometime in July or August?  Also, would we need to send clearance to hold Xarelto to Dr. Duncan or her cardiologist Dr. Geller?     Thanks,   Keiko

## 2022-05-19 NOTE — PROGRESS NOTES
"     GASTROENTEROLOGY CLINIC NOTE    Chief Complaint: The primary encounter diagnosis was Dyspepsia. A diagnosis of Hiatal hernia was also pertinent to this visit.  Referring provider/PCP: Renuka Moreno MD     HPI:  Trudi Mcknight is a 67 y.o. female who is a new patient to me with a PMH that is significant for GERD, AFib with RVR, and chest pain.  She is here today to establish care for reflux and constipation.  These are not new problems as patient reports a h/o reflux but has been well controlled.  She recently (1/26/2022) underwent pacemaker placement and reports symptoms have worsened since.  She experiences daily symptoms where she describes upper abdominal pain/pressure that radiates upward into her chest and neck.  The pressure is described as "constricting"  And is accompanied by water brash, regurgitation with bending over, belching, bloating, and dysphagia.  Bowel movements occur every other day and are small and hard in consistency.  Denies pyrosis, nocturnal symptoms, melena, hematochezia, unexplained weight loss, known triggers, or significant NSAID use.  She is avoiding known triggers such as red sauce and foods with high acidity.  She has tried Prilosec, Mylanta, and Nexium for her symptoms.      Interval Note 5/19/2022  The patient location is: [place]  The chief complaint leading to consultation is: Follow up dyspepsia and hiatal hernia    Visit type: audiovisual    Face to Face time with patient: 20  30 minutes of total time spent on the encounter, which includes face to face time and non-face to face time preparing to see the patient (eg, review of tests), Obtaining and/or reviewing separately obtained history, Documenting clinical information in the electronic or other health record, Independently interpreting results (not separately reported) and communicating results to the patient/family/caregiver, or Care coordination (not separately reported).     Each patient to whom he or she " "provides medical services by telemedicine is:  (1) informed of the relationship between the physician and patient and the respective role of any other health care provider with respect to management of the patient; and (2) notified that he or she may decline to receive medical services by telemedicine and may withdraw from such care at any time.    Notes:   Ms. Trudi Mcknight who is known to me presents via telemedicine encounter for follow up regarding dyspepsia and hiatal hernia.  Following her last appointment, she was initiated on Protonix for reflux but was unable to take as it caused kidney pain and visual disturbances.  She underwent insertion of pacemaker in 1/2022 and had ablation for A-Fib in 3/2022.  She continues to have episodes of A-Fib and is being followed by cardiology and electrophysiology.  She reports reflux has improved but continues to experience bloating, frequent belching, epigastric pain and pressure.  Describes pain as "punching" sensation.  This is accompanied by nausea, voice change, and early satiety.  Denies dysphagia but states after eating, it feels like water is getting stuck and "wants to come back up."  No problems with swallowing food.  Also she has to walk around or stand up for about 30 minutes after eating b/c food sitting in epigastrium.  States she is unable to try any new medications b/c it causes too much anxiety. CTA performed 2/2022 and revealed moderate to large hiatal hernia.      Prior Upper Endoscopy: Approximately 7 years ago in [place].   Prior Colonoscopy: 9/2019  Findings: The perianal and digital rectal examinations were normal. Scattered small-mouthed diverticula were found in the sigmoid colon, descending colon, splenic flexure and transverse colon. No other significant abnormalities were identified in a careful examination of the remainder of the colon. The retroflexed view of the distal rectum and anal verge was normal and showed no anal or rectal " abnormalities. No biopsies or other specimens were collected for this exam. The terminal ileum appeared normal.     Impression:  - Diverticulosis in the sigmoid colon, in the descending colon, at the splenic flexure and in the transverse colon.                         - The distal rectum and anal verge are normal on retroflexion view.                         - The examined portion of the ileum was normal.     Recommendation:       - Discharge patient to home (ambulatory).                         - Resume previous diet.                         - Continue present medications.                         - Repeat colonoscopy in 5 years for surveillance.     Family h/o Colon Cancer: No  Family h/o Crohn's Disease or Ulcerative Colitis: No  Abdominal Surgeries: No    NSAIDs: No  Anticoagulation or Antiplatelet: Xarelto    Review of Systems   Constitutional: Negative for weight loss.   HENT: Negative for sore throat.    Eyes: Negative for blurred vision.   Respiratory: Negative for cough.    Cardiovascular: Negative for chest pain.   Gastrointestinal: Negative for blood in stool, constipation, diarrhea, heartburn, melena, nausea and vomiting. Abdominal pain: upper abdominal pressure.        Bloating, belching   Genitourinary: Negative for dysuria.   Musculoskeletal: Negative for myalgias.   Skin: Negative for rash.   Neurological: Negative for headaches.   Endo/Heme/Allergies: Negative for environmental allergies.   Psychiatric/Behavioral: Negative for suicidal ideas. The patient is not nervous/anxious.        Past Medical History: has a past medical history of Anticoagulant long-term use, Esophagitis, Hiatal hernia, Meniere's disease, Paroxysmal atrial fibrillation, Shingles, Sick sinus syndrome, and Thyroid disease.    Past Surgical History: has a past surgical history that includes Tonsillectomy; Hysterectomy;  section; Breast cyst aspiration; Colonoscopy (N/A, 2019); A-V cardiac pacemaker insertion (N/A,  01/20/2022); deviated septum repair; and lump removed from tongue.    Family History:family history includes Breast cancer in her mother and paternal grandmother; Diverticulitis in her brother and sister; Heart disease in her brother and father; Heart disease (age of onset: 55) in her mother; Hyperlipidemia in her mother; Hypertension in her father and mother.    Allergies:   Review of patient's allergies indicates:   Allergen Reactions    Lasix [furosemide] Shortness Of Breath    Penicillins Hives     causes congestion and hives    Tricyclic compounds        Social History: reports that she has never smoked. She has never used smokeless tobacco. She reports that she does not drink alcohol and does not use drugs.    Home medications:   Current Outpatient Medications on File Prior to Visit   Medication Sig Dispense Refill    ALPRAZolam (XANAX) 0.25 MG tablet Take 1 tablet (0.25 mg total) by mouth 3 (three) times daily as needed for Anxiety. 42 tablet 0    amiodarone (PACERONE) 200 MG Tab Take 1 tablet (200 mg total) by mouth 2 (two) times daily for 13 days, THEN 1 tablet (200 mg total) once daily. 86 tablet 0    clonazePAM (KLONOPIN) 0.25 MG TbDL Take 1 tablet (0.25 mg total) by mouth 2 (two) times daily. 60 tablet 1    levothyroxine (SYNTHROID) 25 MCG tablet Take 1 tablet (25 mcg total) by mouth before breakfast. 90 tablet 1    polyethylene glycol (GLYCOLAX) 17 gram PwPk Take 17 g by mouth once as needed (Constipation).      rivaroxaban (XARELTO) 15 mg Tab Take 1 tablet (15 mg total) by mouth daily with dinner or evening meal. 360 tablet 0    sertraline (ZOLOFT) 25 MG tablet Take 1 tablet (25 mg total) by mouth once daily. 30 tablet 11    [DISCONTINUED] flecainide (TAMBOCOR) 50 MG Tab Take 1 tablet (50 mg total) by mouth every 12 (twelve) hours. 60 tablet 1    [DISCONTINUED] pantoprazole (PROTONIX) 40 MG tablet Take 1 tablet (40 mg total) by mouth once daily. 30 tablet 0     No current  facility-administered medications on file prior to visit.       Vital signs:  There were no vitals taken for this visit.    Physical Exam  Constitutional:       General: She is not in acute distress.     Appearance: Normal appearance. She is not ill-appearing.      Comments: Limited physical exam d/t telemedicine encounter   HENT:      Head: Normocephalic.   Pulmonary:      Effort: Pulmonary effort is normal. No respiratory distress.   Abdominal:      Tenderness: Negative signs include Mccoy's sign.   Neurological:      Mental Status: She is alert and oriented to person, place, and time.   Psychiatric:         Mood and Affect: Mood normal.         Behavior: Behavior normal.         Routine labs:  Lab Results   Component Value Date    WBC 7.09 04/09/2022    HGB 14.2 04/09/2022    HCT 43.2 04/09/2022    MCV 85 04/09/2022     04/09/2022     Lab Results   Component Value Date    INR 1.3 (H) 04/04/2022     No results found for: IRON, FERRITIN, TIBC, FESATURATED  Lab Results   Component Value Date     04/09/2022    K 3.5 04/09/2022     04/09/2022    CO2 16 (L) 04/09/2022    BUN 11 04/09/2022    CREATININE 0.8 04/09/2022     Lab Results   Component Value Date    ALBUMIN 3.6 04/09/2022    ALT 11 04/09/2022    AST 15 04/09/2022    ALKPHOS 83 04/09/2022    BILITOT 1.1 (H) 04/09/2022     No results found for: GLUCOSE  Lab Results   Component Value Date    TSH 4.961 (H) 04/09/2022     Lab Results   Component Value Date    CALCIUM 9.8 04/09/2022    PHOS 2.7 04/09/2022       Imaging:      I have reviewed prior labs, imaging, and notes.      Assessment:  1. Dyspepsia    2. Hiatal hernia        Plan:  Orders Placed This Encounter    Case Request Endoscopy: EGD (ESOPHAGOGASTRODUODENOSCOPY)     Continue TUMS as needed.   Trial FDgard.   EGD to further evaluate dyspepsia and hiatal hernia.    Clearance will need to be obtained from electrophysiology and/or cardiology as patient had ablation for AFib in 3/2022 and  is currently taking Xarelto.   Consider referral to surgery for hiatal hernia pending EGD results.     Will reach out to electrophysiology to see when patient will be cleared for any procedures.     Plan of care discussed with patient who is in agreement and verbalized understanding.     I have explained the planned procedures to the patient.The risks, benefits and alternatives of the procedure were also explained in detail. Patient verbalized understanding, all questions were answered. The patient agrees to proceed as planned    Follow Up: Pending Workup    Higher than average risk given patient is on anticoagulant and will need clearance to hold Xarelto.     More than 45 minutes was spent reviewing and summarizing patient's medical records including reviewing tests, scans, and labs, performing exam, counseling and educating the patient, documenting information in the electronic health record, interpreting results, coordinating care, ordering procedures, and communicating with other providers.       YOUSIF Stevens,FNP-BC [facility]

## 2022-05-19 NOTE — TELEPHONE ENCOUNTER
Per current guideline recommendations, patient can be cleared to hold Xarelto for 2 days prior to EGD (after 6/29/2022 due to recent ablation) and may resume at MD's discretion (per Dr Duncan).   Thanks

## 2022-05-19 NOTE — TELEPHONE ENCOUNTER
Spoke to patient and schedule her to have her EGD procedure on 07/19/2022 with Dr. Mclaughlin. Went over instruction with patient and send them through her my [facility] chart verbal understanding.

## 2022-05-19 NOTE — TELEPHONE ENCOUNTER
----- Message from Keiko Parnell NP sent at 5/19/2022 10:02 AM CDT -----  Regarding: EGD  Can you schedule her for an EGD? I sent a message to electrophysiology to see when she would be able to be cleared?

## 2022-05-19 NOTE — TELEPHONE ENCOUNTER
Patient is schedule to have an EGD procedure on 07/19/2022 with Dr. Mclaughlin. Patient needs clearance to hold Xarelto two days prior her procedure.

## 2022-05-19 NOTE — TELEPHONE ENCOUNTER
Spoke to patient to let her know that  stated She underwent ablation for atrial fibrillation 3/29/22.   It would be best to not hold anticoagulation for 3 months post procedure, unless urgent, but NP Keiko stated that July is ok. Alisha would be three months post procedure so I told patient I will send him another message in June just to double make sure. Verbal understanding.

## 2022-05-26 ENCOUNTER — OFFICE VISIT (OUTPATIENT)
Dept: PSYCHIATRY | Facility: CLINIC | Age: 67
End: 2022-05-26
Payer: MEDICARE

## 2022-05-26 VITALS
HEART RATE: 65 BPM | WEIGHT: 108 LBS | DIASTOLIC BLOOD PRESSURE: 71 MMHG | BODY MASS INDEX: 20.41 KG/M2 | SYSTOLIC BLOOD PRESSURE: 119 MMHG

## 2022-05-26 DIAGNOSIS — F41.0 GENERALIZED ANXIETY DISORDER WITH PANIC ATTACKS: Primary | ICD-10-CM

## 2022-05-26 DIAGNOSIS — F41.1 GENERALIZED ANXIETY DISORDER WITH PANIC ATTACKS: Primary | ICD-10-CM

## 2022-05-26 DIAGNOSIS — G47.52 REM SLEEP BEHAVIOR DISORDER: ICD-10-CM

## 2022-05-26 PROCEDURE — 3074F SYST BP LT 130 MM HG: CPT | Mod: CPTII,S$GLB,, | Performed by: PHYSICIAN ASSISTANT

## 2022-05-26 PROCEDURE — 1160F RVW MEDS BY RX/DR IN RCRD: CPT | Mod: CPTII,S$GLB,, | Performed by: PHYSICIAN ASSISTANT

## 2022-05-26 PROCEDURE — 1160F PR REVIEW ALL MEDS BY PRESCRIBER/CLIN PHARMACIST DOCUMENTED: ICD-10-PCS | Mod: CPTII,S$GLB,, | Performed by: PHYSICIAN ASSISTANT

## 2022-05-26 PROCEDURE — 3008F BODY MASS INDEX DOCD: CPT | Mod: CPTII,S$GLB,, | Performed by: PHYSICIAN ASSISTANT

## 2022-05-26 PROCEDURE — 3044F HG A1C LEVEL LT 7.0%: CPT | Mod: CPTII,S$GLB,, | Performed by: PHYSICIAN ASSISTANT

## 2022-05-26 PROCEDURE — 1159F MED LIST DOCD IN RCRD: CPT | Mod: CPTII,S$GLB,, | Performed by: PHYSICIAN ASSISTANT

## 2022-05-26 PROCEDURE — 90792 PSYCH DIAG EVAL W/MED SRVCS: CPT | Mod: S$GLB,,, | Performed by: PHYSICIAN ASSISTANT

## 2022-05-26 PROCEDURE — 99999 PR PBB SHADOW E&M-EST. PATIENT-LVL III: CPT | Mod: PBBFAC,,, | Performed by: PHYSICIAN ASSISTANT

## 2022-05-26 PROCEDURE — 3074F PR MOST RECENT SYSTOLIC BLOOD PRESSURE < 130 MM HG: ICD-10-PCS | Mod: CPTII,S$GLB,, | Performed by: PHYSICIAN ASSISTANT

## 2022-05-26 PROCEDURE — 99999 PR PBB SHADOW E&M-EST. PATIENT-LVL III: ICD-10-PCS | Mod: PBBFAC,,, | Performed by: PHYSICIAN ASSISTANT

## 2022-05-26 PROCEDURE — 90792 PR PSYCHIATRIC DIAGNOSTIC EVALUATION W/MEDICAL SERVICES: ICD-10-PCS | Mod: S$GLB,,, | Performed by: PHYSICIAN ASSISTANT

## 2022-05-26 PROCEDURE — 3078F DIAST BP <80 MM HG: CPT | Mod: CPTII,S$GLB,, | Performed by: PHYSICIAN ASSISTANT

## 2022-05-26 PROCEDURE — 3008F PR BODY MASS INDEX (BMI) DOCUMENTED: ICD-10-PCS | Mod: CPTII,S$GLB,, | Performed by: PHYSICIAN ASSISTANT

## 2022-05-26 PROCEDURE — 1159F PR MEDICATION LIST DOCUMENTED IN MEDICAL RECORD: ICD-10-PCS | Mod: CPTII,S$GLB,, | Performed by: PHYSICIAN ASSISTANT

## 2022-05-26 PROCEDURE — 3044F PR MOST RECENT HEMOGLOBIN A1C LEVEL <7.0%: ICD-10-PCS | Mod: CPTII,S$GLB,, | Performed by: PHYSICIAN ASSISTANT

## 2022-05-26 PROCEDURE — 3078F PR MOST RECENT DIASTOLIC BLOOD PRESSURE < 80 MM HG: ICD-10-PCS | Mod: CPTII,S$GLB,, | Performed by: PHYSICIAN ASSISTANT

## 2022-05-26 RX ORDER — ALPRAZOLAM 0.5 MG/1
TABLET ORAL
Qty: 60 TABLET | Refills: 2 | Status: SHIPPED | OUTPATIENT
Start: 2022-05-26 | End: 2022-06-09

## 2022-05-26 RX ORDER — VENLAFAXINE HYDROCHLORIDE 37.5 MG/1
37.5 CAPSULE, EXTENDED RELEASE ORAL DAILY
Qty: 60 CAPSULE | Refills: 2 | Status: SHIPPED | OUTPATIENT
Start: 2022-05-26 | End: 2022-06-28

## 2022-05-26 NOTE — PROGRESS NOTES
Outpatient Psychiatry Initial Visit (MD/JASMINE)    5/26/2022    Trudi Mcknight, a 67 y.o. female, presenting for initial evaluation visit. Met with patient.    Reason for Encounter: Referral from pcp. Patient complains of   Chief Complaint   Patient presents with    Anxiety         History of Present Illness:    SUBJECTIVE:   Psych Interview 05/27/2022:   Trudi Mcknight is a 67 y.o. female with past psychiatric history of DANIELLE with panic attacks, depression, hx ECT presented to for initial evaluation and treatment for depression and anxiety.    Hx 2 prior psychiatric hospitalizations. Hx 0 suicide attempts.    Hx ECT treatments in 1980s, since then doing well on zoloft in past until recently having medical problems, starting on amiodarone, now having hypothyroid issues which are now being corrected, seeing endocrinology on 6/10/22.    Pt denies hx trauma. Denies physical/sexual abuse. Pt denies symptoms including nightmares, hypervigilance, flashbacks, avoidance behaviors, and disassociation.    Pt reports currently taking zoloft 12.5mg daily, states she is very sensitive to medications and if she takes higher even up to 25mg becomes more depressed. Pt also taking xanax prn, having frequent panic attacks recently.    Patients mood is anxious and depressive, affect appears mood congruent and tearful at times. Linear and logical, friendly and cooperative, good eye contact.    Reports sleeping 6-8 hrs per night, and normal appetite.     Reports passive +SI when anxiety becomes very severe and having continued severe health problems. Denies having any thoughts of plan or intent.    Denies SI/HI/AVH. Denies side effects of medications.    The patient complained of depressed mood with lethargy, decreased appetite, insomnia, psycho-motor retardation, anhedonia, apathy, worsening self-esteem, guilt, decreased concentration and ability to make decisions.    Pt denies hx symptoms/episodes of fernando.    Denies recreational drug  use. Pt reports denies drinks per week, denies tobacco use.      Review Of Systems:       Psychiatric Review Of Systems - Is patient experiencing or having changes in:  sleep: yes  appetite: yes  weight: yes  energy/anergy: yes  interest/pleasure/anhedonia: yes  somatic symptoms: yes  libido: no  anxiety/panic: yes  guilty/hopelessness: yes  concentration: yes  S.I.B.s/risky behavior: no  Irritability: yes  Racing thoughts: yes  Impulsive behaviors: no  Paranoia: no  AVH: no    OBJECTIVE     Past Psychiatric History:   Previous Psychiatric Hospitalizations: YES: x 2     Previous Medication Trials: YES: zoloft, prozac, xanax     History of psychotherapy:  YES     Previous Suicide Attempts: NO  History of Violence:  NO  History of physical/sexual abuse: NO  Outpatient psychiatric provider(past): YES, last in 1990s    Substance Abuse History:   Tobacco: NO  Alcohol: NO  Illicit Substances: NO  Detox/Rehab: NO    Neurological History:   Seizures: NO  Head trauma: NO    Family Psychiatric History: Yes - mother-depression, brother-depression  Social History:  Developmental/Childhood:Achieved all developmental milestones timely  Employment Status/Finances:Retired   Relationship Status/Sexual Orientation: : Relationship intact  Children: 2  Housing Status: Home    history:  NO  Access to gun: no. Reports guns locked away in the house    Legal History:   Past Charges/Incarcerations:  No      Past Medical/Surgical History:   Past Medical History:   Diagnosis Date    Anticoagulant long-term use     Esophagitis     Hiatal hernia     Meniere's disease     Paroxysmal atrial fibrillation 03/07/2021    Shingles     Sick sinus syndrome 01/21/2022    s/p Dual chamber pacemaker    Thyroid disease      Past Surgical History:   Procedure Laterality Date    A-V CARDIAC PACEMAKER INSERTION N/A 01/20/2022    Procedure: INSERTION, CARDIAC PACEMAKER, DUAL CHAMBER;  Surgeon: Ernesto Duncan MD;  Location: [facility] EP LAB;   Service: Cardiology;  Laterality: N/A;  SB, DUAL PPM, SJM, ANES, SK, [id number]    BREAST CYST ASPIRATION           SECTION      COLONOSCOPY N/A 2019    Procedure: COLONOSCOPY;  Surgeon: INGRID Russo MD;  Location: [facility] ([address] [place] [address]);  Service: Endoscopy;  Laterality: N/A;    deviated septum repair      HYSTERECTOMY      lump removed from tongue      TONSILLECTOMY           Current Medications:   Medication List with Changes/Refills   New Medications    VENLAFAXINE (EFFEXOR-XR) 37.5 MG 24 HR CAPSULE    Take 1 capsule (37.5 mg total) by mouth once daily.   Current Medications    AMIODARONE (PACERONE) 200 MG TAB    Take 1 tablet (200 mg total) by mouth 2 (two) times daily for 13 days, THEN 1 tablet (200 mg total) once daily.    LEVOTHYROXINE (SYNTHROID) 25 MCG TABLET    Take 1 tablet (25 mcg total) by mouth before breakfast.    RIVAROXABAN (XARELTO) 15 MG TAB    Take 1 tablet (15 mg total) by mouth daily with dinner or evening meal.   Changed and/or Refilled Medications    Modified Medication Previous Medication    ALPRAZOLAM (XANAX) 0.5 MG TABLET ALPRAZolam (XANAX) 0.25 MG tablet       Take 1/2 to 1 tablet every 6 hrs as needed for severe anxiety    Take 1 tablet (0.25 mg total) by mouth 3 (three) times daily as needed for Anxiety.   Discontinued Medications    CLONAZEPAM (KLONOPIN) 0.25 MG TBDL    Take 1 tablet (0.25 mg total) by mouth 2 (two) times daily.    SERTRALINE (ZOLOFT) 25 MG TABLET    Take 1 tablet (25 mg total) by mouth once daily.       Allergies:   Review of patient's allergies indicates:   Allergen Reactions    Lasix [furosemide] Shortness Of Breath    Penicillins Hives     causes congestion and hives    Tricyclic compounds          Vitals   Vitals:    22 1056   BP: 119/71   Pulse: 65        Labs/Imaging/Studies:   No results found for this or any previous visit (from the past 48 hour(s)).   No results found for: PHENYTOIN, PHENOBARB, VALPROATE, CBMZ      Nutritional  Screening: Considering the patient's height and weight, medications, medical history and preferences, should a referral be made to the dietitian? no    Constitutional  Vitals:  Most recent vital signs, dated less than 90 days prior to this appointment, were reviewed.    Vitals:    05/26/22 1056   BP: 119/71   Pulse: 65   Weight: 49 kg (108 lb 0.4 oz)        General:  unremarkable, age appropriate     Musculoskeletal  Muscle Strength/Tone:  not examined   Gait & Station:  non-ataxic       Psychiatric Mental Status Exam:  Arousal: alert  Sensorium/Orientation: oriented to grossly intact  Behavior/Cooperation: normal, cooperative   Speech: normal tone, normal rate, normal pitch, normal volume  Language: grossly intact  Mood: anxious, depressed  Affect: congruent and appropriate  Thought Process: normal and logical  Thought Content:   Auditory hallucinations: NO  Visual hallucinations: NO  Paranoia: NO  Delusions:  NO  Suicidal ideation: YES: passive SI without plan or intent     Homicidal ideation: NO  Attention/Concentration:  intact  Memory:    Recent: [facility] Recent Memory: WNL , 3 out of 3 in 3 minutes  Remote: [facility] Remote Memory: WNL , past events, as relates history  Fund of Knowledge: Aware of current events and Intact   Intelligence: [facility] Intelligence: Average, based on history, based on vocabulary, syntax, grammar and content  Insight: {[facility] insight: Good, understanding severity of illness/history of present illness  Judgment: [facility] Judgement: Fair, per patient's behavior/history of present illness      Relevant Elements of Neurological Exam: normal gait        Laboratory Data  No visits with results within 1 Month(s) from this visit.   Latest known visit with results is:   Admission on 04/09/2022, Discharged on 04/09/2022   Component Date Value Ref Range Status    WBC 04/09/2022 7.09  3.90 - 12.70 K/uL Final    RBC 04/09/2022 5.09  4.00 - 5.40 M/uL Final    Hemoglobin 04/09/2022 14.2  12.0 - 16.0 g/dL Final     Hematocrit 04/09/2022 43.2  37.0 - 48.5 % Final    MCV 04/09/2022 85  82 - 98 fL Final    MCH 04/09/2022 27.9  27.0 - 31.0 pg Final    MCHC 04/09/2022 32.9  32.0 - 36.0 g/dL Final    RDW 04/09/2022 15.0 (A) 11.5 - 14.5 % Final    Platelets 04/09/2022 366  150 - 450 K/uL Final    MPV 04/09/2022 11.1  9.2 - 12.9 fL Final    Immature Granulocytes 04/09/2022 0.3  0.0 - 0.5 % Final    Gran # (ANC) 04/09/2022 5.0  1.8 - 7.7 K/uL Final    Immature Grans (Abs) 04/09/2022 0.02  0.00 - 0.04 K/uL Final    Lymph # 04/09/2022 1.3  1.0 - 4.8 K/uL Final    Mono # 04/09/2022 0.6  0.3 - 1.0 K/uL Final    Eos # 04/09/2022 0.1  0.0 - 0.5 K/uL Final    Baso # 04/09/2022 0.04  0.00 - 0.20 K/uL Final    nRBC 04/09/2022 0  0 /100 WBC Final    Gran % 04/09/2022 69.9  38.0 - 73.0 % Final    Lymph % 04/09/2022 18.8  18.0 - 48.0 % Final    Mono % 04/09/2022 8.7  4.0 - 15.0 % Final    Eosinophil % 04/09/2022 1.7  0.0 - 8.0 % Final    Basophil % 04/09/2022 0.6  0.0 - 1.9 % Final    Differential Method 04/09/2022 Automated   Final    Sodium 04/09/2022 141  136 - 145 mmol/L Final    Potassium 04/09/2022 3.5  3.5 - 5.1 mmol/L Final    Chloride 04/09/2022 110  95 - 110 mmol/L Final    CO2 04/09/2022 16 (A) 23 - 29 mmol/L Final    Glucose 04/09/2022 99  70 - 110 mg/dL Final    BUN 04/09/2022 11  8 - 23 mg/dL Final    Creatinine 04/09/2022 0.8  0.5 - 1.4 mg/dL Final    Calcium 04/09/2022 9.8  8.7 - 10.5 mg/dL Final    Total Protein 04/09/2022 7.3  6.0 - 8.4 g/dL Final    Albumin 04/09/2022 3.6  3.5 - 5.2 g/dL Final    Total Bilirubin 04/09/2022 1.1 (A) 0.1 - 1.0 mg/dL Final    Alkaline Phosphatase 04/09/2022 83  55 - 135 U/L Final    AST 04/09/2022 15  10 - 40 U/L Final    ALT 04/09/2022 11  10 - 44 U/L Final    Anion Gap 04/09/2022 15  8 - 16 mmol/L Final    eGFR if African American 04/09/2022 >60.0  >60 mL/min/1.73 m^2 Final    eGFR if non African American 04/09/2022 >60.0  >60 mL/min/1.73 m^2 Final     Lipase 04/09/2022 30  4 - 60 U/L Final    Magnesium 04/09/2022 2.1  1.6 - 2.6 mg/dL Final    Troponin I 04/09/2022 0.046 (A) 0.000 - 0.026 ng/mL Final    Specimen UA 04/09/2022 Urine, Clean Catch   Final    Color, UA 04/09/2022 Yellow  Yellow, Straw, Jazzy Final    Appearance, UA 04/09/2022 Clear  Clear Final    pH, UA 04/09/2022 6.0  5.0 - 8.0 Final    Specific Gravity, UA 04/09/2022 1.010  1.005 - 1.030 Final    Protein, UA 04/09/2022 Negative  Negative Final    Glucose, UA 04/09/2022 Negative  Negative Final    Ketones, UA 04/09/2022 1+ (A) Negative Final    Bilirubin (UA) 04/09/2022 Negative  Negative Final    Occult Blood UA 04/09/2022 1+ (A) Negative Final    Nitrite, UA 04/09/2022 Negative  Negative Final    Leukocytes, UA 04/09/2022 Negative  Negative Final    RBC, UA 04/09/2022 1  0 - 4 /hpf Final    WBC, UA 04/09/2022 1  0 - 5 /hpf Final    Bacteria 04/09/2022 Occasional  None-Occ /hpf Final    Squam Epithel, UA 04/09/2022 1  /hpf Final    Microscopic Comment 04/09/2022 SEE COMMENT   Final    Phosphorus 04/09/2022 2.7  2.7 - 4.5 mg/dL Final    TSH 04/09/2022 4.961 (A) 0.400 - 4.000 uIU/mL Final    SARS-CoV-2 RNA, Amplification, Qual 04/09/2022 Negative  Negative Final    Free T4 04/09/2022 1.49  0.71 - 1.51 ng/dL Final    Troponin I 04/09/2022 0.040 (A) 0.000 - 0.026 ng/mL Final         Medications  Outpatient Encounter Medications as of 5/26/2022   Medication Sig Dispense Refill    ALPRAZolam (XANAX) 0.5 MG tablet Take 1/2 to 1 tablet every 6 hrs as needed for severe anxiety 60 tablet 2    amiodarone (PACERONE) 200 MG Tab Take 1 tablet (200 mg total) by mouth 2 (two) times daily for 13 days, THEN 1 tablet (200 mg total) once daily. 86 tablet 0    levothyroxine (SYNTHROID) 25 MCG tablet Take 1 tablet (25 mcg total) by mouth before breakfast. 90 tablet 1    rivaroxaban (XARELTO) 15 mg Tab Take 1 tablet (15 mg total) by mouth daily with dinner or evening meal. 360 tablet 0     venlafaxine (EFFEXOR-XR) 37.5 MG 24 hr capsule Take 1 capsule (37.5 mg total) by mouth once daily. 60 capsule 2    [DISCONTINUED] ALPRAZolam (XANAX) 0.25 MG tablet Take 1 tablet (0.25 mg total) by mouth 3 (three) times daily as needed for Anxiety. 42 tablet 0    [DISCONTINUED] clonazePAM (KLONOPIN) 0.25 MG TbDL Take 1 tablet (0.25 mg total) by mouth 2 (two) times daily. 60 tablet 1    [DISCONTINUED] flecainide (TAMBOCOR) 50 MG Tab Take 1 tablet (50 mg total) by mouth every 12 (twelve) hours. 60 tablet 1    [DISCONTINUED] pantoprazole (PROTONIX) 40 MG tablet Take 1 tablet (40 mg total) by mouth once daily. 30 tablet 0    [DISCONTINUED] sertraline (ZOLOFT) 25 MG tablet Take 1 tablet (25 mg total) by mouth once daily. 30 tablet 11     No facility-administered encounter medications on file as of 5/26/2022.           Assessment / Plan:     Diagnosis:      ICD-10-CM ICD-9-CM   1. Generalized anxiety disorder with panic attacks  F41.1 300.02    F41.0 300.01   2. REM sleep behavior disorder  G47.52 327.42       Strengths and Liabilities: Strength: Patient accepts guidance/feedback, Strength: Patient is expressive/articulate., Liability: Patient has poor health., Liability: Patient is unstable., Liability: Patient lacks coping skills.    Treatment Goals:  Specify outcomes written in observable, behavioral terms:   Anxiety: reducing negative automatic thoughts, reducing physical symptoms of anxiety and reducing time spent worrying (<30 minutes/day)  Depression: increasing energy, increasing interest in usual activities, increasing motivation, reducing excessive guilt, reducing fatigue and reducing negative automatic thoughts    Treatment Plan/Recommendations:   · Medication Management: Continue current medications. The risks and benefits of medication were discussed with the patient.  -stop zoloft and start effexor in 3 days  -start effexor XR 37.5mg daily after 3 days off zoloft  -continue xanax q6h prn  anxiety      Return to Clinic: as scheduled      Total face to face time: 50 min  Total time (chart review, patient contact, documentation): 60 min    Kristofer Martinez PA-C      *This note has been prepared using a combination of a dictation device and typing.  It has been checked for errors but some errors may still exist within the note as a result of speech recognition errors and/or typographical errors.

## 2022-06-03 ENCOUNTER — LAB VISIT (OUTPATIENT)
Dept: LAB | Facility: HOSPITAL | Age: 67
End: 2022-06-03
Attending: HOSPITALIST
Payer: MEDICARE

## 2022-06-03 DIAGNOSIS — E03.9 HYPOTHYROIDISM, UNSPECIFIED TYPE: ICD-10-CM

## 2022-06-03 LAB
T4 FREE SERPL-MCNC: 1.34 NG/DL (ref 0.71–1.51)
THYROPEROXIDASE IGG SERPL-ACNC: <6 IU/ML
TSH SERPL DL<=0.005 MIU/L-ACNC: 1.68 UIU/ML (ref 0.4–4)

## 2022-06-03 PROCEDURE — 84443 ASSAY THYROID STIM HORMONE: CPT | Performed by: HOSPITALIST

## 2022-06-03 PROCEDURE — 86376 MICROSOMAL ANTIBODY EACH: CPT | Performed by: HOSPITALIST

## 2022-06-03 PROCEDURE — 84439 ASSAY OF FREE THYROXINE: CPT | Performed by: HOSPITALIST

## 2022-06-03 PROCEDURE — 36415 COLL VENOUS BLD VENIPUNCTURE: CPT | Mod: PO | Performed by: HOSPITALIST

## 2022-06-09 ENCOUNTER — OFFICE VISIT (OUTPATIENT)
Dept: PSYCHIATRY | Facility: CLINIC | Age: 67
End: 2022-06-09
Payer: MEDICARE

## 2022-06-09 VITALS
BODY MASS INDEX: 20.52 KG/M2 | HEART RATE: 73 BPM | SYSTOLIC BLOOD PRESSURE: 115 MMHG | DIASTOLIC BLOOD PRESSURE: 59 MMHG | WEIGHT: 108.56 LBS

## 2022-06-09 DIAGNOSIS — F41.1 GENERALIZED ANXIETY DISORDER WITH PANIC ATTACKS: Primary | ICD-10-CM

## 2022-06-09 DIAGNOSIS — F41.0 GENERALIZED ANXIETY DISORDER WITH PANIC ATTACKS: Primary | ICD-10-CM

## 2022-06-09 PROCEDURE — 3078F PR MOST RECENT DIASTOLIC BLOOD PRESSURE < 80 MM HG: ICD-10-PCS | Mod: CPTII,S$GLB,, | Performed by: PHYSICIAN ASSISTANT

## 2022-06-09 PROCEDURE — 3008F PR BODY MASS INDEX (BMI) DOCUMENTED: ICD-10-PCS | Mod: CPTII,S$GLB,, | Performed by: PHYSICIAN ASSISTANT

## 2022-06-09 PROCEDURE — 3008F BODY MASS INDEX DOCD: CPT | Mod: CPTII,S$GLB,, | Performed by: PHYSICIAN ASSISTANT

## 2022-06-09 PROCEDURE — 90833 PSYTX W PT W E/M 30 MIN: CPT | Mod: S$GLB,,, | Performed by: PHYSICIAN ASSISTANT

## 2022-06-09 PROCEDURE — 3074F SYST BP LT 130 MM HG: CPT | Mod: CPTII,S$GLB,, | Performed by: PHYSICIAN ASSISTANT

## 2022-06-09 PROCEDURE — 3078F DIAST BP <80 MM HG: CPT | Mod: CPTII,S$GLB,, | Performed by: PHYSICIAN ASSISTANT

## 2022-06-09 PROCEDURE — 99214 OFFICE O/P EST MOD 30 MIN: CPT | Mod: S$GLB,,, | Performed by: PHYSICIAN ASSISTANT

## 2022-06-09 PROCEDURE — 3044F PR MOST RECENT HEMOGLOBIN A1C LEVEL <7.0%: ICD-10-PCS | Mod: CPTII,S$GLB,, | Performed by: PHYSICIAN ASSISTANT

## 2022-06-09 PROCEDURE — 99214 PR OFFICE/OUTPT VISIT, EST, LEVL IV, 30-39 MIN: ICD-10-PCS | Mod: S$GLB,,, | Performed by: PHYSICIAN ASSISTANT

## 2022-06-09 PROCEDURE — 99999 PR PBB SHADOW E&M-EST. PATIENT-LVL II: ICD-10-PCS | Mod: PBBFAC,,, | Performed by: PHYSICIAN ASSISTANT

## 2022-06-09 PROCEDURE — 3044F HG A1C LEVEL LT 7.0%: CPT | Mod: CPTII,S$GLB,, | Performed by: PHYSICIAN ASSISTANT

## 2022-06-09 PROCEDURE — 99999 PR PBB SHADOW E&M-EST. PATIENT-LVL II: CPT | Mod: PBBFAC,,, | Performed by: PHYSICIAN ASSISTANT

## 2022-06-09 PROCEDURE — 90833 PR PSYCHOTHERAPY W/PATIENT W/E&M, 30 MIN (ADD ON): ICD-10-PCS | Mod: S$GLB,,, | Performed by: PHYSICIAN ASSISTANT

## 2022-06-09 PROCEDURE — 3074F PR MOST RECENT SYSTOLIC BLOOD PRESSURE < 130 MM HG: ICD-10-PCS | Mod: CPTII,S$GLB,, | Performed by: PHYSICIAN ASSISTANT

## 2022-06-09 RX ORDER — ALPRAZOLAM 0.25 MG/1
0.25 TABLET ORAL EVERY 8 HOURS PRN
Qty: 90 TABLET | Refills: 2 | Status: SHIPPED | OUTPATIENT
Start: 2022-06-09 | End: 2022-06-28

## 2022-06-09 NOTE — PROGRESS NOTES
"Outpatient Psychiatry Follow-Up Visit (MD/JASMINE)    6/9/2022    Clinical Status of Patient:  Outpatient (Ambulatory)    Chief Complaint:  Trudi Mcknight is a 67 y.o. female who presents today for follow-up of depression and anxiety.  Met with patient.      Interval History and Content of Current Session:  Interim Events/Subjective Report/Content of Current Session:    Pt reports today: "feeling better, the medicine is helping, its helping quickly."    Reports depression today as 4/10 down from 7/10 2 week ago, and anxiety as 3/10 down from 10/10 2 week ago.    Reports slight increase in energy and improvement in anhedonia. Reports only having rare crying spells.    Patients mood is less anxious and depressive today, no longer tearful affect appears constricted but brighter that previous interview. Linear and logical, friendly and cooperative, good eye contact.    Denies SI/HI/AVH. Pt reports sleeping well and normal appetite. Denies side effects of medications.    Pt reports taking medications as prescribed and behaving appropriately during interview today.    Psychotherapy:  · Target symptoms: depression, anxiety   · Why chosen therapy is appropriate versus another modality: relevant to diagnosis  · Outcome monitoring methods: self-report  · Therapeutic intervention type: insight oriented psychotherapy, supportive psychotherapy  · Topics discussed/themes: stress related to medical comorbidities, building skills sets for symptom management, symptom recognition  · The patient's response to the intervention is accepting. The patient's progress toward treatment goals is fair.   · Duration of intervention: 16 minutes.          INITIAL PSYCHIATRIC EVALUATION:  Psych Interview 05/27/2022:   Trudi cMknight is a 67 y.o. female with past psychiatric history of DANIELLE with panic attacks, depression, hx ECT presented to for initial evaluation and treatment for depression and anxiety.     Hx 2 prior psychiatric hospitalizations. Hx " 0 suicide attempts.     Hx ECT treatments in 1980s, since then doing well on zoloft in past until recently having medical problems, starting on amiodarone, now having hypothyroid issues which are now being corrected, seeing endocrinology on 6/10/22.     Pt denies hx trauma. Denies physical/sexual abuse. Pt denies symptoms including nightmares, hypervigilance, flashbacks, avoidance behaviors, and disassociation.     Pt reports currently taking zoloft 12.5mg daily, states she is very sensitive to medications and if she takes higher even up to 25mg becomes more depressed. Pt also taking xanax prn, having frequent panic attacks recently.     Patients mood is anxious and depressive, affect appears mood congruent and tearful at times. Linear and logical, friendly and cooperative, good eye contact.     Reports sleeping 6-8 hrs per night, and normal appetite.      Reports passive +SI when anxiety becomes very severe and having continued severe health problems. Denies having any thoughts of plan or intent.     Denies SI/HI/AVH. Denies side effects of medications.     The patient complained of depressed mood with lethargy, decreased appetite, insomnia, psycho-motor retardation, anhedonia, apathy, worsening self-esteem, guilt, decreased concentration and ability to make decisions.     Pt denies hx symptoms/episodes of fernando.     Denies recreational drug use. Pt reports denies drinks per week, denies tobacco use.          Review of Systems       Psychiatric Review Of Systems - Is patient experiencing or having changes in:  sleep: no  appetite: no  weight: no  energy/anergy: yes  interest/pleasure/anhedonia: yes  somatic symptoms: no  libido: no  anxiety/panic: yes  guilty/hopelessness: no  concentration: yes  S.I.B.s/risky behavior: no  Irritability: no  Racing thoughts: yes  Impulsive behaviors: no  Paranoia: no  AVH: no      Past Medical, Family and Social History: The patient's past medical, family and social history have  been reviewed and updated as appropriate within the electronic medical record - see encounter notes.      Current Medications:   Medication List with Changes/Refills   Current Medications    AMIODARONE (PACERONE) 200 MG TAB    Take 1 tablet (200 mg total) by mouth 2 (two) times daily for 13 days, THEN 1 tablet (200 mg total) once daily.    LEVOTHYROXINE (SYNTHROID) 25 MCG TABLET    Take 1 tablet (25 mcg total) by mouth before breakfast.    RIVAROXABAN (XARELTO) 15 MG TAB    Take 1 tablet (15 mg total) by mouth daily with dinner or evening meal.    VENLAFAXINE (EFFEXOR-XR) 37.5 MG 24 HR CAPSULE    Take 1 capsule (37.5 mg total) by mouth once daily.   Changed and/or Refilled Medications    Modified Medication Previous Medication    ALPRAZOLAM (XANAX) 0.25 MG TABLET ALPRAZolam (XANAX) 0.5 MG tablet       Take 1 tablet (0.25 mg total) by mouth every 8 (eight) hours as needed for Anxiety.    Take 1/2 to 1 tablet every 6 hrs as needed for severe anxiety         Allergies:   Review of patient's allergies indicates:   Allergen Reactions    Lasix [furosemide] Shortness Of Breath    Penicillins Hives     causes congestion and hives    Tricyclic compounds          Vitals   Vitals:    06/09/22 1344   BP: (!) 115/59   Pulse: 73          Labs/Imaging/Studies:   No results found for this or any previous visit (from the past 48 hour(s)).   No results found for: PHENYTOIN, PHENOBARB, VALPROATE, CBMZ    Compliance: yes    Side effects: None    Risk Parameters:  Patient reports no suicidal ideation  Patient reports no homicidal ideation  Patient reports no self-injurious behavior  Patient reports no violent behavior    Exam (detailed: at least 9 elements; comprehensive: all 15 elements)   Constitutional  Vitals:  Most recent vital signs, dated less than 90 days prior to this appointment, were reviewed.   Vitals:    06/09/22 1344   BP: (!) 115/59   Pulse: 73   Weight: 49.2 kg (108 lb 9.2 oz)        General:  unremarkable, age  appropriate     Musculoskeletal  Muscle Strength/Tone:  not examined   Gait & Station:  non-ataxic     Psychiatric  Speech:  no latency; no press   Mood & Affect:  anxious, depressed  constricted   Thought Process:  normal and logical   Associations:  intact   Thought Content:  normal, no suicidality, no homicidality, delusions, or paranoia   Insight:  intact, has awareness of illness   Judgement: behavior is adequate to circumstances   Orientation:  grossly intact   Memory: intact for content of interview   Language: grossly intact   Attention Span & Concentration:  able to focus   Fund of Knowledge:  intact and appropriate to age and level of education     Assessment and Diagnosis   Status/Progress: Based on the examination today, the patient's problem(s) is/are improved and adequately but not ideally controlled.  New problems have not been presented today.   Co-morbidities, Diagnostic uncertainty and Lack of compliance are not complicating management of the primary condition.  There are no active rule-out diagnoses for this patient at this time.     General Impression:      ICD-10-CM ICD-9-CM   1. Generalized anxiety disorder with panic attacks  F41.1 300.02    F41.0 300.01       Intervention/Counseling/Treatment Plan   · Medication Management: Continue current medications. The risks and benefits of medication were discussed with the patient.    -continue effexor XR 37.5mg daily  -continue xanax 0.25mg q8h prn anxiety    Return to Clinic: 1 month        Kristofer Martinez PA-C      Total face to face time: 31 min  Total time (chart review, patient contact, documentation): 36 min      *This note has been prepared using a combination of a dictation device and typing.  It has been checked for errors but some errors may still exist within the note as a result of speech recognition errors and/or typographical errors.

## 2022-06-10 ENCOUNTER — OFFICE VISIT (OUTPATIENT)
Dept: ENDOCRINOLOGY | Facility: CLINIC | Age: 67
End: 2022-06-10
Payer: MEDICARE

## 2022-06-10 DIAGNOSIS — T46.2X5A AMIODARONE-INDUCED THYROIDITIS: Primary | ICD-10-CM

## 2022-06-10 DIAGNOSIS — I48.0 PAROXYSMAL ATRIAL FIBRILLATION WITH RVR: Chronic | ICD-10-CM

## 2022-06-10 DIAGNOSIS — E03.9 HYPOTHYROIDISM, UNSPECIFIED TYPE: ICD-10-CM

## 2022-06-10 DIAGNOSIS — E06.4 AMIODARONE-INDUCED THYROIDITIS: Primary | ICD-10-CM

## 2022-06-10 PROCEDURE — 3044F PR MOST RECENT HEMOGLOBIN A1C LEVEL <7.0%: ICD-10-PCS | Mod: CPTII,95,, | Performed by: HOSPITALIST

## 2022-06-10 PROCEDURE — 99214 OFFICE O/P EST MOD 30 MIN: CPT | Mod: 95,,, | Performed by: HOSPITALIST

## 2022-06-10 PROCEDURE — 1159F MED LIST DOCD IN RCRD: CPT | Mod: CPTII,95,, | Performed by: HOSPITALIST

## 2022-06-10 PROCEDURE — 1160F RVW MEDS BY RX/DR IN RCRD: CPT | Mod: CPTII,95,, | Performed by: HOSPITALIST

## 2022-06-10 PROCEDURE — 1159F PR MEDICATION LIST DOCUMENTED IN MEDICAL RECORD: ICD-10-PCS | Mod: CPTII,95,, | Performed by: HOSPITALIST

## 2022-06-10 PROCEDURE — 3044F HG A1C LEVEL LT 7.0%: CPT | Mod: CPTII,95,, | Performed by: HOSPITALIST

## 2022-06-10 PROCEDURE — 1160F PR REVIEW ALL MEDS BY PRESCRIBER/CLIN PHARMACIST DOCUMENTED: ICD-10-PCS | Mod: CPTII,95,, | Performed by: HOSPITALIST

## 2022-06-10 PROCEDURE — 99214 PR OFFICE/OUTPT VISIT, EST, LEVL IV, 30-39 MIN: ICD-10-PCS | Mod: 95,,, | Performed by: HOSPITALIST

## 2022-06-10 RX ORDER — LEVOTHYROXINE SODIUM 25 UG/1
25 TABLET ORAL
Qty: 90 TABLET | Refills: 1 | Status: SHIPPED | OUTPATIENT
Start: 2022-06-10 | End: 2023-01-11

## 2022-06-10 NOTE — PROGRESS NOTES
The patient location is: [place], [place]  The chief complaint leading to consultation is:  Hypothyroidism  Visit type: Virtual visit with synchronous audio and video  Total time spent with patient: 30 mins    Each patient to whom he or she provides medical services by telemedicine is:  (1) informed of the relationship between the physician and patient and the respective role of any other health care provider with respect to management of the patient; and (2) notified that he or she may decline to receive medical services by telemedicine and may withdraw from such care at any time.    Subjective:      Patient ID: Trudi Mcknight is a 67 y.o. female presented to [facility] on 6/10/2022.  Chief Complaint:  No chief complaint on file.      History of Present Illness: Trudi Mcknight is a 67 y.o. female here for hypothyroidism/abnormal thyroid function    Other significant past medical history:  Atrial fibrillation ( Amiodarone and Xarelto)      Interval history:  Virtual visit for hypothyroidism follow-up, amiodarone induced.  Has been doing much better.  Compliant with low-dose levothyroxine  On amiodarone per Cardiology  Not as cold as she used to be  Voice comes and go  Denied lower neck enlargement.    Currently on levothyroxine 25 mcg day    Diagnosed with hypothyroidism:   First noted on lab work:  4/9/2022  Started on amiodarone by Cardiology.  With weaning protocol.  Currently on 400mg >> 200mg wean down to  Family history thyroid disorder:  Dad, sister, brother>> hypothyrodism  She had issue with hypothyroidism during her pregnancy     Reported symptoms on initial visit.  She is feeling depressed/anxiety  Current symptoms:   No   Yes  [x]    []   Weight loss>> Since Jan  []    [x]   Fatigue  [x]    []   Constipation  []    [x]   Hair loss  [x]    []   Brittle nails  []    []   Mental fog  []    [x]   Cold intolerance  []    []   Memory impaired   []    []   Bradycardia    []    [x]   Recent severe  illness  [x]    []   Neck swelling, pain, pressure  [x]    []   Hoarseness      Lab Results   Component Value Date    TSH 1.683 06/03/2022    TSH 4.961 (H) 04/09/2022    TSH 3.221 04/04/2022    FREET4 1.34 06/03/2022    FREET4 1.49 04/09/2022    FREET4 1.18 04/05/2022    THYROPEROXID <6.0 06/03/2022       Reviewed past surgical, medical, family, social history and updated as appropriate.    Review of Systems: see HPI above    Objective:   There were no vitals taken for this visit.    There is no height or weight on file to calculate BMI.  Vital signs reviewed    Physical Exam  Constitutional:       Comments: Physical exam and Vital signs were not done, as this is a virtual visit.         Lab Reviewed:   Lab Results   Component Value Date    HGBA1C 5.5 01/20/2022       Lab Results   Component Value Date    CHOL 186 04/04/2022    HDL 51 04/04/2022    LDLCALC 107.8 04/04/2022    TRIG 136 04/04/2022    CHOLHDL 27.4 04/04/2022       Lab Results   Component Value Date     04/09/2022    K 3.5 04/09/2022     04/09/2022    CO2 16 (L) 04/09/2022    GLU 99 04/09/2022    BUN 11 04/09/2022    CREATININE 0.8 04/09/2022    CALCIUM 9.8 04/09/2022    PROT 7.3 04/09/2022    ALBUMIN 3.6 04/09/2022    BILITOT 1.1 (H) 04/09/2022    ALKPHOS 83 04/09/2022    AST 15 04/09/2022    ALT 11 04/09/2022    ANIONGAP 15 04/09/2022    ESTGFRAFRICA >60.0 04/09/2022    EGFRNONAA >60.0 04/09/2022    TSH 1.683 06/03/2022        Lab Results   Component Value Date    LNVLFKPA79KN 27 (L) 08/05/2019    CALCIUM 9.8 04/09/2022    CALCIUM 9.2 04/07/2022    CALCIUM 8.8 04/06/2022    PHOS 2.7 04/09/2022    PHOS 5.2 (H) 04/07/2022    PHOS 3.4 04/06/2022    ALKPHOS 83 04/09/2022    ALKPHOS 84 04/04/2022    ALKPHOS 77 03/18/2022    TSH 1.683 06/03/2022       Assessment     1. Amiodarone-induced thyroiditis     2. Paroxysmal atrial fibrillation with RVR          Plan     Amiodarone-induced thyroiditis  - Hypothyroidism, unspecified type, possible  autoimmune given family history of hypothyroidism  - Pathophysiology of hypothyroidism, the role of TSH, free T4 were explained to patient  - TSH elevation noted on initial evaluation, symptomatic  - continue levothyroxine 25 mcg daily  - repeat lab TFTs much better  - repeat lab work in 6 months    Paroxysmal atrial fibrillation with RVR  - on amiodarone per Cardiology       Advised patient to follow up with PCP for routine health maintenance care.   RTC in 6 months/virtual, lab work prior      Sly Palmer M.D.  Endocrinology  [facility]  6/10/2022      Disclaimer: This note has been generated in part with the use of voice-recognition software. There may be typographical errors that have been missed during proof-reading.

## 2022-06-10 NOTE — ASSESSMENT & PLAN NOTE
- Hypothyroidism, unspecified type, possible autoimmune given family history of hypothyroidism  - Pathophysiology of hypothyroidism, the role of TSH, free T4 were explained to patient  - TSH elevation noted on initial evaluation, symptomatic  - continue levothyroxine 25 mcg daily  - repeat lab TFTs much better  - repeat lab work in 6 months

## 2022-06-21 NOTE — PROGRESS NOTES
Subjective:    Patient ID:  Trudi Mcknight is a 67 y.o. female who presents for follow-up of Pacemaker Check      HPI 67 female with atrial fibrillation, sick sinus syndrome, PPM, anxiety, hypothyroidism.    Background:  Initially presented with symptomatic AF and symptomatic sinus pauses. Also noted chest pain not related to these events.    Dual chamber PPM implanted 1/20/22.  Continued to have AF. Did not tolerate Flecainide or Propafenone.  Echo revealed moderate pericardial effusion, which resolved.  PVI 3/29/22 Cryo-ablation.  Continued to have symptomatic AF/nonsustained AT. Also had multiple hospitalizations for somatic complaints not related to this, with anxiety playing a significant role.  Placed on amiodarone.  Echo 4/6/22 normal biventricular structure and function mild LAE small posterolateral effusion.    Update:  Has started to improve. No recent hospitalizations. Device interrogation reveals no sustained AF in the past month.   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 Being followed by Endocrinology.  Notes tremors. Also notes rash.      Review of Systems   Constitutional: Positive for malaise/fatigue. Negative for fever.   HENT: Negative for congestion and sore throat.    Cardiovascular: Negative for chest pain, dyspnea on exertion, irregular heartbeat, leg swelling, near-syncope, orthopnea, palpitations, paroxysmal nocturnal dyspnea and syncope.   Respiratory: Negative for cough and shortness of breath.    Gastrointestinal: Negative for abdominal pain, constipation and diarrhea.   Neurological: Negative for dizziness, light-headedness and weakness.   Psychiatric/Behavioral: Negative for depression. The patient is not nervous/anxious.    All other systems reviewed and are negative.       Objective:    Physical Exam  Constitutional:       Appearance: She is well-developed.   Eyes:      General: No scleral icterus.     Conjunctiva/sclera: Conjunctivae normal.   Neck:      Vascular: No  JVD.      Trachea: No tracheal deviation.   Cardiovascular:      Rate and Rhythm: Normal rate and regular rhythm.      Chest Wall: PMI is not displaced.   Pulmonary:      Effort: Pulmonary effort is normal. No respiratory distress.      Breath sounds: Normal breath sounds.   Abdominal:      Palpations: Abdomen is soft.      Tenderness: There is no abdominal tenderness.   Musculoskeletal:         General: No tenderness.   Skin:     General: Skin is warm and dry.      Findings: No rash.   Neurological:      Mental Status: She is alert and oriented to person, place, and time.   Psychiatric:         Behavior: Behavior normal.           Assessment:       1. Paroxysmal atrial fibrillation with RVR    2. Sick sinus syndrome    3. S/P placement of cardiac pacemaker    4. Typical atrial flutter    5. Generalized anxiety disorder with panic attacks         Plan:           Doing well with amiodarone.   Options are repeat ablation and discontinue amiodarone vs continue amiodarone for now.  We discussed both options at length. I initially endorsed continuing with amiodarone, given she is now doing better after frequent hospitalizations. However, her preference is repeat ablation and trial off amiodarone, given the tremors, rash, and hypothyroidism.  Risks and benefits of repeat RFA/PVI discussed, she would like to proceed.  Hold Xarelto morning of procedure, take evening prior.

## 2022-06-22 ENCOUNTER — OFFICE VISIT (OUTPATIENT)
Dept: ELECTROPHYSIOLOGY | Facility: CLINIC | Age: 67
End: 2022-06-22
Payer: MEDICARE

## 2022-06-22 ENCOUNTER — CLINICAL SUPPORT (OUTPATIENT)
Dept: CARDIOLOGY | Facility: HOSPITAL | Age: 67
End: 2022-06-22
Attending: INTERNAL MEDICINE
Payer: MEDICARE

## 2022-06-22 VITALS
HEART RATE: 66 BPM | WEIGHT: 108.69 LBS | BODY MASS INDEX: 20.52 KG/M2 | DIASTOLIC BLOOD PRESSURE: 80 MMHG | SYSTOLIC BLOOD PRESSURE: 132 MMHG | HEIGHT: 61 IN

## 2022-06-22 DIAGNOSIS — I49.5 SICK SINUS SYNDROME: Chronic | ICD-10-CM

## 2022-06-22 DIAGNOSIS — I48.0 PAROXYSMAL ATRIAL FIBRILLATION WITH RVR: Primary | Chronic | ICD-10-CM

## 2022-06-22 DIAGNOSIS — F41.0 GENERALIZED ANXIETY DISORDER WITH PANIC ATTACKS: Chronic | ICD-10-CM

## 2022-06-22 DIAGNOSIS — I48.3 TYPICAL ATRIAL FLUTTER: ICD-10-CM

## 2022-06-22 DIAGNOSIS — I48.0 PAROXYSMAL ATRIAL FIBRILLATION: Primary | ICD-10-CM

## 2022-06-22 DIAGNOSIS — F41.1 GENERALIZED ANXIETY DISORDER WITH PANIC ATTACKS: Chronic | ICD-10-CM

## 2022-06-22 DIAGNOSIS — Z95.0 S/P PLACEMENT OF CARDIAC PACEMAKER: Chronic | ICD-10-CM

## 2022-06-22 DIAGNOSIS — I49.8 OTHER SPECIFIED CARDIAC ARRHYTHMIAS: ICD-10-CM

## 2022-06-22 PROCEDURE — 3008F PR BODY MASS INDEX (BMI) DOCUMENTED: ICD-10-PCS | Mod: CPTII,S$GLB,, | Performed by: INTERNAL MEDICINE

## 2022-06-22 PROCEDURE — 1159F MED LIST DOCD IN RCRD: CPT | Mod: CPTII,S$GLB,, | Performed by: INTERNAL MEDICINE

## 2022-06-22 PROCEDURE — 1126F PR PAIN SEVERITY QUANTIFIED, NO PAIN PRESENT: ICD-10-PCS | Mod: CPTII,S$GLB,, | Performed by: INTERNAL MEDICINE

## 2022-06-22 PROCEDURE — 1159F PR MEDICATION LIST DOCUMENTED IN MEDICAL RECORD: ICD-10-PCS | Mod: CPTII,S$GLB,, | Performed by: INTERNAL MEDICINE

## 2022-06-22 PROCEDURE — 3079F DIAST BP 80-89 MM HG: CPT | Mod: CPTII,S$GLB,, | Performed by: INTERNAL MEDICINE

## 2022-06-22 PROCEDURE — 99215 PR OFFICE/OUTPT VISIT, EST, LEVL V, 40-54 MIN: ICD-10-PCS | Mod: S$GLB,,, | Performed by: INTERNAL MEDICINE

## 2022-06-22 PROCEDURE — 99999 PR PBB SHADOW E&M-EST. PATIENT-LVL III: ICD-10-PCS | Mod: PBBFAC,,, | Performed by: INTERNAL MEDICINE

## 2022-06-22 PROCEDURE — 99999 PR PBB SHADOW E&M-EST. PATIENT-LVL III: CPT | Mod: PBBFAC,,, | Performed by: INTERNAL MEDICINE

## 2022-06-22 PROCEDURE — 93280 CARDIAC DEVICE CHECK - IN CLINIC & HOSPITAL: ICD-10-PCS | Mod: 26,,, | Performed by: INTERNAL MEDICINE

## 2022-06-22 PROCEDURE — 93280 PM DEVICE PROGR EVAL DUAL: CPT

## 2022-06-22 PROCEDURE — 3288F FALL RISK ASSESSMENT DOCD: CPT | Mod: CPTII,S$GLB,, | Performed by: INTERNAL MEDICINE

## 2022-06-22 PROCEDURE — 3075F SYST BP GE 130 - 139MM HG: CPT | Mod: CPTII,S$GLB,, | Performed by: INTERNAL MEDICINE

## 2022-06-22 PROCEDURE — 3079F PR MOST RECENT DIASTOLIC BLOOD PRESSURE 80-89 MM HG: ICD-10-PCS | Mod: CPTII,S$GLB,, | Performed by: INTERNAL MEDICINE

## 2022-06-22 PROCEDURE — 3044F PR MOST RECENT HEMOGLOBIN A1C LEVEL <7.0%: ICD-10-PCS | Mod: CPTII,S$GLB,, | Performed by: INTERNAL MEDICINE

## 2022-06-22 PROCEDURE — 1101F PT FALLS ASSESS-DOCD LE1/YR: CPT | Mod: CPTII,S$GLB,, | Performed by: INTERNAL MEDICINE

## 2022-06-22 PROCEDURE — 1101F PR PT FALLS ASSESS DOC 0-1 FALLS W/OUT INJ PAST YR: ICD-10-PCS | Mod: CPTII,S$GLB,, | Performed by: INTERNAL MEDICINE

## 2022-06-22 PROCEDURE — 3288F PR FALLS RISK ASSESSMENT DOCUMENTED: ICD-10-PCS | Mod: CPTII,S$GLB,, | Performed by: INTERNAL MEDICINE

## 2022-06-22 PROCEDURE — 3044F HG A1C LEVEL LT 7.0%: CPT | Mod: CPTII,S$GLB,, | Performed by: INTERNAL MEDICINE

## 2022-06-22 PROCEDURE — 3075F PR MOST RECENT SYSTOLIC BLOOD PRESS GE 130-139MM HG: ICD-10-PCS | Mod: CPTII,S$GLB,, | Performed by: INTERNAL MEDICINE

## 2022-06-22 PROCEDURE — 1126F AMNT PAIN NOTED NONE PRSNT: CPT | Mod: CPTII,S$GLB,, | Performed by: INTERNAL MEDICINE

## 2022-06-22 PROCEDURE — 99215 OFFICE O/P EST HI 40 MIN: CPT | Mod: S$GLB,,, | Performed by: INTERNAL MEDICINE

## 2022-06-22 PROCEDURE — 93280 PM DEVICE PROGR EVAL DUAL: CPT | Mod: 26,,, | Performed by: INTERNAL MEDICINE

## 2022-06-22 PROCEDURE — 3008F BODY MASS INDEX DOCD: CPT | Mod: CPTII,S$GLB,, | Performed by: INTERNAL MEDICINE

## 2022-06-22 RX ORDER — CALCIUM CARBONATE 200(500)MG
1 TABLET,CHEWABLE ORAL
COMMUNITY
End: 2023-04-27

## 2022-06-23 ENCOUNTER — PATIENT MESSAGE (OUTPATIENT)
Dept: GASTROENTEROLOGY | Facility: CLINIC | Age: 67
End: 2022-06-23
Payer: MEDICARE

## 2022-06-23 ENCOUNTER — TELEPHONE (OUTPATIENT)
Dept: GASTROENTEROLOGY | Facility: CLINIC | Age: 67
End: 2022-06-23
Payer: MEDICARE

## 2022-06-23 NOTE — TELEPHONE ENCOUNTER
----- Message from Marilou Hidalgo sent at 6/23/2022  8:22 AM CDT -----  Regarding: cancel procedure  Type:  Patient Returning Call    Who Called:patient '  Who Left Message for Patient:n/a    Does the patient know what this is regarding?:cancel procedure    Would the patient rather a call back or a response via [employer]? N/a    Best Call Back Number:n/a    Additional Information: n/a

## 2022-07-14 ENCOUNTER — OFFICE VISIT (OUTPATIENT)
Dept: CARDIOLOGY | Facility: CLINIC | Age: 67
End: 2022-07-14
Payer: MEDICARE

## 2022-07-14 VITALS
BODY MASS INDEX: 20.31 KG/M2 | HEART RATE: 60 BPM | SYSTOLIC BLOOD PRESSURE: 123 MMHG | WEIGHT: 107.56 LBS | DIASTOLIC BLOOD PRESSURE: 61 MMHG | HEIGHT: 61 IN

## 2022-07-14 DIAGNOSIS — Z95.0 S/P PLACEMENT OF CARDIAC PACEMAKER: Chronic | ICD-10-CM

## 2022-07-14 DIAGNOSIS — I48.0 PAF (PAROXYSMAL ATRIAL FIBRILLATION): Primary | ICD-10-CM

## 2022-07-14 DIAGNOSIS — E78.00 HYPERCHOLESTEREMIA: ICD-10-CM

## 2022-07-14 DIAGNOSIS — I49.5 SICK SINUS SYNDROME: Chronic | ICD-10-CM

## 2022-07-14 DIAGNOSIS — Z98.890 STATUS POST CIRCUMFERENTIAL ABLATION OF PULMONARY VEIN: Chronic | ICD-10-CM

## 2022-07-14 DIAGNOSIS — I31.39 PERICARDIAL EFFUSION: ICD-10-CM

## 2022-07-14 PROCEDURE — 3044F HG A1C LEVEL LT 7.0%: CPT | Mod: CPTII,S$GLB,, | Performed by: INTERNAL MEDICINE

## 2022-07-14 PROCEDURE — 3044F PR MOST RECENT HEMOGLOBIN A1C LEVEL <7.0%: ICD-10-PCS | Mod: CPTII,S$GLB,, | Performed by: INTERNAL MEDICINE

## 2022-07-14 PROCEDURE — 1126F PR PAIN SEVERITY QUANTIFIED, NO PAIN PRESENT: ICD-10-PCS | Mod: CPTII,S$GLB,, | Performed by: INTERNAL MEDICINE

## 2022-07-14 PROCEDURE — 1159F PR MEDICATION LIST DOCUMENTED IN MEDICAL RECORD: ICD-10-PCS | Mod: CPTII,S$GLB,, | Performed by: INTERNAL MEDICINE

## 2022-07-14 PROCEDURE — 1160F RVW MEDS BY RX/DR IN RCRD: CPT | Mod: CPTII,S$GLB,, | Performed by: INTERNAL MEDICINE

## 2022-07-14 PROCEDURE — 1126F AMNT PAIN NOTED NONE PRSNT: CPT | Mod: CPTII,S$GLB,, | Performed by: INTERNAL MEDICINE

## 2022-07-14 PROCEDURE — 3008F BODY MASS INDEX DOCD: CPT | Mod: CPTII,S$GLB,, | Performed by: INTERNAL MEDICINE

## 2022-07-14 PROCEDURE — 99214 OFFICE O/P EST MOD 30 MIN: CPT | Mod: S$GLB,,, | Performed by: INTERNAL MEDICINE

## 2022-07-14 PROCEDURE — 99999 PR PBB SHADOW E&M-EST. PATIENT-LVL IV: CPT | Mod: PBBFAC,,, | Performed by: INTERNAL MEDICINE

## 2022-07-14 PROCEDURE — 3074F PR MOST RECENT SYSTOLIC BLOOD PRESSURE < 130 MM HG: ICD-10-PCS | Mod: CPTII,S$GLB,, | Performed by: INTERNAL MEDICINE

## 2022-07-14 PROCEDURE — 1160F PR REVIEW ALL MEDS BY PRESCRIBER/CLIN PHARMACIST DOCUMENTED: ICD-10-PCS | Mod: CPTII,S$GLB,, | Performed by: INTERNAL MEDICINE

## 2022-07-14 PROCEDURE — 1159F MED LIST DOCD IN RCRD: CPT | Mod: CPTII,S$GLB,, | Performed by: INTERNAL MEDICINE

## 2022-07-14 PROCEDURE — 99214 PR OFFICE/OUTPT VISIT, EST, LEVL IV, 30-39 MIN: ICD-10-PCS | Mod: S$GLB,,, | Performed by: INTERNAL MEDICINE

## 2022-07-14 PROCEDURE — 3078F PR MOST RECENT DIASTOLIC BLOOD PRESSURE < 80 MM HG: ICD-10-PCS | Mod: CPTII,S$GLB,, | Performed by: INTERNAL MEDICINE

## 2022-07-14 PROCEDURE — 3074F SYST BP LT 130 MM HG: CPT | Mod: CPTII,S$GLB,, | Performed by: INTERNAL MEDICINE

## 2022-07-14 PROCEDURE — 3008F PR BODY MASS INDEX (BMI) DOCUMENTED: ICD-10-PCS | Mod: CPTII,S$GLB,, | Performed by: INTERNAL MEDICINE

## 2022-07-14 PROCEDURE — 3078F DIAST BP <80 MM HG: CPT | Mod: CPTII,S$GLB,, | Performed by: INTERNAL MEDICINE

## 2022-07-14 PROCEDURE — 99999 PR PBB SHADOW E&M-EST. PATIENT-LVL IV: ICD-10-PCS | Mod: PBBFAC,,, | Performed by: INTERNAL MEDICINE

## 2022-07-14 RX ORDER — AMPICILLIN TRIHYDRATE 250 MG
CAPSULE ORAL
Qty: 120 EACH | Refills: 3 | Status: SHIPPED | OUTPATIENT
Start: 2022-07-14 | End: 2022-12-20 | Stop reason: SDUPTHER

## 2022-07-14 NOTE — PROGRESS NOTES
"Subjective:   Patient ID:  Trudi Mcknight is a 67 y.o. female who presents for follow-up of Pericardial effusion  (3 month f/u )    Symptomatic Bradycardia s/p Dual chamber pacemaker  s/p PVI (cryo) for PAF  Amiodarone induced thyroiditis  Small to moderate pericardial effusion post procedure found incidentally now resolved  AF with RVR intermittently  Hiatal Hernia  GERD     Echo 01/2022  · Technically challenging study.  · The left ventricle is normal in size with normal systolic function.  · The estimated ejection fraction is 65%.  · Normal left ventricular diastolic function.  · Normal right ventricular size with normal right ventricular systolic function.     HPI:   Patient is c/o chest pain that radiates to the neck and the arm.  Pain comes on a couple of hours after eating, Much worse on lying.   Non smoker  Mother had CABG at 65. Dad has AF. Younger brother has a pacemaker has congential heart disease. Older brother passed away at 60 from heart disease.   She has gastritis and esophagitis and still taking omeprazole.  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 Her HRs have been above 100-135 in prior EKGs. She has been prescribed Diltiazem q8h by EP.         The 10-year ASCVD risk score (Pillow TOMASA Jr., et al., 2013) is: 6.5%    Values used to calculate the score:      Age: 66 years      Sex: Female      Is Non- : No      Diabetic: No      Tobacco smoker: No      Systolic Blood Pressure: 133 mmHg      Is BP treated: No      HDL Cholesterol: 46 mg/dL      Total Cholesterol: 166 mg/dL     HPI:   Patient has been experiencing chest pain and dizzy in November and was   In jan 2nd she was very dizzy presyncopal and was very dizzy, she was prescribed MTP and diagnosed with AF. She feels bad with Metoprolol.  She could not tolerate beta blocker and had a sinus pause " " For which she underwent pacemaker implantation.   She has pacemaker 1/20 and xeralto 1/26.   Patient appears to be very winded  She took diltiazem last Sunday she took the whole tab of 90 mg Cardizem  Before /73 HR 99 and her BP dropped to 83 mmHg and HR to 63 bpm.       HPI:   "I Can't go on with this medicine (FLEICANIDE), it gives me anxiety and makes me depressed".  No chest pain, Orthopnea, PND of heart failure symptoms.   MARLEY.   Denies palpitations or fluttering in the chest  Repeat pulse in the office is  100 bpm irregular. She has stopped Digoxin and also not taking lasix.       Echo 02/2022  · The left ventricle is normal in size with normal systolic function.  · The estimated ejection fraction is 60%.  · Normal left ventricular diastolic function.  · Normal right ventricular size with normal right ventricular systolic function.  · Mild tricuspid regurgitation.  · Normal central venous pressure (3 mmHg).  · The estimated PA systolic pressure is 27 mmHg.  · Small-moderate pericardial effusion  · Effusion appears roughly similar to prior, however heart has shifted somewhat posteriorly, so anterior effusion slightly larger, posterior effusion slightly smaller.        HPI:      s/p Successful pulmonary vein cryoablation repeat EKG NSR  No chest pain, Orthopnea, PND of heart failure symptoms.   Feeling skipped beats that causes chest pain and then goes up to the neck  Minimal activity since ablation still on Flecainide.      HPI:   She feels salvos of flutter and planning to do another re do  She has been gardening and feels well  No chest pain, Orthopnea, PND of heart failure symptoms.     Cardiac device check  Additional Comments  IN-OFFICE device interrogation and testing performed   Device fxn WNL   Presenting egram demonstrates As/Ap/Vs  Underlying rhythm c/w  SB/SR 58 - 62 bpm  RA pacing  54%, RV pacing 2.5%   Atrial arrhythmias:  At/AF burden < 1% since 5/23/22. AMS x 14, max duration 22 secs, no " "egm available.  One available egm c/w with nsAT for 20 secs  Anticoagulation Status: Xarelto  Ventricular arrhythmias:  None  Battery Status/Longevity:  9.4 -10.1 yrs  Reprogramming at this visit:  Auto capture turned on and Rate Response added  Follow up in EP clinic today.  Follow up via remote monitoring as scheduled  Report prepared by      Calcium score  Agatston calcium score equals 97, which translates to the 50th percentile for coronary calcium load based on age and sex.  Additional findings and recommendations above.  Soft tissue density pericardial fluid, perhaps subacute hemopericardium vs proteinaceous exudative pericardial fluid. Patient scheduled for echocardiogram, correlation for exudative effusion vs hemopericardium recommended.  3 mm solid pulmonary nodule in the right middle lobe.  Per Fleischner Society guidelines for nodule <6mm; in a low risk patient, no follow-up recommended.  In a high risk patient/smoker, consider 12 month CT chest follow-up to exclude neoplasia. If stable at that time, no further follow-up needed.  Moderate to large size hiatal hernia.    Patient Active Problem List   Diagnosis    Esophagitis    History of gastritis    Screening for malignant neoplasm of colon    Neuralgia and neuritis, unspecified    Hiatal hernia with GERD and esophagitis    Disorder of thyroid, unspecified    Paroxysmal atrial fibrillation with RVR    Sick sinus syndrome    Bilateral pleural effusion    Typical atrial flutter    S/P placement of cardiac pacemaker    Left flank pain    Hyperlipemia    Generalized anxiety disorder with panic attacks    Status post circumferential ablation of pulmonary vein    Chronic anticoagulation    Pericardial effusion    Nausea and vomiting    Elevated troponin    Amiodarone-induced thyroiditis     /61 (BP Location: Left arm, Patient Position: Sitting, BP Method: Medium (Automatic))   Pulse 60   Ht 5' 1" (1.549 m)   Wt 48.8 kg (107 lb " 9.4 oz)   BMI 20.33 kg/m²   Body mass index is 20.33 kg/m².  CrCl cannot be calculated (Patient's most recent lab result is older than the maximum 7 days allowed.).    Lab Results   Component Value Date     04/09/2022    K 3.5 04/09/2022     04/09/2022    CO2 16 (L) 04/09/2022    BUN 11 04/09/2022    CREATININE 0.8 04/09/2022    GLU 99 04/09/2022    HGBA1C 5.5 01/20/2022    MG 2.1 04/09/2022    AST 15 04/09/2022    ALT 11 04/09/2022    ALBUMIN 3.6 04/09/2022    PROT 7.3 04/09/2022    BILITOT 1.1 (H) 04/09/2022    WBC 7.09 04/09/2022    HGB 14.2 04/09/2022    HCT 43.2 04/09/2022    HCT 43 01/02/2022    MCV 85 04/09/2022     04/09/2022    INR 1.3 (H) 04/04/2022    TSH 1.683 06/03/2022    CHOL 186 04/04/2022    HDL 51 04/04/2022    LDLCALC 107.8 04/04/2022    TRIG 136 04/04/2022       Current Outpatient Medications   Medication Sig    ALPRAZolam (XANAX) 0.25 MG tablet Take 1 tablet (0.25 mg total) by mouth 3 (three) times daily as needed for Anxiety.    amiodarone (PACERONE) 200 MG Tab Take 1 tablet (200 mg total) by mouth once daily.    calcium carbonate (TUMS) 200 mg calcium (500 mg) chewable tablet Take 1 tablet by mouth as needed. 2-3D PRE WEEK    levothyroxine (SYNTHROID) 25 MCG tablet Take 1 tablet (25 mcg total) by mouth before breakfast.    rivaroxaban (XARELTO) 15 mg Tab Take 1 tablet (15 mg total) by mouth daily with dinner or evening meal.    venlafaxine (EFFEXOR-XR) 37.5 MG 24 hr capsule Take 1 capsule (37.5 mg total) by mouth once daily.    red yeast rice 600 mg Cap 2 capsules/ day     No current facility-administered medications for this visit.       Review of Systems   Constitutional: Positive for malaise/fatigue. Negative for chills, decreased appetite, night sweats, weight gain and weight loss.   Eyes: Negative for blurred vision, double vision, visual disturbance and visual halos.   Cardiovascular: Negative for chest pain, claudication, cyanosis, dyspnea on exertion,  irregular heartbeat, leg swelling, near-syncope, orthopnea, palpitations, paroxysmal nocturnal dyspnea and syncope.   Respiratory: Negative for cough, hemoptysis, snoring, sputum production and wheezing.    Endocrine: Negative for cold intolerance, heat intolerance, polydipsia and polyphagia.   Hematologic/Lymphatic: Negative for adenopathy and bleeding problem. Does not bruise/bleed easily.   Skin: Negative for flushing, itching, poor wound healing and rash.   Musculoskeletal: Negative for arthritis, back pain, falls, gout, joint pain, joint swelling, muscle cramps, muscle weakness, myalgias, neck pain and stiffness.   Gastrointestinal: Negative for bloating, abdominal pain, anorexia, diarrhea, dysphagia, excessive appetite, flatus, hematemesis, jaundice, melena and nausea.   Genitourinary: Negative for hesitancy and incomplete emptying.   Neurological: Negative for aphonia, brief paralysis, difficulty with concentration, disturbances in coordination, excessive daytime sleepiness, dizziness, focal weakness, light-headedness, loss of balance and weakness.   Psychiatric/Behavioral: Negative for altered mental status, depression, hallucinations, hypervigilance, memory loss, substance abuse and suicidal ideas. The patient does not have insomnia and is not nervous/anxious.        Objective:   Physical Exam  Constitutional:       General: She is not in acute distress.     Appearance: She is well-developed. She is not diaphoretic.   HENT:      Head: Normocephalic and atraumatic.      Nose: Nose normal.      Mouth/Throat:      Pharynx: No oropharyngeal exudate.   Eyes:      General: No scleral icterus.        Right eye: No discharge.         Left eye: No discharge.      Conjunctiva/sclera: Conjunctivae normal.      Pupils: Pupils are equal, round, and reactive to light.   Neck:      Thyroid: No thyromegaly.      Vascular: No JVD.      Trachea: No tracheal deviation.   Cardiovascular:      Rate and Rhythm: Normal rate and  regular rhythm.      Pulses: Intact distal pulses.      Heart sounds: Normal heart sounds. No murmur heard.    No friction rub. No gallop.   Pulmonary:      Effort: Pulmonary effort is normal. No respiratory distress.      Breath sounds: Normal breath sounds. No stridor. No wheezing or rales.   Chest:      Chest wall: No tenderness.   Abdominal:      General: Bowel sounds are normal. There is no distension.      Palpations: Abdomen is soft. There is no mass.      Tenderness: There is no abdominal tenderness. There is no guarding or rebound.   Musculoskeletal:         General: No tenderness. Normal range of motion.      Cervical back: Normal range of motion and neck supple.   Lymphadenopathy:      Cervical: No cervical adenopathy.   Skin:     General: Skin is warm.      Coloration: Skin is not pale.      Findings: No erythema or rash.   Neurological:      Mental Status: She is alert and oriented to person, place, and time.      Cranial Nerves: No cranial nerve deficit.      Motor: No abnormal muscle tone.      Coordination: Coordination normal.      Deep Tendon Reflexes: Reflexes are normal and symmetric.   Psychiatric:         Behavior: Behavior normal.         Thought Content: Thought content normal.         Judgment: Judgment normal.         Assessment:     1. PAF (paroxysmal atrial fibrillation)    2. S/P placement of cardiac pacemaker    3. Sick sinus syndrome    4. Status post circumferential ablation of pulmonary vein    5. Pericardial effusion    6. Hypercholesteremia        Plan:     Overall feeling much better than in the past. Plan is redo PVI. Re echo for pericardial effusion. Discussed lowering cholesterol in the light of high calcium score and will add red yeast rice for now but will have low threshold for statin.     Trudi was seen today for pericardial effusion .    Diagnoses and all orders for this visit:    PAF (paroxysmal atrial fibrillation)    S/P placement of cardiac pacemaker    Sick sinus  syndrome    Status post circumferential ablation of pulmonary vein    Pericardial effusion  -     Echo Saline Bubble? No; Future    Hypercholesteremia  -     Lipid Panel; Future    Other orders  -     red yeast rice 600 mg Cap; 2 capsules/ day      RTC 7 months.

## 2022-07-19 ENCOUNTER — CLINICAL SUPPORT (OUTPATIENT)
Dept: CARDIOLOGY | Facility: HOSPITAL | Age: 67
End: 2022-07-19
Payer: MEDICARE

## 2022-07-19 DIAGNOSIS — Z95.0 PRESENCE OF CARDIAC PACEMAKER: ICD-10-CM

## 2022-07-19 DIAGNOSIS — I48.91 UNSPECIFIED ATRIAL FIBRILLATION: ICD-10-CM

## 2022-07-19 PROCEDURE — 93296 REM INTERROG EVL PM/IDS: CPT | Performed by: INTERNAL MEDICINE

## 2022-08-01 ENCOUNTER — PATIENT MESSAGE (OUTPATIENT)
Dept: ELECTROPHYSIOLOGY | Facility: CLINIC | Age: 67
End: 2022-08-01
Payer: MEDICARE

## 2022-08-08 ENCOUNTER — PATIENT MESSAGE (OUTPATIENT)
Dept: PSYCHIATRY | Facility: CLINIC | Age: 67
End: 2022-08-08
Payer: MEDICARE

## 2022-08-09 ENCOUNTER — LAB VISIT (OUTPATIENT)
Dept: LAB | Facility: HOSPITAL | Age: 67
End: 2022-08-09
Attending: INTERNAL MEDICINE
Payer: MEDICARE

## 2022-08-09 DIAGNOSIS — I48.0 PAROXYSMAL ATRIAL FIBRILLATION: ICD-10-CM

## 2022-08-09 LAB
ANION GAP SERPL CALC-SCNC: 9 MMOL/L (ref 8–16)
APTT BLDCRRT: 32.5 SEC (ref 21–32)
BASOPHILS # BLD AUTO: 0.07 K/UL (ref 0–0.2)
BASOPHILS NFR BLD: 1.4 % (ref 0–1.9)
BUN SERPL-MCNC: 17 MG/DL (ref 8–23)
CALCIUM SERPL-MCNC: 9.2 MG/DL (ref 8.7–10.5)
CHLORIDE SERPL-SCNC: 108 MMOL/L (ref 95–110)
CO2 SERPL-SCNC: 24 MMOL/L (ref 23–29)
CREAT SERPL-MCNC: 1.1 MG/DL (ref 0.5–1.4)
DIFFERENTIAL METHOD: ABNORMAL
EOSINOPHIL # BLD AUTO: 0.1 K/UL (ref 0–0.5)
EOSINOPHIL NFR BLD: 2.8 % (ref 0–8)
ERYTHROCYTE [DISTWIDTH] IN BLOOD BY AUTOMATED COUNT: 14.6 % (ref 11.5–14.5)
EST. GFR  (NO RACE VARIABLE): 55.1 ML/MIN/1.73 M^2
GLUCOSE SERPL-MCNC: 86 MG/DL (ref 70–110)
HCT VFR BLD AUTO: 43.2 % (ref 37–48.5)
HGB BLD-MCNC: 13.5 G/DL (ref 12–16)
IMM GRANULOCYTES # BLD AUTO: 0.01 K/UL (ref 0–0.04)
IMM GRANULOCYTES NFR BLD AUTO: 0.2 % (ref 0–0.5)
INR PPP: 1.2 (ref 0.8–1.2)
LYMPHOCYTES # BLD AUTO: 1 K/UL (ref 1–4.8)
LYMPHOCYTES NFR BLD: 19.8 % (ref 18–48)
MCH RBC QN AUTO: 28.5 PG (ref 27–31)
MCHC RBC AUTO-ENTMCNC: 31.3 G/DL (ref 32–36)
MCV RBC AUTO: 91 FL (ref 82–98)
MONOCYTES # BLD AUTO: 0.4 K/UL (ref 0.3–1)
MONOCYTES NFR BLD: 7.6 % (ref 4–15)
NEUTROPHILS # BLD AUTO: 3.4 K/UL (ref 1.8–7.7)
NEUTROPHILS NFR BLD: 68.2 % (ref 38–73)
NRBC BLD-RTO: 0 /100 WBC
PLATELET # BLD AUTO: 281 K/UL (ref 150–450)
PMV BLD AUTO: 11.5 FL (ref 9.2–12.9)
POTASSIUM SERPL-SCNC: 3.8 MMOL/L (ref 3.5–5.1)
PROTHROMBIN TIME: 12.1 SEC (ref 9–12.5)
RBC # BLD AUTO: 4.74 M/UL (ref 4–5.4)
SODIUM SERPL-SCNC: 141 MMOL/L (ref 136–145)
WBC # BLD AUTO: 5 K/UL (ref 3.9–12.7)

## 2022-08-09 PROCEDURE — 85730 THROMBOPLASTIN TIME PARTIAL: CPT | Performed by: INTERNAL MEDICINE

## 2022-08-09 PROCEDURE — 80048 BASIC METABOLIC PNL TOTAL CA: CPT | Performed by: INTERNAL MEDICINE

## 2022-08-09 PROCEDURE — 85610 PROTHROMBIN TIME: CPT | Performed by: INTERNAL MEDICINE

## 2022-08-09 PROCEDURE — 85025 COMPLETE CBC W/AUTO DIFF WBC: CPT | Performed by: INTERNAL MEDICINE

## 2022-08-09 PROCEDURE — 36415 COLL VENOUS BLD VENIPUNCTURE: CPT | Mod: PO | Performed by: INTERNAL MEDICINE

## 2022-08-15 ENCOUNTER — TELEPHONE (OUTPATIENT)
Dept: ELECTROPHYSIOLOGY | Facility: CLINIC | Age: 67
End: 2022-08-15
Payer: MEDICARE

## 2022-08-15 NOTE — TELEPHONE ENCOUNTER
Spoke to patient    CONFIRMED procedure arrival time of 8am tomorrow for PVI redo with Dr Duncan    Reiterated instructions including:  -Directions to check in desk  -NPO after midnight night prior to procedure  -Confirmed compliance of Xarelto. Confirmed that she takes it with her evening meal. She will take, as instructed this evening   -Pre-procedure LABS reviewed; no alerts noted  -COVID test not needed; Covid Vax on file  -Confirmed no fever, cough, or shortness of breath in the past 30 days  -Confirmed no redness, rash, irritation, or yeast infection to groin area.   -Do not wear mascara day of procedure  -Reviewed current visitor policy    Patient verbalizes understanding of above and appreciates call.

## 2022-08-16 ENCOUNTER — ANESTHESIA EVENT (OUTPATIENT)
Dept: MEDSURG UNIT | Facility: HOSPITAL | Age: 67
End: 2022-08-16
Payer: MEDICARE

## 2022-08-16 ENCOUNTER — HOSPITAL ENCOUNTER (OUTPATIENT)
Facility: HOSPITAL | Age: 67
Discharge: HOME | End: 2022-08-16
Attending: INTERNAL MEDICINE | Admitting: INTERNAL MEDICINE
Payer: MEDICARE

## 2022-08-16 ENCOUNTER — ANESTHESIA (OUTPATIENT)
Dept: MEDSURG UNIT | Facility: HOSPITAL | Age: 67
End: 2022-08-16
Payer: MEDICARE

## 2022-08-16 VITALS
SYSTOLIC BLOOD PRESSURE: 144 MMHG | OXYGEN SATURATION: 96 % | TEMPERATURE: 99 F | DIASTOLIC BLOOD PRESSURE: 68 MMHG | HEIGHT: 61 IN | WEIGHT: 105 LBS | HEART RATE: 63 BPM | RESPIRATION RATE: 12 BRPM | BODY MASS INDEX: 19.83 KG/M2

## 2022-08-16 DIAGNOSIS — Z95.0 S/P PLACEMENT OF CARDIAC PACEMAKER: Chronic | ICD-10-CM

## 2022-08-16 DIAGNOSIS — I48.91 ATRIAL FIBRILLATION: Primary | ICD-10-CM

## 2022-08-16 DIAGNOSIS — I48.0 PAROXYSMAL ATRIAL FIBRILLATION: ICD-10-CM

## 2022-08-16 DIAGNOSIS — I49.9 ARRHYTHMIA: ICD-10-CM

## 2022-08-16 PROCEDURE — 93656 PR ELECTROPHYS EVAL, COMPREHEN, W/ABLATION OF ATRIAL FIBR: ICD-10-PCS | Mod: GC,,, | Performed by: INTERNAL MEDICINE

## 2022-08-16 PROCEDURE — 93005 ELECTROCARDIOGRAM TRACING: CPT | Mod: 59

## 2022-08-16 PROCEDURE — 93656 COMPRE EP EVAL ABLTJ ATR FIB: CPT | Mod: GC,,, | Performed by: INTERNAL MEDICINE

## 2022-08-16 PROCEDURE — C1766 INTRO/SHEATH,STRBLE,NON-PEEL: HCPCS | Performed by: INTERNAL MEDICINE

## 2022-08-16 PROCEDURE — 37000009 HC ANESTHESIA EA ADD 15 MINS: Performed by: INTERNAL MEDICINE

## 2022-08-16 PROCEDURE — C1769 GUIDE WIRE: HCPCS | Performed by: INTERNAL MEDICINE

## 2022-08-16 PROCEDURE — 93005 ELECTROCARDIOGRAM TRACING: CPT

## 2022-08-16 PROCEDURE — D9220A PRA ANESTHESIA: Mod: CRNA,,, | Performed by: NURSE ANESTHETIST, CERTIFIED REGISTERED

## 2022-08-16 PROCEDURE — D9220A PRA ANESTHESIA: Mod: ANES,,, | Performed by: ANESTHESIOLOGY

## 2022-08-16 PROCEDURE — 37000008 HC ANESTHESIA 1ST 15 MINUTES: Performed by: INTERNAL MEDICINE

## 2022-08-16 PROCEDURE — C1887 CATHETER, GUIDING: HCPCS | Performed by: INTERNAL MEDICINE

## 2022-08-16 PROCEDURE — 94761 N-INVAS EAR/PLS OXIMETRY MLT: CPT

## 2022-08-16 PROCEDURE — 63600175 PHARM REV CODE 636 W HCPCS: Performed by: INTERNAL MEDICINE

## 2022-08-16 PROCEDURE — C1753 CATH, INTRAVAS ULTRASOUND: HCPCS | Performed by: INTERNAL MEDICINE

## 2022-08-16 PROCEDURE — C2630 CATH, EP, COOL-TIP: HCPCS | Performed by: INTERNAL MEDICINE

## 2022-08-16 PROCEDURE — 36620 INSERTION CATHETER ARTERY: CPT | Mod: 59,,, | Performed by: ANESTHESIOLOGY

## 2022-08-16 PROCEDURE — C1730 CATH, EP, 19 OR FEW ELECT: HCPCS | Performed by: INTERNAL MEDICINE

## 2022-08-16 PROCEDURE — 25000003 PHARM REV CODE 250: Performed by: NURSE ANESTHETIST, CERTIFIED REGISTERED

## 2022-08-16 PROCEDURE — D9220A PRA ANESTHESIA: ICD-10-PCS | Mod: CRNA,,, | Performed by: NURSE ANESTHETIST, CERTIFIED REGISTERED

## 2022-08-16 PROCEDURE — D9220A PRA ANESTHESIA: ICD-10-PCS | Mod: ANES,,, | Performed by: ANESTHESIOLOGY

## 2022-08-16 PROCEDURE — 27201423 OPTIME MED/SURG SUP & DEVICES STERILE SUPPLY: Performed by: INTERNAL MEDICINE

## 2022-08-16 PROCEDURE — 36620 PR INSERT CATH,ART,PERCUT,SHORTTERM: ICD-10-PCS | Mod: 59,,, | Performed by: ANESTHESIOLOGY

## 2022-08-16 PROCEDURE — 93656 COMPRE EP EVAL ABLTJ ATR FIB: CPT | Mod: GC | Performed by: INTERNAL MEDICINE

## 2022-08-16 PROCEDURE — 93657 TX L/R ATRIAL FIB ADDL: CPT | Mod: GC,,, | Performed by: INTERNAL MEDICINE

## 2022-08-16 PROCEDURE — 93010 ELECTROCARDIOGRAM REPORT: CPT | Mod: ,,, | Performed by: INTERNAL MEDICINE

## 2022-08-16 PROCEDURE — 63600175 PHARM REV CODE 636 W HCPCS: Performed by: NURSE ANESTHETIST, CERTIFIED REGISTERED

## 2022-08-16 PROCEDURE — 25000003 PHARM REV CODE 250: Performed by: INTERNAL MEDICINE

## 2022-08-16 PROCEDURE — 93657 TX L/R ATRIAL FIB ADDL: CPT | Mod: GC | Performed by: INTERNAL MEDICINE

## 2022-08-16 PROCEDURE — 93657 PR TX L/R ATRIAL FIB ADDL: ICD-10-PCS | Mod: GC,,, | Performed by: INTERNAL MEDICINE

## 2022-08-16 PROCEDURE — C1894 INTRO/SHEATH, NON-LASER: HCPCS | Performed by: INTERNAL MEDICINE

## 2022-08-16 PROCEDURE — 93010 EKG 12-LEAD: ICD-10-PCS | Mod: ,,, | Performed by: INTERNAL MEDICINE

## 2022-08-16 RX ORDER — ROCURONIUM BROMIDE 10 MG/ML
INJECTION, SOLUTION INTRAVENOUS
Status: DISCONTINUED | OUTPATIENT
Start: 2022-08-16 | End: 2022-08-16

## 2022-08-16 RX ORDER — PROPOFOL 10 MG/ML
VIAL (ML) INTRAVENOUS
Status: DISCONTINUED | OUTPATIENT
Start: 2022-08-16 | End: 2022-08-16

## 2022-08-16 RX ORDER — VANCOMYCIN HCL IN 5 % DEXTROSE 1G/250ML
1000 PLASTIC BAG, INJECTION (ML) INTRAVENOUS ONCE
Status: COMPLETED | OUTPATIENT
Start: 2022-08-16 | End: 2022-08-16

## 2022-08-16 RX ORDER — PANTOPRAZOLE SODIUM 40 MG/1
40 TABLET, DELAYED RELEASE ORAL DAILY
Qty: 30 TABLET | Refills: 0 | Status: SHIPPED | OUTPATIENT
Start: 2022-08-16 | End: 2022-11-21

## 2022-08-16 RX ORDER — AMIODARONE HYDROCHLORIDE 200 MG/1
200 TABLET ORAL DAILY
Status: DISCONTINUED | OUTPATIENT
Start: 2022-08-17 | End: 2022-08-17 | Stop reason: HOSPADM

## 2022-08-16 RX ORDER — FAMOTIDINE 10 MG/ML
INJECTION INTRAVENOUS
Status: DISCONTINUED | OUTPATIENT
Start: 2022-08-16 | End: 2022-08-16

## 2022-08-16 RX ORDER — DEXAMETHASONE SODIUM PHOSPHATE 4 MG/ML
INJECTION, SOLUTION INTRA-ARTICULAR; INTRALESIONAL; INTRAMUSCULAR; INTRAVENOUS; SOFT TISSUE
Status: DISCONTINUED | OUTPATIENT
Start: 2022-08-16 | End: 2022-08-16

## 2022-08-16 RX ORDER — MIDAZOLAM HYDROCHLORIDE 1 MG/ML
INJECTION, SOLUTION INTRAMUSCULAR; INTRAVENOUS
Status: DISCONTINUED | OUTPATIENT
Start: 2022-08-16 | End: 2022-08-16

## 2022-08-16 RX ORDER — VENLAFAXINE HYDROCHLORIDE 37.5 MG/1
37.5 CAPSULE, EXTENDED RELEASE ORAL DAILY
Status: DISCONTINUED | OUTPATIENT
Start: 2022-08-17 | End: 2022-08-17 | Stop reason: HOSPADM

## 2022-08-16 RX ORDER — FENTANYL CITRATE 50 UG/ML
25 INJECTION, SOLUTION INTRAMUSCULAR; INTRAVENOUS EVERY 5 MIN PRN
Status: DISCONTINUED | OUTPATIENT
Start: 2022-08-16 | End: 2022-08-17 | Stop reason: HOSPADM

## 2022-08-16 RX ORDER — SODIUM CHLORIDE 0.9 % (FLUSH) 0.9 %
10 SYRINGE (ML) INJECTION
Status: DISCONTINUED | OUTPATIENT
Start: 2022-08-16 | End: 2022-08-17 | Stop reason: HOSPADM

## 2022-08-16 RX ORDER — ACETAMINOPHEN 325 MG/1
650 TABLET ORAL EVERY 4 HOURS PRN
Status: DISCONTINUED | OUTPATIENT
Start: 2022-08-16 | End: 2022-08-17 | Stop reason: HOSPADM

## 2022-08-16 RX ORDER — PANTOPRAZOLE SODIUM 40 MG/1
40 TABLET, DELAYED RELEASE ORAL DAILY
Status: DISCONTINUED | OUTPATIENT
Start: 2022-08-16 | End: 2022-08-17 | Stop reason: HOSPADM

## 2022-08-16 RX ORDER — HALOPERIDOL 5 MG/ML
0.5 INJECTION INTRAMUSCULAR EVERY 10 MIN PRN
Status: DISCONTINUED | OUTPATIENT
Start: 2022-08-16 | End: 2022-08-17 | Stop reason: HOSPADM

## 2022-08-16 RX ORDER — HEPARIN SOD,PORCINE/0.9 % NACL 1000/500ML
INTRAVENOUS SOLUTION INTRAVENOUS
Status: DISCONTINUED | OUTPATIENT
Start: 2022-08-16 | End: 2022-08-16

## 2022-08-16 RX ORDER — LEVOTHYROXINE SODIUM 25 UG/1
25 TABLET ORAL
Status: DISCONTINUED | OUTPATIENT
Start: 2022-08-17 | End: 2022-08-17 | Stop reason: HOSPADM

## 2022-08-16 RX ORDER — HEPARIN SODIUM 1000 [USP'U]/ML
INJECTION, SOLUTION INTRAVENOUS; SUBCUTANEOUS
Status: DISCONTINUED | OUTPATIENT
Start: 2022-08-16 | End: 2022-08-16

## 2022-08-16 RX ORDER — FENTANYL CITRATE 50 UG/ML
INJECTION, SOLUTION INTRAMUSCULAR; INTRAVENOUS
Status: DISCONTINUED | OUTPATIENT
Start: 2022-08-16 | End: 2022-08-16

## 2022-08-16 RX ORDER — LIDOCAINE HYDROCHLORIDE 10 MG/ML
INJECTION INFILTRATION; PERINEURAL
Status: DISCONTINUED | OUTPATIENT
Start: 2022-08-16 | End: 2022-08-16

## 2022-08-16 RX ORDER — HEPARIN SODIUM 10000 [USP'U]/100ML
INJECTION, SOLUTION INTRAVENOUS CONTINUOUS PRN
Status: DISCONTINUED | OUTPATIENT
Start: 2022-08-16 | End: 2022-08-16

## 2022-08-16 RX ORDER — ONDANSETRON 2 MG/ML
INJECTION INTRAMUSCULAR; INTRAVENOUS
Status: DISCONTINUED | OUTPATIENT
Start: 2022-08-16 | End: 2022-08-16

## 2022-08-16 RX ORDER — PHENYLEPHRINE HYDROCHLORIDE 10 MG/ML
INJECTION INTRAVENOUS CONTINUOUS PRN
Status: DISCONTINUED | OUTPATIENT
Start: 2022-08-16 | End: 2022-08-16

## 2022-08-16 RX ORDER — PROTAMINE SULFATE 10 MG/ML
INJECTION, SOLUTION INTRAVENOUS
Status: DISCONTINUED | OUTPATIENT
Start: 2022-08-16 | End: 2022-08-16

## 2022-08-16 RX ORDER — ALPRAZOLAM 0.25 MG/1
0.25 TABLET ORAL 3 TIMES DAILY PRN
Status: DISCONTINUED | OUTPATIENT
Start: 2022-08-16 | End: 2022-08-17 | Stop reason: HOSPADM

## 2022-08-16 RX ORDER — LIDOCAINE HYDROCHLORIDE 20 MG/ML
INJECTION INTRAVENOUS
Status: DISCONTINUED | OUTPATIENT
Start: 2022-08-16 | End: 2022-08-16

## 2022-08-16 RX ORDER — CALCIUM CARBONATE 200(500)MG
1 TABLET,CHEWABLE ORAL DAILY PRN
Status: DISCONTINUED | OUTPATIENT
Start: 2022-08-16 | End: 2022-08-17 | Stop reason: HOSPADM

## 2022-08-16 RX ADMIN — PROPOFOL 150 MG: 10 INJECTION, EMULSION INTRAVENOUS at 10:08

## 2022-08-16 RX ADMIN — ROCURONIUM BROMIDE 30 MG: 10 INJECTION, SOLUTION INTRAVENOUS at 10:08

## 2022-08-16 RX ADMIN — SUGAMMADEX 200 MG: 100 INJECTION, SOLUTION INTRAVENOUS at 12:08

## 2022-08-16 RX ADMIN — PROTAMINE SULFATE 35 MG: 10 INJECTION, SOLUTION INTRAVENOUS at 12:08

## 2022-08-16 RX ADMIN — FENTANYL CITRATE 100 MCG: 50 INJECTION, SOLUTION INTRAMUSCULAR; INTRAVENOUS at 10:08

## 2022-08-16 RX ADMIN — HEPARIN SODIUM 3000 UNITS: 1000 INJECTION INTRAVENOUS; SUBCUTANEOUS at 11:08

## 2022-08-16 RX ADMIN — SODIUM CHLORIDE: 0.9 INJECTION, SOLUTION INTRAVENOUS at 09:08

## 2022-08-16 RX ADMIN — ROCURONIUM BROMIDE 20 MG: 10 INJECTION, SOLUTION INTRAVENOUS at 10:08

## 2022-08-16 RX ADMIN — HEPARIN SODIUM 2000 UNITS: 1000 INJECTION INTRAVENOUS; SUBCUTANEOUS at 11:08

## 2022-08-16 RX ADMIN — VANCOMYCIN HYDROCHLORIDE 1000 MG: 1 INJECTION, POWDER, LYOPHILIZED, FOR SOLUTION INTRAVENOUS at 10:08

## 2022-08-16 RX ADMIN — DEXAMETHASONE SODIUM PHOSPHATE 4 MG: 4 INJECTION INTRA-ARTICULAR; INTRALESIONAL; INTRAMUSCULAR; INTRAVENOUS; SOFT TISSUE at 10:08

## 2022-08-16 RX ADMIN — PHENYLEPHRINE HYDROCHLORIDE 0.3 MCG/KG/MIN: 10 INJECTION, SOLUTION INTRAMUSCULAR; INTRAVENOUS; SUBCUTANEOUS at 10:08

## 2022-08-16 RX ADMIN — HEPARIN SODIUM AND DEXTROSE 12 UNITS/KG/HR: 10000; 5 INJECTION INTRAVENOUS at 10:08

## 2022-08-16 RX ADMIN — ONDANSETRON 4 MG: 2 INJECTION INTRAMUSCULAR; INTRAVENOUS at 12:08

## 2022-08-16 RX ADMIN — MIDAZOLAM HYDROCHLORIDE 2 MG: 1 INJECTION, SOLUTION INTRAMUSCULAR; INTRAVENOUS at 09:08

## 2022-08-16 RX ADMIN — HEPARIN SODIUM 7000 UNITS: 1000 INJECTION INTRAVENOUS; SUBCUTANEOUS at 10:08

## 2022-08-16 RX ADMIN — FAMOTIDINE 20 MG: 10 INJECTION, SOLUTION INTRAVENOUS at 10:08

## 2022-08-16 RX ADMIN — LIDOCAINE HYDROCHLORIDE 30 MG: 20 INJECTION, SOLUTION INTRAVENOUS at 10:08

## 2022-08-16 RX ADMIN — ROCURONIUM BROMIDE 20 MG: 10 INJECTION, SOLUTION INTRAVENOUS at 11:08

## 2022-08-16 NOTE — ANESTHESIA PROCEDURE NOTES
Arterial    Diagnosis: Atrial fibrillation  Doctor requesting consult: [place]    Patient location during procedure: done in OR    Staffing  Authorizing Provider: Cj Tom MD  Performing Provider: Cj Tom MD    Anesthesiologist was present at the time of the procedure.    Preanesthetic Checklist  Completed: patient identified, IV checked, site marked, risks and benefits discussed, surgical consent, monitors and equipment checked, pre-op evaluation, timeout performed and anesthesia consent givenArterial  Skin Prep: chlorhexidine gluconate  Local Infiltration: none  Orientation: right  Location: radial    Catheter Size: 20 G  Catheter placement by Anatomical landmarks. Heme positive aspiration all ports. Insertion Attempts: 1  Assessment  Dressing: secured with tape and tegaderm  Patient: Tolerated well

## 2022-08-16 NOTE — BRIEF OP NOTE
Electrophysiology Brief Op Note:     : Ernesto Duncan MD  Date of Procedure: 08/16/2022    Post-operative Diagnosis: AF    Procedure Performed: Left atrial mapping, isolation of right inferior pulmonary vein and posterior wall     Description of Procedure: The patient was brought to the EP lab in the fasting state. Prepped and draped in sterile fashion. Safety timeout was performed. Sedation administered by anesthesia staff. Ultrasound guided venous access of the bilateral femoral veins was performed. ICE and CS catheters placed via left femoral vein access. Long sheaths via right femoral venous access. Heparin bolus and continuous infusion per protocol. Two transseptal punctures performed using combination of fluoroscopic and ICE guidance. Map created and demonstrated isolation of left sided pulmonary veins as well as right superior. Right inferior pulmonary vein reconnection. RIPV and posterior wall isolated using RFA. ICE confirmed baseline small posterior pericardial effusion which was unchanged at the end of the procedure.     EBL: <10 mL    Specimens: none  Complications: no immediate    Plan:   Bedrest x 6 hrs -- ends at 6:45 PM    ECG on arrival to recovery   Patient to resume OAC this evening. If bleeding or hematoma formation, please notify EP service before holding OAC   PPI x 1 month   Admit to observation overnight. Will be evaluated by EP in the AM.       Kelton Mccoy MD  [facility]  Cardiovascular Disease, PGY-V

## 2022-08-16 NOTE — PLAN OF CARE
Vital signs stable. Afebrile. Drowsy, oriented and following commands. Denies pain/nausea. Sutures remain intact to bilateral groins without drainage. Prince to gravity.  updated via telephone. POC reviewed and understanding verbalized.

## 2022-08-16 NOTE — DISCHARGE SUMMARY
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 She had underwent PVI with Dr. Duncan on 3/29/22 with successful termination of afib at the time.      Following ablation she continued to have symptomatic AF/nonsustained AT. Also had multiple hospitalizations for somatic complaints not related to this, with anxiety thought to play a significant role. She was placed on amiodarone during last hospitalization 4/2022 with improvement in AF/AF, however has been poorly tolerant to this with thyroid issues, tremors and rash which she attributes to amio. She saw Dr. Duncan 6/2021 and was offered continued amiodarone vs redo PVI, she elected the latter. Today she states she has been feeling well, denies any active chest pain, dyspnea, palpitations. She feels her Afib has been fairly under control with the amiodarone but as above, continues to have side effects as above. Has been compliant with xarelto, took last dose yesterday evening. Device interrogation shows 2 short runs of AT/AF on 7/30 and 7/18, 24 and 18 seconds respectively.     Hospital Course:   The patient was brought to the EP lab in the fasting state. Prepped and draped in sterile fashion. Safety timeout was performed. Sedation administered by anesthesia staff. Ultrasound guided venous access of the bilateral femoral veins was performed. ICE and CS catheters placed via left femoral vein access. Long sheaths via right femoral venous access. Heparin bolus and continuous infusion per protocol. Two transseptal  punctures performed using combination of fluoroscopic and ICE guidance. Map created and demonstrated isolation of left sided pulmonary veins as well as right superior. Right inferior pulmonary vein reconnection. RIPV and posterior wall isolated using RFA. ICE confirmed baseline small posterior pericardial effusion which was unchanged at the end of the procedure. She completed 6 hours of bedrest and sutures removed at 4 hours. Bilateral groins inspected and no visible bleeding or hematoma formation. She ambulated without difficulty following bed rest. She was discharged home in stable condition.     Follow up:    Follow up with Dr. Duncan in 3 months     Disposition:   Home or Self Care         Medication List      CONTINUE taking these medications    ALPRAZolam 0.25 MG tablet  Commonly known as: XANAX  Take 1 tablet (0.25 mg total) by mouth 3 (three) times daily as needed for Anxiety.     amiodarone 200 MG Tab  Commonly known as: PACERONE  Take 1 tablet (200 mg total) by mouth once daily.     calcium carbonate 200 mg calcium (500 mg) chewable tablet  Commonly known as: TUMS     levothyroxine 25 MCG tablet  Commonly known as: SYNTHROID  Take 1 tablet (25 mcg total) by mouth before breakfast.     pantoprazole 40 MG tablet  Commonly known as: PROTONIX  Take 1 tablet (40 mg total) by mouth once daily.     red yeast rice 600 mg Cap  2 capsules/ day     venlafaxine 37.5 MG 24 hr capsule  Commonly known as: EFFEXOR-XR  Take 1 capsule (37.5 mg total) by mouth once daily.     XARELTO 15 mg Tab  Generic drug: rivaroxaban  Take 1 tablet (15 mg total) by mouth daily with dinner or evening meal.        STOP taking these medications    flecainide 50 MG Tab  Commonly known as: TAMBOCOR           Where to Get Your Medications      These medications were sent to [facility]  [address] [place] [id number]    Hours: Mon-Fri 7a-7p, Sat-Sun 10a-4p Phone: [phone]   · pantoprazole 40 MG tablet            Kelton Mccoy MD  [facility]  Cardiovascular Disease, PGY-V

## 2022-08-16 NOTE — H&P
[facility] [place]  H&P  Electrophysiology      Trudi Mcknight  YOB: 1955  Medical Record Number:  [id number]  Attending Physician:  Ernesto Duncan MD   Date of Admission: 8/16/2022       Hospital Day:  0  Current Principal Problem:  <principal problem not specified>      History     Cc: atrial fibrillation     HPI  Ms Trudi Mcknight is a 67 year old female with PMH of paroxysmal atrial fibrillation s/p PVI 3/2022, tachy-madelaine syndrome/sinus node dysfunction requiring dual chamber PPM 1/202 who presents to [facility] for redo PVI. She had underwent PVI with Dr. Duncan on 3/29/22 with successful termination of afib at the time.     Following ablation she continued to have symptomatic AF/nonsustained AT. Also had multiple hospitalizations for somatic complaints not related to this, with anxiety thought to play a significant role. She was placed on amiodarone during last hospitalization 4/2022 with improvement in AF/AF, however has been poorly tolerant to this with thyroid issues, tremors and rash which she attributes to amio. She saw Dr. Duncan 6/2021 and was offered continued amiodarone vs redo PVI, she elected the latter. Today she states she has been feeling well, denies any active chest pain, dyspnea, palpitations. She feels her Afib has been fairly under control with the amiodarone but as above, continues to have side effects as above. Has been compliant with xarelto, took last dose yesterday evening. Device interrogation shows 2 short runs of AT/AF on 7/30 and 7/18, 24 and 18 seconds respectively.       Medications - Outpatient  Prior to Admission medications    Medication Sig Start Date End Date Taking? Authorizing Provider   ALPRAZolam (XANAX) 0.25 MG tablet Take 1 tablet (0.25 mg total) by mouth 3 (three) times daily as needed for Anxiety. 7/1/22  Yes HUI Sow   amiodarone (PACERONE) 200 MG Tab Take 1 tablet (200 mg total) by mouth once daily. 7/1/22  Yes Ernesto Dnucan MD    calcium carbonate (TUMS) 200 mg calcium (500 mg) chewable tablet Take 1 tablet by mouth as needed. 2-3D PRE WEEK   Yes Historical Provider   levothyroxine (SYNTHROID) 25 MCG tablet Take 1 tablet (25 mcg total) by mouth before breakfast. 6/10/22  Yes Sly Palmer MD   rivaroxaban (XARELTO) 15 mg Tab Take 1 tablet (15 mg total) by mouth daily with dinner or evening meal. 3/19/22 3/19/23 Yes Mercedez Huerta PA-C   venlafaxine (EFFEXOR-XR) 37.5 MG 24 hr capsule Take 1 capsule (37.5 mg total) by mouth once daily. 22  Yes HUI Sow   red yeast rice 600 mg Cap 2 capsules/ day 22   Kellie Geller MD   flecainide (TAMBOCOR) 50 MG Tab Take 1 tablet (50 mg total) by mouth every 12 (twelve) hours. 3/29/22 4/7/22  Ernestina Rios NP   pantoprazole (PROTONIX) 40 MG tablet Take 1 tablet (40 mg total) by mouth once daily. 3/29/22 3/29/22  Ernestina Rios NP         Medications - Current  Scheduled Meds:   vancomycin (VANCOCIN) IVPB  1,000 mg Intravenous Once     Continuous Infusions:  PRN Meds:.sodium chloride 0.9%      Allergies  Review of patient's allergies indicates:   Allergen Reactions    Lasix [furosemide] Shortness Of Breath    Penicillins Hives     causes congestion and hives    Tricyclic compounds          Past Medical History  Past Medical History:   Diagnosis Date    Anticoagulant long-term use     Esophagitis     Hiatal hernia     Meniere's disease     Paroxysmal atrial fibrillation 2021    Shingles     Sick sinus syndrome 2022    s/p Dual chamber pacemaker    Thyroid disease          Past Surgical History  Past Surgical History:   Procedure Laterality Date    A-V CARDIAC PACEMAKER INSERTION N/A 2022    Procedure: INSERTION, CARDIAC PACEMAKER, DUAL CHAMBER;  Surgeon: Ernesto Duncan MD;  Location: [facility] LAB;  Service: Cardiology;  Laterality: N/A;  SB, DUAL PPM, SJM, ANES, SK, 7071    BREAST CYST ASPIRATION           SECTION      COLONOSCOPY N/A  09/16/2019    Procedure: COLONOSCOPY;  Surgeon: INGRID Russo MD;  Location: [address]);  Service: Endoscopy;  Laterality: N/A;    deviated septum repair      HYSTERECTOMY      lump removed from tongue      TONSILLECTOMY           Social History  Social History     Socioeconomic History    Marital status:    Tobacco Use    Smoking status: Never Smoker    Smokeless tobacco: Never Used   Substance and Sexual Activity    Alcohol use: No     Alcohol/week: 0.0 standard drinks     Comment: rarely    Drug use: No    Sexual activity: Not Currently     Social Determinants of Health     Financial Resource Strain: Low Risk     Difficulty of Paying Living Expenses: Not hard at all   Food Insecurity: No Food Insecurity    Worried About Running Out of Food in the Last Year: Never true    Ran Out of Food in the Last Year: Never true   Transportation Needs: No Transportation Needs    Lack of Transportation (Medical): No    Lack of Transportation (Non-Medical): No   Physical Activity: Inactive    Days of Exercise per Week: 0 days    Minutes of Exercise per Session: 0 min   Stress: Stress Concern Present    Feeling of Stress : Rather much   Social Connections: Unknown    Frequency of Communication with Friends and Family: More than three times a week    Frequency of Social Gatherings with Friends and Family: More than three times a week    Active Member of Clubs or Organizations: Yes    Attends Club or Organization Meetings: More than 4 times per year    Marital Status:    Housing Stability: Low Risk     Unable to Pay for Housing in the Last Year: No    Number of Places Lived in the Last Year: 1    Unstable Housing in the Last Year: No         ROS  10 point ROS performed and negative except as stated in HPI       Physical Examination         Vital Signs             24 Hour VS Range       No intake or output data in the 24 hours ending 08/16/22 0858      Physical Exam:    Constitutional: no acute distress  HEENT: NCAT, EOMI, no scleral icterus  Cardiovascular: Regular rate and rhythm, no murmurs appreciated. 2+ carotid, radial, DP pulses bilaterally    Pulmonary: Clear to auscultation bilaterally   Abdomen: nontender, non-distended   Neuro: alert and oriented, no focal deficits  Extremities: warm, no edema   MSK: no deformities  Integument: intact, no rashes       Data       No results for input(s): WBC, HGB, HCT, PLT in the last 168 hours.     No results for input(s): PROTIME, INR in the last 168 hours.     No results for input(s): NA, K, CL, CO2, BUN, CREATININE, ANIONGAP, CALCIUM in the last 168 hours.     No results for input(s): PROT, ALBUMIN, BILITOT, ALKPHOS, AST, ALT in the last 168 hours.     No results for input(s): TROPONINI in the last 168 hours.     BNP (pg/mL)   Date Value   03/18/2022 38   03/07/2022 37   02/04/2022 196 (H)   01/26/2022 320 (H)   01/17/2022 33       No results for input(s): LABBLOO in the last 168 hours.     ECG:   Atrial paced, V sensed       Assessment & Plan   Ms Trudi Mcknight is a 67 year old female with PMH of paroxysmal atrial fibrillation s/p PVI 3/2022, tachy-madelaine syndrome/sinus node dysfunction requiring dual chamber PPM 1/202 who presents to [facility] for redo PVI.      Paroxysmal atrial fibrillation:   Recurrence of AT/AF s/p PVI 3/2022. Has done well from a rhythm standpoint on amiodarone but experiencing significant side effects. Risks/benefits/alternatives of redo PVI discussed with patient and she agrees to proceed.   -To EP lab for redo PVI vs posterior wall ablation   -Atrial paced rhythm with very low AF burden in the last month, ok to cancel RADHA       Kelton Mccoy MD  [facility]   Cardiovascular Disease PGY-V

## 2022-08-16 NOTE — NURSING
Please note-patient took home xarelto prior to nurse administration, medication not given, see mar

## 2022-08-16 NOTE — TRANSFER OF CARE
"Anesthesia Transfer of Care Note    Patient: Trudi Mcknight    Procedure(s) Performed: Procedure(s) (LRB):  Ablation atrial fibrillation (N/A)    Patient location: PACU    Anesthesia Type: general    Transport from OR: Transported from OR on 6-10 L/min O2 by face mask with adequate spontaneous ventilation    Post pain: adequate analgesia    Post assessment: no apparent anesthetic complications    Post vital signs: stable    Level of consciousness: sedated    Nausea/Vomiting: no nausea/vomiting    Complications: none    Transfer of care protocol was followed      Last vitals:   Visit Vitals  /66 (BP Location: Right arm, Patient Position: Lying)   Pulse 64   Temp 37.2 °C (99 °F) (Temporal)   Resp 16   Ht 5' 1" (1.549 m)   Wt 47.6 kg (105 lb)   SpO2 98%   Breastfeeding No   BMI 19.84 kg/m²     "

## 2022-08-16 NOTE — NURSING TRANSFER
Nursing Transfer Note      8/16/2022     Reason patient is being transferred:per md order    Transfer to 340    Transfer via stretcher    Transfer with belonging bag and portable tele    Transported by pct    Medicines sent: n/a    Any special needs or follow-up needed: groin checks    Chart send with patient:yes    Notified:

## 2022-08-16 NOTE — ANESTHESIA PREPROCEDURE EVALUATION
08/16/2022  Trudi Mcknight is a 67 y.o., female.    Pre-operative evaluation for Procedure(s) (LRB):  Ablation atrial fibrillation (N/A)  Transesophageal echo (RADHA) intra-procedure log documentation (N/A)    Trudi Mcknight is a 67 y.o. female     Patient Active Problem List   Diagnosis    Esophagitis    History of gastritis    Screening for malignant neoplasm of colon    Neuralgia and neuritis, unspecified    Hiatal hernia with GERD and esophagitis    Disorder of thyroid, unspecified    Paroxysmal atrial fibrillation with RVR    Sick sinus syndrome    Bilateral pleural effusion    Typical atrial flutter    S/P placement of cardiac pacemaker    Left flank pain    Hyperlipemia    Generalized anxiety disorder with panic attacks    Status post circumferential ablation of pulmonary vein    Chronic anticoagulation    Pericardial effusion    Nausea and vomiting    Elevated troponin    Amiodarone-induced thyroiditis       Review of patient's allergies indicates:   Allergen Reactions    Lasix [furosemide] Shortness Of Breath    Penicillins Hives     causes congestion and hives    Tricyclic compounds        No current facility-administered medications on file prior to encounter.     Current Outpatient Medications on File Prior to Encounter   Medication Sig Dispense Refill    calcium carbonate (TUMS) 200 mg calcium (500 mg) chewable tablet Take 1 tablet by mouth as needed. 2-3D PRE WEEK      levothyroxine (SYNTHROID) 25 MCG tablet Take 1 tablet (25 mcg total) by mouth before breakfast. 90 tablet 1    rivaroxaban (XARELTO) 15 mg Tab Take 1 tablet (15 mg total) by mouth daily with dinner or evening meal. 360 tablet 0    [DISCONTINUED] flecainide (TAMBOCOR) 50 MG Tab Take 1 tablet (50 mg total) by mouth every 12 (twelve) hours. 60 tablet 1    [DISCONTINUED] pantoprazole (PROTONIX) 40 MG tablet  Take 1 tablet (40 mg total) by mouth once daily. 30 tablet 0       Past Surgical History:   Procedure Laterality Date    A-V CARDIAC PACEMAKER INSERTION N/A 2022    Procedure: INSERTION, CARDIAC PACEMAKER, DUAL CHAMBER;  Surgeon: Ernesto Duncan MD;  Location: [facility] EP LAB;  Service: Cardiology;  Laterality: N/A;  SB, DUAL PPM, SJM, ANES, SK, 7071    BREAST CYST ASPIRATION           SECTION      COLONOSCOPY N/A 2019    Procedure: COLONOSCOPY;  Surgeon: INGRID Russo MD;  Location: [facility] ENDO ([address] [place] [address]);  Service: Endoscopy;  Laterality: N/A;    deviated septum repair      HYSTERECTOMY      lump removed from tongue      TONSILLECTOMY         Social History     Socioeconomic History    Marital status:    Tobacco Use    Smoking status: Never Smoker    Smokeless tobacco: Never Used   Substance and Sexual Activity    Alcohol use: No     Alcohol/week: 0.0 standard drinks     Comment: rarely    Drug use: No    Sexual activity: Not Currently     Social Determinants of Health     Financial Resource Strain: Low Risk     Difficulty of Paying Living Expenses: Not hard at all   Food Insecurity: No Food Insecurity    Worried About Running Out of Food in the Last Year: Never true    Ran Out of Food in the Last Year: Never true   Transportation Needs: No Transportation Needs    Lack of Transportation (Medical): No    Lack of Transportation (Non-Medical): No   Physical Activity: Inactive    Days of Exercise per Week: 0 days    Minutes of Exercise per Session: 0 min   Stress: Stress Concern Present    Feeling of Stress : Rather much   Social Connections: Unknown    Frequency of Communication with Friends and Family: More than three times a week    Frequency of Social Gatherings with Friends and Family: More than three times a week    Active Member of Clubs or Organizations: Yes    Attends Club or Organization Meetings: More than 4 times per year    Marital Status:     Housing Stability: Low Risk     Unable to Pay for Housing in the Last Year: No    Number of Places Lived in the Last Year: 1    Unstable Housing in the Last Year: No         CBC: No results for input(s): WBC, RBC, HGB, HCT, PLT, MCV, MCH, MCHC in the last 72 hours.    CMP: No results for input(s): NA, K, CL, CO2, BUN, CREATININE, GLU, MG, PHOS, CALCIUM, ALBUMIN, PROT, ALKPHOS, ALT, AST, BILITOT in the last 72 hours.    INR  No results for input(s): PT, INR, PROTIME, APTT in the last 72 hours.        Diagnostic Studies:      EKD Echo:  No results found for this or any previous visit.        Pre-op Assessment    I have reviewed the Patient Summary Reports.    I have reviewed the NPO Status.   I have reviewed the Medications.     Review of Systems  Anesthesia Hx:  No problems with previous Anesthesia   Denies Personal Hx of Anesthesia complications.   Cardiovascular:   Exercise tolerance: good Dysrhythmias atrial fibrillation    Pulmonary:  Pulmonary Normal    Renal/:   Chronic Renal Disease, CRI    Hepatic/GI:   GERD, well controlled    Neurological:   Denies CVA. Denies Seizures.    Psych:   anxiety          Physical Exam  General: Cooperative, Alert and Oriented    Airway:  Mallampati: II   Mouth Opening: Small, but > 3cm  TM Distance: 4 - 6 cm  Tongue: Normal  Neck ROM: Extension Painful    Dental:        Anesthesia Plan  Type of Anesthesia, risks & benefits discussed:    Anesthesia Type: Gen ETT  Intra-op Monitoring Plan: Standard ASA Monitors and Art Line  Post Op Pain Control Plan: IV/PO Opioids PRN and multimodal analgesia  Induction:  IV  Airway Plan: Video, Post-Induction  Informed Consent: Informed consent signed with the Patient and all parties understand the risks and agree with anesthesia plan.  All questions answered.   ASA Score: 3  Day of Surgery Review of History & Physical: H&P Update referred to the surgeon/provider.    Ready For Surgery From Anesthesia Perspective.     .

## 2022-08-16 NOTE — ANESTHESIA POSTPROCEDURE EVALUATION
Anesthesia Post Evaluation    Patient: Trudi Mcknight    Procedure(s) Performed: Procedure(s) (LRB):  Ablation atrial fibrillation (N/A)    Final Anesthesia Type: general      Patient location during evaluation: PACU  Patient participation: Yes- Able to Participate  Level of consciousness: awake and alert  Post-procedure vital signs: reviewed and stable  Pain management: adequate  Airway patency: patent    PONV status at discharge: No PONV  Anesthetic complications: no      Cardiovascular status: stable  Respiratory status: spontaneous ventilation  Hydration status: euvolemic  Follow-up not needed.          Vitals Value Taken Time   /68 08/16/22 1444   Temp 36.9 °C (98.5 °F) 08/16/22 1444   Pulse 63 08/16/22 1444   Resp 12 08/16/22 1444   SpO2 96 % 08/16/22 1444         No case tracking events are documented in the log.      Pain/Leanna Score: Leanna Score: 9 (8/16/2022  2:10 PM)

## 2022-08-16 NOTE — Clinical Note
The sheath was exchanged in the right femoral vein. 2nd sl1 used, 1st sl1 sheath bent during exchange

## 2022-08-17 NOTE — NURSING
Pt dcd to home, pt had no c/o or s/s present at discharge. Prince and 2 IV sites dcd, no complications, pt left via wc with  who,  Both stated understanding of dc instructions

## 2022-08-18 LAB
POC ACTIVATED CLOTTING TIME K: 144 SEC (ref 74–137)
POC ACTIVATED CLOTTING TIME K: 277 SEC (ref 74–137)
POC ACTIVATED CLOTTING TIME K: 318 SEC (ref 74–137)
POC ACTIVATED CLOTTING TIME K: 329 SEC (ref 74–137)
POC ACTIVATED CLOTTING TIME K: 393 SEC (ref 74–137)
SAMPLE: ABNORMAL

## 2022-08-19 ENCOUNTER — TELEPHONE (OUTPATIENT)
Dept: ELECTROPHYSIOLOGY | Facility: CLINIC | Age: 67
End: 2022-08-19
Payer: MEDICARE

## 2022-08-19 NOTE — TELEPHONE ENCOUNTER
Spoke to patient regarding post op care. Patient denies any complaints. Wound site dry and intact without redness, swelling, hematoma or drainage. Patient denies any fever or UTI symptoms.  She reports a little SOB with going up stairs,  A little tiny pinch feeling in her heart sometimes.   Patient  has resumed medications has questions about stopping amiodarone in 2 weeks.   Forwarded message to Dr. Duncan's nurse.   She also asked about her Xeralto, instructed to stay on her medication until told to stop.  Patient verbalizes understanding of all post op instructions.

## 2022-08-22 ENCOUNTER — PATIENT MESSAGE (OUTPATIENT)
Dept: ELECTROPHYSIOLOGY | Facility: CLINIC | Age: 67
End: 2022-08-22
Payer: MEDICARE

## 2022-08-23 ENCOUNTER — TELEPHONE (OUTPATIENT)
Dept: INFECTIOUS DISEASES | Facility: HOSPITAL | Age: 67
End: 2022-08-23

## 2022-08-23 ENCOUNTER — PATIENT MESSAGE (OUTPATIENT)
Dept: INTERNAL MEDICINE | Facility: CLINIC | Age: 67
End: 2022-08-23
Payer: MEDICARE

## 2022-08-23 ENCOUNTER — PATIENT MESSAGE (OUTPATIENT)
Dept: PSYCHIATRY | Facility: CLINIC | Age: 67
End: 2022-08-23
Payer: MEDICARE

## 2022-08-23 DIAGNOSIS — U07.1 COVID: ICD-10-CM

## 2022-08-23 DIAGNOSIS — U07.1 COVID-19: Primary | ICD-10-CM

## 2022-08-23 NOTE — TELEPHONE ENCOUNTER
Spoke to patient about covid tx  Symptoms onset: 08/20/22; muscle aches, headaches, fever  +test: 8/22/22    Pt is eligible for Molnupiravir; no eligible for BEB bc of risk score <3 and CI to paxlovid with amiodarone. She doesn't feel covid tx is needed at this time; WCB if wants tx

## 2022-08-24 ENCOUNTER — OFFICE VISIT (OUTPATIENT)
Dept: PSYCHIATRY | Facility: CLINIC | Age: 67
End: 2022-08-24
Payer: MEDICARE

## 2022-08-24 ENCOUNTER — TELEPHONE (OUTPATIENT)
Dept: INFECTIOUS DISEASES | Facility: HOSPITAL | Age: 67
End: 2022-08-24
Payer: MEDICARE

## 2022-08-24 DIAGNOSIS — U07.1 COVID-19: Primary | ICD-10-CM

## 2022-08-24 DIAGNOSIS — F41.1 GENERALIZED ANXIETY DISORDER WITH PANIC ATTACKS: Primary | ICD-10-CM

## 2022-08-24 DIAGNOSIS — F41.0 GENERALIZED ANXIETY DISORDER WITH PANIC ATTACKS: Primary | ICD-10-CM

## 2022-08-24 DIAGNOSIS — F33.0 MDD (MAJOR DEPRESSIVE DISORDER), RECURRENT EPISODE, MILD: ICD-10-CM

## 2022-08-24 PROCEDURE — 3044F HG A1C LEVEL LT 7.0%: CPT | Mod: CPTII,95,, | Performed by: PHYSICIAN ASSISTANT

## 2022-08-24 PROCEDURE — 99499 RISK ADDL DX/OHS AUDIT: ICD-10-PCS | Mod: HCNC,95,, | Performed by: PHYSICIAN ASSISTANT

## 2022-08-24 PROCEDURE — 99214 OFFICE O/P EST MOD 30 MIN: CPT | Mod: 95,,, | Performed by: PHYSICIAN ASSISTANT

## 2022-08-24 PROCEDURE — 99214 PR OFFICE/OUTPT VISIT, EST, LEVL IV, 30-39 MIN: ICD-10-PCS | Mod: 95,,, | Performed by: PHYSICIAN ASSISTANT

## 2022-08-24 PROCEDURE — 3044F PR MOST RECENT HEMOGLOBIN A1C LEVEL <7.0%: ICD-10-PCS | Mod: CPTII,95,, | Performed by: PHYSICIAN ASSISTANT

## 2022-08-24 PROCEDURE — 99499 UNLISTED E&M SERVICE: CPT | Mod: HCNC,95,, | Performed by: PHYSICIAN ASSISTANT

## 2022-08-24 RX ORDER — ALPRAZOLAM 0.25 MG/1
0.25 TABLET ORAL 3 TIMES DAILY PRN
Qty: 90 TABLET | Refills: 3 | Status: SHIPPED | OUTPATIENT
Start: 2022-08-24 | End: 2022-12-13 | Stop reason: SDUPTHER

## 2022-08-24 RX ORDER — VENLAFAXINE HYDROCHLORIDE 37.5 MG/1
37.5 CAPSULE, EXTENDED RELEASE ORAL DAILY
Qty: 90 CAPSULE | Refills: 2 | Status: SHIPPED | OUTPATIENT
Start: 2022-08-24 | End: 2022-12-13 | Stop reason: SDUPTHER

## 2022-08-24 NOTE — TELEPHONE ENCOUNTER
Pt called to go ahead w covid treatment.  Explain to pt she will be getting Molnupiravir and will be sent to her pharmacy- [facility] on [address]  Pt agrees

## 2022-08-24 NOTE — PROGRESS NOTES
"The patient location is: [place]    Visit type: audiovisual    Face to Face time with patient: 10  15 minutes of total time spent on the encounter, which includes face to face time and non-face to face time preparing to see the patient (eg, review of tests), Obtaining and/or reviewing separately obtained history, Documenting clinical information in the electronic or other health record, Independently interpreting results (not separately reported) and communicating results to the patient/family/caregiver, or Care coordination (not separately reported).         Each patient to whom he or she provides medical services by telemedicine is:  (1) informed of the relationship between the physician and patient and the respective role of any other health care provider with respect to management of the patient; and (2) notified that he or she may decline to receive medical services by telemedicine and may withdraw from such care at any time.    Notes:       Outpatient Psychiatry Follow-Up Visit (MD/JASMINE)    8/24/2022    Clinical Status of Patient:  Outpatient (Ambulatory)    Chief Complaint:  Trudi Mcknight is a 67 y.o. female who presents today for follow-up of depression and anxiety.  Met with patient.      Interval History and Content of Current Session:  Interim Events/Subjective Report/Content of Current Session:    Pt reports today: "I have covid so feeling tired but my mood has been good." Pt on effexor 37.5mg and xanax 0.25mg tid prn. States most days only taking xanax twice daily. States "I feel good overall the medicines are working for me"    Patients mood is steady, affect appears mood congruent. Linear and logical, friendly and cooperative, good eye contact.    Denies SI/HI/AVH. Pt reports sleeping well and normal appetite. Denies side effects of medications.    Pt reports taking medications as prescribed and behaving appropriately during interview today.        Prior visit 6/9/22:  Pt reports today: "feeling " "better, the medicine is helping, its helping quickly."     Reports depression today as 4/10 down from 7/10 2 week ago, and anxiety as 3/10 down from 10/10 2 week ago.     Reports slight increase in energy and improvement in anhedonia. Reports only having rare crying spells.     Patients mood is less anxious and depressive today, no longer tearful affect appears constricted but brighter that previous interview. Linear and logical, friendly and cooperative, good eye contact.     Denies SI/HI/AVH. Pt reports sleeping well and normal appetite. Denies side effects of medications.     Pt reports taking medications as prescribed and behaving appropriately during interview today.    Review of Systems     Review of Systems   Constitutional: Positive for malaise/fatigue. Negative for fever.   HENT: Negative for sore throat.    Eyes: Negative for photophobia.   Respiratory: Negative for cough.    Cardiovascular: Negative for chest pain and palpitations.   Gastrointestinal: Negative for abdominal pain.   Genitourinary: Negative for dysuria.   Musculoskeletal: Negative for myalgias.   Skin: Negative for rash.   Neurological: Negative for dizziness.   Endo/Heme/Allergies: Does not bruise/bleed easily.       Psychiatric Review Of Systems - Is patient experiencing or having changes in:  sleep: no  appetite: no  weight: no  energy/anergy: yes  interest/pleasure/anhedonia: no  somatic symptoms: no  libido: no  anxiety/panic: no  guilty/hopelessness: no  concentration: no  S.I.B.s/risky behavior: no  Irritability: no  Racing thoughts: no  Impulsive behaviors: no  Paranoia: no  AVH: no      Past Medical, Family and Social History: The patient's past medical, family and social history have been reviewed and updated as appropriate within the electronic medical record - see encounter notes.      Current Medications:   Medication List with Changes/Refills   New Medications    MOLNUPIRAVIR 200 MG CAPSULE (EUA)    Take 4 capsules (800 mg " total) by mouth every 12 (twelve) hours. for 5 days   Current Medications    ALPRAZOLAM (XANAX) 0.25 MG TABLET    Take 1 tablet (0.25 mg total) by mouth 3 (three) times daily as needed for Anxiety.    AMIODARONE (PACERONE) 200 MG TAB    Take 1 tablet (200 mg total) by mouth once daily.    CALCIUM CARBONATE (TUMS) 200 MG CALCIUM (500 MG) CHEWABLE TABLET    Take 1 tablet by mouth as needed. 2-3D PRE WEEK    LEVOTHYROXINE (SYNTHROID) 25 MCG TABLET    Take 1 tablet (25 mcg total) by mouth before breakfast.    PANTOPRAZOLE (PROTONIX) 40 MG TABLET    Take 1 tablet (40 mg total) by mouth once daily.    RED YEAST RICE 600 MG CAP    2 capsules/ day    RIVAROXABAN (XARELTO) 15 MG TAB    Take 1 tablet (15 mg total) by mouth daily with dinner or evening meal.    VENLAFAXINE (EFFEXOR-XR) 37.5 MG 24 HR CAPSULE    Take 1 capsule (37.5 mg total) by mouth once daily.         Allergies:   Review of patient's allergies indicates:   Allergen Reactions    Lasix [furosemide] Shortness Of Breath    Penicillins Hives     causes congestion and hives    Tricyclic compounds          Vitals   There were no vitals filed for this visit.       Labs/Imaging/Studies:   No results found for this or any previous visit (from the past 48 hour(s)).   No results found for: PHENYTOIN, PHENOBARB, VALPROATE, CBMZ    Compliance: yes    Side effects: None    Risk Parameters:  Patient reports no suicidal ideation  Patient reports no homicidal ideation  Patient reports no self-injurious behavior  Patient reports no violent behavior    Exam (detailed: at least 9 elements; comprehensive: all 15 elements)   Constitutional  Vitals:  Most recent vital signs, dated less than 90 days prior to this appointment, were reviewed.   There were no vitals filed for this visit.     General:  unremarkable, age appropriate     Musculoskeletal  Muscle Strength/Tone:  not examined   Gait & Station:  Not examined     Psychiatric  Speech:  no latency; no press   Mood & Affect:   steady  congruent and appropriate   Thought Process:  normal and logical   Associations:  intact   Thought Content:  normal, no suicidality, no homicidality, delusions, or paranoia   Insight:  intact, has awareness of illness   Judgement: behavior is adequate to circumstances   Orientation:  grossly intact   Memory: intact for content of interview   Language: grossly intact   Attention Span & Concentration:  able to focus   Fund of Knowledge:  intact and appropriate to age and level of education     Assessment and Diagnosis   Status/Progress: Based on the examination today, the patient's problem(s) is/are well controlled.  New problems have not been presented today.   Co-morbidities, Diagnostic uncertainty and Lack of compliance are not complicating management of the primary condition.  There are no active rule-out diagnoses for this patient at this time.     General Impression:      ICD-10-CM ICD-9-CM   1. Generalized anxiety disorder with panic attacks  F41.1 300.02    F41.0 300.01   2. MDD (major depressive disorder), recurrent episode, mild  F33.0 296.31       Intervention/Counseling/Treatment Plan   · Medication Management: Continue current medications. The risks and benefits of medication were discussed with the patient.    -continue effexor XR 37.5mg daily  -continue xanax 0.25mg q8h prn anxiety      Return to Clinic: 4 months        Kristofer Martinez PA-C      Total face to face time: 10 min  Total time (chart review, patient contact, documentation): 15 min      *This note has been prepared using a combination of a dictation device and typing.  It has been checked for errors but some errors may still exist within the note as a result of speech recognition errors and/or typographical errors.

## 2022-08-25 ENCOUNTER — PATIENT MESSAGE (OUTPATIENT)
Dept: ELECTROPHYSIOLOGY | Facility: CLINIC | Age: 67
End: 2022-08-25
Payer: MEDICARE

## 2022-08-29 ENCOUNTER — OFFICE VISIT (OUTPATIENT)
Dept: INTERNAL MEDICINE | Facility: CLINIC | Age: 67
End: 2022-08-29
Payer: MEDICARE

## 2022-08-29 VITALS
WEIGHT: 106.5 LBS | DIASTOLIC BLOOD PRESSURE: 60 MMHG | BODY MASS INDEX: 20.11 KG/M2 | HEIGHT: 61 IN | HEART RATE: 71 BPM | OXYGEN SATURATION: 97 % | SYSTOLIC BLOOD PRESSURE: 106 MMHG

## 2022-08-29 DIAGNOSIS — I31.39 PERICARDIAL EFFUSION: ICD-10-CM

## 2022-08-29 DIAGNOSIS — I48.0 PAROXYSMAL ATRIAL FIBRILLATION: ICD-10-CM

## 2022-08-29 DIAGNOSIS — F41.1 GENERALIZED ANXIETY DISORDER WITH PANIC ATTACKS: Primary | Chronic | ICD-10-CM

## 2022-08-29 DIAGNOSIS — Z12.31 ENCOUNTER FOR SCREENING MAMMOGRAM FOR BREAST CANCER: ICD-10-CM

## 2022-08-29 DIAGNOSIS — E78.5 HYPERLIPIDEMIA, UNSPECIFIED HYPERLIPIDEMIA TYPE: Chronic | ICD-10-CM

## 2022-08-29 DIAGNOSIS — F41.0 GENERALIZED ANXIETY DISORDER WITH PANIC ATTACKS: Primary | Chronic | ICD-10-CM

## 2022-08-29 DIAGNOSIS — Z78.0 ASYMPTOMATIC MENOPAUSAL STATE: ICD-10-CM

## 2022-08-29 DIAGNOSIS — F33.0 MDD (MAJOR DEPRESSIVE DISORDER), RECURRENT EPISODE, MILD: ICD-10-CM

## 2022-08-29 DIAGNOSIS — B00.1 COLD SORE: ICD-10-CM

## 2022-08-29 PROCEDURE — 1160F PR REVIEW ALL MEDS BY PRESCRIBER/CLIN PHARMACIST DOCUMENTED: ICD-10-PCS | Mod: CPTII,S$GLB,, | Performed by: INTERNAL MEDICINE

## 2022-08-29 PROCEDURE — 3288F FALL RISK ASSESSMENT DOCD: CPT | Mod: CPTII,S$GLB,, | Performed by: INTERNAL MEDICINE

## 2022-08-29 PROCEDURE — 99999 PR PBB SHADOW E&M-EST. PATIENT-LVL IV: ICD-10-PCS | Mod: PBBFAC,,, | Performed by: INTERNAL MEDICINE

## 2022-08-29 PROCEDURE — 3288F PR FALLS RISK ASSESSMENT DOCUMENTED: ICD-10-PCS | Mod: CPTII,S$GLB,, | Performed by: INTERNAL MEDICINE

## 2022-08-29 PROCEDURE — 1126F PR PAIN SEVERITY QUANTIFIED, NO PAIN PRESENT: ICD-10-PCS | Mod: CPTII,S$GLB,, | Performed by: INTERNAL MEDICINE

## 2022-08-29 PROCEDURE — 99214 PR OFFICE/OUTPT VISIT, EST, LEVL IV, 30-39 MIN: ICD-10-PCS | Mod: S$GLB,,, | Performed by: INTERNAL MEDICINE

## 2022-08-29 PROCEDURE — 99214 OFFICE O/P EST MOD 30 MIN: CPT | Mod: S$GLB,,, | Performed by: INTERNAL MEDICINE

## 2022-08-29 PROCEDURE — 3074F PR MOST RECENT SYSTOLIC BLOOD PRESSURE < 130 MM HG: ICD-10-PCS | Mod: CPTII,S$GLB,, | Performed by: INTERNAL MEDICINE

## 2022-08-29 PROCEDURE — 3078F DIAST BP <80 MM HG: CPT | Mod: CPTII,S$GLB,, | Performed by: INTERNAL MEDICINE

## 2022-08-29 PROCEDURE — 1159F PR MEDICATION LIST DOCUMENTED IN MEDICAL RECORD: ICD-10-PCS | Mod: CPTII,S$GLB,, | Performed by: INTERNAL MEDICINE

## 2022-08-29 PROCEDURE — 1126F AMNT PAIN NOTED NONE PRSNT: CPT | Mod: CPTII,S$GLB,, | Performed by: INTERNAL MEDICINE

## 2022-08-29 PROCEDURE — 3044F PR MOST RECENT HEMOGLOBIN A1C LEVEL <7.0%: ICD-10-PCS | Mod: CPTII,S$GLB,, | Performed by: INTERNAL MEDICINE

## 2022-08-29 PROCEDURE — 1159F MED LIST DOCD IN RCRD: CPT | Mod: CPTII,S$GLB,, | Performed by: INTERNAL MEDICINE

## 2022-08-29 PROCEDURE — 99499 UNLISTED E&M SERVICE: CPT | Mod: HCNC,S$GLB,, | Performed by: INTERNAL MEDICINE

## 2022-08-29 PROCEDURE — 3044F HG A1C LEVEL LT 7.0%: CPT | Mod: CPTII,S$GLB,, | Performed by: INTERNAL MEDICINE

## 2022-08-29 PROCEDURE — 99999 PR PBB SHADOW E&M-EST. PATIENT-LVL IV: CPT | Mod: PBBFAC,,, | Performed by: INTERNAL MEDICINE

## 2022-08-29 PROCEDURE — 1101F PR PT FALLS ASSESS DOC 0-1 FALLS W/OUT INJ PAST YR: ICD-10-PCS | Mod: CPTII,S$GLB,, | Performed by: INTERNAL MEDICINE

## 2022-08-29 PROCEDURE — 99499 RISK ADDL DX/OHS AUDIT: ICD-10-PCS | Mod: HCNC,S$GLB,, | Performed by: INTERNAL MEDICINE

## 2022-08-29 PROCEDURE — 3078F PR MOST RECENT DIASTOLIC BLOOD PRESSURE < 80 MM HG: ICD-10-PCS | Mod: CPTII,S$GLB,, | Performed by: INTERNAL MEDICINE

## 2022-08-29 PROCEDURE — 3008F PR BODY MASS INDEX (BMI) DOCUMENTED: ICD-10-PCS | Mod: CPTII,S$GLB,, | Performed by: INTERNAL MEDICINE

## 2022-08-29 PROCEDURE — 1160F RVW MEDS BY RX/DR IN RCRD: CPT | Mod: CPTII,S$GLB,, | Performed by: INTERNAL MEDICINE

## 2022-08-29 PROCEDURE — 1101F PT FALLS ASSESS-DOCD LE1/YR: CPT | Mod: CPTII,S$GLB,, | Performed by: INTERNAL MEDICINE

## 2022-08-29 PROCEDURE — 3074F SYST BP LT 130 MM HG: CPT | Mod: CPTII,S$GLB,, | Performed by: INTERNAL MEDICINE

## 2022-08-29 PROCEDURE — 3008F BODY MASS INDEX DOCD: CPT | Mod: CPTII,S$GLB,, | Performed by: INTERNAL MEDICINE

## 2022-08-29 RX ORDER — ACYCLOVIR 50 MG/G
OINTMENT TOPICAL
Qty: 5 G | Refills: 2 | Status: SHIPPED | OUTPATIENT
Start: 2022-08-29 | End: 2022-11-21

## 2022-08-29 NOTE — PROGRESS NOTES
"Subjective:       Patient ID: Trudi Mcknight is a 67 y.o. female.    Chief Complaint: Annual Exam    HPI  Trudi Mcknight is a 67 y.o. female here for routine follow up of the following chronic issues:    Hx depression, anxiety, atrial fibrillation.     Followed by psychiatry  effexor 37.5mg and xanax 0.25mg tid prn. States most days only taking xanax twice daily    Symptomatic Bradycardia s/p Dual chamber pacemaker  s/p PVI (cryo) for PAF  Amiodarone induced thyroiditis  Small to moderate pericardial effusion post procedure found incidentally now resolved  AF with RVR intermittently - now on amiodarone and xarelto  Hiatal Hernia  GERD    Saw cardiologist DR. Geller 7/14/22: repeat echo for pericardial effusion, add red yeast rice and considering statin.  Recently had second ablation. Was planning to stop amio but continuing for a little longer due to recent COVID.     Recently w covid and treated w molnupiravir. It raised HR so she stopped.   Now just with runny nose. Overall feeling better.     Has been borderline osteopenia/osteoporosis in the past. Declined bisphosphonates in past.   Review of Systems   Constitutional:  Negative for fever.   HENT:  Positive for rhinorrhea. Negative for hearing loss.    Eyes: Negative.  Negative for visual disturbance.   Respiratory:  Negative for shortness of breath.    Cardiovascular:  Negative for chest pain and leg swelling.   Gastrointestinal:  Negative for abdominal pain, diarrhea, nausea and vomiting.   Genitourinary: Negative.    Musculoskeletal:  Negative for arthralgias and myalgias.   Skin:  Negative for rash.   Neurological:  Negative for weakness and numbness.   Psychiatric/Behavioral: Negative.       Objective:   /60 (BP Location: Right arm, Patient Position: Sitting, BP Method: Medium (Manual))   Pulse 71   Ht 5' 1" (1.549 m)   Wt 48.3 kg (106 lb 7.7 oz)   SpO2 97%   BMI 20.12 kg/m²      Physical Exam  Constitutional:       General: She is not in acute " distress.     Appearance: She is well-developed. She is not diaphoretic.   HENT:      Head: Normocephalic and atraumatic.   Cardiovascular:      Rate and Rhythm: Normal rate and regular rhythm.   Pulmonary:      Effort: Pulmonary effort is normal. No respiratory distress.      Breath sounds: No wheezing or rales.   Skin:     General: Skin is warm and dry.   Neurological:      Mental Status: She is alert and oriented to person, place, and time.   Psychiatric:         Behavior: Behavior normal.       Assessment:       1. Generalized anxiety disorder with panic attacks    2. MDD (major depressive disorder), recurrent episode, mild    3. Hyperlipidemia, unspecified hyperlipidemia type    4. Paroxysmal atrial fibrillation    5. Pericardial effusion    6. Asymptomatic menopausal state    7. Encounter for screening mammogram for breast cancer    8. Cold sore        Plan:       Trudi was seen today for annual exam.    Diagnoses and all orders for this visit:    Generalized anxiety disorder with panic attacks    MDD (major depressive disorder), recurrent episode, mild    Hyperlipidemia, unspecified hyperlipidemia type    Paroxysmal atrial fibrillation    Pericardial effusion    Asymptomatic menopausal state    Encounter for screening mammogram for breast cancer  -     Mammo Digital Screening Bilat w/ [facility]; Future    Cold sore  -     acyclovir 5% (ZOVIRAX) 5 % ointment; Apply topically 5 (five) times daily. As needed for cold sore  Has taken this previously and found it helpful. Rare cold sores, recently triggered by coivd        Shingrx  P20  Mmg October Declines germán

## 2022-08-30 ENCOUNTER — TELEPHONE (OUTPATIENT)
Dept: INTERNAL MEDICINE | Facility: CLINIC | Age: 67
End: 2022-08-30
Payer: MEDICARE

## 2022-08-30 DIAGNOSIS — B00.1 COLD SORE: Primary | ICD-10-CM

## 2022-08-30 RX ORDER — VALACYCLOVIR HYDROCHLORIDE 1 G/1
2000 TABLET, FILM COATED ORAL EVERY 12 HOURS
Qty: 12 TABLET | Refills: 3 | Status: SHIPPED | OUTPATIENT
Start: 2022-08-30 | End: 2023-01-11

## 2022-08-30 NOTE — TELEPHONE ENCOUNTER
Pt rx for zovirax ointment has been denied. The insurance company sent the following message.   We cover this drug when our criteria are met. The unmet criteria are: has tried or cannot use oral acyclovir. This decision was from [facility] Coverage Policy. Case

## 2022-10-17 ENCOUNTER — CLINICAL SUPPORT (OUTPATIENT)
Dept: CARDIOLOGY | Facility: HOSPITAL | Age: 67
End: 2022-10-17
Attending: INTERNAL MEDICINE
Payer: MEDICARE

## 2022-10-17 ENCOUNTER — HOSPITAL ENCOUNTER (OUTPATIENT)
Dept: RADIOLOGY | Facility: HOSPITAL | Age: 67
Discharge: HOME | End: 2022-10-17
Attending: INTERNAL MEDICINE
Payer: MEDICARE

## 2022-10-17 VITALS — BODY MASS INDEX: 19.65 KG/M2 | WEIGHT: 104 LBS

## 2022-10-17 DIAGNOSIS — I49.5 SICK SINUS SYNDROME: ICD-10-CM

## 2022-10-17 DIAGNOSIS — Z12.31 ENCOUNTER FOR SCREENING MAMMOGRAM FOR BREAST CANCER: ICD-10-CM

## 2022-10-17 DIAGNOSIS — Z95.0 PRESENCE OF CARDIAC PACEMAKER: ICD-10-CM

## 2022-10-17 DIAGNOSIS — I48.91 UNSPECIFIED ATRIAL FIBRILLATION: ICD-10-CM

## 2022-10-17 PROCEDURE — 77063 BREAST TOMOSYNTHESIS BI: CPT | Mod: TC

## 2022-10-17 PROCEDURE — 77067 SCR MAMMO BI INCL CAD: CPT | Mod: 26,,, | Performed by: RADIOLOGY

## 2022-10-17 PROCEDURE — 77067 MAMMO DIGITAL SCREENING BILAT WITH TOMO: ICD-10-PCS | Mod: 26,,, | Performed by: RADIOLOGY

## 2022-10-17 PROCEDURE — 77063 MAMMO DIGITAL SCREENING BILAT WITH TOMO: ICD-10-PCS | Mod: 26,,, | Performed by: RADIOLOGY

## 2022-10-17 PROCEDURE — 77063 BREAST TOMOSYNTHESIS BI: CPT | Mod: 26,,, | Performed by: RADIOLOGY

## 2022-10-17 PROCEDURE — 93294 CARDIAC DEVICE CHECK - REMOTE: ICD-10-PCS | Mod: ,,, | Performed by: INTERNAL MEDICINE

## 2022-10-17 PROCEDURE — 93296 REM INTERROG EVL PM/IDS: CPT | Performed by: INTERNAL MEDICINE

## 2022-10-17 PROCEDURE — 93294 REM INTERROG EVL PM/LDLS PM: CPT | Mod: ,,, | Performed by: INTERNAL MEDICINE

## 2022-10-20 ENCOUNTER — TELEPHONE (OUTPATIENT)
Dept: OPHTHALMOLOGY | Facility: CLINIC | Age: 67
End: 2022-10-20
Payer: MEDICARE

## 2022-10-20 NOTE — TELEPHONE ENCOUNTER
----- Message from Samy Velez sent at 10/20/2022  9:50 AM CDT -----  Regarding: Appt  Contact: Trudi Chiang believes she has a detach retina and would like to be seen.    [phone]

## 2022-10-24 ENCOUNTER — OFFICE VISIT (OUTPATIENT)
Dept: OPTOMETRY | Facility: CLINIC | Age: 67
End: 2022-10-24
Payer: MEDICARE

## 2022-10-24 DIAGNOSIS — H25.13 NUCLEAR SCLEROSIS, BILATERAL: ICD-10-CM

## 2022-10-24 DIAGNOSIS — H52.203 ASTIGMATISM OF BOTH EYES WITH PRESBYOPIA: ICD-10-CM

## 2022-10-24 DIAGNOSIS — H43.391 VITREOUS SYNERESIS OF RIGHT EYE: Primary | ICD-10-CM

## 2022-10-24 DIAGNOSIS — H52.4 ASTIGMATISM OF BOTH EYES WITH PRESBYOPIA: ICD-10-CM

## 2022-10-24 PROCEDURE — 92014 PR EYE EXAM, EST PATIENT,COMPREHESV: ICD-10-PCS | Mod: S$GLB,,, | Performed by: OPTOMETRIST

## 2022-10-24 PROCEDURE — 3044F HG A1C LEVEL LT 7.0%: CPT | Mod: CPTII,S$GLB,, | Performed by: OPTOMETRIST

## 2022-10-24 PROCEDURE — 1159F PR MEDICATION LIST DOCUMENTED IN MEDICAL RECORD: ICD-10-PCS | Mod: CPTII,S$GLB,, | Performed by: OPTOMETRIST

## 2022-10-24 PROCEDURE — 92014 COMPRE OPH EXAM EST PT 1/>: CPT | Mod: S$GLB,,, | Performed by: OPTOMETRIST

## 2022-10-24 PROCEDURE — 3044F PR MOST RECENT HEMOGLOBIN A1C LEVEL <7.0%: ICD-10-PCS | Mod: CPTII,S$GLB,, | Performed by: OPTOMETRIST

## 2022-10-24 PROCEDURE — 99999 PR PBB SHADOW E&M-EST. PATIENT-LVL I: CPT | Mod: PBBFAC,,, | Performed by: OPTOMETRIST

## 2022-10-24 PROCEDURE — 1160F PR REVIEW ALL MEDS BY PRESCRIBER/CLIN PHARMACIST DOCUMENTED: ICD-10-PCS | Mod: CPTII,S$GLB,, | Performed by: OPTOMETRIST

## 2022-10-24 PROCEDURE — 1160F RVW MEDS BY RX/DR IN RCRD: CPT | Mod: CPTII,S$GLB,, | Performed by: OPTOMETRIST

## 2022-10-24 PROCEDURE — 1159F MED LIST DOCD IN RCRD: CPT | Mod: CPTII,S$GLB,, | Performed by: OPTOMETRIST

## 2022-10-24 PROCEDURE — 99999 PR PBB SHADOW E&M-EST. PATIENT-LVL I: ICD-10-PCS | Mod: PBBFAC,,, | Performed by: OPTOMETRIST

## 2022-10-24 NOTE — PROGRESS NOTES
[facility] - Intensive Care ([place] [id number])  [facility] Medicine  Progress Note    Patient Name: Trudi Mcknight  MRN: [id number]  Patient Class: IP- Inpatient   Admission Date: 2/4/2022  Length of Stay: 1 days  Attending Physician: Phil Leon MD  Primary Care Provider: Renuka Moreno MD        Subjective:     Principal Problem:Paroxysmal atrial fibrillation with RVR        HPI:  66 year old female with past medical history of sick sinus syndrome (s/p [facility] dual chamber pacemaker placement on 1/20), paroxysmal Afib (on xarelto for AC), diastolic HF (EF 65%), hiatal hernia, esophagitis, Meniere's disease who present from cardiology clinic with Afib with RVR. She reports crushing substernal/left sided chest pain radiating to her neck and arms, dyspnea on exertion, and palpitations. Her chest pain worsens with eating, improves with burping. She denies pleuritic/positional chest pain. She reports not being able to tolerate metoprolol and diltiazem in the past, experiencing headache, low BP, and palpitations when taking them. She reports that her dyspnea has worsened through the month of of January; she used to be able to walk 2 miles per day and now struggles to walk to the restroom 2/2 to dyspnea. She also reports occasional morning lightheadedness lasting 15 min. She denies orthopnea/PND/leg swelling. She was prescribed lasix 20 mg by her outpatient cardiologist two days ago, and has been taking it since. She has not been taking her diltiazem due her intolerance.     In the ED, patient received Lasix 20 mg. Troponins x2 negative, EKG shows Aflutter with variable AV block. Cardiology was consulted, recommend RADHA/DC cardioversion on Monday 2/7.       Overview/Hospital Course:  Patient has been rate controlled without any medications in the 60-90s mostly. Dc'd lasix due to hypotension and lightheadedness. Plan for DCCV on Monday, continue rivaroxaban for AC.       Interval History: NAOE, rate controlled off BB  or CCB. DC'd Lasix due to hypotension and lightheadedness.     Review of Systems   Constitutional: Negative for chills and fever.   Eyes: Negative for photophobia and visual disturbance.   Respiratory: Negative for cough, shortness of breath and wheezing.    Cardiovascular: Negative for chest pain, palpitations and leg swelling.   Gastrointestinal: Negative for abdominal pain, constipation, diarrhea, nausea and vomiting.   Genitourinary: Negative for difficulty urinating and dysuria.   Musculoskeletal: Negative for arthralgias and back pain.   Neurological: Positive for light-headedness and headaches (ih metoprolol/diltiazem).   Psychiatric/Behavioral: Negative for agitation and behavioral problems.     Objective:     Vital Signs (Most Recent):  Temp: 97.5 °F (36.4 °C) (02/05/22 1145)  Pulse: 90 (02/05/22 1315)  Resp: 15 (02/05/22 0418)  BP: 95/62 (02/05/22 1315)  SpO2: 98 % (02/05/22 1315) Vital Signs (24h Range):  Temp:  [97.5 °F (36.4 °C)-98.8 °F (37.1 °C)] 97.5 °F (36.4 °C)  Pulse:  [] 90  Resp:  [15-29] 15  SpO2:  [98 %-100 %] 98 %  BP: ()/(55-80) 95/62     Weight: 56.7 kg (125 lb)  Body mass index is 23.62 kg/m².    Intake/Output Summary (Last 24 hours) at 2/5/2022 1529  Last data filed at 2/5/2022 1030  Gross per 24 hour   Intake 500 ml   Output 1025 ml   Net -525 ml      Physical Exam  Constitutional:       General: She is not in acute distress.     Appearance: She is normal weight. She is not ill-appearing.   HENT:      Head: Normocephalic and atraumatic.      Nose: Nose normal.      Mouth/Throat:      Mouth: Mucous membranes are moist.   Eyes:      General: No scleral icterus.     Conjunctiva/sclera: Conjunctivae normal.   Cardiovascular:      Rate and Rhythm: Normal rate. Rhythm irregular.      Pulses: Normal pulses.      Heart sounds: No murmur heard.  No gallop.       Comments: No JVD  Pulmonary:      Effort: Pulmonary effort is normal. No respiratory distress.      Breath sounds: Normal  breath sounds. No wheezing or rales.   Abdominal:      General: Abdomen is flat. Bowel sounds are normal. There is no distension.      Palpations: Abdomen is soft.      Tenderness: There is no abdominal tenderness. There is no guarding.   Musculoskeletal:         General: No swelling or tenderness. Normal range of motion.      Cervical back: Normal range of motion. No rigidity.      Right lower leg: No edema.      Left lower leg: No edema.   Skin:     General: Skin is warm and dry.      Capillary Refill: Capillary refill takes less than 2 seconds.      Coloration: Skin is not jaundiced.   Neurological:      General: No focal deficit present.      Mental Status: She is alert and oriented to person, place, and time.   Psychiatric:         Mood and Affect: Mood normal.         Behavior: Behavior normal.         Significant Labs: All pertinent labs within the past 24 hours have been reviewed.    Significant Imaging: I have reviewed all pertinent imaging results/findings within the past 24 hours.      Assessment/Plan:      * Paroxysmal atrial fibrillation with RVR  Patient with Paroxysmal (<7 days) atrial fibrillation which is uncontrolled currently with Calcium Channel Blocker. Patient is currently in atrial fibrillation.[id number] Score: 3. Anticoagulation indicated. Anticoagulation done with rivaroxaban.    - Restarted home AC med xarelto, CHADS VASC 3  - Rate/rhythm control per cards recommendations. Patient has not tolerated metoprolol or diltiazem in the past.   - May consider amiodarone/dofetilide for rhythm control post DCCV  - Cardioversion planned for Monday 2/7, NPO at midnight on 2/7  - EP consulted, appreciate recs.         Acute on chronic diastolic CHF (congestive heart failure)  Heart failure with preserved EF (65%) on 1/20/22. No signs of fluid overload on physical exam. History not consistent with HF exacerbation as patient denies orthopnea/PND/recurrent LE edema. Started on Lasix 20 mg by her  cardiologist Dr. Dominguez on 2/2/22. BNP was 196 on admission, down from 320 in January. Troponins x2 negative. Exam and labs not indicative of acute exacerbation. Likely that uncontrolled Afib contributing to HF.     - Hold Lasix  - daily weights  - Strict I/Os  - cardiac diet (low Na 2 g, fluid restriction 1.5 L)          Bilateral pleural effusion  Patient reports SOB, no orthopnea. History of HFpEF.   CXR (2/4): No particular change in the size of the small left pleural effusion and trace right pleural effusion.  Afebrile, no white count, no consolidation present, low concern for infectious process.     - HOLD lasix due to lightheadedness with hypotension    Esophagitis  Patient reports history of esophagitis and has trouble swallowing uncoated pills.     - continue home med pantoprozole      Typical atrial flutter    See paroxysmal afib with RVR    Chest pain  Patient complains of burning/crushing chest pain that radiating to neck/arms accompanied by SOB. Reports relief with burping and possible relief with omeprazole. Troponins x2 negative. No ST changes on EKG. EKG consistent with aflutter.     - Pantoprazole daily for esophagitis related chest pain  - prn EKG and trop stat if recurrence of chest pain        VTE Risk Mitigation (From admission, onward)         Ordered     rivaroxaban tablet 20 mg  With dinner         02/04/22 1603     IP VTE HIGH RISK PATIENT  Once         02/04/22 1410     Place sequential compression device  Until discontinued         02/04/22 1410     Place sequential compression device  Until discontinued         02/04/22 1349                Discharge Planning   STEVE: 2/9/2022     Code Status: Full Code   Is the patient medically ready for discharge?: No    Reason for patient still in hospital (select all that apply): Treatment and Consult recommendations                     Js Sales MD  Department of Hospital Medicine   [facility] - Intensive Care ([facility]     Propranolol Counseling:  I discussed with the patient the risks of propranolol including but not limited to low heart rate, low blood pressure, low blood sugar, restlessness and increased cold sensitivity. They should call the office if they experience any of these side effects.

## 2022-10-26 NOTE — PROGRESS NOTES
HPI    Patient began having some discomfort and increased floaters OD for a few   weeks.   Patient states last weekend she began having hazy VA OD and flashes.   Floater coming and going out of [place].   Last edited by Erika Renee on 10/24/2022 10:20 AM.            Assessment /Plan     For exam results, see Encounter Report.    Vitreous syneresis of right eye    Nuclear sclerosis, bilateral    Astigmatism of both eyes with presbyopia    RTC 6 weeks for DFE/ follow up

## 2022-11-07 ENCOUNTER — PATIENT MESSAGE (OUTPATIENT)
Dept: DERMATOLOGY | Facility: CLINIC | Age: 67
End: 2022-11-07

## 2022-11-07 ENCOUNTER — OFFICE VISIT (OUTPATIENT)
Dept: DERMATOLOGY | Facility: CLINIC | Age: 67
End: 2022-11-07
Payer: MEDICARE

## 2022-11-07 DIAGNOSIS — L71.9 ROSACEA: Primary | ICD-10-CM

## 2022-11-07 PROCEDURE — 99204 PR OFFICE/OUTPT VISIT, NEW, LEVL IV, 45-59 MIN: ICD-10-PCS | Mod: 95,,, | Performed by: DERMATOLOGY

## 2022-11-07 PROCEDURE — 1159F MED LIST DOCD IN RCRD: CPT | Mod: CPTII,95,, | Performed by: DERMATOLOGY

## 2022-11-07 PROCEDURE — 1159F PR MEDICATION LIST DOCUMENTED IN MEDICAL RECORD: ICD-10-PCS | Mod: CPTII,95,, | Performed by: DERMATOLOGY

## 2022-11-07 PROCEDURE — 99204 OFFICE O/P NEW MOD 45 MIN: CPT | Mod: 95,,, | Performed by: DERMATOLOGY

## 2022-11-07 PROCEDURE — 3044F PR MOST RECENT HEMOGLOBIN A1C LEVEL <7.0%: ICD-10-PCS | Mod: CPTII,95,, | Performed by: DERMATOLOGY

## 2022-11-07 PROCEDURE — 1160F RVW MEDS BY RX/DR IN RCRD: CPT | Mod: CPTII,95,, | Performed by: DERMATOLOGY

## 2022-11-07 PROCEDURE — 3044F HG A1C LEVEL LT 7.0%: CPT | Mod: CPTII,95,, | Performed by: DERMATOLOGY

## 2022-11-07 PROCEDURE — 1160F PR REVIEW ALL MEDS BY PRESCRIBER/CLIN PHARMACIST DOCUMENTED: ICD-10-PCS | Mod: CPTII,95,, | Performed by: DERMATOLOGY

## 2022-11-07 RX ORDER — SODIUM SULFACETAMIDE AND SULFUR 80; 40 MG/ML; MG/ML
SOLUTION TOPICAL
Qty: 473 ML | Refills: 3 | Status: SHIPPED | OUTPATIENT
Start: 2022-11-07

## 2022-11-07 NOTE — PATIENT INSTRUCTIONS
PM:  Wash with sulfacleanser  Thin film of ivermectin/metrogel/azaleic acid  all over every other to every night   Moisturize with cerave pm or vanicream daily moisturizer to minimize irritation    AM:  Wash with mild cleanser like cerave hydrating cleanser  3. Moisturizer with spf 30+ such as cerave am

## 2022-11-07 NOTE — PROGRESS NOTES
Subjective:       Patient ID:  Trudi Mcknight is a 67 y.o. female who presents for No chief complaint on file.    The patient location is: [place]  The chief complaint leading to consultation is: wendy    Visit type: audiovisual    Face to Face time with patient: 5 minutes  8 minutes of total time spent on the encounter, which includes face to face time and non-face to face time preparing to see the patient (eg, review of tests), Obtaining and/or reviewing separately obtained history, Documenting clinical information in the electronic or other health record, Independently interpreting results (not separately reported) and communicating results to the patient/family/caregiver, or Care coordination (not separately reported).         Each patient to whom he or she provides medical services by telemedicine is:  (1) informed of the relationship between the physician and patient and the respective role of any other health care provider with respect to management of the patient; and (2) notified that he or she may decline to receive medical services by telemedicine and may withdraw from such care at any time.    Notes:   Pt c/o rash /acne  around her nose and right cheek and left brow. Pt feels the areas are no longer raised but are still red. Pt has used acne medication otc  and this is not helping. Happens on cheeks, eyelids, nose; lesions burst and then do not resolve  Pt feels it is due to amiodarone as she changed to this medication 4/2022         Review of Systems   Gastrointestinal:  Positive for Sensitivity to oral antibiotics.   Skin:  Positive for daily sunscreen use.      Objective:    Physical Exam   Constitutional: She appears well-developed and well-nourished. No distress.   Neurological: She is alert and oriented to person, place, and time. She is not disoriented.   Psychiatric: She has a normal mood and affect.   Skin:               Diagram Legend     Erythematous scaling macule/papule c/w actinic keratosis        Vascular papule c/w angioma      Pigmented verrucoid papule/plaque c/w seborrheic keratosis      Yellow umbilicated papule c/w sebaceous hyperplasia      Irregularly shaped tan macule c/w lentigo     1-2 mm smooth white papules consistent with Milia      Movable subcutaneous cyst with punctum c/w epidermal inclusion cyst      Subcutaneous movable cyst c/w pilar cyst      Firm pink to brown papule c/w dermatofibroma      Pedunculated fleshy papule(s) c/w skin tag(s)      Evenly pigmented macule c/w junctional nevus     Mildly variegated pigmented, slightly irregular-bordered macule c/w mildly atypical nevus      Flesh colored to evenly pigmented papule c/w intradermal nevus       Pink pearly papule/plaque c/w basal cell carcinoma      Erythematous hyperkeratotic cursted plaque c/w SCC      Surgical scar with no sign of skin cancer recurrence      Open and closed comedones      Inflammatory papules and pustules      Verrucoid papule consistent consistent with wart     Erythematous eczematous patches and plaques     Dystrophic onycholytic nail with subungual debris c/w onychomycosis     Umbilicated papule    Erythematous-base heme-crusted tan verrucoid plaque consistent with inflamed seborrheic keratosis     Erythematous Silvery Scaling Plaque c/w Psoriasis     See annotation                Assessment / Plan:        Rosacea  Unlikely due to amiodarone.  Can be treated but maybe not cured.     -     sulfacetamide sodium-sulfur (SULFACLEANSE 8-4) 8-4 % Susp; Wash face qhs  Dispense: 473 mL; Refill: 3  -     metroNIDAZOLE (NORITATE) 1 % cream; Compound azelaic acid 15% + ivermectin 1% + metronidazole 1% cream. Apply to affected area on face once or twice daily.  Dispense: 30 g; Refill: 5  PM:  Wash with sulfacleanser  Thin film of ivermectin/metrogel/azaleic acid  all over every other to every night   Moisturize with cerave pm or vanicream daily moisturizer to minimize irritation    AM:  Wash with mild cleanser like  cerave hydrating cleanser  3. Moisturizer with spf 30+ such as cerave am       consider oral low dose doxy if needed               Follow up in about 2 months (around 1/7/2023).

## 2022-11-16 NOTE — PROGRESS NOTES
Ms. Mcknight is a patient of Dr. Duncan and was last seen in clinic 6/22/2022.      Subjective:   Patient ID:  Trudi Mcknight is a 67 y.o. female who presents for follow-up of Pacemaker Check and Atrial Fibrillation  .     HPI:    Ms. Mcknight is a 67 y.o. female with atrial fibrillation (cryo PVI 3/29/2022, RF PVI 8/16/2022), sick sinus syndrome, PPM, anxiety, hypothyroidism here for follow up after ablation.    Background:    Initially presented with symptomatic AF and symptomatic sinus pauses. Also noted chest pain not related to these events.  Dual chamber PPM implanted 1/20/22.  Continued to have AF. Did not tolerate Flecainide or Propafenone.  Echo revealed moderate pericardial effusion, which resolved.  PVI 3/29/22 Cryo-ablation.  Continued to have symptomatic AF/nonsustained AT. Also had multiple hospitalizations for somatic complaints not related to this, with anxiety playing a significant role.  Placed on amiodarone.  Echo 4/6/22 normal biventricular structure and function mild LAE small posterolateral effusion.    6/22/2022:  Has started to improve. No recent hospitalizations. Device interrogation reveals no sustained AF in the past month.   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 Being followed by Endocrinology.  Notes tremors. Also notes rash.  Doing well with amiodarone.   Options are repeat ablation and discontinue amiodarone vs continue amiodarone for now.  We discussed both options at length. I initially endorsed continuing with amiodarone, given she is now doing better after frequent hospitalizations. However, her preference is repeat ablation and trial off amiodarone, given the tremors, rash, and hypothyroidism.  Risks and benefits of repeat RFA/PVI discussed, she would like to proceed.  Hold Xarelto morning of procedure, take evening prior.    Update (11/21/2022):    8/16/2022:  Successful pulmonary vein RF ablation.  3D mapping performed with Ensite.  Device reprogramming.  Posterior  wall isolation.  Intracardiac echo.    Today she says she feels much better since her procedure. Only felt palps when she had covid. No CP, worsening MARLEY, LH, syncope. Feels much better off amiodarone (couldn't drive on this med).    She is currently taking xarelto 15mg daily (CRCL 37) for stroke prophylaxis and denies significant bleeding episodes. She is no longer on amiodarone. Kidney function is stable, with a creatinine of 1.1 on 8/9/2022.    Device Interrogation (11/21/2022) reveals an intrinsic SR with stable lead and device function. No recent arrhythmias or treated episodes were noted since last remote today. AF burden <1% previously. She paces 1.6% in the RA and <1% in the RV. Estimated battery longevity 6.7-10.6 years.     I have personally reviewed the patient's EKG today, which shows sinus rhythm at 68bpm. IA interval is 156. QRS is 74. QTc is 435.    Relevant Cardiac Test Results:    2D Echo (4/6/2022):  The left ventricle is normal in size with normal systolic function. The estimated ejection fraction is 55%.  Normal right ventricular size with normal right ventricular systolic function.  Indeterminate left ventricular diastolic function.  Mild left atrial enlargement.  Intermediate central venous pressure (8 mmHg).  Small circumferential pericardial effusion, largest inferolaterally. No evidence of tamponade.  There was frequent ventricular ectopy throughout the examination.    Current Outpatient Medications   Medication Sig    ALPRAZolam (XANAX) 0.25 MG tablet Take 1 tablet (0.25 mg total) by mouth 3 (three) times daily as needed for Anxiety.    levothyroxine (SYNTHROID) 25 MCG tablet Take 1 tablet (25 mcg total) by mouth before breakfast.    metroNIDAZOLE (NORITATE) 1 % cream Compound azelaic acid 15% + ivermectin 1% + metronidazole 1% cream. Apply to affected area on face once or twice daily.    red yeast rice 600 mg Cap 2 capsules/ day    rivaroxaban (XARELTO) 15 mg Tab Take 1 tablet (15 mg  "total) by mouth daily with dinner or evening meal.    sulfacetamide sodium-sulfur (SULFACLEANSE 8-4) 8-4 % Susp Wash face qhs    venlafaxine (EFFEXOR-XR) 37.5 MG 24 hr capsule Take 1 capsule (37.5 mg total) by mouth once daily.    calcium carbonate (TUMS) 200 mg calcium (500 mg) chewable tablet Take 1 tablet by mouth as needed. 2-3D PRE WEEK    valACYclovir (VALTREX) 1000 MG tablet Take 2 tablets (2,000 mg total) by mouth every 12 (twelve) hours. For two doses when needed for cold sore for 2 doses     No current facility-administered medications for this visit.       Review of Systems   Constitutional: Negative for malaise/fatigue.   Cardiovascular:  Negative for chest pain, dyspnea on exertion, irregular heartbeat, leg swelling and palpitations.   Respiratory:  Negative for shortness of breath.    Hematologic/Lymphatic: Negative for bleeding problem.   Skin:  Negative for rash.   Musculoskeletal:  Negative for myalgias.   Gastrointestinal:  Negative for hematemesis, hematochezia and nausea.   Genitourinary:  Negative for hematuria.   Neurological:  Negative for light-headedness.   Psychiatric/Behavioral:  Negative for altered mental status.    Allergic/Immunologic: Negative for persistent infections.     Objective:          /74   Pulse 68   Ht 5' 1" (1.549 m)   Wt 49 kg (108 lb 0.4 oz)   BMI 20.41 kg/m²     Physical Exam  Vitals and nursing note reviewed.   Constitutional:       Appearance: Normal appearance. She is well-developed.   HENT:      Head: Normocephalic.      Nose: Nose normal.   Eyes:      Pupils: Pupils are equal, round, and reactive to light.   Cardiovascular:      Rate and Rhythm: Normal rate and regular rhythm.   Pulmonary:      Effort: No respiratory distress.      Breath sounds: Normal breath sounds.   Chest:      Comments: PPM in situ  Musculoskeletal:         General: Normal range of motion.   Skin:     General: Skin is warm and dry.      Findings: No erythema.   Neurological:      " Mental Status: She is alert and oriented to person, place, and time.   Psychiatric:         Speech: Speech normal.         Behavior: Behavior normal.         Lab Results   Component Value Date     08/09/2022    K 3.8 08/09/2022    MG 2.1 04/09/2022    BUN 17 08/09/2022    CREATININE 1.1 08/09/2022    ALT 11 04/09/2022    AST 15 04/09/2022    HGB 13.5 08/09/2022    HCT 43.2 08/09/2022    HCT 43 01/02/2022    TSH 1.683 06/03/2022    LDLCALC 107.8 04/04/2022       Recent Labs   Lab 03/29/22  0535 04/04/22 2015 08/09/22  0829   INR 1.1 1.3 H 1.2         Assessment:     1. Paroxysmal atrial fibrillation    2. Sick sinus syndrome    3. Typical atrial flutter    4. Amiodarone-induced thyroiditis    5. Chronic anticoagulation      Plan:     In summary, Ms. Mcknight is a 67 y.o. female with atrial fibrillation, sick sinus syndrome, PPM, anxiety, hypothyroidism here for follow up after ablation.  She is 3 months s/p redo ablation. She is feeling well since procedure. Did have some AF during blanking period (1%), mostly while having covid. She is reporting symptom improvement.  She is off amiodarone since Sept. CHADSVASc 2 on xarelto at renal dose (CRCL 37). No RV pacing. No CHF symptoms. Echo 4/2022 showed normal LVEF.    Continue current medication regimen and device settings.   Follow up in device clinic as scheduled.   Follow up in EP clinic in 6 mo, sooner as needed.     *A copy of this note has been sent to Dr. Duncan*    Follow up in about 6 months (around 5/21/2023).    ------------------------------------------------------------------    Renuka Toth, YOUSIF, NP-C  Cardiac Electrophysiology

## 2022-11-21 ENCOUNTER — OFFICE VISIT (OUTPATIENT)
Dept: ELECTROPHYSIOLOGY | Facility: CLINIC | Age: 67
End: 2022-11-21
Payer: MEDICARE

## 2022-11-21 ENCOUNTER — HOSPITAL ENCOUNTER (OUTPATIENT)
Dept: CARDIOLOGY | Facility: CLINIC | Age: 67
Discharge: HOME | End: 2022-11-21
Payer: MEDICARE

## 2022-11-21 ENCOUNTER — CLINICAL SUPPORT (OUTPATIENT)
Dept: CARDIOLOGY | Facility: HOSPITAL | Age: 67
End: 2022-11-21
Attending: INTERNAL MEDICINE
Payer: MEDICARE

## 2022-11-21 VITALS
BODY MASS INDEX: 20.39 KG/M2 | HEIGHT: 61 IN | DIASTOLIC BLOOD PRESSURE: 74 MMHG | SYSTOLIC BLOOD PRESSURE: 133 MMHG | WEIGHT: 108 LBS | HEART RATE: 68 BPM

## 2022-11-21 DIAGNOSIS — I49.8 OTHER SPECIFIED CARDIAC ARRHYTHMIAS: ICD-10-CM

## 2022-11-21 DIAGNOSIS — T46.2X5A AMIODARONE-INDUCED THYROIDITIS: ICD-10-CM

## 2022-11-21 DIAGNOSIS — I48.0 PAROXYSMAL ATRIAL FIBRILLATION: Primary | ICD-10-CM

## 2022-11-21 DIAGNOSIS — I49.5 SICK SINUS SYNDROME: Chronic | ICD-10-CM

## 2022-11-21 DIAGNOSIS — Z79.01 CHRONIC ANTICOAGULATION: ICD-10-CM

## 2022-11-21 DIAGNOSIS — E06.4 AMIODARONE-INDUCED THYROIDITIS: ICD-10-CM

## 2022-11-21 DIAGNOSIS — I48.3 TYPICAL ATRIAL FLUTTER: ICD-10-CM

## 2022-11-21 PROCEDURE — 93005 ELECTROCARDIOGRAM TRACING: CPT | Mod: S$GLB,,, | Performed by: INTERNAL MEDICINE

## 2022-11-21 PROCEDURE — 93280 PM DEVICE PROGR EVAL DUAL: CPT

## 2022-11-21 PROCEDURE — 93010 ELECTROCARDIOGRAM REPORT: CPT | Mod: S$GLB,,, | Performed by: INTERNAL MEDICINE

## 2022-11-21 PROCEDURE — 3044F PR MOST RECENT HEMOGLOBIN A1C LEVEL <7.0%: ICD-10-PCS | Mod: CPTII,S$GLB,, | Performed by: NURSE PRACTITIONER

## 2022-11-21 PROCEDURE — 3008F BODY MASS INDEX DOCD: CPT | Mod: CPTII,S$GLB,, | Performed by: NURSE PRACTITIONER

## 2022-11-21 PROCEDURE — 1126F AMNT PAIN NOTED NONE PRSNT: CPT | Mod: CPTII,S$GLB,, | Performed by: NURSE PRACTITIONER

## 2022-11-21 PROCEDURE — 99214 OFFICE O/P EST MOD 30 MIN: CPT | Mod: S$GLB,,, | Performed by: NURSE PRACTITIONER

## 2022-11-21 PROCEDURE — 3075F PR MOST RECENT SYSTOLIC BLOOD PRESS GE 130-139MM HG: ICD-10-PCS | Mod: CPTII,S$GLB,, | Performed by: NURSE PRACTITIONER

## 2022-11-21 PROCEDURE — 99999 PR PBB SHADOW E&M-EST. PATIENT-LVL III: ICD-10-PCS | Mod: PBBFAC,,, | Performed by: NURSE PRACTITIONER

## 2022-11-21 PROCEDURE — 3075F SYST BP GE 130 - 139MM HG: CPT | Mod: CPTII,S$GLB,, | Performed by: NURSE PRACTITIONER

## 2022-11-21 PROCEDURE — 1101F PT FALLS ASSESS-DOCD LE1/YR: CPT | Mod: CPTII,S$GLB,, | Performed by: NURSE PRACTITIONER

## 2022-11-21 PROCEDURE — 1160F RVW MEDS BY RX/DR IN RCRD: CPT | Mod: CPTII,S$GLB,, | Performed by: NURSE PRACTITIONER

## 2022-11-21 PROCEDURE — 3288F PR FALLS RISK ASSESSMENT DOCUMENTED: ICD-10-PCS | Mod: CPTII,S$GLB,, | Performed by: NURSE PRACTITIONER

## 2022-11-21 PROCEDURE — 93280 CARDIAC DEVICE CHECK - IN CLINIC & HOSPITAL: ICD-10-PCS | Mod: 26,,, | Performed by: INTERNAL MEDICINE

## 2022-11-21 PROCEDURE — 3008F PR BODY MASS INDEX (BMI) DOCUMENTED: ICD-10-PCS | Mod: CPTII,S$GLB,, | Performed by: NURSE PRACTITIONER

## 2022-11-21 PROCEDURE — 93280 PM DEVICE PROGR EVAL DUAL: CPT | Mod: 26,,, | Performed by: INTERNAL MEDICINE

## 2022-11-21 PROCEDURE — 1101F PR PT FALLS ASSESS DOC 0-1 FALLS W/OUT INJ PAST YR: ICD-10-PCS | Mod: CPTII,S$GLB,, | Performed by: NURSE PRACTITIONER

## 2022-11-21 PROCEDURE — 1159F MED LIST DOCD IN RCRD: CPT | Mod: CPTII,S$GLB,, | Performed by: NURSE PRACTITIONER

## 2022-11-21 PROCEDURE — 3288F FALL RISK ASSESSMENT DOCD: CPT | Mod: CPTII,S$GLB,, | Performed by: NURSE PRACTITIONER

## 2022-11-21 PROCEDURE — 93010 RHYTHM STRIP: ICD-10-PCS | Mod: S$GLB,,, | Performed by: INTERNAL MEDICINE

## 2022-11-21 PROCEDURE — 99999 PR PBB SHADOW E&M-EST. PATIENT-LVL III: CPT | Mod: PBBFAC,,, | Performed by: NURSE PRACTITIONER

## 2022-11-21 PROCEDURE — 3078F PR MOST RECENT DIASTOLIC BLOOD PRESSURE < 80 MM HG: ICD-10-PCS | Mod: CPTII,S$GLB,, | Performed by: NURSE PRACTITIONER

## 2022-11-21 PROCEDURE — 3044F HG A1C LEVEL LT 7.0%: CPT | Mod: CPTII,S$GLB,, | Performed by: NURSE PRACTITIONER

## 2022-11-21 PROCEDURE — 1126F PR PAIN SEVERITY QUANTIFIED, NO PAIN PRESENT: ICD-10-PCS | Mod: CPTII,S$GLB,, | Performed by: NURSE PRACTITIONER

## 2022-11-21 PROCEDURE — 1159F PR MEDICATION LIST DOCUMENTED IN MEDICAL RECORD: ICD-10-PCS | Mod: CPTII,S$GLB,, | Performed by: NURSE PRACTITIONER

## 2022-11-21 PROCEDURE — 1160F PR REVIEW ALL MEDS BY PRESCRIBER/CLIN PHARMACIST DOCUMENTED: ICD-10-PCS | Mod: CPTII,S$GLB,, | Performed by: NURSE PRACTITIONER

## 2022-11-21 PROCEDURE — 99214 PR OFFICE/OUTPT VISIT, EST, LEVL IV, 30-39 MIN: ICD-10-PCS | Mod: S$GLB,,, | Performed by: NURSE PRACTITIONER

## 2022-11-21 PROCEDURE — 3078F DIAST BP <80 MM HG: CPT | Mod: CPTII,S$GLB,, | Performed by: NURSE PRACTITIONER

## 2022-11-21 PROCEDURE — 93005 RHYTHM STRIP: ICD-10-PCS | Mod: S$GLB,,, | Performed by: INTERNAL MEDICINE

## 2022-11-27 ENCOUNTER — IMMUNIZATION (OUTPATIENT)
Dept: PRIMARY CARE CLINIC | Facility: CLINIC | Age: 67
End: 2022-11-27
Payer: MEDICARE

## 2022-11-27 DIAGNOSIS — Z23 NEED FOR VACCINATION: Primary | ICD-10-CM

## 2022-11-27 PROCEDURE — 0134A COVID-19, MRNA, LNP-S, BIVALENT BOOSTER, PF, 50 MCG/0.5 ML: CPT | Mod: CV19,PBBFAC | Performed by: INTERNAL MEDICINE

## 2022-11-27 PROCEDURE — 91313 COVID-19, MRNA, LNP-S, BIVALENT BOOSTER, PF, 50 MCG/0.5 ML: CPT | Mod: PBBFAC | Performed by: INTERNAL MEDICINE

## 2022-12-06 ENCOUNTER — OFFICE VISIT (OUTPATIENT)
Dept: OPTOMETRY | Facility: CLINIC | Age: 67
End: 2022-12-06
Payer: MEDICARE

## 2022-12-06 DIAGNOSIS — H43.391 VITREOUS SYNERESIS OF RIGHT EYE: Primary | ICD-10-CM

## 2022-12-06 DIAGNOSIS — H25.13 NUCLEAR SCLEROSIS, BILATERAL: ICD-10-CM

## 2022-12-06 DIAGNOSIS — H52.4 ASTIGMATISM OF BOTH EYES WITH PRESBYOPIA: ICD-10-CM

## 2022-12-06 DIAGNOSIS — H52.203 ASTIGMATISM OF BOTH EYES WITH PRESBYOPIA: ICD-10-CM

## 2022-12-06 PROCEDURE — 1159F MED LIST DOCD IN RCRD: CPT | Mod: CPTII,S$GLB,, | Performed by: OPTOMETRIST

## 2022-12-06 PROCEDURE — 92014 PR EYE EXAM, EST PATIENT,COMPREHESV: ICD-10-PCS | Mod: S$GLB,,, | Performed by: OPTOMETRIST

## 2022-12-06 PROCEDURE — 92014 COMPRE OPH EXAM EST PT 1/>: CPT | Mod: S$GLB,,, | Performed by: OPTOMETRIST

## 2022-12-06 PROCEDURE — 3288F FALL RISK ASSESSMENT DOCD: CPT | Mod: CPTII,S$GLB,, | Performed by: OPTOMETRIST

## 2022-12-06 PROCEDURE — 99999 PR PBB SHADOW E&M-EST. PATIENT-LVL II: ICD-10-PCS | Mod: PBBFAC,,, | Performed by: OPTOMETRIST

## 2022-12-06 PROCEDURE — 99999 PR PBB SHADOW E&M-EST. PATIENT-LVL II: CPT | Mod: PBBFAC,,, | Performed by: OPTOMETRIST

## 2022-12-06 PROCEDURE — 1100F PR PT FALLS ASSESS DOC 2+ FALLS/FALL W/INJURY/YR: ICD-10-PCS | Mod: CPTII,S$GLB,, | Performed by: OPTOMETRIST

## 2022-12-06 PROCEDURE — 1126F PR PAIN SEVERITY QUANTIFIED, NO PAIN PRESENT: ICD-10-PCS | Mod: CPTII,S$GLB,, | Performed by: OPTOMETRIST

## 2022-12-06 PROCEDURE — 1126F AMNT PAIN NOTED NONE PRSNT: CPT | Mod: CPTII,S$GLB,, | Performed by: OPTOMETRIST

## 2022-12-06 PROCEDURE — 1100F PTFALLS ASSESS-DOCD GE2>/YR: CPT | Mod: CPTII,S$GLB,, | Performed by: OPTOMETRIST

## 2022-12-06 PROCEDURE — 3044F PR MOST RECENT HEMOGLOBIN A1C LEVEL <7.0%: ICD-10-PCS | Mod: CPTII,S$GLB,, | Performed by: OPTOMETRIST

## 2022-12-06 PROCEDURE — 3288F PR FALLS RISK ASSESSMENT DOCUMENTED: ICD-10-PCS | Mod: CPTII,S$GLB,, | Performed by: OPTOMETRIST

## 2022-12-06 PROCEDURE — 3044F HG A1C LEVEL LT 7.0%: CPT | Mod: CPTII,S$GLB,, | Performed by: OPTOMETRIST

## 2022-12-06 PROCEDURE — 1159F PR MEDICATION LIST DOCUMENTED IN MEDICAL RECORD: ICD-10-PCS | Mod: CPTII,S$GLB,, | Performed by: OPTOMETRIST

## 2022-12-06 NOTE — PROGRESS NOTES
HPI    Patient states floaters have improved and she hasnt had any flashes since   last visit   States she still gets haze over vision but not as much as before .    Gtts-none   Last edited by Cynthia Ansari on 12/6/2022  8:16 AM.            Assessment /Plan     For exam results, see Encounter Report.    Vitreous syneresis of right eye   Stable, monitor     Nuclear sclerosis, bilateral           RTC 1 year

## 2022-12-13 ENCOUNTER — OFFICE VISIT (OUTPATIENT)
Dept: PSYCHIATRY | Facility: CLINIC | Age: 67
End: 2022-12-13
Payer: MEDICARE

## 2022-12-13 DIAGNOSIS — F33.0 MDD (MAJOR DEPRESSIVE DISORDER), RECURRENT EPISODE, MILD: ICD-10-CM

## 2022-12-13 DIAGNOSIS — F41.0 GENERALIZED ANXIETY DISORDER WITH PANIC ATTACKS: Primary | ICD-10-CM

## 2022-12-13 DIAGNOSIS — F41.1 GENERALIZED ANXIETY DISORDER WITH PANIC ATTACKS: Primary | ICD-10-CM

## 2022-12-13 PROCEDURE — 1159F PR MEDICATION LIST DOCUMENTED IN MEDICAL RECORD: ICD-10-PCS | Mod: CPTII,95,, | Performed by: PHYSICIAN ASSISTANT

## 2022-12-13 PROCEDURE — 1159F MED LIST DOCD IN RCRD: CPT | Mod: CPTII,95,, | Performed by: PHYSICIAN ASSISTANT

## 2022-12-13 PROCEDURE — 99214 OFFICE O/P EST MOD 30 MIN: CPT | Mod: 95,,, | Performed by: PHYSICIAN ASSISTANT

## 2022-12-13 PROCEDURE — 3044F HG A1C LEVEL LT 7.0%: CPT | Mod: CPTII,95,, | Performed by: PHYSICIAN ASSISTANT

## 2022-12-13 PROCEDURE — 1160F PR REVIEW ALL MEDS BY PRESCRIBER/CLIN PHARMACIST DOCUMENTED: ICD-10-PCS | Mod: CPTII,95,, | Performed by: PHYSICIAN ASSISTANT

## 2022-12-13 PROCEDURE — 3044F PR MOST RECENT HEMOGLOBIN A1C LEVEL <7.0%: ICD-10-PCS | Mod: CPTII,95,, | Performed by: PHYSICIAN ASSISTANT

## 2022-12-13 PROCEDURE — 99214 PR OFFICE/OUTPT VISIT, EST, LEVL IV, 30-39 MIN: ICD-10-PCS | Mod: 95,,, | Performed by: PHYSICIAN ASSISTANT

## 2022-12-13 PROCEDURE — 1160F RVW MEDS BY RX/DR IN RCRD: CPT | Mod: CPTII,95,, | Performed by: PHYSICIAN ASSISTANT

## 2022-12-13 RX ORDER — ALPRAZOLAM 0.25 MG/1
0.25 TABLET ORAL 3 TIMES DAILY PRN
Qty: 90 TABLET | Refills: 3 | Status: SHIPPED | OUTPATIENT
Start: 2022-12-13 | End: 2023-04-11 | Stop reason: SDUPTHER

## 2022-12-13 RX ORDER — VENLAFAXINE HYDROCHLORIDE 37.5 MG/1
37.5 CAPSULE, EXTENDED RELEASE ORAL DAILY
Qty: 90 CAPSULE | Refills: 2 | Status: SHIPPED | OUTPATIENT
Start: 2022-12-13 | End: 2023-04-11 | Stop reason: SDUPTHER

## 2022-12-13 NOTE — PROGRESS NOTES
"The patient location is: [place]    Visit type: audiovisual    Face to Face time with patient: 8  15 minutes of total time spent on the encounter, which includes face to face time and non-face to face time preparing to see the patient (eg, review of tests), Obtaining and/or reviewing separately obtained history, Documenting clinical information in the electronic or other health record, Independently interpreting results (not separately reported) and communicating results to the patient/family/caregiver, or Care coordination (not separately reported).         Each patient to whom he or she provides medical services by telemedicine is:  (1) informed of the relationship between the physician and patient and the respective role of any other health care provider with respect to management of the patient; and (2) notified that he or she may decline to receive medical services by telemedicine and may withdraw from such care at any time.    Notes:       Outpatient Psychiatry Follow-Up Visit (MD/JASMINE)    12/13/2022    Clinical Status of Patient:  Outpatient (Ambulatory)    Chief Complaint:  Trudi Mcknight is a 67 y.o. female who presents today for follow-up of depression and anxiety.  Met with patient.      Interval History and Content of Current Session:  Interim Events/Subjective Report/Content of Current Session:    Pt reports today: "mood pretty good, pretty stable, xanax helps me sleep"    Patients mood is steady and euthymic, affect appears mood congruent. Linear and logical, friendly and cooperative, good eye contact.    Denies SI/HI/AVH. Pt reports sleeping well and normal appetite. Denies side effects of medications.    Pt reports taking medications as prescribed and behaving appropriately during interview today.        Prior visit    Pt reports today: "I have covid so feeling tired but my mood has been good." Pt on effexor 37.5mg and xanax 0.25mg tid prn. States most days only taking xanax twice daily. States "I " "feel good overall the medicines are working for me"    Patients mood is steady, affect appears mood congruent. Linear and logical, friendly and cooperative, good eye contact.    Denies SI/HI/AVH. Pt reports sleeping well and normal appetite. Denies side effects of medications.    Pt reports taking medications as prescribed and behaving appropriately during interview today.    Review of Systems     Review of Systems   Constitutional:  Negative for fever and malaise/fatigue.   HENT:  Negative for sore throat.    Eyes:  Negative for photophobia.   Respiratory:  Negative for cough.    Cardiovascular:  Negative for chest pain and palpitations.   Gastrointestinal:  Negative for abdominal pain.   Genitourinary:  Negative for dysuria.   Musculoskeletal:  Negative for myalgias.   Skin:  Negative for rash.   Neurological:  Negative for dizziness.   Endo/Heme/Allergies:  Does not bruise/bleed easily.     Psychiatric Review Of Systems - Is patient experiencing or having changes in:  sleep: no  appetite: no  weight: no  energy/anergy: yes  interest/pleasure/anhedonia: no  somatic symptoms: no  libido: no  anxiety/panic: no  guilty/hopelessness: no  concentration: no  S.I.B.s/risky behavior: no  Irritability: no  Racing thoughts: no  Impulsive behaviors: no  Paranoia: no  AVH: no      Past Medical, Family and Social History: The patient's past medical, family and social history have been reviewed and updated as appropriate within the electronic medical record - see encounter notes.      Current Medications:   Medication List with Changes/Refills   Current Medications    ALPRAZOLAM (XANAX) 0.25 MG TABLET    Take 1 tablet (0.25 mg total) by mouth 3 (three) times daily as needed for Anxiety.    CALCIUM CARBONATE (TUMS) 200 MG CALCIUM (500 MG) CHEWABLE TABLET    Take 1 tablet by mouth as needed. 2-3D PRE WEEK    LEVOTHYROXINE (SYNTHROID) 25 MCG TABLET    Take 1 tablet (25 mcg total) by mouth before breakfast.    METRONIDAZOLE " (NORITATE) 1 % CREAM    Compound azelaic acid 15% + ivermectin 1% + metronidazole 1% cream. Apply to affected area on face once or twice daily.    RED YEAST RICE 600 MG CAP    2 capsules/ day    RIVAROXABAN (XARELTO) 15 MG TAB    Take 1 tablet (15 mg total) by mouth daily with dinner or evening meal.    SULFACETAMIDE SODIUM-SULFUR (SULFACLEANSE 8-4) 8-4 % SUSP    Wash face qhs    VALACYCLOVIR (VALTREX) 1000 MG TABLET    Take 2 tablets (2,000 mg total) by mouth every 12 (twelve) hours. For two doses when needed for cold sore for 2 doses    VENLAFAXINE (EFFEXOR-XR) 37.5 MG 24 HR CAPSULE    Take 1 capsule (37.5 mg total) by mouth once daily.         Allergies:   Review of patient's allergies indicates:   Allergen Reactions    Lasix [furosemide] Shortness Of Breath    Penicillins Hives     causes congestion and hives    Tricyclic compounds          Vitals   There were no vitals filed for this visit.       Labs/Imaging/Studies:   No results found for this or any previous visit (from the past 48 hour(s)).   No results found for: PHENYTOIN, PHENOBARB, VALPROATE, CBMZ    Compliance: yes    Side effects: None    Risk Parameters:  Patient reports no suicidal ideation  Patient reports no homicidal ideation  Patient reports no self-injurious behavior  Patient reports no violent behavior    Exam (detailed: at least 9 elements; comprehensive: all 15 elements)   Constitutional  Vitals:  Most recent vital signs, dated less than 90 days prior to this appointment, were reviewed.   There were no vitals filed for this visit.     General:  unremarkable, age appropriate     Musculoskeletal  Muscle Strength/Tone:  not examined   Gait & Station:  Not examined     Psychiatric  Speech:  no latency; no press   Mood & Affect:  steady  congruent and appropriate   Thought Process:  normal and logical   Associations:  intact   Thought Content:  normal, no suicidality, no homicidality, delusions, or paranoia   Insight:  intact, has awareness of  illness   Judgement: behavior is adequate to circumstances   Orientation:  grossly intact   Memory: intact for content of interview   Language: grossly intact   Attention Span & Concentration:  able to focus   Fund of Knowledge:  intact and appropriate to age and level of education     Assessment and Diagnosis   Status/Progress: Based on the examination today, the patient's problem(s) is/are well controlled.  New problems have not been presented today.   Co-morbidities, Diagnostic uncertainty and Lack of compliance are not complicating management of the primary condition.  There are no active rule-out diagnoses for this patient at this time.     General Impression:      ICD-10-CM ICD-9-CM   1. Generalized anxiety disorder with panic attacks  F41.1 300.02    F41.0 300.01   2. MDD (major depressive disorder), recurrent episode, mild  F33.0 296.31         Intervention/Counseling/Treatment Plan   Medication Management: Continue current medications. The risks and benefits of medication were discussed with the patient.    -continue effexor XR 37.5mg daily  -continue xanax 0.25mg q8h prn anxiety      Return to Clinic:  4 months        Kristofer Martinez PA-C      Total face to face time: 8 min  Total time (chart review, patient contact, documentation): 15 min      *This note has been prepared using a combination of a dictation device and typing.  It has been checked for errors but some errors may still exist within the note as a result of speech recognition errors and/or typographical errors.

## 2022-12-20 ENCOUNTER — OFFICE VISIT (OUTPATIENT)
Dept: CARDIOLOGY | Facility: CLINIC | Age: 67
End: 2022-12-20
Payer: MEDICARE

## 2022-12-20 VITALS
HEART RATE: 81 BPM | OXYGEN SATURATION: 97 % | WEIGHT: 108.44 LBS | HEIGHT: 61 IN | DIASTOLIC BLOOD PRESSURE: 72 MMHG | BODY MASS INDEX: 20.47 KG/M2 | SYSTOLIC BLOOD PRESSURE: 122 MMHG

## 2022-12-20 DIAGNOSIS — T46.2X5A AMIODARONE-INDUCED THYROIDITIS: ICD-10-CM

## 2022-12-20 DIAGNOSIS — Z98.890 STATUS POST CIRCUMFERENTIAL ABLATION OF PULMONARY VEIN: Chronic | ICD-10-CM

## 2022-12-20 DIAGNOSIS — E06.4 AMIODARONE-INDUCED THYROIDITIS: ICD-10-CM

## 2022-12-20 DIAGNOSIS — E78.2 MIXED HYPERLIPIDEMIA: Chronic | ICD-10-CM

## 2022-12-20 DIAGNOSIS — Z95.0 S/P PLACEMENT OF CARDIAC PACEMAKER: Primary | Chronic | ICD-10-CM

## 2022-12-20 DIAGNOSIS — I48.0 PAROXYSMAL ATRIAL FIBRILLATION: ICD-10-CM

## 2022-12-20 DIAGNOSIS — I31.39 PERICARDIAL EFFUSION: ICD-10-CM

## 2022-12-20 PROCEDURE — 3078F PR MOST RECENT DIASTOLIC BLOOD PRESSURE < 80 MM HG: ICD-10-PCS | Mod: HCNC,CPTII,S$GLB, | Performed by: INTERNAL MEDICINE

## 2022-12-20 PROCEDURE — 3074F SYST BP LT 130 MM HG: CPT | Mod: HCNC,CPTII,S$GLB, | Performed by: INTERNAL MEDICINE

## 2022-12-20 PROCEDURE — 1160F RVW MEDS BY RX/DR IN RCRD: CPT | Mod: HCNC,CPTII,S$GLB, | Performed by: INTERNAL MEDICINE

## 2022-12-20 PROCEDURE — 1126F PR PAIN SEVERITY QUANTIFIED, NO PAIN PRESENT: ICD-10-PCS | Mod: HCNC,CPTII,S$GLB, | Performed by: INTERNAL MEDICINE

## 2022-12-20 PROCEDURE — 3288F FALL RISK ASSESSMENT DOCD: CPT | Mod: HCNC,CPTII,S$GLB, | Performed by: INTERNAL MEDICINE

## 2022-12-20 PROCEDURE — 3008F PR BODY MASS INDEX (BMI) DOCUMENTED: ICD-10-PCS | Mod: HCNC,CPTII,S$GLB, | Performed by: INTERNAL MEDICINE

## 2022-12-20 PROCEDURE — 1100F PTFALLS ASSESS-DOCD GE2>/YR: CPT | Mod: HCNC,CPTII,S$GLB, | Performed by: INTERNAL MEDICINE

## 2022-12-20 PROCEDURE — 3044F HG A1C LEVEL LT 7.0%: CPT | Mod: HCNC,CPTII,S$GLB, | Performed by: INTERNAL MEDICINE

## 2022-12-20 PROCEDURE — 3074F PR MOST RECENT SYSTOLIC BLOOD PRESSURE < 130 MM HG: ICD-10-PCS | Mod: HCNC,CPTII,S$GLB, | Performed by: INTERNAL MEDICINE

## 2022-12-20 PROCEDURE — 3288F PR FALLS RISK ASSESSMENT DOCUMENTED: ICD-10-PCS | Mod: HCNC,CPTII,S$GLB, | Performed by: INTERNAL MEDICINE

## 2022-12-20 PROCEDURE — 3008F BODY MASS INDEX DOCD: CPT | Mod: HCNC,CPTII,S$GLB, | Performed by: INTERNAL MEDICINE

## 2022-12-20 PROCEDURE — 3044F PR MOST RECENT HEMOGLOBIN A1C LEVEL <7.0%: ICD-10-PCS | Mod: HCNC,CPTII,S$GLB, | Performed by: INTERNAL MEDICINE

## 2022-12-20 PROCEDURE — 99999 PR PBB SHADOW E&M-EST. PATIENT-LVL IV: ICD-10-PCS | Mod: PBBFAC,,, | Performed by: INTERNAL MEDICINE

## 2022-12-20 PROCEDURE — 3078F DIAST BP <80 MM HG: CPT | Mod: HCNC,CPTII,S$GLB, | Performed by: INTERNAL MEDICINE

## 2022-12-20 PROCEDURE — 1126F AMNT PAIN NOTED NONE PRSNT: CPT | Mod: HCNC,CPTII,S$GLB, | Performed by: INTERNAL MEDICINE

## 2022-12-20 PROCEDURE — 1159F MED LIST DOCD IN RCRD: CPT | Mod: HCNC,CPTII,S$GLB, | Performed by: INTERNAL MEDICINE

## 2022-12-20 PROCEDURE — 99999 PR PBB SHADOW E&M-EST. PATIENT-LVL IV: CPT | Mod: PBBFAC,,, | Performed by: INTERNAL MEDICINE

## 2022-12-20 PROCEDURE — 99214 OFFICE O/P EST MOD 30 MIN: CPT | Mod: HCNC,S$GLB,, | Performed by: INTERNAL MEDICINE

## 2022-12-20 PROCEDURE — 99214 PR OFFICE/OUTPT VISIT, EST, LEVL IV, 30-39 MIN: ICD-10-PCS | Mod: HCNC,S$GLB,, | Performed by: INTERNAL MEDICINE

## 2022-12-20 PROCEDURE — 1100F PR PT FALLS ASSESS DOC 2+ FALLS/FALL W/INJURY/YR: ICD-10-PCS | Mod: HCNC,CPTII,S$GLB, | Performed by: INTERNAL MEDICINE

## 2022-12-20 PROCEDURE — 1159F PR MEDICATION LIST DOCUMENTED IN MEDICAL RECORD: ICD-10-PCS | Mod: HCNC,CPTII,S$GLB, | Performed by: INTERNAL MEDICINE

## 2022-12-20 PROCEDURE — 1160F PR REVIEW ALL MEDS BY PRESCRIBER/CLIN PHARMACIST DOCUMENTED: ICD-10-PCS | Mod: HCNC,CPTII,S$GLB, | Performed by: INTERNAL MEDICINE

## 2022-12-20 RX ORDER — AMPICILLIN TRIHYDRATE 250 MG
2 CAPSULE ORAL 2 TIMES DAILY
Qty: 240 EACH | Refills: 5 | Status: SHIPPED | OUTPATIENT
Start: 2022-12-20 | End: 2022-12-27

## 2022-12-20 NOTE — PROGRESS NOTES
"Subjective:   Patient ID:  Trudi Mcknight is a 67 y.o. female who presents for follow-up of Establish Care and Follow-up    Trudi Mcknight is a 67 y.o. female who presents for follow-up of Pericardial effusion  (3 month f/u )     Symptomatic Bradycardia s/p Dual chamber pacemaker  s/p PVI (cryo) for PAF  Amiodarone induced thyroiditis  Small to moderate pericardial effusion post procedure found incidentally now resolved  AF with RVR intermittently  Hiatal Hernia  GERD     Echo 01/2022  Technically challenging study.  The left ventricle is normal in size with normal systolic function.  The estimated ejection fraction is 65%.  Normal left ventricular diastolic function.  Normal right ventricular size with normal right ventricular systolic function.     HPI:   Patient is c/o chest pain that radiates to the neck and the arm.  Pain comes on a couple of hours after eating, Much worse on lying.   Non smoker  Mother had CABG at 65. Dad has AF. Younger brother has a pacemaker has congential heart disease. Older brother passed away at 60 from heart disease.   She has gastritis and esophagitis and still taking omeprazole.  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 Her HRs have been above 100-135 in prior EKGs. She has been prescribed Diltiazem q8h by EP.         The 10-year ASCVD risk score (Redding TOMASA Jr., et al., 2013) is: 6.5%    Values used to calculate the score:      Age: 66 years      Sex: Female      Is Non- : No      Diabetic: No      Tobacco smoker: No      Systolic Blood Pressure: 133 mmHg      Is BP treated: No      HDL Cholesterol: 46 mg/dL      Total Cholesterol: 166 mg/dL     HPI:   Patient has been experiencing chest pain and dizzy in November and was   In jan 2nd she was very dizzy presyncopal and was very dizzy, she was prescribed MTP and diagnosed with AF. She " "feels bad with Metoprolol.  She could not tolerate beta blocker and had a sinus pause  For which she underwent pacemaker implantation.   She has pacemaker 1/20 and xeralto 1/26.   Patient appears to be very winded  She took diltiazem last Sunday she took the whole tab of 90 mg Cardizem  Before /73 HR 99 and her BP dropped to 83 mmHg and HR to 63 bpm.       HPI:   "I Can't go on with this medicine (FLEICANIDE), it gives me anxiety and makes me depressed".  No chest pain, Orthopnea, PND of heart failure symptoms.   MARLEY.   Denies palpitations or fluttering in the chest  Repeat pulse in the office is  100 bpm irregular. She has stopped Digoxin and also not taking lasix.       Echo 02/2022  The left ventricle is normal in size with normal systolic function.  The estimated ejection fraction is 60%.  Normal left ventricular diastolic function.  Normal right ventricular size with normal right ventricular systolic function.  Mild tricuspid regurgitation.  Normal central venous pressure (3 mmHg).  The estimated PA systolic pressure is 27 mmHg.  Small-moderate pericardial effusion  Effusion appears roughly similar to prior, however heart has shifted somewhat posteriorly, so anterior effusion slightly larger, posterior effusion slightly smaller.        HPI:      s/p Successful pulmonary vein cryoablation repeat EKG NSR  No chest pain, Orthopnea, PND of heart failure symptoms.   Feeling skipped beats that causes chest pain and then goes up to the neck  Minimal activity since ablation still on Flecainide.      HPI:   She feels salvos of flutter and planning to do another re do  She has been gardening and feels well  No chest pain, Orthopnea, PND of heart failure symptoms.      Cardiac device check  Additional Comments  IN-OFFICE device interrogation and testing performed   Device fxn WNL   Presenting egram demonstrates As/Ap/Vs  Underlying rhythm c/w  SB/SR 58 - 62 bpm  RA pacing  54%, RV pacing 2.5%   Atrial arrhythmias:  " At/AF burden < 1% since 5/23/22. AMS x 14, max duration 22 secs, no egm available.  One available egm c/w with nsAT for 20 secs  Anticoagulation Status: Xarelto  Ventricular arrhythmias:  None  Battery Status/Longevity:  9.4 -10.1 yrs  Reprogramming at this visit:  Auto capture turned on and Rate Response added  Follow up in EP clinic today.  Follow up via remote monitoring as scheduled  Report prepared by       Calcium score  Agatston calcium score equals 97, which translates to the 50th percentile for coronary calcium load based on age and sex.  Additional findings and recommendations above.  Soft tissue density pericardial fluid, perhaps subacute hemopericardium vs proteinaceous exudative pericardial fluid. Patient scheduled for echocardiogram, correlation for exudative effusion vs hemopericardium recommended.  3 mm solid pulmonary nodule in the right middle lobe.  Per Fleischner Society guidelines for nodule <6mm; in a low risk patient, no follow-up recommended.  In a high risk patient/smoker, consider 12 month CT chest follow-up to exclude neoplasia. If stable at that time, no further follow-up needed.  Moderate to large size hiatal hernia.     HPI:   Patient is doing well.   No chest pain, Orthopnea, PND of heart failure symptoms.   Occasional palpitation in the evening       Patient Active Problem List   Diagnosis    Esophagitis    History of gastritis    Screening for malignant neoplasm of colon    Neuralgia and neuritis, unspecified    Hiatal hernia with GERD and esophagitis    Disorder of thyroid, unspecified    Paroxysmal atrial fibrillation    Sick sinus syndrome    Bilateral pleural effusion    Typical atrial flutter    S/P placement of cardiac pacemaker    Left flank pain    Hyperlipemia    Generalized anxiety disorder with panic attacks    Status post circumferential ablation of pulmonary vein    Chronic anticoagulation    Pericardial effusion    Nausea and vomiting    Elevated troponin     "Amiodarone-induced thyroiditis    COVID    MDD (major depressive disorder), recurrent episode, mild     /72 (BP Location: Left arm, Patient Position: Sitting, BP Method: Medium (Automatic))   Pulse 81   Ht 5' 1" (1.549 m)   Wt 49.2 kg (108 lb 7.5 oz)   SpO2 97%   BMI 20.49 kg/m²   Body mass index is 20.49 kg/m².  CrCl cannot be calculated (Patient's most recent lab result is older than the maximum 7 days allowed.).    Lab Results   Component Value Date     08/09/2022    K 3.8 08/09/2022     08/09/2022    CO2 24 08/09/2022    BUN 17 08/09/2022    CREATININE 1.1 08/09/2022    GLU 86 08/09/2022    HGBA1C 5.5 01/20/2022    MG 2.1 04/09/2022    AST 15 04/09/2022    ALT 11 04/09/2022    ALBUMIN 3.6 04/09/2022    PROT 7.3 04/09/2022    BILITOT 1.1 (H) 04/09/2022    WBC 5.00 08/09/2022    HGB 13.5 08/09/2022    HCT 43.2 08/09/2022    HCT 43 01/02/2022    MCV 91 08/09/2022     08/09/2022    INR 1.2 08/09/2022    INR 1.5 (H) 04/04/2022    TSH 1.683 06/03/2022    CHOL 186 04/04/2022    HDL 51 04/04/2022    LDLCALC 107.8 04/04/2022    TRIG 136 04/04/2022       Current Outpatient Medications   Medication Sig    ALPRAZolam (XANAX) 0.25 MG tablet Take 1 tablet (0.25 mg total) by mouth 3 (three) times daily as needed for Anxiety.    calcium carbonate (TUMS) 200 mg calcium (500 mg) chewable tablet Take 1 tablet by mouth as needed. 2-3D PRE WEEK    levothyroxine (SYNTHROID) 25 MCG tablet Take 1 tablet (25 mcg total) by mouth before breakfast.    metroNIDAZOLE (NORITATE) 1 % cream Compound azelaic acid 15% + ivermectin 1% + metronidazole 1% cream. Apply to affected area on face once or twice daily.    red yeast rice 600 mg Cap 2 capsules/ day    rivaroxaban (XARELTO) 15 mg Tab Take 1 tablet (15 mg total) by mouth daily with dinner or evening meal.    sulfacetamide sodium-sulfur (SULFACLEANSE 8-4) 8-4 % Susp Wash face qhs    venlafaxine (EFFEXOR-XR) 37.5 MG 24 hr capsule Take 1 capsule (37.5 mg total) by " mouth once daily.    valACYclovir (VALTREX) 1000 MG tablet Take 2 tablets (2,000 mg total) by mouth every 12 (twelve) hours. For two doses when needed for cold sore for 2 doses     No current facility-administered medications for this visit.       Review of Systems   Constitutional: Negative for chills, decreased appetite, malaise/fatigue, night sweats, weight gain and weight loss.   Eyes:  Negative for blurred vision, double vision, visual disturbance and visual halos.   Cardiovascular:  Negative for chest pain, claudication, cyanosis, dyspnea on exertion, irregular heartbeat, leg swelling, near-syncope, orthopnea, palpitations, paroxysmal nocturnal dyspnea and syncope.   Respiratory:  Negative for cough, hemoptysis, snoring, sputum production and wheezing.    Endocrine: Negative for cold intolerance, heat intolerance, polydipsia and polyphagia.   Hematologic/Lymphatic: Negative for adenopathy and bleeding problem. Does not bruise/bleed easily.   Skin:  Negative for flushing, itching, poor wound healing and rash.   Musculoskeletal:  Positive for joint pain (shoulder occasional). Negative for arthritis, back pain, falls, gout, joint swelling, muscle cramps, muscle weakness, myalgias, neck pain and stiffness.   Gastrointestinal:  Negative for bloating, abdominal pain, anorexia, diarrhea, dysphagia, excessive appetite, flatus, hematemesis, jaundice, melena and nausea.   Genitourinary:  Negative for hesitancy and incomplete emptying.   Neurological:  Negative for aphonia, brief paralysis, difficulty with concentration, disturbances in coordination, excessive daytime sleepiness, dizziness, focal weakness, light-headedness, loss of balance and weakness.   Psychiatric/Behavioral:  Negative for altered mental status, depression, hallucinations, hypervigilance, memory loss, substance abuse and suicidal ideas. The patient does not have insomnia and is not nervous/anxious.      Objective:   Physical Exam  Constitutional:        General: She is not in acute distress.     Appearance: She is well-developed. She is not diaphoretic.   HENT:      Head: Normocephalic and atraumatic.      Nose: Nose normal.      Mouth/Throat:      Pharynx: No oropharyngeal exudate.   Eyes:      General: No scleral icterus.        Right eye: No discharge.         Left eye: No discharge.      Conjunctiva/sclera: Conjunctivae normal.      Pupils: Pupils are equal, round, and reactive to light.   Neck:      Thyroid: No thyromegaly.      Vascular: No JVD.      Trachea: No tracheal deviation.   Cardiovascular:      Rate and Rhythm: Normal rate and regular rhythm.      Pulses: Intact distal pulses.      Heart sounds: Normal heart sounds. No murmur heard.    No friction rub. No gallop.   Pulmonary:      Effort: Pulmonary effort is normal. No respiratory distress.      Breath sounds: Normal breath sounds. No stridor. No wheezing or rales.   Chest:      Chest wall: No tenderness.   Abdominal:      General: Bowel sounds are normal. There is no distension.      Palpations: Abdomen is soft. There is no mass.      Tenderness: There is no abdominal tenderness. There is no guarding or rebound.   Musculoskeletal:         General: No tenderness. Normal range of motion.      Cervical back: Normal range of motion and neck supple.   Lymphadenopathy:      Cervical: No cervical adenopathy.   Skin:     General: Skin is warm.      Coloration: Skin is not pale.      Findings: No erythema or rash.   Neurological:      Mental Status: She is alert and oriented to person, place, and time.      Cranial Nerves: No cranial nerve deficit.      Motor: No abnormal muscle tone.      Coordination: Coordination normal.      Deep Tendon Reflexes: Reflexes are normal and symmetric. Reflexes normal.   Psychiatric:         Behavior: Behavior normal.         Thought Content: Thought content normal.         Judgment: Judgment normal.       Assessment:     1. S/P placement of cardiac pacemaker    2. Mixed  hyperlipidemia    3. Amiodarone-induced thyroiditis    4. Status post circumferential ablation of pulmonary vein    5. Paroxysmal atrial fibrillation    6. Pericardial effusion        Plan:   Repeat echo for small pericardial effusion. Patient would like to try red yeast rice before starting statin with concerns for muscle aches. No other changes. Last EKG is NSR    Trudi was seen today for establish care and follow-up.    Diagnoses and all orders for this visit:    S/P placement of cardiac pacemaker  -     Echo Saline Bubble? No; Future    Mixed hyperlipidemia  -     Lipid Panel; Future    Amiodarone-induced thyroiditis    Status post circumferential ablation of pulmonary vein    Paroxysmal atrial fibrillation  -     Echo Saline Bubble? No; Future    Pericardial effusion  -     Echo Saline Bubble? No; Future    Other orders  -     red yeast rice 600 mg Cap; Take 2 capsules by mouth 2 (two) times a day. 2 capsules/ day

## 2022-12-23 ENCOUNTER — PES CALL (OUTPATIENT)
Dept: ADMINISTRATIVE | Facility: CLINIC | Age: 67
End: 2022-12-23
Payer: MEDICARE

## 2022-12-30 ENCOUNTER — HOSPITAL ENCOUNTER (OUTPATIENT)
Dept: CARDIOLOGY | Facility: HOSPITAL | Age: 67
Discharge: HOME | End: 2022-12-30
Attending: INTERNAL MEDICINE
Payer: MEDICARE

## 2022-12-30 VITALS
HEIGHT: 61 IN | SYSTOLIC BLOOD PRESSURE: 120 MMHG | HEART RATE: 65 BPM | DIASTOLIC BLOOD PRESSURE: 70 MMHG | BODY MASS INDEX: 20.39 KG/M2 | WEIGHT: 108 LBS

## 2022-12-30 DIAGNOSIS — Z95.0 S/P PLACEMENT OF CARDIAC PACEMAKER: ICD-10-CM

## 2022-12-30 DIAGNOSIS — I48.0 PAROXYSMAL ATRIAL FIBRILLATION: ICD-10-CM

## 2022-12-30 DIAGNOSIS — I31.39 PERICARDIAL EFFUSION: ICD-10-CM

## 2022-12-30 LAB
ASCENDING AORTA: 2.93 CM
AV INDEX (PROSTH): 0.93
AV MEAN GRADIENT: 1 MMHG
AV PEAK GRADIENT: 2 MMHG
AV VALVE AREA: 3.33 CM2
AV VELOCITY RATIO: 0.93
BSA FOR ECHO PROCEDURE: 1.45 M2
CV ECHO LV RWT: 0.3 CM
DOP CALC AO PEAK VEL: 0.7 M/S
DOP CALC AO VTI: 16.08 CM
DOP CALC LVOT AREA: 3.6 CM2
DOP CALC LVOT DIAMETER: 2.14 CM
DOP CALC LVOT PEAK VEL: 0.65 M/S
DOP CALC LVOT STROKE VOLUME: 53.53 CM3
DOP CALCLVOT PEAK VEL VTI: 14.89 CM
E WAVE DECELERATION TIME: 208.1 MSEC
E/A RATIO: 1.02
E/E' RATIO: 10.73 M/S
ECHO LV POSTERIOR WALL: 0.59 CM (ref 0.6–1.1)
EJECTION FRACTION: 65 %
FRACTIONAL SHORTENING: 29 % (ref 28–44)
INTERVENTRICULAR SEPTUM: 0.56 CM (ref 0.6–1.1)
LA MAJOR: 4.37 CM
LA MINOR: 4.26 CM
LA WIDTH: 3.52 CM
LEFT ATRIUM SIZE: 2.68 CM
LEFT ATRIUM VOLUME INDEX MOD: 28.1 ML/M2
LEFT ATRIUM VOLUME INDEX: 23.9 ML/M2
LEFT ATRIUM VOLUME MOD: 40.75 CM3
LEFT ATRIUM VOLUME: 34.59 CM3
LEFT INTERNAL DIMENSION IN SYSTOLE: 2.81 CM (ref 2.1–4)
LEFT VENTRICLE DIASTOLIC VOLUME INDEX: 47.77 ML/M2
LEFT VENTRICLE DIASTOLIC VOLUME: 69.26 ML
LEFT VENTRICLE MASS INDEX: 42 G/M2
LEFT VENTRICLE SYSTOLIC VOLUME INDEX: 20.5 ML/M2
LEFT VENTRICLE SYSTOLIC VOLUME: 29.75 ML
LEFT VENTRICULAR INTERNAL DIMENSION IN DIASTOLE: 3.98 CM (ref 3.5–6)
LEFT VENTRICULAR MASS: 60.47 G
LV LATERAL E/E' RATIO: 8.43 M/S
LV SEPTAL E/E' RATIO: 14.75 M/S
MV PEAK A VEL: 0.58 M/S
MV PEAK E VEL: 0.59 M/S
MV STENOSIS PRESSURE HALF TIME: 60.35 MS
MV VALVE AREA P 1/2 METHOD: 3.65 CM2
PISA TR MAX VEL: 2.18 M/S
RA MAJOR: 3.85 CM
RA PRESSURE: 3 MMHG
RA WIDTH: 3.44 CM
RIGHT VENTRICULAR END-DIASTOLIC DIMENSION: 2.44 CM
RV TISSUE DOPPLER FREE WALL SYSTOLIC VELOCITY 1 (APICAL 4 CHAMBER VIEW): 9.93 CM/S
SINUS: 3.75 CM
STJ: 2.8 CM
TDI LATERAL: 0.07 M/S
TDI SEPTAL: 0.04 M/S
TDI: 0.06 M/S
TR MAX PG: 19 MMHG
TRICUSPID ANNULAR PLANE SYSTOLIC EXCURSION: 1.81 CM
TV REST PULMONARY ARTERY PRESSURE: 22 MMHG

## 2022-12-30 PROCEDURE — 93306 ECHO (CUPID ONLY): ICD-10-PCS | Mod: 26,HCNC,, | Performed by: INTERNAL MEDICINE

## 2022-12-30 PROCEDURE — 93306 TTE W/DOPPLER COMPLETE: CPT | Mod: HCNC

## 2022-12-30 PROCEDURE — 93306 TTE W/DOPPLER COMPLETE: CPT | Mod: 26,HCNC,, | Performed by: INTERNAL MEDICINE

## 2023-01-03 NOTE — PROGRESS NOTES
plz let pt. Know that echo (ultrasound of the heart) is normal. There is still a little bit of fluid around the heart but this is not of concern

## 2023-01-04 ENCOUNTER — PATIENT MESSAGE (OUTPATIENT)
Dept: ENDOCRINOLOGY | Facility: CLINIC | Age: 68
End: 2023-01-04
Payer: MEDICARE

## 2023-01-05 ENCOUNTER — LAB VISIT (OUTPATIENT)
Dept: LAB | Facility: HOSPITAL | Age: 68
End: 2023-01-05
Attending: HOSPITALIST
Payer: MEDICARE

## 2023-01-05 DIAGNOSIS — E06.4 AMIODARONE-INDUCED THYROIDITIS: ICD-10-CM

## 2023-01-05 DIAGNOSIS — T46.2X5A AMIODARONE-INDUCED THYROIDITIS: ICD-10-CM

## 2023-01-05 LAB
T4 FREE SERPL-MCNC: 1.21 NG/DL (ref 0.71–1.51)
TSH SERPL DL<=0.005 MIU/L-ACNC: 0.83 UIU/ML (ref 0.4–4)

## 2023-01-05 PROCEDURE — 36415 COLL VENOUS BLD VENIPUNCTURE: CPT | Mod: HCNC,PO | Performed by: HOSPITALIST

## 2023-01-05 PROCEDURE — 84439 ASSAY OF FREE THYROXINE: CPT | Mod: HCNC | Performed by: HOSPITALIST

## 2023-01-05 PROCEDURE — 84443 ASSAY THYROID STIM HORMONE: CPT | Mod: HCNC | Performed by: HOSPITALIST

## 2023-01-11 ENCOUNTER — OFFICE VISIT (OUTPATIENT)
Dept: ENDOCRINOLOGY | Facility: CLINIC | Age: 68
End: 2023-01-11
Payer: MEDICARE

## 2023-01-11 VITALS
SYSTOLIC BLOOD PRESSURE: 114 MMHG | TEMPERATURE: 98 F | WEIGHT: 109.19 LBS | DIASTOLIC BLOOD PRESSURE: 72 MMHG | BODY MASS INDEX: 20.63 KG/M2 | HEART RATE: 71 BPM

## 2023-01-11 DIAGNOSIS — E06.4 AMIODARONE-INDUCED THYROIDITIS: Primary | ICD-10-CM

## 2023-01-11 DIAGNOSIS — I48.0 PAROXYSMAL ATRIAL FIBRILLATION: ICD-10-CM

## 2023-01-11 DIAGNOSIS — T46.2X5A AMIODARONE-INDUCED THYROIDITIS: Primary | ICD-10-CM

## 2023-01-11 PROCEDURE — 99214 OFFICE O/P EST MOD 30 MIN: CPT | Mod: HCNC,S$GLB,, | Performed by: HOSPITALIST

## 2023-01-11 PROCEDURE — 3078F PR MOST RECENT DIASTOLIC BLOOD PRESSURE < 80 MM HG: ICD-10-PCS | Mod: HCNC,CPTII,S$GLB, | Performed by: HOSPITALIST

## 2023-01-11 PROCEDURE — 1126F PR PAIN SEVERITY QUANTIFIED, NO PAIN PRESENT: ICD-10-PCS | Mod: HCNC,CPTII,S$GLB, | Performed by: HOSPITALIST

## 2023-01-11 PROCEDURE — 3008F PR BODY MASS INDEX (BMI) DOCUMENTED: ICD-10-PCS | Mod: HCNC,CPTII,S$GLB, | Performed by: HOSPITALIST

## 2023-01-11 PROCEDURE — 1159F PR MEDICATION LIST DOCUMENTED IN MEDICAL RECORD: ICD-10-PCS | Mod: HCNC,CPTII,S$GLB, | Performed by: HOSPITALIST

## 2023-01-11 PROCEDURE — 3008F BODY MASS INDEX DOCD: CPT | Mod: HCNC,CPTII,S$GLB, | Performed by: HOSPITALIST

## 2023-01-11 PROCEDURE — 99999 PR PBB SHADOW E&M-EST. PATIENT-LVL III: CPT | Mod: PBBFAC,HCNC,, | Performed by: HOSPITALIST

## 2023-01-11 PROCEDURE — 3288F PR FALLS RISK ASSESSMENT DOCUMENTED: ICD-10-PCS | Mod: HCNC,CPTII,S$GLB, | Performed by: HOSPITALIST

## 2023-01-11 PROCEDURE — 3074F SYST BP LT 130 MM HG: CPT | Mod: HCNC,CPTII,S$GLB, | Performed by: HOSPITALIST

## 2023-01-11 PROCEDURE — 99999 PR PBB SHADOW E&M-EST. PATIENT-LVL III: ICD-10-PCS | Mod: PBBFAC,HCNC,, | Performed by: HOSPITALIST

## 2023-01-11 PROCEDURE — 3078F DIAST BP <80 MM HG: CPT | Mod: HCNC,CPTII,S$GLB, | Performed by: HOSPITALIST

## 2023-01-11 PROCEDURE — 3288F FALL RISK ASSESSMENT DOCD: CPT | Mod: HCNC,CPTII,S$GLB, | Performed by: HOSPITALIST

## 2023-01-11 PROCEDURE — 1101F PR PT FALLS ASSESS DOC 0-1 FALLS W/OUT INJ PAST YR: ICD-10-PCS | Mod: HCNC,CPTII,S$GLB, | Performed by: HOSPITALIST

## 2023-01-11 PROCEDURE — 3074F PR MOST RECENT SYSTOLIC BLOOD PRESSURE < 130 MM HG: ICD-10-PCS | Mod: HCNC,CPTII,S$GLB, | Performed by: HOSPITALIST

## 2023-01-11 PROCEDURE — 1159F MED LIST DOCD IN RCRD: CPT | Mod: HCNC,CPTII,S$GLB, | Performed by: HOSPITALIST

## 2023-01-11 PROCEDURE — 1126F AMNT PAIN NOTED NONE PRSNT: CPT | Mod: HCNC,CPTII,S$GLB, | Performed by: HOSPITALIST

## 2023-01-11 PROCEDURE — 1101F PT FALLS ASSESS-DOCD LE1/YR: CPT | Mod: HCNC,CPTII,S$GLB, | Performed by: HOSPITALIST

## 2023-01-11 PROCEDURE — 99214 PR OFFICE/OUTPT VISIT, EST, LEVL IV, 30-39 MIN: ICD-10-PCS | Mod: HCNC,S$GLB,, | Performed by: HOSPITALIST

## 2023-01-11 NOTE — PROGRESS NOTES
Subjective:      Patient ID: Trudi Mcknight is a 67 y.o. female presented to [facility] on 1/11/2023.  Chief Complaint:  Thyroid F/U      History of Present Illness: Trudi Mcknight is a 67 y.o. female here for hypothyroidism/abnormal thyroid function    Other significant past medical history:  Atrial fibrillation ( Amiodarone and Xarelto)    Interval history:  Visit for hypothyroidism follow-up, amiodarone has been stopped 9/2022, doing well now  Did have an ablation at that time  Currently on levothyroxine 25 mcg day    Diagnosed with hypothyroidism:   First noted on lab work:  4/9/2022  Started on amiodarone by Cardiology.  With weaning protocol.  Currently on 400mg >> 200mg wean down to  Family history thyroid disorder:  Dad, sister, brother>> hypothyrodism  She had issue with hypothyroidism during her pregnancy     Reported symptoms on initial visit.  She is feeling depressed/anxiety  Current symptoms:   No   Yes  [x]    []   Weight loss>> Since Jan  []    [x]   Fatigue  [x]    []   Constipation  []    [x]   Hair loss  [x]    []   Brittle nails  []    []   Mental fog  []    [x]   Cold intolerance  []    []   Memory impaired   []    []   Bradycardia    []    [x]   Recent severe illness  [x]    []   Neck swelling, pain, pressure  [x]    []   Hoarseness      Lab Results   Component Value Date    TSH 0.835 01/05/2023    TSH 1.683 06/03/2022    TSH 4.961 (H) 04/09/2022    FREET4 1.21 01/05/2023    FREET4 1.34 06/03/2022    FREET4 1.49 04/09/2022    THYROPEROXID <6.0 06/03/2022     Reviewed past surgical, medical, family, social history and updated as appropriate.  Review of Systems: see HPI above    Objective:   /72 (BP Location: Left arm)   Pulse 71   Temp 98.1 °F (36.7 °C) (Oral)   Wt 49.5 kg (109 lb 3.2 oz)   BMI 20.63 kg/m²     Body mass index is 20.63 kg/m².  Vital signs reviewed    Physical Exam  Vitals and nursing note reviewed.   Constitutional:       Appearance: Normal  appearance. She is well-developed. She is not ill-appearing.   Neck:      Thyroid: No thyromegaly.   Pulmonary:      Effort: Pulmonary effort is normal. No respiratory distress.   Musculoskeletal:         General: Normal range of motion.      Cervical back: Normal range of motion.   Neurological:      General: No focal deficit present.      Mental Status: She is alert. Mental status is at baseline.   Psychiatric:         Mood and Affect: Mood normal.         Behavior: Behavior normal.     Lab Reviewed:   Lab Results   Component Value Date    HGBA1C 5.5 01/20/2022     Lab Results   Component Value Date    CHOL 179 12/30/2022    HDL 50 12/30/2022    LDLCALC 108.8 12/30/2022    TRIG 101 12/30/2022    CHOLHDL 27.9 12/30/2022     Lab Results   Component Value Date     08/09/2022    K 3.8 08/09/2022     08/09/2022    CO2 24 08/09/2022    GLU 86 08/09/2022    BUN 17 08/09/2022    CREATININE 1.1 08/09/2022    CALCIUM 9.2 08/09/2022    PROT 7.3 04/09/2022    ALBUMIN 3.6 04/09/2022    BILITOT 1.1 (H) 04/09/2022    ALKPHOS 83 04/09/2022    AST 15 04/09/2022    ALT 11 04/09/2022    ANIONGAP 9 08/09/2022    ESTGFRAFRICA >60.0 04/09/2022    EGFRNONAA >60.0 04/09/2022    TSH 0.835 01/05/2023     Lab Results   Component Value Date    CSEFGSJD60KS 27 (L) 08/05/2019    CALCIUM 9.2 08/09/2022    CALCIUM 9.8 04/09/2022    CALCIUM 9.2 04/07/2022    PHOS 2.7 04/09/2022    PHOS 5.2 (H) 04/07/2022    PHOS 3.4 04/06/2022    ALKPHOS 83 04/09/2022    ALKPHOS 84 04/04/2022    ALKPHOS 77 03/18/2022    TSH 0.835 01/05/2023     Assessment     1. Amiodarone-induced thyroiditis  TSH    T4, Free      2. Paroxysmal atrial fibrillation          Plan     Amiodarone-induced thyroiditis  - Hypothyroidism, unspecified type, possible autoimmune given family history of hypothyroidism  - Pathophysiology of hypothyroidism, the role of TSH, free T4 were explained to patient  - TSH 0.8, no sign of hyperthyroidism.  Amiodarone has been stopped since  09/2022  - at this time we will stop levothyroxine  - repeat TFTs in 3 months for monitoring.  - follow-up as needed    Paroxysmal atrial fibrillation  - no longer on amiodarone.  Continue follow-up cardiology       Advised patient to follow up with PCP for routine health maintenance care.   RTC in as needed.  If TFTs normal, no further follow-up need      Sly Palmer M.D.  Endocrinology  [facility]  1/11/2023      Disclaimer: This note has been generated in part with the use of voice-recognition software. There may be typographical errors that have been missed during proof-reading.

## 2023-01-11 NOTE — ASSESSMENT & PLAN NOTE
- Hypothyroidism, unspecified type, possible autoimmune given family history of hypothyroidism  - Pathophysiology of hypothyroidism, the role of TSH, free T4 were explained to patient  - TSH 0.8, no sign of hyperthyroidism.  Amiodarone has been stopped since 09/2022  - at this time we will stop levothyroxine  - repeat TFTs in 3 months for monitoring.  - follow-up as needed

## 2023-01-15 ENCOUNTER — CLINICAL SUPPORT (OUTPATIENT)
Dept: CARDIOLOGY | Facility: HOSPITAL | Age: 68
End: 2023-01-15
Payer: MEDICARE

## 2023-01-15 DIAGNOSIS — Z95.0 PRESENCE OF CARDIAC PACEMAKER: ICD-10-CM

## 2023-01-15 DIAGNOSIS — I48.91 UNSPECIFIED ATRIAL FIBRILLATION: ICD-10-CM

## 2023-01-15 DIAGNOSIS — I49.5 SICK SINUS SYNDROME: ICD-10-CM

## 2023-01-15 PROCEDURE — 93296 REM INTERROG EVL PM/IDS: CPT | Mod: HCNC | Performed by: INTERNAL MEDICINE

## 2023-01-28 ENCOUNTER — OFFICE VISIT (OUTPATIENT)
Dept: URGENT CARE | Facility: CLINIC | Age: 68
End: 2023-01-28
Payer: MEDICARE

## 2023-01-28 VITALS
HEART RATE: 80 BPM | WEIGHT: 109 LBS | DIASTOLIC BLOOD PRESSURE: 77 MMHG | OXYGEN SATURATION: 98 % | RESPIRATION RATE: 16 BRPM | BODY MASS INDEX: 20.58 KG/M2 | HEIGHT: 61 IN | SYSTOLIC BLOOD PRESSURE: 113 MMHG | TEMPERATURE: 98 F

## 2023-01-28 DIAGNOSIS — Z79.01 ON ANTICOAGULANT THERAPY: ICD-10-CM

## 2023-01-28 DIAGNOSIS — S90.31XA CONTUSION OF RIGHT FOOT, INITIAL ENCOUNTER: ICD-10-CM

## 2023-01-28 DIAGNOSIS — M79.671 RIGHT FOOT PAIN: Primary | ICD-10-CM

## 2023-01-28 PROCEDURE — 73630 XR FOOT COMPLETE 3 VIEW RIGHT: ICD-10-PCS | Mod: FY,RT,S$GLB, | Performed by: RADIOLOGY

## 2023-01-28 PROCEDURE — 99213 PR OFFICE/OUTPT VISIT, EST, LEVL III, 20-29 MIN: ICD-10-PCS | Mod: S$GLB,,, | Performed by: FAMILY MEDICINE

## 2023-01-28 PROCEDURE — 3078F PR MOST RECENT DIASTOLIC BLOOD PRESSURE < 80 MM HG: ICD-10-PCS | Mod: CPTII,S$GLB,, | Performed by: FAMILY MEDICINE

## 2023-01-28 PROCEDURE — 1160F PR REVIEW ALL MEDS BY PRESCRIBER/CLIN PHARMACIST DOCUMENTED: ICD-10-PCS | Mod: CPTII,S$GLB,, | Performed by: FAMILY MEDICINE

## 2023-01-28 PROCEDURE — 1160F RVW MEDS BY RX/DR IN RCRD: CPT | Mod: CPTII,S$GLB,, | Performed by: FAMILY MEDICINE

## 2023-01-28 PROCEDURE — 3008F BODY MASS INDEX DOCD: CPT | Mod: CPTII,S$GLB,, | Performed by: FAMILY MEDICINE

## 2023-01-28 PROCEDURE — 99213 OFFICE O/P EST LOW 20 MIN: CPT | Mod: S$GLB,,, | Performed by: FAMILY MEDICINE

## 2023-01-28 PROCEDURE — 1159F MED LIST DOCD IN RCRD: CPT | Mod: CPTII,S$GLB,, | Performed by: FAMILY MEDICINE

## 2023-01-28 PROCEDURE — 1125F PR PAIN SEVERITY QUANTIFIED, PAIN PRESENT: ICD-10-PCS | Mod: CPTII,S$GLB,, | Performed by: FAMILY MEDICINE

## 2023-01-28 PROCEDURE — 3074F SYST BP LT 130 MM HG: CPT | Mod: CPTII,S$GLB,, | Performed by: FAMILY MEDICINE

## 2023-01-28 PROCEDURE — 3078F DIAST BP <80 MM HG: CPT | Mod: CPTII,S$GLB,, | Performed by: FAMILY MEDICINE

## 2023-01-28 PROCEDURE — 1125F AMNT PAIN NOTED PAIN PRSNT: CPT | Mod: CPTII,S$GLB,, | Performed by: FAMILY MEDICINE

## 2023-01-28 PROCEDURE — 3008F PR BODY MASS INDEX (BMI) DOCUMENTED: ICD-10-PCS | Mod: CPTII,S$GLB,, | Performed by: FAMILY MEDICINE

## 2023-01-28 PROCEDURE — 73630 X-RAY EXAM OF FOOT: CPT | Mod: FY,RT,S$GLB, | Performed by: RADIOLOGY

## 2023-01-28 PROCEDURE — 1159F PR MEDICATION LIST DOCUMENTED IN MEDICAL RECORD: ICD-10-PCS | Mod: CPTII,S$GLB,, | Performed by: FAMILY MEDICINE

## 2023-01-28 PROCEDURE — 3074F PR MOST RECENT SYSTOLIC BLOOD PRESSURE < 130 MM HG: ICD-10-PCS | Mod: CPTII,S$GLB,, | Performed by: FAMILY MEDICINE

## 2023-01-28 RX ORDER — DICLOFENAC SODIUM 10 MG/G
2 GEL TOPICAL 2 TIMES DAILY PRN
Qty: 150 G | Refills: 0 | Status: SHIPPED | OUTPATIENT
Start: 2023-01-28

## 2023-01-28 NOTE — PATIENT INSTRUCTIONS
Rest  Ice   Walking boot  Elevate    Tylenol as needed for pain  Topical voltaren as needed for pain; avoid oral non steroidal antiinflammatories    Patient has her own walking boot and crutches.    I have placed a referral to podiatry  Call to schedule an appointment: [phone]

## 2023-01-28 NOTE — PROGRESS NOTES
"Subjective:       Patient ID: Trudi Mcknight is a 67 y.o. female.    Vitals:  height is 5' 1" (1.549 m) and weight is 49.4 kg (109 lb). Her oral temperature is 98.4 °F (36.9 °C). Her blood pressure is 113/77 and her pulse is 80. Her respiration is 16 and oxygen saturation is 98%.     Chief Complaint: Foot Injury    This is a 67 y.o. female who presents today with a chief complaint of  right foot pain. Pt states she was wrestling with her blanket last night and fell; hit the floor and thinks she might have possibly broken her foot.    Foot Injury   The incident occurred 6 to 12 hours ago. The incident occurred at home. The injury mechanism was a direct blow. The pain is present in the right foot. The quality of the pain is described as shooting, stabbing and aching. The pain is at a severity of 10/10. The pain is severe. The pain has been Constant since onset. She reports no foreign bodies present. The symptoms are aggravated by movement and weight bearing. She has tried acetaminophen for the symptoms. The treatment provided mild relief.     Musculoskeletal:  Positive for pain.     Objective:      Vitals:    01/28/23 0923   BP: 113/77   Pulse: 80   Resp: 16   Temp: 98.4 °F (36.9 °C)   TempSrc: Oral   SpO2: 98%   Weight: 49.4 kg (109 lb)   Height: 5' 1" (1.549 m)      Physical Exam   Constitutional: She is oriented to person, place, and time.  Non-toxic appearance. She does not appear ill. No distress.   HENT:   Head: Atraumatic.   Eyes: Conjunctivae are normal.   Pulmonary/Chest: Effort normal.   Musculoskeletal:         General: Swelling (dorsal lateral aspect of right foot) and tenderness (dorsal lateral aspect of right foot) present.      Comments: No open wounds. Peripheral pulses intact. No sensory deficit.   Neurological: She is alert and oriented to person, place, and time.   Skin: Skin is not diaphoretic.   Psychiatric: Judgment and thought content normal.       XR FOOT COMPLETE 3 VIEW RIGHT    Result Date: " 1/28/2023  EXAMINATION: XR FOOT COMPLETE 3 VIEW RIGHT CLINICAL HISTORY: . Pain in right foot TECHNIQUE: AP, lateral, and oblique views of the right foot were performed. COMPARISON: None FINDINGS: Query osseous demineralization. No convincing evidence of acute fracture or dislocation.  Lisfranc joint appears congruent.  Joint spaces appear maintained.  DJD of the great toe MTP joint. No definite radiopaque foreign body.     No convincing evidence of acute fracture or dislocation. Electronically signed by: Popeye Grimes Date:    01/28/2023 Time:    09:57        Assessment:       1. Right foot pain    2. Contusion of right foot, initial encounter    3. On anticoagulant therapy          Plan:         Right foot pain  -     XR FOOT COMPLETE 3 VIEW RIGHT; Future; Expected date: 01/28/2023    2. Contusion of right foot, initial encounter  -     diclofenac sodium (VOLTAREN) 1 % Gel; Apply 2 g topically 2 (two) times daily as needed (pain).  Dispense: 150 g; Refill: 0  -     Ambulatory referral/consult to Podiatry    3. On anticoagulant therapy  Avoid oral NSAIDs    Patient Instructions   Rest  Ice   Walking boot  Elevate    Tylenol as needed for pain  Topical voltaren as needed for pain; avoid oral non steroidal antiinflammatories    Patient has her own walking boot and crutches.    I have placed a referral to podiatry  Call to schedule an appointment: [phone]

## 2023-02-07 DIAGNOSIS — Z00.00 ENCOUNTER FOR MEDICARE ANNUAL WELLNESS EXAM: ICD-10-CM

## 2023-02-09 ENCOUNTER — TELEPHONE (OUTPATIENT)
Dept: DERMATOLOGY | Facility: CLINIC | Age: 68
End: 2023-02-09

## 2023-02-09 ENCOUNTER — OFFICE VISIT (OUTPATIENT)
Dept: DERMATOLOGY | Facility: CLINIC | Age: 68
End: 2023-02-09
Payer: MEDICARE

## 2023-02-09 DIAGNOSIS — Z00.00 ENCOUNTER FOR MEDICARE ANNUAL WELLNESS EXAM: ICD-10-CM

## 2023-02-09 DIAGNOSIS — L71.9 ROSACEA: Primary | ICD-10-CM

## 2023-02-09 DIAGNOSIS — L63.9 ALOPECIA AREATA: ICD-10-CM

## 2023-02-09 PROCEDURE — 99999 PR PBB SHADOW E&M-EST. PATIENT-LVL III: ICD-10-PCS | Mod: PBBFAC,HCNC,, | Performed by: DERMATOLOGY

## 2023-02-09 PROCEDURE — 1101F PT FALLS ASSESS-DOCD LE1/YR: CPT | Mod: HCNC,CPTII,S$GLB, | Performed by: DERMATOLOGY

## 2023-02-09 PROCEDURE — 1160F PR REVIEW ALL MEDS BY PRESCRIBER/CLIN PHARMACIST DOCUMENTED: ICD-10-PCS | Mod: HCNC,CPTII,S$GLB, | Performed by: DERMATOLOGY

## 2023-02-09 PROCEDURE — 1126F AMNT PAIN NOTED NONE PRSNT: CPT | Mod: HCNC,CPTII,S$GLB, | Performed by: DERMATOLOGY

## 2023-02-09 PROCEDURE — 1159F PR MEDICATION LIST DOCUMENTED IN MEDICAL RECORD: ICD-10-PCS | Mod: HCNC,CPTII,S$GLB, | Performed by: DERMATOLOGY

## 2023-02-09 PROCEDURE — 99214 OFFICE O/P EST MOD 30 MIN: CPT | Mod: HCNC,S$GLB,, | Performed by: DERMATOLOGY

## 2023-02-09 PROCEDURE — 99999 PR PBB SHADOW E&M-EST. PATIENT-LVL III: CPT | Mod: PBBFAC,HCNC,, | Performed by: DERMATOLOGY

## 2023-02-09 PROCEDURE — 99214 PR OFFICE/OUTPT VISIT, EST, LEVL IV, 30-39 MIN: ICD-10-PCS | Mod: HCNC,S$GLB,, | Performed by: DERMATOLOGY

## 2023-02-09 PROCEDURE — 3288F FALL RISK ASSESSMENT DOCD: CPT | Mod: HCNC,CPTII,S$GLB, | Performed by: DERMATOLOGY

## 2023-02-09 PROCEDURE — 1126F PR PAIN SEVERITY QUANTIFIED, NO PAIN PRESENT: ICD-10-PCS | Mod: HCNC,CPTII,S$GLB, | Performed by: DERMATOLOGY

## 2023-02-09 PROCEDURE — 1160F RVW MEDS BY RX/DR IN RCRD: CPT | Mod: HCNC,CPTII,S$GLB, | Performed by: DERMATOLOGY

## 2023-02-09 PROCEDURE — 1101F PR PT FALLS ASSESS DOC 0-1 FALLS W/OUT INJ PAST YR: ICD-10-PCS | Mod: HCNC,CPTII,S$GLB, | Performed by: DERMATOLOGY

## 2023-02-09 PROCEDURE — 1159F MED LIST DOCD IN RCRD: CPT | Mod: HCNC,CPTII,S$GLB, | Performed by: DERMATOLOGY

## 2023-02-09 PROCEDURE — 3288F PR FALLS RISK ASSESSMENT DOCUMENTED: ICD-10-PCS | Mod: HCNC,CPTII,S$GLB, | Performed by: DERMATOLOGY

## 2023-02-09 RX ORDER — TACROLIMUS 1 MG/G
OINTMENT TOPICAL
Qty: 30 G | Refills: 5 | Status: SHIPPED | OUTPATIENT
Start: 2023-02-09

## 2023-02-09 NOTE — TELEPHONE ENCOUNTER
Spoke to pharmacy. Pharmacy asked if oint can be replaced with cream to reduce cost for pt. Dr. Caldwell approved. Pharm thanked me.     ----- Message from Cuca Childers sent at 2/9/2023 11:30 AM CST -----  Regarding: Seth Tuttle([place] [facility] )  Is Requesting a Call back Regarding Clarification for pt Medication   tacrolimus (PROTOPIC) 0.1 % ointment, please call to discuss further.        Phone  [phone]

## 2023-02-09 NOTE — PROGRESS NOTES
Subjective:       Patient ID:  Trudi Mcknight is a 67 y.o. female who presents for   Chief Complaint   Patient presents with    Rosacea     Patient is here today for a follow up for rosacea on face and scalp.   Pt states that tx with sulfacleanse and metronizadole -bid- has improved significantly. Pt in the past would get bumps with her rosacea but this plan has minimized the bumps.  She does wear spf daily   Pt states that symptoms worsen in heat or sun.   Occasionally gets flares in her scalp as well.     Pt also complains of eyebrown loss for over 30 years. Has hx of thyroid disease which flares it. Has been using metrocream on them but this has not helped.      Review of Systems   HENT:  Negative for headaches.    Eyes:  Positive for itching and eye irritation. Negative for eye watering and eyelid inflammation.   Gastrointestinal:  Negative for nausea, vomiting and diarrhea.        Vascular instability syndrome: rosacea associated with GI sx and HA's   Skin:  Positive for daily sunscreen use and activity-related sunscreen use. Negative for recent sunburn.   Neurological:  Negative for headaches.   Hematologic/Lymphatic: Does not bruise/bleed easily (xarelto).      Objective:    Physical Exam   Constitutional: She appears well-developed and well-nourished. No distress.   Neurological: She is alert and oriented to person, place, and time. She is not disoriented.   Psychiatric: She has a normal mood and affect.   Skin:   Areas Examined (abnormalities noted in diagram):   Head / Face Inspection Performed            Diagram Legend     Erythematous scaling macule/papule c/w actinic keratosis       Vascular papule c/w angioma      Pigmented verrucoid papule/plaque c/w seborrheic keratosis      Yellow umbilicated papule c/w sebaceous hyperplasia      Irregularly shaped tan macule c/w lentigo     1-2 mm smooth white papules consistent with Milia      Movable subcutaneous cyst with punctum c/w epidermal inclusion cyst       Subcutaneous movable cyst c/w pilar cyst      Firm pink to brown papule c/w dermatofibroma      Pedunculated fleshy papule(s) c/w skin tag(s)      Evenly pigmented macule c/w junctional nevus     Mildly variegated pigmented, slightly irregular-bordered macule c/w mildly atypical nevus      Flesh colored to evenly pigmented papule c/w intradermal nevus       Pink pearly papule/plaque c/w basal cell carcinoma      Erythematous hyperkeratotic cursted plaque c/w SCC      Surgical scar with no sign of skin cancer recurrence      Open and closed comedones      Inflammatory papules and pustules      Verrucoid papule consistent consistent with wart     Erythematous eczematous patches and plaques     Dystrophic onycholytic nail with subungual debris c/w onychomycosis     Umbilicated papule    Erythematous-base heme-crusted tan verrucoid plaque consistent with inflamed seborrheic keratosis     Erythematous Silvery Scaling Plaque c/w Psoriasis     See annotation      Assessment / Plan:        Rosacea  Sulfacleanser bid   Metrocream 1% qhs   Sunscreen qam     Alopecia areata v secondary to thyroid disease   -     tacrolimus (PROTOPIC) 0.1 % ointment; Aaa on brows qd- bid  Dispense: 30 g; Refill: 5             Follow up in about 1 year (around 2/9/2024) for rosacea med management.

## 2023-03-17 NOTE — DISCHARGE INSTRUCTIONS
-Follow up with Endocrinology in 1 week  -Decrease dose of Amiodarone by half, take 200 mg twice a day for 13 days followed by 200 mg daily. Follow up with Cardiology in 1 week.  -Follow Cardiology instructions concerning Amiodarone  -If worsening nausea/vomiting despite medication, please return to the ED     Complex Repair And O-L Flap Text: The defect edges were debeveled with a #15 scalpel blade.  The primary defect was closed partially with a complex linear closure.  Given the location of the remaining defect, shape of the defect and the proximity to free margins an O-L flap was deemed most appropriate for complete closure of the defect.  Using a sterile surgical marker, an appropriate flap was drawn incorporating the defect and placing the expected incisions within the relaxed skin tension lines where possible.    The area thus outlined was incised deep to adipose tissue with a #15 scalpel blade.  The skin margins were undermined to an appropriate distance in all directions utilizing iris scissors.

## 2023-03-21 RX ORDER — RIVAROXABAN 15 MG/1
15 TABLET, FILM COATED ORAL
Qty: 360 TABLET | Refills: 0 | Status: CANCELLED | OUTPATIENT
Start: 2023-03-17 | End: 2024-03-16

## 2023-04-11 ENCOUNTER — OFFICE VISIT (OUTPATIENT)
Dept: PSYCHIATRY | Facility: CLINIC | Age: 68
End: 2023-04-11
Payer: MEDICARE

## 2023-04-11 ENCOUNTER — LAB VISIT (OUTPATIENT)
Dept: LAB | Facility: HOSPITAL | Age: 68
End: 2023-04-11
Attending: HOSPITALIST
Payer: MEDICARE

## 2023-04-11 DIAGNOSIS — F41.1 GENERALIZED ANXIETY DISORDER WITH PANIC ATTACKS: Primary | ICD-10-CM

## 2023-04-11 DIAGNOSIS — F33.0 MDD (MAJOR DEPRESSIVE DISORDER), RECURRENT EPISODE, MILD: ICD-10-CM

## 2023-04-11 DIAGNOSIS — T46.2X5A AMIODARONE-INDUCED THYROIDITIS: ICD-10-CM

## 2023-04-11 DIAGNOSIS — F41.0 GENERALIZED ANXIETY DISORDER WITH PANIC ATTACKS: Primary | ICD-10-CM

## 2023-04-11 DIAGNOSIS — E06.4 AMIODARONE-INDUCED THYROIDITIS: ICD-10-CM

## 2023-04-11 LAB
T4 FREE SERPL-MCNC: 0.96 NG/DL (ref 0.71–1.51)
TSH SERPL DL<=0.005 MIU/L-ACNC: 3.99 UIU/ML (ref 0.4–4)

## 2023-04-11 PROCEDURE — 1160F PR REVIEW ALL MEDS BY PRESCRIBER/CLIN PHARMACIST DOCUMENTED: ICD-10-PCS | Mod: HCNC,CPTII,95, | Performed by: PHYSICIAN ASSISTANT

## 2023-04-11 PROCEDURE — 99214 PR OFFICE/OUTPT VISIT, EST, LEVL IV, 30-39 MIN: ICD-10-PCS | Mod: HCNC,95,, | Performed by: PHYSICIAN ASSISTANT

## 2023-04-11 PROCEDURE — 36415 COLL VENOUS BLD VENIPUNCTURE: CPT | Mod: HCNC,PO | Performed by: HOSPITALIST

## 2023-04-11 PROCEDURE — 1159F PR MEDICATION LIST DOCUMENTED IN MEDICAL RECORD: ICD-10-PCS | Mod: HCNC,CPTII,95, | Performed by: PHYSICIAN ASSISTANT

## 2023-04-11 PROCEDURE — 99214 OFFICE O/P EST MOD 30 MIN: CPT | Mod: HCNC,95,, | Performed by: PHYSICIAN ASSISTANT

## 2023-04-11 PROCEDURE — 1159F MED LIST DOCD IN RCRD: CPT | Mod: HCNC,CPTII,95, | Performed by: PHYSICIAN ASSISTANT

## 2023-04-11 PROCEDURE — 1160F RVW MEDS BY RX/DR IN RCRD: CPT | Mod: HCNC,CPTII,95, | Performed by: PHYSICIAN ASSISTANT

## 2023-04-11 PROCEDURE — 84439 ASSAY OF FREE THYROXINE: CPT | Mod: HCNC | Performed by: HOSPITALIST

## 2023-04-11 PROCEDURE — 84443 ASSAY THYROID STIM HORMONE: CPT | Mod: HCNC | Performed by: HOSPITALIST

## 2023-04-11 RX ORDER — ALPRAZOLAM 0.25 MG/1
0.25 TABLET ORAL 3 TIMES DAILY PRN
Qty: 90 TABLET | Refills: 3 | Status: SHIPPED | OUTPATIENT
Start: 2023-04-11

## 2023-04-11 RX ORDER — VENLAFAXINE HYDROCHLORIDE 37.5 MG/1
37.5 CAPSULE, EXTENDED RELEASE ORAL DAILY
Qty: 90 CAPSULE | Refills: 2 | Status: SHIPPED | OUTPATIENT
Start: 2023-04-11 | End: 2023-08-15 | Stop reason: SDUPTHER

## 2023-04-11 NOTE — PROGRESS NOTES
"The patient location is: [place]    Visit type: audiovisual    Each patient to whom he or she provides medical services by telemedicine is:  (1) informed of the relationship between the physician and patient and the respective role of any other health care provider with respect to management of the patient; and (2) notified that he or she may decline to receive medical services by telemedicine and may withdraw from such care at any time.    Notes:       Outpatient Psychiatry Follow-Up Visit (MD/JASMINE)    4/11/2023    Clinical Status of Patient:  Outpatient (Ambulatory)    Chief Complaint:  Trudi Mcknight is a 68 y.o. female who presents today for follow-up of depression and anxiety.  Met with patient.      Interval History and Content of Current Session:  Interim Events/Subjective Report/Content of Current Session:    Pt reports today: "still doing the effexor and xanax. Ill take 1 xanax tablet per day at night but havent used it since I ran out"    Mood "has been pretty good. Some anxiety if I get sick whether its a cold or just feeling bad"    Patients mood is steady, affect appears mood congruent. Linear and logical, friendly and cooperative, good eye contact.    Denies SI/HI/AVH. Pt reports sleeping well 6-8hr per night and normal appetite. Denies side effects of medications.    Pt reports taking medications as prescribed and behaving appropriately during interview today.        Prior visit    Pt reports today: "mood pretty good, pretty stable, xanax helps me sleep"    Patients mood is steady and euthymic, affect appears mood congruent. Linear and logical, friendly and cooperative, good eye contact.    Denies SI/HI/AVH. Pt reports sleeping well and normal appetite. Denies side effects of medications.    Pt reports taking medications as prescribed and behaving appropriately during interview today.    Review of Systems     Review of Systems   Constitutional:  Negative for fever and malaise/fatigue.   HENT:  " Negative for sore throat.    Eyes:  Negative for photophobia.   Respiratory:  Negative for cough.    Cardiovascular:  Negative for chest pain and palpitations.   Gastrointestinal:  Negative for abdominal pain.   Genitourinary:  Negative for dysuria.   Musculoskeletal:  Negative for myalgias.   Skin:  Negative for rash.   Neurological:  Negative for dizziness.   Endo/Heme/Allergies:  Does not bruise/bleed easily.   Psychiatric/Behavioral:  Negative for depression, hallucinations, substance abuse and suicidal ideas. The patient is not nervous/anxious and does not have insomnia.      Psychiatric Review Of Systems - Is patient experiencing or having changes in:  sleep: no  appetite: no  weight: no  energy/anergy: yes  interest/pleasure/anhedonia: no  somatic symptoms: no  libido: no  anxiety/panic: no  guilty/hopelessness: no  concentration: no  S.I.B.s/risky behavior: no  Irritability: no  Racing thoughts: no  Impulsive behaviors: no  Paranoia: no  AVH: no      Past Medical, Family and Social History: The patient's past medical, family and social history have been reviewed and updated as appropriate within the electronic medical record - see encounter notes.      Current Medications:   Medication List with Changes/Refills   Current Medications    ALPRAZOLAM (XANAX) 0.25 MG TABLET    Take 1 tablet (0.25 mg total) by mouth 3 (three) times daily as needed for Anxiety.    CALCIUM CARBONATE (TUMS) 200 MG CALCIUM (500 MG) CHEWABLE TABLET    Take 1 tablet by mouth as needed. 2-3D PRE WEEK    DICLOFENAC SODIUM (VOLTAREN) 1 % GEL    Apply 2 g topically 2 (two) times daily as needed (pain).    METRONIDAZOLE (NORITATE) 1 % CREAM    Compound azelaic acid 15% + ivermectin 1% + metronidazole 1% cream. Apply to affected area on face once or twice daily.    RED YEAST RICE 600 MG CAP    TAKE 2 CAPSULES BY MOUTH 2 (TWO) TIMES A DAY. 2 CAPSULES/ DAY    RIVAROXABAN (XARELTO) 15 MG TAB    Take 1 tablet (15 mg total) by mouth daily with  dinner or evening meal.    SULFACETAMIDE SODIUM-SULFUR (SULFACLEANSE 8-4) 8-4 % SUSP    Wash face qhs    TACROLIMUS (PROTOPIC) 0.1 % OINTMENT    Aaa on brows qd- bid    VENLAFAXINE (EFFEXOR-XR) 37.5 MG 24 HR CAPSULE    Take 1 capsule (37.5 mg total) by mouth once daily.         Allergies:   Review of patient's allergies indicates:   Allergen Reactions    Lasix [furosemide] Shortness Of Breath    Penicillins Hives     causes congestion and hives    Tricyclic antidepressants and tricyclic compounds          Vitals   There were no vitals filed for this visit.       Labs/Imaging/Studies:   No results found for this or any previous visit (from the past 48 hour(s)).   No results found for: PHENYTOIN, PHENOBARB, VALPROATE, CBMZ    Compliance: yes    Side effects: None    Risk Parameters:  Patient reports no suicidal ideation  Patient reports no homicidal ideation  Patient reports no self-injurious behavior  Patient reports no violent behavior    Exam (detailed: at least 9 elements; comprehensive: all 15 elements)   Constitutional  Vitals:  Most recent vital signs, dated less than 90 days prior to this appointment, were reviewed.   There were no vitals filed for this visit.     General:  unremarkable, age appropriate     Musculoskeletal  Muscle Strength/Tone:  not examined   Gait & Station:  Not examined     Psychiatric  Speech:  no latency; no press   Mood & Affect:  steady  congruent and appropriate   Thought Process:  normal and logical   Associations:  intact   Thought Content:  normal, no suicidality, no homicidality, delusions, or paranoia   Insight:  intact, has awareness of illness   Judgement: behavior is adequate to circumstances   Orientation:  grossly intact   Memory: intact for content of interview   Language: grossly intact   Attention Span & Concentration:  able to focus   Fund of Knowledge:  intact and appropriate to age and level of education     Assessment and Diagnosis   Status/Progress: Based on the  examination today, the patient's problem(s) is/are well controlled.  New problems have not been presented today.   Co-morbidities, Diagnostic uncertainty and Lack of compliance are not complicating management of the primary condition.  There are no active rule-out diagnoses for this patient at this time.     General Impression:      ICD-10-CM ICD-9-CM   1. Generalized anxiety disorder with panic attacks  F41.1 300.02    F41.0 300.01   2. MDD (major depressive disorder), recurrent episode, mild  F33.0 296.31           Intervention/Counseling/Treatment Plan   Medication Management: Continue current medications. The risks and benefits of medication were discussed with the patient.    -continue effexor XR 37.5mg daily  -continue xanax 0.25mg q8h prn anxiety      Return to Clinic:  4 months        Kristofer Martinez PA-C      Total face to face time: 10 min  Total time (chart review, patient contact, documentation): 14 min      *This note has been prepared using a combination of a dictation device and typing.  It has been checked for errors but some errors may still exist within the note as a result of speech recognition errors and/or typographical errors.

## 2023-04-15 ENCOUNTER — CLINICAL SUPPORT (OUTPATIENT)
Dept: CARDIOLOGY | Facility: HOSPITAL | Age: 68
End: 2023-04-15
Payer: MEDICARE

## 2023-04-15 DIAGNOSIS — I48.91 UNSPECIFIED ATRIAL FIBRILLATION: ICD-10-CM

## 2023-04-15 DIAGNOSIS — I49.5 SICK SINUS SYNDROME: ICD-10-CM

## 2023-04-15 DIAGNOSIS — Z95.0 PRESENCE OF CARDIAC PACEMAKER: ICD-10-CM

## 2023-04-15 PROCEDURE — 93294 REM INTERROG EVL PM/LDLS PM: CPT | Mod: HCNC,,, | Performed by: INTERNAL MEDICINE

## 2023-04-15 PROCEDURE — 93296 REM INTERROG EVL PM/IDS: CPT | Mod: HCNC | Performed by: INTERNAL MEDICINE

## 2023-04-15 PROCEDURE — 93294 CARDIAC DEVICE CHECK - REMOTE: ICD-10-PCS | Mod: HCNC,,, | Performed by: INTERNAL MEDICINE

## 2023-04-27 ENCOUNTER — PATIENT MESSAGE (OUTPATIENT)
Dept: PHARMACY | Facility: CLINIC | Age: 68
End: 2023-04-27
Payer: MEDICARE

## 2023-04-27 ENCOUNTER — OFFICE VISIT (OUTPATIENT)
Dept: INTERNAL MEDICINE | Facility: CLINIC | Age: 68
End: 2023-04-27
Payer: MEDICARE

## 2023-04-27 VITALS
OXYGEN SATURATION: 97 % | WEIGHT: 114.31 LBS | HEART RATE: 74 BPM | BODY MASS INDEX: 21.58 KG/M2 | HEIGHT: 61 IN | SYSTOLIC BLOOD PRESSURE: 112 MMHG | DIASTOLIC BLOOD PRESSURE: 72 MMHG

## 2023-04-27 DIAGNOSIS — F33.0 MDD (MAJOR DEPRESSIVE DISORDER), RECURRENT EPISODE, MILD: ICD-10-CM

## 2023-04-27 DIAGNOSIS — F41.1 GENERALIZED ANXIETY DISORDER WITH PANIC ATTACKS: Chronic | ICD-10-CM

## 2023-04-27 DIAGNOSIS — Z79.01 CHRONIC ANTICOAGULATION: ICD-10-CM

## 2023-04-27 DIAGNOSIS — K21.9 GASTROESOPHAGEAL REFLUX DISEASE, UNSPECIFIED WHETHER ESOPHAGITIS PRESENT: ICD-10-CM

## 2023-04-27 DIAGNOSIS — F41.0 GENERALIZED ANXIETY DISORDER WITH PANIC ATTACKS: Chronic | ICD-10-CM

## 2023-04-27 DIAGNOSIS — I48.0 PAROXYSMAL ATRIAL FIBRILLATION: Primary | ICD-10-CM

## 2023-04-27 DIAGNOSIS — E78.2 MIXED HYPERLIPIDEMIA: Chronic | ICD-10-CM

## 2023-04-27 PROCEDURE — 3078F PR MOST RECENT DIASTOLIC BLOOD PRESSURE < 80 MM HG: ICD-10-PCS | Mod: HCNC,CPTII,S$GLB, | Performed by: INTERNAL MEDICINE

## 2023-04-27 PROCEDURE — 99999 PR PBB SHADOW E&M-EST. PATIENT-LVL III: ICD-10-PCS | Mod: PBBFAC,HCNC,, | Performed by: INTERNAL MEDICINE

## 2023-04-27 PROCEDURE — 3288F PR FALLS RISK ASSESSMENT DOCUMENTED: ICD-10-PCS | Mod: HCNC,CPTII,S$GLB, | Performed by: INTERNAL MEDICINE

## 2023-04-27 PROCEDURE — 3288F FALL RISK ASSESSMENT DOCD: CPT | Mod: HCNC,CPTII,S$GLB, | Performed by: INTERNAL MEDICINE

## 2023-04-27 PROCEDURE — 1101F PT FALLS ASSESS-DOCD LE1/YR: CPT | Mod: HCNC,CPTII,S$GLB, | Performed by: INTERNAL MEDICINE

## 2023-04-27 PROCEDURE — 90677 PCV20 VACCINE IM: CPT | Mod: HCNC,S$GLB,, | Performed by: INTERNAL MEDICINE

## 2023-04-27 PROCEDURE — 1101F PR PT FALLS ASSESS DOC 0-1 FALLS W/OUT INJ PAST YR: ICD-10-PCS | Mod: HCNC,CPTII,S$GLB, | Performed by: INTERNAL MEDICINE

## 2023-04-27 PROCEDURE — 3074F PR MOST RECENT SYSTOLIC BLOOD PRESSURE < 130 MM HG: ICD-10-PCS | Mod: HCNC,CPTII,S$GLB, | Performed by: INTERNAL MEDICINE

## 2023-04-27 PROCEDURE — 99214 PR OFFICE/OUTPT VISIT, EST, LEVL IV, 30-39 MIN: ICD-10-PCS | Mod: HCNC,S$GLB,, | Performed by: INTERNAL MEDICINE

## 2023-04-27 PROCEDURE — 3008F BODY MASS INDEX DOCD: CPT | Mod: HCNC,CPTII,S$GLB, | Performed by: INTERNAL MEDICINE

## 2023-04-27 PROCEDURE — G0009 ADMIN PNEUMOCOCCAL VACCINE: HCPCS | Mod: HCNC,S$GLB,, | Performed by: INTERNAL MEDICINE

## 2023-04-27 PROCEDURE — 1160F PR REVIEW ALL MEDS BY PRESCRIBER/CLIN PHARMACIST DOCUMENTED: ICD-10-PCS | Mod: HCNC,CPTII,S$GLB, | Performed by: INTERNAL MEDICINE

## 2023-04-27 PROCEDURE — 1159F MED LIST DOCD IN RCRD: CPT | Mod: HCNC,CPTII,S$GLB, | Performed by: INTERNAL MEDICINE

## 2023-04-27 PROCEDURE — 99214 OFFICE O/P EST MOD 30 MIN: CPT | Mod: HCNC,S$GLB,, | Performed by: INTERNAL MEDICINE

## 2023-04-27 PROCEDURE — 3078F DIAST BP <80 MM HG: CPT | Mod: HCNC,CPTII,S$GLB, | Performed by: INTERNAL MEDICINE

## 2023-04-27 PROCEDURE — 1126F AMNT PAIN NOTED NONE PRSNT: CPT | Mod: HCNC,CPTII,S$GLB, | Performed by: INTERNAL MEDICINE

## 2023-04-27 PROCEDURE — 3074F SYST BP LT 130 MM HG: CPT | Mod: HCNC,CPTII,S$GLB, | Performed by: INTERNAL MEDICINE

## 2023-04-27 PROCEDURE — 1160F RVW MEDS BY RX/DR IN RCRD: CPT | Mod: HCNC,CPTII,S$GLB, | Performed by: INTERNAL MEDICINE

## 2023-04-27 PROCEDURE — 1159F PR MEDICATION LIST DOCUMENTED IN MEDICAL RECORD: ICD-10-PCS | Mod: HCNC,CPTII,S$GLB, | Performed by: INTERNAL MEDICINE

## 2023-04-27 PROCEDURE — 3008F PR BODY MASS INDEX (BMI) DOCUMENTED: ICD-10-PCS | Mod: HCNC,CPTII,S$GLB, | Performed by: INTERNAL MEDICINE

## 2023-04-27 PROCEDURE — 99999 PR PBB SHADOW E&M-EST. PATIENT-LVL III: CPT | Mod: PBBFAC,HCNC,, | Performed by: INTERNAL MEDICINE

## 2023-04-27 PROCEDURE — 90677 PNEUMOCOCCAL CONJUGATE VACCINE 20-VALENT: ICD-10-PCS | Mod: HCNC,S$GLB,, | Performed by: INTERNAL MEDICINE

## 2023-04-27 PROCEDURE — G0009 PNEUMOCOCCAL CONJUGATE VACCINE 20-VALENT: ICD-10-PCS | Mod: HCNC,S$GLB,, | Performed by: INTERNAL MEDICINE

## 2023-04-27 PROCEDURE — 1126F PR PAIN SEVERITY QUANTIFIED, NO PAIN PRESENT: ICD-10-PCS | Mod: HCNC,CPTII,S$GLB, | Performed by: INTERNAL MEDICINE

## 2023-04-27 RX ORDER — OMEPRAZOLE 20 MG/1
20 CAPSULE, DELAYED RELEASE ORAL DAILY PRN
Qty: 30 CAPSULE | Refills: 11 | COMMUNITY
Start: 2023-04-27 | End: 2024-04-26

## 2023-04-27 NOTE — PROGRESS NOTES
"Subjective:       Patient ID: Trudi Mcknight is a 68 y.o. female.    Chief Complaint: Annual Exam    HPI  Trudi Mcknight is a 68 y.o. female here for routine follow up of the following chronic issues:    Lots of congestion since covid in august. Gradually getting better.     She has had recurrent outbreaks of shingles. Initial 2 yars ago, last blistering in Dec '22.    Now off flecanide post ablation  Continues on xarelto.  Review of Systems   Constitutional:  Negative for activity change and unexpected weight change.   HENT:  Positive for hearing loss and rhinorrhea. Negative for trouble swallowing.    Eyes:  Negative for discharge and visual disturbance.   Respiratory:  Negative for chest tightness and wheezing.    Cardiovascular:  Negative for chest pain and palpitations.   Gastrointestinal:  Negative for blood in stool, constipation, diarrhea and vomiting.   Endocrine: Negative for polydipsia and polyuria.   Genitourinary:  Negative for difficulty urinating, dysuria, hematuria and menstrual problem.   Musculoskeletal:  Positive for arthralgias and joint swelling. Negative for neck pain.   Neurological:  Negative for weakness and headaches.   Psychiatric/Behavioral:  Negative for confusion and dysphoric mood.      Objective:   /72 (BP Location: Left arm, Patient Position: Sitting, BP Method: Medium (Manual))   Pulse 74   Ht 5' 1" (1.549 m)   Wt 51.9 kg (114 lb 4.9 oz)   SpO2 97%   BMI 21.60 kg/m²      Physical Exam  Constitutional:       General: She is not in acute distress.     Appearance: She is well-developed. She is not diaphoretic.   HENT:      Head: Normocephalic and atraumatic.   Cardiovascular:      Rate and Rhythm: Normal rate and regular rhythm.   Pulmonary:      Effort: Pulmonary effort is normal. No respiratory distress.      Breath sounds: No wheezing or rales.   Skin:     General: Skin is warm and dry.   Neurological:      Mental Status: She is alert and oriented to person, place, and " time.   Psychiatric:         Behavior: Behavior normal.       Assessment:       1. Paroxysmal atrial fibrillation    2. Gastroesophageal reflux disease, unspecified whether esophagitis present    3. MDD (major depressive disorder), recurrent episode, mild    4. Generalized anxiety disorder with panic attacks    5. Mixed hyperlipidemia    6. Chronic anticoagulation        Plan:       Trudi was seen today for annual exam.    Diagnoses and all orders for this visit:    Paroxysmal atrial fibrillation  Doing well post ablation  Continue xarelto  Off flecanide    Gastroesophageal reflux disease, unspecified whether esophagitis present  -     omeprazole (PRILOSEC) 20 MG capsule; Take 1 capsule (20 mg total) by mouth daily as needed (reflux). Prn, few times as week at most    MDD (major depressive disorder), recurrent episode, mild  Generalized anxiety disorder with panic attacks  Managed by psych and doing well    Mixed hyperlipidemia  Monitor    Other orders  -     (In Office Administered) Pneumococcal Conjugate Vaccine (20 Valent) (IM)        Shingrx recommended

## 2023-05-16 NOTE — PROGRESS NOTES
Ms. Mcknight is a patient of Dr. Duncan and was last seen in clinic 11/21/2022.      Subjective:   Patient ID:  Trudi Mcknight is a 68 y.o. female who presents for follow up of Atrial Fibrillation and Pacemaker Check  .     HPI:    Ms. Mcknight is a 68 y.o. female with atrial fibrillation (cryo PVI 3/29/2022, RF PVI 8/16/2022), sick sinus syndrome, PPM, anxiety, hypothyroidism here for follow up.    Background:    Initially presented with symptomatic AF and symptomatic sinus pauses. Also noted chest pain not related to these events.  Dual chamber PPM implanted 1/20/22.  Continued to have AF. Did not tolerate Flecainide or Propafenone.  Echo revealed moderate pericardial effusion, which resolved.  PVI 3/29/22 Cryo-ablation.  Continued to have symptomatic AF/nonsustained AT. Also had multiple hospitalizations for somatic complaints not related to this, with anxiety playing a significant role.  Placed on amiodarone.  Echo 4/6/22 normal biventricular structure and function mild LAE small posterolateral effusion.    6/22/2022:  Has started to improve. No recent hospitalizations. Device interrogation reveals no sustained AF in the past month.   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 Being followed by Endocrinology.  Notes tremors. Also notes rash.  Doing well with amiodarone.   Options are repeat ablation and discontinue amiodarone vs continue amiodarone for now.  We discussed both options at length. I initially endorsed continuing with amiodarone, given she is now doing better after frequent hospitalizations. However, her preference is repeat ablation and trial off amiodarone, given the tremors, rash, and hypothyroidism.  Risks and benefits of repeat RFA/PVI discussed, she would like to proceed.  Hold Xarelto morning of procedure, take evening prior.    8/16/2022: RF PVI, PWI    8/21/2022: She is 3 months s/p redo ablation. She is feeling well since procedure. Did have some AF during blanking period (1%),  mostly while having covid. She is reporting symptom improvement.  She is off amiodarone since Sept. CHADSVASc 2 on xarelto at renal dose (CRCL 37). No RV pacing. No CHF symptoms. Echo 4/2022 showed normal LVEF.    Update (05/22/2023):    Today she says overall she is feeling well. Some irreg HB in the evenings after she takes her xarelto or eating certain foods. No CP, MARLEY, LH, syncope reported.    She is currently taking xarelto 15mg daily for stroke prophylaxis and denies significant bleeding episodes. Kidney function is stable, with a creatinine of 1.1 on 8/9/2022.    Device Interrogation (5/22/2023) reveals an intrinsic SR with stable lead and device function. No arrhythmias or treated episodes were noted.  She paces 1.1% in the RA and <1% in the RV. Estimated battery longevity 8-10.3 years.     I have personally reviewed the patient's EKG today, which shows sinus rhythm at 75bpm. NE interval is 154. QRS is 74. QTc is 433.    Relevant Cardiac Test Results:    2D Echo (12/30/2022):  Small circumferential pericardial effusion, largest inferolaterally. No evidence of tamponade.  The left ventricle is normal in size with normal systolic function.  The estimated ejection fraction is 65%.  Normal left ventricular diastolic function.  Normal right ventricular size with normal right ventricular systolic function.  Normal central venous pressure (3 mmHg).  The estimated PA systolic pressure is 22 mmHg.    Current Outpatient Medications   Medication Sig    ALPRAZolam (XANAX) 0.25 MG tablet Take 1 tablet (0.25 mg total) by mouth 3 (three) times daily as needed for Anxiety.    diclofenac sodium (VOLTAREN) 1 % Gel Apply 2 g topically 2 (two) times daily as needed (pain).    metroNIDAZOLE (NORITATE) 1 % cream Compound azelaic acid 15% + ivermectin 1% + metronidazole 1% cream. Apply to affected area on face once or twice daily.    omeprazole (PRILOSEC) 20 MG capsule Take 1 capsule (20 mg total) by mouth daily as needed  "(reflux).    red yeast rice 600 mg Cap TAKE 2 CAPSULES BY MOUTH 2 (TWO) TIMES A DAY. 2 CAPSULES/ DAY (Patient not taking: Reported on 4/27/2023)    rivaroxaban (XARELTO) 15 mg Tab Take 1 tablet (15 mg total) by mouth daily with dinner or evening meal.    sulfacetamide sodium-sulfur (SULFACLEANSE 8-4) 8-4 % Susp Wash face qhs    tacrolimus (PROTOPIC) 0.1 % ointment Aaa on brows qd- bid    venlafaxine (EFFEXOR-XR) 37.5 MG 24 hr capsule Take 1 capsule (37.5 mg total) by mouth once daily.     No current facility-administered medications for this visit.       Review of Systems   Constitutional: Negative for malaise/fatigue.   Cardiovascular:  Positive for irregular heartbeat. Negative for chest pain, dyspnea on exertion, leg swelling and palpitations.   Respiratory:  Negative for shortness of breath.    Hematologic/Lymphatic: Negative for bleeding problem.   Skin:  Negative for rash.   Musculoskeletal:  Negative for myalgias.   Gastrointestinal:  Negative for hematemesis, hematochezia and nausea.   Genitourinary:  Negative for hematuria.   Neurological:  Negative for light-headedness.   Psychiatric/Behavioral:  Negative for altered mental status.    Allergic/Immunologic: Negative for persistent infections.     Objective:          /74   Pulse 75   Ht 5' 1" (1.549 m)   Wt 53.1 kg (117 lb 1 oz)   BMI 22.12 kg/m²     Physical Exam  Vitals and nursing note reviewed.   Constitutional:       Appearance: Normal appearance. She is well-developed.   HENT:      Head: Normocephalic.      Nose: Nose normal.   Eyes:      Pupils: Pupils are equal, round, and reactive to light.   Cardiovascular:      Rate and Rhythm: Normal rate and regular rhythm.   Pulmonary:      Effort: No respiratory distress.      Breath sounds: Normal breath sounds.   Chest:      Comments: Device to LUCW. Incision and pocket in good repair.    Musculoskeletal:         General: Normal range of motion.   Skin:     General: Skin is warm and dry.      " Findings: No erythema.   Neurological:      Mental Status: She is alert and oriented to person, place, and time.   Psychiatric:         Speech: Speech normal.         Behavior: Behavior normal.         Lab Results   Component Value Date     08/09/2022    K 3.8 08/09/2022    MG 2.1 04/09/2022    BUN 17 08/09/2022    CREATININE 1.1 08/09/2022    ALT 11 04/09/2022    AST 15 04/09/2022    HGB 13.5 08/09/2022    HCT 43.2 08/09/2022    HCT 43 01/02/2022    TSH 3.993 04/11/2023    LDLCALC 108.8 12/30/2022       Recent Labs   Lab 03/29/22  0535 04/04/22 2015 04/04/22 2048 08/09/22  0829   INR 1.1 1.3 H 1.5 H 1.2       Assessment:     1. Paroxysmal atrial fibrillation    2. Sick sinus syndrome    3. Typical atrial flutter    4. Chronic anticoagulation    5. S/P placement of cardiac pacemaker      Plan:     In summary, Ms. Mcknight is a 68 y.o. female with atrial fibrillation (cryo PVI 3/29/2022, RF PVI 8/16/2022), sick sinus syndrome, PPM, anxiety, hypothyroidism here for follow up.  She is now 9 months s/p redo ablation. Not on AAD. Some irreg HB after meds/eating likely ectopy. She has not tolerated BB in the past due to hypotension. She says this symptom is improving. On xarelto.  Ms. Mcknight is doing well from a device perspective with stable lead and device function. No RV pacing. No CHF symptoms. RTC 1 yr.    Continue current medication regimen and device settings.   Follow up in device clinic as scheduled.   Follow up in EP clinic in 1 year, sooner as needed.     *A copy of this note has been sent to Dr. Duncan*    Follow up in about 1 year (around 5/22/2024).    ------------------------------------------------------------------    YOUSIF Durham, NP-C  Cardiac Electrophysiology

## 2023-05-19 DIAGNOSIS — I48.0 PAROXYSMAL ATRIAL FIBRILLATION: Primary | ICD-10-CM

## 2023-05-22 ENCOUNTER — CLINICAL SUPPORT (OUTPATIENT)
Dept: CARDIOLOGY | Facility: HOSPITAL | Age: 68
End: 2023-05-22
Attending: INTERNAL MEDICINE
Payer: MEDICARE

## 2023-05-22 ENCOUNTER — HOSPITAL ENCOUNTER (OUTPATIENT)
Dept: CARDIOLOGY | Facility: CLINIC | Age: 68
Discharge: HOME | End: 2023-05-22
Payer: MEDICARE

## 2023-05-22 ENCOUNTER — OFFICE VISIT (OUTPATIENT)
Dept: ELECTROPHYSIOLOGY | Facility: CLINIC | Age: 68
End: 2023-05-22
Attending: INTERNAL MEDICINE
Payer: MEDICARE

## 2023-05-22 VITALS
HEART RATE: 75 BPM | BODY MASS INDEX: 22.1 KG/M2 | DIASTOLIC BLOOD PRESSURE: 74 MMHG | HEIGHT: 61 IN | SYSTOLIC BLOOD PRESSURE: 115 MMHG | WEIGHT: 117.06 LBS

## 2023-05-22 DIAGNOSIS — I49.8 OTHER SPECIFIED CARDIAC ARRHYTHMIAS: ICD-10-CM

## 2023-05-22 DIAGNOSIS — Z79.01 CHRONIC ANTICOAGULATION: ICD-10-CM

## 2023-05-22 DIAGNOSIS — Z95.0 S/P PLACEMENT OF CARDIAC PACEMAKER: Chronic | ICD-10-CM

## 2023-05-22 DIAGNOSIS — I48.0 PAROXYSMAL ATRIAL FIBRILLATION: ICD-10-CM

## 2023-05-22 DIAGNOSIS — I48.0 PAROXYSMAL ATRIAL FIBRILLATION: Primary | ICD-10-CM

## 2023-05-22 DIAGNOSIS — I49.5 SICK SINUS SYNDROME: Chronic | ICD-10-CM

## 2023-05-22 DIAGNOSIS — I48.3 TYPICAL ATRIAL FLUTTER: ICD-10-CM

## 2023-05-22 PROCEDURE — 99999 PR PBB SHADOW E&M-EST. PATIENT-LVL III: CPT | Mod: PBBFAC,HCNC,, | Performed by: NURSE PRACTITIONER

## 2023-05-22 PROCEDURE — 93280 CARDIAC DEVICE CHECK - IN CLINIC & HOSPITAL: ICD-10-PCS | Mod: 26,HCNC,, | Performed by: INTERNAL MEDICINE

## 2023-05-22 PROCEDURE — 99999 PR PBB SHADOW E&M-EST. PATIENT-LVL III: ICD-10-PCS | Mod: PBBFAC,HCNC,, | Performed by: NURSE PRACTITIONER

## 2023-05-22 PROCEDURE — 93000 ELECTROCARDIOGRAM COMPLETE: CPT | Mod: HCNC,,, | Performed by: INTERNAL MEDICINE

## 2023-05-22 PROCEDURE — 1126F AMNT PAIN NOTED NONE PRSNT: CPT | Mod: HCNC,CPTII,S$GLB, | Performed by: NURSE PRACTITIONER

## 2023-05-22 PROCEDURE — 3008F BODY MASS INDEX DOCD: CPT | Mod: HCNC,CPTII,S$GLB, | Performed by: NURSE PRACTITIONER

## 2023-05-22 PROCEDURE — 3078F PR MOST RECENT DIASTOLIC BLOOD PRESSURE < 80 MM HG: ICD-10-PCS | Mod: HCNC,CPTII,S$GLB, | Performed by: NURSE PRACTITIONER

## 2023-05-22 PROCEDURE — 3074F SYST BP LT 130 MM HG: CPT | Mod: HCNC,CPTII,S$GLB, | Performed by: NURSE PRACTITIONER

## 2023-05-22 PROCEDURE — 1160F PR REVIEW ALL MEDS BY PRESCRIBER/CLIN PHARMACIST DOCUMENTED: ICD-10-PCS | Mod: HCNC,CPTII,S$GLB, | Performed by: NURSE PRACTITIONER

## 2023-05-22 PROCEDURE — 1101F PT FALLS ASSESS-DOCD LE1/YR: CPT | Mod: HCNC,CPTII,S$GLB, | Performed by: NURSE PRACTITIONER

## 2023-05-22 PROCEDURE — 3288F FALL RISK ASSESSMENT DOCD: CPT | Mod: HCNC,CPTII,S$GLB, | Performed by: NURSE PRACTITIONER

## 2023-05-22 PROCEDURE — 3078F DIAST BP <80 MM HG: CPT | Mod: HCNC,CPTII,S$GLB, | Performed by: NURSE PRACTITIONER

## 2023-05-22 PROCEDURE — 3074F PR MOST RECENT SYSTOLIC BLOOD PRESSURE < 130 MM HG: ICD-10-PCS | Mod: HCNC,CPTII,S$GLB, | Performed by: NURSE PRACTITIONER

## 2023-05-22 PROCEDURE — 93280 PM DEVICE PROGR EVAL DUAL: CPT | Mod: HCNC

## 2023-05-22 PROCEDURE — 93000 RHYTHM STRIP: ICD-10-PCS | Mod: HCNC,,, | Performed by: INTERNAL MEDICINE

## 2023-05-22 PROCEDURE — 99214 PR OFFICE/OUTPT VISIT, EST, LEVL IV, 30-39 MIN: ICD-10-PCS | Mod: HCNC,S$GLB,, | Performed by: NURSE PRACTITIONER

## 2023-05-22 PROCEDURE — 1101F PR PT FALLS ASSESS DOC 0-1 FALLS W/OUT INJ PAST YR: ICD-10-PCS | Mod: HCNC,CPTII,S$GLB, | Performed by: NURSE PRACTITIONER

## 2023-05-22 PROCEDURE — 3288F PR FALLS RISK ASSESSMENT DOCUMENTED: ICD-10-PCS | Mod: HCNC,CPTII,S$GLB, | Performed by: NURSE PRACTITIONER

## 2023-05-22 PROCEDURE — 3008F PR BODY MASS INDEX (BMI) DOCUMENTED: ICD-10-PCS | Mod: HCNC,CPTII,S$GLB, | Performed by: NURSE PRACTITIONER

## 2023-05-22 PROCEDURE — 1160F RVW MEDS BY RX/DR IN RCRD: CPT | Mod: HCNC,CPTII,S$GLB, | Performed by: NURSE PRACTITIONER

## 2023-05-22 PROCEDURE — 1159F MED LIST DOCD IN RCRD: CPT | Mod: HCNC,CPTII,S$GLB, | Performed by: NURSE PRACTITIONER

## 2023-05-22 PROCEDURE — 1159F PR MEDICATION LIST DOCUMENTED IN MEDICAL RECORD: ICD-10-PCS | Mod: HCNC,CPTII,S$GLB, | Performed by: NURSE PRACTITIONER

## 2023-05-22 PROCEDURE — 93280 PM DEVICE PROGR EVAL DUAL: CPT | Mod: 26,HCNC,, | Performed by: INTERNAL MEDICINE

## 2023-05-22 PROCEDURE — 99214 OFFICE O/P EST MOD 30 MIN: CPT | Mod: HCNC,S$GLB,, | Performed by: NURSE PRACTITIONER

## 2023-05-22 PROCEDURE — 1126F PR PAIN SEVERITY QUANTIFIED, NO PAIN PRESENT: ICD-10-PCS | Mod: HCNC,CPTII,S$GLB, | Performed by: NURSE PRACTITIONER

## 2023-05-29 ENCOUNTER — PES CALL (OUTPATIENT)
Dept: ADMINISTRATIVE | Facility: CLINIC | Age: 68
End: 2023-05-29
Payer: MEDICARE

## 2023-06-24 ENCOUNTER — OFFICE VISIT (OUTPATIENT)
Dept: URGENT CARE | Facility: CLINIC | Age: 68
End: 2023-06-24
Payer: MEDICARE

## 2023-06-24 VITALS
SYSTOLIC BLOOD PRESSURE: 106 MMHG | DIASTOLIC BLOOD PRESSURE: 68 MMHG | WEIGHT: 119.94 LBS | HEIGHT: 61 IN | BODY MASS INDEX: 22.64 KG/M2 | HEART RATE: 78 BPM | OXYGEN SATURATION: 97 % | TEMPERATURE: 98 F | RESPIRATION RATE: 17 BRPM

## 2023-06-24 DIAGNOSIS — J01.00 ACUTE MAXILLARY SINUSITIS, RECURRENCE NOT SPECIFIED: Primary | ICD-10-CM

## 2023-06-24 LAB
CTP QC/QA: YES
CTP QC/QA: YES
MOLECULAR STREP A: NEGATIVE
SARS-COV-2 AG RESP QL IA.RAPID: NEGATIVE

## 2023-06-24 PROCEDURE — 87651 STREP A DNA AMP PROBE: CPT | Mod: QW,S$GLB,, | Performed by: FAMILY MEDICINE

## 2023-06-24 PROCEDURE — 87651 POCT STREP A MOLECULAR: ICD-10-PCS | Mod: QW,S$GLB,, | Performed by: FAMILY MEDICINE

## 2023-06-24 PROCEDURE — 87811 SARS-COV-2 COVID19 W/OPTIC: CPT | Mod: QW,S$GLB,, | Performed by: FAMILY MEDICINE

## 2023-06-24 PROCEDURE — 99213 OFFICE O/P EST LOW 20 MIN: CPT | Mod: S$GLB,,, | Performed by: FAMILY MEDICINE

## 2023-06-24 PROCEDURE — 99213 PR OFFICE/OUTPT VISIT, EST, LEVL III, 20-29 MIN: ICD-10-PCS | Mod: S$GLB,,, | Performed by: FAMILY MEDICINE

## 2023-06-24 PROCEDURE — 87811 SARS CORONAVIRUS 2 ANTIGEN POCT, MANUAL READ: ICD-10-PCS | Mod: QW,S$GLB,, | Performed by: FAMILY MEDICINE

## 2023-06-24 RX ORDER — FLUTICASONE PROPIONATE 50 MCG
1 SPRAY, SUSPENSION (ML) NASAL DAILY
Qty: 9.9 ML | Refills: 0 | Status: SHIPPED | OUTPATIENT
Start: 2023-06-24 | End: 2023-08-02

## 2023-06-24 RX ORDER — DOXYCYCLINE 100 MG/1
100 CAPSULE ORAL 2 TIMES DAILY
Qty: 14 CAPSULE | Refills: 0 | Status: SHIPPED | OUTPATIENT
Start: 2023-06-24 | End: 2023-07-01

## 2023-06-24 NOTE — PROGRESS NOTES
"Subjective:      Patient ID: Trudi Mcknight is a 68 y.o. female.    Vitals:  height is 5' 1" (1.549 m) and weight is 54.4 kg (119 lb 14.9 oz). Her oral temperature is 97.8 °F (36.6 °C). Her blood pressure is 106/68 and her pulse is 78. Her respiration is 17 and oxygen saturation is 97%.     Chief Complaint: Sinus Problem    Pt. States she has a sinus infection that's been going on for three weeks. Pt. States she has a sore throat also.    Sinus Problem  The current episode started 1 to 4 weeks ago. The problem is unchanged. There has been no fever. The pain is mild. Associated symptoms include congestion, coughing, headaches, sinus pressure and a sore throat. Pertinent negatives include no swollen glands. Past treatments include oral decongestants. The treatment provided no relief.     HENT:  Positive for congestion, sinus pressure and sore throat.    Respiratory:  Positive for cough.    Neurological:  Positive for headaches.    Objective:     Physical Exam   Constitutional: She does not appear ill. No distress. normal  HENT:   Head: Normocephalic and atraumatic.   Ears:   Right Ear: Tympanic membrane and ear canal normal.   Left Ear: Tympanic membrane and ear canal normal.   Nose: Mucosal edema, rhinorrhea, purulent discharge and congestion present. Right sinus exhibits maxillary sinus tenderness. Left sinus exhibits maxillary sinus tenderness.   Mouth/Throat: Mucous membranes are moist. Posterior oropharyngeal erythema present.   Eyes: Pupils are equal, round, and reactive to light. Extraocular movement intact   Neck: Neck supple.   Cardiovascular: Normal rate, regular rhythm, normal heart sounds and normal pulses.   Pulmonary/Chest: Effort normal and breath sounds normal.   Abdominal: Normal appearance. Soft.   Lymphadenopathy:     She has cervical adenopathy (tender).   Neurological: She is alert.   Nursing note and vitals reviewed.  Results for orders placed or performed in visit on 06/24/23   POCT Strep [place], " Molecular   Result Value Ref Range    Molecular Strep A, POC Negative Negative     Acceptable Yes    SARS Coronavirus 2 Antigen, POCT Manual Read   Result Value Ref Range    SARS Coronavirus 2 Antigen Negative Negative     Acceptable Yes       Assessment:     1. Acute maxillary sinusitis, recurrence not specified        Plan:       Acute maxillary sinusitis, recurrence not specified  -     POCT Strep A, Molecular  -     SARS Coronavirus 2 Antigen, POCT Manual Read  -     doxycycline (VIBRAMYCIN) 100 MG Cap; Take 1 capsule (100 mg total) by mouth 2 (two) times daily. for 7 days  Dispense: 14 capsule; Refill: 0  -     fluticasone propionate (FLONASE) 50 mcg/actuation nasal spray; 1 spray (50 mcg total) by Each Nostril route once daily.  Dispense: 9.9 mL; Refill: 0

## 2023-07-14 ENCOUNTER — CLINICAL SUPPORT (OUTPATIENT)
Dept: CARDIOLOGY | Facility: HOSPITAL | Age: 68
End: 2023-07-14
Payer: MEDICARE

## 2023-07-14 DIAGNOSIS — Z95.0 PRESENCE OF CARDIAC PACEMAKER: ICD-10-CM

## 2023-07-14 DIAGNOSIS — I48.91 UNSPECIFIED ATRIAL FIBRILLATION: ICD-10-CM

## 2023-07-14 DIAGNOSIS — I49.5 SICK SINUS SYNDROME: ICD-10-CM

## 2023-07-14 PROCEDURE — 93296 REM INTERROG EVL PM/IDS: CPT | Mod: HCNC | Performed by: INTERNAL MEDICINE

## 2023-08-02 ENCOUNTER — OFFICE VISIT (OUTPATIENT)
Dept: URGENT CARE | Facility: CLINIC | Age: 68
End: 2023-08-02
Payer: MEDICARE

## 2023-08-02 VITALS
WEIGHT: 120.81 LBS | DIASTOLIC BLOOD PRESSURE: 76 MMHG | OXYGEN SATURATION: 98 % | HEIGHT: 61 IN | BODY MASS INDEX: 22.81 KG/M2 | HEART RATE: 93 BPM | SYSTOLIC BLOOD PRESSURE: 109 MMHG | TEMPERATURE: 98 F | RESPIRATION RATE: 17 BRPM

## 2023-08-02 DIAGNOSIS — R05.8 COUGH WITH CONGESTION OF PARANASAL SINUS: ICD-10-CM

## 2023-08-02 DIAGNOSIS — Z11.52 ENCOUNTER FOR SCREENING FOR COVID-19: ICD-10-CM

## 2023-08-02 DIAGNOSIS — B96.89 ACUTE BACTERIAL BRONCHITIS: Primary | ICD-10-CM

## 2023-08-02 DIAGNOSIS — R30.0 DYSURIA: ICD-10-CM

## 2023-08-02 DIAGNOSIS — J20.8 ACUTE BACTERIAL BRONCHITIS: Primary | ICD-10-CM

## 2023-08-02 DIAGNOSIS — J30.9 CHRONIC ALLERGIC RHINITIS: ICD-10-CM

## 2023-08-02 DIAGNOSIS — R09.81 COUGH WITH CONGESTION OF PARANASAL SINUS: ICD-10-CM

## 2023-08-02 DIAGNOSIS — Z87.442 HISTORY OF KIDNEY STONES: ICD-10-CM

## 2023-08-02 LAB
BILIRUB UR QL STRIP: NEGATIVE
CTP QC/QA: YES
GLUCOSE UR QL STRIP: NEGATIVE
KETONES UR QL STRIP: NEGATIVE
LEUKOCYTE ESTERASE UR QL STRIP: NEGATIVE
PH, POC UA: 5.5 (ref 5–8)
POC BLOOD, URINE: POSITIVE
POC NITRATES, URINE: NEGATIVE
PROT UR QL STRIP: NEGATIVE
SARS-COV-2 AG RESP QL IA.RAPID: NEGATIVE
SP GR UR STRIP: 1.02 (ref 1–1.03)
UROBILINOGEN UR STRIP-ACNC: NORMAL (ref 0.1–1.1)

## 2023-08-02 PROCEDURE — 99214 OFFICE O/P EST MOD 30 MIN: CPT | Mod: S$GLB,,, | Performed by: PHYSICIAN ASSISTANT

## 2023-08-02 PROCEDURE — 87811 SARS-COV-2 COVID19 W/OPTIC: CPT | Mod: QW,S$GLB,, | Performed by: PHYSICIAN ASSISTANT

## 2023-08-02 PROCEDURE — 87811 SARS CORONAVIRUS 2 ANTIGEN POCT, MANUAL READ: ICD-10-PCS | Mod: QW,S$GLB,, | Performed by: PHYSICIAN ASSISTANT

## 2023-08-02 PROCEDURE — 87086 URINE CULTURE/COLONY COUNT: CPT | Mod: HCNC | Performed by: PHYSICIAN ASSISTANT

## 2023-08-02 PROCEDURE — 99214 PR OFFICE/OUTPT VISIT, EST, LEVL IV, 30-39 MIN: ICD-10-PCS | Mod: S$GLB,,, | Performed by: PHYSICIAN ASSISTANT

## 2023-08-02 PROCEDURE — 81003 POCT URINALYSIS, DIPSTICK, MANUAL, W/O SCOPE: ICD-10-PCS | Mod: QW,S$GLB,, | Performed by: PHYSICIAN ASSISTANT

## 2023-08-02 PROCEDURE — 87088 URINE BACTERIA CULTURE: CPT | Mod: HCNC | Performed by: PHYSICIAN ASSISTANT

## 2023-08-02 PROCEDURE — 81003 URINALYSIS AUTO W/O SCOPE: CPT | Mod: QW,S$GLB,, | Performed by: PHYSICIAN ASSISTANT

## 2023-08-02 RX ORDER — DEXTROMETHORPHAN HYDROBROMIDE AND GUAIFENESIN 10; 200 MG/1; MG/1
1 CAPSULE, GELATIN COATED ORAL
Qty: 30 EACH | Refills: 0 | Status: SHIPPED | OUTPATIENT
Start: 2023-08-02 | End: 2023-08-12

## 2023-08-02 RX ORDER — AZELASTINE 1 MG/ML
1 SPRAY, METERED NASAL 2 TIMES DAILY PRN
Qty: 30 ML | Refills: 0 | Status: SHIPPED | OUTPATIENT
Start: 2023-08-02 | End: 2023-11-10

## 2023-08-02 RX ORDER — HYDROCODONE BITARTRATE AND ACETAMINOPHEN 5; 325 MG/1; MG/1
1 TABLET ORAL EVERY 12 HOURS PRN
Qty: 8 TABLET | Refills: 0 | Status: SHIPPED | OUTPATIENT
Start: 2023-08-02 | End: 2023-11-10

## 2023-08-02 RX ORDER — BENZONATATE 200 MG/1
200 CAPSULE ORAL 3 TIMES DAILY PRN
Qty: 30 CAPSULE | Refills: 0 | Status: SHIPPED | OUTPATIENT
Start: 2023-08-02 | End: 2023-08-12

## 2023-08-02 RX ORDER — FLUTICASONE PROPIONATE 50 MCG
1 SPRAY, SUSPENSION (ML) NASAL 2 TIMES DAILY PRN
Qty: 16 G | Refills: 0 | Status: SHIPPED | OUTPATIENT
Start: 2023-08-02 | End: 2023-11-10

## 2023-08-02 RX ORDER — AMOXICILLIN AND CLAVULANATE POTASSIUM 875; 125 MG/1; MG/1
1 TABLET, FILM COATED ORAL EVERY 12 HOURS
Qty: 20 TABLET | Refills: 0 | Status: SHIPPED | OUTPATIENT
Start: 2023-08-02 | End: 2023-08-12

## 2023-08-02 RX ORDER — ALBUTEROL SULFATE 90 UG/1
1-2 AEROSOL, METERED RESPIRATORY (INHALATION) EVERY 6 HOURS PRN
Qty: 6.7 G | Refills: 0 | Status: SHIPPED | OUTPATIENT
Start: 2023-08-02

## 2023-08-02 NOTE — PATIENT INSTRUCTIONS
PLEASE READ YOUR DISCHARGE INSTRUCTIONS ENTIRELY AS IT CONTAINS IMPORTANT INFORMATION.    Patient had covid testing done today.    Discussed corona virus precautions and reviewed [facility] FAC; printed a copy for patient.  I discussed to continue to monitor their symptoms. Discussed that if their symptoms persist or worsen to seek re-evaluation. Clinic vs. ER precautions were given.  Patient verbalized understanding and agreed with the entire plan of care.    If Negative and no direct exposure: symptom free without fever reducing meds in 24 hours - can go back to work in 24 hours with surgical mask for 10-14 days.  - Reviewed radiographs and all diagnostic testing with patient/family.    - Rest.  Drink plenty of fluids.    - Tylenol as directed as needed for fever/pain.  For Tylenol, do not exceed 3000 mg/ day. If no contraindication or allergies.  -OK to supplement with coricidin cough/chest congestion every 4-6 hours as needed for cough and congestion.  Use caution of total amount of Tylenol/acetaminophen per day.  - take Tessalon as needed for cough suppression.   - continue albuterol inhaler as needed for shortness of breath/wheezing  - If you were prescribed antibiotics, please take them to completion. Please supplement with OTC probiotics and yogurt.  Contact clinic if develop profuse diarrhea and weakness.    -Below are suggestions for symptomatic relief:              -Salt water gargles to soothe throat pain.              -Chloroseptic spray also helps to numb throat pain. Drink hot tea with honey or lemon to soothe your throat.              -Nasal saline spray reduces inflammation and dryness.              -Warm face compresses to help with facial sinus pain/pressure.              -Vicks vapor rub at night.              -Astelin NASAL SPRAY twice day for nasal/sinus congestion   **may also supplement with OTC nasal spray to help with inflammation and congestion.   Wean to off when you nose becomes to dry or  bleed. Also use nasal saline twice a day to help with dryness.               -Flonase OTC or Nasacort OTC  once or twice a day for nasal/sinus congestion. DON'T USE IF YOU HAVE GLAUCOMA. CHECK WITH YOUR PHARMACIST/PHYSICIAN.              -Simple foods like chicken noodle soup.              -CORICIDIN/Mucinex DM (ANY COUGH EXPECTORANT-- guaifenesin) for cough or chest congestion with mucus and (ANY COUGH SUPPRESSANT- dextromethorphan) helps with coughing every 12 hours. Mucinex-DM if you have chest congestion or sputum (caution if history of high blood pressure or palpitations).              -Zyrtec/Claritin/xyzal during the day time  & Benadryl at night (only if severe runny nose) may help with allergies and runny nose. Add decongestant if you have nasal/sinus congestion/sinus pressure/ear fullness sensation. (see below)              -may take OTC meclizine as needed for dizziness or nausea.     For your URINARY symptoms:  -URINE CULTURE WAS SENT.  WILL NOTIFY YOU IN THE NEXT 3-5 DAYS.  -Use gatorade/pedialyte or rehydration packets to help stay hydrated. Vitamin water and plain water do not contain rehydrating electrolytes.  -Increase clear liquids (water, gatorade, pedialyte, broths, jello, etc).   -Drink plenty of fluids  -Strain your urine and keep any stone you pass  -Take the narcotic medication only for severe pain, do not drive or operate machinery. Break in half first to see how it affects you. AVOID WHEN TAKING XANAX     Please go to the ER for worsening symptoms including fatigue/weakness, fever, worsening flank pain, vomiting, etc. if you experience worsening abdominal pain, blood in your vomit or stool, high fever, dizziness, fainting, swelling of your abdomen, inability to pass gas or stool, or inability to urinate.         -You must understand that you've received an Urgent Care treatment only and that you may be released before all your medical problems are known or treated. You, the patient, will  arrange for follow up care as instructed. Please arrange follow up with your primary medical clinic within 2-5 days if your signs and symptoms have not resolved or worsen.     - Follow up with your PCP or specialty clinic as directed.  You can call [phone] or [phone] to schedule an appointment with the appropriate provider.  Schedule CENTER is open Mon-Friday 8-5pm (excluded holidays).    - If your condition worsens or fails to improve we recommend that you receive another evaluation at the emergency room immediately or contact your primary medical clinic to discuss your concerns.

## 2023-08-02 NOTE — PROGRESS NOTES
"Subjective:      Patient ID: Trudi Mcknight is a 68 y.o. female.    Vitals:  height is 5' 0.98" (1.549 m) and weight is 54.8 kg (120 lb 13 oz). Her oral temperature is 98.2 °F (36.8 °C). Her blood pressure is 109/76 and her pulse is 93. Her respiration is 17 and oxygen saturation is 98%.     Chief Complaint: Cough and Dysuria    68-year-old Female with a history of kidney stones, paroxysmal AFib on Xarelto daily, sick sinus syndrome status post dual chamber pacemaker, chronic allergies, and already vaccinated for COVID-19 who presents to urgent care clinic for evaluation.  She is here with multiple complaints.  She reports 2-3 week history of productive cough, nasal congestion, runny nose, headache, fatigue, and ear congestion.  Has been taking OTC Claritin with minimal improvements.  No sick contacts, chest pain, shortness breast, or focal weakness/deficits.  She was seen at this clinic 1 month ago by different provider and prescribed doxycycline for sinus infection.    Last night had 1 episode of suprapubic pain radiating to low back.  Pain was rated as 8/10 and resolved.  Has some mild dysuria associated.  No urinary urgency/frequency, hematuria, vaginal discharge, fever, chills, nausea, vomiting or focal weakness/deficits.  Similar to her previous kidney stone episode many years ago.  Has not taking anything for symptoms.  Has increase water consumption to about 2-3 bottles of water per day.  Does not feel like urinary tract infection.    Cough  This is a new problem. The current episode started 1 to 4 weeks ago. The cough is Productive of sputum. Associated symptoms include ear congestion, ear pain, headaches, nasal congestion, postnasal drip and rhinorrhea. Pertinent negatives include no chest pain, chills, fever, heartburn, hemoptysis, myalgias, rash, sore throat, shortness of breath, sweats, weight loss or wheezing.       Constitution: Positive for fatigue. Negative for activity change, appetite change, " chills, sweating, fever and generalized weakness.   HENT:  Positive for ear pain, congestion, postnasal drip and sinus pressure. Negative for hearing loss, facial swelling, sinus pain, sore throat, trouble swallowing and voice change.    Neck: Negative for neck pain, neck stiffness and painful lymph nodes.   Cardiovascular:  Negative for chest pain, leg swelling, palpitations, sob on exertion and passing out.   Eyes:  Negative for eye discharge, eye pain, photophobia, vision loss, double vision and blurred vision.   Respiratory:  Positive for cough and sputum production. Negative for chest tightness, bloody sputum, COPD, shortness of breath, stridor, wheezing and asthma.    Gastrointestinal:  Negative for abdominal pain, nausea, vomiting, constipation, diarrhea, bright red blood in stool, rectal bleeding, heartburn and bowel incontinence.   Genitourinary:  Positive for dysuria, flank pain and history of kidney stones. Negative for frequency, urgency, urine decreased, bladder incontinence, hematuria, vaginal pain, vaginal discharge, vaginal bleeding, vaginal odor, painful intercourse, genital sore and pelvic pain.   Musculoskeletal:  Negative for trauma, joint pain, joint swelling, abnormal ROM of joint, muscle cramps and muscle ache.   Skin:  Negative for color change, pale, rash and wound.   Allergic/Immunologic: Negative for seasonal allergies, asthma and immunocompromised state.   Neurological:  Positive for headaches. Negative for dizziness, history of vertigo, light-headedness, passing out, facial drooping, speech difficulty, coordination disturbances, loss of balance, disorientation, altered mental status, loss of consciousness, numbness, tingling and seizures.   Hematologic/Lymphatic: Negative for swollen lymph nodes, easy bruising/bleeding and trouble clotting. Does not bruise/bleed easily.   Psychiatric/Behavioral:  Negative for altered mental status and disorientation.       Past Medical History:    Diagnosis Date    Anticoagulant long-term use     Esophagitis     Hiatal hernia     Meniere's disease     Paroxysmal atrial fibrillation 03/07/2021    Shingles     Sick sinus syndrome 01/21/2022    s/p Dual chamber pacemaker    Thyroid disease        Objective:     Physical Exam   Constitutional: She is oriented to person, place, and time. She appears well-developed. She is cooperative. She does not appear ill. No distress.      Comments:Well-appearing.  Speaking in full sentences without difficulty.     HENT:   Head: Normocephalic.   Ears:   Right Ear: Hearing, external ear and ear canal normal. No no drainage, swelling or tenderness. No mastoid tenderness.   Left Ear: Hearing, external ear and ear canal normal. No no drainage, swelling or tenderness. No mastoid tenderness.      Comments: Mild bilateral middle ear effusion with no bulging erythema  Nose: Rhinorrhea and congestion present. Right sinus exhibits no maxillary sinus tenderness and no frontal sinus tenderness. Left sinus exhibits no maxillary sinus tenderness and no frontal sinus tenderness.   Mouth/Throat: Uvula is midline, oropharynx is clear and moist and mucous membranes are normal. Mucous membranes are moist. No oral lesions. No trismus in the jaw. No uvula swelling. No oropharyngeal exudate, posterior oropharyngeal edema or posterior oropharyngeal erythema. No tonsillar exudate. Oropharynx is clear.   Eyes: Conjunctivae, EOM and lids are normal. Pupils are equal, round, and reactive to light. Right eye exhibits no discharge. Left eye exhibits no discharge. Right conjunctiva is not injected. Right conjunctiva has no hemorrhage. Left conjunctiva is not injected. Left conjunctiva has no hemorrhage. Extraocular movement intact vision grossly intact gaze aligned appropriately   Neck: Phonation normal. Neck supple. No neck rigidity present.   Cardiovascular: Normal rate, regular rhythm, normal heart sounds and normal pulses.   No murmur heard.      Comments: No leg edema, calf tenderness/erythema, or Homans sign bilaterally.       Pulmonary/Chest: Effort normal and breath sounds normal. No accessory muscle usage. No respiratory distress. She has no wheezes. She exhibits no tenderness.         Comments: Wet sounding cough on exam    Abdominal: Normal appearance. She exhibits no distension. Soft. There is no abdominal tenderness. There is no rebound, no guarding, no left CVA tenderness and no right CVA tenderness.   Musculoskeletal: Normal range of motion.         General: Normal range of motion.      Comments: Moves all extremities with normal tone, strength, and ROM.  Gait normal.   Lymphadenopathy:     She has no cervical adenopathy.   Neurological: no focal deficit. She is alert, oriented to person, place, and time and at baseline. She has normal motor skills and normal sensation. She displays facial symmetry and no dysarthria. She exhibits normal muscle tone. Gait and coordination normal. Coordination normal. GCS eye subscore is 4. GCS verbal subscore is 5. GCS motor subscore is 6.   Skin: Skin is warm, dry and no rash. Capillary refill takes less than 2 seconds.   Psychiatric: She experiences Normal attention. Her speech is normal and behavior is normal. Thought content normal.   Nursing note and vitals reviewed.    Results for orders placed or performed in visit on 08/02/23   SARS Coronavirus 2 Antigen, POCT Manual Read   Result Value Ref Range    SARS Coronavirus 2 Antigen Negative Negative     Acceptable Yes    POCT Urinalysis, Dipstick, Manual, w/o Scope   Result Value Ref Range    POC Blood, Urine Positive (A) Negative    POC Bilirubin, Urine Negative Negative    POC Urobilinogen, Urine Normal 0.1 - 1.1    POC Ketones, Urine Negative Negative    POC Protein, Urine Negative Negative    POC Nitrates, Urine Negative Negative    POC Glucose, Urine Negative Negative    pH, UA 5.5 5 - 8    POC Specific Gravity, Urine 1.020 1.003 - 1.029     POC Leukocytes, Urine Negative Negative       Assessment:     1. Acute bacterial bronchitis    2. Cough with congestion of paranasal sinus    3. Chronic allergic rhinitis    4. Dysuria    5. History of kidney stones    6. Encounter for screening for COVID-19      Note dictated with voice recognition software, please excuse any grammatical errors.    Patient presents with clinical exam findings and history consistent with above.     On exam, patient is nontoxic appearing and vitals are stable.  Patient is essentially neurovascularly intact on exam.    Clinic orders:   Urinalysis with 50 RBC.  Urine culture sent.  Will notify patient in 3-5 days for results.  She can also check my chart.  Rapid COVID in clinic negative.  Diagnostic testing results were independently reviewed and interpreted, which were discussed in depth with patient.  Previous progress note 06/24/2023 urgent care reviewed.  Previous Urgent Care progress note January and February 2019 reviewed.  Primary care progress note February 2019 reviewed.  Tolerated Augmentin with no issues.    Bacterial bronchitis,   -prescribed Augmentin times 10 days.  Emphasized importance of symptomatic treatment for improvement in symptoms including oral antihistamine, nasal antihistamine spray, and oral cough suppressant/expectorant such as crease eating given her cardiac history.    History of kidney stones with dysuria,   -no concern for UTI or pyelonephritis at this time.  Recommend increase fluid hydration.  -prescribed Augmentin times 10 days for back to bronchitis but also has urinary tract coverage.  -on chronic Xarelto not a good candidate for anti-inflammatory.  Prescribed short course of hydrocodone only for severe pain.  Avoid concomitant use with Xanax due to side effects.      If symptoms do not improve/worsens, patient was referred back to PCP for continued outpatient workup and management.     Patient was counseled, explained with the test results meaning,  expected course, and answered all of questions. They can also receive results via my chart.  Printed and verbal guidelines were given.  We had shared decision making for patient's treatment.     Patient was instructed to return for re-evaluation for any worsening or change in current symptoms. Strict ED versus clinic precautions given in depth. Discharge and follow-up instructions given verbally/printed with the patient who expressed understanding and willingness to comply with my recommendations.  Patient verbalized understanding and agreed with the entirety of plan of care.      Plan:       Acute bacterial bronchitis  -     amoxicillin-clavulanate 875-125mg (AUGMENTIN) 875-125 mg per tablet; Take 1 tablet by mouth every 12 (twelve) hours. Take With food increase or hydration while on this medication. for 10 days  Dispense: 20 tablet; Refill: 0  -     dextromethorphan-guaiFENesin (CORICIDIN HBP CHEST BILL-COUGH)  mg Cap; Take 1 tablet by mouth every 4 to 6 hours as needed (Cough, nasal, sinus, chest, and ear congestion).  Dispense: 30 each; Refill: 0  -     albuterol (VENTOLIN HFA) 90 mcg/actuation inhaler; Inhale 1-2 puffs into the lungs every 6 (six) hours as needed for Wheezing or Shortness of Breath. Rescue  Dispense: 6.7 g; Refill: 0    Cough with congestion of paranasal sinus  -     SARS Coronavirus 2 Antigen, POCT Manual Read  -     fluticasone propionate (FLONASE) 50 mcg/actuation nasal spray; 1 spray (50 mcg total) by Each Nostril route 2 (two) times daily as needed for Rhinitis or Allergies.  Dispense: 16 g; Refill: 0  -     benzonatate (TESSALON) 200 MG capsule; Take 1 capsule (200 mg total) by mouth 3 (three) times daily as needed for Cough (Cough).  Dispense: 30 capsule; Refill: 0  -     azelastine (ASTELIN) 137 mcg (0.1 %) nasal spray; 1 spray (137 mcg total) by Nasal route 2 (two) times daily as needed for Rhinitis.  Dispense: 30 mL; Refill: 0  -     dextromethorphan-guaiFENesin (CORICIDIN HBP  CHEST BILL-COUGH)  mg Cap; Take 1 tablet by mouth every 4 to 6 hours as needed (Cough, nasal, sinus, chest, and ear congestion).  Dispense: 30 each; Refill: 0  -     albuterol (VENTOLIN HFA) 90 mcg/actuation inhaler; Inhale 1-2 puffs into the lungs every 6 (six) hours as needed for Wheezing or Shortness of Breath. Rescue  Dispense: 6.7 g; Refill: 0    Chronic allergic rhinitis  -     fluticasone propionate (FLONASE) 50 mcg/actuation nasal spray; 1 spray (50 mcg total) by Each Nostril route 2 (two) times daily as needed for Rhinitis or Allergies.  Dispense: 16 g; Refill: 0  -     benzonatate (TESSALON) 200 MG capsule; Take 1 capsule (200 mg total) by mouth 3 (three) times daily as needed for Cough (Cough).  Dispense: 30 capsule; Refill: 0  -     azelastine (ASTELIN) 137 mcg (0.1 %) nasal spray; 1 spray (137 mcg total) by Nasal route 2 (two) times daily as needed for Rhinitis.  Dispense: 30 mL; Refill: 0  -     dextromethorphan-guaiFENesin (CORICIDIN HBP CHEST BILL-COUGH)  mg Cap; Take 1 tablet by mouth every 4 to 6 hours as needed (Cough, nasal, sinus, chest, and ear congestion).  Dispense: 30 each; Refill: 0    Dysuria  -     POCT Urinalysis, Dipstick, Manual, w/o Scope  -     CULTURE, URINE  -     HYDROcodone-acetaminophen (NORCO) 5-325 mg per tablet; Take 1 tablet by mouth every 12 (twelve) hours as needed for Pain (severe kidney stone pain; do not take with xanax).  Dispense: 8 tablet; Refill: 0    History of kidney stones  -     HYDROcodone-acetaminophen (NORCO) 5-325 mg per tablet; Take 1 tablet by mouth every 12 (twelve) hours as needed for Pain (severe kidney stone pain; do not take with xanax).  Dispense: 8 tablet; Refill: 0    Encounter for screening for COVID-19             Additional MDM:     Heart Failure Score:   COPD = No      Patient Instructions     PLEASE READ YOUR DISCHARGE INSTRUCTIONS ENTIRELY AS IT CONTAINS IMPORTANT INFORMATION.    Patient had covid testing done today.    Discussed  corona virus precautions and reviewed CDC FAC; printed a copy for patient.  I discussed to continue to monitor their symptoms. Discussed that if their symptoms persist or worsen to seek re-evaluation. Clinic vs. ER precautions were given.  Patient verbalized understanding and agreed with the entire plan of care.    If Negative and no direct exposure: symptom free without fever reducing meds in 24 hours - can go back to work in 24 hours with surgical mask for 10-14 days.  - Reviewed radiographs and all diagnostic testing with patient/family.    - Rest.  Drink plenty of fluids.    - Tylenol as directed as needed for fever/pain.  For Tylenol, do not exceed 3000 mg/ day. If no contraindication or allergies.  -OK to supplement with coricidin cough/chest congestion every 4-6 hours as needed for cough and congestion.  Use caution of total amount of Tylenol/acetaminophen per day.  - take Tessalon as needed for cough suppression.   - continue albuterol inhaler as needed for shortness of breath/wheezing  - If you were prescribed antibiotics, please take them to completion. Please supplement with OTC probiotics and yogurt.  Contact clinic if develop profuse diarrhea and weakness.    -Below are suggestions for symptomatic relief:              -Salt water gargles to soothe throat pain.              -Chloroseptic spray also helps to numb throat pain. Drink hot tea with honey or lemon to soothe your throat.              -Nasal saline spray reduces inflammation and dryness.              -Warm face compresses to help with facial sinus pain/pressure.              -Vicks vapor rub at night.              -Astelin NASAL SPRAY twice day for nasal/sinus congestion   **may also supplement with OTC nasal spray to help with inflammation and congestion.   Wean to off when you nose becomes to dry or bleed. Also use nasal saline twice a day to help with dryness.               -Flonase OTC or Nasacort OTC  once or twice a day for nasal/sinus  congestion. DON'T USE IF YOU HAVE GLAUCOMA. CHECK WITH YOUR PHARMACIST/PHYSICIAN.              -Simple foods like chicken noodle soup.              -CORICIDIN/Mucinex DM (ANY COUGH EXPECTORANT-- guaifenesin) for cough or chest congestion with mucus and (ANY COUGH SUPPRESSANT- dextromethorphan) helps with coughing every 12 hours. Mucinex-DM if you have chest congestion or sputum (caution if history of high blood pressure or palpitations).              -Zyrtec/Claritin/xyzal during the day time  & Benadryl at night (only if severe runny nose) may help with allergies and runny nose. Add decongestant if you have nasal/sinus congestion/sinus pressure/ear fullness sensation. (see below)              -may take OTC meclizine as needed for dizziness or nausea.     For your URINARY symptoms:  -URINE CULTURE WAS SENT.  WILL NOTIFY YOU IN THE NEXT 3-5 DAYS.  -Use gatorade/pedialyte or rehydration packets to help stay hydrated. Vitamin water and plain water do not contain rehydrating electrolytes.  -Increase clear liquids (water, gatorade, pedialyte, broths, jello, etc).   -Drink plenty of fluids  -Strain your urine and keep any stone you pass  -Take the narcotic medication only for severe pain, do not drive or operate machinery. Break in half first to see how it affects you. AVOID WHEN TAKING XANAX     Please go to the ER for worsening symptoms including fatigue/weakness, fever, worsening flank pain, vomiting, etc. if you experience worsening abdominal pain, blood in your vomit or stool, high fever, dizziness, fainting, swelling of your abdomen, inability to pass gas or stool, or inability to urinate.         -You must understand that you've received an Urgent Care treatment only and that you may be released before all your medical problems are known or treated. You, the patient, will arrange for follow up care as instructed. Please arrange follow up with your primary medical clinic within 2-5 days if your signs and symptoms  have not resolved or worsen.     - Follow up with your PCP or specialty clinic as directed.  You can call [phone] or [phone] to schedule an appointment with the appropriate provider.  Schedule CENTER is open Mon-Friday 8-5pm (excluded holidays).    - If your condition worsens or fails to improve we recommend that you receive another evaluation at the emergency room immediately or contact your primary medical clinic to discuss your concerns.

## 2023-08-05 LAB — BACTERIA UR CULT: ABNORMAL

## 2023-08-06 ENCOUNTER — TELEPHONE (OUTPATIENT)
Dept: URGENT CARE | Facility: CLINIC | Age: 68
End: 2023-08-06
Payer: MEDICARE

## 2023-08-06 NOTE — TELEPHONE ENCOUNTER
Patient reports she is feeling better. Notified of positive urine culture results and advised to continue medications as previously prescribed

## 2023-08-14 ENCOUNTER — PATIENT MESSAGE (OUTPATIENT)
Dept: ADMINISTRATIVE | Facility: HOSPITAL | Age: 68
End: 2023-08-14
Payer: MEDICARE

## 2023-08-15 ENCOUNTER — OFFICE VISIT (OUTPATIENT)
Dept: PSYCHIATRY | Facility: CLINIC | Age: 68
End: 2023-08-15
Payer: MEDICARE

## 2023-08-15 DIAGNOSIS — F41.1 GENERALIZED ANXIETY DISORDER WITH PANIC ATTACKS: Primary | ICD-10-CM

## 2023-08-15 DIAGNOSIS — F33.0 MDD (MAJOR DEPRESSIVE DISORDER), RECURRENT EPISODE, MILD: ICD-10-CM

## 2023-08-15 DIAGNOSIS — F41.0 GENERALIZED ANXIETY DISORDER WITH PANIC ATTACKS: Primary | ICD-10-CM

## 2023-08-15 PROCEDURE — 1160F RVW MEDS BY RX/DR IN RCRD: CPT | Mod: 95,HCNC,CPTII, | Performed by: PHYSICIAN ASSISTANT

## 2023-08-15 PROCEDURE — 99214 PR OFFICE/OUTPT VISIT, EST, LEVL IV, 30-39 MIN: ICD-10-PCS | Mod: 95,HCNC,, | Performed by: PHYSICIAN ASSISTANT

## 2023-08-15 PROCEDURE — 99214 OFFICE O/P EST MOD 30 MIN: CPT | Mod: 95,HCNC,, | Performed by: PHYSICIAN ASSISTANT

## 2023-08-15 PROCEDURE — 1160F PR REVIEW ALL MEDS BY PRESCRIBER/CLIN PHARMACIST DOCUMENTED: ICD-10-PCS | Mod: 95,HCNC,CPTII, | Performed by: PHYSICIAN ASSISTANT

## 2023-08-15 PROCEDURE — 1159F PR MEDICATION LIST DOCUMENTED IN MEDICAL RECORD: ICD-10-PCS | Mod: 95,HCNC,CPTII, | Performed by: PHYSICIAN ASSISTANT

## 2023-08-15 PROCEDURE — 1159F MED LIST DOCD IN RCRD: CPT | Mod: 95,HCNC,CPTII, | Performed by: PHYSICIAN ASSISTANT

## 2023-08-15 RX ORDER — VENLAFAXINE HYDROCHLORIDE 37.5 MG/1
37.5 CAPSULE, EXTENDED RELEASE ORAL DAILY
Qty: 90 CAPSULE | Refills: 2 | Status: SHIPPED | OUTPATIENT
Start: 2023-08-15 | End: 2023-12-19 | Stop reason: SDUPTHER

## 2023-08-15 NOTE — PROGRESS NOTES
"The patient location is: [place]    Visit type: audiovisual    Each patient to whom he or she provides medical services by telemedicine is:  (1) informed of the relationship between the physician and patient and the respective role of any other health care provider with respect to management of the patient; and (2) notified that he or she may decline to receive medical services by telemedicine and may withdraw from such care at any time.    Notes:       Outpatient Psychiatry Follow-Up Visit (MD/JASMINE)    8/15/2023    Clinical Status of Patient:  Outpatient (Ambulatory)    Chief Complaint:  Trudi Mcknight is a 68 y.o. female who presents today for follow-up of depression and anxiety.  Met with patient.      Interval History and Content of Current Session:  Interim Events/Subjective Report/Content of Current Session:    Pt mood "is good, doing fine. Good overall"    Pt stable on current regimen on of effexor and xanax prn. States very rarely uses xanax prn, states only used about 3-4 times since last visit 4 months ago.    Patients mood is euthymic, affect appears mood congruent. Linear and logical, friendly and cooperative, good eye contact.    Denies SI/HI/AVH. Pt reports sleeping well 8 hr on average and normal appetite. Denies side effects of medications.    Pt reports taking medications as prescribed and behaving appropriately during interview today.        Prior visit    Pt reports today: "still doing the effexor and xanax. Ill take 1 xanax tablet per day at night but havent used it since I ran out"    Mood "has been pretty good. Some anxiety if I get sick whether its a cold or just feeling bad"    Patients mood is steady, affect appears mood congruent. Linear and logical, friendly and cooperative, good eye contact.    Denies SI/HI/AVH. Pt reports sleeping well 6-8hr per night and normal appetite. Denies side effects of medications.    Pt reports taking medications as prescribed and behaving appropriately during " interview today.    Review of Systems     Review of Systems   Constitutional:  Negative for fever and malaise/fatigue.   HENT:  Negative for sore throat.    Eyes:  Negative for photophobia.   Respiratory:  Negative for cough.    Cardiovascular:  Negative for chest pain and palpitations.   Gastrointestinal:  Negative for abdominal pain.   Genitourinary:  Negative for dysuria.   Musculoskeletal:  Negative for myalgias.   Skin:  Negative for rash.   Neurological:  Negative for dizziness.   Endo/Heme/Allergies:  Does not bruise/bleed easily.   Psychiatric/Behavioral:  Negative for depression, hallucinations, substance abuse and suicidal ideas. The patient is not nervous/anxious and does not have insomnia.        Psychiatric Review Of Systems - Is patient experiencing or having changes in:  sleep: no  appetite: no  weight: no  energy/anergy: no  interest/pleasure/anhedonia: no  somatic symptoms: no  libido: no  anxiety/panic: no  guilty/hopelessness: no  concentration: no  S.I.B.s/risky behavior: no  Irritability: no  Racing thoughts: no  Impulsive behaviors: no  Paranoia: no  AVH: no      Past Medical, Family and Social History: The patient's past medical, family and social history have been reviewed and updated as appropriate within the electronic medical record - see encounter notes.      Current Medications:   Medication List with Changes/Refills   Current Medications    ALBUTEROL (VENTOLIN HFA) 90 MCG/ACTUATION INHALER    Inhale 1-2 puffs into the lungs every 6 (six) hours as needed for Wheezing or Shortness of Breath. Rescue    ALPRAZOLAM (XANAX) 0.25 MG TABLET    Take 1 tablet (0.25 mg total) by mouth 3 (three) times daily as needed for Anxiety.    AZELASTINE (ASTELIN) 137 MCG (0.1 %) NASAL SPRAY    1 spray (137 mcg total) by Nasal route 2 (two) times daily as needed for Rhinitis.    DICLOFENAC SODIUM (VOLTAREN) 1 % GEL    Apply 2 g topically 2 (two) times daily as needed (pain).    FLUTICASONE PROPIONATE  "(FLONASE) 50 MCG/ACTUATION NASAL SPRAY    1 spray (50 mcg total) by Each Nostril route 2 (two) times daily as needed for Rhinitis or Allergies.    HYDROCODONE-ACETAMINOPHEN (NORCO) 5-325 MG PER TABLET    Take 1 tablet by mouth every 12 (twelve) hours as needed for Pain (severe kidney stone pain; do not take with xanax).    METRONIDAZOLE (NORITATE) 1 % CREAM    Compound azelaic acid 15% + ivermectin 1% + metronidazole 1% cream. Apply to affected area on face once or twice daily.    OMEPRAZOLE (PRILOSEC) 20 MG CAPSULE    Take 1 capsule (20 mg total) by mouth daily as needed (reflux).    RED YEAST RICE 600 MG CAP    TAKE 2 CAPSULES BY MOUTH 2 (TWO) TIMES A DAY. 2 CAPSULES/ DAY    RIVAROXABAN (XARELTO) 15 MG TAB    Take 1 tablet (15 mg total) by mouth daily with dinner or evening meal.    SULFACETAMIDE SODIUM-SULFUR (SULFACLEANSE 8-4) 8-4 % SUSP    Wash face qhs    TACROLIMUS (PROTOPIC) 0.1 % OINTMENT    Aaa on brows qd- bid    VENLAFAXINE (EFFEXOR-XR) 37.5 MG 24 HR CAPSULE    Take 1 capsule (37.5 mg total) by mouth once daily.         Allergies:   Review of patient's allergies indicates:   Allergen Reactions    Lasix [furosemide] Shortness Of Breath    Penicillins Hives     causes congestion and hives.  Tolerated amoxicillin 2015.  Tolerated Augmentin 2019 multiple times.    Tricyclic antidepressants and tricyclic compounds          Vitals   There were no vitals filed for this visit.       Labs/Imaging/Studies:   No results found for this or any previous visit (from the past 48 hour(s)).   No results found for: "PHENYTOIN", "PHENOBARB", "VALPROATE", "CBMZ"    Compliance: yes    Side effects: None    Risk Parameters:  Patient reports no suicidal ideation  Patient reports no homicidal ideation  Patient reports no self-injurious behavior  Patient reports no violent behavior    Exam (detailed: at least 9 elements; comprehensive: all 15 elements)   Constitutional  Vitals:  Most recent vital signs, dated less than 90 days " prior to this appointment, were reviewed.   There were no vitals filed for this visit.     General:  unremarkable, age appropriate     Musculoskeletal  Muscle Strength/Tone:  not examined   Gait & Station:  Not examined     Psychiatric  Speech:  no latency; no press   Mood & Affect:  euthymic  congruent and appropriate   Thought Process:  normal and logical   Associations:  intact   Thought Content:  normal, no suicidality, no homicidality, delusions, or paranoia   Insight:  intact, has awareness of illness   Judgement: behavior is adequate to circumstances   Orientation:  grossly intact   Memory: intact for content of interview   Language: grossly intact   Attention Span & Concentration:  able to focus   Fund of Knowledge:  intact and appropriate to age and level of education     Assessment and Diagnosis   Status/Progress: Based on the examination today, the patient's problem(s) is/are well controlled.  New problems have not been presented today.   Co-morbidities, Diagnostic uncertainty and Lack of compliance are not complicating management of the primary condition.  There are no active rule-out diagnoses for this patient at this time.     General Impression:      ICD-10-CM ICD-9-CM   1. Generalized anxiety disorder with panic attacks  F41.1 300.02    F41.0 300.01   2. MDD (major depressive disorder), recurrent episode, mild  F33.0 296.31             Intervention/Counseling/Treatment Plan   Medication Management: Continue current medications. The risks and benefits of medication were discussed with the patient.    -continue effexor XR 37.5mg daily  -continue xanax 0.25mg q8h prn anxiety. Use sparingly as directed    The risks and benefits of medication were discussed with the patient.  Discussed diagnosis, risks and benefits of proposed treatment above vs alternative treatments vs no treatment, and potential side effects of these treatments. The patient expresses understanding of the above and displays the capacity  to agree with this treatment given said understanding. Patient also agrees that, currently, the benefits outweigh the risks and would like to pursue treatment at this time.  Discussed inherent unpredictability of medications in each individual.   Encouraged Patient to keep future appointments.   Take medications as prescribed and abstain from substance abuse.   In the event of an emergency, patient was advised to go to the emergency room        Return to Clinic:  4 months        Kristofer Martinez PA-C      Total face to face time: 10 min  Total time (chart review, patient contact, documentation): 14 min      *This note has been prepared using a combination of a dictation device and typing.  It has been checked for errors but some errors may still exist within the note as a result of speech recognition errors and/or typographical errors.

## 2023-09-19 ENCOUNTER — PATIENT MESSAGE (OUTPATIENT)
Dept: ENDOCRINOLOGY | Facility: CLINIC | Age: 68
End: 2023-09-19
Payer: MEDICARE

## 2023-09-20 DIAGNOSIS — Z78.0 MENOPAUSE: ICD-10-CM

## 2023-10-01 DIAGNOSIS — L71.9 ROSACEA: ICD-10-CM

## 2023-10-02 ENCOUNTER — PATIENT MESSAGE (OUTPATIENT)
Dept: INTERNAL MEDICINE | Facility: CLINIC | Age: 68
End: 2023-10-02
Payer: MEDICARE

## 2023-10-02 DIAGNOSIS — Z12.31 ENCOUNTER FOR SCREENING MAMMOGRAM FOR BREAST CANCER: Primary | ICD-10-CM

## 2023-10-02 NOTE — TELEPHONE ENCOUNTER
Please see the attached refill request.    Last seen 02/2023    Rosacea  Sulfacleanser bid   Metrocream 1% qhs   Sunscreen qam      Alopecia areata v secondary to thyroid disease   -     tacrolimus (PROTOPIC) 0.1 % ointment; Aaa on brows qd- bid  Dispense: 30 g; Refill: 5

## 2023-10-03 ENCOUNTER — TELEPHONE (OUTPATIENT)
Dept: OPHTHALMOLOGY | Facility: CLINIC | Age: 68
End: 2023-10-03
Payer: MEDICARE

## 2023-10-12 ENCOUNTER — CLINICAL SUPPORT (OUTPATIENT)
Dept: CARDIOLOGY | Facility: HOSPITAL | Age: 68
End: 2023-10-12
Payer: MEDICARE

## 2023-10-12 DIAGNOSIS — Z95.0 PRESENCE OF CARDIAC PACEMAKER: ICD-10-CM

## 2023-10-12 PROCEDURE — 93294 REM INTERROG EVL PM/LDLS PM: CPT | Mod: HCNC,,, | Performed by: INTERNAL MEDICINE

## 2023-10-12 PROCEDURE — 93294 CARDIAC DEVICE CHECK - REMOTE: ICD-10-PCS | Mod: HCNC,,, | Performed by: INTERNAL MEDICINE

## 2023-10-12 PROCEDURE — 93296 REM INTERROG EVL PM/IDS: CPT | Mod: HCNC | Performed by: INTERNAL MEDICINE

## 2023-10-18 ENCOUNTER — PATIENT OUTREACH (OUTPATIENT)
Dept: ADMINISTRATIVE | Facility: HOSPITAL | Age: 68
End: 2023-10-18
Payer: MEDICARE

## 2023-10-18 NOTE — PROGRESS NOTES
Health Maintenance Due   Topic Date Due    DEXA Scan  Never done    TETANUS VACCINE  07/01/2022    Influenza Vaccine (1) 09/01/2023    COVID-19 Vaccine (5 - 2023-24 season) 09/01/2023    Mammogram  10/17/2023        updated. Triggered LINKS and care everywhere. Chart reviewed for campaigns.Mammogram has been scheduled.    Senait Jacobo LPN   Clinical Care Coordinator  Primary Care and Wellness

## 2023-10-19 ENCOUNTER — HOSPITAL ENCOUNTER (OUTPATIENT)
Dept: RADIOLOGY | Facility: HOSPITAL | Age: 68
Discharge: HOME | End: 2023-10-19
Attending: INTERNAL MEDICINE
Payer: MEDICARE

## 2023-10-19 VITALS — BODY MASS INDEX: 23.56 KG/M2 | HEIGHT: 60 IN | WEIGHT: 120 LBS

## 2023-10-19 DIAGNOSIS — Z12.31 ENCOUNTER FOR SCREENING MAMMOGRAM FOR BREAST CANCER: ICD-10-CM

## 2023-10-19 PROCEDURE — 77063 BREAST TOMOSYNTHESIS BI: CPT | Mod: 26,,, | Performed by: RADIOLOGY

## 2023-10-19 PROCEDURE — 77067 SCR MAMMO BI INCL CAD: CPT | Mod: TC

## 2023-10-19 PROCEDURE — 77067 SCR MAMMO BI INCL CAD: CPT | Mod: 26,,, | Performed by: RADIOLOGY

## 2023-10-19 PROCEDURE — 77063 MAMMO DIGITAL SCREENING BILAT WITH TOMO: ICD-10-PCS | Mod: 26,,, | Performed by: RADIOLOGY

## 2023-10-19 PROCEDURE — 77067 MAMMO DIGITAL SCREENING BILAT WITH TOMO: ICD-10-PCS | Mod: 26,,, | Performed by: RADIOLOGY

## 2023-11-10 ENCOUNTER — OFFICE VISIT (OUTPATIENT)
Dept: CARDIOLOGY | Facility: CLINIC | Age: 68
End: 2023-11-10
Payer: MEDICARE

## 2023-11-10 VITALS
DIASTOLIC BLOOD PRESSURE: 67 MMHG | OXYGEN SATURATION: 96 % | BODY MASS INDEX: 24.97 KG/M2 | SYSTOLIC BLOOD PRESSURE: 111 MMHG | WEIGHT: 127.19 LBS | HEIGHT: 60 IN | HEART RATE: 82 BPM

## 2023-11-10 DIAGNOSIS — I48.0 PAROXYSMAL ATRIAL FIBRILLATION: ICD-10-CM

## 2023-11-10 DIAGNOSIS — Z98.890 STATUS POST CIRCUMFERENTIAL ABLATION OF PULMONARY VEIN: Chronic | ICD-10-CM

## 2023-11-10 DIAGNOSIS — I49.5 SICK SINUS SYNDROME: Chronic | ICD-10-CM

## 2023-11-10 DIAGNOSIS — E78.00 PURE HYPERCHOLESTEROLEMIA: Primary | Chronic | ICD-10-CM

## 2023-11-10 DIAGNOSIS — Z95.0 S/P PLACEMENT OF CARDIAC PACEMAKER: Chronic | ICD-10-CM

## 2023-11-10 DIAGNOSIS — Z79.01 CHRONIC ANTICOAGULATION: ICD-10-CM

## 2023-11-10 PROCEDURE — 1126F PR PAIN SEVERITY QUANTIFIED, NO PAIN PRESENT: ICD-10-PCS | Mod: HCNC,CPTII,S$GLB, | Performed by: PHYSICIAN ASSISTANT

## 2023-11-10 PROCEDURE — 99214 OFFICE O/P EST MOD 30 MIN: CPT | Mod: HCNC,S$GLB,, | Performed by: PHYSICIAN ASSISTANT

## 2023-11-10 PROCEDURE — 1159F MED LIST DOCD IN RCRD: CPT | Mod: HCNC,CPTII,S$GLB, | Performed by: PHYSICIAN ASSISTANT

## 2023-11-10 PROCEDURE — 3074F SYST BP LT 130 MM HG: CPT | Mod: HCNC,CPTII,S$GLB, | Performed by: PHYSICIAN ASSISTANT

## 2023-11-10 PROCEDURE — 99214 PR OFFICE/OUTPT VISIT, EST, LEVL IV, 30-39 MIN: ICD-10-PCS | Mod: HCNC,S$GLB,, | Performed by: PHYSICIAN ASSISTANT

## 2023-11-10 PROCEDURE — 3288F FALL RISK ASSESSMENT DOCD: CPT | Mod: HCNC,CPTII,S$GLB, | Performed by: PHYSICIAN ASSISTANT

## 2023-11-10 PROCEDURE — 1159F PR MEDICATION LIST DOCUMENTED IN MEDICAL RECORD: ICD-10-PCS | Mod: HCNC,CPTII,S$GLB, | Performed by: PHYSICIAN ASSISTANT

## 2023-11-10 PROCEDURE — 99999 PR PBB SHADOW E&M-EST. PATIENT-LVL IV: ICD-10-PCS | Mod: PBBFAC,HCNC,, | Performed by: PHYSICIAN ASSISTANT

## 2023-11-10 PROCEDURE — 1101F PT FALLS ASSESS-DOCD LE1/YR: CPT | Mod: HCNC,CPTII,S$GLB, | Performed by: PHYSICIAN ASSISTANT

## 2023-11-10 PROCEDURE — 1101F PR PT FALLS ASSESS DOC 0-1 FALLS W/OUT INJ PAST YR: ICD-10-PCS | Mod: HCNC,CPTII,S$GLB, | Performed by: PHYSICIAN ASSISTANT

## 2023-11-10 PROCEDURE — 3078F DIAST BP <80 MM HG: CPT | Mod: HCNC,CPTII,S$GLB, | Performed by: PHYSICIAN ASSISTANT

## 2023-11-10 PROCEDURE — 1126F AMNT PAIN NOTED NONE PRSNT: CPT | Mod: HCNC,CPTII,S$GLB, | Performed by: PHYSICIAN ASSISTANT

## 2023-11-10 PROCEDURE — 3288F PR FALLS RISK ASSESSMENT DOCUMENTED: ICD-10-PCS | Mod: HCNC,CPTII,S$GLB, | Performed by: PHYSICIAN ASSISTANT

## 2023-11-10 PROCEDURE — 3008F BODY MASS INDEX DOCD: CPT | Mod: HCNC,CPTII,S$GLB, | Performed by: PHYSICIAN ASSISTANT

## 2023-11-10 PROCEDURE — 3074F PR MOST RECENT SYSTOLIC BLOOD PRESSURE < 130 MM HG: ICD-10-PCS | Mod: HCNC,CPTII,S$GLB, | Performed by: PHYSICIAN ASSISTANT

## 2023-11-10 PROCEDURE — 3008F PR BODY MASS INDEX (BMI) DOCUMENTED: ICD-10-PCS | Mod: HCNC,CPTII,S$GLB, | Performed by: PHYSICIAN ASSISTANT

## 2023-11-10 PROCEDURE — 3078F PR MOST RECENT DIASTOLIC BLOOD PRESSURE < 80 MM HG: ICD-10-PCS | Mod: HCNC,CPTII,S$GLB, | Performed by: PHYSICIAN ASSISTANT

## 2023-11-10 PROCEDURE — 99999 PR PBB SHADOW E&M-EST. PATIENT-LVL IV: CPT | Mod: PBBFAC,HCNC,, | Performed by: PHYSICIAN ASSISTANT

## 2023-11-10 NOTE — PROGRESS NOTES
Cardiology Clinic Note  Reason for Visit: Annual visit   LOV w/ Cardiology: 12/20/2022  General Cardiologist: Dr. Geller    HPI:     PMHx:  Paroxysmal afib  - s/p PVI x 2, Dr. Duncan  Bradycardia s/p PPM  Small pericardial effusion   Amiodarone induced thyroiditis  GERD/Hiatal hernia       Trudi Mcknight is a 68 y.o. F, who presents for follow up.  She is established with EP and last saw Renukamalathi Toth in May.  She is tolerating Xarelto.  Eliquis previously caused hair loss.  Her last fasting lipid panel showed an LDL of 108 in December.  She was then recommended to increase her dose of red yeast rice, which caused myalgias.  She stopped taking red yeast rice altogether at least 9 or 10 months ago.  She has a family history of CAD.  Her mother had a CABG, and also had difficulty tolerating statin secondary to myalgias.  However her mother was able to tolerate at least 1 formulation, and she thinks she may be able to find out from her sister exactly what it was.  She will have labs drawn, and is willing to try low-dose statin based on those results.  She is walking for exercise, and is an active grandmother. She denies chest pain/pressure/tightness/discomfort, dyspnea on regular exertion, orthopnea, PND, peripheral edema, sustained palpitations, syncope or claudication symptoms.       ROS:    Pertinent ROS included in HPI and below.  PMH:     Past Medical History:   Diagnosis Date    Anticoagulant long-term use     Esophagitis     Hiatal hernia     Meniere's disease     Paroxysmal atrial fibrillation 03/07/2021    Shingles     Sick sinus syndrome 01/21/2022    s/p Dual chamber pacemaker    Thyroid disease      Past Surgical History:   Procedure Laterality Date    A-V CARDIAC PACEMAKER INSERTION N/A 01/20/2022    Procedure: INSERTION, CARDIAC PACEMAKER, DUAL CHAMBER;  Surgeon: Ernesto Duncan MD;  Location: [facility] EP LAB;  Service: Cardiology;  Laterality: N/A;  SB, DUAL PPM, SJM, ANES, SK, [id number]    ABLATION OF  ARRHYTHMOGENIC FOCUS FOR ATRIAL FIBRILLATION N/A 2022    Procedure: Ablation atrial fibrillation;  Surgeon: Ernesto Duncan MD;  Location: [facility] EP LAB;  Service: Cardiology;  Laterality: N/A;  AF, RADHA (Cx if SR), PVI redo, RFA, ERIN, Gen, SK, 3 Prep *SJM PPM in situ*    BREAST CYST ASPIRATION           SECTION      COLONOSCOPY N/A 2019    Procedure: COLONOSCOPY;  Surgeon: INGRID Russo MD;  Location: [facility] ENDO ([facility] FLR);  Service: Endoscopy;  Laterality: N/A;    deviated septum repair      HYSTERECTOMY      lump removed from tongue      TONSILLECTOMY       Allergies:     Review of patient's allergies indicates:   Allergen Reactions    Lasix [furosemide] Shortness Of Breath    Tricyclic antidepressants and tricyclic compounds      Medications:     Current Outpatient Medications on File Prior to Visit   Medication Sig Dispense Refill    azelaic acid (FINACEA) 15 % Foam APPLY TO THE AFFECTED AREA(S) of face ONCE OR twice DAILY      diclofenac sodium (VOLTAREN) 1 % Gel Apply 2 g topically 2 (two) times daily as needed (pain). 150 g 0    metroNIDAZOLE (NORITATE) 1 % cream Compound azelaic acid 15% + ivermectin 1% + metronidazole 1% cream. Apply to affected area on face once or twice daily. 30 g 5    rivaroxaban (XARELTO) 15 mg Tab Take 1 tablet (15 mg total) by mouth daily with dinner or evening meal. 360 tablet 0    sulfacetamide sodium-sulfur (SULFACLEANSE 8-4) 8-4 % Susp Wash face qhs 473 mL 3    tacrolimus (PROTOPIC) 0.1 % ointment Aaa on brows qd- bid 30 g 5    venlafaxine (EFFEXOR-XR) 37.5 MG 24 hr capsule Take 1 capsule (37.5 mg total) by mouth once daily. 90 capsule 2    albuterol (VENTOLIN HFA) 90 mcg/actuation inhaler Inhale 1-2 puffs into the lungs every 6 (six) hours as needed for Wheezing or Shortness of Breath. Rescue (Patient not taking: Reported on 11/10/2023) 6.7 g 0    ALPRAZolam (XANAX) 0.25 MG tablet Take 1 tablet (0.25 mg total) by mouth 3 (three) times daily as needed for  Anxiety. 90 tablet 3    azelastine (ASTELIN) 137 mcg (0.1 %) nasal spray 1 spray (137 mcg total) by Nasal route 2 (two) times daily as needed for Rhinitis. 30 mL 0    fluticasone propionate (FLONASE) 50 mcg/actuation nasal spray 1 spray (50 mcg total) by Each Nostril route 2 (two) times daily as needed for Rhinitis or Allergies. 16 g 0    HYDROcodone-acetaminophen (NORCO) 5-325 mg per tablet Take 1 tablet by mouth every 12 (twelve) hours as needed for Pain (severe kidney stone pain; do not take with xanax). 8 tablet 0    omeprazole (PRILOSEC) 20 MG capsule Take 1 capsule (20 mg total) by mouth daily as needed (reflux). (Patient not taking: Reported on 11/10/2023) 30 capsule 11    red yeast rice 600 mg Cap TAKE 2 CAPSULES BY MOUTH 2 (TWO) TIMES A DAY. 2 CAPSULES/ DAY (Patient not taking: Reported on 6/24/2023) 60 each 5    [DISCONTINUED] flecainide (TAMBOCOR) 50 MG Tab Take 1 tablet (50 mg total) by mouth every 12 (twelve) hours. 60 tablet 1     No current facility-administered medications on file prior to visit.     Social History:     Social History     Tobacco Use    Smoking status: Never    Smokeless tobacco: Never   Substance Use Topics    Alcohol use: No     Alcohol/week: 0.0 standard drinks of alcohol     Comment: rarely     Family History:     Family History   Problem Relation Age of Onset    Heart disease Mother 55        bypass at 55    Breast cancer Mother     Hypertension Mother     Hyperlipidemia Mother     Heart disease Father     Hypertension Father     Skin cancer Father     Diverticulitis Sister     Heart disease Brother     Diverticulitis Brother     Breast cancer Paternal Grandmother     Colon cancer Neg Hx     Melanoma Neg Hx     Amblyopia Neg Hx     Blindness Neg Hx     Cataracts Neg Hx     Glaucoma Neg Hx     Macular degeneration Neg Hx     Strabismus Neg Hx     Retinal detachment Neg Hx      Physical Exam:   /67   Pulse 82   Ht 5' (1.524 m)   Wt 57.7 kg (127 lb 3.3 oz)   SpO2 96%    BMI 24.84 kg/m²      Physical Exam  Vitals and nursing note reviewed.   Constitutional:       Appearance: Normal appearance.   HENT:      Head: Normocephalic and atraumatic.   Neck:      Vascular: Normal carotid pulses. No carotid bruit or hepatojugular reflux.   Cardiovascular:      Rate and Rhythm: Normal rate and regular rhythm.      Chest Wall: PMI is not displaced.      Pulses:           Radial pulses are 2+ on the right side and 2+ on the left side.        Dorsalis pedis pulses are 2+ on the right side and 2+ on the left side.        Posterior tibial pulses are 2+ on the right side and 2+ on the left side.      Heart sounds: No murmur heard.  Pulmonary:      Effort: Pulmonary effort is normal.      Breath sounds: Normal breath sounds. No wheezing, rhonchi or rales.   Abdominal:      General: Bowel sounds are normal. There is no abdominal bruit.      Palpations: Abdomen is soft. There is no pulsatile mass.      Tenderness: There is no abdominal tenderness.   Feet:      Right foot:      Skin integrity: Skin integrity normal.      Left foot:      Skin integrity: Skin integrity normal.   Skin:     Capillary Refill: Capillary refill takes less than 2 seconds.   Neurological:      General: No focal deficit present.      Mental Status: She is alert.   Psychiatric:         Mood and Affect: Mood and affect normal.         Speech: Speech normal.         Behavior: Behavior is cooperative.         Thought Content: Thought content normal.          Labs:     Blood Tests:  Lab Results   Component Value Date    BNP 38 03/18/2022     08/09/2022    K 3.8 08/09/2022     08/09/2022    CO2 24 08/09/2022    BUN 17 08/09/2022    CREATININE 1.1 08/09/2022    GLU 86 08/09/2022    HGBA1C 5.5 01/20/2022    MG 2.1 04/09/2022    AST 15 04/09/2022    ALT 11 04/09/2022    ALBUMIN 3.6 04/09/2022    PROT 7.3 04/09/2022    BILITOT 1.1 (H) 04/09/2022    WBC 5.00 08/09/2022    HGB 13.5 08/09/2022    HCT 43.2 08/09/2022    HCT 43  2022    MCV 91 2022     2022    INR 1.2 2022    INR 1.5 (H) 2022    TSH 3.993 2023       Lab Results   Component Value Date    CHOL 179 2022    HDL 50 2022    TRIG 101 2022       Lab Results   Component Value Date    LDLCALC 108.8 2022         Imaging:     Echocardiogram  TTE 2023  Small circumferential pericardial effusion, largest inferolaterally. No evidence of tamponade.  The left ventricle is normal in size with normal systolic function.  The estimated ejection fraction is 65%.  Normal left ventricular diastolic function.  Normal right ventricular size with normal right ventricular systolic function.  Normal central venous pressure (3 mmHg).  The estimated PA systolic pressure is 22 mmHg.    Stress testing  None    Cath Lab  None    Other  None    [id number]  Normal sinus rhythm   Rightward axis     Assessment:     1. Pure hypercholesterolemia    2. Sick sinus syndrome    3. S/P placement of cardiac pacemaker    4. Status post circumferential ablation of pulmonary vein    5. Paroxysmal atrial fibrillation    6. Chronic anticoagulation        Plan:     Pure hypercholesterolemia  -     Lipid Panel; Future; Expected date: 11/10/2023  -     Comprehensive Metabolic Panel; Future; Expected date: 11/10/2023  Plan to check labs and consider trial of low-dose statin based on those results  Patient will try to find out what medication her mother was able to tolerate  Discussed and recommended patient adhere to Mediterranean and DASH diets.     Sick sinus syndrome  S/P placement of cardiac pacemaker  Status post circumferential ablation of pulmonary vein  Paroxysmal atrial fibrillation  Chronic anticoagulation  Follows with Dr. Franco, KAROL Toth  Continue anticoagulant   Avoid NSAIDs  Reviewed importance of monitoring for bleeding   Reviewed injury precautions      Signed:  Carmita Albrecht PA-C  Cardiology     11/10/2023 8:29 AM    Follow-up:      Future Appointments   Date Time Provider Department Center   11/15/2023  8:45 AM LAB, [facility] [place]   12/6/2023  9:40 AM Vivian Ivan OD [facility] [place]   12/13/2023  9:00 AM Wilner Martinez PA [facility] [place]

## 2023-11-10 NOTE — PATIENT INSTRUCTIONS
Dietary guidance to reduce your risk of heart disease:    LDL - bad type - lower is better.   HDL good type - higher is better  Your LDL should be less than 100    LDL - bad type - improves with diet and medications: typically statins; most other medications that lower LDL have not been proven to prevent heart attacks.  May not improve significantly with exercise alone.  Should be less than 100 mg/dl, but the lower the better. Statins (cholesterol lowering meds) lower LDL and also reduce inflammation within blood vessels.    HDL - good type - improves with exercise.  Ideally greater than 50 mg/dl. The proportion of HDL to the total cholesterol is more important than the absolute HDL.  This means a HDL of 45 out of a total cholesterol of 130 mg'dl is pretty good, but the same HDL out of a total of  200 mg/dl is not quite as good. A level of 30% or higher is ideal.    TGs (triglycerides) - also bad - can change very quickly and considerably with certain foods. Improve with diet, exercise and high dose fish oil.  In some cases a low carbohydrate diet will lower TGs better than a low fat diet.  Ideal range  mg/dl.      Sugar, fat and cholesterol in food:     A sensible diet that limits the intake of sugars, saturated (bad) fats and trans fats while increasing the intake of unsaturated (good) fats and plant proteins is the basis of the current dietary recommendations.      We now recommend drastically reducing the intake of sugar and sugary drinks. There is less emphasis on excluding all fat and more emphasis on the types of different fats. We continue to recommend eating more vegetables.    Cholesterol in our food is generally present in relatively small amounts. New dietary guidelines are less obsessed with the amount of cholesterol. However please do not confuse this with the role of cholesterol in our blood and arteries. The liver converts certain foods into cholesterol.  It is this cholesterol and other fats  "that clog up our arteries.      Most foods that are high in cholesterol are also high in saturated fat. But there is much more saturated fat than cholesterol in these foods. The saturated fat content matters more than cholesterol. On the other hand, there are a handful of foods that are high in cholesterol but do not contain much saturated fat: eggs, shrimp, crab legs and crawfish are OK to eat in small quantities as long as you do not deep ross them. So a few (2-3) eggs a week are fine (both the white and the yolk), but you can eat as many egg whites as you want. Also, some of these same foods irritate the inner lining of blood vessels by inducing inflammation (see below).  This occurs even if your blood cholesterol levels are "normal". So you should avoid foods that are high in saturated fat and sugar even if your blood cholesterol levels are normal.      Saturated fat is the bad fat - you should limit your intake of this. Deep fried foods, meats and other animal fats are high in saturated fat. Cookies, doughnuts and cakes are usually high in both saturated fat and sugar.       Unsaturated fat is the good fat. It contains the same number of calories as saturated fat but these fats do not get deposited in our arteries. The Mediterranean style diet encourages the intake of unsaturated fat - olive oil, avocado and unsalted nuts. So instead of baking a piece of fish, consider pan-frying it using olive oil.     You should eat a few servings of vegetables (and fruit as long as you are not diabetic) everyday. Substitute some plant proteins in place of meat: beans, lentils, quinoa and oatmeal. They are lean proteins.    Vegetables (particularly green vegetables such as broccoli, kale and spinach) have anti-inflammatory properties. Some fruits (certain berries) have anti-oxidant properties. However I think foods that reduce vascular inflammation are much  more important than the anti-oxidant effect of fruits and I predict " that we will be prescribing anti inflammatory medication specifically for blood vessels to prevent heart attacks in the future. In the mean time eat more of the vegetables with anti-oxidant properties.     Do not use stick butter or stick margarine. Butter that comes in a tub is soft butter and consists of 1/2 butter and 1/2 vegetable oil (either canola or olive oil). It is preferable to use soft butter in small quantities. Avocado butter is also an option.      Trans fats should definitely be avoided. Most foods that are labelled as containing 0 gms of trans fat may still contain several hundred milligrams of trans fat: creamer, margarine, dough, deep fried foods, ready made frosting, potato, corn and torilla chips, cakes, cookies, pie crusts and crackers containing shortening made with hydrogenated vegetable oil.       Avoid excessive red meat and pork as much as possible. Turkey, chicken and fish, if prepared properly, are ok.     More info:   [email or web address]/articles/16037-mediterranean-diet

## 2023-11-15 ENCOUNTER — LAB VISIT (OUTPATIENT)
Dept: LAB | Facility: HOSPITAL | Age: 68
End: 2023-11-15
Payer: MEDICARE

## 2023-11-15 DIAGNOSIS — E78.00 PURE HYPERCHOLESTEROLEMIA: Chronic | ICD-10-CM

## 2023-11-15 LAB
ALBUMIN SERPL BCP-MCNC: 3.7 G/DL (ref 3.5–5.2)
ALP SERPL-CCNC: 97 U/L (ref 55–135)
ALT SERPL W/O P-5'-P-CCNC: 16 U/L (ref 10–44)
ANION GAP SERPL CALC-SCNC: 8 MMOL/L (ref 8–16)
AST SERPL-CCNC: 22 U/L (ref 10–40)
BILIRUB SERPL-MCNC: 0.5 MG/DL (ref 0.1–1)
BUN SERPL-MCNC: 15 MG/DL (ref 8–23)
CALCIUM SERPL-MCNC: 9.8 MG/DL (ref 8.7–10.5)
CHLORIDE SERPL-SCNC: 108 MMOL/L (ref 95–110)
CHOLEST SERPL-MCNC: 203 MG/DL (ref 120–199)
CHOLEST/HDLC SERPL: 4.1 {RATIO} (ref 2–5)
CO2 SERPL-SCNC: 26 MMOL/L (ref 23–29)
CREAT SERPL-MCNC: 1 MG/DL (ref 0.5–1.4)
EST. GFR  (NO RACE VARIABLE): >60 ML/MIN/1.73 M^2
GLUCOSE SERPL-MCNC: 92 MG/DL (ref 70–110)
HDLC SERPL-MCNC: 50 MG/DL (ref 40–75)
HDLC SERPL: 24.6 % (ref 20–50)
LDLC SERPL CALC-MCNC: 129.4 MG/DL (ref 63–159)
NONHDLC SERPL-MCNC: 153 MG/DL
POTASSIUM SERPL-SCNC: 4.8 MMOL/L (ref 3.5–5.1)
PROT SERPL-MCNC: 7.3 G/DL (ref 6–8.4)
SODIUM SERPL-SCNC: 142 MMOL/L (ref 136–145)
TRIGL SERPL-MCNC: 118 MG/DL (ref 30–150)

## 2023-11-15 PROCEDURE — 36415 COLL VENOUS BLD VENIPUNCTURE: CPT | Mod: HCNC,PO | Performed by: PHYSICIAN ASSISTANT

## 2023-11-15 PROCEDURE — 80061 LIPID PANEL: CPT | Mod: HCNC | Performed by: PHYSICIAN ASSISTANT

## 2023-11-15 PROCEDURE — 80053 COMPREHEN METABOLIC PANEL: CPT | Mod: HCNC | Performed by: PHYSICIAN ASSISTANT

## 2023-11-16 ENCOUNTER — TELEPHONE (OUTPATIENT)
Dept: CARDIOLOGY | Facility: CLINIC | Age: 68
End: 2023-11-16
Payer: MEDICARE

## 2023-11-16 NOTE — TELEPHONE ENCOUNTER
----- Message from Carmita Albrecht PA-C sent at 11/16/2023  8:31 AM CST -----  Please let the patient know cholesterol is above goal, and I'd like her to try a medication as we discussed at her visit. I gave her some options. Please find out what she'd like to try and let me know.     Thanks

## 2023-11-17 DIAGNOSIS — E78.2 MIXED HYPERLIPIDEMIA: Primary | Chronic | ICD-10-CM

## 2023-11-17 RX ORDER — ROSUVASTATIN CALCIUM 5 MG/1
5 TABLET, COATED ORAL DAILY
Qty: 90 TABLET | Refills: 3 | Status: SHIPPED | OUTPATIENT
Start: 2023-11-17 | End: 2024-11-16

## 2023-12-07 ENCOUNTER — TELEPHONE (OUTPATIENT)
Dept: ELECTROPHYSIOLOGY | Facility: CLINIC | Age: 68
End: 2023-12-07
Payer: MEDICARE

## 2023-12-07 NOTE — TELEPHONE ENCOUNTER
----- Message from Kita Her MA sent at 12/7/2023  9:40 AM CST -----  Regarding: FW: Pacemaker  I can't get her seen until February, can someone please call to troubleshoot?  ----- Message -----  From: Nata Childress  Sent: 12/7/2023   9:20 AM CST  To: [facility] Staff  Subject: Pacemaker                                        Patient calling to see if she can get an appt, because her pacemaker making some buzzing sound. Please call back @ [phone]. Thank you Nata

## 2023-12-07 NOTE — TELEPHONE ENCOUNTER
"Returned pt's call on this am.  Pt stated she has been feeling a "tiny vibration" just below her PPM.  Informed the pt that this does not appear to be related to her PPM. Remote transmission received on 12/5/23 - no arrhythmias recorded and all lead measurements were WNL.  Understanding was verbalized.  Pt appreciated the call.   "

## 2023-12-18 ENCOUNTER — TELEPHONE (OUTPATIENT)
Dept: ADMINISTRATIVE | Facility: CLINIC | Age: 68
End: 2023-12-18
Payer: MEDICARE

## 2023-12-19 ENCOUNTER — OFFICE VISIT (OUTPATIENT)
Dept: PSYCHIATRY | Facility: CLINIC | Age: 68
End: 2023-12-19
Payer: MEDICARE

## 2023-12-19 DIAGNOSIS — F41.0 GENERALIZED ANXIETY DISORDER WITH PANIC ATTACKS: Primary | ICD-10-CM

## 2023-12-19 DIAGNOSIS — F33.41 MDD (MAJOR DEPRESSIVE DISORDER), RECURRENT, IN PARTIAL REMISSION: ICD-10-CM

## 2023-12-19 DIAGNOSIS — F41.1 GENERALIZED ANXIETY DISORDER WITH PANIC ATTACKS: Primary | ICD-10-CM

## 2023-12-19 PROCEDURE — 99214 OFFICE O/P EST MOD 30 MIN: CPT | Mod: HCNC,95,, | Performed by: PHYSICIAN ASSISTANT

## 2023-12-19 PROCEDURE — 99214 PR OFFICE/OUTPT VISIT, EST, LEVL IV, 30-39 MIN: ICD-10-PCS | Mod: HCNC,95,, | Performed by: PHYSICIAN ASSISTANT

## 2023-12-19 PROCEDURE — 1160F PR REVIEW ALL MEDS BY PRESCRIBER/CLIN PHARMACIST DOCUMENTED: ICD-10-PCS | Mod: HCNC,CPTII,95, | Performed by: PHYSICIAN ASSISTANT

## 2023-12-19 PROCEDURE — 1160F RVW MEDS BY RX/DR IN RCRD: CPT | Mod: HCNC,CPTII,95, | Performed by: PHYSICIAN ASSISTANT

## 2023-12-19 PROCEDURE — 1159F MED LIST DOCD IN RCRD: CPT | Mod: HCNC,CPTII,95, | Performed by: PHYSICIAN ASSISTANT

## 2023-12-19 PROCEDURE — 1159F PR MEDICATION LIST DOCUMENTED IN MEDICAL RECORD: ICD-10-PCS | Mod: HCNC,CPTII,95, | Performed by: PHYSICIAN ASSISTANT

## 2023-12-19 RX ORDER — VENLAFAXINE HYDROCHLORIDE 37.5 MG/1
37.5 CAPSULE, EXTENDED RELEASE ORAL DAILY
Qty: 90 CAPSULE | Refills: 2 | Status: SHIPPED | OUTPATIENT
Start: 2023-12-19

## 2023-12-19 NOTE — PROGRESS NOTES
"The patient location is: [place]    Visit type: audiovisual    Each patient to whom he or she provides medical services by telemedicine is:  (1) informed of the relationship between the physician and patient and the respective role of any other health care provider with respect to management of the patient; and (2) notified that he or she may decline to receive medical services by telemedicine and may withdraw from such care at any time.    Notes:       Outpatient Psychiatry Follow-Up Visit (MD/JASMINE)    12/19/2023    Clinical Status of Patient:  Outpatient (Ambulatory)    Chief Complaint:  Trudi Mcknight is a 68 y.o. female who presents today for follow-up of depression and anxiety.  Met with patient.      Interval History and Content of Current Session:  Interim Events/Subjective Report/Content of Current Session:    Pt reports today: "its been a rough month. Both my boys were in the hospital and not working back to normal. Other than that things are ok". Reports 2 of her adult son's had medical issues and hospitalized but are both back home recovering.     States other than this past month with her sons' health issues anxiety and mood have been well under control    Reports has had increased anxiety but has rarely needed to take any of previously prescribed xanax.    Mood overall is "ok overall, a rough month. Before that things were consistently good."    Patients mood is steady, affect appears mood congruent. Linear and logical, friendly and cooperative, good eye contact.    Denies SI/HI/AVH. Pt reports sleeping well and normal appetite. Denies side effects of medications.    Pt reports taking medications as prescribed and behaving appropriately during interview today.      Prior visit    Pt mood "is good, doing fine. Good overall"    Pt stable on current regimen on of effexor and xanax prn. States very rarely uses xanax prn, states only used about 3-4 times since last visit 4 months ago.    Patients mood is " "euthymic, affect appears mood congruent. Linear and logical, friendly and cooperative, good eye contact.    Denies SI/HI/AVH. Pt reports sleeping well 8 hr on average and normal appetite. Denies side effects of medications.    Pt reports taking medications as prescribed and behaving appropriately during interview today.    Pt reports today: "still doing the effexor and xanax. Ill take 1 xanax tablet per day at night but havent used it since I ran out"    Mood "has been pretty good. Some anxiety if I get sick whether its a cold or just feeling bad"    Patients mood is steady, affect appears mood congruent. Linear and logical, friendly and cooperative, good eye contact.    Denies SI/HI/AVH. Pt reports sleeping well 6-8hr per night and normal appetite. Denies side effects of medications.    Pt reports taking medications as prescribed and behaving appropriately during interview today.    Review of Systems     Review of Systems   Constitutional:  Negative for fever and malaise/fatigue.   HENT:  Negative for sore throat.    Eyes:  Negative for photophobia.   Respiratory:  Negative for cough.    Cardiovascular:  Negative for chest pain and palpitations.   Gastrointestinal:  Negative for abdominal pain.   Genitourinary:  Negative for dysuria.   Musculoskeletal:  Negative for myalgias.   Skin:  Negative for rash.   Neurological:  Negative for dizziness.   Endo/Heme/Allergies:  Does not bruise/bleed easily.   Psychiatric/Behavioral:  Negative for depression, hallucinations, substance abuse and suicidal ideas. The patient is not nervous/anxious and does not have insomnia.        Psychiatric Review Of Systems - Is patient experiencing or having changes in:  sleep: no  appetite: no  weight: no  energy/anergy: no  interest/pleasure/anhedonia: no  somatic symptoms: no  libido: no  anxiety/panic: no  guilty/hopelessness: no  concentration: no  S.I.B.s/risky behavior: no  Irritability: no  Racing thoughts: no  Impulsive behaviors: " no  Paranoia: no  AVH: no      Past Medical, Family and Social History: The patient's past medical, family and social history have been reviewed and updated as appropriate within the electronic medical record - see encounter notes.      Current Medications:   Medication List with Changes/Refills   Current Medications    ALBUTEROL (VENTOLIN HFA) 90 MCG/ACTUATION INHALER    Inhale 1-2 puffs into the lungs every 6 (six) hours as needed for Wheezing or Shortness of Breath. Rescue    ALPRAZOLAM (XANAX) 0.25 MG TABLET    Take 1 tablet (0.25 mg total) by mouth 3 (three) times daily as needed for Anxiety.    AZELAIC ACID (FINACEA) 15 % FOAM    APPLY TO THE AFFECTED AREA(S) of face ONCE OR twice DAILY    DICLOFENAC SODIUM (VOLTAREN) 1 % GEL    Apply 2 g topically 2 (two) times daily as needed (pain).    METRONIDAZOLE (NORITATE) 1 % CREAM    Compound azelaic acid 15% + ivermectin 1% + metronidazole 1% cream. Apply to affected area on face once or twice daily.    OMEPRAZOLE (PRILOSEC) 20 MG CAPSULE    Take 1 capsule (20 mg total) by mouth daily as needed (reflux).    RIVAROXABAN (XARELTO) 15 MG TAB    Take 1 tablet (15 mg total) by mouth daily with dinner or evening meal.    ROSUVASTATIN (CRESTOR) 5 MG TABLET    Take 1 tablet (5 mg total) by mouth once daily.    SULFACETAMIDE SODIUM-SULFUR (SULFACLEANSE 8-4) 8-4 % SUSP    Wash face qhs    TACROLIMUS (PROTOPIC) 0.1 % OINTMENT    Aaa on brows qd- bid   Changed and/or Refilled Medications    Modified Medication Previous Medication    VENLAFAXINE (EFFEXOR-XR) 37.5 MG 24 HR CAPSULE venlafaxine (EFFEXOR-XR) 37.5 MG 24 hr capsule       Take 1 capsule (37.5 mg total) by mouth once daily.    Take 1 capsule (37.5 mg total) by mouth once daily.         Allergies:   Review of patient's allergies indicates:   Allergen Reactions    Lasix [furosemide] Shortness Of Breath    Tricyclic antidepressants and tricyclic compounds          Vitals   There were no vitals filed for this visit.  "      Labs/Imaging/Studies:   No results found for this or any previous visit (from the past 48 hour(s)).   No results found for: "PHENYTOIN", "PHENOBARB", "VALPROATE", "CBMZ"    Compliance: yes    Side effects: None    Risk Parameters:  Patient reports no suicidal ideation  Patient reports no homicidal ideation  Patient reports no self-injurious behavior  Patient reports no violent behavior    Exam (detailed: at least 9 elements; comprehensive: all 15 elements)   Constitutional  Vitals:  Most recent vital signs, dated less than 90 days prior to this appointment, were reviewed.   There were no vitals filed for this visit.     General:  unremarkable, age appropriate     Musculoskeletal  Muscle Strength/Tone:  not examined   Gait & Station:  Not examined     Psychiatric  Speech:  no latency; no press   Mood & Affect:  steady  congruent and appropriate   Thought Process:  normal and logical   Associations:  intact   Thought Content:  normal, no suicidality, no homicidality, delusions, or paranoia   Insight:  intact, has awareness of illness   Judgement: behavior is adequate to circumstances   Orientation:  grossly intact   Memory: intact for content of interview   Language: grossly intact   Attention Span & Concentration:  able to focus   Fund of Knowledge:  intact and appropriate to age and level of education     Assessment and Diagnosis   Status/Progress: Based on the examination today, the patient's problem(s) is/are well controlled.  New problems have not been presented today.   Co-morbidities, Diagnostic uncertainty and Lack of compliance are not complicating management of the primary condition.  There are no active rule-out diagnoses for this patient at this time.     General Impression:      ICD-10-CM ICD-9-CM   1. Generalized anxiety disorder with panic attacks  F41.1 300.02    F41.0 300.01   2. MDD (major depressive disorder), recurrent, in partial remission  F33.41 296.35 "       Intervention/Counseling/Treatment Plan   Medication Management: Continue current medications. The risks and benefits of medication were discussed with the patient.    -continue effexor XR 37.5mg daily  -continue xanax 0.25mg q8h prn anxiety. Use sparingly as directed    The risks and benefits of medication were discussed with the patient.  Discussed diagnosis, risks and benefits of proposed treatment above vs alternative treatments vs no treatment, and potential side effects of these treatments. The patient expresses understanding of the above and displays the capacity to agree with this treatment given said understanding. Patient also agrees that, currently, the benefits outweigh the risks and would like to pursue treatment at this time.  Discussed inherent unpredictability of medications in each individual.   Encouraged Patient to keep future appointments.   Take medications as prescribed and abstain from substance abuse.   In the event of an emergency, patient was advised to go to the emergency room      Return to Clinic:  4 months        Kristofer Martinez PA-C      Total face to face time: 8 min  Total time (chart review, patient contact, documentation): 11 min      *This note has been prepared using a combination of a dictation device and typing.  It has been checked for errors but some errors may still exist within the note as a result of speech recognition errors and/or typographical errors.

## 2024-01-12 ENCOUNTER — CLINICAL SUPPORT (OUTPATIENT)
Dept: CARDIOLOGY | Facility: HOSPITAL | Age: 69
End: 2024-01-12
Attending: INTERNAL MEDICINE
Payer: MEDICARE

## 2024-01-12 ENCOUNTER — CLINICAL SUPPORT (OUTPATIENT)
Dept: CARDIOLOGY | Facility: HOSPITAL | Age: 69
End: 2024-01-12

## 2024-01-12 ENCOUNTER — OFFICE VISIT (OUTPATIENT)
Dept: OPTOMETRY | Facility: CLINIC | Age: 69
End: 2024-01-12
Payer: MEDICARE

## 2024-01-12 DIAGNOSIS — T46.2X5A AMIODARONE-INDUCED THYROIDITIS: ICD-10-CM

## 2024-01-12 DIAGNOSIS — H02.889 MEIBOMIAN GLAND DISEASE, UNSPECIFIED LATERALITY: ICD-10-CM

## 2024-01-12 DIAGNOSIS — I48.91 UNSPECIFIED ATRIAL FIBRILLATION: ICD-10-CM

## 2024-01-12 DIAGNOSIS — H04.123 DRY EYE SYNDROME, BILATERAL: ICD-10-CM

## 2024-01-12 DIAGNOSIS — E06.4 AMIODARONE-INDUCED THYROIDITIS: ICD-10-CM

## 2024-01-12 DIAGNOSIS — H16.229 KERATOCONJUNCT SICCA, NOT SPECIFIED AS SJOGREN'S, UNSP EYE: Primary | ICD-10-CM

## 2024-01-12 DIAGNOSIS — H10.829 ROSACEA BLEPHAROCONJUNCTIVITIS: ICD-10-CM

## 2024-01-12 DIAGNOSIS — H43.391 VITREOUS SYNERESIS OF RIGHT EYE: ICD-10-CM

## 2024-01-12 DIAGNOSIS — H52.203 ASTIGMATISM OF BOTH EYES WITH PRESBYOPIA: ICD-10-CM

## 2024-01-12 DIAGNOSIS — H25.13 NUCLEAR SCLEROSIS, BILATERAL: ICD-10-CM

## 2024-01-12 DIAGNOSIS — H52.4 ASTIGMATISM OF BOTH EYES WITH PRESBYOPIA: ICD-10-CM

## 2024-01-12 PROCEDURE — 1160F RVW MEDS BY RX/DR IN RCRD: CPT | Mod: HCNC,CPTII,S$GLB, | Performed by: OPTOMETRIST

## 2024-01-12 PROCEDURE — 93296 REM INTERROG EVL PM/IDS: CPT | Mod: HCNC | Performed by: INTERNAL MEDICINE

## 2024-01-12 PROCEDURE — 1159F MED LIST DOCD IN RCRD: CPT | Mod: HCNC,CPTII,S$GLB, | Performed by: OPTOMETRIST

## 2024-01-12 PROCEDURE — 3288F FALL RISK ASSESSMENT DOCD: CPT | Mod: HCNC,CPTII,S$GLB, | Performed by: OPTOMETRIST

## 2024-01-12 PROCEDURE — 99214 OFFICE O/P EST MOD 30 MIN: CPT | Mod: HCNC,S$GLB,, | Performed by: OPTOMETRIST

## 2024-01-12 PROCEDURE — 1101F PT FALLS ASSESS-DOCD LE1/YR: CPT | Mod: HCNC,CPTII,S$GLB, | Performed by: OPTOMETRIST

## 2024-01-12 PROCEDURE — 99999 PR PBB SHADOW E&M-EST. PATIENT-LVL III: CPT | Mod: PBBFAC,HCNC,, | Performed by: OPTOMETRIST

## 2024-01-12 PROCEDURE — 1125F AMNT PAIN NOTED PAIN PRSNT: CPT | Mod: HCNC,CPTII,S$GLB, | Performed by: OPTOMETRIST

## 2024-01-12 PROCEDURE — 92015 DETERMINE REFRACTIVE STATE: CPT | Mod: HCNC,S$GLB,, | Performed by: OPTOMETRIST

## 2024-01-12 PROCEDURE — 93294 REM INTERROG EVL PM/LDLS PM: CPT | Mod: HCNC,,, | Performed by: INTERNAL MEDICINE

## 2024-01-12 RX ORDER — CYCLOSPORINE 0.5 MG/ML
1 EMULSION OPHTHALMIC 2 TIMES DAILY
Qty: 180 EACH | Refills: 3 | Status: SHIPPED | OUTPATIENT
Start: 2024-01-12 | End: 2025-01-11

## 2024-01-12 NOTE — PROGRESS NOTES
HPI    C/o decreasing vision at both distance and near x 5 mo, no pain, and   occasional floaters/flashes.    No gtts  Last edited by Carmita Card on 1/12/2024  9:42 AM.            Assessment /Plan     For exam results, see Encounter Report.    Keratoconjunct sicca, not specified as Sjogren's, unsp eye  -  Start   cycloSPORINE (RESTASIS) 0.05 % ophthalmic emulsion; Place 1 drop into both eyes 2 (two) times daily.  Dispense: 180 each; Refill: 3  Dry eye syndrome, bilateral    Meibomian gland disease, unspecified laterality  Rosacea blepharoconjunctivitis   Use ocusoft lid scrubs and hypochlor spray daily    Vitreous syneresis of right eye  Nuclear sclerosis, bilateral  Amiodarone-induced thyroiditis    Astigmatism of both eyes with presbyopia   Rx specs    RTC 1 year

## 2024-02-01 ENCOUNTER — PATIENT MESSAGE (OUTPATIENT)
Dept: ADMINISTRATIVE | Facility: OTHER | Age: 69
End: 2024-02-01
Payer: MEDICARE

## 2024-02-19 ENCOUNTER — LAB VISIT (OUTPATIENT)
Dept: LAB | Facility: HOSPITAL | Age: 69
End: 2024-02-19
Payer: MEDICARE

## 2024-02-19 DIAGNOSIS — E78.2 MIXED HYPERLIPIDEMIA: Chronic | ICD-10-CM

## 2024-02-19 LAB
ALBUMIN SERPL BCP-MCNC: 3.7 G/DL (ref 3.5–5.2)
ALP SERPL-CCNC: 97 U/L (ref 55–135)
ALT SERPL W/O P-5'-P-CCNC: 15 U/L (ref 10–44)
ANION GAP SERPL CALC-SCNC: 9 MMOL/L (ref 8–16)
AST SERPL-CCNC: 19 U/L (ref 10–40)
BILIRUB SERPL-MCNC: 0.6 MG/DL (ref 0.1–1)
BUN SERPL-MCNC: 15 MG/DL (ref 8–23)
CALCIUM SERPL-MCNC: 9.5 MG/DL (ref 8.7–10.5)
CHLORIDE SERPL-SCNC: 109 MMOL/L (ref 95–110)
CHOLEST SERPL-MCNC: 206 MG/DL (ref 120–199)
CHOLEST/HDLC SERPL: 4.1 {RATIO} (ref 2–5)
CO2 SERPL-SCNC: 24 MMOL/L (ref 23–29)
CREAT SERPL-MCNC: 0.9 MG/DL (ref 0.5–1.4)
EST. GFR  (NO RACE VARIABLE): >60 ML/MIN/1.73 M^2
GLUCOSE SERPL-MCNC: 88 MG/DL (ref 70–110)
HDLC SERPL-MCNC: 50 MG/DL (ref 40–75)
HDLC SERPL: 24.3 % (ref 20–50)
LDLC SERPL CALC-MCNC: 129.8 MG/DL (ref 63–159)
NONHDLC SERPL-MCNC: 156 MG/DL
POTASSIUM SERPL-SCNC: 4.8 MMOL/L (ref 3.5–5.1)
PROT SERPL-MCNC: 7.2 G/DL (ref 6–8.4)
SODIUM SERPL-SCNC: 142 MMOL/L (ref 136–145)
TRIGL SERPL-MCNC: 131 MG/DL (ref 30–150)

## 2024-02-19 PROCEDURE — 80061 LIPID PANEL: CPT | Mod: HCNC | Performed by: PHYSICIAN ASSISTANT

## 2024-02-19 PROCEDURE — 36415 COLL VENOUS BLD VENIPUNCTURE: CPT | Mod: HCNC,PO | Performed by: PHYSICIAN ASSISTANT

## 2024-02-19 PROCEDURE — 80053 COMPREHEN METABOLIC PANEL: CPT | Mod: HCNC | Performed by: PHYSICIAN ASSISTANT

## 2024-02-20 ENCOUNTER — PATIENT MESSAGE (OUTPATIENT)
Dept: CARDIOLOGY | Facility: CLINIC | Age: 69
End: 2024-02-20
Payer: MEDICARE

## 2024-02-23 LAB
OHS CV AF BURDEN PERCENT: < 1
OHS CV DC REMOTE DEVICE TYPE: NORMAL
OHS CV ICD SHOCK: NO
OHS CV RV PACING PERCENT: 1 %

## 2024-02-26 ENCOUNTER — TELEPHONE (OUTPATIENT)
Dept: DERMATOLOGY | Facility: CLINIC | Age: 69
End: 2024-02-26
Payer: MEDICARE

## 2024-02-29 ENCOUNTER — OFFICE VISIT (OUTPATIENT)
Dept: DERMATOLOGY | Facility: CLINIC | Age: 69
End: 2024-02-29
Payer: MEDICARE

## 2024-02-29 DIAGNOSIS — L82.1 SEBORRHEIC KERATOSES: Primary | ICD-10-CM

## 2024-02-29 PROCEDURE — 1159F MED LIST DOCD IN RCRD: CPT | Mod: HCNC,CPTII,S$GLB, | Performed by: DERMATOLOGY

## 2024-02-29 PROCEDURE — 3288F FALL RISK ASSESSMENT DOCD: CPT | Mod: HCNC,CPTII,S$GLB, | Performed by: DERMATOLOGY

## 2024-02-29 PROCEDURE — 1160F RVW MEDS BY RX/DR IN RCRD: CPT | Mod: HCNC,CPTII,S$GLB, | Performed by: DERMATOLOGY

## 2024-02-29 PROCEDURE — 1126F AMNT PAIN NOTED NONE PRSNT: CPT | Mod: HCNC,CPTII,S$GLB, | Performed by: DERMATOLOGY

## 2024-02-29 PROCEDURE — 99212 OFFICE O/P EST SF 10 MIN: CPT | Mod: HCNC,S$GLB,, | Performed by: DERMATOLOGY

## 2024-02-29 PROCEDURE — 1101F PT FALLS ASSESS-DOCD LE1/YR: CPT | Mod: HCNC,CPTII,S$GLB, | Performed by: DERMATOLOGY

## 2024-02-29 PROCEDURE — 99999 PR PBB SHADOW E&M-EST. PATIENT-LVL III: CPT | Mod: PBBFAC,HCNC,, | Performed by: DERMATOLOGY

## 2024-02-29 NOTE — PROGRESS NOTES
Subjective:      Patient ID:  Trudi Mcknight is a 68 y.o. female who presents for   Chief Complaint   Patient presents with    Lesion     Scalp      Patient with new complaint of lesion(s)  Location: scalp  Duration: 2wks  Symptoms: none  Relieving factors/Previous treatments: none        Lesion      Review of Systems   Gastrointestinal:         Vascular instability syndrome: rosacea associated with GI sx and HA's   Skin:  Positive for daily sunscreen use and activity-related sunscreen use. Negative for recent sunburn.   Hematologic/Lymphatic: Does not bruise/bleed easily (xarelto).       Objective:   Physical Exam   Constitutional: She appears well-developed and well-nourished. No distress.   Neurological: She is alert and oriented to person, place, and time. She is not disoriented.   Psychiatric: She has a normal mood and affect.   Skin:   Areas Examined (abnormalities noted in diagram):   Scalp / Hair Palpated and Inspected  Chest / Axilla Inspection Performed                 Diagram Legend     Erythematous scaling macule/papule c/w actinic keratosis       Vascular papule c/w angioma      Pigmented verrucoid papule/plaque c/w seborrheic keratosis      Yellow umbilicated papule c/w sebaceous hyperplasia      Irregularly shaped tan macule c/w lentigo     1-2 mm smooth white papules consistent with Milia      Movable subcutaneous cyst with punctum c/w epidermal inclusion cyst      Subcutaneous movable cyst c/w pilar cyst      Firm pink to brown papule c/w dermatofibroma      Pedunculated fleshy papule(s) c/w skin tag(s)      Evenly pigmented macule c/w junctional nevus     Mildly variegated pigmented, slightly irregular-bordered macule c/w mildly atypical nevus      Flesh colored to evenly pigmented papule c/w intradermal nevus       Pink pearly papule/plaque c/w basal cell carcinoma      Erythematous hyperkeratotic cursted plaque c/w SCC      Surgical scar with no sign of skin cancer recurrence      Open and  closed comedones      Inflammatory papules and pustules      Verrucoid papule consistent consistent with wart     Erythematous eczematous patches and plaques     Dystrophic onycholytic nail with subungual debris c/w onychomycosis     Umbilicated papule    Erythematous-base heme-crusted tan verrucoid plaque consistent with inflamed seborrheic keratosis     Erythematous Silvery Scaling Plaque c/w Psoriasis     See annotation      Assessment / Plan:        Seborrheic keratoses  These are benign inherited growths without a malignant potential. Reassurance given to patient. No treatment is necessary.              Follow up for regularly scheduled follow up in 2 weeks.

## 2024-03-06 ENCOUNTER — PATIENT MESSAGE (OUTPATIENT)
Dept: CARDIOLOGY | Facility: CLINIC | Age: 69
End: 2024-03-06
Payer: MEDICARE

## 2024-03-06 ENCOUNTER — TELEPHONE (OUTPATIENT)
Dept: AUDIOLOGY | Facility: CLINIC | Age: 69
End: 2024-03-06
Payer: MEDICARE

## 2024-03-06 DIAGNOSIS — E78.2 MIXED HYPERLIPIDEMIA: Primary | Chronic | ICD-10-CM

## 2024-03-06 DIAGNOSIS — H90.3 SENSORINEURAL HEARING LOSS, BILATERAL: Primary | ICD-10-CM

## 2024-03-14 ENCOUNTER — OFFICE VISIT (OUTPATIENT)
Dept: DERMATOLOGY | Facility: CLINIC | Age: 69
End: 2024-03-14
Payer: MEDICARE

## 2024-03-14 DIAGNOSIS — L82.1 SEBORRHEIC KERATOSES: ICD-10-CM

## 2024-03-14 DIAGNOSIS — L71.9 ROSACEA: Primary | ICD-10-CM

## 2024-03-14 PROCEDURE — 3288F FALL RISK ASSESSMENT DOCD: CPT | Mod: HCNC,CPTII,S$GLB, | Performed by: DERMATOLOGY

## 2024-03-14 PROCEDURE — 99999 PR PBB SHADOW E&M-EST. PATIENT-LVL III: CPT | Mod: PBBFAC,HCNC,, | Performed by: DERMATOLOGY

## 2024-03-14 PROCEDURE — 1160F RVW MEDS BY RX/DR IN RCRD: CPT | Mod: HCNC,CPTII,S$GLB, | Performed by: DERMATOLOGY

## 2024-03-14 PROCEDURE — 99214 OFFICE O/P EST MOD 30 MIN: CPT | Mod: HCNC,S$GLB,, | Performed by: DERMATOLOGY

## 2024-03-14 PROCEDURE — 1126F AMNT PAIN NOTED NONE PRSNT: CPT | Mod: HCNC,CPTII,S$GLB, | Performed by: DERMATOLOGY

## 2024-03-14 PROCEDURE — 1159F MED LIST DOCD IN RCRD: CPT | Mod: HCNC,CPTII,S$GLB, | Performed by: DERMATOLOGY

## 2024-03-14 PROCEDURE — 1101F PT FALLS ASSESS-DOCD LE1/YR: CPT | Mod: HCNC,CPTII,S$GLB, | Performed by: DERMATOLOGY

## 2024-03-14 RX ORDER — TRETINOIN 0.25 MG/G
CREAM TOPICAL
Qty: 30 G | Refills: 5 | Status: SHIPPED | OUTPATIENT
Start: 2024-03-14

## 2024-03-14 NOTE — PROGRESS NOTES
Subjective:      Patient ID:  Trudi Mcknight is a 69 y.o. female who presents for   Chief Complaint   Patient presents with    Rosacea     F/u - better      Pt states she has not had a flare since 12/2023    Rosacea - Follow-up  Symptom course: improving  Currently using: sulfawash, azelaic acid.  Affected locations: face  Signs / symptoms: itching  Severity: mild      Review of Systems   Skin:  Positive for daily sunscreen use and activity-related sunscreen use. Negative for recent sunburn.   Hematologic/Lymphatic: Does not bruise/bleed easily.       Objective:   Physical Exam   Constitutional: She appears well-developed and well-nourished. No distress.   Neurological: She is alert and oriented to person, place, and time. She is not disoriented.   Psychiatric: She has a normal mood and affect.   Skin:   Areas Examined (abnormalities noted in diagram):   Scalp / Hair Palpated and Inspected  Head / Face Inspection Performed            Diagram Legend     Erythematous scaling macule/papule c/w actinic keratosis       Vascular papule c/w angioma      Pigmented verrucoid papule/plaque c/w seborrheic keratosis      Yellow umbilicated papule c/w sebaceous hyperplasia      Irregularly shaped tan macule c/w lentigo     1-2 mm smooth white papules consistent with Milia      Movable subcutaneous cyst with punctum c/w epidermal inclusion cyst      Subcutaneous movable cyst c/w pilar cyst      Firm pink to brown papule c/w dermatofibroma      Pedunculated fleshy papule(s) c/w skin tag(s)      Evenly pigmented macule c/w junctional nevus     Mildly variegated pigmented, slightly irregular-bordered macule c/w mildly atypical nevus      Flesh colored to evenly pigmented papule c/w intradermal nevus       Pink pearly papule/plaque c/w basal cell carcinoma      Erythematous hyperkeratotic cursted plaque c/w SCC      Surgical scar with no sign of skin cancer recurrence      Open and closed comedones      Inflammatory papules and  pustules      Verrucoid papule consistent consistent with wart     Erythematous eczematous patches and plaques     Dystrophic onycholytic nail with subungual debris c/w onychomycosis     Umbilicated papule    Erythematous-base heme-crusted tan verrucoid plaque consistent with inflamed seborrheic keratosis     Erythematous Silvery Scaling Plaque c/w Psoriasis     See annotation      Assessment / Plan:        Rosacea  -     tretinoin (RETIN-A) 0.025 % cream; Compound tretinoin 0.025% / azelaic acid 8% / niacinamide 2% cream. Apply a pea-sized amount to entire face qhs then moisturize  Dispense: 30 g; Refill: 5  PM:  Wash with sulfacleanser or mild cleanser  Thin film of azaleic acid/tretinoin/niacinamide all over every other to every night   Moisturize with cerave pm or vanicream daily moisturizer to minimize irritation    AM:  Wash with mild cleanser  2. Moisturizer with spf 30 +     A tint is recommended too- such as makeup, tinted sunscreen, BB/CC creams. The iron oxide in the tint of products protects against agents other than UV light that damage our skin such as blue light from screens, infrared heat, visible light, and other other environmental pollutants.  These other factors can contribute significantly to irregular pigment, especially in skin of color and tint helps protect from these factors.     Seborrheic keratoses  These are benign inherited growths without a malignant potential. Reassurance given to patient. No treatment is necessary.              Follow up in about 1 year (around 3/14/2025) for Medication Management.

## 2024-03-14 NOTE — PATIENT INSTRUCTIONS
PM:  Wash with sulfacleanser or mild cleanser  Thin film of azaleic acid/tretinoin/niacinamide all over every other to every night   Moisturize with cerave pm or vanicream daily moisturizer to minimize irritation    AM:  Wash with mild cleanser  2. Moisturizer with spf 30 +     A tint is recommended too- such as makeup, tinted sunscreen, BB/CC creams. The iron oxide in the tint of products protects against agents other than UV light that damage our skin such as blue light from screens, infrared heat, visible light, and other other environmental pollutants.  These other factors can contribute significantly to irregular pigment, especially in skin of color and tint helps protect from these factors.       We would like to see you back in the clinic in 12 months.  You will be able to schedule this appointment by calling or by using your [facility] portal 3 months before this time. Because our schedule fills so quickly, please set a reminder in your phone or on your calendar to schedule 3 months before you are due to come in so that we can see you in a timely fashion.  You should also receive a reminder from us in the mail. This will help us ensure we can continue to provide excellent healthcare for you. Thank you.

## 2024-03-25 NOTE — PROGRESS NOTES
HEARING AID CONSULTATION    Trudi Mcknight was seen today for a hearing aid consultation. We reviewed her hearing test results and discussed different levels of technology of hearing aids. Mrs. Mcknight was informed of all pricing and service options available through [facility] ([employer]). She was also advised to verify what, if any, discounts or benefits are available with her insurance. Mrs. Mcknight understood that [facility] is not in network with any insurance payor. We discussed her lifestyle and hearing goals in order to decide the best technology level for her. Mrs. Mcknight currently wears Resound hearing aids she purchased from [facility] in 2016. She has not been seen in our clinic for hearing aid adjustments since 2017.  Mrs. Mcknight was informed of the non-refundable $250.00 deposit required to order and that the remaining balance is due the day of . Mrs. Mcknight has the [id number] [employer] insurance and is going to file using the reimbursement form provided by [employer].     Oticon Intent 1 in dario blue aids were selected. Impressions for custom ear molds were taken without incident. I will call Mrs. Mcknight upon receipt of her hearing aids to schedule a fitting. Mrs. Mcknight selected the itemized plan and understood there will be a separate fee for custom ear molds.

## 2024-03-28 ENCOUNTER — PATIENT MESSAGE (OUTPATIENT)
Dept: AUDIOLOGY | Facility: CLINIC | Age: 69
End: 2024-03-28

## 2024-03-28 ENCOUNTER — CLINICAL SUPPORT (OUTPATIENT)
Dept: AUDIOLOGY | Facility: CLINIC | Age: 69
End: 2024-03-28
Payer: MEDICARE

## 2024-03-28 DIAGNOSIS — H93.13 TINNITUS OF BOTH EARS: Primary | ICD-10-CM

## 2024-03-28 DIAGNOSIS — H90.3 SENSORINEURAL HEARING LOSS, BILATERAL: Primary | ICD-10-CM

## 2024-03-28 DIAGNOSIS — H90.3 SENSORINEURAL HEARING LOSS, BILATERAL: ICD-10-CM

## 2024-03-28 PROCEDURE — 92567 TYMPANOMETRY: CPT | Mod: S$GLB,,, | Performed by: AUDIOLOGIST

## 2024-03-28 PROCEDURE — 92557 COMPREHENSIVE HEARING TEST: CPT | Mod: S$GLB,,, | Performed by: AUDIOLOGIST

## 2024-03-28 PROCEDURE — 99999 PR PBB SHADOW E&M-EST. PATIENT-LVL I: CPT | Mod: PBBFAC,,, | Performed by: AUDIOLOGIST

## 2024-03-28 NOTE — PROGRESS NOTES
Trudi Mcknight was seen today in the clinic for an audiologic evaluation.  Patient's main complaint was decreased benefit from her hearing aids.  Mrs. cMknight reported she has not been hearing as well as before; her last audiogram was in 2016. She reported a history of Meniere's disease in the left ear; she noted that her last episode of vertigo was approximately 2 years ago. Mrs. Mcknight described her tinnitus as mostly non-bothersome other than when her ears feel full. Her right ear has had some fullness lately as she has been struggling with sinus congestion.     Otoscopy revealed clear EAC's with visible TM's in both ears.    Tympanometry revealed Type A in the right ear and Type A in the left ear.     Audiogram results revealed a mild to moderately severe SNHL in the right ear and a moderate to severe SNHL in the left ear.      Speech reception thresholds were noted at 25 dB in the right ear and 35 dB in the left ear.    Speech discrimination scores were 96% in the right ear and 72% in the left ear.    Results were reviewed with patient in detail following testing; it is recommended she get medical clearance prior to being fit with new hearing aids. Mrs. Mcknight was seen for a hearing aid consultation following testing.     Recommendations:  Otologic evaluation  Annual audiogram  Hearing protection when in noise  Hearing aid consultation  Medical clearance for hearing aids

## 2024-04-10 ENCOUNTER — DOCUMENTATION ONLY (OUTPATIENT)
Dept: AUDIOLOGY | Facility: CLINIC | Age: 69
End: 2024-04-10
Payer: MEDICARE

## 2024-04-10 RX ORDER — RIVAROXABAN 15 MG/1
15 TABLET, FILM COATED ORAL
Qty: 60 TABLET | Refills: 0 | Status: SHIPPED | OUTPATIENT
Start: 2024-04-10 | End: 2024-05-10 | Stop reason: SDUPTHER

## 2024-04-10 NOTE — PROGRESS NOTES
Hearing aids arrived from ; hearing aids were assembled, prepared and charged for fitting. Mrs. Mcknight is scheduled on 4/17 at 8 am for her fitting.      Hearing Aid Details     & Model: Oticon INTENT 1 miniRITE R  Color: dario blue  Rt SN: [id number]  Lt SN: [id number]  Battery: Li Ion Rechargeable 13  Rt  and Dome: MicroShell Detect 85 2.4mm vent ([id number]) 1R  Lt  and Dome: MicroShell Detect 85 2.4mm vent ([id number]) 1L  Repair Warranty Exp: 05/05/2027  L&D Warranty Exp: 05/05/2027   SN: [id number]

## 2024-04-12 ENCOUNTER — CLINICAL SUPPORT (OUTPATIENT)
Dept: CARDIOLOGY | Facility: HOSPITAL | Age: 69
End: 2024-04-12
Attending: INTERNAL MEDICINE
Payer: MEDICARE

## 2024-04-12 ENCOUNTER — CLINICAL SUPPORT (OUTPATIENT)
Dept: CARDIOLOGY | Facility: HOSPITAL | Age: 69
End: 2024-04-12
Payer: MEDICARE

## 2024-04-12 DIAGNOSIS — I48.91 UNSPECIFIED ATRIAL FIBRILLATION: ICD-10-CM

## 2024-04-12 PROCEDURE — 93296 REM INTERROG EVL PM/IDS: CPT | Mod: HCNC | Performed by: INTERNAL MEDICINE

## 2024-04-12 PROCEDURE — 93294 REM INTERROG EVL PM/LDLS PM: CPT | Mod: HCNC,,, | Performed by: INTERNAL MEDICINE

## 2024-04-16 ENCOUNTER — OFFICE VISIT (OUTPATIENT)
Dept: PSYCHIATRY | Facility: CLINIC | Age: 69
End: 2024-04-16
Payer: MEDICARE

## 2024-04-16 DIAGNOSIS — F41.1 GENERALIZED ANXIETY DISORDER WITH PANIC ATTACKS: Primary | ICD-10-CM

## 2024-04-16 DIAGNOSIS — G47.52 REM SLEEP BEHAVIOR DISORDER: ICD-10-CM

## 2024-04-16 DIAGNOSIS — F33.41 MDD (MAJOR DEPRESSIVE DISORDER), RECURRENT, IN PARTIAL REMISSION: ICD-10-CM

## 2024-04-16 DIAGNOSIS — F41.0 GENERALIZED ANXIETY DISORDER WITH PANIC ATTACKS: Primary | ICD-10-CM

## 2024-04-16 DIAGNOSIS — F33.0 MDD (MAJOR DEPRESSIVE DISORDER), RECURRENT EPISODE, MILD: ICD-10-CM

## 2024-04-16 DIAGNOSIS — F43.21 GRIEF REACTION: ICD-10-CM

## 2024-04-16 PROCEDURE — 1159F MED LIST DOCD IN RCRD: CPT | Mod: HCNC,CPTII,95, | Performed by: PHYSICIAN ASSISTANT

## 2024-04-16 PROCEDURE — 99214 OFFICE O/P EST MOD 30 MIN: CPT | Mod: HCNC,95,, | Performed by: PHYSICIAN ASSISTANT

## 2024-04-16 PROCEDURE — 1160F RVW MEDS BY RX/DR IN RCRD: CPT | Mod: HCNC,CPTII,95, | Performed by: PHYSICIAN ASSISTANT

## 2024-04-16 RX ORDER — VENLAFAXINE HYDROCHLORIDE 37.5 MG/1
37.5 CAPSULE, EXTENDED RELEASE ORAL DAILY
Qty: 90 CAPSULE | Refills: 1 | Status: SHIPPED | OUTPATIENT
Start: 2024-04-16

## 2024-04-16 RX ORDER — ALPRAZOLAM 0.25 MG/1
0.25 TABLET ORAL 3 TIMES DAILY PRN
Qty: 90 TABLET | Refills: 3 | Status: SHIPPED | OUTPATIENT
Start: 2024-04-16

## 2024-04-16 NOTE — PROGRESS NOTES
"The patient location is: [place]    Visit type: audiovisual    Each patient to whom he or she provides medical services by telemedicine is:  (1) informed of the relationship between the physician and patient and the respective role of any other health care provider with respect to management of the patient; and (2) notified that he or she may decline to receive medical services by telemedicine and may withdraw from such care at any time.    Notes:       Outpatient Psychiatry Follow-Up Visit (MD/JASMINE)    4/16/2024    Clinical Status of Patient:  Outpatient (Ambulatory)    Chief Complaint:  Trudi Mcknight is a 69 y.o. female who presents today for follow-up of depression and anxiety.  Met with patient.      Interval History and Content of Current Session:  Interim Events/Subjective Report/Content of Current Session:    Pt reports today: "doing better overall. I had to take more xanax recently after my best friend passed away"    Reports that pt's closest relative/friend passed away 3 weeks ago. States that she is having severe grief due to loss "but I'm managing it, its tough but I'm hanging in there."    Mood overall is "a little bit said. Good days and bad due to my best friend passing away"    Rates depression as 3/10 and anxiety as 3/10 over the past 2 weeks.    Patients mood is sad, affect appears mood congruent and appropriate. Linear and logical, friendly and cooperative, good eye contact.    Denies SI/HI/AVH. Pt reports sleeping well and normal appetite. Denies side effects of medications.    Pt reports taking medications as prescribed and behaving appropriately during interview today.      Prior visit    Pt reports today: "its been a rough month. Both my boys were in the hospital and not working back to normal. Other than that things are ok". Reports 2 of her adult son's had medical issues and hospitalized but are both back home recovering.     States other than this past month with her sons' health issues " "anxiety and mood have been well under control    Reports has had increased anxiety but has rarely needed to take any of previously prescribed xanax.    Mood overall is "ok overall, a rough month. Before that things were consistently good."    Patients mood is steady, affect appears mood congruent. Linear and logical, friendly and cooperative, good eye contact.    Denies SI/HI/AVH. Pt reports sleeping well and increased appetite since starting crestor. Denies other side effects of medications.    Pt reports taking medications as prescribed and behaving appropriately during interview today.    Review of Systems     Review of Systems   Constitutional:  Negative for fever and malaise/fatigue.   HENT:  Negative for sore throat.    Eyes:  Negative for photophobia.   Respiratory:  Negative for cough.    Cardiovascular:  Negative for chest pain and palpitations.   Gastrointestinal:  Negative for abdominal pain.   Genitourinary:  Negative for dysuria.   Musculoskeletal:  Negative for myalgias.   Skin:  Negative for rash.   Neurological:  Negative for dizziness.   Endo/Heme/Allergies:  Does not bruise/bleed easily.   Psychiatric/Behavioral:  Positive for depression. Negative for hallucinations, substance abuse and suicidal ideas. The patient is nervous/anxious. The patient does not have insomnia.        Psychiatric Review Of Systems - Is patient experiencing or having changes in:  sleep: no  appetite: yes  weight: no  energy/anergy: yes  interest/pleasure/anhedonia: yes  somatic symptoms: no  libido: no  anxiety/panic: yes  guilty/hopelessness: no  concentration: no  S.I.B.s/risky behavior: no  Irritability: no  Racing thoughts: no  Impulsive behaviors: no  Paranoia: no  AVH: no      Past Medical, Family and Social History: The patient's past medical, family and social history have been reviewed and updated as appropriate within the electronic medical record - see encounter notes.      Current Medications:   Medication List " with Changes/Refills   Current Medications    ALBUTEROL (VENTOLIN HFA) 90 MCG/ACTUATION INHALER    Inhale 1-2 puffs into the lungs every 6 (six) hours as needed for Wheezing or Shortness of Breath. Rescue    ALPRAZOLAM (XANAX) 0.25 MG TABLET    Take 1 tablet (0.25 mg total) by mouth 3 (three) times daily as needed for Anxiety.    AZELAIC ACID (FINACEA) 15 % FOAM    APPLY TO THE AFFECTED AREA(S) of face ONCE OR twice DAILY    AZELAIC ACID MISC    APPLY TO THE AFFECTED AREA(S) of face ONCE OR twice DAILY    CYCLOSPORINE (RESTASIS) 0.05 % OPHTHALMIC EMULSION    Place 1 drop into both eyes 2 (two) times daily.    DICLOFENAC SODIUM (VOLTAREN) 1 % GEL    Apply 2 g topically 2 (two) times daily as needed (pain).    METRONIDAZOLE (NORITATE) 1 % CREAM    Compound azelaic acid 15% + ivermectin 1% + metronidazole 1% cream. Apply to affected area on face once or twice daily.    OMEPRAZOLE (PRILOSEC) 20 MG CAPSULE    Take 1 capsule (20 mg total) by mouth daily as needed (reflux).    ROSUVASTATIN (CRESTOR) 5 MG TABLET    Take 1 tablet (5 mg total) by mouth once daily.    SULFACETAMIDE SODIUM-SULFUR (SULFACLEANSE 8-4) 8-4 % SUSP    Wash face qhs    TACROLIMUS (PROTOPIC) 0.1 % OINTMENT    Aaa on brows qd- bid    TRETINOIN (RETIN-A) 0.025 % CREAM    Compound tretinoin 0.025% / azelaic acid 8% / niacinamide 2% cream. Apply a pea-sized amount to entire face qhs then moisturize    XARELTO 15 MG TAB    TAKE 1 TABLET (15 MG TOTAL) BY MOUTH DAILY WITH DINNER OR EVENING MEAL.   Changed and/or Refilled Medications    Modified Medication Previous Medication    VENLAFAXINE (EFFEXOR-XR) 37.5 MG 24 HR CAPSULE venlafaxine (EFFEXOR-XR) 37.5 MG 24 hr capsule       Take 1 capsule (37.5 mg total) by mouth once daily.    Take 1 capsule (37.5 mg total) by mouth once daily.         Allergies:   Review of patient's allergies indicates:   Allergen Reactions    Lasix [furosemide] Shortness Of Breath    Tricyclic antidepressants and tricyclic compounds   "        Vitals   There were no vitals filed for this visit.       Labs/Imaging/Studies:   No results found for this or any previous visit (from the past 48 hour(s)).   No results found for: "PHENYTOIN", "PHENOBARB", "VALPROATE", "CBMZ"    Compliance: yes    Side effects: None    Risk Parameters:  Patient reports no suicidal ideation  Patient reports no homicidal ideation  Patient reports no self-injurious behavior  Patient reports no violent behavior    Exam (detailed: at least 9 elements; comprehensive: all 15 elements)   Constitutional  Vitals:  Most recent vital signs, dated less than 90 days prior to this appointment, were reviewed.   There were no vitals filed for this visit.     General:  unremarkable, age appropriate     Musculoskeletal  Muscle Strength/Tone:  not examined   Gait & Station:  Not examined     Psychiatric  Speech:  no latency; no press   Mood & Affect:  sad  congruent and appropriate   Thought Process:  normal and logical   Associations:  intact   Thought Content:  normal, no suicidality, no homicidality, delusions, or paranoia   Insight:  intact, has awareness of illness   Judgement: behavior is adequate to circumstances   Orientation:  grossly intact   Memory: intact for content of interview   Language: grossly intact   Attention Span & Concentration:  able to focus   Fund of Knowledge:  intact and appropriate to age and level of education     Assessment and Diagnosis   Status/Progress: Based on the examination today, the patient's problem(s) is/are adequately but not ideally controlled.  New problems have been presented today.   Co-morbidities, Diagnostic uncertainty and Lack of compliance are not complicating management of the primary condition.  There are no active rule-out diagnoses for this patient at this time.     General Impression:      ICD-10-CM ICD-9-CM   1. Generalized anxiety disorder with panic attacks  F41.1 300.02    F41.0 300.01   2. MDD (major depressive disorder), recurrent " episode, mild  F33.0 296.31   3. REM sleep behavior disorder  G47.52 327.42   4. Grief reaction  F43.21 309.0   5. MDD (major depressive disorder), recurrent, in partial remission  F33.41 296.35         Intervention/Counseling/Treatment Plan   Medication Management: Continue current medications. The risks and benefits of medication were discussed with the patient.    -continue effexor XR 37.5mg daily    -continue xanax 0.25mg q8h prn anxiety. Use sparingly as directed    -continue vitamin d3 2k iu once daily    The risks and benefits of medication were discussed with the patient.  Discussed diagnosis, risks and benefits of proposed treatment above vs alternative treatments vs no treatment, and potential side effects of these treatments. The patient expresses understanding of the above and displays the capacity to agree with this treatment given said understanding. Patient also agrees that, currently, the benefits outweigh the risks and would like to pursue treatment at this time.  Discussed inherent unpredictability of medications in each individual.   Encouraged Patient to keep future appointments.   Take medications as prescribed and abstain from substance abuse.   In the event of an emergency, patient was advised to go to the emergency room      Return to Clinic:  4 months        Kristofer Martinez PA-C      Total face to face time: 20 min  Total time (chart review, patient contact, documentation): 28 min      *This note has been prepared using a combination of a dictation device and typing.  It has been checked for errors but some errors may still exist within the note as a result of speech recognition errors and/or typographical errors.

## 2024-04-16 NOTE — CARE UPDATE
Care Update    As plan from EP:   Workup for underlying CAD and ischemia as cause of her chest pain   Need outpatient stress test ideally nuclear stress (SPECT)   Can increase metoprolol to 50 mg bid if her bp allows   Continue to keep on sling overnight for 6 weeks  Continue on oral antibiotics x 5 days    70.7

## 2024-04-17 ENCOUNTER — CLINICAL SUPPORT (OUTPATIENT)
Dept: AUDIOLOGY | Facility: CLINIC | Age: 69
End: 2024-04-17
Payer: MEDICARE

## 2024-04-17 DIAGNOSIS — H90.3 SENSORINEURAL HEARING LOSS, BILATERAL: Primary | ICD-10-CM

## 2024-04-17 NOTE — PROGRESS NOTES
HEARING AID FITTING    Trudi Mcknight was seen today for a hearing aid fitting.  All parts of the hearing aid, including battery, domes, wax filters, and microphones, were discussed with the patient.  Battery charging was also reviewed and practiced with the patient.  Real ear and feedback measures were taken and hearing aid was fit to patient's satisfaction.  Counseled patient on daily wear and maintenance of the hearing aid.  Mrs. Mcknight acknowledged that he/she understood.  The hearing aids were not connected to the patient's phone.  Bluetooth settings were tested successfully in office.The purchase agreement, 30-day trial period, and warranties were also reviewed with patient.  It is recommended Mrs. Mcknight return to clinic in 2 weeks or as needed.      Hearing Aid Details      & Model: Oticon INTENT 1 miniRITE R  Color: dario blue  Rt SN: [id number]  Lt SN: [id number]  Battery: Li Ion Rechargeable 13  Rt  and Dome: MicroShell Detect 85 2.4mm vent ([id number]) 1R  Lt  and Dome: MicroShell Detect 85 2.4mm vent ([id number]) 1L  Repair Warranty Exp: 05/05/2027  L&D Warranty Exp: 05/05/2027   SN: [id number]

## 2024-04-18 DIAGNOSIS — I48.0 PAROXYSMAL ATRIAL FIBRILLATION: Primary | ICD-10-CM

## 2024-04-18 LAB
OHS CV AF BURDEN PERCENT: < 1
OHS CV DC REMOTE DEVICE TYPE: NORMAL
OHS CV RV PACING PERCENT: 1 %

## 2024-05-09 ENCOUNTER — CLINICAL SUPPORT (OUTPATIENT)
Dept: AUDIOLOGY | Facility: CLINIC | Age: 69
End: 2024-05-09
Payer: MEDICARE

## 2024-05-09 DIAGNOSIS — H90.3 SENSORINEURAL HEARING LOSS, BILATERAL: Primary | ICD-10-CM

## 2024-05-09 PROCEDURE — 99499 UNLISTED E&M SERVICE: CPT | Mod: S$GLB,,, | Performed by: AUDIOLOGIST

## 2024-05-09 NOTE — PROGRESS NOTES
HEARING AID FOLLOW-UP    rTudi Mcknight was seen today for a hearing aid follow-up visit. Mrs. Mcknight reported she is happy with the physical and acoustic fit of her hearing aids with a few exceptions. The day of her fitting she perceived the right mold as more noticeable than the left but quickly felt balanced after a few days wear. She recently was sick with the flu and noticed her ear canals felt swollen and had difficulty fitting. Mrs. Mcknight reported that her hearing aids occasionally cut out like she cannot hear anything and then return. All system sounds were turned off on her [employer] phone and MPO was increased in both ears. She also noted that when her grandkids speak loudly the sound gets garbled and she has to turn it down. Sound settings were changed to air on the side of comfort versus detail. Compression ratios were checked in both ears and all ratios were set to no higher than 1.6.    It is recommended that Mrs. Mcknight return to clinic as needed for adjustments. Her last visit included in her purchase is July 16th, 2024. She is set up to get a reminder in the mail for her annual audiogram.

## 2024-05-14 ENCOUNTER — OFFICE VISIT (OUTPATIENT)
Dept: INTERNAL MEDICINE | Facility: CLINIC | Age: 69
End: 2024-05-14
Payer: MEDICARE

## 2024-05-14 VITALS
BODY MASS INDEX: 25.08 KG/M2 | DIASTOLIC BLOOD PRESSURE: 66 MMHG | HEIGHT: 60 IN | OXYGEN SATURATION: 98 % | SYSTOLIC BLOOD PRESSURE: 98 MMHG | HEART RATE: 89 BPM | WEIGHT: 127.75 LBS

## 2024-05-14 DIAGNOSIS — Z79.01 CHRONIC ANTICOAGULATION: ICD-10-CM

## 2024-05-14 DIAGNOSIS — Z12.11 ENCOUNTER FOR SCREENING COLONOSCOPY: Primary | ICD-10-CM

## 2024-05-14 DIAGNOSIS — Z95.0 S/P PLACEMENT OF CARDIAC PACEMAKER: Chronic | ICD-10-CM

## 2024-05-14 DIAGNOSIS — F33.0 MDD (MAJOR DEPRESSIVE DISORDER), RECURRENT EPISODE, MILD: ICD-10-CM

## 2024-05-14 DIAGNOSIS — I48.0 PAROXYSMAL ATRIAL FIBRILLATION: ICD-10-CM

## 2024-05-14 DIAGNOSIS — F41.0 GENERALIZED ANXIETY DISORDER WITH PANIC ATTACKS: Chronic | ICD-10-CM

## 2024-05-14 DIAGNOSIS — Z98.890 STATUS POST CIRCUMFERENTIAL ABLATION OF PULMONARY VEIN: Chronic | ICD-10-CM

## 2024-05-14 DIAGNOSIS — E78.2 MIXED HYPERLIPIDEMIA: Chronic | ICD-10-CM

## 2024-05-14 DIAGNOSIS — F41.1 GENERALIZED ANXIETY DISORDER WITH PANIC ATTACKS: Chronic | ICD-10-CM

## 2024-05-14 DIAGNOSIS — B00.1 RECURRENT COLD SORES: ICD-10-CM

## 2024-05-14 DIAGNOSIS — I49.5 SICK SINUS SYNDROME: Chronic | ICD-10-CM

## 2024-05-14 PROCEDURE — 1159F MED LIST DOCD IN RCRD: CPT | Mod: HCNC,CPTII,S$GLB, | Performed by: PHYSICIAN ASSISTANT

## 2024-05-14 PROCEDURE — 3074F SYST BP LT 130 MM HG: CPT | Mod: HCNC,CPTII,S$GLB, | Performed by: PHYSICIAN ASSISTANT

## 2024-05-14 PROCEDURE — 3078F DIAST BP <80 MM HG: CPT | Mod: HCNC,CPTII,S$GLB, | Performed by: PHYSICIAN ASSISTANT

## 2024-05-14 PROCEDURE — 1160F RVW MEDS BY RX/DR IN RCRD: CPT | Mod: HCNC,CPTII,S$GLB, | Performed by: PHYSICIAN ASSISTANT

## 2024-05-14 PROCEDURE — 3288F FALL RISK ASSESSMENT DOCD: CPT | Mod: HCNC,CPTII,S$GLB, | Performed by: PHYSICIAN ASSISTANT

## 2024-05-14 PROCEDURE — 3008F BODY MASS INDEX DOCD: CPT | Mod: HCNC,CPTII,S$GLB, | Performed by: PHYSICIAN ASSISTANT

## 2024-05-14 PROCEDURE — 1101F PT FALLS ASSESS-DOCD LE1/YR: CPT | Mod: HCNC,CPTII,S$GLB, | Performed by: PHYSICIAN ASSISTANT

## 2024-05-14 PROCEDURE — 99999 PR PBB SHADOW E&M-EST. PATIENT-LVL V: CPT | Mod: PBBFAC,HCNC,, | Performed by: PHYSICIAN ASSISTANT

## 2024-05-14 PROCEDURE — 99214 OFFICE O/P EST MOD 30 MIN: CPT | Mod: HCNC,S$GLB,, | Performed by: PHYSICIAN ASSISTANT

## 2024-05-14 PROCEDURE — 1126F AMNT PAIN NOTED NONE PRSNT: CPT | Mod: HCNC,CPTII,S$GLB, | Performed by: PHYSICIAN ASSISTANT

## 2024-05-14 RX ORDER — VALACYCLOVIR HYDROCHLORIDE 1 G/1
TABLET, FILM COATED ORAL
Qty: 60 TABLET | Refills: 1 | Status: SHIPPED | OUTPATIENT
Start: 2024-05-14 | End: 2024-05-21

## 2024-05-14 NOTE — PATIENT INSTRUCTIONS
The Endoscopy/Colonoscopy team should be contacting you to schedule your appointment. You can also call them directly at [phone] to schedule your upper endoscopy and/or colonoscopy in a few days if you haven't heard from them.

## 2024-05-14 NOTE — PROGRESS NOTES
Subjective:       Patient ID: Trudi Mcknight is a 69 y.o. female.        Chief Complaint: Annual Exam    Trudi Mcknight is an established patient of Renuka Moreno MD here today for annual exam.    SSS, s/p pacemaker placement, paroxysmal Afib -   Xarelto 15 mg BID  Follwed by Dr. Duncan    DANIELLE, MDD -   Effexor 37.5 mg daily, very rare xanax use  Followed by psychiatry, [facility] Sow    Rosacea - multiple creams    Lipids -   Crestor 5 mg daily    GERD -   Prilosec 20 mg three times/week           Review of Systems   Constitutional:  Negative for activity change, chills, diaphoresis, fatigue, fever and unexpected weight change.   HENT:  Positive for hearing loss and rhinorrhea. Negative for congestion, sore throat and trouble swallowing.    Eyes:  Negative for discharge and visual disturbance.   Respiratory:  Negative for cough, chest tightness, shortness of breath and wheezing.    Cardiovascular:  Positive for palpitations. Negative for chest pain and leg swelling.   Gastrointestinal:  Positive for constipation. Negative for abdominal pain, blood in stool, diarrhea, nausea and vomiting.   Endocrine: Negative for polydipsia and polyuria.   Genitourinary:  Negative for difficulty urinating, dysuria, frequency, hematuria, menstrual problem and urgency.   Musculoskeletal:  Positive for arthralgias and joint swelling. Negative for back pain and neck pain.   Skin:  Negative for rash.   Neurological:  Negative for dizziness, syncope, weakness and headaches.   Psychiatric/Behavioral:  Negative for confusion, dysphoric mood and sleep disturbance. The patient is not nervous/anxious.        Objective:      Physical Exam  Vitals and nursing note reviewed.   Constitutional:       Appearance: Normal appearance. She is well-developed.   HENT:      Head: Normocephalic.      Comments: Hearing aids in place, removed to examine ears      Right Ear: Tympanic membrane and external ear normal.      Left Ear: Tympanic membrane  and external ear normal.      Nose: No mucosal edema or rhinorrhea.      Mouth/Throat:      Pharynx: Oropharynx is clear.   Eyes:      Pupils: Pupils are equal, round, and reactive to light.   Cardiovascular:      Rate and Rhythm: Normal rate and regular rhythm.      Heart sounds: Normal heart sounds. No murmur heard.     No friction rub. No gallop.   Pulmonary:      Effort: Pulmonary effort is normal. No respiratory distress.      Breath sounds: Normal breath sounds.   Abdominal:      Palpations: Abdomen is soft.      Tenderness: There is no abdominal tenderness.   Skin:     General: Skin is warm and dry.   Neurological:      Mental Status: She is alert.         Assessment:       1. Encounter for screening colonoscopy    2. Recurrent cold sores    3. Generalized anxiety disorder with panic attacks    4. MDD (major depressive disorder), recurrent episode, mild    5. Sick sinus syndrome    6. S/P placement of cardiac pacemaker    7. Mixed hyperlipidemia    8. Paroxysmal atrial fibrillation    9. Status post circumferential ablation of pulmonary vein    10. Chronic anticoagulation        Plan:       Trudi was seen today for annual exam.    Diagnoses and all orders for this visit:    Encounter for screening colonoscopy  -     Ambulatory referral/consult to Endo Procedure ; Future    Recurrent cold sores  -     REFILL: valACYclovir (VALTREX) 1000 MG tablet; Take 2 tablets every 12 hours for two doses as needed for cold sores    Generalized anxiety disorder with panic attacks - stable and controlled, continue current regimen    MDD (major depressive disorder), recurrent episode, mild - stable and controlled, continue current regimen    Sick sinus syndrome  S/P placement of cardiac pacemaker    Mixed hyperlipidemia - stable and controlled, continue current regimen  Lab Results   Component Value Date    CHOL 206 (H) 02/19/2024    TRIG 131 02/19/2024    HDL 50 02/19/2024    LDLCALC 129.8 02/19/2024  "    Paroxysmal atrial fibrillation    Status post circumferential ablation of pulmonary vein    Chronic anticoagulation    Pt has been given instructions populated from patient instructions database and has verbalized understanding of the after visit summary and information contained wherein.    Follow up with a primary care provider. May go to ER for acute shortness of breath, lightheadedness, fever, or any other emergent complaints or changes in condition.    "This note will be shared with the patient"    Future Appointments   Date Time Provider Department Center   6/7/2024 10:00 AM LAB, APPOINTMENT [facility] LAB VNP [facility]   6/14/2024  9:00 AM EKG, APPT [facility] EKG [facility]   6/14/2024  9:20 AM PACEMAKER, ICD [facility] ARRHPRO [facility]   6/14/2024 10:00 AM Renuka Toth NP [facility] ARRHYTH [facility]   8/6/2024  9:00 AM Wilner Martinez PA [facility] PSYCH [facility]                 "

## 2024-05-15 ENCOUNTER — PATIENT MESSAGE (OUTPATIENT)
Dept: ELECTROPHYSIOLOGY | Facility: CLINIC | Age: 69
End: 2024-05-15
Payer: MEDICARE

## 2024-05-15 NOTE — TELEPHONE ENCOUNTER
Spoke with patient and clarified that her dosing IS once a day. Apologized for the miscommunication. Med list updated and new rx sent to pharmacy.

## 2024-05-21 DIAGNOSIS — B00.1 RECURRENT COLD SORES: ICD-10-CM

## 2024-05-21 RX ORDER — VALACYCLOVIR HYDROCHLORIDE 1 G/1
TABLET, FILM COATED ORAL
Qty: 360 TABLET | Refills: 1 | Status: SHIPPED | OUTPATIENT
Start: 2024-05-21

## 2024-06-07 ENCOUNTER — LAB VISIT (OUTPATIENT)
Dept: LAB | Facility: HOSPITAL | Age: 69
End: 2024-06-07
Payer: MEDICARE

## 2024-06-07 DIAGNOSIS — E78.49 OTHER HYPERLIPIDEMIA: ICD-10-CM

## 2024-06-07 DIAGNOSIS — E78.2 MIXED HYPERLIPIDEMIA: Chronic | ICD-10-CM

## 2024-06-07 LAB
ALBUMIN SERPL BCP-MCNC: 3.4 G/DL (ref 3.5–5.2)
ALP SERPL-CCNC: 87 U/L (ref 55–135)
ALT SERPL W/O P-5'-P-CCNC: 15 U/L (ref 10–44)
ANION GAP SERPL CALC-SCNC: 9 MMOL/L (ref 8–16)
AST SERPL-CCNC: 20 U/L (ref 10–40)
BILIRUB SERPL-MCNC: 0.5 MG/DL (ref 0.1–1)
BUN SERPL-MCNC: 17 MG/DL (ref 8–23)
CALCIUM SERPL-MCNC: 9.4 MG/DL (ref 8.7–10.5)
CHLORIDE SERPL-SCNC: 108 MMOL/L (ref 95–110)
CHOLEST SERPL-MCNC: 121 MG/DL (ref 120–199)
CHOLEST/HDLC SERPL: 2.4 {RATIO} (ref 2–5)
CO2 SERPL-SCNC: 23 MMOL/L (ref 23–29)
CREAT SERPL-MCNC: 0.9 MG/DL (ref 0.5–1.4)
EST. GFR  (NO RACE VARIABLE): >60 ML/MIN/1.73 M^2
GLUCOSE SERPL-MCNC: 89 MG/DL (ref 70–110)
HDLC SERPL-MCNC: 51 MG/DL (ref 40–75)
HDLC SERPL: 42.1 % (ref 20–50)
LDLC SERPL CALC-MCNC: 54 MG/DL (ref 63–159)
NONHDLC SERPL-MCNC: 70 MG/DL
POTASSIUM SERPL-SCNC: 4.2 MMOL/L (ref 3.5–5.1)
PROT SERPL-MCNC: 6.6 G/DL (ref 6–8.4)
SODIUM SERPL-SCNC: 140 MMOL/L (ref 136–145)
TRIGL SERPL-MCNC: 80 MG/DL (ref 30–150)
TSH SERPL DL<=0.005 MIU/L-ACNC: 2.17 UIU/ML (ref 0.4–4)

## 2024-06-07 PROCEDURE — 36415 COLL VENOUS BLD VENIPUNCTURE: CPT | Mod: HCNC,PO | Performed by: INTERNAL MEDICINE

## 2024-06-07 PROCEDURE — 80061 LIPID PANEL: CPT | Mod: HCNC | Performed by: PHYSICIAN ASSISTANT

## 2024-06-07 PROCEDURE — 84443 ASSAY THYROID STIM HORMONE: CPT | Mod: HCNC | Performed by: INTERNAL MEDICINE

## 2024-06-07 PROCEDURE — 80053 COMPREHEN METABOLIC PANEL: CPT | Mod: HCNC | Performed by: PHYSICIAN ASSISTANT

## 2024-06-10 ENCOUNTER — PATIENT MESSAGE (OUTPATIENT)
Dept: INTERNAL MEDICINE | Facility: CLINIC | Age: 69
End: 2024-06-10
Payer: MEDICARE

## 2024-06-10 ENCOUNTER — HOSPITAL ENCOUNTER (OUTPATIENT)
Dept: RADIOLOGY | Facility: HOSPITAL | Age: 69
Discharge: HOME | End: 2024-06-10
Attending: INTERNAL MEDICINE
Payer: MEDICARE

## 2024-06-10 DIAGNOSIS — Z78.0 MENOPAUSE: ICD-10-CM

## 2024-06-10 PROCEDURE — 77080 DXA BONE DENSITY AXIAL: CPT | Mod: 26,,, | Performed by: INTERNAL MEDICINE

## 2024-06-10 PROCEDURE — 77080 DXA BONE DENSITY AXIAL: CPT | Mod: TC

## 2024-06-12 ENCOUNTER — PATIENT MESSAGE (OUTPATIENT)
Dept: RESEARCH | Facility: CLINIC | Age: 69
End: 2024-06-12
Payer: MEDICARE

## 2024-06-12 PROBLEM — I50.32 CHRONIC HEART FAILURE WITH PRESERVED EJECTION FRACTION: Status: ACTIVE | Noted: 2024-06-12

## 2024-06-12 PROBLEM — I70.0 AORTIC ATHEROSCLEROSIS: Status: ACTIVE | Noted: 2024-06-12

## 2024-06-12 NOTE — PROGRESS NOTES
Ms. Mcknight is a patient of Dr. Duncan and was last seen in clinic 5/22/2023.      Subjective:   Patient ID:  Trudi Mcknight is a 69 y.o. female who presents for follow up of Atrial Fibrillation and Pacemaker Check  .     HPI:    Ms. Mcknight is a 69 y.o. female with atrial fibrillation (cryo PVI 3/29/2022, RF PVI 8/16/2022), sick sinus syndrome, PPM, anxiety, hypothyroidism here for follow up.    Background:    Initially presented with symptomatic AF and symptomatic sinus pauses. Also noted chest pain not related to these events.  Dual chamber PPM implanted 1/20/22.  Continued to have AF. Did not tolerate Flecainide or Propafenone.  Echo revealed moderate pericardial effusion, which resolved.  PVI 3/29/22 Cryo-ablation.  Continued to have symptomatic AF/nonsustained AT. Also had multiple hospitalizations for somatic complaints not related to this, with anxiety playing a significant role.  Placed on amiodarone.  Echo 4/6/22 normal biventricular structure and function mild LAE small posterolateral effusion.    6/22/2022:  Has started to improve. No recent hospitalizations. Device interrogation reveals no sustained AF in the past month.   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 Being followed by Endocrinology.  Notes tremors. Also notes rash.  Doing well with amiodarone.   Options are repeat ablation and discontinue amiodarone vs continue amiodarone for now.  We discussed both options at length. I initially endorsed continuing with amiodarone, given she is now doing better after frequent hospitalizations. However, her preference is repeat ablation and trial off amiodarone, given the tremors, rash, and hypothyroidism.  Risks and benefits of repeat RFA/PVI discussed, she would like to proceed.  Hold Xarelto morning of procedure, take evening prior.    8/16/2022: RF PVI, PWI    8/21/2022: She is 3 months s/p redo ablation. She is feeling well since procedure. Did have some AF during blanking period (1%),  mostly while having covid. She is reporting symptom improvement.  She is off amiodarone since Sept. CHADSVASc 2 on xarelto at renal dose (CRCL 37). No RV pacing. No CHF symptoms. Echo 4/2022 showed normal LVEF.    5/22/2023: She is now 9 months s/p redo ablation. Not on AAD. Some irreg HB after meds/eating likely ectopy. She has not tolerated BB in the past due to hypotension. She says this symptom is improving. On xarelto.  Ms. Mcknight is doing well from a device perspective with stable lead and device function. No RV pacing. No CHF symptoms. RTC 1 yr.      Update (06/14/2024):    Today she says she notes a small decrease in stamina since starting low dose rosuvastatin. Slight drop in oxygen levels (99->95%). Still no significant MARLEY, palps, CP, LH, syncope reported.    She is currently taking xarelto 15mg daily (CRCL 23) for stroke prophylaxis and denies significant bleeding episodes. Kidney function is stable, with a creatinine of 0.9 on 6/7/2024.    Device Interrogation (6/14/2024) reveals an intrinsic SR (83bpm) with stable lead and device function. AMS x 1 for 13 mins 18 secs on 6/8/24, egram c/w ST (pt was working outside). No ventricular arrhythmias. She paces 5% in the RA and 1% in the RV. Estimated battery longevity 7 years, 3 mo.     I have personally reviewed the patient's EKG today, which shows SR at 80bpm. CT interval is 152. QRS is 74. QTc is 442.    Relevant Cardiac Test Results:    2D Echo (12/30/2022):  Small circumferential pericardial effusion, largest inferolaterally. No evidence of tamponade.  The left ventricle is normal in size with normal systolic function.  The estimated ejection fraction is 65%.  Normal left ventricular diastolic function.  Normal right ventricular size with normal right ventricular systolic function.  Normal central venous pressure (3 mmHg).  The estimated PA systolic pressure is 22 mmHg.    Current Outpatient Medications   Medication Sig    albuterol (VENTOLIN HFA) 90  mcg/actuation inhaler Inhale 1-2 puffs into the lungs every 6 (six) hours as needed for Wheezing or Shortness of Breath. Rescue (Patient not taking: Reported on 11/10/2023)    ALPRAZolam (XANAX) 0.25 MG tablet Take 1 tablet (0.25 mg total) by mouth 3 (three) times daily as needed for Anxiety.    azelaic acid (FINACEA) 15 % Foam APPLY TO THE AFFECTED AREA(S) of face ONCE OR twice DAILY    AZELAIC ACID MISC APPLY TO THE AFFECTED AREA(S) of face ONCE OR twice DAILY    diclofenac sodium (VOLTAREN) 1 % Gel Apply 2 g topically 2 (two) times daily as needed (pain).    metroNIDAZOLE (NORITATE) 1 % cream Compound azelaic acid 15% + ivermectin 1% + metronidazole 1% cream. Apply to affected area on face once or twice daily.    omeprazole (PRILOSEC) 20 MG capsule Take 1 capsule (20 mg total) by mouth daily as needed (reflux).    rivaroxaban (XARELTO) 15 mg Tab Take 1 tablet (15 mg total) by mouth daily with dinner or evening meal.    rosuvastatin (CRESTOR) 5 MG tablet Take 1 tablet (5 mg total) by mouth once daily.    sulfacetamide sodium-sulfur (SULFACLEANSE 8-4) 8-4 % Susp Wash face qhs    tacrolimus (PROTOPIC) 0.1 % ointment Aaa on brows qd- bid (Patient not taking: Reported on 1/12/2024)    tretinoin (RETIN-A) 0.025 % cream Compound tretinoin 0.025% / azelaic acid 8% / niacinamide 2% cream. Apply a pea-sized amount to entire face qhs then moisturize    valACYclovir (VALTREX) 1000 MG tablet TAKE 2 TABLETS EVERY 12 HOURS FOR TWO DOSES AS NEEDED FOR COLD SORES    venlafaxine (EFFEXOR-XR) 37.5 MG 24 hr capsule Take 1 capsule (37.5 mg total) by mouth once daily.     No current facility-administered medications for this visit.       Review of Systems   Constitutional: Positive for malaise/fatigue (decrease in stamina).   Cardiovascular:  Negative for chest pain, dyspnea on exertion, irregular heartbeat, leg swelling and palpitations.   Respiratory:  Positive for shortness of breath.    Hematologic/Lymphatic: Negative for bleeding  problem.   Skin:  Negative for rash.   Musculoskeletal:  Negative for myalgias.   Gastrointestinal:  Negative for hematemesis, hematochezia and nausea.   Genitourinary:  Negative for hematuria.   Neurological:  Negative for light-headedness.   Psychiatric/Behavioral:  Negative for altered mental status.    Allergic/Immunologic: Negative for persistent infections.       Objective:          /65   Pulse 75   Ht 5' (1.524 m)   Wt 58.8 kg (129 lb 10.1 oz)   BMI 25.32 kg/m²     Physical Exam  Vitals and nursing note reviewed.   Constitutional:       Appearance: Normal appearance. She is well-developed.   HENT:      Head: Normocephalic.      Nose: Nose normal.   Eyes:      Pupils: Pupils are equal, round, and reactive to light.   Cardiovascular:      Rate and Rhythm: Normal rate and regular rhythm.   Pulmonary:      Effort: No respiratory distress.      Breath sounds: Normal breath sounds.   Chest:      Comments: Device to LUCW. Incision and pocket in good repair.    Musculoskeletal:         General: Normal range of motion.   Skin:     General: Skin is warm and dry.      Findings: No erythema.   Neurological:      Mental Status: She is alert and oriented to person, place, and time.   Psychiatric:         Speech: Speech normal.         Behavior: Behavior normal.           Lab Results   Component Value Date     06/07/2024    K 4.2 06/07/2024    MG 2.1 04/09/2022    BUN 17 06/07/2024    CREATININE 0.9 06/07/2024    ALT 15 06/07/2024    AST 20 06/07/2024    HGB 13.5 08/09/2022    HCT 43.2 08/09/2022    HCT 43 01/02/2022    TSH 2.173 06/07/2024    LDLCALC 54.0 (L) 06/07/2024       Recent Labs   Lab 03/29/22  0535 04/04/22 2015 04/04/22  2048 08/09/22  0829   INR 1.1 1.3 H 1.5 H 1.2       Assessment:     1. S/P placement of cardiac pacemaker    2. Paroxysmal atrial fibrillation    3. Chronic heart failure with preserved ejection fraction    4. Aortic atherosclerosis    5. Sick sinus syndrome    6. Typical atrial  flutter    7. Chronic anticoagulation        Plan:     In summary, Ms. Mcknight is a 69 y.o. female with atrial fibrillation (cryo PVI 3/29/2022, RF PVI 8/16/2022), sick sinus syndrome, PPM, anxiety, hypothyroidism here for follow up.  Ms. Mcknight is doing well from a device perspective with stable lead and device function. No arrhythmia noted. On xarelto at renal dose. No RV pacing. No evidence of CHF but she does report some side effects from crestor. She will reach out to her cardiology team. Deferred echo for now. Last echo 12/2022 showed preserved LVEF.     Continue current medication regimen and device settings.   Follow up in device clinic as scheduled.   Follow up in EP clinic in 1 year, sooner as needed.     *A copy of this note has been sent to Dr. Duncan*    Follow up in about 1 year (around 6/14/2025).    ------------------------------------------------------------------    YOUSIF Durham, NP-C  Cardiac Electrophysiology

## 2024-06-14 ENCOUNTER — OFFICE VISIT (OUTPATIENT)
Dept: ELECTROPHYSIOLOGY | Facility: CLINIC | Age: 69
End: 2024-06-14
Payer: MEDICARE

## 2024-06-14 ENCOUNTER — HOSPITAL ENCOUNTER (OUTPATIENT)
Dept: CARDIOLOGY | Facility: CLINIC | Age: 69
Discharge: HOME | End: 2024-06-14
Payer: MEDICARE

## 2024-06-14 ENCOUNTER — CLINICAL SUPPORT (OUTPATIENT)
Dept: CARDIOLOGY | Facility: HOSPITAL | Age: 69
End: 2024-06-14
Attending: INTERNAL MEDICINE
Payer: MEDICARE

## 2024-06-14 VITALS
BODY MASS INDEX: 25.45 KG/M2 | WEIGHT: 129.63 LBS | DIASTOLIC BLOOD PRESSURE: 65 MMHG | SYSTOLIC BLOOD PRESSURE: 105 MMHG | HEART RATE: 75 BPM | HEIGHT: 60 IN

## 2024-06-14 DIAGNOSIS — I48.0 PAROXYSMAL ATRIAL FIBRILLATION: ICD-10-CM

## 2024-06-14 DIAGNOSIS — I48.3 TYPICAL ATRIAL FLUTTER: ICD-10-CM

## 2024-06-14 DIAGNOSIS — Z95.0 S/P PLACEMENT OF CARDIAC PACEMAKER: Primary | Chronic | ICD-10-CM

## 2024-06-14 DIAGNOSIS — I70.0 AORTIC ATHEROSCLEROSIS: ICD-10-CM

## 2024-06-14 DIAGNOSIS — I49.5 SICK SINUS SYNDROME: Chronic | ICD-10-CM

## 2024-06-14 DIAGNOSIS — I50.32 CHRONIC HEART FAILURE WITH PRESERVED EJECTION FRACTION: ICD-10-CM

## 2024-06-14 DIAGNOSIS — Z79.01 CHRONIC ANTICOAGULATION: ICD-10-CM

## 2024-06-14 LAB
OHS QRS DURATION: 74 MS
OHS QTC CALCULATION: 442 MS

## 2024-06-14 PROCEDURE — 1160F RVW MEDS BY RX/DR IN RCRD: CPT | Mod: HCNC,CPTII,S$GLB, | Performed by: NURSE PRACTITIONER

## 2024-06-14 PROCEDURE — 93005 ELECTROCARDIOGRAM TRACING: CPT | Mod: HCNC,S$GLB,, | Performed by: INTERNAL MEDICINE

## 2024-06-14 PROCEDURE — 1126F AMNT PAIN NOTED NONE PRSNT: CPT | Mod: HCNC,CPTII,S$GLB, | Performed by: NURSE PRACTITIONER

## 2024-06-14 PROCEDURE — 99999 PR PBB SHADOW E&M-EST. PATIENT-LVL III: CPT | Mod: PBBFAC,HCNC,, | Performed by: NURSE PRACTITIONER

## 2024-06-14 PROCEDURE — 3078F DIAST BP <80 MM HG: CPT | Mod: HCNC,CPTII,S$GLB, | Performed by: NURSE PRACTITIONER

## 2024-06-14 PROCEDURE — 3288F FALL RISK ASSESSMENT DOCD: CPT | Mod: HCNC,CPTII,S$GLB, | Performed by: NURSE PRACTITIONER

## 2024-06-14 PROCEDURE — 3008F BODY MASS INDEX DOCD: CPT | Mod: HCNC,CPTII,S$GLB, | Performed by: NURSE PRACTITIONER

## 2024-06-14 PROCEDURE — 93280 PM DEVICE PROGR EVAL DUAL: CPT | Mod: HCNC

## 2024-06-14 PROCEDURE — 3074F SYST BP LT 130 MM HG: CPT | Mod: HCNC,CPTII,S$GLB, | Performed by: NURSE PRACTITIONER

## 2024-06-14 PROCEDURE — 1159F MED LIST DOCD IN RCRD: CPT | Mod: HCNC,CPTII,S$GLB, | Performed by: NURSE PRACTITIONER

## 2024-06-14 PROCEDURE — 93010 ELECTROCARDIOGRAM REPORT: CPT | Mod: HCNC,S$GLB,, | Performed by: INTERNAL MEDICINE

## 2024-06-14 PROCEDURE — 1101F PT FALLS ASSESS-DOCD LE1/YR: CPT | Mod: HCNC,CPTII,S$GLB, | Performed by: NURSE PRACTITIONER

## 2024-06-14 PROCEDURE — 99214 OFFICE O/P EST MOD 30 MIN: CPT | Mod: HCNC,S$GLB,, | Performed by: NURSE PRACTITIONER

## 2024-06-24 ENCOUNTER — TELEPHONE (OUTPATIENT)
Dept: ENDOSCOPY | Facility: HOSPITAL | Age: 69
End: 2024-06-24

## 2024-06-24 ENCOUNTER — CLINICAL SUPPORT (OUTPATIENT)
Dept: ENDOSCOPY | Facility: HOSPITAL | Age: 69
End: 2024-06-24
Payer: MEDICARE

## 2024-06-24 DIAGNOSIS — Z12.11 ENCOUNTER FOR SCREENING COLONOSCOPY: ICD-10-CM

## 2024-06-24 RX ORDER — SODIUM, POTASSIUM,MAG SULFATES 17.5-3.13G
1 SOLUTION, RECONSTITUTED, ORAL ORAL DAILY
Qty: 1 KIT | Refills: 0 | Status: SHIPPED | OUTPATIENT
Start: 2024-06-24 | End: 2024-06-28

## 2024-06-24 NOTE — TELEPHONE ENCOUNTER
Spoke to pt to schedule procedure(s) Colonoscopy       Physician to perform procedure(s)  First available  Date of Procedure (s) 9/11/24  Arrival Time 10:00 AM  Time of Procedure(s) 11:00 AM   Location of Procedure(s) [facility] [address] [facility]  Type of Rx Prep sent to patient: Suprep  Instructions provided to patient via [facility]    Patient was informed on the following information and verbalized understanding. Screening questionnaire reviewed with patient and complete. If procedure requires anesthesia, a responsible adult needs to be present to accompany the patient home, patient cannot drive after receiving anesthesia. Appointment details are tentative, especially check-in time. Patient will receive a prep-op call 7 days prior to confirm check-in time for procedure. If applicable the patient should contact their pharmacy to verify Rx for procedure prep is ready for pick-up. Patient was advised to call the scheduling department at [phone] if pharmacy states no Rx is available. Patient was advised to call the endoscopy scheduling department if any questions or concerns arise.       Endoscopy Scheduling Department      Pt is currently taking Xarelto (rivaroxaban). Message sent to Endoscopy clearance nurse per protocol to submit Xarelto (rivaroxaban)  hold.

## 2024-07-01 DIAGNOSIS — M81.0 AGE-RELATED OSTEOPOROSIS WITHOUT CURRENT PATHOLOGICAL FRACTURE: Primary | ICD-10-CM

## 2024-07-09 NOTE — PROGRESS NOTES
Subjective:      Patient ID: Trudi Mcknight is a 69 y.o. female presented to [facility] on 7/10/2024.    Chief Complaint:  No chief complaint on file.    History of Present Illness: Trudi Mcknight is a 69 y.o. female here for hypothyroidism/abnormal thyroid function    Previous patient of Dr Palmer. Last visit in Jan 2023    Other significant past medical history:  Atrial fibrillation ( Amiodarone and Xarelto)    The patient location is: at home   The chief complaint leading to consultation is: osteoporosis     Visit type: audiovisual    Face to Face time with patient: 27 minutes   35 minutes of total time spent on the encounter, which includes face to face time and non-face to face time preparing to see the patient (eg, review of tests), Obtaining and/or reviewing separately obtained history, Documenting clinical information in the electronic or other health record, Independently interpreting results (not separately reported) and communicating results to the patient/family/caregiver, or Care coordination (not separately reported).    Each patient to whom he or she provides medical services by telemedicine is:  (1) informed of the relationship between the physician and patient and the respective role of any other health care provider with respect to management of the patient; and (2) notified that he or she may decline to receive medical services by telemedicine and may withdraw from such care at any time.    With regards to osteoporosis:     Family history: none     Menopause at age hysterectomy but had ovaries and believes in her late 50's and not on HRT    current medication: none     Calcium intake- not on supplementation but has in diet   Vit D intake- 2000 IU daily        Latest Reference Range & Units 08/05/19 10:50   Vitamin D 30 - 96 ng/mL 27 (L)   (L): Data is abnormally low    Weight bearing exercise- walking for 30 minutes daily   Recent falls- denies     They have made fall precautions  in house   Dental work-denies need at this time but has had in the past     No recent fractures   Left foot fracture around 2020 -crocs and caught on floor   No significant height loss (>2 inches)    tob use - denies   etoh use - denies   Hobbies/Habits: climbing ladders      No current diarrhea or h/o malabsorption    Denies chronic exposure to steroid therapy,  + anticoagulants - xarelto   + proton pump inhibitors   Denies antiseizure medications.     + GERD and indigestion; has hiatal hernia   denies gastric bypass surgery.     Denies history of thyroid disease, anemia, kidney stones or kidney disease.     Denies active malignancy, history of malignancy the involved the bone, prior radiation treatment, or unexplained elevations of alk phos on labs     Denies recent MI or stroke     Last BMD dated  6/10/2024   COMPARISON:  No prior comparison available     FINDINGS:  Lumbar spine (L1-L4):             T-score is -3.7, and Z-score is -1.7.     Femoral neck:                          T-score is -3.3, and Z-score is -1.5.     Total hip:                                T-score is -3.4, and Z-score is -2.0.        Fracture Risk (FRAX)     20% risk of a major osteoporotic fracture in the next 10 years.     6.8% risk of hip fracture in the next 10 years.     Impression:     *Osteoporosis based on T-score below -2.5  *Fracture risk is very high due to calculated 10 year risk of hip fracture >4.5% (FRAX) and T-score below -3.0.     RECOMMENDATIONS:  *Daily calcium intake 8730-6079 mg, dietary sources preferred; Vitamin D 9470-0040 IU daily.  *Weight bearing exercise and fall precautions.  *Recommend workup for secondary causes of osteoporosis if not already done.  Consider endocrinology consult.  *Given very high fracture risk, would consider anabolic agents (including teriparatide, abaloparatide, or romosozumab), denosumab or zoledronic acid as the preferred treatment options. Oral bisphosphonates can be considered as  second-line therapy.  *Repeat BMD in 1-2 years.    With regards to hypothyroidism,  First noted on lab work:  4/9/2022    Amiodarone has been stopped 9/2022, doing well now  Did have an ablation at that time  She is now off of LT4 and her TSH is at goal     Family history thyroid disorder:  Dad, sister, brother>> hypothyrodism  She had issue with hypothyroidism during her pregnancy     Reported symptoms on initial visit.  She is feeling depressed/anxiety  Current symptoms:   No   Yes  []    [x]   Weight loss>> Since Jan  []    [x]   Fatigue  []    [x]   Constipation  [x]    []   Hair loss  []    [x]   Brittle nails  [x]    []   Mental fog  []    [x]   Cold intolerance  []    []   Memory impaired   []    []   Bradycardia    []    [x]   Recent severe illness  [x]    []   Neck swelling, pain, pressure  [x]    []   Hoarseness      Lab Results   Component Value Date    TSH 2.173 06/07/2024    TSH 3.993 04/11/2023    TSH 0.835 01/05/2023    FREET4 0.96 04/11/2023    FREET4 1.21 01/05/2023    FREET4 1.34 06/03/2022    THYROPEROXID <6.0 06/03/2022     Reviewed past surgical, medical, family, social history and updated as appropriate.  Review of Systems   Musculoskeletal:  Positive for arthralgias. Negative for back pain, gait problem and neck pain.   :   see HPI above    Objective:   There were no vitals taken for this visit.    There is no height or weight on file to calculate BMI.  Vital signs reviewed    Physical Exam  Neurological:      Mental Status: She is alert.       Lab Reviewed:   Lab Results   Component Value Date    HGBA1C 5.5 01/20/2022     Lab Results   Component Value Date    CHOL 121 06/07/2024    HDL 51 06/07/2024    LDLCALC 54.0 (L) 06/07/2024    TRIG 80 06/07/2024    CHOLHDL 42.1 06/07/2024     Lab Results   Component Value Date     06/07/2024    K 4.2 06/07/2024     06/07/2024    CO2 23 06/07/2024    GLU 89 06/07/2024    BUN 17 06/07/2024    CREATININE 0.9 06/07/2024    CALCIUM 9.4 06/07/2024     PROT 6.6 06/07/2024    ALBUMIN 3.4 (L) 06/07/2024    BILITOT 0.5 06/07/2024    ALKPHOS 87 06/07/2024    AST 20 06/07/2024    ALT 15 06/07/2024    ANIONGAP 9 06/07/2024    ESTGFRAFRICA >60.0 04/09/2022    EGFRNONAA >60.0 04/09/2022    TSH 2.173 06/07/2024     Lab Results   Component Value Date    PCPJGBTN21DW 27 (L) 08/05/2019    CALCIUM 9.4 06/07/2024    CALCIUM 9.5 02/19/2024    CALCIUM 9.8 11/15/2023    PHOS 2.7 04/09/2022    PHOS 5.2 (H) 04/07/2022    PHOS 3.4 04/06/2022    ALKPHOS 87 06/07/2024    ALKPHOS 97 02/19/2024    ALKPHOS 97 11/15/2023    TSH 2.173 06/07/2024     Assessment     1. Amiodarone-induced thyroiditis        2. Age-related osteoporosis without current pathological fracture  Ambulatory referral/consult to Endocrinology    Calcium, Timed Urine    Creatinine Clearance, Timed    Vitamin D    Tissue Transglutaminase, IgA    Renal Function Panel    PTH, Intact    Protein Electrophoresis, Serum          Plan     Age-related osteoporosis without current pathological fracture  -- Risks include low weight,  race, menopause, smoking, ETOH,  +FH, glucocorticoid use, hyperthyroidism, hyperparathyroidism, PPI therapy, chronic malabsortpion  -- Reviewed basics of quantity versus quality  -- Reassuring she is not fracturing   -- Plan work up of accelerated bone loss to include 24 urine calcium, TSH, PTH, vit D, CMP   -- RDA of calcium (2150-7936 mg /day in divided doses) and vitamin D 2000iu a day  discussed  -- Calcium from food sources preferred. Given list of calcium in foods.  -- Fall precautions emphasized  -- +weight bearing exercise  -- opentabsF.org website information given  -- Pharmacological therapy is recommended for patients with osteopenia if the 10 year probability of a hip fracture is >3% and 10 year probability of other major osteoporotic fractures is >20%.   -- Treatment options and potential side effects discussed for PO bisphosphonates, reclast, Denosumab, and Teriparatide.  -- Low  risk for ONJ and atypical fractures reviewed and discussed. Alerted that if dental work needs to be done it should be done prior to initiating therapy   -- Discussed optimal duration of bisphosphonate use is unknown - drug holiday after 5-10 po versus drug hold ay after 3 reclast treatment   -- Repeat DEXA scan in 2 yrs time.     Check secondary causes of osteoporosis including a 24 hour urine  Avoid oral medications due to history of her GI   Discussed considering her severe osteoporosis, we can anabolic. Will wait for labs and consider treatment options.       Amiodarone-induced thyroiditis  Resolved  Off of LT4  Monitor TSH for symptoms and annually     Follow up in about 6 months (around 1/10/2025).     Visit today included increased complexity associated with the care of episodic problems osteoporosis, amiodarone induced thyroiditis addressed and managing longitudinal care of the patient due to serious managed problems osteoporosis, amiodarone induced thyroiditis    She agrees to look at AVS

## 2024-07-10 ENCOUNTER — OFFICE VISIT (OUTPATIENT)
Dept: ENDOCRINOLOGY | Facility: CLINIC | Age: 69
End: 2024-07-10
Payer: MEDICARE

## 2024-07-10 DIAGNOSIS — M81.0 AGE-RELATED OSTEOPOROSIS WITHOUT CURRENT PATHOLOGICAL FRACTURE: ICD-10-CM

## 2024-07-10 DIAGNOSIS — E06.4 AMIODARONE-INDUCED THYROIDITIS: Primary | ICD-10-CM

## 2024-07-10 DIAGNOSIS — T46.2X5A AMIODARONE-INDUCED THYROIDITIS: Primary | ICD-10-CM

## 2024-07-10 NOTE — ASSESSMENT & PLAN NOTE
-- Risks include low weight,  race, menopause, smoking, ETOH,  +FH, glucocorticoid use, hyperthyroidism, hyperparathyroidism, PPI therapy, chronic malabsortpion  -- Reviewed basics of quantity versus quality  -- Reassuring she is not fracturing   -- Plan work up of accelerated bone loss to include 24 urine calcium, TSH, PTH, vit D, CMP   -- RDA of calcium (7300-1698 mg /day in divided doses) and vitamin D 2000iu a day  discussed  -- Calcium from food sources preferred. Given list of calcium in foods.  -- Fall precautions emphasized  -- +weight bearing exercise  -- NOF.org website information given  -- Pharmacological therapy is recommended for patients with osteopenia if the 10 year probability of a hip fracture is >3% and 10 year probability of other major osteoporotic fractures is >20%.   -- Treatment options and potential side effects discussed for PO bisphosphonates, reclast, Denosumab, and Teriparatide.  -- Low risk for ONJ and atypical fractures reviewed and discussed. Alerted that if dental work needs to be done it should be done prior to initiating therapy   -- Discussed optimal duration of bisphosphonate use is unknown - drug holiday after 5-10 po versus drug hold ay after 3 reclast treatment   -- Repeat DEXA scan in 2 yrs time.     Check secondary causes of osteoporosis including a 24 hour urine  Avoid oral medications due to history of her GI   Discussed considering her severe osteoporosis, we can anabolic. Will wait for labs and consider treatment options.

## 2024-07-10 NOTE — PATIENT INSTRUCTIONS
Abnormal Thyroid Tests when previously on amiodarone   No longer on Levothyroxine    TSH at goal    Monitor TSH for symptoms and at least annually     Osteoporosis     Check secondary causes of osteoporosis including a 24 hour urine  Avoid oral medications due to history of her GI   Discussed considering her severe osteoporosis, we can anabolic. Will wait for labs and consider treatment options.       HOW TO COLLECT AN ACCURATE   24 HOUR URINE SPECIMEN     I want you to collect EVERY drop of your urine during a 24 hour period. I do not care what the volume of the urine is as long as it represents every drop that you pass during that 24 hour period. If you need to have a bowel movement, you may separate the urine but I want every drop.     Begin the collection on a morning which is convenient for you. At that time, pass your urine, flush it down the toilet and note the exact time. Now you have an empty bladder and empty bottle. That starts the collection. Collect every drop all during the day and night until exactly the same time the next morning, when you should pass your urine and add it to the bottle.     Bring the closed container back to the same laboratory that issued the empty container.     FINDINGS:  Lumbar spine (L1-L4):             T-score is -3.7, and Z-score is -1.7.     Femoral neck:                          T-score is -3.3, and Z-score is -1.5.     Total hip:                                T-score is -3.4, and Z-score is -2.0.        Fracture Risk (FRAX)     20% risk of a major osteoporotic fracture in the next 10 years.     6.8% risk of hip fracture in the next 10 years.     Impression:     *Osteoporosis based on T-score below -2.5  *Fracture risk is very high due to calculated 10 year risk of hip fracture >4.5% (FRAX) and T-score below -3.0.     RECOMMENDATIONS:  *Daily calcium intake 7552-7974 mg, dietary sources preferred; Vitamin D 0697-1473 IU daily.  *Weight bearing exercise and fall  precautions.  *Recommend workup for secondary causes of osteoporosis if not already done.  Consider endocrinology consult.  *Given very high fracture risk, would consider anabolic agents (including teriparatide, abaloparatide, or romosozumab), denosumab or zoledronic acid as the preferred treatment options. Oral bisphosphonates can be considered as second-line therapy.  *Repeat BMD in 1-2 years.    Tscore  -1.0 osteopenia  -2.5 osteoporosis  -3.0 is severe osteoporosis     FRAX score   >20% major bone in body   >3% hip    Recommended daily allowance of calcium is 8930-7277 mg daily, preferably from food. See below  Recommended daily allowance of vitamin D is 7250-0873 IU daily    Encouraged weight bearing exercise  Encouraged to avoid falls  Avoid alcohol and tobacco    Estimated Calcium Content of Foods:  Produce  Serving Size Estimated Calcium*    [place] greens, frozen 8 oz 360 mg   Broccoli trav 8 oz 200 mg   Kale, frozen 8 oz 180 mg   Soy Beans, green, boiled 8 oz 175 mg   Bok Rinku, cooked, boiled 8 oz 160 mg   Figs, dried 2 figs 65 mg   Broccoli, fresh, cooked 8 oz 60 mg   Oranges 1 whole 55 mg   Seafood Serving Size Estimated Calcium*    Sardines, canned with bones 3 oz 325 mg   Jarreau, canned with bones 3 oz 180 mg   Shrimp, canned 3 oz 125 mg   Dairy Serving Size Estimated Calcium*    Ricotta, part-skim 4 oz 335 mg   Yogurt, plain, low-fat 6 oz 310 mg   Milk, skim, low-fat, whole 8 oz 300 mg   Yogurt with fruit, low-fat 6 oz 260 mg   Mozzarella, part-skim 1 oz 210 mg   Cheddar 1 oz 205 mg   Yogurt, Greek 6 oz 200 mg   American Cheese 1 oz 195 mg   Feta Cheese 4 oz 140 mg   Cottage Cheese, 2% 4 oz 105 mg   Frozen yogurt, vanilla 8 oz 105 mg   Ice Cream, vanilla 8 oz 85 mg   Parmesan 1 tbsp 55 mg   Fortified Food Serving Size Estimated Calcium*   [place] milk, rice milk or soy milk, fortified 8 oz 300 mg   Orange juice and other fruit juices, fortified 8 oz 300 mg   Tofu, prepared with calcium 4 oz 205 mg    Waffle, frozen, fortified 2 pieces 200 mg   Oatmeal, fortified 1 packet 140 mg   English muffin, fortified 1 muffin 100 mg   Cereal, fortified 8 oz 100-1,000 mg   Other Serving Size Estimated Calcium*   Mac & cheese, frozen 1 package 325 mg   Pizza, cheese, frozen 1 serving 115 mg   Pudding, chocolate, prepared with 2% milk 4 oz 160 mg   Beans, baked, canned 4 oz 160 mg   *The calcium content listed for most foods is estimated and can vary due to multiple factors. Check the food label to determine how much calcium is in a particular product.  If you read the nutrition label for a food source, it lists the % calcium in that food.  For an 8 oz glass of milk, for example, the label states calcium 30%.  This is equivalent to 300 mg of calcium (multiply the listed number by 10).   **Table from the National Osteoporosis Foundation      Osteoporosis medications  These are the medications we discussed for osteoporosis treatment:    - Bisphosphonates:         - these slow down bone loss         - oral forms (Fosamax and Actonel are examples) - taken once weekly or once monthly         - IV form (Reclast) - given intravenously once yearly    - Prolia (denosumab):          - slows down bone loss           - it is a subcutaneous injection (under the skin) every 6 months, given in the office    Side effects of Prolia reviewed, including risk of hypocalcemia, eczema/skin complications and possible increased risk of infections.  Also reviewed association with ONJ and rare atypical femur fractures.  Advise to see the dentist regularly, notify dentist of using this medication and avoid non-emergent dental implants and extractions.  Also advise to contact us if develops new, persistent thigh or groin pain.    Discussed ongoing concern and accumulating data describing rebound-associated vertebral fractures (RAVFs) after denosumab discontinuation.  We now know that discontinuation of denosumab is associated with up to 50% reduction  "in BMD that is only partially blocked by Reclast.  Current recommendations are to either continue indefinitely or to switch to oral bisphosphonate for at least a year.    ANABOLICS below -     - Forteo (teriparatide) or Tymlos (abaloparatide):           - medications that "build bone" or increases bone formation           - subcutaneous injection (under the skin) taken once daily that you self-administer at home for 18-24 months    -Evenity (romosozumab)   -medications that "build bone" or increases bone formation   - subcutaneous injection (under the skin) taken once monthly for one year    Evenity (210mg) once a month subcutaneous injection used for one year. Blocks the effects of sclerostin, a protein that prevents bone production and causes bone breakdown. Dual action of bone building and antiresorptive.  Trials did show there was an increased risk of cardiovascular disease (CI in patients with history of MI or stroke).  Increased risk is 50% from baseline and 1.5% relative risk.  Rare, but a possibility.   Most common side effects are joint pain and headaches.  Use it for one year followed by antiresorptive - Prolia or bisphosphonate. Use prolia.     Receive injection in infusion center.     For more information on medications: https://www.nof.org/patients/treatment/medicationadherence/    "

## 2024-07-12 ENCOUNTER — CLINICAL SUPPORT (OUTPATIENT)
Dept: CARDIOLOGY | Facility: HOSPITAL | Age: 69
End: 2024-07-12
Payer: MEDICARE

## 2024-07-12 ENCOUNTER — CLINICAL SUPPORT (OUTPATIENT)
Dept: CARDIOLOGY | Facility: HOSPITAL | Age: 69
End: 2024-07-12
Attending: INTERNAL MEDICINE
Payer: MEDICARE

## 2024-07-12 DIAGNOSIS — Z95.0 PRESENCE OF CARDIAC PACEMAKER: ICD-10-CM

## 2024-07-12 DIAGNOSIS — I49.5 SICK SINUS SYNDROME: ICD-10-CM

## 2024-07-12 DIAGNOSIS — I48.0 PAROXYSMAL ATRIAL FIBRILLATION: ICD-10-CM

## 2024-07-12 PROCEDURE — 93296 REM INTERROG EVL PM/IDS: CPT | Mod: HCNC | Performed by: INTERNAL MEDICINE

## 2024-07-12 PROCEDURE — 93294 REM INTERROG EVL PM/LDLS PM: CPT | Mod: HCNC,,, | Performed by: INTERNAL MEDICINE

## 2024-07-19 ENCOUNTER — TELEPHONE (OUTPATIENT)
Dept: ENDOSCOPY | Facility: HOSPITAL | Age: 69
End: 2024-07-19
Payer: MEDICARE

## 2024-07-19 NOTE — TELEPHONE ENCOUNTER
----- Message from Carolina Rapp sent at 2024 10:54 AM CDT -----  Regardin/11 BT  The patient is currently under an internal cardiologist Renuka Toth NP (Ernesto Duncan MD) care and requires a blood thinner Xarelto (rivaroxaban) for their upcoming scheduled Colonoscopy on 24.     Looks like pt primarily sees KAROL Toth wasn't sure if it had to be sent to an MD.

## 2024-07-26 ENCOUNTER — TELEPHONE (OUTPATIENT)
Dept: ENDOCRINOLOGY | Facility: CLINIC | Age: 69
End: 2024-07-26
Payer: MEDICARE

## 2024-07-26 NOTE — TELEPHONE ENCOUNTER
Called pt to discuss what she needs. Pt stated she got the problem figured out by going to the correct lab.

## 2024-07-26 NOTE — TELEPHONE ENCOUNTER
----- Message from Karlee Llanos sent at 7/26/2024  9:28 AM CDT -----  Regarding: Nurse from [facility] called regarding Py and what she needed in order to perform labs Pt wld like to speak  Contact: [phone]  Name of Who is Calling:RAZ CROW [[id number]]        What is the request in detail:Nurse from [facility] called regarding Py and what she needed in order to perform labs Pt wld like to speak with someone regarding this matter. Please advise         Can the clinic reply by [facility]:No        What Number to Call Back if not in [facility]: Telephone Information:  Mobile          [phone]   Kiel Morrison(Resident)

## 2024-07-30 ENCOUNTER — LAB VISIT (OUTPATIENT)
Dept: LAB | Facility: HOSPITAL | Age: 69
End: 2024-07-30
Attending: NURSE PRACTITIONER
Payer: MEDICARE

## 2024-07-30 DIAGNOSIS — M81.0 AGE-RELATED OSTEOPOROSIS WITHOUT CURRENT PATHOLOGICAL FRACTURE: ICD-10-CM

## 2024-07-30 LAB
25(OH)D3+25(OH)D2 SERPL-MCNC: 37 NG/ML (ref 30–96)
ALBUMIN SERPL BCP-MCNC: 3.7 G/DL (ref 3.5–5.2)
ANION GAP SERPL CALC-SCNC: 11 MMOL/L (ref 8–16)
BUN SERPL-MCNC: 14 MG/DL (ref 8–23)
CALCIUM SERPL-MCNC: 9.7 MG/DL (ref 8.7–10.5)
CHLORIDE SERPL-SCNC: 107 MMOL/L (ref 95–110)
CO2 SERPL-SCNC: 23 MMOL/L (ref 23–29)
CREAT SERPL-MCNC: 1.1 MG/DL (ref 0.5–1.4)
EST. GFR  (NO RACE VARIABLE): 54.4 ML/MIN/1.73 M^2
GLUCOSE SERPL-MCNC: 89 MG/DL (ref 70–110)
PHOSPHATE SERPL-MCNC: 3.1 MG/DL (ref 2.7–4.5)
POTASSIUM SERPL-SCNC: 4.2 MMOL/L (ref 3.5–5.1)
PTH-INTACT SERPL-MCNC: 43.4 PG/ML (ref 9–77)
SODIUM SERPL-SCNC: 141 MMOL/L (ref 136–145)

## 2024-07-30 PROCEDURE — 83970 ASSAY OF PARATHORMONE: CPT | Mod: HCNC | Performed by: NURSE PRACTITIONER

## 2024-07-30 PROCEDURE — 80069 RENAL FUNCTION PANEL: CPT | Mod: HCNC | Performed by: NURSE PRACTITIONER

## 2024-07-30 PROCEDURE — 82306 VITAMIN D 25 HYDROXY: CPT | Mod: HCNC | Performed by: NURSE PRACTITIONER

## 2024-07-30 PROCEDURE — 84165 PROTEIN E-PHORESIS SERUM: CPT | Mod: 26,HCNC,, | Performed by: PATHOLOGY

## 2024-07-30 PROCEDURE — 84165 PROTEIN E-PHORESIS SERUM: CPT | Mod: HCNC | Performed by: NURSE PRACTITIONER

## 2024-07-30 PROCEDURE — 86364 TISS TRNSGLTMNASE EA IG CLAS: CPT | Mod: HCNC | Performed by: NURSE PRACTITIONER

## 2024-07-31 LAB
ALBUMIN SERPL ELPH-MCNC: 3.84 G/DL (ref 3.35–5.55)
ALPHA1 GLOB SERPL ELPH-MCNC: 0.3 G/DL (ref 0.17–0.41)
ALPHA2 GLOB SERPL ELPH-MCNC: 0.73 G/DL (ref 0.43–0.99)
B-GLOBULIN SERPL ELPH-MCNC: 0.91 G/DL (ref 0.5–1.1)
GAMMA GLOB SERPL ELPH-MCNC: 1.12 G/DL (ref 0.67–1.58)
PATHOLOGIST INTERPRETATION SPE: NORMAL
PROT SERPL-MCNC: 6.9 G/DL (ref 6–8.4)

## 2024-08-01 ENCOUNTER — PATIENT MESSAGE (OUTPATIENT)
Dept: ENDOCRINOLOGY | Facility: CLINIC | Age: 69
End: 2024-08-01
Payer: MEDICARE

## 2024-08-02 DIAGNOSIS — M81.0 AGE-RELATED OSTEOPOROSIS WITHOUT CURRENT PATHOLOGICAL FRACTURE: Primary | ICD-10-CM

## 2024-08-02 LAB — TTG IGA SER-ACNC: 1.5 U/ML

## 2024-08-02 RX ORDER — ABALOPARATIDE 2000 UG/ML
80 INJECTION, SOLUTION SUBCUTANEOUS DAILY
Qty: 1 EACH | Refills: 3 | OUTPATIENT
Start: 2024-08-02 | End: 2025-08-02

## 2024-08-06 ENCOUNTER — OFFICE VISIT (OUTPATIENT)
Dept: PSYCHIATRY | Facility: CLINIC | Age: 69
End: 2024-08-06
Payer: MEDICARE

## 2024-08-06 DIAGNOSIS — F33.41 MDD (MAJOR DEPRESSIVE DISORDER), RECURRENT, IN PARTIAL REMISSION: ICD-10-CM

## 2024-08-06 DIAGNOSIS — F41.0 GENERALIZED ANXIETY DISORDER WITH PANIC ATTACKS: ICD-10-CM

## 2024-08-06 DIAGNOSIS — F41.1 GENERALIZED ANXIETY DISORDER WITH PANIC ATTACKS: ICD-10-CM

## 2024-08-06 PROCEDURE — 99214 OFFICE O/P EST MOD 30 MIN: CPT | Mod: HCNC,95,, | Performed by: PHYSICIAN ASSISTANT

## 2024-08-06 PROCEDURE — 1160F RVW MEDS BY RX/DR IN RCRD: CPT | Mod: HCNC,CPTII,95, | Performed by: PHYSICIAN ASSISTANT

## 2024-08-06 PROCEDURE — 1159F MED LIST DOCD IN RCRD: CPT | Mod: HCNC,CPTII,95, | Performed by: PHYSICIAN ASSISTANT

## 2024-08-06 RX ORDER — VENLAFAXINE HYDROCHLORIDE 37.5 MG/1
37.5 CAPSULE, EXTENDED RELEASE ORAL DAILY
Qty: 90 CAPSULE | Refills: 1 | Status: SHIPPED | OUTPATIENT
Start: 2024-08-06

## 2024-08-09 LAB
OHS CV AF BURDEN PERCENT: < 1
OHS CV DC REMOTE DEVICE TYPE: NORMAL
OHS CV RV PACING PERCENT: 1 %

## 2024-08-12 ENCOUNTER — TELEPHONE (OUTPATIENT)
Dept: ENDOSCOPY | Facility: HOSPITAL | Age: 69
End: 2024-08-12
Payer: MEDICARE

## 2024-08-12 NOTE — TELEPHONE ENCOUNTER
Dear AIMEE Toth NP,    Patient has a scheduled procedure Colonoscopy on 9/11/24 and is currently taking a blood thinner prescribed by your office. In order to ensure patient safety, we would like to confirm that the patient can place their blood thinner medication on hold for the procedure. Can he/she discontinue Xarelto (rivaroxaban) for a minimum of 2 days prior to the procedure?     Thank you for your prompt reply.    [facility] Endoscopy Scheduling

## 2024-08-12 NOTE — TELEPHONE ENCOUNTER
Per current guideline recommendations and given that her creatinine clearance is 47ml/min,2 patient can be cleared to hold Xarelto for 2 days prior to procedure and may resume at MD discretion (per Dr Duncan).  Thanks

## 2024-08-14 ENCOUNTER — PATIENT MESSAGE (OUTPATIENT)
Dept: ENDOCRINOLOGY | Facility: CLINIC | Age: 69
End: 2024-08-14
Payer: MEDICARE

## 2024-08-20 ENCOUNTER — PATIENT MESSAGE (OUTPATIENT)
Dept: ADMINISTRATIVE | Facility: HOSPITAL | Age: 69
End: 2024-08-20
Payer: MEDICARE

## 2024-08-20 DIAGNOSIS — L71.9 ROSACEA: ICD-10-CM

## 2024-08-23 NOTE — TELEPHONE ENCOUNTER
Please see the attached refill request.    Last seen 03/2024    Assessment / Plan:         Rosacea  -     tretinoin (RETIN-A) 0.025 % cream; Compound tretinoin 0.025% / azelaic acid 8% / niacinamide 2% cream. Apply a pea-sized amount to entire face qhs then moisturize  Dispense: 30 g; Refill: 5  PM:  Wash with sulfacleanser or mild cleanser  Thin film of azaleic acid/tretinoin/niacinamide all over every other to every night   Moisturize with cerave pm or vanicream daily moisturizer to minimize irritation     AM:  Wash with mild cleanser  2. Moisturizer with spf 30 +      A tint is recommended too- such as makeup, tinted sunscreen, BB/CC creams. The iron oxide in the tint of products protects against agents other than UV light that damage our skin such as blue light from screens, infrared heat, visible light, and other other environmental pollutants.  These other factors can contribute significantly to irregular pigment, especially in skin of color and tint helps protect from these factors.      Seborrheic keratoses  These are benign inherited growths without a malignant potential. Reassurance given to patient. No treatment is necessary.               Follow up in about 1 year (around 3/14/2025) for Medication Management.

## 2024-08-26 DIAGNOSIS — L71.9 ROSACEA: ICD-10-CM

## 2024-08-26 RX ORDER — TRETINOIN 0.25 MG/G
CREAM TOPICAL
Qty: 30 G | Refills: 5 | Status: SHIPPED | OUTPATIENT
Start: 2024-08-26

## 2024-09-06 ENCOUNTER — TELEPHONE (OUTPATIENT)
Dept: ENDOSCOPY | Facility: HOSPITAL | Age: 69
End: 2024-09-06
Payer: MEDICARE

## 2024-09-06 ENCOUNTER — PATIENT MESSAGE (OUTPATIENT)
Dept: ENDOSCOPY | Facility: HOSPITAL | Age: 69
End: 2024-09-06
Payer: MEDICARE

## 2024-09-06 NOTE — TELEPHONE ENCOUNTER
Spoke to patient for pre-call to confirm scheduled Colonoscopy and patient verbalized understanding of the following:       Date of Procedure (s)  verified 9/11/24  Arrival Time 10:00 AM verified.  Location of Procedure(s) [place] 4th Floor verified.  NPO status reinforced. Ok to continue clear liquids up until 4 hours prior to the Endoscopy procedure.  Pt is taking  Xarelto (rivaroxaban) , Pt instructed to stop taking Xarelto (rivaroxaban)  on:  9/9/24, per endoscopy protocol.  Approval to hold received from Dr. Navneet SOL.   Pt confirmed receipt of prep instructions and Rx prep (if applicable).  Instructions provided to patient via [facility]  Pt confirmed ride home after procedure if procedure requires anesthesia.   Pre-call screening questionnaire reviewed and completed with patient.   Appointment details are tentative, including check-in time.  If the patient begins taking any blood thinning medications, injectable weight loss/diabetes medications (other than insulin), or Adipex (phentermine) patient was instructed to contact the endoscopy scheduling department as soon as possible.  Patient was advised to call the endoscopy scheduling department if any questions or concerns arise.       [facility]oscopy Scheduling Department

## 2024-09-10 ENCOUNTER — TELEPHONE (OUTPATIENT)
Dept: ENDOSCOPY | Facility: HOSPITAL | Age: 69
End: 2024-09-10
Payer: MEDICARE

## 2024-09-10 NOTE — TELEPHONE ENCOUNTER
Spoke to patient to reschedule procedure(s) Colonoscopy       Physician to perform procedure(s) Dr. HOMERO Faust  Date of Procedure (s) 9/13/24  Arrival Time 11:30 AM  Time of Procedure(s) 12:30 PM   Location of Procedure(s) [place] 4th Floor  Type of Rx Prep sent to patient: Suprep  Instructions provided to patient via [facility]    Patient was informed on the following information and verbalized understanding. Screening questionnaire reviewed with patient and complete. If procedure requires anesthesia, a responsible adult needs to be present to accompany the patient home, patient cannot drive after receiving anesthesia. Appointment details are tentative, especially check-in time. Patient will receive a prep-op call 7 days prior to confirm check-in time for procedure. If applicable the patient should contact their pharmacy to verify Rx for procedure prep is ready for pick-up. Patient was advised to call the scheduling department at [phone] if pharmacy states no Rx is available. Patient was advised to call the endoscopy scheduling department if any questions or concerns arise.      [facility]oscopy Scheduling Department

## 2024-09-12 ENCOUNTER — TELEPHONE (OUTPATIENT)
Dept: ENDOSCOPY | Facility: HOSPITAL | Age: 69
End: 2024-09-12
Payer: MEDICARE

## 2024-09-12 NOTE — TELEPHONE ENCOUNTER
Spoke to patient for pre-call to confirm scheduled Colonoscopy and patient verbalized understanding of the following:       Date of Procedure (s)  verified 9/13/24  Arrival Time 7:40 AM verified.  Location of Procedure(s) [place] 4th Floor verified.  NPO status reinforced. Ok to continue clear liquids up until 4 hours prior to the Endoscopy procedure.     Pt is taking  Xarelto (rivaroxaban) , Pt instructed to stop taking Xarelto (rivaroxaban)  on:  9/11/24, per endoscopy protocol.  Approval to hold received from Dr. Duncan.   Pt confirmed receipt of prep instructions and Rx prep (if applicable).  Instructions provided to patient via [facility]  Pt confirmed ride home after procedure if procedure requires anesthesia.   Pre-call screening questionnaire reviewed and completed with patient.   Appointment details are tentative, including check-in time.  If the patient begins taking any blood thinning medications, injectable weight loss/diabetes medications (other than insulin), or Adipex (phentermine) patient was instructed to contact the endoscopy scheduling department as soon as possible.  Patient was advised to call the endoscopy scheduling department if any questions or concerns arise.       [facility]oscopy Scheduling Department

## 2024-09-13 ENCOUNTER — HOSPITAL ENCOUNTER (OUTPATIENT)
Facility: HOSPITAL | Age: 69
Discharge: HOME | End: 2024-09-13
Attending: SURGERY | Admitting: SURGERY
Payer: MEDICARE

## 2024-09-13 ENCOUNTER — ANESTHESIA EVENT (OUTPATIENT)
Dept: ENDOSCOPY | Facility: HOSPITAL | Age: 69
End: 2024-09-13
Payer: MEDICARE

## 2024-09-13 ENCOUNTER — ANESTHESIA (OUTPATIENT)
Dept: ENDOSCOPY | Facility: HOSPITAL | Age: 69
End: 2024-09-13
Payer: MEDICARE

## 2024-09-13 VITALS
HEIGHT: 61 IN | RESPIRATION RATE: 19 BRPM | SYSTOLIC BLOOD PRESSURE: 117 MMHG | OXYGEN SATURATION: 97 % | HEART RATE: 78 BPM | TEMPERATURE: 98 F | WEIGHT: 130 LBS | DIASTOLIC BLOOD PRESSURE: 73 MMHG | BODY MASS INDEX: 24.55 KG/M2

## 2024-09-13 DIAGNOSIS — Z12.11 COLON CANCER SCREENING: Primary | ICD-10-CM

## 2024-09-13 PROCEDURE — 88305 TISSUE EXAM BY PATHOLOGIST: CPT | Mod: 26,HCNC,, | Performed by: STUDENT IN AN ORGANIZED HEALTH CARE EDUCATION/TRAINING PROGRAM

## 2024-09-13 PROCEDURE — 45380 COLONOSCOPY AND BIOPSY: CPT | Mod: PT,59,HCNC, | Performed by: SURGERY

## 2024-09-13 PROCEDURE — 63600175 PHARM REV CODE 636 W HCPCS: Mod: HCNC | Performed by: NURSE ANESTHETIST, CERTIFIED REGISTERED

## 2024-09-13 PROCEDURE — 37000008 HC ANESTHESIA 1ST 15 MINUTES: Mod: HCNC | Performed by: SURGERY

## 2024-09-13 PROCEDURE — 25000003 PHARM REV CODE 250: Mod: HCNC | Performed by: SURGERY

## 2024-09-13 PROCEDURE — 45385 COLONOSCOPY W/LESION REMOVAL: CPT | Mod: PT,HCNC,, | Performed by: SURGERY

## 2024-09-13 PROCEDURE — 27201089 HC SNARE, DISP (ANY): Mod: HCNC | Performed by: SURGERY

## 2024-09-13 PROCEDURE — 45380 COLONOSCOPY AND BIOPSY: CPT | Mod: PT,59,HCNC | Performed by: SURGERY

## 2024-09-13 PROCEDURE — 37000009 HC ANESTHESIA EA ADD 15 MINS: Mod: HCNC | Performed by: SURGERY

## 2024-09-13 PROCEDURE — 45385 COLONOSCOPY W/LESION REMOVAL: CPT | Mod: PT,HCNC | Performed by: SURGERY

## 2024-09-13 PROCEDURE — 27201012 HC FORCEPS, HOT/COLD, DISP: Mod: HCNC | Performed by: SURGERY

## 2024-09-13 PROCEDURE — 25000003 PHARM REV CODE 250: Mod: HCNC | Performed by: NURSE ANESTHETIST, CERTIFIED REGISTERED

## 2024-09-13 PROCEDURE — 88305 TISSUE EXAM BY PATHOLOGIST: CPT | Mod: HCNC | Performed by: STUDENT IN AN ORGANIZED HEALTH CARE EDUCATION/TRAINING PROGRAM

## 2024-09-13 RX ORDER — SODIUM CHLORIDE 9 MG/ML
INJECTION, SOLUTION INTRAVENOUS CONTINUOUS
Status: DISCONTINUED | OUTPATIENT
Start: 2024-09-13 | End: 2024-09-13 | Stop reason: HOSPADM

## 2024-09-13 RX ORDER — PROPOFOL 10 MG/ML
VIAL (ML) INTRAVENOUS CONTINUOUS PRN
Status: DISCONTINUED | OUTPATIENT
Start: 2024-09-13 | End: 2024-09-13

## 2024-09-13 RX ORDER — PROPOFOL 10 MG/ML
VIAL (ML) INTRAVENOUS
Status: DISCONTINUED | OUTPATIENT
Start: 2024-09-13 | End: 2024-09-13

## 2024-09-13 RX ORDER — ONDANSETRON HYDROCHLORIDE 2 MG/ML
INJECTION, SOLUTION INTRAVENOUS
Status: DISCONTINUED | OUTPATIENT
Start: 2024-09-13 | End: 2024-09-13

## 2024-09-13 RX ORDER — LIDOCAINE HYDROCHLORIDE 20 MG/ML
INJECTION INTRAVENOUS
Status: DISCONTINUED | OUTPATIENT
Start: 2024-09-13 | End: 2024-09-13

## 2024-09-13 RX ADMIN — PROPOFOL 100 MG: 10 INJECTION, EMULSION INTRAVENOUS at 08:09

## 2024-09-13 RX ADMIN — ONDANSETRON 4 MG: 2 INJECTION INTRAMUSCULAR; INTRAVENOUS at 09:09

## 2024-09-13 RX ADMIN — LIDOCAINE HYDROCHLORIDE 100 MG: 20 INJECTION INTRAVENOUS at 08:09

## 2024-09-13 RX ADMIN — SODIUM CHLORIDE: 9 INJECTION, SOLUTION INTRAVENOUS at 08:09

## 2024-09-13 RX ADMIN — PROPOFOL 150 MCG/KG/MIN: 10 INJECTION, EMULSION INTRAVENOUS at 08:09

## 2024-09-13 NOTE — ANESTHESIA POSTPROCEDURE EVALUATION
Anesthesia Post Evaluation    Patient: Trudi Mcknight    Procedure(s) Performed: Procedure(s) (LRB):  COLONOSCOPY (N/A)    Final Anesthesia Type: general      Patient location during evaluation: PACU  Patient participation: Yes- Able to Participate  Level of consciousness: awake and alert  Post-procedure vital signs: reviewed and stable  Pain management: adequate  Airway patency: patent    PONV status at discharge: No PONV  Anesthetic complications: no      Cardiovascular status: blood pressure returned to baseline  Respiratory status: spontaneous ventilation and room air  Hydration status: euvolemic  Follow-up not needed.              Vitals Value Taken Time   /73 09/13/24 0950   Temp 36.5 °C (97.7 °F) 09/13/24 0920   Pulse 78 09/13/24 0950   Resp 19 09/13/24 0950   SpO2 97 % 09/13/24 0950         Event Time   Out of Recovery 09:59:14         Pain/Leanna Score: Leanna Score: 10 (9/13/2024  9:50 AM)

## 2024-09-13 NOTE — H&P
Colonoscopy History and Physical      Procedure : Colonoscopy    Indications:  asymptomatic screening exam and personal history of colon polyps      Past Medical History:   Diagnosis Date    Anticoagulant long-term use     Esophagitis     Hiatal hernia     Meniere's disease     Paroxysmal atrial fibrillation 03/07/2021    Shingles     Sick sinus syndrome 01/21/2022    s/p Dual chamber pacemaker    Thyroid disease        Family History   Problem Relation Name Age of Onset    Heart disease Mother  55        bypass at 55    Breast cancer Mother      Hypertension Mother      Hyperlipidemia Mother      Heart disease Father      Hypertension Father      Skin cancer Father      Diverticulitis Sister      Heart disease Brother      Diverticulitis Brother      Breast cancer Paternal Grandmother      Colon cancer Neg Hx      Melanoma Neg Hx      Amblyopia Neg Hx      Blindness Neg Hx      Cataracts Neg Hx      Glaucoma Neg Hx      Macular degeneration Neg Hx      Strabismus Neg Hx      Retinal detachment Neg Hx         Social History     Socioeconomic History    Marital status:    Tobacco Use    Smoking status: Never    Smokeless tobacco: Never   Substance and Sexual Activity    Alcohol use: No     Alcohol/week: 0.0 standard drinks of alcohol     Comment: rarely    Drug use: No    Sexual activity: Not Currently     Social Determinants of Health     Financial Resource Strain: Low Risk  (12/12/2023)    Overall Financial Resource Strain (CARDIA)     Difficulty of Paying Living Expenses: Not hard at all   Food Insecurity: No Food Insecurity (12/12/2023)    Hunger Vital Sign     Worried About Running Out of Food in the Last Year: Never true     Ran Out of Food in the Last Year: Never true   Transportation Needs: No Transportation Needs (12/12/2023)    PRAPARE - Transportation     Lack of Transportation (Medical): No     Lack of Transportation (Non-Medical): No   Physical  Activity: Insufficiently Active (12/12/2023)    Exercise Vital Sign     Days of Exercise per Week: 5 days     Minutes of Exercise per Session: 20 min   Stress: No Stress Concern Present (12/12/2023)    [employer] - Occupational Stress Questionnaire     Feeling of Stress : Not at all   Housing Stability: Low Risk  (12/12/2023)    Housing Stability Vital Sign     Unable to Pay for Housing in the Last Year: No     Number of Places Lived in the Last Year: 1     Unstable Housing in the Last Year: No       Review of Systems -   Respiratory ROS: no cough, shortness of breath, or wheezing  Cardiovascular ROS: no chest pain or dyspnea on exertion  Gastrointestinal ROS: no abdominal pain, change in bowel habits, or black or bloody stools  Musculoskeletal ROS: negative  Neurological ROS: no TIA or stroke symptoms        Physical Exam:  General: no distress  Head: normocephalic  Neck: supple, symmetrical, trachea midline  Lungs:  clear to auscultation bilaterally and normal respiratory effort  Heart: regular rate and rhythm, S1, S2 normal, no murmur, rub or gallop  Abdomen: soft, non-tender non-distented; bowel sounds normal; no masses,  no organomegaly  Extremities: no cyanosis or edema, or clubbing       Deep Sedation: Mallampati Score per anesthesia    ASA: II

## 2024-09-13 NOTE — TRANSFER OF CARE
"Anesthesia Transfer of Care Note    Patient: Trudi Mcknight    Procedure(s) Performed: Procedure(s) (LRB):  COLONOSCOPY (N/A)    Patient location: GI    Anesthesia Type: general    Transport from OR: Transported from OR on 2-3 L/min O2 by NC with adequate spontaneous ventilation    Post pain: adequate analgesia    Post assessment: no apparent anesthetic complications and tolerated procedure well    Post vital signs: stable    Level of consciousness: sedated and responds to stimulation    Nausea/Vomiting: no nausea/vomiting    Complications: none    Transfer of care protocol was followed    Last vitals: Visit Vitals  /61   Pulse 85   Temp 36.5 °C (97.7 °F) (Temporal)   Resp 18   Ht 5' 1" (1.549 m)   Wt 59 kg (130 lb)   SpO2 97%   Breastfeeding No   BMI 24.56 kg/m²     "

## 2024-09-13 NOTE — ANESTHESIA PREPROCEDURE EVALUATION
Patient Active Problem List   Diagnosis    Esophagitis    History of gastritis    Screening for malignant neoplasm of colon    Neuralgia and neuritis, unspecified    Hiatal hernia with GERD and esophagitis    Disorder of thyroid, unspecified    Paroxysmal atrial fibrillation    Sick sinus syndrome    Bilateral pleural effusion    Typical atrial flutter    S/P placement of cardiac pacemaker    Left flank pain    Hyperlipemia    Generalized anxiety disorder with panic attacks    Status post circumferential ablation of pulmonary vein    Chronic anticoagulation    Pericardial effusion    Nausea and vomiting    Elevated troponin    Amiodarone-induced thyroiditis    COVID    MDD (major depressive disorder), recurrent episode, mild    REM sleep behavior disorder    Chronic heart failure with preserved ejection fraction    Aortic atherosclerosis    Age-related osteoporosis without current pathological fracture     Past Medical History:   Diagnosis Date    Anticoagulant long-term use     Esophagitis     Hiatal hernia     Meniere's disease     Paroxysmal atrial fibrillation 2021    Shingles     Sick sinus syndrome 2022    s/p Dual chamber pacemaker    Thyroid disease      Past Surgical History:   Procedure Laterality Date    A-V CARDIAC PACEMAKER INSERTION N/A 2022    Procedure: INSERTION, CARDIAC PACEMAKER, DUAL CHAMBER;  Surgeon: Ernesto Duncan MD;  Location: [facility] EP LAB;  Service: Cardiology;  Laterality: N/A;  SB, DUAL PPM, SJM, ANES, SK, [id number]    ABLATION OF ARRHYTHMOGENIC FOCUS FOR ATRIAL FIBRILLATION N/A 2022    Procedure: Ablation atrial fibrillation;  Surgeon: Ernesto Duncan MD;  Location: [facility] EP LAB;  Service: Cardiology;  Laterality: N/A;  AF, RADHA (Cx if SR), PVI redo, RFA, ERIN, Gen, SK, 3 Prep *SJM PPM in situ*    BREAST CYST ASPIRATION           SECTION      COLONOSCOPY N/A 2019    Procedure: COLONOSCOPY;  Surgeon: INGRID Russo MD;  Location: [facility] ENDO (4TH FLR);   "Service: Endoscopy;  Laterality: N/A;    deviated septum repair      HYSTERECTOMY  1985    lump removed from tongue      TONSILLECTOMY                                                                                                                    09/13/2024  Trudi Mcknight is a 69 y.o., female.      Pre-op Assessment    I have reviewed the Patient Summary Reports.     I have reviewed the Nursing Notes. I have reviewed the NPO Status.   I have reviewed the Medications.     Review of Systems  Anesthesia Hx:  No problems with previous Anesthesia                Hematology/Oncology:  Hematology Normal   Oncology Normal                                   EENT/Dental:  EENT/Dental Normal           Cardiovascular:  Exercise tolerance: good       Dysrhythmias atrial fibrillation         ECG has been reviewed.                       Disorder of Cardiac Conduction, Sick Sinus Syndrome Pacemaker   Pulmonary:  Pulmonary Normal                       Renal/:  Renal/ Normal                 Hepatic/GI:    Hiatal Hernia, GERD       Hernia, Hiatal Hernia      Musculoskeletal:  Musculoskeletal Normal                Neurological:    Neuromuscular Disease,                                 Neuromuscular Disease Meniere"s Dz  Endocrine:  Endocrine Normal      Thyroid Disease   Hypothyroidism          Dermatological:  Skin Normal    Psych:  Psychiatric History anxiety                 Physical Exam  General: Well nourished    Airway:  Mallampati: II   Mouth Opening: Normal  TM Distance: Normal  Tongue: Normal  Neck ROM: Normal ROM    Dental:  Intact    Chest/Lungs:  Clear to auscultation, Normal Respiratory Rate    Heart:  Rate: Normal  Rhythm: Regular Rhythm  Sounds: Normal    Abdomen:  Soft, Normal, Nontender        Anesthesia Plan  Type of Anesthesia, risks & benefits discussed:    Anesthesia Type: Gen Natural Airway  Intra-op Monitoring Plan: Standard ASA Monitors  Post Op Pain Control Plan: multimodal analgesia  Induction:  " IV  Informed Consent: Informed consent signed with the Patient and all parties understand the risks and agree with anesthesia plan.  All questions answered. Patient consented to blood products? No  ASA Score: 3  Day of Surgery Review of History & Physical: H&P Update referred to the surgeon/provider.    Ready For Surgery From Anesthesia Perspective.     .

## 2024-09-13 NOTE — PROVATION PATIENT INSTRUCTIONS
Discharge Summary/Instructions after an Endoscopic Procedure  Patient Name: Trudi Mcknight  Patient MRN: [id number]  Patient YOB: 1955  Friday, September 13, 2024  Chelita Faust MD  Dear patient,  As a result of recent federal legislation (The Federal Cures Act), you may   receive lab or pathology results from your procedure in your [facility]   account before your physician is able to contact you. Your physician or   their representative will relay the results to you with their   recommendations at their soonest availability.  Thank you,  RESTRICTIONS:  During your procedure today, you received medications for sedation.  These   medications may affect your judgment, balance and coordination.  Therefore,   for 24 hours, you have the following restrictions:   - DO NOT drive a car, operate machinery, make legal/financial decisions,   sign important papers or drink alcohol.    ACTIVITY:  Today: no heavy lifting, straining or running due to procedural   sedation/anesthesia.  The following day: return to full activity including work.  DIET:  Eat and drink normally unless instructed otherwise.     TREATMENT FOR COMMON SIDE EFFECTS:  - Mild abdominal pain, nausea, belching, bloating or excessive gas:  rest,   eat lightly and use a heating pad.  - Sore Throat: treat with throat lozenges and/or gargle with warm salt   water.  - Because air was used during the procedure, expelling large amounts of air   from your rectum or belching is normal.  - If a bowel prep was taken, you may not have a bowel movement for 1-3 days.    This is normal.  SYMPTOMS TO WATCH FOR AND REPORT TO YOUR PHYSICIAN:  1. Abdominal pain or bloating, other than gas cramps.  2. Chest pain.  3. Back pain.  4. Signs of infection such as: chills or fever occurring within 24 hours   after the procedure.  5. Rectal bleeding, which would show as bright red, maroon, or black stools.   (A tablespoon of blood from the rectum is not serious, especially  if   hemorrhoids are present.)  6. Vomiting.  7. Weakness or dizziness.  GO DIRECTLY TO THE NEAREST EMERGENCY ROOM IF YOU HAVE ANY OF THE FOLLOWING:      Difficulty breathing              Chills and/or fever over 101 F   Persistent vomiting and/or vomiting blood   Severe abdominal pain   Severe chest pain   Black, tarry stools   Bleeding- more than one tablespoon   Any other symptom or condition that you feel may need urgent attention  Your doctor recommends these additional instructions:  If any biopsies were taken, your doctors clinic will contact you in 1 to 2   weeks with any results.  - Patient has a contact number available for emergencies.  The signs and   symptoms of potential delayed complications were discussed with the   patient.  Return to normal activities tomorrow.  Written discharge   instructions were provided to the patient.   - Resume previous diet.   - Continue present medications.   - Discharge patient to home (ambulatory).   - Repeat colonoscopy date to be determined after pending pathology results   are reviewed for surveillance.   - Resume Eliquis (apixaban) at prior dose tomorrow.  For questions, problems or results please call your physician - Chelita Faust MD at Work:  [phone].  [facility], EMERGENCY ROOM PHONE NUMBER: [phone]  IF A COMPLICATION OR EMERGENCY SITUATION ARISES AND YOU ARE UNABLE TO REACH   YOUR PHYSICIAN - GO DIRECTLY TO THE EMERGENCY ROOM.  MD Chelita Lowe MD  9/13/2024 9:17:39 AM  This report has been verified and signed electronically.  Dear patient,  As a result of recent federal legislation (The Federal Cures Act), you may   receive lab or pathology results from your procedure in your [facility]   account before your physician is able to contact you. Your physician or   their representative will relay the results to you with their   recommendations at their soonest availability.  Thank you,  PROVATION

## 2024-09-17 LAB
FINAL PATHOLOGIC DIAGNOSIS: NORMAL
GROSS: NORMAL
Lab: NORMAL

## 2024-09-23 ENCOUNTER — CLINICAL SUPPORT (OUTPATIENT)
Dept: OPHTHALMOLOGY | Facility: CLINIC | Age: 69
End: 2024-09-23
Payer: MEDICARE

## 2024-09-23 ENCOUNTER — OFFICE VISIT (OUTPATIENT)
Dept: OPHTHALMOLOGY | Facility: CLINIC | Age: 69
End: 2024-09-23
Payer: MEDICARE

## 2024-09-23 DIAGNOSIS — H33.321 RETINA HOLE, RIGHT: Primary | ICD-10-CM

## 2024-09-23 DIAGNOSIS — H43.811 POSTERIOR VITREOUS DETACHMENT, RIGHT: ICD-10-CM

## 2024-09-23 PROCEDURE — 1101F PT FALLS ASSESS-DOCD LE1/YR: CPT | Mod: HCNC,CPTII,S$GLB, | Performed by: OPHTHALMOLOGY

## 2024-09-23 PROCEDURE — 1160F RVW MEDS BY RX/DR IN RCRD: CPT | Mod: HCNC,CPTII,S$GLB, | Performed by: OPHTHALMOLOGY

## 2024-09-23 PROCEDURE — 67145 PROPH RTA DTCHMNT PC: CPT | Mod: HCNC,RT,S$GLB, | Performed by: OPHTHALMOLOGY

## 2024-09-23 PROCEDURE — 3288F FALL RISK ASSESSMENT DOCD: CPT | Mod: HCNC,CPTII,S$GLB, | Performed by: OPHTHALMOLOGY

## 2024-09-23 PROCEDURE — 92004 COMPRE OPH EXAM NEW PT 1/>: CPT | Mod: 57,HCNC,S$GLB, | Performed by: OPHTHALMOLOGY

## 2024-09-23 PROCEDURE — 1159F MED LIST DOCD IN RCRD: CPT | Mod: HCNC,CPTII,S$GLB, | Performed by: OPHTHALMOLOGY

## 2024-09-23 PROCEDURE — 99999 PR PBB SHADOW E&M-EST. PATIENT-LVL III: CPT | Mod: PBBFAC,HCNC,, | Performed by: OPHTHALMOLOGY

## 2024-09-23 PROCEDURE — 1126F AMNT PAIN NOTED NONE PRSNT: CPT | Mod: HCNC,CPTII,S$GLB, | Performed by: OPHTHALMOLOGY

## 2024-09-23 NOTE — PROGRESS NOTES
HPI    Urgent Floaters/Veil OD    Pt states she noticed new/worsening floaters OD last Wednesday, has   slightly improved but still present. Pt c/o veil over va OD. Pt also c/o   bright hallo central va OD. Pt c/o headache with no h/o of headaches in   past. Dull ache and pressure sensation OD.   No symptoms or concerns OS today.    No flashes  Floaters OD  Dull ache  No photophobia  Gtts: AT's PRN OU  Allergy gtts    No flashes  Last edited by Salud Hannah on 9/23/2024  2:38 PM.        OCT - OD PVD with VH  OS some early hyaloid separation      A/P    Hemorrhage PVD OD  Sx began 9/18/24  Blood emanating from ON[facility] - on Xarelto  Small hole at 1:00 - retinopexy OD today  Blood should settle without PPV  Will follow    RD precautions    2. Early NS OU      2 weeks OCT and dilate OD      Risks, benefits, and alternatives to treatment discussed in detail with the patient.  The patient voiced understanding and wished to proceed with the procedure    Laser Procedure Note  Dx: Retinal Hole OD  Laser: Retino pexy OD  Argon  Spot: 200  Power: 150-200  Dur: 0.15  #:   174  Complications: None  F/U as above

## 2024-10-08 ENCOUNTER — OFFICE VISIT (OUTPATIENT)
Dept: OPHTHALMOLOGY | Facility: CLINIC | Age: 69
End: 2024-10-08
Payer: MEDICARE

## 2024-10-08 ENCOUNTER — CLINICAL SUPPORT (OUTPATIENT)
Dept: OPHTHALMOLOGY | Facility: CLINIC | Age: 69
End: 2024-10-08
Payer: MEDICARE

## 2024-10-08 DIAGNOSIS — H33.321 RETINA HOLE, RIGHT: Primary | ICD-10-CM

## 2024-10-08 PROCEDURE — 99999 PR PBB SHADOW E&M-EST. PATIENT-LVL III: CPT | Mod: PBBFAC,HCNC,, | Performed by: OPHTHALMOLOGY

## 2024-10-08 NOTE — PROGRESS NOTES
HPI     pvd   od      1 month follow up  oct      Additional comments: PVD  OD   OCT    Some flashes on and off   no  floaters in OD   Hyaloid  seperation  os     Hx   S/p retinopexy   Ns  ou   Dls  09/23/24          Last edited by Cha Koroma on 10/8/2024  2:43 PM.           OCT - OD PVD with VH  OS some early hyaloid separation      A/P    Hemorrhage PVD OD  Sx began 9/18/24  Blood emanating from ONH - on Xarelto  Small hole at 1:00 - retinopexy OD today  Blood should settle without PPV  Will follow    RD precautions    2. Early NS OU      2 month OCT and dilate OD

## 2024-10-08 NOTE — PROGRESS NOTES
Oct macula done ou, per Dr. Goldstein./MA        There are no diagnoses linked to this encounter.     69 y.o. y/o here for screening for Diabetic Renopathy with non-dilated fundus photos per Renuka Moreno MD

## 2024-10-11 ENCOUNTER — CLINICAL SUPPORT (OUTPATIENT)
Dept: CARDIOLOGY | Facility: HOSPITAL | Age: 69
End: 2024-10-11
Payer: MEDICARE

## 2024-10-11 ENCOUNTER — CLINICAL SUPPORT (OUTPATIENT)
Dept: CARDIOLOGY | Facility: HOSPITAL | Age: 69
End: 2024-10-11
Attending: INTERNAL MEDICINE
Payer: MEDICARE

## 2024-10-11 DIAGNOSIS — I49.5 SICK SINUS SYNDROME: ICD-10-CM

## 2024-10-11 DIAGNOSIS — I48.0 PAROXYSMAL ATRIAL FIBRILLATION: ICD-10-CM

## 2024-10-11 DIAGNOSIS — Z95.0 PRESENCE OF CARDIAC PACEMAKER: ICD-10-CM

## 2024-10-11 PROCEDURE — 93294 REM INTERROG EVL PM/LDLS PM: CPT | Mod: HCNC,,, | Performed by: INTERNAL MEDICINE

## 2024-10-11 PROCEDURE — 93296 REM INTERROG EVL PM/IDS: CPT | Mod: HCNC | Performed by: INTERNAL MEDICINE

## 2024-10-22 ENCOUNTER — HOSPITAL ENCOUNTER (OUTPATIENT)
Dept: RADIOLOGY | Facility: HOSPITAL | Age: 69
Discharge: HOME | End: 2024-10-22
Attending: INTERNAL MEDICINE
Payer: MEDICARE

## 2024-10-22 VITALS — WEIGHT: 130 LBS | HEIGHT: 61 IN | BODY MASS INDEX: 24.55 KG/M2

## 2024-10-22 DIAGNOSIS — Z12.31 ENCOUNTER FOR SCREENING MAMMOGRAM FOR BREAST CANCER: ICD-10-CM

## 2024-10-22 PROCEDURE — 77067 SCR MAMMO BI INCL CAD: CPT | Mod: TC

## 2024-10-22 PROCEDURE — 77063 BREAST TOMOSYNTHESIS BI: CPT | Mod: TC

## 2024-11-21 ENCOUNTER — TELEPHONE (OUTPATIENT)
Dept: CARDIOLOGY | Facility: CLINIC | Age: 69
End: 2024-11-21

## 2024-11-21 ENCOUNTER — OFFICE VISIT (OUTPATIENT)
Dept: CARDIOLOGY | Facility: CLINIC | Age: 69
End: 2024-11-21
Payer: MEDICARE

## 2024-11-21 DIAGNOSIS — E78.00 PURE HYPERCHOLESTEROLEMIA: Primary | Chronic | ICD-10-CM

## 2024-11-21 PROCEDURE — 99213 OFFICE O/P EST LOW 20 MIN: CPT | Mod: HCNC,95,, | Performed by: PHYSICIAN ASSISTANT

## 2024-11-21 RX ORDER — PRAVASTATIN SODIUM 10 MG/1
10 TABLET ORAL DAILY
Qty: 90 TABLET | Refills: 3 | Status: SHIPPED | OUTPATIENT
Start: 2024-11-21 | End: 2025-11-21

## 2024-11-21 NOTE — TELEPHONE ENCOUNTER
----- Message from Carmita Albrecht PA-C sent at 11/21/2024  3:17 PM CST -----  Please schedule the patient for CMP and lipid panel in 3 months. Please also give her this number for tech support for the patient portal: [phone]

## 2024-11-21 NOTE — TELEPHONE ENCOUNTER
Spoke with pt, lab work has already been scheduled. Please see appt desk. Also gave pt number for tech support for pt portal per Carmita Albrecht. Pt verbalized understanding.   Future Appointments   Date Time Provider Department Center   12/4/2024  9:00 AM Wilner Martinez PA [facility]   12/10/2024  8:30 AM HOMERO Goldstein MD [facility] [place]   1/22/2025 10:00 AM Vivian Ivan OD [facility] [place]   2/21/2025  8:30 AM LAB, [facility]oz[facility]

## 2024-11-21 NOTE — Clinical Note
Please schedule the patient for CMP and lipid panel in 3 months. Please also give her this number for tech support for the patient portal: [phone]

## 2024-11-21 NOTE — PROGRESS NOTES
The patient location is: [place]  The chief complaint leading to consultation is: Hyperlipidemia    Visit type: audiovisual    Face to Face time with patient: 30 minutes of total time spent on the encounter, which includes face to face time and non-face to face time preparing to see the patient (eg, review of tests), Obtaining and/or reviewing separately obtained history, Documenting clinical information in the electronic or other health record, Independently interpreting results (not separately reported) and communicating results to the patient/family/caregiver, or Care coordination (not separately reported).     Each patient to whom he or she provides medical services by telemedicine is:  (1) informed of the relationship between the physician and patient and the respective role of any other health care provider with respect to management of the patient; and (2) notified that he or she may decline to receive medical services by telemedicine and may withdraw from such care at any time.    Notes:         Cardiology Clinic Note  Reason for Visit: JERAD  General Cardiologist: Dr. Geller     HPI:     PMHx:  Paroxysmal afib  - s/p PVI x 2, Dr. Duncan  Bradycardia s/p PPM  Small pericardial effusion   Amiodarone induced thyroiditis  GERD/Hiatal hernia       Trudi Mcknight is a 69 y.o. F, who presents to discuss side effects related to rosuvastatin 5 mg qD. Medication was causing her to have GERD symptoms. Symptoms resolved when she stopped taking for a few weeks. She tried to re-start and symptoms quickly returned. Her LDL goal is ideally less than 100. She is willing to try pravastatin. No other complaints today.     HUI Albrecht HPI 11/10/2023  Trudi Mcknight is a 68 y.o. F, who presents for follow up.  She is established with EP and last saw Renuka Toth in May.  She is tolerating Xarelto.  Eliquis previously caused hair loss.  Her last fasting lipid panel showed an LDL of 108 in December.  She was then  recommended to increase her dose of red yeast rice, which caused myalgias.  She stopped taking red yeast rice altogether at least 9 or 10 months ago.  She has a family history of CAD.  Her mother had a CABG, and also had difficulty tolerating statin secondary to myalgias.  However her mother was able to tolerate at least 1 formulation, and she thinks she may be able to find out from her sister exactly what it was.  She will have labs drawn, and is willing to try low-dose statin based on those results.  She is walking for exercise, and is an active grandmother. She denies chest pain/pressure/tightness/discomfort, dyspnea on regular exertion, orthopnea, PND, peripheral edema, sustained palpitations, syncope or claudication symptoms.        ROS:    Pertinent ROS included in HPI and below.  PMH:     Past Medical History:   Diagnosis Date    Anticoagulant long-term use     Esophagitis     Hiatal hernia     Meniere's disease     Paroxysmal atrial fibrillation 2021    Shingles     Sick sinus syndrome 2022    s/p Dual chamber pacemaker    Thyroid disease      Past Surgical History:   Procedure Laterality Date    A-V CARDIAC PACEMAKER INSERTION N/A 2022    Procedure: INSERTION, CARDIAC PACEMAKER, DUAL CHAMBER;  Surgeon: Ernesto Duncan MD;  Location: [facility] EP LAB;  Service: Cardiology;  Laterality: N/A;  SB, DUAL PPM, SJM, ANES, SK, [id number]    ABLATION OF ARRHYTHMOGENIC FOCUS FOR ATRIAL FIBRILLATION N/A 2022    Procedure: Ablation atrial fibrillation;  Surgeon: Ernesto Duncan MD;  Location: [facility] EP LAB;  Service: Cardiology;  Laterality: N/A;  AF, RADHA (Cx if SR), PVI redo, RFA, ERIN, Gen, SK, 3 Prep *SJM PPM in situ*    BREAST CYST ASPIRATION           SECTION      COLONOSCOPY N/A 2019    Procedure: COLONOSCOPY;  Surgeon: INGRID Russo MD;  Location: [facility] ENDO ([address] [place] [address]);  Service: Endoscopy;  Laterality: N/A;    COLONOSCOPY N/A 2024    Procedure: COLONOSCOPY;  Surgeon: Chelita Faust,  MD;  Location: [facility] ([address] [place] [address]);  Service: Endoscopy;  Laterality: N/A;  Xarelto hold pending  Pacemaker- [employer]   Pt states she should be having colonoscopy only not EGD  Referral: HUI Caraballo  [facility]  Inst portal  LW  ok to hold Xarelto 2 days per Dr Duncan/JOSE Mora RN-GT  9/6-lvm for pre call-tb  9/6 Pre-call complete-ASul  9/10 edwina    deviated septum repair      HYSTERECTOMY  1985    lump removed from tongue      TONSILLECTOMY       Allergies:     Review of patient's allergies indicates:   Allergen Reactions    Lasix [furosemide] Shortness Of Breath    Rosuvastatin      GERD (resolved when med stopped)    Tricyclic antidepressants and tricyclic compounds      Medications:     Current Outpatient Medications:     albuterol (VENTOLIN HFA) 90 mcg/actuation inhaler, Inhale 1-2 puffs into the lungs every 6 (six) hours as needed for Wheezing or Shortness of Breath. Rescue, Disp: 6.7 g, Rfl: 0    ALPRAZolam (XANAX) 0.25 MG tablet, Take 1 tablet (0.25 mg total) by mouth 3 (three) times daily as needed for Anxiety., Disp: 90 tablet, Rfl: 3    azelaic acid (FINACEA) 15 % Foam, APPLY TO THE AFFECTED AREA(S) of face ONCE OR twice DAILY, Disp: , Rfl:     AZELAIC ACID MISC, APPLY TO THE AFFECTED AREA(S) of face ONCE OR twice DAILY, Disp: , Rfl:     diclofenac sodium (VOLTAREN) 1 % Gel, Apply 2 g topically 2 (two) times daily as needed (pain)., Disp: 150 g, Rfl: 0    metroNIDAZOLE (NORITATE) 1 % cream, Compound azelaic acid 15% + ivermectin 1% + metronidazole 1% cream. Apply to affected area on face once or twice daily., Disp: 30 g, Rfl: 5    omeprazole (PRILOSEC) 20 MG capsule, Take 1 capsule (20 mg total) by mouth daily as needed (reflux)., Disp: 30 capsule, Rfl: 11    pravastatin (PRAVACHOL) 10 MG tablet, Take 1 tablet (10 mg total) by mouth once daily., Disp: 90 tablet, Rfl: 3    rivaroxaban (XARELTO) 15 mg Tab, Take 1 tablet (15 mg total) by mouth daily with dinner or evening meal., Disp: 90 tablet, Rfl: 3     sulfacetamide sodium-sulfur (SULFACLEANSE 8-4) 8-4 % Susp, Wash face qhs, Disp: 473 mL, Rfl: 3    tacrolimus (PROTOPIC) 0.1 % ointment, Aaa on brows qd- bid, Disp: 30 g, Rfl: 5    tretinoin (RETIN-A) 0.025 % cream, Compound tretinoin 0.025% / azelaic acid 8% / niacinamide 2% cream. Apply a pea-sized amount to entire face qhs then moisturize, Disp: 30 g, Rfl: 5    tretinoin (RETIN-A) 0.025 % cream, Compound tretinoin 0.025% / azelaic acid 8% / niacinamide 2% cream. Apply a pea-sized amount to entire face qhs then moisturize, Disp: 30 g, Rfl: 5    valACYclovir (VALTREX) 1000 MG tablet, TAKE 2 TABLETS EVERY 12 HOURS FOR TWO DOSES AS NEEDED FOR COLD SORES, Disp: 360 tablet, Rfl: 1    venlafaxine (EFFEXOR-XR) 37.5 MG 24 hr capsule, Take 1 capsule (37.5 mg total) by mouth once daily., Disp: 90 capsule, Rfl: 1   Social History:     Social History     Tobacco Use    Smoking status: Never    Smokeless tobacco: Never   Substance Use Topics    Alcohol use: No     Alcohol/week: 0.0 standard drinks of alcohol     Comment: rarely     Family History:     Family History   Problem Relation Name Age of Onset    Heart disease Mother  55        bypass at 55    Breast cancer Mother      Hypertension Mother      Hyperlipidemia Mother      Heart disease Father      Hypertension Father      Skin cancer Father      Diverticulitis Sister      Heart disease Brother      Diverticulitis Brother      Breast cancer Paternal Grandmother      Colon cancer Neg Hx      Melanoma Neg Hx      Amblyopia Neg Hx      Blindness Neg Hx      Cataracts Neg Hx      Glaucoma Neg Hx      Macular degeneration Neg Hx      Strabismus Neg Hx      Retinal detachment Neg Hx       Physical Exam:     Physical not exam was not performed due this encounter being a virtual visit.    Labs:     Blood Tests:  Lab Results   Component Value Date    BNP 38 03/18/2022     07/30/2024    K 4.2 07/30/2024     07/30/2024    CO2 23 07/30/2024    BUN 14 07/30/2024     CREATININE 1.1 2024    GLU 89 2024    HGBA1C 5.5 2022    MG 2.1 2022    AST 20 2024    ALT 15 2024    ALBUMIN 3.7 2024    PROT 6.6 2024    BILITOT 0.5 2024    WBC 5.00 2022    HGB 13.5 2022    HCT 43.2 2022    HCT 43 2022    MCV 91 2022     2022    INR 1.2 2022    INR 1.5 (H) 2022    TSH 2.173 2024       Lab Results   Component Value Date    CHOL 121 2024    HDL 51 2024    TRIG 80 2024       Lab Results   Component Value Date    LDLCALC 54.0 (L) 2024       Urine Tests:  Lab Results   Component Value Date    COLORU Yellow 2022    APPEARANCEUA Clear 2022    PHUR 5.5 2023    PHUR 6.0 2022    SPECGRAV 1.010 2022    PROTEINUA Negative 2022    GLUCUA Negative 2022    KETONESU 1+ (A) 2022    BILIRUBINUA Negative 2022    OCCULTUA 1+ (A) 2022    NITRITE Negative 2022    UROBILINOGEN normal 2017    UROBILINOGEN Negative 2015    LEUKOCYTESUR Negative 2022    CREATRANDUR 78.0 2022       Imaging:     Echocardiogram  TTE 2023  Small circumferential pericardial effusion, largest inferolaterally. No evidence of tamponade.  The left ventricle is normal in size with normal systolic function.  The estimated ejection fraction is 65%.  Normal left ventricular diastolic function.  Normal right ventricular size with normal right ventricular systolic function.  Normal central venous pressure (3 mmHg).  The estimated PA systolic pressure is 22 mmHg.     Stress testing  None     Cath Lab  None     Other  None     [id number]  Normal sinus rhythm   Rightward axis     Assessment:     1. Pure hypercholesterolemia        Plan:     Pure hypercholesterolemia  -     pravastatin (PRAVACHOL) 10 MG tablet; Take 1 tablet (10 mg total) by mouth once daily.  Dispense: 90 tablet; Refill: 3  -     Lipid Panel; Future;  Expected date: 02/21/2025  -     Comprehensive Metabolic Panel; Future; Expected date: 02/21/2025      Sick sinus syndrome  S/P placement of cardiac pacemaker  Status post circumferential ablation of pulmonary vein  Paroxysmal atrial fibrillation  Chronic anticoagulation  Follows with KAROL Sanford  Continue anticoagulant   Avoid NSAIDs  Reviewed importance of monitoring for bleeding   Reviewed injury precautions      Signed:  Carmita Albrecht PA-C  Cardiology     11/21/2024 3:22 PM    Follow-up:     Future Appointments   Date Time Provider Department Center   12/4/2024  9:00 AM Wilner Martinez PA [facility]   12/10/2024  8:30 AM HOMERO Goldstein MD [facility] OPHTH[facility]   1/22/2025 10:00 AM Vivian Ivan, OD [facility] OPTOMTY [place]

## 2024-11-25 ENCOUNTER — PATIENT MESSAGE (OUTPATIENT)
Dept: CARDIOLOGY | Facility: CLINIC | Age: 69
End: 2024-11-25
Payer: MEDICARE

## 2024-11-27 ENCOUNTER — PATIENT MESSAGE (OUTPATIENT)
Dept: CARDIOLOGY | Facility: CLINIC | Age: 69
End: 2024-11-27
Payer: MEDICARE

## 2024-11-27 DIAGNOSIS — E78.2 MIXED HYPERLIPIDEMIA: Primary | Chronic | ICD-10-CM

## 2024-11-27 RX ORDER — EZETIMIBE 10 MG/1
10 TABLET ORAL DAILY
Qty: 90 TABLET | Refills: 3 | Status: SHIPPED | OUTPATIENT
Start: 2024-11-27 | End: 2025-11-27

## 2024-12-04 ENCOUNTER — OFFICE VISIT (OUTPATIENT)
Dept: PSYCHIATRY | Facility: CLINIC | Age: 69
End: 2024-12-04
Payer: MEDICARE

## 2024-12-04 DIAGNOSIS — F51.5 NIGHTMARES: ICD-10-CM

## 2024-12-04 DIAGNOSIS — F33.0 MDD (MAJOR DEPRESSIVE DISORDER), RECURRENT EPISODE, MILD: ICD-10-CM

## 2024-12-04 DIAGNOSIS — F41.0 GENERALIZED ANXIETY DISORDER WITH PANIC ATTACKS: Primary | ICD-10-CM

## 2024-12-04 DIAGNOSIS — F33.41 MDD (MAJOR DEPRESSIVE DISORDER), RECURRENT, IN PARTIAL REMISSION: ICD-10-CM

## 2024-12-04 DIAGNOSIS — F41.1 GENERALIZED ANXIETY DISORDER WITH PANIC ATTACKS: Primary | ICD-10-CM

## 2024-12-04 RX ORDER — VENLAFAXINE HYDROCHLORIDE 37.5 MG/1
37.5 CAPSULE, EXTENDED RELEASE ORAL DAILY
Qty: 90 CAPSULE | Refills: 1 | Status: SHIPPED | OUTPATIENT
Start: 2024-12-04

## 2024-12-04 RX ORDER — PRAZOSIN HYDROCHLORIDE 1 MG/1
1 CAPSULE ORAL NIGHTLY
Qty: 30 CAPSULE | Refills: 3 | Status: SHIPPED | OUTPATIENT
Start: 2024-12-04 | End: 2025-04-03

## 2024-12-04 RX ORDER — ALPRAZOLAM 0.25 MG/1
0.25 TABLET ORAL NIGHTLY PRN
Qty: 30 TABLET | Refills: 3 | Status: SHIPPED | OUTPATIENT
Start: 2024-12-04

## 2024-12-04 NOTE — PROGRESS NOTES
"The patient location is: [place]    Visit type: audiovisual    Each patient to whom he or she provides medical services by telemedicine is:  (1) informed of the relationship between the physician and patient and the respective role of any other health care provider with respect to management of the patient; and (2) notified that he or she may decline to receive medical services by telemedicine and may withdraw from such care at any time.    Notes:     Outpatient Psychiatry Follow-Up Visit (MD/JASMINE)    12/4/2024    Clinical Status of Patient:  Outpatient (Ambulatory)    Chief Complaint:  Trudi Mcknight is a 69 y.o. female who presents today for follow-up of depression and anxiety.  Met with patient.      Interval History and Content of Current Session:  Interim Events/Subjective Report/Content of Current Session:    Pt reports today: "doing good overall"    Mood overall is "really well overall"    Reports had vision issue in R eye, had temporary vision loss due to tear in retina. In last week has been getting her vision back.    Patients mood is euthymic, affect appears mood congruent. Linear and logical, friendly and cooperative, good eye contact.    Reports having nightmares and vivid dreams where pt is acting out behaviors. Discussed with pt could use prazosin 1mg qhs for nightmares. Pt is agreeable to plan.    Denies SI/HI/AVH. Pt reports sleeping well most nights and normal appetite. Denies side effects of medications.    Pt reports taking medications as prescribed and behaving appropriately during interview today.      Prior visit    Pt reports today: "busy summer. Vacation with the kids."    Mood overall is "pretty good overall"    Patients mood is euthymic, affect appears mood congruent. Linear and logical, friendly and cooperative, good eye contact.    Denies SI/HI/AVH. Pt reports sleeping well and normal appetite. Denies side effects of medications.    Pt reports taking medications as prescribed and " behaving appropriately during interview today.    Review of Systems     Review of Systems   Constitutional:  Negative for fever and malaise/fatigue.   HENT:  Negative for sore throat.    Eyes:  Negative for photophobia.   Respiratory:  Negative for cough.    Cardiovascular:  Negative for chest pain and palpitations.   Gastrointestinal:  Negative for abdominal pain.   Genitourinary:  Negative for dysuria.   Musculoskeletal:  Negative for myalgias.   Skin:  Negative for rash.   Neurological:  Negative for dizziness.   Endo/Heme/Allergies:  Does not bruise/bleed easily.   Psychiatric/Behavioral:  Negative for depression, hallucinations, substance abuse and suicidal ideas. The patient is not nervous/anxious and does not have insomnia.        Psychiatric Review Of Systems - Is patient experiencing or having changes in:  sleep: no  appetite: no  weight: no  energy/anergy: no  interest/pleasure/anhedonia: no  somatic symptoms: no  libido: no  anxiety/panic: no  guilty/hopelessness: no  concentration: no  S.I.B.s/risky behavior: no  Irritability: no  Racing thoughts: no  Impulsive behaviors: no  Paranoia: no  AVH: no      Past Medical, Family and Social History: The patient's past medical, family and social history have been reviewed and updated as appropriate within the electronic medical record - see encounter notes.      Current Medications:   Medication List with Changes/Refills   New Medications    PRAZOSIN (MINIPRESS) 1 MG CAP    Take 1 capsule (1 mg total) by mouth every evening.   Current Medications    ALBUTEROL (VENTOLIN HFA) 90 MCG/ACTUATION INHALER    Inhale 1-2 puffs into the lungs every 6 (six) hours as needed for Wheezing or Shortness of Breath. Rescue    AZELAIC ACID (FINACEA) 15 % FOAM    APPLY TO THE AFFECTED AREA(S) of face ONCE OR twice DAILY    AZELAIC ACID MISC    APPLY TO THE AFFECTED AREA(S) of face ONCE OR twice DAILY    DICLOFENAC SODIUM (VOLTAREN) 1 % GEL    Apply 2 g topically 2 (two) times daily  as needed (pain).    EZETIMIBE (ZETIA) 10 MG TABLET    Take 1 tablet (10 mg total) by mouth once daily.    METRONIDAZOLE (NORITATE) 1 % CREAM    Compound azelaic acid 15% + ivermectin 1% + metronidazole 1% cream. Apply to affected area on face once or twice daily.    OMEPRAZOLE (PRILOSEC) 20 MG CAPSULE    Take 1 capsule (20 mg total) by mouth daily as needed (reflux).    PRAVASTATIN (PRAVACHOL) 10 MG TABLET    Take 1 tablet (10 mg total) by mouth once daily.    RIVAROXABAN (XARELTO) 15 MG TAB    Take 1 tablet (15 mg total) by mouth daily with dinner or evening meal.    SULFACETAMIDE SODIUM-SULFUR (SULFACLEANSE 8-4) 8-4 % SUSP    Wash face qhs    TACROLIMUS (PROTOPIC) 0.1 % OINTMENT    Aaa on brows qd- bid    TRETINOIN (RETIN-A) 0.025 % CREAM    Compound tretinoin 0.025% / azelaic acid 8% / niacinamide 2% cream. Apply a pea-sized amount to entire face qhs then moisturize    TRETINOIN (RETIN-A) 0.025 % CREAM    Compound tretinoin 0.025% / azelaic acid 8% / niacinamide 2% cream. Apply a pea-sized amount to entire face qhs then moisturize    VALACYCLOVIR (VALTREX) 1000 MG TABLET    TAKE 2 TABLETS EVERY 12 HOURS FOR TWO DOSES AS NEEDED FOR COLD SORES   Changed and/or Refilled Medications    Modified Medication Previous Medication    ALPRAZOLAM (XANAX) 0.25 MG TABLET ALPRAZolam (XANAX) 0.25 MG tablet       Take 1 tablet (0.25 mg total) by mouth nightly as needed for Anxiety.    Take 1 tablet (0.25 mg total) by mouth 3 (three) times daily as needed for Anxiety.    VENLAFAXINE (EFFEXOR-XR) 37.5 MG 24 HR CAPSULE venlafaxine (EFFEXOR-XR) 37.5 MG 24 hr capsule       Take 1 capsule (37.5 mg total) by mouth once daily.    Take 1 capsule (37.5 mg total) by mouth once daily.         Allergies:   Review of patient's allergies indicates:   Allergen Reactions    Lasix [furosemide] Shortness Of Breath    Rosuvastatin      GERD (resolved when med stopped)    Tricyclic antidepressants and tricyclic compounds          Vitals   There were  "no vitals filed for this visit.       Labs/Imaging/Studies:   No results found for this or any previous visit (from the past 48 hours).   No results found for: "PHENYTOIN", "PHENOBARB", "VALPROATE", "CBMZ"    Compliance: yes    Side effects: None    Risk Parameters:  Patient reports no suicidal ideation  Patient reports no homicidal ideation  Patient reports no self-injurious behavior  Patient reports no violent behavior    Exam (detailed: at least 9 elements; comprehensive: all 15 elements)   Constitutional  Vitals:  Most recent vital signs, dated less than 90 days prior to this appointment, were reviewed.   There were no vitals filed for this visit.     General:  unremarkable, age appropriate     Musculoskeletal  Muscle Strength/Tone:  not examined   Gait & Station:  Not examined     Psychiatric  Speech:  no latency; no press   Mood & Affect:  euthymic  congruent and appropriate   Thought Process:  normal and logical   Associations:  intact   Thought Content:  normal, no suicidality, no homicidality, delusions, or paranoia   Insight:  intact, has awareness of illness   Judgement: behavior is adequate to circumstances   Orientation:  grossly intact   Memory: intact for content of interview   Language: grossly intact   Attention Span & Concentration:  able to focus   Fund of Knowledge:  intact and appropriate to age and level of education     Assessment and Diagnosis   Status/Progress: Based on the examination today, the patient's problem(s) is/are adequately but not ideally controlled.  New problems have been presented today.   Co-morbidities, Diagnostic uncertainty and Lack of compliance are not complicating management of the primary condition.  There are no active rule-out diagnoses for this patient at this time.     General Impression:      ICD-10-CM ICD-9-CM   1. Generalized anxiety disorder with panic attacks  F41.1 300.02    F41.0 300.01   2. MDD (major depressive disorder), recurrent, in partial remission  " F33.41 296.35   3. MDD (major depressive disorder), recurrent episode, mild  F33.0 296.31   4. Nightmares  F51.5 307.47       Intervention/Counseling/Treatment Plan   Medication Management: Continue current medications. The risks and benefits of medication were discussed with the patient.    -continue effexor XR 37.5mg daily    -continue xanax 0.25mg q8h prn anxiety. Use sparingly as directed    -continue vitamin d3 2k iu once daily    -start prazosin 1mg qhs for nightmares/vivid dreams    The risks and benefits of medication were discussed with the patient.  Discussed diagnosis, risks and benefits of proposed treatment above vs alternative treatments vs no treatment, and potential side effects of these treatments. The patient expresses understanding of the above and displays the capacity to agree with this treatment given said understanding. Patient also agrees that, currently, the benefits outweigh the risks and would like to pursue treatment at this time.  Discussed inherent unpredictability of medications in each individual.   Encouraged Patient to keep future appointments.   Take medications as prescribed and abstain from substance abuse.   In the event of an emergency, patient was advised to go to the emergency room      Return to Clinic: 3 months        Kristofer Martinez PA-C      Total face to face time: 11 min  Total time (chart review, patient contact, documentation): 13 min      *This note has been prepared using a combination of a dictation device and typing.  It has been checked for errors but some errors may still exist within the note as a result of speech recognition errors and/or typographical errors.

## 2024-12-10 ENCOUNTER — CLINICAL SUPPORT (OUTPATIENT)
Dept: OPHTHALMOLOGY | Facility: CLINIC | Age: 69
End: 2024-12-10
Payer: MEDICARE

## 2024-12-10 ENCOUNTER — OFFICE VISIT (OUTPATIENT)
Dept: OPHTHALMOLOGY | Facility: CLINIC | Age: 69
End: 2024-12-10
Payer: MEDICARE

## 2024-12-10 DIAGNOSIS — H43.11 VITREOUS HEMORRHAGE, RIGHT: ICD-10-CM

## 2024-12-10 DIAGNOSIS — H43.811 POSTERIOR VITREOUS DETACHMENT, RIGHT: ICD-10-CM

## 2024-12-10 DIAGNOSIS — H33.321 RETINA HOLE, RIGHT: Primary | ICD-10-CM

## 2024-12-10 PROCEDURE — 3288F FALL RISK ASSESSMENT DOCD: CPT | Mod: HCNC,CPTII,S$GLB, | Performed by: OPHTHALMOLOGY

## 2024-12-10 PROCEDURE — 1126F AMNT PAIN NOTED NONE PRSNT: CPT | Mod: HCNC,CPTII,S$GLB, | Performed by: OPHTHALMOLOGY

## 2024-12-10 PROCEDURE — 92201 OPSCPY EXTND RTA DRAW UNI/BI: CPT | Mod: HCNC,S$GLB,, | Performed by: OPHTHALMOLOGY

## 2024-12-10 PROCEDURE — 92134 CPTRZ OPH DX IMG PST SGM RTA: CPT | Mod: HCNC,S$GLB,, | Performed by: OPHTHALMOLOGY

## 2024-12-10 PROCEDURE — 99999 PR PBB SHADOW E&M-EST. PATIENT-LVL III: CPT | Mod: PBBFAC,HCNC,, | Performed by: OPHTHALMOLOGY

## 2024-12-10 PROCEDURE — 1159F MED LIST DOCD IN RCRD: CPT | Mod: HCNC,CPTII,S$GLB, | Performed by: OPHTHALMOLOGY

## 2024-12-10 PROCEDURE — 92014 COMPRE OPH EXAM EST PT 1/>: CPT | Mod: HCNC,S$GLB,, | Performed by: OPHTHALMOLOGY

## 2024-12-10 PROCEDURE — 1160F RVW MEDS BY RX/DR IN RCRD: CPT | Mod: HCNC,CPTII,S$GLB, | Performed by: OPHTHALMOLOGY

## 2024-12-10 PROCEDURE — 1101F PT FALLS ASSESS-DOCD LE1/YR: CPT | Mod: HCNC,CPTII,S$GLB, | Performed by: OPHTHALMOLOGY

## 2024-12-10 NOTE — PROGRESS NOTES
HPI     Follow-up     Additional comments: 2 mo Heme PVD OD           Comments    VA improving OD, no pain ou and floaters with flashes ou.    No gtts          Last edited by Carmita Cadr on 12/10/2024  8:52 AM.              OCT - OD PVD with [facility]  OS some early hyaloid separation      A/P    Hemorrhage PVD OD  Sx began 9/18/24  Blood emanating from [facility] - on Xarelto  Small hole at 1:00 - retinopexy OD today  Blood should settle without PPV  Will follow  12/24 - central floaters, improving    IF sig bothered next visit, can consider PPV    RD precautions    2. Early NS OU      2 month OCT and dilate OD

## 2025-01-10 ENCOUNTER — CLINICAL SUPPORT (OUTPATIENT)
Dept: CARDIOLOGY | Facility: HOSPITAL | Age: 70
End: 2025-01-10

## 2025-01-10 ENCOUNTER — CLINICAL SUPPORT (OUTPATIENT)
Dept: CARDIOLOGY | Facility: HOSPITAL | Age: 70
End: 2025-01-10
Attending: INTERNAL MEDICINE
Payer: MEDICARE

## 2025-01-10 DIAGNOSIS — Z95.0 PRESENCE OF CARDIAC PACEMAKER: ICD-10-CM

## 2025-01-10 DIAGNOSIS — I48.0 PAROXYSMAL ATRIAL FIBRILLATION: ICD-10-CM

## 2025-01-10 DIAGNOSIS — I49.5 SICK SINUS SYNDROME: ICD-10-CM

## 2025-01-10 PROCEDURE — 93294 REM INTERROG EVL PM/LDLS PM: CPT | Mod: HCNC,,, | Performed by: INTERNAL MEDICINE

## 2025-01-10 PROCEDURE — 93296 REM INTERROG EVL PM/IDS: CPT | Mod: HCNC | Performed by: INTERNAL MEDICINE

## 2025-01-30 DIAGNOSIS — Z00.00 ENCOUNTER FOR MEDICARE ANNUAL WELLNESS EXAM: ICD-10-CM

## 2025-02-07 DIAGNOSIS — L71.9 ROSACEA: ICD-10-CM

## 2025-02-10 ENCOUNTER — TELEPHONE (OUTPATIENT)
Dept: OPTOMETRY | Facility: CLINIC | Age: 70
End: 2025-02-10
Payer: MEDICARE

## 2025-02-10 DIAGNOSIS — L71.9 ROSACEA: ICD-10-CM

## 2025-02-10 RX ORDER — TRETINOIN 0.25 MG/G
CREAM TOPICAL
Qty: 30 G | Refills: 5 | Status: SHIPPED | OUTPATIENT
Start: 2025-02-10

## 2025-02-11 ENCOUNTER — OFFICE VISIT (OUTPATIENT)
Dept: OPHTHALMOLOGY | Facility: CLINIC | Age: 70
End: 2025-02-11
Payer: MEDICARE

## 2025-02-11 DIAGNOSIS — H43.811 POSTERIOR VITREOUS DETACHMENT, RIGHT: ICD-10-CM

## 2025-02-11 DIAGNOSIS — H43.11 VITREOUS HEMORRHAGE, RIGHT: ICD-10-CM

## 2025-02-11 DIAGNOSIS — H33.321 RETINA HOLE, RIGHT: Primary | ICD-10-CM

## 2025-02-11 PROCEDURE — 92134 CPTRZ OPH DX IMG PST SGM RTA: CPT | Mod: HCNC,S$GLB,, | Performed by: OPHTHALMOLOGY

## 2025-02-11 PROCEDURE — 92014 COMPRE OPH EXAM EST PT 1/>: CPT | Mod: HCNC,S$GLB,, | Performed by: OPHTHALMOLOGY

## 2025-02-11 PROCEDURE — 99999 PR PBB SHADOW E&M-EST. PATIENT-LVL III: CPT | Mod: PBBFAC,HCNC,, | Performed by: OPHTHALMOLOGY

## 2025-02-11 PROCEDURE — 1160F RVW MEDS BY RX/DR IN RCRD: CPT | Mod: HCNC,CPTII,S$GLB, | Performed by: OPHTHALMOLOGY

## 2025-02-11 PROCEDURE — 1101F PT FALLS ASSESS-DOCD LE1/YR: CPT | Mod: HCNC,CPTII,S$GLB, | Performed by: OPHTHALMOLOGY

## 2025-02-11 PROCEDURE — 1159F MED LIST DOCD IN RCRD: CPT | Mod: HCNC,CPTII,S$GLB, | Performed by: OPHTHALMOLOGY

## 2025-02-11 PROCEDURE — 3288F FALL RISK ASSESSMENT DOCD: CPT | Mod: HCNC,CPTII,S$GLB, | Performed by: OPHTHALMOLOGY

## 2025-02-11 NOTE — PROGRESS NOTES
HPI     retina hole   OD    OCT     PVD   VH       Additional comments: Retina hole    Oct   Ns     Dilate only  OD     DLS 12/1024          Last edited by Cha Koroma on 2/11/2025  8:25 AM.           OCT - OD PVD with [facility]H  OS some early hyaloid separation      A/P    Hemorrhage PVD OD  Sx began 9/18/24  Blood emanating from [facility] - on Xarelto  Small hole at 1:00 - retinopexy OD today  Blood should settle without PPV  Will follow  12/24 - central floaters, improving    IF sig bothered next visit, can consider PPV    RD precautions    2. Early NS OU      6 month OCT and dilate

## 2025-03-05 ENCOUNTER — OFFICE VISIT (OUTPATIENT)
Dept: PSYCHIATRY | Facility: CLINIC | Age: 70
End: 2025-03-05
Payer: MEDICARE

## 2025-03-05 DIAGNOSIS — F33.41 MDD (MAJOR DEPRESSIVE DISORDER), RECURRENT, IN PARTIAL REMISSION: ICD-10-CM

## 2025-03-05 DIAGNOSIS — F33.0 MDD (MAJOR DEPRESSIVE DISORDER), RECURRENT EPISODE, MILD: ICD-10-CM

## 2025-03-05 DIAGNOSIS — F41.1 GENERALIZED ANXIETY DISORDER WITH PANIC ATTACKS: Primary | ICD-10-CM

## 2025-03-05 DIAGNOSIS — F41.0 GENERALIZED ANXIETY DISORDER WITH PANIC ATTACKS: Primary | ICD-10-CM

## 2025-03-05 DIAGNOSIS — F51.5 NIGHTMARES: ICD-10-CM

## 2025-03-05 PROCEDURE — 98006 SYNCH AUDIO-VIDEO EST MOD 30: CPT | Mod: HCNC,95,, | Performed by: PHYSICIAN ASSISTANT

## 2025-03-05 PROCEDURE — 1160F RVW MEDS BY RX/DR IN RCRD: CPT | Mod: HCNC,CPTII,95, | Performed by: PHYSICIAN ASSISTANT

## 2025-03-05 PROCEDURE — 1159F MED LIST DOCD IN RCRD: CPT | Mod: HCNC,CPTII,95, | Performed by: PHYSICIAN ASSISTANT

## 2025-03-05 RX ORDER — VENLAFAXINE HYDROCHLORIDE 37.5 MG/1
37.5 CAPSULE, EXTENDED RELEASE ORAL DAILY
Qty: 90 CAPSULE | Refills: 1 | Status: SHIPPED | OUTPATIENT
Start: 2025-03-05

## 2025-03-05 NOTE — PROGRESS NOTES
"The patient location is: [place]    Visit type: audiovisual    Each patient to whom he or she provides medical services by telemedicine is:  (1) informed of the relationship between the physician and patient and the respective role of any other health care provider with respect to management of the patient; and (2) notified that he or she may decline to receive medical services by telemedicine and may withdraw from such care at any time.    Notes:     Outpatient Psychiatry Follow-Up Visit (MD/JASMINE)    3/5/2025    Clinical Status of Patient:  Outpatient (Ambulatory)    Chief Complaint:  Trudi Mcknight is a 69 y.o. female who presents today for follow-up of depression and anxiety.  Met with patient.      Interval History and Content of Current Session:  Interim Events/Subjective Report/Content of Current Session:    Pt reports today: "things are going ok"    Mood overall is "really good overall"    Reports had vision issue in improving, had issues with retina.    Patients mood is euthymic, affect appears mood congruent. Linear and logical, friendly and cooperative, good eye contact.    Reports having vivid dreams where pt is acting out behaviors still, has fallen out of bed but not injured herself.    Trialed prazosin and reports she had palpitations when taking it, had discontinued.    Denies SI/HI/AVH. Pt reports sleeping well most nights and normal appetite. Denies side effects of medications.    Pt reports taking medications as prescribed and behaving appropriately during interview today.      Prior visit    Pt reports today: "doing good overall"    Mood overall is "really well overall"    Reports had vision issue in R eye, had temporary vision loss due to tear in retina. In last week has been getting her vision back.    Patients mood is euthymic, affect appears mood congruent. Linear and logical, friendly and cooperative, good eye contact.    Reports having nightmares and vivid dreams where pt is acting out " behaviors. Discussed with pt could use prazosin 1mg qhs for nightmares. Pt is agreeable to plan.    Denies SI/HI/AVH. Pt reports sleeping well most nights and normal appetite. Denies side effects of medications.    Pt reports taking medications as prescribed and behaving appropriately during interview today.      Review of Systems     Review of Systems   Constitutional:  Negative for fever and malaise/fatigue.   HENT:  Negative for sore throat.    Eyes:  Negative for photophobia.   Respiratory:  Negative for cough.    Cardiovascular:  Negative for chest pain and palpitations.   Gastrointestinal:  Negative for abdominal pain.   Genitourinary:  Negative for dysuria.   Musculoskeletal:  Negative for myalgias.   Skin:  Negative for rash.   Neurological:  Negative for dizziness.   Endo/Heme/Allergies:  Does not bruise/bleed easily.   Psychiatric/Behavioral:  Negative for depression, hallucinations, substance abuse and suicidal ideas. The patient is not nervous/anxious and does not have insomnia.        Psychiatric Review Of Systems - Is patient experiencing or having changes in:  sleep: no  appetite: no  weight: no  energy/anergy: no  interest/pleasure/anhedonia: no  somatic symptoms: no  libido: no  anxiety/panic: no  guilty/hopelessness: no  concentration: no  S.I.B.s/risky behavior: no  Irritability: no  Racing thoughts: no  Impulsive behaviors: no  Paranoia: no  AVH: no      Past Medical, Family and Social History: The patient's past medical, family and social history have been reviewed and updated as appropriate within the electronic medical record - see encounter notes.      Current Medications:   Medication List with Changes/Refills   Current Medications    ALBUTEROL (VENTOLIN HFA) 90 MCG/ACTUATION INHALER    Inhale 1-2 puffs into the lungs every 6 (six) hours as needed for Wheezing or Shortness of Breath. Rescue    ALPRAZOLAM (XANAX) 0.25 MG TABLET    Take 1 tablet (0.25 mg total) by mouth nightly as needed for  Anxiety.    AZELAIC ACID (FINACEA) 15 % FOAM    APPLY TO THE AFFECTED AREA(S) of face ONCE OR twice DAILY    AZELAIC ACID MISC    APPLY TO THE AFFECTED AREA(S) of face ONCE OR twice DAILY    DICLOFENAC SODIUM (VOLTAREN) 1 % GEL    Apply 2 g topically 2 (two) times daily as needed (pain).    EZETIMIBE (ZETIA) 10 MG TABLET    Take 1 tablet (10 mg total) by mouth once daily.    METRONIDAZOLE (NORITATE) 1 % CREAM    Compound azelaic acid 15% + ivermectin 1% + metronidazole 1% cream. Apply to affected area on face once or twice daily.    OMEPRAZOLE (PRILOSEC) 20 MG CAPSULE    Take 1 capsule (20 mg total) by mouth daily as needed (reflux).    PRAVASTATIN (PRAVACHOL) 10 MG TABLET    Take 1 tablet (10 mg total) by mouth once daily.    PRAZOSIN (MINIPRESS) 1 MG CAP    Take 1 capsule (1 mg total) by mouth every evening.    RIVAROXABAN (XARELTO) 15 MG TAB    Take 1 tablet (15 mg total) by mouth daily with dinner or evening meal.    SULFACETAMIDE SODIUM-SULFUR (SULFACLEANSE 8-4) 8-4 % SUSP    Wash face qhs    TACROLIMUS (PROTOPIC) 0.1 % OINTMENT    Aaa on brows qd- bid    TRETINOIN (RETIN-A) 0.025 % CREAM    Compound tretinoin 0.025% / azelaic acid 8% / niacinamide 2% cream. Apply a pea-sized amount to entire face qhs then moisturize    TRETINOIN (RETIN-A) 0.025 % CREAM    Compound tretinoin 0.025% / azelaic acid 8% / niacinamide 2% cream. Apply a pea-sized amount to entire face qhs then moisturize    TRETINOIN (RETIN-A) 0.025 % CREAM    Compound tretinoin 0.025% / azelaic acid 8% / niacinamide 2% cream. Apply a pea-sized amount to entire face qhs then moisturize    VALACYCLOVIR (VALTREX) 1000 MG TABLET    TAKE 2 TABLETS EVERY 12 HOURS FOR TWO DOSES AS NEEDED FOR COLD SORES    VENLAFAXINE (EFFEXOR-XR) 37.5 MG 24 HR CAPSULE    Take 1 capsule (37.5 mg total) by mouth once daily.         Allergies:   Review of patient's allergies indicates:   Allergen Reactions    Lasix [furosemide] Shortness Of Breath    Rosuvastatin      GERD  "(resolved when med stopped)    Tricyclic antidepressants and tricyclic compounds          Vitals   There were no vitals filed for this visit.       Labs/Imaging/Studies:   No results found for this or any previous visit (from the past 48 hours).   No results found for: "PHENYTOIN", "PHENOBARB", "VALPROATE", "CBMZ"    Compliance: yes    Side effects: None    Risk Parameters:  Patient reports no suicidal ideation  Patient reports no homicidal ideation  Patient reports no self-injurious behavior  Patient reports no violent behavior    Exam (detailed: at least 9 elements; comprehensive: all 15 elements)   Constitutional  Vitals:  Most recent vital signs, dated less than 90 days prior to this appointment, were reviewed.   There were no vitals filed for this visit.     General:  unremarkable, age appropriate     Musculoskeletal  Muscle Strength/Tone:  not examined   Gait & Station:  Not examined     Psychiatric  Speech:  no latency; no press   Mood & Affect:  euthymic  congruent and appropriate   Thought Process:  normal and logical   Associations:  intact   Thought Content:  normal, no suicidality, no homicidality, delusions, or paranoia   Insight:  intact, has awareness of illness   Judgement: behavior is adequate to circumstances   Orientation:  grossly intact   Memory: intact for content of interview   Language: grossly intact   Attention Span & Concentration:  able to focus   Fund of Knowledge:  intact and appropriate to age and level of education     Assessment and Diagnosis   Status/Progress: Based on the examination today, the patient's problem(s) is/are adequately but not ideally controlled.  New problems have been presented today.   Co-morbidities, Diagnostic uncertainty and Lack of compliance are not complicating management of the primary condition.  There are no active rule-out diagnoses for this patient at this time.     General Impression:      ICD-10-CM ICD-9-CM   1. Generalized anxiety disorder with panic " attacks  F41.1 300.02    F41.0 300.01   2. MDD (major depressive disorder), recurrent episode, mild  F33.0 296.31   3. Nightmares  F51.5 307.47         Intervention/Counseling/Treatment Plan   Medication Management: Continue current medications. The risks and benefits of medication were discussed with the patient.    -continue effexor XR 37.5mg daily    -continue xanax 0.25mg q8h prn anxiety. Use sparingly as directed    -continue vitamin d3 2k iu once daily      The risks and benefits of medication were discussed with the patient.  Discussed diagnosis, risks and benefits of proposed treatment above vs alternative treatments vs no treatment, and potential side effects of these treatments. The patient expresses understanding of the above and displays the capacity to agree with this treatment given said understanding. Patient also agrees that, currently, the benefits outweigh the risks and would like to pursue treatment at this time.  Discussed inherent unpredictability of medications in each individual.   Encouraged Patient to keep future appointments.   Take medications as prescribed and abstain from substance abuse.   In the event of an emergency, patient was advised to go to the emergency room      Return to Clinic: 3 months        Kristofer Martinez PA-C      Total face to face time: 8 min  Total time (chart review, patient contact, documentation): 10 min      *This note has been prepared using a combination of a dictation device and typing.  It has been checked for errors but some errors may still exist within the note as a result of speech recognition errors and/or typographical errors.

## 2025-03-25 ENCOUNTER — TELEPHONE (OUTPATIENT)
Dept: AUDIOLOGY | Facility: CLINIC | Age: 70
End: 2025-03-25
Payer: MEDICARE

## 2025-03-25 DIAGNOSIS — H90.3 SENSORINEURAL HEARING LOSS, BILATERAL: Primary | ICD-10-CM

## 2025-04-11 ENCOUNTER — CLINICAL SUPPORT (OUTPATIENT)
Dept: CARDIOLOGY | Facility: HOSPITAL | Age: 70
End: 2025-04-11
Attending: INTERNAL MEDICINE
Payer: MEDICARE

## 2025-04-11 ENCOUNTER — CLINICAL SUPPORT (OUTPATIENT)
Dept: CARDIOLOGY | Facility: HOSPITAL | Age: 70
End: 2025-04-11

## 2025-04-11 DIAGNOSIS — I48.0 PAROXYSMAL ATRIAL FIBRILLATION: ICD-10-CM

## 2025-04-11 DIAGNOSIS — I49.5 SICK SINUS SYNDROME: ICD-10-CM

## 2025-04-11 DIAGNOSIS — Z95.0 PRESENCE OF CARDIAC PACEMAKER: ICD-10-CM

## 2025-04-11 PROCEDURE — 93294 REM INTERROG EVL PM/LDLS PM: CPT | Mod: HCNC,,, | Performed by: INTERNAL MEDICINE

## 2025-04-11 PROCEDURE — 93296 REM INTERROG EVL PM/IDS: CPT | Mod: HCNC | Performed by: INTERNAL MEDICINE

## 2025-05-06 ENCOUNTER — OFFICE VISIT (OUTPATIENT)
Dept: INTERNAL MEDICINE | Facility: CLINIC | Age: 70
End: 2025-05-06
Payer: MEDICARE

## 2025-05-06 VITALS
OXYGEN SATURATION: 97 % | TEMPERATURE: 98 F | BODY MASS INDEX: 26.11 KG/M2 | HEIGHT: 61 IN | RESPIRATION RATE: 18 BRPM | WEIGHT: 138.31 LBS | HEART RATE: 78 BPM

## 2025-05-06 DIAGNOSIS — T46.2X5A AMIODARONE-INDUCED THYROIDITIS: ICD-10-CM

## 2025-05-06 DIAGNOSIS — M81.0 AGE-RELATED OSTEOPOROSIS WITHOUT CURRENT PATHOLOGICAL FRACTURE: ICD-10-CM

## 2025-05-06 DIAGNOSIS — I49.5 SICK SINUS SYNDROME: ICD-10-CM

## 2025-05-06 DIAGNOSIS — F41.1 GENERALIZED ANXIETY DISORDER WITH PANIC ATTACKS: ICD-10-CM

## 2025-05-06 DIAGNOSIS — Z95.0 S/P PLACEMENT OF CARDIAC PACEMAKER: ICD-10-CM

## 2025-05-06 DIAGNOSIS — E78.2 MIXED HYPERLIPIDEMIA: ICD-10-CM

## 2025-05-06 DIAGNOSIS — R79.9 ABNORMAL FINDING OF BLOOD CHEMISTRY, UNSPECIFIED: ICD-10-CM

## 2025-05-06 DIAGNOSIS — F41.0 GENERALIZED ANXIETY DISORDER WITH PANIC ATTACKS: ICD-10-CM

## 2025-05-06 DIAGNOSIS — I48.0 PAROXYSMAL ATRIAL FIBRILLATION: ICD-10-CM

## 2025-05-06 DIAGNOSIS — F33.0 MDD (MAJOR DEPRESSIVE DISORDER), RECURRENT EPISODE, MILD: Primary | ICD-10-CM

## 2025-05-06 DIAGNOSIS — E55.9 VITAMIN D DEFICIENCY: ICD-10-CM

## 2025-05-06 DIAGNOSIS — E06.4 AMIODARONE-INDUCED THYROIDITIS: ICD-10-CM

## 2025-05-06 DIAGNOSIS — I50.32 CHRONIC HEART FAILURE WITH PRESERVED EJECTION FRACTION: ICD-10-CM

## 2025-05-06 DIAGNOSIS — Z79.01 CHRONIC ANTICOAGULATION: ICD-10-CM

## 2025-05-06 PROBLEM — Z12.11 ENCOUNTER FOR SCREENING COLONOSCOPY: Status: RESOLVED | Noted: 2019-09-16 | Resolved: 2025-05-06

## 2025-05-06 PROCEDURE — 99999 PR PBB SHADOW E&M-EST. PATIENT-LVL V: CPT | Mod: PBBFAC,HCNC,, | Performed by: PHYSICIAN ASSISTANT

## 2025-05-06 NOTE — PROGRESS NOTES
Subjective:       Patient ID: Trudi Mcknight is a 70 y.o. female.        Chief Complaint: Annual Exam    Trudi Mcknight is an established patient of Renuka Moreno MD here today for annual exam.     SSS, s/p pacemaker placement, paroxysmal Afib -   Xarelto 15 mg daily  Follwed by Dr. Duncan and Renuka Toth NP     DANIELLE, MDD -   Effexor 37.5 mg daily, very rare xanax use  Followed by psychiatry, [facility]     Rosacea - multiple creams     Lipids -   Crestor  Pravastatin  Lipitor  Bessie  Has not been able to tolerate any cholesterol lowering medication due to side effect  Working with cardiology, [facility] Paredes-ANDRAE    GERD -   Prilosec 20 mg three times/week    Amiodarone induced thyroiditis, check TSH yearly, Shayy Webster NP, due for f/u this summer    Osteoporosis and has declined tx at this time           Review of Systems   Constitutional:  Negative for chills, diaphoresis, fatigue and fever.   HENT:  Negative for congestion and sore throat.    Eyes:  Negative for visual disturbance.   Respiratory:  Negative for cough, chest tightness and shortness of breath.    Cardiovascular:  Negative for chest pain, palpitations and leg swelling.   Gastrointestinal:  Negative for abdominal pain, blood in stool, constipation, diarrhea, nausea and vomiting.   Genitourinary:  Negative for dysuria, frequency, hematuria and urgency.   Musculoskeletal:  Negative for arthralgias and back pain.   Skin:  Negative for rash.   Neurological:  Negative for dizziness, syncope, weakness and headaches.   Psychiatric/Behavioral:  Negative for dysphoric mood and sleep disturbance. The patient is not nervous/anxious.        Objective:      Physical Exam  Vitals and nursing note reviewed.   Constitutional:       Appearance: Normal appearance. She is well-developed.   HENT:      Head: Normocephalic.      Right Ear: Tympanic membrane and external ear normal.      Left Ear: Tympanic membrane and external ear normal.       Nose: No mucosal edema or rhinorrhea.      Mouth/Throat:      Pharynx: Oropharynx is clear.   Eyes:      Pupils: Pupils are equal, round, and reactive to light.   Cardiovascular:      Rate and Rhythm: Normal rate and regular rhythm.      Heart sounds: Normal heart sounds. No murmur heard.     No friction rub. No gallop.   Pulmonary:      Effort: Pulmonary effort is normal. No respiratory distress.      Breath sounds: Normal breath sounds.   Abdominal:      Palpations: Abdomen is soft.      Tenderness: There is no abdominal tenderness.   Skin:     General: Skin is warm and dry.   Neurological:      Mental Status: She is alert.         Assessment:       1. MDD (major depressive disorder), recurrent episode, mild    2. Sick sinus syndrome    3. Generalized anxiety disorder with panic attacks    4. S/P placement of cardiac pacemaker    5. Mixed hyperlipidemia    6. Paroxysmal atrial fibrillation    7. Chronic anticoagulation    8. Vitamin D deficiency    9. Abnormal finding of blood chemistry, unspecified    10. Amiodarone-induced thyroiditis    11. Age-related osteoporosis without current pathological fracture    12. Chronic heart failure with preserved ejection fraction        Plan:       Trudi was seen today for annual exam.    Diagnoses and all orders for this visit:    MDD (major depressive disorder), recurrent episode, mild - stable and controlled, continue current regimen, follows with psychiatry     Sick sinus syndrome s/p pacemaker placement    Generalized anxiety disorder with panic attacks - stable and controlled, continue current regimen, follows with psychiatry     S/P placement of cardiac pacemaker    Mixed hyperlipidemia - intolerant to multiple statins and zetia, needs to discuss with cardiology regarding next steps  -     TSH; Future  -     Hemoglobin A1C; Future  -     Comprehensive Metabolic Panel; Future  -     Lipid Panel; Future    Paroxysmal atrial fibrillation    Chronic anticoagulation  -      "CBC Auto Differential; Future    Vitamin D deficiency  -     Vitamin D; Future    Abnormal finding of blood chemistry, unspecified  -     Hemoglobin A1C; Future    Amiodarone-induced thyroiditis - due for yearly TSH level    Age-related osteoporosis without current pathological fracture - declines tx at this time    Chronic heart failure with preserved ejection fraction - echo 12/2022, EF 65%, no swelling or shortness of breath    Visit today included increased complexity associated with the care of the episodic problem addressed and managing the longitudinal care of the patient due to the serious and/or complex managed problem(s) osteoporosis, lipids, vitamin d    Discussed annual must be with Dr. Moreno next year.    Pt has been given instructions populated from patient instructions database and has verbalized understanding of the after visit summary and information contained wherein.    Follow up with a primary care provider. May go to ER for acute shortness of breath, lightheadedness, fever, or any other emergent complaints or changes in condition.    "This note will be shared with the patient"    Future Appointments   Date Time Provider Department Center   5/20/2025  9:30 AM Ann Gagnon Au.D, CCC-A [facility]UDIO [place]   5/20/2025 10:00 AM Ann Gagnon Au.D, CCC-A [facility] [place]   5/22/2025  8:00 AM Vivian Ivan OD [facility] [place]   6/4/2025  9:00 AM Wilner Martinez PA [facility] [place]                 "

## 2025-05-06 NOTE — PATIENT INSTRUCTIONS
Schedule with Shayy Webster, due 7/2025  Schedule with Renuka Toth or Dr. Duncan, due 6/2025    You are due for a tetanus booster    You can get a covid booster when you are ready    Hi Trudi,     If you are due for any health screening(s) below please notify me so we can arrange them to be ordered and scheduled. Most healthy patients at your age complete them, but you are free to accept or refuse.     If you can't do it, I'll definitely understand. If you can, I'd certainly appreciate it!    All of your core healthy metrics are met.

## 2025-05-07 ENCOUNTER — LAB VISIT (OUTPATIENT)
Dept: LAB | Facility: HOSPITAL | Age: 70
End: 2025-05-07
Attending: PHYSICIAN ASSISTANT
Payer: MEDICARE

## 2025-05-07 DIAGNOSIS — E78.2 MIXED HYPERLIPIDEMIA: ICD-10-CM

## 2025-05-07 DIAGNOSIS — E55.9 VITAMIN D DEFICIENCY: ICD-10-CM

## 2025-05-07 DIAGNOSIS — Z79.01 CHRONIC ANTICOAGULATION: ICD-10-CM

## 2025-05-07 DIAGNOSIS — R79.9 ABNORMAL FINDING OF BLOOD CHEMISTRY, UNSPECIFIED: ICD-10-CM

## 2025-05-07 LAB
25(OH)D3+25(OH)D2 SERPL-MCNC: 37 NG/ML (ref 30–96)
ABSOLUTE EOSINOPHIL (OHS): 0.22 K/UL
ABSOLUTE MONOCYTE (OHS): 0.43 K/UL (ref 0.3–1)
ABSOLUTE NEUTROPHIL COUNT (OHS): 2.79 K/UL (ref 1.8–7.7)
ALBUMIN SERPL BCP-MCNC: 3.5 G/DL (ref 3.5–5.2)
ALP SERPL-CCNC: 87 UNIT/L (ref 40–150)
ALT SERPL W/O P-5'-P-CCNC: 8 UNIT/L (ref 10–44)
ANION GAP (OHS): 5 MMOL/L (ref 8–16)
AST SERPL-CCNC: 18 UNIT/L (ref 11–45)
BASOPHILS # BLD AUTO: 0.05 K/UL
BASOPHILS NFR BLD AUTO: 1 %
BILIRUB SERPL-MCNC: 0.4 MG/DL (ref 0.1–1)
BUN SERPL-MCNC: 14 MG/DL (ref 8–23)
CALCIUM SERPL-MCNC: 9.1 MG/DL (ref 8.7–10.5)
CHLORIDE SERPL-SCNC: 109 MMOL/L (ref 95–110)
CHOLEST SERPL-MCNC: 193 MG/DL (ref 120–199)
CHOLEST/HDLC SERPL: 3.4 {RATIO} (ref 2–5)
CO2 SERPL-SCNC: 25 MMOL/L (ref 23–29)
CREAT SERPL-MCNC: 0.9 MG/DL (ref 0.5–1.4)
EAG (OHS): 117 MG/DL (ref 68–131)
ERYTHROCYTE [DISTWIDTH] IN BLOOD BY AUTOMATED COUNT: 14.6 % (ref 11.5–14.5)
GFR SERPLBLD CREATININE-BSD FMLA CKD-EPI: >60 ML/MIN/1.73/M2
GLUCOSE SERPL-MCNC: 83 MG/DL (ref 70–110)
HBA1C MFR BLD: 5.7 % (ref 4–5.6)
HCT VFR BLD AUTO: 44.2 % (ref 37–48.5)
HDLC SERPL-MCNC: 56 MG/DL (ref 40–75)
HDLC SERPL: 29 % (ref 20–50)
HGB BLD-MCNC: 13.6 GM/DL (ref 12–16)
IMM GRANULOCYTES # BLD AUTO: 0.01 K/UL (ref 0–0.04)
IMM GRANULOCYTES NFR BLD AUTO: 0.2 % (ref 0–0.5)
LDLC SERPL CALC-MCNC: 117.6 MG/DL (ref 63–159)
LYMPHOCYTES # BLD AUTO: 1.29 K/UL (ref 1–4.8)
MCH RBC QN AUTO: 26.6 PG (ref 27–31)
MCHC RBC AUTO-ENTMCNC: 30.8 G/DL (ref 32–36)
MCV RBC AUTO: 86 FL (ref 82–98)
NONHDLC SERPL-MCNC: 137 MG/DL
NUCLEATED RBC (/100WBC) (OHS): 0 /100 WBC
PLATELET # BLD AUTO: 285 K/UL (ref 150–450)
PMV BLD AUTO: 10.8 FL (ref 9.2–12.9)
POTASSIUM SERPL-SCNC: 5.1 MMOL/L (ref 3.5–5.1)
PROT SERPL-MCNC: 6.9 GM/DL (ref 6–8.4)
RBC # BLD AUTO: 5.12 M/UL (ref 4–5.4)
RELATIVE EOSINOPHIL (OHS): 4.6 %
RELATIVE LYMPHOCYTE (OHS): 26.9 % (ref 18–48)
RELATIVE MONOCYTE (OHS): 9 % (ref 4–15)
RELATIVE NEUTROPHIL (OHS): 58.3 % (ref 38–73)
SODIUM SERPL-SCNC: 139 MMOL/L (ref 136–145)
TRIGL SERPL-MCNC: 97 MG/DL (ref 30–150)
TSH SERPL-ACNC: 3.62 UIU/ML (ref 0.4–4)
WBC # BLD AUTO: 4.79 K/UL (ref 3.9–12.7)

## 2025-05-07 PROCEDURE — 36415 COLL VENOUS BLD VENIPUNCTURE: CPT

## 2025-05-07 PROCEDURE — 84443 ASSAY THYROID STIM HORMONE: CPT | Mod: HCNC

## 2025-05-07 PROCEDURE — 85025 COMPLETE CBC W/AUTO DIFF WBC: CPT | Mod: HCNC

## 2025-05-07 PROCEDURE — 80061 LIPID PANEL: CPT | Mod: HCNC

## 2025-05-07 PROCEDURE — 80053 COMPREHEN METABOLIC PANEL: CPT | Mod: HCNC

## 2025-05-07 PROCEDURE — 82306 VITAMIN D 25 HYDROXY: CPT | Mod: HCNC

## 2025-05-07 PROCEDURE — 83036 HEMOGLOBIN GLYCOSYLATED A1C: CPT | Mod: HCNC

## 2025-05-08 ENCOUNTER — PATIENT MESSAGE (OUTPATIENT)
Dept: INTERNAL MEDICINE | Facility: CLINIC | Age: 70
End: 2025-05-08
Payer: MEDICARE

## 2025-05-08 ENCOUNTER — RESULTS FOLLOW-UP (OUTPATIENT)
Dept: INTERNAL MEDICINE | Facility: CLINIC | Age: 70
End: 2025-05-08

## 2025-05-13 NOTE — PLAN OF CARE
PT evaluation complete and appropriate goals established.    2022    Problem: Physical Therapy Goal  Goal: Physical Therapy Goal  Description: Goals to be met by: 2/15/2022     Patient will increase functional independence with mobility by performin. Supine to sit with [place]  2. Sit to stand transfer with Supervision  3. Gait  x 150 feet with Supervision without AD.   4. Ascend/descend 5 stair with bilateral Handrails with SBA.  5. Lower extremity exercise program x15 reps, with supervision, in order to increase LE strength and (I) with functional mobility.     Outcome: Ongoing, Progressing      Universal Safety Interventions

## 2025-05-15 ENCOUNTER — DOCUMENTATION ONLY (OUTPATIENT)
Dept: AUDIOLOGY | Facility: CLINIC | Age: 70
End: 2025-05-15
Payer: MEDICARE

## 2025-05-15 NOTE — PROGRESS NOTES
HEARING AID FOLLOW-UP    Trudi Mcknight was seen today for a hearing aid follow-up visit. Patient's new audiogram was applied to settings. Hearing aids were connected to [employer] and a firmware update was completed successfully. Patient's acclimization was increased from a 2 to a 3. Mrs. Mcknight expressed she was happy with today's changes. She verified that her hearing aids were connected to her cell phone. She was counseled on possible loudness in certain environments with today's increase in gain; she was advised to use volume control in the sandra as needed for comfort.

## 2025-05-15 NOTE — PROGRESS NOTES
Hearing Aid Information  Service Type: Itemized  Service End Date: 07/16/24  Trial Period End Date: 05/17/24   and Model: Oticon INTENT 1 miniRITE R  Color: dario blue  Right SN: [id number]  Left SN: [id number]  Battery: Li-Ion Rechargeable 13  Right  and Dome: MicroShell Detect 85 2.4mm vent ([id number]) 1R  Left  and Dome: MicroShell Detect 85 2.4mm vent ([id number]) 1L  Repair Warranty Expiration Date: 05/05/27  L&D Warranty Expiration Date: 05/05/27   SN: [id number]

## 2025-05-20 ENCOUNTER — CLINICAL SUPPORT (OUTPATIENT)
Dept: AUDIOLOGY | Facility: CLINIC | Age: 70
End: 2025-05-20
Payer: MEDICARE

## 2025-05-20 DIAGNOSIS — H90.3 SENSORINEURAL HEARING LOSS, BILATERAL: Primary | ICD-10-CM

## 2025-05-20 DIAGNOSIS — H90.3 SENSORINEURAL HEARING LOSS, BILATERAL: ICD-10-CM

## 2025-05-20 PROCEDURE — 92557 COMPREHENSIVE HEARING TEST: CPT | Mod: S$GLB,,, | Performed by: AUDIOLOGIST

## 2025-05-20 PROCEDURE — 92567 TYMPANOMETRY: CPT | Mod: S$GLB,,, | Performed by: AUDIOLOGIST

## 2025-05-20 PROCEDURE — 99999 PR PBB SHADOW E&M-EST. PATIENT-LVL I: CPT | Mod: PBBFAC,,, | Performed by: AUDIOLOGIST

## 2025-05-20 NOTE — PROGRESS NOTES
Trudi Mcknight was seen today in the clinic for an annual audiologic evaluation. Mrs. Mcknight reported she would like to increase gain in her hearing aids some but denied any significant changes in hearing sensitivity. She denied any aural fullness or otalgia. She currently wears binaural hearing aids and has worn hearing aids for several years.     Otoscopy revealed clear EAC's with visible TM's in both ears.    Tympanometry revealed Type A in the right ear and Type A in the left ear.     Audiogram results revealed a mild to severe SNHL in the right ear and a mild-moderate to severe SNHL in the left ear.      Speech reception thresholds were noted at 20 dB in the right ear and 30 dB in the left ear.    Speech discrimination scores were 88% in the right ear and 96% in the left ear.    Results were reviewed with the patient following testing; she was seen for a hearing aid follow-up immediately following testing. There was little to no change in hearing sensitivity.    Recommendations:  Annual audiogram  Hearing protection when in noise  Daily use of binaural hearing aids

## 2025-05-22 ENCOUNTER — OFFICE VISIT (OUTPATIENT)
Dept: OPTOMETRY | Facility: CLINIC | Age: 70
End: 2025-05-22
Payer: MEDICARE

## 2025-05-22 DIAGNOSIS — H02.889 MEIBOMIAN GLAND DISEASE, UNSPECIFIED LATERALITY: ICD-10-CM

## 2025-05-22 DIAGNOSIS — H33.321 RETINA HOLE, RIGHT: ICD-10-CM

## 2025-05-22 DIAGNOSIS — H43.811 POSTERIOR VITREOUS DETACHMENT, RIGHT: ICD-10-CM

## 2025-05-22 DIAGNOSIS — H52.203 ASTIGMATISM OF BOTH EYES WITH PRESBYOPIA: Primary | ICD-10-CM

## 2025-05-22 DIAGNOSIS — H04.123 DRY EYE SYNDROME, BILATERAL: ICD-10-CM

## 2025-05-22 DIAGNOSIS — H52.4 ASTIGMATISM OF BOTH EYES WITH PRESBYOPIA: Primary | ICD-10-CM

## 2025-05-22 DIAGNOSIS — H43.11 VITREOUS HEMORRHAGE, RIGHT: ICD-10-CM

## 2025-05-22 DIAGNOSIS — T46.2X5A AMIODARONE-INDUCED THYROIDITIS: ICD-10-CM

## 2025-05-22 DIAGNOSIS — H25.13 NUCLEAR SCLEROSIS, BILATERAL: ICD-10-CM

## 2025-05-22 DIAGNOSIS — E06.4 AMIODARONE-INDUCED THYROIDITIS: ICD-10-CM

## 2025-05-22 PROCEDURE — 99999 PR PBB SHADOW E&M-EST. PATIENT-LVL III: CPT | Mod: PBBFAC,,, | Performed by: OPTOMETRIST

## 2025-05-22 NOTE — PROGRESS NOTES
HPI     Blurred Vision     Additional comments: Patient Trudi Mcknight is a 70 year old female.           Comments    Pt here for refraction only (Dr. Goldstein). Pt states that she has been   squinting in PAL glasses, states that VA OU has changed.    DLS: 02/11/2025 with Dr. Goldstein  PD: 64.0 mm    Meds: None    POHx:  1. Hemorrhage PVD OD     Sx began 9/18/24     Blood emanating from ONH - on Xarelto     Small hole at 1:00 - retinopexy OD today  2. Early NS OU            Last edited by Mackenzie Navarro on 5/22/2025  8:07 AM.            Assessment /Plan     For exam results, see Encounter Report.       Astigmatism of both eyes with presbyopia              Rx specs      Meibomian gland disease, unspecified laterality  Rosacea blepharoconjunctivitis               Use ocusoft lid scrubs and hypochlor spray daily    Nuclear sclerosis, bilateral  Amiodarone-induced thyroiditis        Vitreous hemorrhage, right  Retina hole, right  Posterior vitreous detachment, right    Sept 2024 Hemorrhage PVD OD     Blood emanating from ONH - on Xarelto     Small hole at 1:00 - retinopexy OD- [place]          RTC 1 year

## 2025-06-02 NOTE — PROGRESS NOTES
Ms. Mcknight is a patient of Dr. Duncan and was last seen in clinic 6/14/2024.      Subjective:   Patient ID:  Trudi Mcknight is a 70 y.o. female who presents for follow up of Atrial Fibrillation and Pacemaker Check  .     HPI:    Ms. Mcknight is a 70 y.o. female with atrial fibrillation (cryo PVI 3/29/2022, RF PVI 8/16/2022), sick sinus syndrome, PPM, anxiety, hypothyroidism here for follow up.    Background:    Initially presented with symptomatic AF and symptomatic sinus pauses. Also noted chest pain not related to these events.  Dual chamber PPM implanted 1/20/22.  Continued to have AF. Did not tolerate Flecainide or Propafenone.  Echo revealed moderate pericardial effusion, which resolved.  PVI 3/29/22 Cryo-ablation.  Continued to have symptomatic AF/nonsustained AT. Also had multiple hospitalizations for somatic complaints not related to this, with anxiety playing a significant role.  Placed on amiodarone.  Echo 4/6/22 normal biventricular structure and function mild LAE small posterolateral effusion.    6/22/2022:  Has started to improve. No recent hospitalizations. Device interrogation reveals no sustained AF in the past month.   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 Being followed by Endocrinology.  Notes tremors. Also notes rash.  Doing well with amiodarone.   Options are repeat ablation and discontinue amiodarone vs continue amiodarone for now.  We discussed both options at length. I initially endorsed continuing with amiodarone, given she is now doing better after frequent hospitalizations. However, her preference is repeat ablation and trial off amiodarone, given the tremors, rash, and hypothyroidism.  Risks and benefits of repeat RFA/PVI discussed, she would like to proceed.  Hold Xarelto morning of procedure, take evening prior.    8/16/2022: RF PVI, PWI    8/21/2022: She is 3 months s/p redo ablation. She is feeling well since procedure. Did have some AF during blanking period (1%),  "mostly while having covid. She is reporting symptom improvement.  She is off amiodarone since Sept. CHADSVASc 2 on xarelto at renal dose (CRCL 37). No RV pacing. No CHF symptoms. Echo 4/2022 showed normal LVEF.    5/22/2023: She is now 9 months s/p redo ablation. Not on AAD. Some irreg HB after meds/eating likely ectopy. She has not tolerated BB in the past due to hypotension. She says this symptom is improving. On xarelto.  Ms. Mcknight is doing well from a device perspective with stable lead and device function. No RV pacing. No CHF symptoms. RTC 1 yr.    6/14/2024: Ms. Mcknight is doing well from a device perspective with stable lead and device function. No arrhythmia noted. On xarelto at renal dose. No RV pacing. No evidence of CHF but she does report some side effects from crestor. She will reach out to her cardiology team. Deferred echo for now. Last echo 12/2022 showed preserved LVEF.       Update (06/10/2025):    Today she reports she had been experiencing recurrent URI with increased SOB, palps, chest discomfort earlier in the year but these symptoms have improved. No LH or syncope reported.    She is currently taking xarelto 15mg daily (CRCL 49) for stroke prophylaxis and denies significant bleeding episodes. Kidney function is stable, with a creatinine of 0.9 on 5/7/2025.    Device Interrogation (6/10/2025) reveals an intrinsic SR (77bpm) with stable lead and device function. AF burden 0%. AMS x 4; egrams c/w ST. Max duration x 58 min 32 sec. Most recent on 2/26/25 x 8 min 34 sec. Ventricular arrhythmias: "HVR" x 4; max duration x 3 sec egrams c/w NSAT.  He paces 7.6% in the RA and 1% in the RV. Estimated battery longevity 6.5-8.1 years.     I have personally reviewed the patient's EKG today, which shows SR at 72bpm. SD interval is 170. QRS is 76. QTc is 440.    Relevant Cardiac Test Results:    2D Echo (12/30/2022):  Small circumferential pericardial effusion, largest inferolaterally. No evidence of " tamponade.  The left ventricle is normal in size with normal systolic function.  The estimated ejection fraction is 65%.  Normal left ventricular diastolic function.  Normal right ventricular size with normal right ventricular systolic function.  Normal central venous pressure (3 mmHg).  The estimated PA systolic pressure is 22 mmHg.    Current Outpatient Medications   Medication Sig    albuterol (VENTOLIN HFA) 90 mcg/actuation inhaler Inhale 1-2 puffs into the lungs every 6 (six) hours as needed for Wheezing or Shortness of Breath. Rescue    ALPRAZolam (XANAX) 0.25 MG tablet Take 1 tablet (0.25 mg total) by mouth nightly as needed for Anxiety.    atorvastatin (LIPITOR) 10 MG tablet Take 1 tablet (10 mg total) by mouth 3 (three) times a week. 1 tablet Monday, Wednesday, and Friday    azelaic acid (FINACEA) 15 % Foam APPLY TO THE AFFECTED AREA(S) of face ONCE OR twice DAILY    AZELAIC ACID MISC APPLY TO THE AFFECTED AREA(S) of face ONCE OR twice DAILY    diclofenac sodium (VOLTAREN) 1 % Gel Apply 2 g topically 2 (two) times daily as needed (pain).    metroNIDAZOLE (NORITATE) 1 % cream Compound azelaic acid 15% + ivermectin 1% + metronidazole 1% cream. Apply to affected area on face once or twice daily.    omeprazole (PRILOSEC) 20 MG capsule Take 1 capsule (20 mg total) by mouth daily as needed (reflux).    rivaroxaban (XARELTO) 15 mg Tab Take 1 tablet (15 mg total) by mouth daily with dinner or evening meal.    sulfacetamide sodium-sulfur (SULFACLEANSE 8-4) 8-4 % Susp Wash face qhs    tretinoin (RETIN-A) 0.025 % cream Compound tretinoin 0.025% / azelaic acid 8% / niacinamide 2% cream. Apply a pea-sized amount to entire face qhs then moisturize    tretinoin (RETIN-A) 0.025 % cream Compound tretinoin 0.025% / azelaic acid 8% / niacinamide 2% cream. Apply a pea-sized amount to entire face qhs then moisturize    tretinoin (RETIN-A) 0.025 % cream Compound tretinoin 0.025% / azelaic acid 8% / niacinamide 2% cream. Apply a  "pea-sized amount to entire face qhs then moisturize    valACYclovir (VALTREX) 1000 MG tablet TAKE 2 TABLETS EVERY 12 HOURS FOR TWO DOSES AS NEEDED FOR COLD SORES    venlafaxine (EFFEXOR-XR) 37.5 MG 24 hr capsule Take 1 capsule (37.5 mg total) by mouth once daily.     No current facility-administered medications for this visit.       Review of Systems   Constitutional: Negative for malaise/fatigue.   Cardiovascular:  Positive for chest pain (improved), dyspnea on exertion (improved) and palpitations (improved). Negative for irregular heartbeat and leg swelling.   Respiratory:  Negative for shortness of breath.    Hematologic/Lymphatic: Negative for bleeding problem.   Skin:  Negative for rash.   Musculoskeletal:  Negative for myalgias.   Gastrointestinal:  Negative for hematemesis, hematochezia and nausea.   Genitourinary:  Negative for hematuria.   Neurological:  Negative for light-headedness.   Psychiatric/Behavioral:  Negative for altered mental status.    Allergic/Immunologic: Negative for persistent infections.       Objective:          /74 (Patient Position: Sitting)   Pulse 72   Ht 5' 1" (1.549 m)   Wt 62.6 kg (138 lb 0.1 oz)   BMI 26.08 kg/m²     Physical Exam  Vitals and nursing note reviewed.   Constitutional:       Appearance: Normal appearance. She is well-developed.   HENT:      Head: Normocephalic.      Nose: Nose normal.   Eyes:      Pupils: Pupils are equal, round, and reactive to light.   Cardiovascular:      Rate and Rhythm: Normal rate and regular rhythm.   Pulmonary:      Effort: No respiratory distress.   Chest:      Comments: Device to LUCW. Incision and pocket in good repair.    Musculoskeletal:         General: Normal range of motion.   Skin:     General: Skin is warm and dry.      Findings: No erythema.   Neurological:      Mental Status: She is alert and oriented to person, place, and time.   Psychiatric:         Speech: Speech normal.         Behavior: Behavior normal. "           Lab Results   Component Value Date     05/07/2025     07/30/2024    K 5.1 05/07/2025    K 4.2 07/30/2024    MG 2.1 04/09/2022    BUN 14 05/07/2025    CREATININE 0.9 05/07/2025    ALT 8 (L) 05/07/2025    ALT 15 06/07/2024    AST 18 05/07/2025    AST 20 06/07/2024    HGB 13.6 05/07/2025    HGB 13.5 08/09/2022    HCT 44.2 05/07/2025    HCT 43.2 08/09/2022    HCT 43 01/02/2022    TSH 3.618 05/07/2025    TSH 2.173 06/07/2024    LDLCALC 117.6 05/07/2025       Recent Labs   Lab 08/09/22  0829   INR 1.2       Assessment:     1. S/P placement of cardiac pacemaker    2. Chronic heart failure with preserved ejection fraction    3. Paroxysmal atrial fibrillation    4. Sick sinus syndrome    5. Typical atrial flutter    6. Chronic anticoagulation        Plan:     In summary, Ms. Mcknight is a 70 y.o. female with atrial fibrillation (cryo PVI 3/29/2022, RF PVI 8/16/2022), sick sinus syndrome, PPM, anxiety, hypothyroidism here for follow up.  Ms. Mcknight is doing well from a device perspective with stable lead and device function. AF burden remains low but she did have recurrence earlier this year in the context of URI. She was symptomatic. No episodes since Feb. Her symptoms have improved. No RV pacing. No CHF symptoms. She is on xarelto at renal dose. CRCL 49. Will check wt in 3 mo and adjust dose if needed based on trend. 6.5 yrs to CAITY.    WT check 3 mo to review CRCL ->xarelto dose change if needed  Continue current medication regimen and device settings.   Follow up in device clinic as scheduled.   Follow up in EP clinic in 1 year, sooner as needed.     *A copy of this note has been sent to Dr. Duncan*    Follow up in about 1 year (around 6/10/2026).      ------------------------------------------------------------------    YOUSIF Durham, NP-C  Cardiac Electrophysiology

## 2025-06-04 ENCOUNTER — TELEPHONE (OUTPATIENT)
Dept: PSYCHIATRY | Facility: CLINIC | Age: 70
End: 2025-06-04
Payer: MEDICARE

## 2025-06-04 ENCOUNTER — PATIENT MESSAGE (OUTPATIENT)
Dept: PSYCHIATRY | Facility: CLINIC | Age: 70
End: 2025-06-04
Payer: MEDICARE

## 2025-06-09 DIAGNOSIS — I48.0 PAROXYSMAL ATRIAL FIBRILLATION: Primary | ICD-10-CM

## 2025-06-10 ENCOUNTER — CLINICAL SUPPORT (OUTPATIENT)
Dept: CARDIOLOGY | Facility: HOSPITAL | Age: 70
End: 2025-06-10
Attending: INTERNAL MEDICINE
Payer: MEDICARE

## 2025-06-10 ENCOUNTER — OFFICE VISIT (OUTPATIENT)
Dept: ELECTROPHYSIOLOGY | Facility: CLINIC | Age: 70
End: 2025-06-10
Payer: MEDICARE

## 2025-06-10 VITALS
BODY MASS INDEX: 26.06 KG/M2 | WEIGHT: 138 LBS | HEART RATE: 72 BPM | DIASTOLIC BLOOD PRESSURE: 74 MMHG | SYSTOLIC BLOOD PRESSURE: 122 MMHG | HEIGHT: 61 IN

## 2025-06-10 DIAGNOSIS — Z79.01 CHRONIC ANTICOAGULATION: ICD-10-CM

## 2025-06-10 DIAGNOSIS — I49.5 SICK SINUS SYNDROME: Chronic | ICD-10-CM

## 2025-06-10 DIAGNOSIS — I48.3 TYPICAL ATRIAL FLUTTER: ICD-10-CM

## 2025-06-10 DIAGNOSIS — I48.0 PAROXYSMAL ATRIAL FIBRILLATION: ICD-10-CM

## 2025-06-10 DIAGNOSIS — Z95.0 S/P PLACEMENT OF CARDIAC PACEMAKER: Primary | Chronic | ICD-10-CM

## 2025-06-10 DIAGNOSIS — I48.0 PAROXYSMAL ATRIAL FIBRILLATION: Primary | ICD-10-CM

## 2025-06-10 DIAGNOSIS — I50.32 CHRONIC HEART FAILURE WITH PRESERVED EJECTION FRACTION: ICD-10-CM

## 2025-06-10 LAB
OHS QRS DURATION: 76 MS
OHS QTC CALCULATION: 440 MS

## 2025-06-10 PROCEDURE — 93280 PM DEVICE PROGR EVAL DUAL: CPT

## 2025-06-10 PROCEDURE — 99999 PR PBB SHADOW E&M-EST. PATIENT-LVL IV: CPT | Mod: PBBFAC,,, | Performed by: NURSE PRACTITIONER

## 2025-06-10 PROCEDURE — 93010 ELECTROCARDIOGRAM REPORT: CPT | Mod: S$GLB,,, | Performed by: INTERNAL MEDICINE

## 2025-06-11 ENCOUNTER — OFFICE VISIT (OUTPATIENT)
Dept: PSYCHIATRY | Facility: CLINIC | Age: 70
End: 2025-06-11
Payer: MEDICARE

## 2025-06-11 DIAGNOSIS — F33.41 MDD (MAJOR DEPRESSIVE DISORDER), RECURRENT, IN PARTIAL REMISSION: Primary | ICD-10-CM

## 2025-06-11 DIAGNOSIS — F41.0 GENERALIZED ANXIETY DISORDER WITH PANIC ATTACKS: ICD-10-CM

## 2025-06-11 DIAGNOSIS — F41.1 GENERALIZED ANXIETY DISORDER WITH PANIC ATTACKS: ICD-10-CM

## 2025-06-11 PROCEDURE — 1160F RVW MEDS BY RX/DR IN RCRD: CPT | Mod: CPTII,95,, | Performed by: PHYSICIAN ASSISTANT

## 2025-06-11 PROCEDURE — 3044F HG A1C LEVEL LT 7.0%: CPT | Mod: CPTII,95,, | Performed by: PHYSICIAN ASSISTANT

## 2025-06-11 PROCEDURE — 1159F MED LIST DOCD IN RCRD: CPT | Mod: CPTII,95,, | Performed by: PHYSICIAN ASSISTANT

## 2025-06-11 PROCEDURE — 98006 SYNCH AUDIO-VIDEO EST MOD 30: CPT | Mod: 95,,, | Performed by: PHYSICIAN ASSISTANT

## 2025-06-11 RX ORDER — VENLAFAXINE HYDROCHLORIDE 37.5 MG/1
37.5 CAPSULE, EXTENDED RELEASE ORAL DAILY
Qty: 90 CAPSULE | Refills: 1 | Status: SHIPPED | OUTPATIENT
Start: 2025-06-11

## 2025-06-11 NOTE — PROGRESS NOTES
"The patient location is: [place]    Visit type: audiovisual    Each patient to whom he or she provides medical services by telemedicine is:  (1) informed of the relationship between the physician and patient and the respective role of any other health care provider with respect to management of the patient; and (2) notified that he or she may decline to receive medical services by telemedicine and may withdraw from such care at any time.    Notes:     Outpatient Psychiatry Follow-Up Visit (MD/JASMINE)    6/11/2025    Clinical Status of Patient:  Outpatient (Ambulatory)    Chief Complaint:  Trudi Mcknight is a 70 y.o. female who presents today for follow-up of depression and anxiety.  Met with patient.      Interval History and Content of Current Session:  Interim Events/Subjective Report/Content of Current Session:    Pt reports today: "I've been doing alright. Sleeping better most nights"    Mood overall is "its been good overall"    Reports had vision issue in improving, had issues with retina.    Patients mood is euthymic, affect appears mood congruent. Linear and logical, friendly and cooperative, good eye contact.    Reports having vivid dreams where pt is acting out behaviors still, has fallen out of bed but not injured herself.    Denies SI/HI/AVH. Pt reports sleeping well most nights and normal appetite. Denies side effects of medications.    Pt reports taking medications as prescribed and behaving appropriately during interview today.      Prior visit:    Pt reports today: "things are going ok"    Mood overall is "really good overall"    Reports had vision issue in improving, had issues with retina.    Patients mood is euthymic, affect appears mood congruent. Linear and logical, friendly and cooperative, good eye contact.    Reports having vivid dreams where pt is acting out behaviors still, has fallen out of bed but not injured herself.    Trialed prazosin and reports she had palpitations when taking it, had " discontinued.    Denies SI/HI/AVH. Pt reports sleeping well most nights and normal appetite. Denies side effects of medications.    Pt reports taking medications as prescribed and behaving appropriately during interview today.      Review of Systems     Review of Systems   Constitutional:  Negative for fever and malaise/fatigue.   HENT:  Negative for sore throat.    Eyes:  Negative for photophobia.   Respiratory:  Negative for cough.    Cardiovascular:  Negative for chest pain and palpitations.   Gastrointestinal:  Negative for abdominal pain.   Genitourinary:  Negative for dysuria.   Musculoskeletal:  Negative for myalgias.   Skin:  Negative for rash.   Neurological:  Negative for dizziness.   Endo/Heme/Allergies:  Does not bruise/bleed easily.   Psychiatric/Behavioral:  Negative for depression, hallucinations, substance abuse and suicidal ideas. The patient is not nervous/anxious and does not have insomnia.        Psychiatric Review Of Systems - Is patient experiencing or having changes in:  sleep: no  appetite: no  weight: no  energy/anergy: no  interest/pleasure/anhedonia: no  somatic symptoms: no  libido: no  anxiety/panic: no  guilty/hopelessness: no  concentration: no  S.I.B.s/risky behavior: no  Irritability: no  Racing thoughts: no  Impulsive behaviors: no  Paranoia: no  AVH: no      Past Medical, Family and Social History: The patient's past medical, family and social history have been reviewed and updated as appropriate within the electronic medical record - see encounter notes.      Current Medications:   Medication List with Changes/Refills   Current Medications    ALBUTEROL (VENTOLIN HFA) 90 MCG/ACTUATION INHALER    Inhale 1-2 puffs into the lungs every 6 (six) hours as needed for Wheezing or Shortness of Breath. Rescue    ALPRAZOLAM (XANAX) 0.25 MG TABLET    Take 1 tablet (0.25 mg total) by mouth nightly as needed for Anxiety.    ATORVASTATIN (LIPITOR) 10 MG TABLET    Take 1 tablet (10 mg total) by  mouth 3 (three) times a week. 1 tablet Monday, Wednesday, and Friday    AZELAIC ACID (FINACEA) 15 % FOAM    APPLY TO THE AFFECTED AREA(S) of face ONCE OR twice DAILY    AZELAIC ACID MISC    APPLY TO THE AFFECTED AREA(S) of face ONCE OR twice DAILY    DICLOFENAC SODIUM (VOLTAREN) 1 % GEL    Apply 2 g topically 2 (two) times daily as needed (pain).    METRONIDAZOLE (NORITATE) 1 % CREAM    Compound azelaic acid 15% + ivermectin 1% + metronidazole 1% cream. Apply to affected area on face once or twice daily.    OMEPRAZOLE (PRILOSEC) 20 MG CAPSULE    Take 1 capsule (20 mg total) by mouth daily as needed (reflux).    RIVAROXABAN (XARELTO) 15 MG TAB    Take 1 tablet (15 mg total) by mouth daily with dinner or evening meal.    SULFACETAMIDE SODIUM-SULFUR (SULFACLEANSE 8-4) 8-4 % SUSP    Wash face qhs    TRETINOIN (RETIN-A) 0.025 % CREAM    Compound tretinoin 0.025% / azelaic acid 8% / niacinamide 2% cream. Apply a pea-sized amount to entire face qhs then moisturize    TRETINOIN (RETIN-A) 0.025 % CREAM    Compound tretinoin 0.025% / azelaic acid 8% / niacinamide 2% cream. Apply a pea-sized amount to entire face qhs then moisturize    TRETINOIN (RETIN-A) 0.025 % CREAM    Compound tretinoin 0.025% / azelaic acid 8% / niacinamide 2% cream. Apply a pea-sized amount to entire face qhs then moisturize    VALACYCLOVIR (VALTREX) 1000 MG TABLET    TAKE 2 TABLETS EVERY 12 HOURS FOR TWO DOSES AS NEEDED FOR COLD SORES    VENLAFAXINE (EFFEXOR-XR) 37.5 MG 24 HR CAPSULE    Take 1 capsule (37.5 mg total) by mouth once daily.         Allergies:   Review of patient's allergies indicates:   Allergen Reactions    Lasix [furosemide] Shortness Of Breath    Rosuvastatin      GERD (resolved when med stopped)    Tricyclic antidepressants and tricyclic compounds          Vitals   There were no vitals filed for this visit.       Labs/Imaging/Studies:   Recent Results (from the past 48 hours)   Rhythm strip    Collection Time: 06/10/25  8:10 AM  "  Result Value Ref Range    QRS Duration 76 ms    [facility] QTC Calculation 440 ms      No results found for: "PHENYTOIN", "PHENOBARB", "VALPROATE", "CBMZ"    Compliance: yes    Side effects: None    Risk Parameters:  Patient reports no suicidal ideation  Patient reports no homicidal ideation  Patient reports no self-injurious behavior  Patient reports no violent behavior    Exam (detailed: at least 9 elements; comprehensive: all 15 elements)   Constitutional  Vitals:  Most recent vital signs, dated less than 90 days prior to this appointment, were reviewed.   There were no vitals filed for this visit.     General:  unremarkable, age appropriate     Musculoskeletal  Muscle Strength/Tone:  not examined   Gait & Station:  Not examined     Psychiatric  Speech:  no latency; no press   Mood & Affect:  euthymic  congruent and appropriate   Thought Process:  normal and logical   Associations:  intact   Thought Content:  normal, no suicidality, no homicidality, delusions, or paranoia   Insight:  intact, has awareness of illness   Judgement: behavior is adequate to circumstances   Orientation:  grossly intact   Memory: intact for content of interview   Language: grossly intact   Attention Span & Concentration:  able to focus   Fund of Knowledge:  intact and appropriate to age and level of education     Assessment and Diagnosis   Status/Progress: Based on the examination today, the patient's problem(s) is/are adequately but not ideally controlled.  New problems have been presented today.   Co-morbidities, Diagnostic uncertainty and Lack of compliance are not complicating management of the primary condition.  There are no active rule-out diagnoses for this patient at this time.     General Impression:      ICD-10-CM ICD-9-CM   1. MDD (major depressive disorder), recurrent, in partial remission  F33.41 296.35   2. Generalized anxiety disorder with panic attacks  F41.1 300.02    F41.0 300.01       Intervention/Counseling/Treatment " Plan   Medication Management: Continue current medications. The risks and benefits of medication were discussed with the patient.    -continue effexor XR 37.5mg daily    -continue xanax 0.25mg q8h prn anxiety. Use sparingly as directed    -continue vitamin d3 2k iu once daily      The risks and benefits of medication were discussed with the patient.  Discussed diagnosis, risks and benefits of proposed treatment above vs alternative treatments vs no treatment, and potential side effects of these treatments. The patient expresses understanding of the above and displays the capacity to agree with this treatment given said understanding. Patient also agrees that, currently, the benefits outweigh the risks and would like to pursue treatment at this time.  Discussed inherent unpredictability of medications in each individual.   Encouraged Patient to keep future appointments.   Take medications as prescribed and abstain from substance abuse.   In the event of an emergency, patient was advised to go to the emergency room      Return to Clinic: 3 months        Kristofer Martinez PA-C      Total face to face time: 6 min  Total time (chart review, patient contact, documentation): 8 min      *This note has been prepared using a combination of a dictation device and typing.  It has been checked for errors but some errors may still exist within the note as a result of speech recognition errors and/or typographical errors.

## 2025-07-11 ENCOUNTER — CLINICAL SUPPORT (OUTPATIENT)
Dept: CARDIOLOGY | Facility: HOSPITAL | Age: 70
End: 2025-07-11
Payer: MEDICARE

## 2025-07-11 ENCOUNTER — CLINICAL SUPPORT (OUTPATIENT)
Dept: CARDIOLOGY | Facility: HOSPITAL | Age: 70
End: 2025-07-11
Attending: INTERNAL MEDICINE
Payer: MEDICARE

## 2025-07-11 DIAGNOSIS — I49.5 SICK SINUS SYNDROME: ICD-10-CM

## 2025-07-11 DIAGNOSIS — I48.0 PAROXYSMAL ATRIAL FIBRILLATION: ICD-10-CM

## 2025-07-11 DIAGNOSIS — Z95.0 PRESENCE OF CARDIAC PACEMAKER: ICD-10-CM

## 2025-07-11 PROCEDURE — 93294 REM INTERROG EVL PM/LDLS PM: CPT | Mod: ,,, | Performed by: INTERNAL MEDICINE

## 2025-07-11 PROCEDURE — 93296 REM INTERROG EVL PM/IDS: CPT | Performed by: INTERNAL MEDICINE

## 2025-07-16 LAB
OHS CV AF BURDEN PERCENT: < 1
OHS CV DC REMOTE DEVICE TYPE: NORMAL
OHS CV RV PACING PERCENT: 1 %

## 2025-07-26 DIAGNOSIS — L71.9 ROSACEA: ICD-10-CM

## 2025-07-28 ENCOUNTER — PATIENT MESSAGE (OUTPATIENT)
Dept: DERMATOLOGY | Facility: CLINIC | Age: 70
End: 2025-07-28
Payer: MEDICARE

## 2025-07-28 RX ORDER — TRETINOIN 0.25 MG/G
CREAM TOPICAL
Qty: 30 G | Refills: 0 | Status: SHIPPED | OUTPATIENT
Start: 2025-07-28

## 2025-07-31 ENCOUNTER — PATIENT MESSAGE (OUTPATIENT)
Dept: DERMATOLOGY | Facility: CLINIC | Age: 70
End: 2025-07-31
Payer: MEDICARE

## 2025-08-06 ENCOUNTER — OFFICE VISIT (OUTPATIENT)
Dept: URGENT CARE | Facility: CLINIC | Age: 70
End: 2025-08-06
Payer: MEDICARE

## 2025-08-06 VITALS
BODY MASS INDEX: 26.06 KG/M2 | DIASTOLIC BLOOD PRESSURE: 83 MMHG | SYSTOLIC BLOOD PRESSURE: 133 MMHG | OXYGEN SATURATION: 95 % | WEIGHT: 138 LBS | RESPIRATION RATE: 16 BRPM | HEIGHT: 61 IN | TEMPERATURE: 98 F | HEART RATE: 85 BPM

## 2025-08-06 DIAGNOSIS — S89.92XA INJURY OF LEFT KNEE, INITIAL ENCOUNTER: Primary | ICD-10-CM

## 2025-08-06 DIAGNOSIS — M17.12 OSTEOARTHRITIS OF LEFT KNEE, UNSPECIFIED OSTEOARTHRITIS TYPE: ICD-10-CM

## 2025-08-06 PROCEDURE — 99213 OFFICE O/P EST LOW 20 MIN: CPT | Mod: S$GLB,,, | Performed by: NURSE PRACTITIONER

## 2025-08-06 PROCEDURE — 73564 X-RAY EXAM KNEE 4 OR MORE: CPT | Mod: LT,S$GLB,, | Performed by: RADIOLOGY

## 2025-08-06 NOTE — PATIENT INSTRUCTIONS
Please drink plenty of fluids.  Please get plenty of rest.  Please return here or go to the Emergency Department for any concerns or worsening of condition.  If you were not prescribed an anti-inflammatory medication, and if you do not have any history of stomach/intestinal ulcers, or kidney disease, or are not taking a blood thinner such as Coumadin, Plavix, Pradaxa, Eloquis, or Xaralta for example, it is OK to take over the counter Ibuprofen or Advil or Motrin or Aleve as directed.  Do not take these medications on an empty stomach.  Rest, ice, compression and elevation to the affected joint or limb as needed.  Please follow up with your primary care doctor and Orthopedics.  A referral was placed for you, call [phone] to schedule appointment.      OTC Neoprene brace as discussed.  Restart your voltaren gel to the left knee.    If you  smoke, please stop smoking.

## 2025-08-06 NOTE — PROGRESS NOTES
"Subjective:      Patient ID: Trudi Mcknight is a 70 y.o. female.    Vitals:  height is 5' 1" (1.549 m) and weight is 62.6 kg (138 lb). Her oral temperature is 98.1 °F (36.7 °C). Her blood pressure is 133/83 and her pulse is 85. Her respiration is 16 and oxygen saturation is 95%.     Chief Complaint: Knee Injury    This is a 70 y.o. female who presents today with a chief complaint of 2 weeks ago having a fall and bruising her L knee. Pt is having trouble putting weight on her knee. Pt tripped over a rug. Denies other injury.  Pt took tylenol       Knee Injury  This is a new problem. The current episode started 1 to 4 weeks ago. The problem occurs constantly. The problem has been unchanged. Associated symptoms include arthralgias. Pertinent negatives include no abdominal pain, anorexia, change in bowel habit, chest pain, chills, congestion, coughing, diaphoresis, fatigue, fever, headaches, joint swelling, myalgias, nausea, neck pain, numbness, rash, sore throat, swollen glands, urinary symptoms, vertigo, visual change, vomiting or weakness.     Constitution: Negative for chills, sweating, fatigue and fever.   HENT:  Negative for congestion and sore throat.    Neck: Negative for neck pain.   Cardiovascular:  Negative for chest pain.   Respiratory:  Negative for cough.    Gastrointestinal:  Negative for abdominal pain, nausea and vomiting.   Musculoskeletal:  Positive for pain, trauma and joint pain. Negative for joint swelling, abnormal ROM of joint and muscle ache.   Skin:  Negative for rash and erythema.   Neurological:  Negative for history of vertigo, headaches and numbness.      Objective:     Physical Exam   Constitutional: She is oriented to person, place, and time. She appears well-developed.  Non-toxic appearance. She does not appear ill. No distress.   HENT:   Head: Normocephalic and atraumatic. Head is without abrasion, without contusion and without laceration.   Ears:   Right Ear: External ear normal. "   Left Ear: External ear normal.   Nose: Nose normal.   Mouth/Throat: Oropharynx is clear and moist and mucous membranes are normal.   Eyes: Conjunctivae, EOM and lids are normal. Pupils are equal, round, and reactive to light.   Neck: Trachea normal and phonation normal. Neck supple.   Cardiovascular: Normal rate, regular rhythm, normal heart sounds and normal pulses.   Pulmonary/Chest: Effort normal and breath sounds normal. No stridor. No respiratory distress.   Musculoskeletal: Normal range of motion.         General: Normal range of motion.      Left knee: She exhibits normal range of motion, no swelling, no effusion, no ecchymosis, no deformity, no laceration, no erythema, normal alignment, no LCL laxity, normal patellar mobility, no bony tenderness, normal meniscus and no MCL laxity. No tenderness found.     Instability Tests: anterior drawer test negative, posterior drawer test negative, anterior Lachman test negative, left knee negative medial Marek test and left knee negative lateral Marek test     Left foot: Normal.   Neurological: She is alert and oriented to person, place, and time.   Skin: Skin is warm, dry, intact, not diaphoretic and no rash. Capillary refill takes less than 2 seconds. not left kneeNo abrasion, No burn, No bruising, No erythema and No ecchymosis   Psychiatric: Her speech is normal and behavior is normal. Judgment and thought content normal.   Nursing note and vitals reviewed.    X-Ray Knee Complete 4 or More Views Left  Result Date: 8/6/2025  EXAMINATION: XR KNEE COMP 4 OR MORE VIEWS LEFT CLINICAL HISTORY: Unspecified injury of left lower leg, initial encounter TECHNIQUE: AP, Lateral, Sunrise and Tunnel views of the left knee were preformed COMPARISON: None FINDINGS: DJD with narrowing of the tibiofemoral joint spaces identified bilaterally.  No fracture or dislocation.  No bone destruction identified     See above Electronically signed by: Shadi Arellano MD Date:    08/06/2025  Time:    09:12        Assessment:     1. Injury of left knee, initial encounter    2. Osteoarthritis of left knee, unspecified osteoarthritis type        Plan:   Xray reviewed.     Injury of left knee, initial encounter  -     X-Ray Knee Complete 4 or More Views Left; Future; Expected date: 08/06/2025  -     Ambulatory referral/consult to Orthopedics    Osteoarthritis of left knee, unspecified osteoarthritis type

## 2025-08-08 ENCOUNTER — OFFICE VISIT (OUTPATIENT)
Dept: ORTHOPEDICS | Facility: CLINIC | Age: 70
End: 2025-08-08
Payer: MEDICARE

## 2025-08-08 VITALS — HEIGHT: 61 IN | BODY MASS INDEX: 26.1 KG/M2 | WEIGHT: 138.25 LBS

## 2025-08-08 DIAGNOSIS — M17.12 PRIMARY OSTEOARTHRITIS OF LEFT KNEE: ICD-10-CM

## 2025-08-08 DIAGNOSIS — S80.02XA CONTUSION OF LEFT KNEE, INITIAL ENCOUNTER: Primary | ICD-10-CM

## 2025-08-08 PROCEDURE — 99999 PR PBB SHADOW E&M-EST. PATIENT-LVL IV: CPT | Mod: PBBFAC,HCNC,, | Performed by: PHYSICIAN ASSISTANT

## 2025-08-08 RX ORDER — METHYLPREDNISOLONE ACETATE 80 MG/ML
80 INJECTION, SUSPENSION INTRA-ARTICULAR; INTRALESIONAL; INTRAMUSCULAR; SOFT TISSUE
Status: COMPLETED | OUTPATIENT
Start: 2025-08-08 | End: 2025-08-08

## 2025-08-08 RX ADMIN — METHYLPREDNISOLONE ACETATE 80 MG: 80 INJECTION, SUSPENSION INTRA-ARTICULAR; INTRALESIONAL; INTRAMUSCULAR; SOFT TISSUE at 03:08

## 2025-08-08 NOTE — PROGRESS NOTES
SUBJECTIVE:     Chief Complaint & History of Present Illness:  Trudi Mcknight is a New patient 70 y.o. female who is seen here today with a complaint of    Chief Complaint   Patient presents with    Left Knee - Pain    . History of Present Illness    - Left knee pain and instability following a fall at home.    - Presents with left knee pain following a fall at home while playing with grandson  - Tripped over a disturbed rug, bruising both knees  - Attributes fall to unstable ankles that make her prone to tripping  - Left knee now causes more significant problems with constant pain throughout  - Reports occasional shifting sensations, more localized to the front, which can either improve or worsen pain  - Walking is particularly challenging, and has difficulty with ambulation  - Initially tried Tylenol for pain management, which was ineffective  - Used Ibuprofen once, which provided some relief  - Medical history significant for atrial fibrillation, for which she takes Xarelto (a blood thinner)  - In the past, received cortisone injections for bursitis in shoulders following an accident when younger  - Denies any recent changes in medication or history of stroke    - Cortisone shots: Received for bursitis in the shoulders due to an accident as a child, last shot in 1973    - Student  - Starting as a freshman at [employer] next week       On a scale of 1-10, with 10 being worst pain imaginable, he rates this pain as 2 on good days and 7 on bad days.  she describes the pain as tender.    Review of patient's allergies indicates:   Allergen Reactions    Lasix [furosemide] Shortness Of Breath    Rosuvastatin      GERD (resolved when med stopped)    Tricyclic antidepressants and tricyclic compounds          Current Medications[1]    Past Medical History:   Diagnosis Date    Anticoagulant long-term use     Esophagitis     Hiatal hernia     Meniere's disease     Paroxysmal atrial fibrillation 03/07/2021    Shingles      Sick sinus syndrome 2022    s/p Dual chamber pacemaker    Thyroid disease        Past Surgical History:   Procedure Laterality Date    A-V CARDIAC PACEMAKER INSERTION N/A 2022    Procedure: INSERTION, CARDIAC PACEMAKER, DUAL CHAMBER;  Surgeon: Ernesto Duncan MD;  Location: [facility] EP LAB;  Service: Cardiology;  Laterality: N/A;  SB, DUAL PPM, SJM, ANES, SK, [id number]    ABLATION OF ARRHYTHMOGENIC FOCUS FOR ATRIAL FIBRILLATION N/A 2022    Procedure: Ablation atrial fibrillation;  Surgeon: Ernesto Duncan MD;  Location: [facility] EP LAB;  Service: Cardiology;  Laterality: N/A;  AF, RADHA (Cx if SR), PVI redo, RFA, ERIN, Gen, SK, 3 Prep *SJM PPM in situ*    BREAST CYST ASPIRATION           SECTION      COLONOSCOPY N/A 2019    Procedure: COLONOSCOPY;  Surgeon: INGRID Russo MD;  Location: [facility] ENDO (4TH FLR);  Service: Endoscopy;  Laterality: N/A;    COLONOSCOPY N/A 2024    Procedure: COLONOSCOPY;  Surgeon: Chelita Faust MD;  Location: [facility] ENDO ([facility] FLR);  Service: Endoscopy;  Laterality: N/A;  Xarelto hold pending  Pacemaker- [employer]   Pt states she should be having colonoscopy only not EGD  Referral: HUI Caraballo  [facility]  Inst portal  LW  ok to hold Xarelto 2 days per Dr Duncan/JOSE Mora RN-GT  -lvm for pre call-tb   Pre-call complete-ASul  9/10 edwina    deviated septum repair      HYSTERECTOMY  1985    lump removed from tongue      TONSILLECTOMY         Vital Signs (Most Recent)  There were no vitals filed for this visit.        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 1/20/22      Left flank pain 03/18/2022    Typical atrial flutter 02/04/2022    Bilateral pleural effusion 02/02/2022    Sick sinus syndrome 01/21/2022    Paroxysmal atrial fibrillation 01/17/2022    Neuralgia and neuritis, unspecified 03/07/2021    Hiatal hernia with GERD and esophagitis 03/07/2021    Disorder of thyroid, unspecified 03/07/2021    History of gastritis 08/03/2016    Esophagitis 12/15/2015                 OBJECTIVE:     PHYSICAL EXAM:  Height: 5' 1" (154.9 cm) Weight: 62.7 kg (138 lb 3.7 oz), General Appearance: Well nourished, well developed, in no acute distress.  Neurological: Mood & affect are normal.    left  Knee Exam:  Knee Range of Motion:limited by pain and 0-120 degrees flexion   Effusion:none  Condition of skin:intact  Location of tenderness:Patellar tendon   Strength:5 of 5  Stability:  Lachman: stable, LCL: stable, MCL: stable, PCL: stable, and posteromedial (dial): stable  Varus /Valgus stress:  normal  Marek:   negative/negative    right  Knee Exam:  Knee Range of Motion:0-125 degrees flexion   Effusion:none  Condition of skin:intact  Location of tenderness:None   Strength:5 of 5  Stability:  Lachman: stable, LCL: stable, MCL: stable, PCL: stable, and posteromedial (dial): stable  Varus /Valgus stress:  normal  Marek:   negative/negative      Hip Examination:  full painless range of motion, without tenderness    RADIOGRAPHS:  X-rays taken today films reviewed by me demonstrate no evidence of fracture " dislocation in or about the knees mild medial joint space narrowing with early sclerotic changes no osteophytic spurring no fracture dislocation or other bony abnormalities    ASSESSMENT/PLAN:       ICD-10-CM ICD-9-CM   1. Contusion of left knee, initial encounter  S80.02XA 924.11   2. Primary osteoarthritis of left knee  M17.12 715.16       Plan: We discussed with the patient at length all the different treatment options available for  the knee including anti-inflammatories, acetaminophen, rest, ice, knee strengthening exercise, occasional cortisone injections for temporary relief, Viscosupplimentation injections, arthroscopic debridement osteotomy, and finally knee arthroplasty.   Assessment & Plan    M23.209 Derangement of unspecified meniscus due to old tear or injury, unspecified knee  M24.271 Disorder of ligament, right ankle  M24.272 Disorder of ligament, left ankle  M17.9 Osteoarthritis of knee, unspecified  Z79.01 Long term (current) use of anticoagulants  I48.91 Unspecified atrial fibrillation  W19.XXXA Unspecified fall, initial encounter    PROCEDURES:  - Administered right knee corticosteroid injection today.  - Used numbing spray prior to injection.    FOLLOW UP:  - Contact the office if knee does not improve.    PATIENT INSTRUCTIONS:  - Expect numbness in the knee for the rest of the day.  - Anticipate possible aching in the evening as numbing medication wears off.  - Expect improvement over the next few days.         The risks, benefits, pros, cons, and potential side effects of the procedure were discussed with the patient in detail all questions were answered.  The patient is comfortable and willing to proceed with the procedure. Verbal consent was obtained and the proper joint was identified by the patient and provider        The injection site was identified and the skin was prepared with a chlorhexidine solution. The   left  knee was injected with 1 ml of Depo-Medrol and 5 ml Lidocaine under  sterile technique. Trudi Mcknight tolerated the procedure well, she was advised to rest the knee today, ice and elevation. she did receive immediate relief of the pain in and about his knee she was told this would be short lived and is secondary to the lidocaine. she may have an increase in his discomfort tonight followed by steady improvement over the next several days. I may take 1-3 weeks following the injection to get the full benefit of the medication.  I will see her back in 3-8 weeks. Sooner if he has any problems or concerns.         [1]   Current Outpatient Medications   Medication Sig Dispense Refill    albuterol (VENTOLIN HFA) 90 mcg/actuation inhaler Inhale 1-2 puffs into the lungs every 6 (six) hours as needed for Wheezing or Shortness of Breath. Rescue 6.7 g 0    ALPRAZolam (XANAX) 0.25 MG tablet Take 1 tablet (0.25 mg total) by mouth nightly as needed for Anxiety. 30 tablet 3    atorvastatin (LIPITOR) 10 MG tablet Take 1 tablet (10 mg total) by mouth 3 (three) times a week. 1 tablet Monday, Wednesday, and Friday 36 tablet 1    azelaic acid (FINACEA) 15 % Foam APPLY TO THE AFFECTED AREA(S) of face ONCE OR twice DAILY      AZELAIC ACID MISC APPLY TO THE AFFECTED AREA(S) of face ONCE OR twice DAILY      diclofenac sodium (VOLTAREN) 1 % Gel Apply 2 g topically 2 (two) times daily as needed (pain). 150 g 0    metroNIDAZOLE (NORITATE) 1 % cream Compound azelaic acid 15% + ivermectin 1% + metronidazole 1% cream. Apply to affected area on face once or twice daily. 30 g 5    rivaroxaban (XARELTO) 15 mg Tab Take 1 tablet (15 mg total) by mouth daily with dinner or evening meal. 90 tablet 1    sulfacetamide sodium-sulfur (SULFACLEANSE 8-4) 8-4 % Susp Wash face qhs 473 mL 3    tretinoin (RETIN-A) 0.025 % cream Compound tretinoin 0.025% / azelaic acid 8% / niacinamide 2% cream. Apply a pea-sized amount to entire face qhs then moisturize 30 g 5    tretinoin (RETIN-A) 0.025 % cream Compound tretinoin 0.025% /  azelaic acid 8% / niacinamide 2% cream. Apply a pea-sized amount to entire face qhs then moisturize 30 g 5    tretinoin (RETIN-A) 0.025 % cream Compound tretinoin 0.025% / azelaic acid 8% / niacinamide 2% cream. Apply a pea-sized amount to entire face qhs then moisturize 30 g 0    valACYclovir (VALTREX) 1000 MG tablet TAKE 2 TABLETS EVERY 12 HOURS FOR TWO DOSES AS NEEDED FOR COLD SORES 360 tablet 1    venlafaxine (EFFEXOR-XR) 37.5 MG 24 hr capsule Take 1 capsule (37.5 mg total) by mouth once daily. 90 capsule 1    omeprazole (PRILOSEC) 20 MG capsule Take 1 capsule (20 mg total) by mouth daily as needed (reflux). 30 capsule 11     No current facility-administered medications for this visit.

## 2025-08-12 ENCOUNTER — CLINICAL SUPPORT (OUTPATIENT)
Dept: OPHTHALMOLOGY | Facility: CLINIC | Age: 70
End: 2025-08-12
Payer: MEDICARE

## 2025-08-12 ENCOUNTER — OFFICE VISIT (OUTPATIENT)
Dept: OPHTHALMOLOGY | Facility: CLINIC | Age: 70
End: 2025-08-12
Payer: MEDICARE

## 2025-08-12 DIAGNOSIS — H33.321 RETINA HOLE, RIGHT: Primary | ICD-10-CM

## 2025-08-12 DIAGNOSIS — H43.11 VITREOUS HEMORRHAGE, RIGHT: ICD-10-CM

## 2025-08-12 DIAGNOSIS — H43.811 POSTERIOR VITREOUS DETACHMENT, RIGHT: ICD-10-CM

## 2025-08-12 PROCEDURE — 1101F PT FALLS ASSESS-DOCD LE1/YR: CPT | Mod: CPTII,HCNC,S$GLB, | Performed by: OPHTHALMOLOGY

## 2025-08-12 PROCEDURE — 1159F MED LIST DOCD IN RCRD: CPT | Mod: CPTII,HCNC,S$GLB, | Performed by: OPHTHALMOLOGY

## 2025-08-12 PROCEDURE — 92201 OPSCPY EXTND RTA DRAW UNI/BI: CPT | Mod: HCNC,S$GLB,, | Performed by: OPHTHALMOLOGY

## 2025-08-12 PROCEDURE — 3288F FALL RISK ASSESSMENT DOCD: CPT | Mod: CPTII,HCNC,S$GLB, | Performed by: OPHTHALMOLOGY

## 2025-08-12 PROCEDURE — 99999 PR PBB SHADOW E&M-EST. PATIENT-LVL III: CPT | Mod: PBBFAC,HCNC,, | Performed by: OPHTHALMOLOGY

## 2025-08-12 PROCEDURE — 1160F RVW MEDS BY RX/DR IN RCRD: CPT | Mod: CPTII,HCNC,S$GLB, | Performed by: OPHTHALMOLOGY

## 2025-08-12 PROCEDURE — 92014 COMPRE OPH EXAM EST PT 1/>: CPT | Mod: HCNC,S$GLB,, | Performed by: OPHTHALMOLOGY

## 2025-08-12 PROCEDURE — 92134 CPTRZ OPH DX IMG PST SGM RTA: CPT | Mod: HCNC,S$GLB,, | Performed by: OPHTHALMOLOGY

## 2025-08-12 PROCEDURE — 3044F HG A1C LEVEL LT 7.0%: CPT | Mod: CPTII,HCNC,S$GLB, | Performed by: OPHTHALMOLOGY

## 2025-08-19 ENCOUNTER — PATIENT MESSAGE (OUTPATIENT)
Dept: ADMINISTRATIVE | Facility: HOSPITAL | Age: 70
End: 2025-08-19
Payer: MEDICARE

## 2025-08-28 ENCOUNTER — LAB VISIT (OUTPATIENT)
Dept: LAB | Facility: HOSPITAL | Age: 70
End: 2025-08-28
Attending: PHYSICIAN ASSISTANT
Payer: MEDICARE

## 2025-08-28 DIAGNOSIS — E78.5 HYPERLIPIDEMIA, UNSPECIFIED HYPERLIPIDEMIA TYPE: ICD-10-CM

## 2025-08-28 LAB
ALBUMIN SERPL BCP-MCNC: 3.6 G/DL (ref 3.5–5.2)
ALP SERPL-CCNC: 96 UNIT/L (ref 40–150)
ALT SERPL W/O P-5'-P-CCNC: 11 UNIT/L (ref 0–55)
ANION GAP (OHS): 8 MMOL/L (ref 8–16)
AST SERPL-CCNC: 25 UNIT/L (ref 0–50)
BILIRUB SERPL-MCNC: 0.4 MG/DL (ref 0.1–1)
BUN SERPL-MCNC: 16 MG/DL (ref 8–23)
CALCIUM SERPL-MCNC: 9.1 MG/DL (ref 8.7–10.5)
CHLORIDE SERPL-SCNC: 108 MMOL/L (ref 95–110)
CHOLEST SERPL-MCNC: 147 MG/DL (ref 120–199)
CHOLEST/HDLC SERPL: 2.5 {RATIO} (ref 2–5)
CO2 SERPL-SCNC: 25 MMOL/L (ref 23–29)
CREAT SERPL-MCNC: 1 MG/DL (ref 0.5–1.4)
GFR SERPLBLD CREATININE-BSD FMLA CKD-EPI: >60 ML/MIN/1.73/M2
GLUCOSE SERPL-MCNC: 79 MG/DL (ref 70–110)
HDLC SERPL-MCNC: 59 MG/DL (ref 40–75)
HDLC SERPL: 40.1 % (ref 20–50)
LDLC SERPL CALC-MCNC: 75.2 MG/DL (ref 63–159)
NONHDLC SERPL-MCNC: 88 MG/DL
POTASSIUM SERPL-SCNC: 4.8 MMOL/L (ref 3.5–5.1)
PROT SERPL-MCNC: 6.8 GM/DL (ref 6–8.4)
SODIUM SERPL-SCNC: 141 MMOL/L (ref 136–145)
TRIGL SERPL-MCNC: 64 MG/DL (ref 30–150)

## 2025-08-28 PROCEDURE — 36415 COLL VENOUS BLD VENIPUNCTURE: CPT

## 2025-08-28 PROCEDURE — 82040 ASSAY OF SERUM ALBUMIN: CPT | Mod: HCNC

## 2025-08-28 PROCEDURE — 80061 LIPID PANEL: CPT | Mod: HCNC

## (undated) DEVICE — PACK EP DRAPE

## (undated) DEVICE — CATH SUPREME QPLR CRD-2 6F 120

## (undated) DEVICE — INTRO FAST-CATH SL1 8.5FR 63CM

## (undated) DEVICE — PAD DEFIB CADENCE ADULT R2

## (undated) DEVICE — CATH ACUSON ACUNAV 8FR

## (undated) DEVICE — COVER DRAPE ACUSON STERILE

## (undated) DEVICE — GUIDEWIRE EMERALD 150CM PTFE

## (undated) DEVICE — PACK EP DRAPE OMC

## (undated) DEVICE — R INTRO AGILS MED CRL 8.5F71CM

## (undated) DEVICE — DRAPE INCISE IOBAN 2 23X17IN

## (undated) DEVICE — CABLE COAXIAL UMBILICAL

## (undated) DEVICE — SHEATH SAFESHEATH II ULTRA 6FR

## (undated) DEVICE — GUIDEWIRE TORAY INOUE

## (undated) DEVICE — PACK PACER PERMANENT

## (undated) DEVICE — PAD GROUND UNIV STYLE CORD 9IN

## (undated) DEVICE — ADHESIVE DERMABOND ADVANCED

## (undated) DEVICE — CATH BIDIRECTIONAL DF CRV 7FR

## (undated) DEVICE — SET TUBING COOL POINT IRR

## (undated) DEVICE — CATH ACHIEVE ADVANCE 20MM

## (undated) DEVICE — CUTTER LEAD

## (undated) DEVICE — KIT ENSITE ELECTRODE SURFACE

## (undated) DEVICE — NDL TRNSSPTL BRK-1 18GA 71CM

## (undated) DEVICE — INTRODUCER HEMOSTASIS 7.5F

## (undated) DEVICE — REPROCESSED CATH ACUNAV 8FR

## (undated) DEVICE — ELECTRODE REM PLYHSV RETURN 9

## (undated) DEVICE — KIT PROBE COVER WITH GEL

## (undated) DEVICE — PAD RADI FEMORAL

## (undated) DEVICE — SHEATH HEMOSTASIS 8.5FR

## (undated) DEVICE — DIALATOR COONS TAPER 12F 20CM

## (undated) DEVICE — SLING SWATHE UNIVERSAL FOAM

## (undated) DEVICE — GUIDEWIRE AMPLATZ XSTIFF 180CM

## (undated) DEVICE — SHEATH FLEXCATH STEERABLE 12F

## (undated) DEVICE — LINE PRESSURE MONITORING 96IN

## (undated) DEVICE — CABLE ELECTRICAL UMBILICAL

## (undated) DEVICE — CATH TACTICATH ABLAT BIDIR F-J

## (undated) DEVICE — KIT CUSTOM MANIFOLD

## (undated) DEVICE — DRAPE ANGIO FEM 13X14.75IN

## (undated) DEVICE — SEE MEDLINE ITEM 107746

## (undated) DEVICE — BOWL FLUID - BACK STOP

## (undated) DEVICE — CATH MAP BI-D HD SENSOR ENABLE

## (undated) DEVICE — CATH ARCTIC FRONT ADVANCE 28MM